# Patient Record
Sex: MALE | Race: BLACK OR AFRICAN AMERICAN | NOT HISPANIC OR LATINO | Employment: OTHER | URBAN - METROPOLITAN AREA
[De-identification: names, ages, dates, MRNs, and addresses within clinical notes are randomized per-mention and may not be internally consistent; named-entity substitution may affect disease eponyms.]

---

## 2023-05-30 PROBLEM — K42.9 UMBILICAL HERNIA: Status: ACTIVE | Noted: 2023-05-30

## 2023-05-30 PROBLEM — I10 BENIGN HYPERTENSION: Status: ACTIVE | Noted: 2023-05-30

## 2023-05-30 PROBLEM — R43.2 LOSS OF TASTE: Status: ACTIVE | Noted: 2023-05-30

## 2023-05-30 PROBLEM — L91.8 CUTANEOUS SKIN TAGS: Status: ACTIVE | Noted: 2023-05-30

## 2023-05-30 PROBLEM — R59.0 CERVICAL LYMPHADENOPATHY: Status: ACTIVE | Noted: 2023-05-30

## 2023-05-30 PROBLEM — R53.83 FATIGUE: Status: ACTIVE | Noted: 2023-05-30

## 2023-05-30 PROBLEM — R03.0 ELEVATED BLOOD PRESSURE READING: Status: ACTIVE | Noted: 2023-05-30

## 2023-05-30 PROBLEM — E55.9 VITAMIN D DEFICIENCY: Status: ACTIVE | Noted: 2023-05-30

## 2023-05-30 PROBLEM — J30.9 ALLERGIC RHINITIS: Status: ACTIVE | Noted: 2023-05-30

## 2023-05-30 RX ORDER — ERGOCALCIFEROL 1.25 MG/1
50000 CAPSULE ORAL
COMMUNITY
Start: 2021-03-08 | End: 2023-05-31 | Stop reason: ENTERED-IN-ERROR

## 2023-05-30 RX ORDER — AMLODIPINE BESYLATE 5 MG/1
1 TABLET ORAL DAILY
COMMUNITY
Start: 2022-05-23 | End: 2023-05-31 | Stop reason: ENTERED-IN-ERROR

## 2023-05-30 RX ORDER — SODIUM PICOSULFATE, MAGNESIUM OXIDE, AND ANHYDROUS CITRIC ACID 10; 3.5; 12 MG/160ML; G/160ML; G/160ML
LIQUID ORAL
COMMUNITY
Start: 2021-03-19 | End: 2023-05-31 | Stop reason: ENTERED-IN-ERROR

## 2023-05-30 RX ORDER — POLYETHYLENE GLYCOL 3350, SODIUM CHLORIDE, SODIUM BICARBONATE, POTASSIUM CHLORIDE 420; 11.2; 5.72; 1.48 G/4L; G/4L; G/4L; G/4L
POWDER, FOR SOLUTION ORAL
COMMUNITY
Start: 2022-06-02 | End: 2023-05-31 | Stop reason: ENTERED-IN-ERROR

## 2023-05-30 RX ORDER — FLUTICASONE PROPIONATE 50 MCG
1 SPRAY, SUSPENSION (ML) NASAL 2 TIMES DAILY
COMMUNITY
Start: 2022-05-23 | End: 2023-06-22 | Stop reason: ALTCHOICE

## 2023-05-31 ENCOUNTER — OFFICE VISIT (OUTPATIENT)
Dept: PRIMARY CARE | Facility: CLINIC | Age: 68
End: 2023-05-31
Payer: MEDICARE

## 2023-05-31 VITALS
HEIGHT: 66 IN | WEIGHT: 210 LBS | HEART RATE: 67 BPM | BODY MASS INDEX: 33.75 KG/M2 | SYSTOLIC BLOOD PRESSURE: 120 MMHG | DIASTOLIC BLOOD PRESSURE: 88 MMHG

## 2023-05-31 DIAGNOSIS — L50.6 ALLERGIC CONTACT URTICARIA: ICD-10-CM

## 2023-05-31 DIAGNOSIS — K42.9 UMBILICAL HERNIA WITHOUT OBSTRUCTION AND WITHOUT GANGRENE: ICD-10-CM

## 2023-05-31 DIAGNOSIS — I10 BENIGN HYPERTENSION: ICD-10-CM

## 2023-05-31 DIAGNOSIS — Z12.5 PROSTATE CANCER SCREENING: ICD-10-CM

## 2023-05-31 DIAGNOSIS — Z00.00 ROUTINE GENERAL MEDICAL EXAMINATION AT HEALTH CARE FACILITY: Primary | ICD-10-CM

## 2023-05-31 LAB
ALANINE AMINOTRANSFERASE (SGPT) (U/L) IN SER/PLAS: 9 U/L (ref 10–52)
ALBUMIN (G/DL) IN SER/PLAS: 4.4 G/DL (ref 3.4–5)
ALKALINE PHOSPHATASE (U/L) IN SER/PLAS: 85 U/L (ref 33–136)
ANION GAP IN SER/PLAS: 13 MMOL/L (ref 10–20)
ASPARTATE AMINOTRANSFERASE (SGOT) (U/L) IN SER/PLAS: 11 U/L (ref 9–39)
BASOPHILS (10*3/UL) IN BLOOD BY AUTOMATED COUNT: 0.11 X10E9/L (ref 0–0.1)
BASOPHILS/100 LEUKOCYTES IN BLOOD BY AUTOMATED COUNT: 0.6 % (ref 0–2)
BILIRUBIN TOTAL (MG/DL) IN SER/PLAS: 0.7 MG/DL (ref 0–1.2)
CALCIUM (MG/DL) IN SER/PLAS: 9.1 MG/DL (ref 8.6–10.6)
CARBON DIOXIDE, TOTAL (MMOL/L) IN SER/PLAS: 22 MMOL/L (ref 21–32)
CHLORIDE (MMOL/L) IN SER/PLAS: 104 MMOL/L (ref 98–107)
CHOLESTEROL (MG/DL) IN SER/PLAS: 92 MG/DL (ref 0–199)
CHOLESTEROL IN HDL (MG/DL) IN SER/PLAS: 35.8 MG/DL
CHOLESTEROL/HDL RATIO: 2.6
CREATININE (MG/DL) IN SER/PLAS: 1.03 MG/DL (ref 0.5–1.3)
EOSINOPHILS (10*3/UL) IN BLOOD BY AUTOMATED COUNT: 2.7 X10E9/L (ref 0–0.7)
EOSINOPHILS/100 LEUKOCYTES IN BLOOD BY AUTOMATED COUNT: 15 % (ref 0–6)
ERYTHROCYTE DISTRIBUTION WIDTH (RATIO) BY AUTOMATED COUNT: 13.7 % (ref 11.5–14.5)
ERYTHROCYTE MEAN CORPUSCULAR HEMOGLOBIN CONCENTRATION (G/DL) BY AUTOMATED: 31.3 G/DL (ref 32–36)
ERYTHROCYTE MEAN CORPUSCULAR VOLUME (FL) BY AUTOMATED COUNT: 97 FL (ref 80–100)
ERYTHROCYTES (10*6/UL) IN BLOOD BY AUTOMATED COUNT: 4.31 X10E12/L (ref 4.5–5.9)
GFR MALE: 79 ML/MIN/1.73M2
GLUCOSE (MG/DL) IN SER/PLAS: 64 MG/DL (ref 74–99)
HEMATOCRIT (%) IN BLOOD BY AUTOMATED COUNT: 41.6 % (ref 41–52)
HEMOGLOBIN (G/DL) IN BLOOD: 13 G/DL (ref 13.5–17.5)
IMMATURE GRANULOCYTES/100 LEUKOCYTES IN BLOOD BY AUTOMATED COUNT: 1.7 % (ref 0–0.9)
LDL: 44 MG/DL (ref 0–99)
LEUKOCYTES (10*3/UL) IN BLOOD BY AUTOMATED COUNT: 18 X10E9/L (ref 4.4–11.3)
LYMPHOCYTES (10*3/UL) IN BLOOD BY AUTOMATED COUNT: 1.67 X10E9/L (ref 1.2–4.8)
LYMPHOCYTES/100 LEUKOCYTES IN BLOOD BY AUTOMATED COUNT: 9.3 % (ref 13–44)
MONOCYTES (10*3/UL) IN BLOOD BY AUTOMATED COUNT: 2.52 X10E9/L (ref 0.1–1)
MONOCYTES/100 LEUKOCYTES IN BLOOD BY AUTOMATED COUNT: 14 % (ref 2–10)
NEUTROPHILS (10*3/UL) IN BLOOD BY AUTOMATED COUNT: 10.66 X10E9/L (ref 1.2–7.7)
NEUTROPHILS/100 LEUKOCYTES IN BLOOD BY AUTOMATED COUNT: 59.4 % (ref 40–80)
NRBC (PER 100 WBCS) BY AUTOMATED COUNT: 0 /100 WBC (ref 0–0)
PLATELETS (10*3/UL) IN BLOOD AUTOMATED COUNT: 234 X10E9/L (ref 150–450)
POTASSIUM (MMOL/L) IN SER/PLAS: 3.7 MMOL/L (ref 3.5–5.3)
PROSTATE SPECIFIC ANTIGEN,SCREEN: 0.66 NG/ML (ref 0–4)
PROTEIN TOTAL: 7.6 G/DL (ref 6.4–8.2)
SODIUM (MMOL/L) IN SER/PLAS: 135 MMOL/L (ref 136–145)
TRIGLYCERIDE (MG/DL) IN SER/PLAS: 60 MG/DL (ref 0–149)
UREA NITROGEN (MG/DL) IN SER/PLAS: 12 MG/DL (ref 6–23)
VLDL: 12 MG/DL (ref 0–40)

## 2023-05-31 PROCEDURE — 1160F RVW MEDS BY RX/DR IN RCRD: CPT | Performed by: NURSE PRACTITIONER

## 2023-05-31 PROCEDURE — 1123F ACP DISCUSS/DSCN MKR DOCD: CPT | Performed by: NURSE PRACTITIONER

## 2023-05-31 PROCEDURE — 84153 ASSAY OF PSA TOTAL: CPT

## 2023-05-31 PROCEDURE — G0439 PPPS, SUBSEQ VISIT: HCPCS | Performed by: NURSE PRACTITIONER

## 2023-05-31 PROCEDURE — 80053 COMPREHEN METABOLIC PANEL: CPT

## 2023-05-31 PROCEDURE — 1170F FXNL STATUS ASSESSED: CPT | Performed by: NURSE PRACTITIONER

## 2023-05-31 PROCEDURE — 3074F SYST BP LT 130 MM HG: CPT | Performed by: NURSE PRACTITIONER

## 2023-05-31 PROCEDURE — 3079F DIAST BP 80-89 MM HG: CPT | Performed by: NURSE PRACTITIONER

## 2023-05-31 PROCEDURE — 1036F TOBACCO NON-USER: CPT | Performed by: NURSE PRACTITIONER

## 2023-05-31 PROCEDURE — 80061 LIPID PANEL: CPT

## 2023-05-31 PROCEDURE — G0444 DEPRESSION SCREEN ANNUAL: HCPCS | Performed by: NURSE PRACTITIONER

## 2023-05-31 PROCEDURE — 99214 OFFICE O/P EST MOD 30 MIN: CPT | Performed by: NURSE PRACTITIONER

## 2023-05-31 PROCEDURE — 1159F MED LIST DOCD IN RCRD: CPT | Performed by: NURSE PRACTITIONER

## 2023-05-31 PROCEDURE — 85025 COMPLETE CBC W/AUTO DIFF WBC: CPT

## 2023-05-31 PROCEDURE — 82652 VIT D 1 25-DIHYDROXY: CPT

## 2023-05-31 RX ORDER — PREDNISONE 10 MG/1
TABLET ORAL
Qty: 30 TABLET | Refills: 0 | Status: SHIPPED | OUTPATIENT
Start: 2023-05-31 | End: 2023-06-21 | Stop reason: ALTCHOICE

## 2023-05-31 RX ORDER — AMLODIPINE BESYLATE 5 MG/1
5 TABLET ORAL DAILY
Qty: 90 TABLET | Refills: 1 | Status: SHIPPED | OUTPATIENT
Start: 2023-05-31 | End: 2023-11-07 | Stop reason: ALTCHOICE

## 2023-05-31 RX ORDER — CETIRIZINE HYDROCHLORIDE 10 MG/1
10 TABLET ORAL DAILY
Qty: 30 TABLET | Refills: 2 | Status: SHIPPED | OUTPATIENT
Start: 2023-05-31 | End: 2023-06-02

## 2023-05-31 ASSESSMENT — ACTIVITIES OF DAILY LIVING (ADL)
DOING_HOUSEWORK: INDEPENDENT
BATHING: INDEPENDENT
EATING: INDEPENDENT
WALKS IN HOME: INDEPENDENT
TAKING MEDICATION: INDEPENDENT
GROCERY SHOPPING: INDEPENDENT
MANAGING_FINANCES: INDEPENDENT
PREPARING MEALS: INDEPENDENT
STIL DRIVING: YES
DRESSING: INDEPENDENT
PILL BOX USED: NO
USING TELEPHONE: INDEPENDENT
HEARING - LEFT EAR: FUNCTIONAL
USING TRANSPORTATION: INDEPENDENT
HEARING - RIGHT EAR: FUNCTIONAL
FEEDING YOURSELF: INDEPENDENT
BATHING: INDEPENDENT
ADEQUATE_TO_COMPLETE_ADL: YES
DRESSING YOURSELF: INDEPENDENT
NEEDS ASSISTANCE WITH FOOD: INDEPENDENT
JUDGMENT_ADEQUATE_SAFELY_COMPLETE_DAILY_ACTIVITIES: YES
TOILETING: INDEPENDENT
MANAGING FINANCES: INDEPENDENT
TAKING_MEDICATION: INDEPENDENT
PATIENT'S MEMORY ADEQUATE TO SAFELY COMPLETE DAILY ACTIVITIES?: YES
DOING HOUSEWORK: INDEPENDENT
GROCERY_SHOPPING: INDEPENDENT
GROOMING: INDEPENDENT

## 2023-05-31 ASSESSMENT — ENCOUNTER SYMPTOMS
OCCASIONAL FEELINGS OF UNSTEADINESS: 0
DEPRESSION: 0
LOSS OF SENSATION IN FEET: 0
URTICARIA: 1

## 2023-05-31 ASSESSMENT — PATIENT HEALTH QUESTIONNAIRE - PHQ9
SUM OF ALL RESPONSES TO PHQ9 QUESTIONS 1 AND 2: 0
2. FEELING DOWN, DEPRESSED OR HOPELESS: NOT AT ALL
1. LITTLE INTEREST OR PLEASURE IN DOING THINGS: NOT AT ALL
1. LITTLE INTEREST OR PLEASURE IN DOING THINGS: NOT AT ALL
SUM OF ALL RESPONSES TO PHQ9 QUESTIONS 1 AND 2: 0
2. FEELING DOWN, DEPRESSED OR HOPELESS: NOT AT ALL

## 2023-05-31 ASSESSMENT — ANXIETY QUESTIONNAIRES
6. BECOMING EASILY ANNOYED OR IRRITABLE: NOT AT ALL
1. FEELING NERVOUS, ANXIOUS, OR ON EDGE: NOT AT ALL
2. NOT BEING ABLE TO STOP OR CONTROL WORRYING: NOT AT ALL
7. FEELING AFRAID AS IF SOMETHING AWFUL MIGHT HAPPEN: NOT AT ALL
5. BEING SO RESTLESS THAT IT IS HARD TO SIT STILL: NOT AT ALL
GAD7 TOTAL SCORE: 0
4. TROUBLE RELAXING: NOT AT ALL
3. WORRYING TOO MUCH ABOUT DIFFERENT THINGS: NOT AT ALL

## 2023-05-31 ASSESSMENT — COLUMBIA-SUICIDE SEVERITY RATING SCALE - C-SSRS
2. HAVE YOU ACTUALLY HAD ANY THOUGHTS OF KILLING YOURSELF?: NO
6. HAVE YOU EVER DONE ANYTHING, STARTED TO DO ANYTHING, OR PREPARED TO DO ANYTHING TO END YOUR LIFE?: NO
1. IN THE PAST MONTH, HAVE YOU WISHED YOU WERE DEAD OR WISHED YOU COULD GO TO SLEEP AND NOT WAKE UP?: NO

## 2023-05-31 NOTE — PROGRESS NOTES
"Subjective   Reason for Visit: Jin Reynolds is an 68 y.o. male here for a Medicare Wellness visit.          Reviewed all medications by prescribing practitioner or clinical pharmacist (such as prescriptions, OTCs, herbal therapies and supplements) and documented in the medical record.    Jin is a 68 year old male who presents to the office today for a Medicare Wellness visit. Stated he developed allergies/hives approximately one week ago. Has itchy hives.    Umbilical hernia has gotten large. He denied pain with hernia.    Hives    Rash  This is a new problem. The current episode started in the past 7 days. The problem has been gradually improving since onset. The rash is diffuse. The rash is characterized by redness, swelling and itchiness. It is unknown if there was an exposure to a precipitant.       Patient Care Team:  ROSA Bone-ROLY as PCP - General  Clive Hernandez MD as PCP - [employer] PCP     Review of Systems   Skin:  Positive for rash.   All other systems reviewed and are negative.      Objective   Vitals:  BP (!) 172/100   Pulse 67   Ht 1.676 m (5' 6\")   Wt 95.3 kg (210 lb)   BMI 33.89 kg/m²       Physical Exam  Constitutional:       Appearance: Normal appearance.   HENT:      Head: Normocephalic and atraumatic.      Right Ear: Tympanic membrane, ear canal and external ear normal.      Left Ear: Tympanic membrane, ear canal and external ear normal.      Nose: Nose normal.      Mouth/Throat:      Mouth: Mucous membranes are moist.      Pharynx: Oropharynx is clear.   Eyes:      Extraocular Movements: Extraocular movements intact.      Conjunctiva/sclera: Conjunctivae normal.      Pupils: Pupils are equal, round, and reactive to light.   Cardiovascular:      Rate and Rhythm: Normal rate and regular rhythm.   Pulmonary:      Effort: Pulmonary effort is normal.      Breath sounds: Normal breath sounds.   Abdominal:      General: Abdomen is flat. Bowel sounds are normal.      " Palpations: Abdomen is soft.   Musculoskeletal:         General: Normal range of motion.      Cervical back: Normal range of motion.   Skin:     General: Skin is warm and dry.      Comments: Hive like rash to face, posterior neck, arms and thighs.    Neurological:      General: No focal deficit present.      Mental Status: He is alert and oriented to person, place, and time. Mental status is at baseline.   Psychiatric:         Mood and Affect: Mood normal.         Behavior: Behavior normal.         Thought Content: Thought content normal.         Judgment: Judgment normal.         Assessment/Plan   Problem List Items Addressed This Visit          Circulatory    Benign hypertension    Relevant Medications    amLODIPine (Norvasc) 5 mg tablet    Other Relevant Orders    CBC and Auto Differential    Comprehensive Metabolic Panel    Lipid Panel       Digestive    Umbilical hernia    Relevant Orders    Referral to General Surgery     Other Visit Diagnoses       Routine general medical examination at health care facility    -  Primary    Relevant Orders    Vitamin D 1,25 Dihydroxy    Allergic contact urticaria        Relevant Medications    cetirizine (ZyrTEC) 10 mg tablet    predniSONE (Deltasone) 10 mg tablet    Prostate cancer screening        Relevant Orders    Prostate Specific Antigen, Screen        1) Hypertension: BP rechecked 120/88. Restart Amlodipine 5 mg daily. Monitor daily sodium intake. Blood work ordered.    2) Umbilical hernia: referral placed for general surgery (Dr. Vergara); patient known to Dr. Vergara.    3) Hives: likely allergic dermatitis. You were prescribed tapering dose of Prednisone and Cetirizine.     4) Medicare wellness: discussed living will/healthcare POA. You were provided with forms to complete and bring copy for your chart once completed.

## 2023-06-02 RX ORDER — HYDROXYZINE HYDROCHLORIDE 25 MG/1
25 TABLET, FILM COATED ORAL 2 TIMES DAILY
Qty: 30 TABLET | Refills: 0 | Status: SHIPPED | OUTPATIENT
Start: 2023-06-02 | End: 2023-06-21 | Stop reason: ALTCHOICE

## 2023-06-03 LAB — VITAMIN D 1,25-DIHYDROXY: 53.4 PG/ML (ref 19.9–79.3)

## 2023-06-09 ENCOUNTER — TELEPHONE (OUTPATIENT)
Dept: PRIMARY CARE | Facility: CLINIC | Age: 68
End: 2023-06-09
Payer: MEDICARE

## 2023-06-09 NOTE — TELEPHONE ENCOUNTER
Pt daughter wanted you to know he is admitted at [facility] with a blood infection and you can call her with any questions.

## 2023-06-16 ENCOUNTER — PATIENT OUTREACH (OUTPATIENT)
Dept: PRIMARY CARE | Facility: CLINIC | Age: 68
End: 2023-06-16
Payer: MEDICARE

## 2023-06-16 DIAGNOSIS — K92.2 GASTROINTESTINAL HEMORRHAGE, UNSPECIFIED GASTROINTESTINAL HEMORRHAGE TYPE: ICD-10-CM

## 2023-06-16 RX ORDER — PANTOPRAZOLE SODIUM 40 MG/1
40 TABLET, DELAYED RELEASE ORAL
COMMUNITY

## 2023-06-16 RX ORDER — FOLIC ACID 1 MG/1
1 TABLET ORAL DAILY
COMMUNITY
End: 2023-11-07 | Stop reason: ALTCHOICE

## 2023-06-16 NOTE — PROGRESS NOTES
Discharge Facility:[facility]  Discharge Diagnosis:GI Bleed  Admission Date:6/9/23  Discharge Date: 6/15/23    PCP Appointment Date:6/21/23  Specialist Appointment Date: GI 7/17/23/ Cardiology   Hospital Encounter and Summary: Linked   See discharge assessment below for further details  Engagement  Call Start Time: 1224 (6/16/2023 12:35 PM)    Medications  Medications reviewed with patient/caregiver?: Yes (6/16/2023 12:35 PM)  Is the patient having any side effects they believe may be caused by any medication additions or changes?: No (6/16/2023 12:35 PM)  Does the patient have all medications ordered at discharge?: Yes (6/16/2023 12:35 PM)  Prescription Comments: see med list (6/16/2023 12:35 PM)  Is the patient taking all medications as directed (includes completed medication regime)?: Yes (6/16/2023 12:35 PM)  Medication Comments: see med list (6/16/2023 12:35 PM)    Appointments  Does the patient have a primary care provider?: Yes (6/16/2023 12:35 PM)  Care Management Interventions: Verified appointment date/time/provider; Educated patient on importance of making appointment (Patient has a follow up visit) (6/16/2023 12:35 PM)  Has the patient kept scheduled appointments due by today?: Yes (6/16/2023 12:35 PM)  Care Management Interventions: Advised patient to keep appointment; Educated on importance of keeping appointment (6/16/2023 12:35 PM)    Patient Teaching  Does the patient have access to their discharge instructions?: Yes (6/16/2023 12:35 PM)  Care Management Interventions: Reviewed instructions with patient (6/16/2023 12:35 PM)  What is the patient's perception of their health status since discharge?: Improving (6/16/2023 12:35 PM)  Is the patient/caregiver able to teach back the hierarchy of who to call/visit for symptoms/problems? PCP, Specialist, Home Health nurse, Urgent Care, ED, 911: Yes (6/16/2023 12:35 PM)

## 2023-06-21 ENCOUNTER — OFFICE VISIT (OUTPATIENT)
Dept: PRIMARY CARE | Facility: CLINIC | Age: 68
End: 2023-06-21
Payer: MEDICARE

## 2023-06-21 VITALS
BODY MASS INDEX: 29.4 KG/M2 | SYSTOLIC BLOOD PRESSURE: 140 MMHG | WEIGHT: 194 LBS | DIASTOLIC BLOOD PRESSURE: 80 MMHG | HEIGHT: 68 IN

## 2023-06-21 DIAGNOSIS — I10 BENIGN HYPERTENSION: Primary | ICD-10-CM

## 2023-06-21 DIAGNOSIS — K25.4 GASTROINTESTINAL HEMORRHAGE ASSOCIATED WITH GASTRIC ULCER: ICD-10-CM

## 2023-06-21 DIAGNOSIS — D72.829 LEUKOCYTOSIS, UNSPECIFIED TYPE: ICD-10-CM

## 2023-06-21 DIAGNOSIS — R79.89 ELEVATED SERUM CREATININE: ICD-10-CM

## 2023-06-21 LAB
ALANINE AMINOTRANSFERASE (SGPT) (U/L) IN SER/PLAS: 10 U/L (ref 10–52)
ALBUMIN (G/DL) IN SER/PLAS: 4.1 G/DL (ref 3.4–5)
ALKALINE PHOSPHATASE (U/L) IN SER/PLAS: 123 U/L (ref 33–136)
ANION GAP IN SER/PLAS: 14 MMOL/L (ref 10–20)
ASPARTATE AMINOTRANSFERASE (SGOT) (U/L) IN SER/PLAS: 10 U/L (ref 9–39)
BASOPHILS (10*3/UL) IN BLOOD BY AUTOMATED COUNT: 0.14 X10E9/L (ref 0–0.1)
BASOPHILS/100 LEUKOCYTES IN BLOOD BY AUTOMATED COUNT: 1 % (ref 0–2)
BILIRUBIN TOTAL (MG/DL) IN SER/PLAS: 0.4 MG/DL (ref 0–1.2)
CALCIUM (MG/DL) IN SER/PLAS: 9.2 MG/DL (ref 8.6–10.6)
CARBON DIOXIDE, TOTAL (MMOL/L) IN SER/PLAS: 22 MMOL/L (ref 21–32)
CHLORIDE (MMOL/L) IN SER/PLAS: 105 MMOL/L (ref 98–107)
CREATININE (MG/DL) IN SER/PLAS: 1.18 MG/DL (ref 0.5–1.3)
EOSINOPHILS (10*3/UL) IN BLOOD BY AUTOMATED COUNT: 1.63 X10E9/L (ref 0–0.7)
EOSINOPHILS/100 LEUKOCYTES IN BLOOD BY AUTOMATED COUNT: 11.5 % (ref 0–6)
ERYTHROCYTE DISTRIBUTION WIDTH (RATIO) BY AUTOMATED COUNT: 15.8 % (ref 11.5–14.5)
ERYTHROCYTE MEAN CORPUSCULAR HEMOGLOBIN CONCENTRATION (G/DL) BY AUTOMATED: 29.9 G/DL (ref 32–36)
ERYTHROCYTE MEAN CORPUSCULAR VOLUME (FL) BY AUTOMATED COUNT: 96 FL (ref 80–100)
ERYTHROCYTES (10*6/UL) IN BLOOD BY AUTOMATED COUNT: 3.79 X10E12/L (ref 4.5–5.9)
GFR MALE: 67 ML/MIN/1.73M2
GLUCOSE (MG/DL) IN SER/PLAS: 84 MG/DL (ref 74–99)
HEMATOCRIT (%) IN BLOOD BY AUTOMATED COUNT: 36.4 % (ref 41–52)
HEMOGLOBIN (G/DL) IN BLOOD: 10.9 G/DL (ref 13.5–17.5)
IMMATURE GRANULOCYTES/100 LEUKOCYTES IN BLOOD BY AUTOMATED COUNT: 0.4 % (ref 0–0.9)
LEUKOCYTES (10*3/UL) IN BLOOD BY AUTOMATED COUNT: 14.1 X10E9/L (ref 4.4–11.3)
LYMPHOCYTES (10*3/UL) IN BLOOD BY AUTOMATED COUNT: 1.45 X10E9/L (ref 1.2–4.8)
LYMPHOCYTES/100 LEUKOCYTES IN BLOOD BY AUTOMATED COUNT: 10.3 % (ref 13–44)
MONOCYTES (10*3/UL) IN BLOOD BY AUTOMATED COUNT: 2.09 X10E9/L (ref 0.1–1)
MONOCYTES/100 LEUKOCYTES IN BLOOD BY AUTOMATED COUNT: 14.8 % (ref 2–10)
NEUTROPHILS (10*3/UL) IN BLOOD BY AUTOMATED COUNT: 8.75 X10E9/L (ref 1.2–7.7)
NEUTROPHILS/100 LEUKOCYTES IN BLOOD BY AUTOMATED COUNT: 62 % (ref 40–80)
NRBC (PER 100 WBCS) BY AUTOMATED COUNT: 0 /100 WBC (ref 0–0)
PLATELETS (10*3/UL) IN BLOOD AUTOMATED COUNT: 290 X10E9/L (ref 150–450)
POTASSIUM (MMOL/L) IN SER/PLAS: 4.1 MMOL/L (ref 3.5–5.3)
PROTEIN TOTAL: 7.2 G/DL (ref 6.4–8.2)
SODIUM (MMOL/L) IN SER/PLAS: 137 MMOL/L (ref 136–145)
UREA NITROGEN (MG/DL) IN SER/PLAS: 19 MG/DL (ref 6–23)

## 2023-06-21 PROCEDURE — 3079F DIAST BP 80-89 MM HG: CPT | Performed by: NURSE PRACTITIONER

## 2023-06-21 PROCEDURE — 80053 COMPREHEN METABOLIC PANEL: CPT

## 2023-06-21 PROCEDURE — 99495 TRANSJ CARE MGMT MOD F2F 14D: CPT | Performed by: NURSE PRACTITIONER

## 2023-06-21 PROCEDURE — 1160F RVW MEDS BY RX/DR IN RCRD: CPT | Performed by: NURSE PRACTITIONER

## 2023-06-21 PROCEDURE — 3077F SYST BP >= 140 MM HG: CPT | Performed by: NURSE PRACTITIONER

## 2023-06-21 PROCEDURE — 1036F TOBACCO NON-USER: CPT | Performed by: NURSE PRACTITIONER

## 2023-06-21 PROCEDURE — 85025 COMPLETE CBC W/AUTO DIFF WBC: CPT

## 2023-06-21 PROCEDURE — 1159F MED LIST DOCD IN RCRD: CPT | Performed by: NURSE PRACTITIONER

## 2023-06-21 ASSESSMENT — ENCOUNTER SYMPTOMS
LOSS OF SENSATION IN FEET: 0
DEPRESSION: 0
OCCASIONAL FEELINGS OF UNSTEADINESS: 0

## 2023-06-21 NOTE — PROGRESS NOTES
Subjective   Patient ID: Jin Reynolds is a 68 y.o. male who presents for Hospital Follow-up (Blood infection).    Jin presents to the office today for a follow-up from hospitalization. Was admitted to [facility] on 6/6 with seizure-like activity and evidence of UGIB (melena, fatigue, dizziness w/ syncope, anemia to (13 baseline)). He underwent EGD on 6/9 w/ non-bleeding grade D esophagitis and non-bleeding duodenal ulcers x2- one ulcer with clot now s/p clipping and cauterization. Ulcer etiology most likely NSAID use for back pain (herniated disc). No further episodes of melena s/p intervention. Fatigue and dizziness improved s/p 2U pRBCs. Found to have leukocytosis with WBC 95,000; recent steroid use (days) for pruritic rash (/2 rash bx w/ extravascular neutrophilia and eosinophilia; intact blood vessels) that has since downtrended to 26 pm 6/11 s/p steroid DC on 6/6 and IV abx   (zosyn 6/1-6/11). Pruritic rash resolved by 6/11- developed over Memorial day weekend spent in back yard so perhaps rash was d/t reaction to bug bite. A CT head was negative for intracranial abnormality. EEG was performed with no epileptiform discharges seen on routine. An MRI was performed that was negative for any intercranial abnormality. Neuro was consulted due to concern for seizure at home and signed off with likely diagnosis of convulsive syncope. He had two episodes of nonsustained VTACH (13 beat and 25 beats) in back to back nights. He was asx and remained hemodynamically stable. A TTE was performed that showed EF of 55-60% with no WMA but severe LA dilation. His labs and   vitals signs remained stable and he is stable for discharge to home with GI and PCP follow up. He was discharged with a Zio patch and instructed to   follow up with cardiology. Follow-up appt with cardiology tomorrow at 2:20p.    Admitted he is feeling much better.       Review of Systems   All other systems reviewed and are negative.      Objective   Ht 1.727  "m (5' 8\")   Wt 88 kg (194 lb)   BMI 29.50 kg/m²     Physical Exam  Constitutional:       Appearance: Normal appearance.   HENT:      Head: Normocephalic and atraumatic.      Right Ear: Tympanic membrane, ear canal and external ear normal.      Left Ear: Tympanic membrane, ear canal and external ear normal.      Nose: Nose normal.      Mouth/Throat:      Mouth: Mucous membranes are moist.      Pharynx: Oropharynx is clear.   Eyes:      Extraocular Movements: Extraocular movements intact.      Conjunctiva/sclera: Conjunctivae normal.      Pupils: Pupils are equal, round, and reactive to light.   Cardiovascular:      Rate and Rhythm: Normal rate and regular rhythm.   Pulmonary:      Effort: Pulmonary effort is normal.      Breath sounds: Normal breath sounds.   Abdominal:      General: Abdomen is flat. Bowel sounds are normal.      Palpations: Abdomen is soft.   Musculoskeletal:         General: Normal range of motion.      Cervical back: Normal range of motion.   Skin:     General: Skin is warm and dry.   Neurological:      General: No focal deficit present.      Mental Status: He is alert and oriented to person, place, and time. Mental status is at baseline.   Psychiatric:         Mood and Affect: Mood normal.         Behavior: Behavior normal.         Thought Content: Thought content normal.         Judgment: Judgment normal.         Assessment/Plan   Problem List Items Addressed This Visit          Circulatory    Benign hypertension - Primary     Other Visit Diagnoses       Leukocytosis, unspecified type        Relevant Orders    CBC and Auto Differential (Completed)    Elevated serum creatinine        Relevant Orders    Comprehensive Metabolic Panel (Completed)    Gastrointestinal hemorrhage associated with gastric ulcer              1) GI bleed: stable. Avoid NSAIDs. If back pain persists, consider pain management referral.    2) Leukocytosis: CBC ordered.     3) Elevated serum creatinine: BMP ordered. Make " sure you are staying well hydrated with fluids. Avoid nephrotoxic medications such as Aleve, Motrin, Ibuprofen and Advil.    4) Hypertension: stable. Continue Amlodipine. Monitor daily sodium intake.

## 2023-06-27 ENCOUNTER — TELEPHONE (OUTPATIENT)
Dept: PRIMARY CARE | Facility: CLINIC | Age: 68
End: 2023-06-27
Payer: MEDICARE

## 2023-06-28 ENCOUNTER — OFFICE VISIT (OUTPATIENT)
Dept: PRIMARY CARE | Facility: CLINIC | Age: 68
End: 2023-06-28
Payer: MEDICARE

## 2023-06-28 VITALS
DIASTOLIC BLOOD PRESSURE: 83 MMHG | HEART RATE: 77 BPM | BODY MASS INDEX: 29.25 KG/M2 | HEIGHT: 68 IN | SYSTOLIC BLOOD PRESSURE: 121 MMHG | WEIGHT: 193 LBS

## 2023-06-28 DIAGNOSIS — L50.9 HIVES OF UNKNOWN ORIGIN: Primary | ICD-10-CM

## 2023-06-28 LAB — SEDIMENTATION RATE, ERYTHROCYTE: 38 MM/H (ref 0–20)

## 2023-06-28 PROCEDURE — 85652 RBC SED RATE AUTOMATED: CPT

## 2023-06-28 PROCEDURE — 1160F RVW MEDS BY RX/DR IN RCRD: CPT | Performed by: NURSE PRACTITIONER

## 2023-06-28 PROCEDURE — 86038 ANTINUCLEAR ANTIBODIES: CPT

## 2023-06-28 PROCEDURE — 1159F MED LIST DOCD IN RCRD: CPT | Performed by: NURSE PRACTITIONER

## 2023-06-28 PROCEDURE — 3079F DIAST BP 80-89 MM HG: CPT | Performed by: NURSE PRACTITIONER

## 2023-06-28 PROCEDURE — 3074F SYST BP LT 130 MM HG: CPT | Performed by: NURSE PRACTITIONER

## 2023-06-28 PROCEDURE — 1036F TOBACCO NON-USER: CPT | Performed by: NURSE PRACTITIONER

## 2023-06-28 PROCEDURE — 99214 OFFICE O/P EST MOD 30 MIN: CPT | Performed by: NURSE PRACTITIONER

## 2023-06-28 RX ORDER — PREDNISONE 10 MG/1
TABLET ORAL
Qty: 20 TABLET | Refills: 0 | Status: SHIPPED | OUTPATIENT
Start: 2023-06-28 | End: 2023-11-07 | Stop reason: ALTCHOICE

## 2023-06-28 RX ORDER — HYDROXYZINE HYDROCHLORIDE 25 MG/1
25 TABLET, FILM COATED ORAL EVERY 8 HOURS PRN
Qty: 60 TABLET | Refills: 0 | Status: SHIPPED | OUTPATIENT
Start: 2023-06-28 | End: 2023-11-07 | Stop reason: ALTCHOICE

## 2023-06-28 ASSESSMENT — PATIENT HEALTH QUESTIONNAIRE - PHQ9
2. FEELING DOWN, DEPRESSED OR HOPELESS: NOT AT ALL
SUM OF ALL RESPONSES TO PHQ9 QUESTIONS 1 AND 2: 0
1. LITTLE INTEREST OR PLEASURE IN DOING THINGS: NOT AT ALL

## 2023-06-28 ASSESSMENT — ENCOUNTER SYMPTOMS: URTICARIA: 1

## 2023-06-28 NOTE — PROGRESS NOTES
"Subjective   Patient ID: Jin Reynolds is a 68 y.o. male who presents for Hives and Itching.    Jin presents to the office today with concerns of recurrent hives and itching. No new medications, soaps, lotions or detergents. Stated he had skin biopsy while hospitalized. Punch biopsy showed neutrophilic and eosinophilic     Hives  This is a recurrent problem.        Review of Systems   All other systems reviewed and are negative.      Objective   /83   Pulse 77   Ht 1.727 m (5' 8\")   Wt 87.5 kg (193 lb)   BMI 29.35 kg/m²     Physical Exam  Constitutional:       Appearance: Normal appearance.   HENT:      Head: Normocephalic and atraumatic.      Right Ear: Tympanic membrane, ear canal and external ear normal.      Left Ear: Tympanic membrane, ear canal and external ear normal.      Nose: Nose normal.      Mouth/Throat:      Mouth: Mucous membranes are moist.      Pharynx: Oropharynx is clear.   Eyes:      Extraocular Movements: Extraocular movements intact.      Conjunctiva/sclera: Conjunctivae normal.      Pupils: Pupils are equal, round, and reactive to light.   Cardiovascular:      Rate and Rhythm: Normal rate and regular rhythm.   Pulmonary:      Effort: Pulmonary effort is normal.      Breath sounds: Normal breath sounds.   Abdominal:      General: Abdomen is flat. Bowel sounds are normal.      Palpations: Abdomen is soft.   Musculoskeletal:         General: Normal range of motion.      Cervical back: Normal range of motion.   Skin:     General: Skin is warm and dry.      Comments: Skin with erythematous rash/hives.    Neurological:      General: No focal deficit present.      Mental Status: He is alert and oriented to person, place, and time. Mental status is at baseline.   Psychiatric:         Mood and Affect: Mood normal.         Behavior: Behavior normal.         Thought Content: Thought content normal.         Judgment: Judgment normal.         Assessment/Plan   Problem List Items Addressed This " Visit    None  Visit Diagnoses       Hives of unknown origin    -  Primary    Relevant Medications    predniSONE (Deltasone) 10 mg tablet    hydrOXYzine HCL (Atarax) 25 mg tablet    Other Relevant Orders    PEDRO with Reflex to RANDI (Completed)    Sedimentation Rate (Completed)          1) Hives unknown origin: blood work ordered (PEDRO and sed rate). You were prescribed tapering dose of Prednisone along with Hydroxyzine. Referral placed for dermatology.

## 2023-06-29 LAB — ANTI-NUCLEAR ANTIBODY (ANA): NEGATIVE

## 2023-07-21 ENCOUNTER — APPOINTMENT (OUTPATIENT)
Dept: LAB | Facility: LAB | Age: 68
End: 2023-07-21
Payer: MEDICARE

## 2023-07-21 LAB
BASOPHILS (10*3/UL) IN BLOOD BY AUTOMATED COUNT: 0.13 X10E9/L (ref 0–0.1)
BASOPHILS/100 LEUKOCYTES IN BLOOD BY AUTOMATED COUNT: 0.8 % (ref 0–2)
COMPLEMENT C3 (MG/DL) IN SER/PLAS: 77 MG/DL (ref 87–200)
COMPLEMENT C4 (MG/DL) IN SER/PLAS: 32 MG/DL (ref 10–50)
EOSINOPHILS (10*3/UL) IN BLOOD BY AUTOMATED COUNT: 2.34 X10E9/L (ref 0–0.7)
EOSINOPHILS/100 LEUKOCYTES IN BLOOD BY AUTOMATED COUNT: 13.6 % (ref 0–6)
ERYTHROCYTE DISTRIBUTION WIDTH (RATIO) BY AUTOMATED COUNT: 14.7 % (ref 11.5–14.5)
ERYTHROCYTE MEAN CORPUSCULAR HEMOGLOBIN CONCENTRATION (G/DL) BY AUTOMATED: 30.4 G/DL (ref 32–36)
ERYTHROCYTE MEAN CORPUSCULAR VOLUME (FL) BY AUTOMATED COUNT: 89 FL (ref 80–100)
ERYTHROCYTES (10*6/UL) IN BLOOD BY AUTOMATED COUNT: 4.29 X10E12/L (ref 4.5–5.9)
HEMATOCRIT (%) IN BLOOD BY AUTOMATED COUNT: 38.2 % (ref 41–52)
HEMOGLOBIN (G/DL) IN BLOOD: 11.6 G/DL (ref 13.5–17.5)
IMMATURE GRANULOCYTES/100 LEUKOCYTES IN BLOOD BY AUTOMATED COUNT: 1.1 % (ref 0–0.9)
LEUKOCYTES (10*3/UL) IN BLOOD BY AUTOMATED COUNT: 17.2 X10E9/L (ref 4.4–11.3)
LYMPHOCYTES (10*3/UL) IN BLOOD BY AUTOMATED COUNT: 1.46 X10E9/L (ref 1.2–4.8)
LYMPHOCYTES/100 LEUKOCYTES IN BLOOD BY AUTOMATED COUNT: 8.5 % (ref 13–44)
MONOCYTES (10*3/UL) IN BLOOD BY AUTOMATED COUNT: 2.03 X10E9/L (ref 0.1–1)
MONOCYTES/100 LEUKOCYTES IN BLOOD BY AUTOMATED COUNT: 11.8 % (ref 2–10)
NEUTROPHILS (10*3/UL) IN BLOOD BY AUTOMATED COUNT: 11.09 X10E9/L (ref 1.2–7.7)
NEUTROPHILS/100 LEUKOCYTES IN BLOOD BY AUTOMATED COUNT: 64.2 % (ref 40–80)
NRBC (PER 100 WBCS) BY AUTOMATED COUNT: 0 /100 WBC (ref 0–0)
PLATELETS (10*3/UL) IN BLOOD AUTOMATED COUNT: 413 X10E9/L (ref 150–450)
RHEUMATOID FACTOR (IU/ML) IN SERUM OR PLASMA: 12 IU/ML (ref 0–15)
SEDIMENTATION RATE, ERYTHROCYTE: 63 MM/H (ref 0–20)

## 2023-07-22 LAB
ALLERGEN CHICKEN MEAT IGE: <0.1 KU/L
ALLERGEN FOOD: CLAM (RUDITAPES SPP.) IGE (KU/L): <0.1 KU/L
ALLERGEN FOOD: EGG WHITE IGE (KU/L): <0.1 KU/L
ALLERGEN FOOD: FISH (COD) GADUS MORHUA) IGE (KU/L): <0.1 KU/L
ALLERGEN FOOD: MAIZE, CORN (ZEA MAYS) IGE (KU/L): <0.1 KU/L
ALLERGEN FOOD: MILK IGE (KU/L): <0.1 KU/L
ALLERGEN FOOD: PEANUT (ARACHIS HYPOGAEA) IGE (KU/L): <0.1 KU/L
ALLERGEN FOOD: POTATO (SOLANUM TUBEROSUM) IGE (KU/L): <0.1 KU/L
ALLERGEN FOOD: SCALLOP (PECTEN SPP.) IGE (KU/L): <0.1 KU/L
ALLERGEN FOOD: SESAME SEED (SESAMUM INDICUM) IGE (KU/L): <0.1 KU/L
ALLERGEN FOOD: SHRIMP (P. BOREALIS/MONODON, M. BARBATA/JOYNERI) IGE (KU/L): <0.1 KU/L
ALLERGEN FOOD: SOYBEAN (GLYCINE MAX) IGE (KU/L): <0.1 KU/L
ALLERGEN FOOD: STRAWBERRY (FRAGARIA VESCA) IGE (KU/L): <0.1 KU/L
ALLERGEN FOOD: WALNUT (JUGLANS SPP.) IGE (KU/L): <0.1 KU/L
ALLERGEN FOOD: WHEAT (TRITICUM AESTIVUM) IGE (KU/L): <0.1 KU/L
ALLERGEN OCCUPATIONAL: LATEX (HEVEA BRASILIENSIS) IGE (KU/L): <0.1 KU/L
IMMUNOCAP INTERPRETATION: NORMAL
Lab: <0.1 KU/L
Lab: <0.1 KU/L

## 2023-07-24 LAB — ANTI-NUCLEAR ANTIBODY (ANA): NEGATIVE

## 2023-07-26 LAB
ALLERGEN FOOD: PINEAPPLE (ANANAS COMOSUS) IGE (KU/L): <0.1 KU/L
ALLERGEN FOOD: PORK (SUS SPP.) IGE (KU/L): <0.1 KU/L
IMMUNOCAP INTERPRETATION (ARUP): NORMAL
Lab: <0.1 KU/L

## 2023-07-27 LAB
ALLERGEN RICE IGG: 12.6 MCG/ML
CLASS ARA H1, VIRC: 0
CLASS ARA H2, VIRC: 0
CLASS ARA H3, VIRC: 0
CLASS ARA H8, VIRC: 0
CLASS ARA H9, VIRC: 0
Lab: 12.1 MCG/ML
Lab: 18 MCG/ML
PEANUT COMP. ARA H1, VIRC: <0.1 KU/L
PEANUT COMP. ARA H2, VIRC: <0.1 KU/L
PEANUT COMP. ARA H3, VIRC: <0.1 KU/L
PEANUT COMP. ARA H8, VIRC: <0.1 KU/L
PEANUT COMP. ARA H9, VIRC: <0.1 KU/L

## 2023-07-28 ENCOUNTER — TELEPHONE (OUTPATIENT)
Dept: PRIMARY CARE | Facility: CLINIC | Age: 68
End: 2023-07-28
Payer: MEDICARE

## 2023-07-28 DIAGNOSIS — R51.9 NONINTRACTABLE HEADACHE, UNSPECIFIED CHRONICITY PATTERN, UNSPECIFIED HEADACHE TYPE: Primary | ICD-10-CM

## 2023-07-28 RX ORDER — ACETAMINOPHEN 500 MG
1000 TABLET ORAL EVERY 8 HOURS PRN
Qty: 30 TABLET | Refills: 0 | Status: SHIPPED | OUTPATIENT
Start: 2023-07-28 | End: 2023-08-07

## 2023-07-28 NOTE — TELEPHONE ENCOUNTER
Spoke with Dr. Lucia Marc earlier today. Allergy testing negative. Sed rate elevated. WBC was 96,000 in hospital. Most recent was 17,000, H/H 11.6/38.2. Has appt with Dr. Herrera on 8/8/2023 (heme-onc) at 9A. Possible BM bx. Above was discussed with Jin via telephone this afternoon. Having headaches. Requesting script for Tylenol; script sent to pharmacy.

## 2023-08-29 LAB
COMPLETE PATHOLOGY REPORT: NORMAL
CONVERTED ADDENDUM DIAGNOSIS 2: NORMAL
CONVERTED ADDENDUM DIAGNOSIS 3: NORMAL
CONVERTED CLINICAL DIAGNOSIS-HISTORY: NORMAL
CONVERTED FINAL DIAGNOSIS: NORMAL
CONVERTED FINAL REPORT PDF LINK TO COPY AND PASTE: NORMAL
CONVERTED GROSS DESCRIPTION: NORMAL
CONVERTED MICROSCOPIC DESCRIPTION: NORMAL

## 2023-09-13 ENCOUNTER — HOSPITAL ENCOUNTER (OUTPATIENT)
Dept: DATA CONVERSION | Facility: HOSPITAL | Age: 68
End: 2023-09-13
Attending: INTERNAL MEDICINE
Payer: MEDICARE

## 2023-09-13 DIAGNOSIS — Z87.11 PERSONAL HISTORY OF PEPTIC ULCER DISEASE: ICD-10-CM

## 2023-09-13 DIAGNOSIS — K22.89 OTHER SPECIFIED DISEASE OF ESOPHAGUS: ICD-10-CM

## 2023-09-13 DIAGNOSIS — K20.90 ESOPHAGITIS, UNSPECIFIED WITHOUT BLEEDING: ICD-10-CM

## 2023-09-13 DIAGNOSIS — K44.9 DIAPHRAGMATIC HERNIA WITHOUT OBSTRUCTION OR GANGRENE: ICD-10-CM

## 2023-09-13 DIAGNOSIS — K26.0 ACUTE DUODENAL ULCER WITH HEMORRHAGE: ICD-10-CM

## 2023-09-13 DIAGNOSIS — D47.02 SYSTEMIC MASTOCYTOSIS: ICD-10-CM

## 2023-09-19 DIAGNOSIS — I10 BENIGN HYPERTENSION: ICD-10-CM

## 2023-09-19 RX ORDER — AMLODIPINE BESYLATE 10 MG/1
10 TABLET ORAL DAILY
Qty: 90 TABLET | Refills: 1 | Status: SHIPPED | OUTPATIENT
Start: 2023-09-19 | End: 2024-03-12

## 2023-09-20 ENCOUNTER — PATIENT OUTREACH (OUTPATIENT)
Dept: PRIMARY CARE | Facility: CLINIC | Age: 68
End: 2023-09-20
Payer: MEDICARE

## 2023-09-20 NOTE — PROGRESS NOTES
Patient has met target of no readmission for or 90 days post hospital discharge and is graduated from Transitional Care Management program at this time.

## 2023-09-25 LAB
COMPLETE PATHOLOGY REPORT: NORMAL
CONVERTED ADDENDUM DIAGNOSIS 2: NORMAL
CONVERTED CLINICAL DIAGNOSIS-HISTORY: NORMAL
CONVERTED FINAL DIAGNOSIS: NORMAL
CONVERTED FINAL REPORT PDF LINK TO COPY AND PASTE: NORMAL
CONVERTED GROSS DESCRIPTION: NORMAL

## 2023-11-01 ENCOUNTER — APPOINTMENT (OUTPATIENT)
Dept: RADIOLOGY | Facility: HOSPITAL | Age: 68
End: 2023-11-01
Payer: MEDICARE

## 2023-11-01 ENCOUNTER — HOSPITAL ENCOUNTER (EMERGENCY)
Facility: HOSPITAL | Age: 68
Discharge: HOME | End: 2023-11-01
Attending: GENERAL PRACTICE
Payer: MEDICARE

## 2023-11-01 VITALS
TEMPERATURE: 98.1 F | HEART RATE: 86 BPM | BODY MASS INDEX: 28.04 KG/M2 | SYSTOLIC BLOOD PRESSURE: 139 MMHG | WEIGHT: 185 LBS | DIASTOLIC BLOOD PRESSURE: 76 MMHG | RESPIRATION RATE: 17 BRPM | OXYGEN SATURATION: 99 % | HEIGHT: 68 IN

## 2023-11-01 DIAGNOSIS — N20.1 URETERAL STONE: Primary | ICD-10-CM

## 2023-11-01 DIAGNOSIS — N30.01 ACUTE CYSTITIS WITH HEMATURIA: ICD-10-CM

## 2023-11-01 DIAGNOSIS — M76.61 ACHILLES TENDINITIS OF RIGHT LOWER EXTREMITY: ICD-10-CM

## 2023-11-01 LAB
ALBUMIN SERPL BCP-MCNC: 4.2 G/DL (ref 3.4–5)
ALP SERPL-CCNC: 78 U/L (ref 33–136)
ALT SERPL W P-5'-P-CCNC: 7 U/L (ref 10–52)
ANION GAP SERPL CALC-SCNC: 12 MMOL/L (ref 10–20)
APPEARANCE UR: ABNORMAL
APTT PPP: 50 SECONDS (ref 27–38)
AST SERPL W P-5'-P-CCNC: 11 U/L (ref 9–39)
BACTERIA #/AREA URNS AUTO: ABNORMAL /HPF
BASOPHILS # BLD AUTO: 0.19 X10*3/UL (ref 0–0.1)
BASOPHILS NFR BLD AUTO: 1.4 %
BILIRUB SERPL-MCNC: 0.6 MG/DL (ref 0–1.2)
BILIRUB UR STRIP.AUTO-MCNC: NEGATIVE MG/DL
BUN SERPL-MCNC: 44 MG/DL (ref 6–23)
CALCIUM SERPL-MCNC: 8.7 MG/DL (ref 8.6–10.3)
CHLORIDE SERPL-SCNC: 103 MMOL/L (ref 98–107)
CO2 SERPL-SCNC: 23 MMOL/L (ref 21–32)
COLOR UR: ABNORMAL
CREAT SERPL-MCNC: 2.04 MG/DL (ref 0.5–1.3)
EOSINOPHIL # BLD AUTO: 4.44 X10*3/UL (ref 0–0.7)
EOSINOPHIL NFR BLD AUTO: 33.8 %
ERYTHROCYTE [DISTWIDTH] IN BLOOD BY AUTOMATED COUNT: 17.2 % (ref 11.5–14.5)
GFR SERPL CREATININE-BSD FRML MDRD: 35 ML/MIN/1.73M*2
GLUCOSE SERPL-MCNC: 84 MG/DL (ref 74–99)
GLUCOSE UR STRIP.AUTO-MCNC: NEGATIVE MG/DL
HCT VFR BLD AUTO: 33.4 % (ref 41–52)
HGB BLD-MCNC: 10.4 G/DL (ref 13.5–17.5)
IMM GRANULOCYTES # BLD AUTO: 0.04 X10*3/UL (ref 0–0.7)
IMM GRANULOCYTES NFR BLD AUTO: 0.3 % (ref 0–0.9)
INR PPP: 1.5 (ref 0.9–1.1)
KETONES UR STRIP.AUTO-MCNC: NEGATIVE MG/DL
LEUKOCYTE ESTERASE UR QL STRIP.AUTO: NEGATIVE
LYMPHOCYTES # BLD AUTO: 2.1 X10*3/UL (ref 1.2–4.8)
LYMPHOCYTES NFR BLD AUTO: 16 %
MCH RBC QN AUTO: 28.3 PG (ref 26–34)
MCHC RBC AUTO-ENTMCNC: 31.1 G/DL (ref 32–36)
MCV RBC AUTO: 91 FL (ref 80–100)
MONOCYTES # BLD AUTO: 2.22 X10*3/UL (ref 0.1–1)
MONOCYTES NFR BLD AUTO: 16.9 %
MUCOUS THREADS #/AREA URNS AUTO: ABNORMAL /LPF
NEUTROPHILS # BLD AUTO: 4.15 X10*3/UL (ref 1.2–7.7)
NEUTROPHILS NFR BLD AUTO: 31.6 %
NITRITE UR QL STRIP.AUTO: NEGATIVE
NRBC BLD-RTO: 0 /100 WBCS (ref 0–0)
PH UR STRIP.AUTO: 5 [PH]
PLATELET # BLD AUTO: 374 X10*3/UL (ref 150–450)
PMV BLD AUTO: 10.3 FL (ref 7.5–11.5)
POTASSIUM SERPL-SCNC: 4.2 MMOL/L (ref 3.5–5.3)
PROT SERPL-MCNC: 8.3 G/DL (ref 6.4–8.2)
PROT UR STRIP.AUTO-MCNC: ABNORMAL MG/DL
PROTHROMBIN TIME: 17.5 SECONDS (ref 9.8–12.8)
RBC # BLD AUTO: 3.68 X10*6/UL (ref 4.5–5.9)
RBC # UR STRIP.AUTO: ABNORMAL /UL
RBC #/AREA URNS AUTO: >20 /HPF
RBC MORPH BLD: NORMAL
SODIUM SERPL-SCNC: 134 MMOL/L (ref 136–145)
SP GR UR STRIP.AUTO: 1.01
SQUAMOUS #/AREA URNS AUTO: ABNORMAL /HPF
UROBILINOGEN UR STRIP.AUTO-MCNC: <2 MG/DL
WBC # BLD AUTO: 13.1 X10*3/UL (ref 4.4–11.3)
WBC #/AREA URNS AUTO: ABNORMAL /HPF

## 2023-11-01 PROCEDURE — 85025 COMPLETE CBC W/AUTO DIFF WBC: CPT

## 2023-11-01 PROCEDURE — 85730 THROMBOPLASTIN TIME PARTIAL: CPT

## 2023-11-01 PROCEDURE — 74176 CT ABD & PELVIS W/O CONTRAST: CPT | Mod: FOREIGN READ | Performed by: RADIOLOGY

## 2023-11-01 PROCEDURE — 74176 CT ABD & PELVIS W/O CONTRAST: CPT | Mod: MG

## 2023-11-01 PROCEDURE — 81001 URINALYSIS AUTO W/SCOPE: CPT

## 2023-11-01 PROCEDURE — 85610 PROTHROMBIN TIME: CPT

## 2023-11-01 PROCEDURE — 36415 COLL VENOUS BLD VENIPUNCTURE: CPT

## 2023-11-01 PROCEDURE — 2500000001 HC RX 250 WO HCPCS SELF ADMINISTERED DRUGS (ALT 637 FOR MEDICARE OP): Performed by: EMERGENCY MEDICINE

## 2023-11-01 PROCEDURE — 99284 EMERGENCY DEPT VISIT MOD MDM: CPT | Mod: 25 | Performed by: GENERAL PRACTICE

## 2023-11-01 PROCEDURE — 80053 COMPREHEN METABOLIC PANEL: CPT

## 2023-11-01 RX ORDER — LEVOFLOXACIN 500 MG/1
500 TABLET, FILM COATED ORAL DAILY
Qty: 7 TABLET | Refills: 0 | Status: SHIPPED | OUTPATIENT
Start: 2023-11-01 | End: 2023-11-07 | Stop reason: ALTCHOICE

## 2023-11-01 RX ORDER — LEVOFLOXACIN 500 MG/1
500 TABLET, FILM COATED ORAL ONCE
Status: COMPLETED | OUTPATIENT
Start: 2023-11-01 | End: 2023-11-01

## 2023-11-01 RX ADMIN — LEVOFLOXACIN 500 MG: 500 TABLET, FILM COATED ORAL at 08:22

## 2023-11-01 ASSESSMENT — COLUMBIA-SUICIDE SEVERITY RATING SCALE - C-SSRS
1. IN THE PAST MONTH, HAVE YOU WISHED YOU WERE DEAD OR WISHED YOU COULD GO TO SLEEP AND NOT WAKE UP?: NO
2. HAVE YOU ACTUALLY HAD ANY THOUGHTS OF KILLING YOURSELF?: NO
6. HAVE YOU EVER DONE ANYTHING, STARTED TO DO ANYTHING, OR PREPARED TO DO ANYTHING TO END YOUR LIFE?: NO

## 2023-11-01 ASSESSMENT — PAIN - FUNCTIONAL ASSESSMENT: PAIN_FUNCTIONAL_ASSESSMENT: 0-10

## 2023-11-01 ASSESSMENT — PAIN SCALES - GENERAL: PAINLEVEL_OUTOF10: 0 - NO PAIN

## 2023-11-01 NOTE — ED PROVIDER NOTES
HPI   Chief Complaint   Patient presents with    Blood in Urine     Pt presents with blood in his urine that began this morning when he woke up to go to the bathroom at 0300. Pt states he urinated around 0400 and that was normal looking to him. Pt denies a h/x of this, or any past  issues. Pt is also stating that his right ankle has been swollen for the last three days. No cp or sob. No further complaints at this time.        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  This evening he woke up to go to the bathroom in the middle of the night and noticed blood in his urine.  He has urinated since and states it is starting to clear.  He denies flank pain, abdominal pain and is not on anticoagulants.    Limitations to history: None  Independent Historians: Patient  External Records Reviewed: HIE, outpatient notes, inpatient notes  ------------------------------------------------------------------------------------------------------------------------------------------  ROS: a ten point review of systems was performed and was negative except as per HPI.  ------------------------------------------------------------------------------------------------------------------------------------------  PMH / PSH: as per HPI, otherwise reviewed in EMR  MEDS: as per HPI, otherwise reviewed in EMR  ALLERGIES: as per HPI, otherwise reviewed in EMR  SocH:  as per HPI, otherwise reviewed in EMR  FH:  as per HPI, otherwise reviewed in EMR  ------------------------------------------------------------------------------------------------------------------------------------------  Physical Exam:  VS: As documented in the triage note and EMR flowsheet from this visit was reviewed  General: Well appearing. No acute distress.   Eyes:  Extraocular movements grossly intact. No scleral icterus. No discharge  HEENT:  Normocephalic.  Atraumatic  Neck: Moves neck  freely. No gross masses  CV: Regular rhythm. No murmurs, rubs or gallops   Resp: Clear to auscultation bilaterally. No respiratory distress.    GI: Soft, no masses, nontender. No rebound tenderness or guarding.  No CVA tenderness bilaterally  MSK: Symmetric muscle bulk. No deformities. No lower extremity edema.    Skin: Warm, dry, intact.   Neuro: No focal deficits.  A&O x3.   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  The patient has no CVA tenderness and is well-appearing on exam.  There is renal insufficiency on his CMP.  INR 1.5.  Patient was signed out to the oncoming physician, Dr. Samano, pending additional labs and CT results.  We will also handle the patient's ultimate disposition.    Discussion of Management with Other Providers:   I discussed the patient/results with: Emergency medicine team    Final diagnosis and disposition as below.    Results for orders placed or performed during the hospital encounter of 11/01/23  -CBC and Auto Differential:        Result                      Value             Ref Range           WBC                         13.1 (H)          4.4 - 11.3 x*       nRBC                        0.0               0.0 - 0.0 /1*       RBC                         3.68 (L)          4.50 - 5.90 *       Hemoglobin                  10.4 (L)          13.5 - 17.5 *       Hematocrit                  33.4 (L)          41.0 - 52.0 %       MCV                         91                80 - 100 fL         MCH                         28.3              26.0 - 34.0 *       MCHC                        31.1 (L)          32.0 - 36.0  *       RDW                         17.2 (H)          11.5 - 14.5 %       Platelets                   374               150 - 450 x1*       MPV                         10.3              7.5 - 11.5 fL       Neutrophils %                                                     Immature Granulocytes *                                           Lymphocytes %                                                     Monocytes %                                                       Eosinophils %                                                     Basophils %                                                       Neutrophils Absolute                                              Lymphocytes Absolute                                              Monocytes Absolute                                                Eosinophils Absolute                                              Basophils Absolute                                           -Protime-INR:        Result                      Value             Ref Range           Protime                     17.5 (H)          9.8 - 12.8 s*       INR                         1.5 (H)           0.9 - 1.1      -aPTT:        Result                      Value             Ref Range           aPTT                        50 (H)            27 - 38 seco*  CT abdomen pelvis w IV contrast    (Results Pending)                            Urban Coma Scale Score: 15                  Patient History   Past Medical History:   Diagnosis Date    Personal history of diseases of the skin and subcutaneous tissue     History of alopecia     No past surgical history on file.  Family History   Problem Relation Name Age of Onset    Cancer Father       Social History     Tobacco Use    Smoking status: Never    Smokeless tobacco: Never   Substance Use Topics    Alcohol use: Never    Drug use: Yes     Types: Marijuana       Physical Exam   ED Triage Vitals [11/01/23 0500]   Temp Heart Rate Resp BP   36.7 °C (98.1 °F) 86 17  139/76      SpO2 Temp src Heart Rate Source Patient Position   99 % -- Monitor --      BP Location FiO2 (%)     -- --       Physical Exam    ED Course & MDM   Diagnoses as of 11/04/23 1212   Ureteral stone   Achilles tendinitis of right lower extremity   Acute cystitis with hematuria       Medical Decision Making      Procedure  Procedures     Tank Bernard,   11/04/23 1215

## 2023-11-01 NOTE — DISCHARGE INSTRUCTIONS
Please take the Levaquin as prescribed to treat for your UTI.  Please follow-up with the urologist and the orthopedic surgeons to continue care for your possible passed kidney stone and UTI as well as Achilles tendinitis.  Please Tennity for worsening pain, fever, vomiting, inability to drink or any other concerning symptoms.

## 2023-11-01 NOTE — PROGRESS NOTES
I did take signout on this patient.  He was pending imaging and lab work.  His UA does show signs for infection and CT scan does show some mild hydro on the right side but no kidney stone.  By patient history does describe some pain yesterday and did acutely worsen with some pain on urination that did pass suddenly.  Clinically seems like he did pass a stone with residual UTI.  I will treat patient with Levaquin.  Following overnight exam he did describe some pain over right Achilles tendon and I did examine patient.  He does not have any thigh swelling or tenderness.  Does a full range of motion of the knee.  Full range of motion of the ankle.  Does not have tenderness over the foot.  Does have tenderness 3 cm up on the Achilles tendon.  Does have full range of motion of the ankle.  No sign for cellulitis but no signs for DVT.  No signs for septic joint.  Clinically I suspect patient has Achilles tendinitis.  Patient replaced in a walking boot and plan to follow-up with Ortho.  Return precautions are discussed.

## 2023-11-03 ENCOUNTER — TELEPHONE (OUTPATIENT)
Dept: HEMATOLOGY/ONCOLOGY | Facility: HOSPITAL | Age: 68
End: 2023-11-03
Payer: MEDICARE

## 2023-11-07 ENCOUNTER — LAB (OUTPATIENT)
Dept: LAB | Facility: LAB | Age: 68
End: 2023-11-07
Payer: MEDICARE

## 2023-11-07 ENCOUNTER — OFFICE VISIT (OUTPATIENT)
Dept: HEMATOLOGY/ONCOLOGY | Facility: HOSPITAL | Age: 68
End: 2023-11-07
Payer: MEDICARE

## 2023-11-07 DIAGNOSIS — D47.02 SMOLDERING SYSTEMIC MASTOCYTOSIS: Primary | ICD-10-CM

## 2023-11-07 DIAGNOSIS — D47.02 SMOLDERING SYSTEMIC MASTOCYTOSIS: ICD-10-CM

## 2023-11-07 LAB
ALBUMIN SERPL BCP-MCNC: 4.2 G/DL (ref 3.4–5)
ALP SERPL-CCNC: 85 U/L (ref 33–136)
ALT SERPL W P-5'-P-CCNC: 10 U/L (ref 10–52)
ANION GAP SERPL CALC-SCNC: 14 MMOL/L (ref 10–20)
AST SERPL W P-5'-P-CCNC: 11 U/L (ref 9–39)
BASOPHILS # BLD AUTO: 0.18 X10*3/UL (ref 0–0.1)
BASOPHILS NFR BLD AUTO: 1.7 %
BILIRUB SERPL-MCNC: 0.6 MG/DL (ref 0–1.2)
BUN SERPL-MCNC: 21 MG/DL (ref 6–23)
CALCIUM SERPL-MCNC: 9.2 MG/DL (ref 8.6–10.6)
CHLORIDE SERPL-SCNC: 106 MMOL/L (ref 98–107)
CO2 SERPL-SCNC: 24 MMOL/L (ref 21–32)
CREAT SERPL-MCNC: 1.26 MG/DL (ref 0.5–1.3)
EOSINOPHIL # BLD AUTO: 2.87 X10*3/UL (ref 0–0.7)
EOSINOPHIL NFR BLD AUTO: 27.8 %
ERYTHROCYTE [DISTWIDTH] IN BLOOD BY AUTOMATED COUNT: 16.9 % (ref 11.5–14.5)
GFR SERPL CREATININE-BSD FRML MDRD: 63 ML/MIN/1.73M*2
GLUCOSE SERPL-MCNC: 92 MG/DL (ref 74–99)
HCT VFR BLD AUTO: 33.6 % (ref 41–52)
HGB BLD-MCNC: 10.1 G/DL (ref 13.5–17.5)
IMM GRANULOCYTES # BLD AUTO: 0.04 X10*3/UL (ref 0–0.7)
IMM GRANULOCYTES NFR BLD AUTO: 0.4 % (ref 0–0.9)
LYMPHOCYTES # BLD AUTO: 1.99 X10*3/UL (ref 1.2–4.8)
LYMPHOCYTES NFR BLD AUTO: 19.2 %
MCH RBC QN AUTO: 28 PG (ref 26–34)
MCHC RBC AUTO-ENTMCNC: 30.1 G/DL (ref 32–36)
MCV RBC AUTO: 93 FL (ref 80–100)
MONOCYTES # BLD AUTO: 1.79 X10*3/UL (ref 0.1–1)
MONOCYTES NFR BLD AUTO: 17.3 %
NEUTROPHILS # BLD AUTO: 3.47 X10*3/UL (ref 1.2–7.7)
NEUTROPHILS NFR BLD AUTO: 33.6 %
NRBC BLD-RTO: 0 /100 WBCS (ref 0–0)
PLATELET # BLD AUTO: 410 X10*3/UL (ref 150–450)
POTASSIUM SERPL-SCNC: 4.1 MMOL/L (ref 3.5–5.3)
PROT SERPL-MCNC: 7.9 G/DL (ref 6.4–8.2)
RBC # BLD AUTO: 3.61 X10*6/UL (ref 4.5–5.9)
RBC MORPH BLD: NORMAL
SODIUM SERPL-SCNC: 140 MMOL/L (ref 136–145)
WBC # BLD AUTO: 10.3 X10*3/UL (ref 4.4–11.3)

## 2023-11-07 PROCEDURE — 1126F AMNT PAIN NOTED NONE PRSNT: CPT | Performed by: STUDENT IN AN ORGANIZED HEALTH CARE EDUCATION/TRAINING PROGRAM

## 2023-11-07 PROCEDURE — 3074F SYST BP LT 130 MM HG: CPT | Performed by: STUDENT IN AN ORGANIZED HEALTH CARE EDUCATION/TRAINING PROGRAM

## 2023-11-07 PROCEDURE — 1160F RVW MEDS BY RX/DR IN RCRD: CPT | Performed by: STUDENT IN AN ORGANIZED HEALTH CARE EDUCATION/TRAINING PROGRAM

## 2023-11-07 PROCEDURE — 99442 PR PHYS/QHP TELEPHONE EVALUATION 11-20 MIN: CPT | Performed by: STUDENT IN AN ORGANIZED HEALTH CARE EDUCATION/TRAINING PROGRAM

## 2023-11-07 PROCEDURE — 85025 COMPLETE CBC W/AUTO DIFF WBC: CPT

## 2023-11-07 PROCEDURE — 83520 IMMUNOASSAY QUANT NOS NONAB: CPT

## 2023-11-07 PROCEDURE — 1036F TOBACCO NON-USER: CPT | Performed by: STUDENT IN AN ORGANIZED HEALTH CARE EDUCATION/TRAINING PROGRAM

## 2023-11-07 PROCEDURE — 36415 COLL VENOUS BLD VENIPUNCTURE: CPT

## 2023-11-07 PROCEDURE — 3079F DIAST BP 80-89 MM HG: CPT | Performed by: STUDENT IN AN ORGANIZED HEALTH CARE EDUCATION/TRAINING PROGRAM

## 2023-11-07 PROCEDURE — 1159F MED LIST DOCD IN RCRD: CPT | Performed by: STUDENT IN AN ORGANIZED HEALTH CARE EDUCATION/TRAINING PROGRAM

## 2023-11-07 PROCEDURE — 80053 COMPREHEN METABOLIC PANEL: CPT

## 2023-11-07 RX ORDER — CETIRIZINE HYDROCHLORIDE 10 MG/1
10 TABLET ORAL DAILY
COMMUNITY
End: 2024-01-02 | Stop reason: WASHOUT

## 2023-11-07 RX ORDER — MONTELUKAST SODIUM 10 MG/1
10 TABLET ORAL DAILY
Qty: 30 TABLET | Refills: 11 | Status: SHIPPED | OUTPATIENT
Start: 2023-11-07 | End: 2024-11-06

## 2023-11-07 RX ORDER — MONTELUKAST SODIUM 10 MG/1
10 TABLET ORAL DAILY
COMMUNITY
End: 2023-11-07 | Stop reason: SDUPTHER

## 2023-11-07 ASSESSMENT — ENCOUNTER SYMPTOMS: HEMATURIA: 1

## 2023-11-07 NOTE — PROGRESS NOTES
Patient ID: Jin Reynolds is a 68 y.o. male.    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 Was initially scheduled for in person visit but did not realize it was for today. Had labs locally and requested phone visit instead. Feeling well overall though a recent episode of painless hematuria that started a little over a week ago. Was seen in the ED for this, underwent labs and CT imaging which showed right sided hydronephrosis, felt to be related to a possible ureteral stone vs acute cystitis. He has completed a weeks' course of levofloxacin and is scheduled to see Urology next week. Hematuria ongoing. Some mild right flank discomfort that is not getting worse. No fevers, chills, nausea, vomiting, abdominal pain. No dysuria, frequency, or urgency. Denies similar symptoms in past.     Still using daily antihistamine, montelukast. Pruritus continues to be intermittent, not relieved with topical creams or ointments. Still carrying around Epipen, has not had to use this. Has not had DEXA done yet, was told order  when he went in this morning.     Review of Systems   Genitourinary:  Positive for hematuria.    All other systems reviewed and are negative.      Objective      Performance Status:  Karnofsky Score: 80 - Normal activity with effort; some signs or symptoms of disease      Assessment/Plan        Oncology History   Smoldering systemic mastocytosis   2023 Initial Diagnosis    Smoldering systemic mastocytosis  Presented with skin rash and eosinophilia     2023 Biopsy    BONE MARROW (23)  Hypercellular (90-95%) with trilineage hematopoiesis, granulocytic hyperplasia, eosinophilia, and increased atypical mast cells  IP:  CD34+, +, CD7(bright)+, cCD3-, CD33-, CD15-, CD13+ blast population  IHC:  + increased spindle-shaped mast cells (15% cellularity), CD2-  Myeloid NGS:  CBL (31%), cKIT D816V (30%), RUNX1 x 2 (19%, 29%), SRSF2 (47%)  [ASXL1 negative]  FISH:  PDGFRb negative    SERUM TRYPTASE  228 (8/23/23)  268 (9/5/23)          Smoldering systemic mastocytosis  Counts and symptoms stable. No allergic reactions, but does carry EpiPen with him. EGD done, biopsy without mast cells present. Current regimen includes daily anti-histamine, montelukast, and pepcid. Labs are stable. DEXA not completed yet, new order placed today.     RTC:   1/9/24 with Dr. Samir June PA-C

## 2023-11-08 ENCOUNTER — APPOINTMENT (OUTPATIENT)
Dept: RADIOLOGY | Facility: CLINIC | Age: 68
End: 2023-11-08
Payer: MEDICARE

## 2023-11-08 ENCOUNTER — APPOINTMENT (OUTPATIENT)
Dept: ORTHOPEDIC SURGERY | Facility: CLINIC | Age: 68
End: 2023-11-08
Payer: MEDICARE

## 2023-11-08 NOTE — ASSESSMENT & PLAN NOTE
Counts and symptoms stable. No allergic reactions, but does carry EpiPen with him. EGD done, biopsy without mast cells present. Current regimen includes daily anti-histamine, montelukast, and pepcid. Labs are stable. DEXA not completed yet, new order placed today.

## 2023-11-10 LAB — TRYPTASE SERPL-MCNC: 207 UG/L

## 2023-11-13 ENCOUNTER — ANCILLARY PROCEDURE (OUTPATIENT)
Dept: RADIOLOGY | Facility: CLINIC | Age: 68
End: 2023-11-13
Payer: MEDICARE

## 2023-11-13 DIAGNOSIS — D47.02 SMOLDERING SYSTEMIC MASTOCYTOSIS: ICD-10-CM

## 2023-11-13 PROCEDURE — 77080 DXA BONE DENSITY AXIAL: CPT

## 2023-11-13 PROCEDURE — 77080 DXA BONE DENSITY AXIAL: CPT | Performed by: RADIOLOGY

## 2023-11-16 ENCOUNTER — OFFICE VISIT (OUTPATIENT)
Dept: UROLOGY | Facility: CLINIC | Age: 68
End: 2023-11-16
Payer: MEDICARE

## 2023-11-16 DIAGNOSIS — R31.0 GROSS HEMATURIA: Primary | ICD-10-CM

## 2023-11-16 DIAGNOSIS — R31.9 HEMATURIA, UNSPECIFIED TYPE: ICD-10-CM

## 2023-11-16 DIAGNOSIS — N13.30 HYDROURETERONEPHROSIS: ICD-10-CM

## 2023-11-16 LAB
POC APPEARANCE, URINE: CLEAR
POC BILIRUBIN, URINE: NEGATIVE
POC BLOOD, URINE: ABNORMAL
POC COLOR, URINE: ABNORMAL
POC GLUCOSE, URINE: NEGATIVE MG/DL
POC KETONES, URINE: NEGATIVE MG/DL
POC LEUKOCYTES, URINE: NEGATIVE
POC NITRITE,URINE: NEGATIVE
POC PH, URINE: 6.5 PH
POC PROTEIN, URINE: ABNORMAL MG/DL
POC SPECIFIC GRAVITY, URINE: 1.02
POC UROBILINOGEN, URINE: 1 EU/DL

## 2023-11-16 PROCEDURE — 1126F AMNT PAIN NOTED NONE PRSNT: CPT | Performed by: NURSE PRACTITIONER

## 2023-11-16 PROCEDURE — 1036F TOBACCO NON-USER: CPT | Performed by: NURSE PRACTITIONER

## 2023-11-16 PROCEDURE — 81003 URINALYSIS AUTO W/O SCOPE: CPT | Performed by: NURSE PRACTITIONER

## 2023-11-16 PROCEDURE — 1160F RVW MEDS BY RX/DR IN RCRD: CPT | Performed by: NURSE PRACTITIONER

## 2023-11-16 PROCEDURE — 99203 OFFICE O/P NEW LOW 30 MIN: CPT | Performed by: NURSE PRACTITIONER

## 2023-11-16 PROCEDURE — 1159F MED LIST DOCD IN RCRD: CPT | Performed by: NURSE PRACTITIONER

## 2023-11-16 PROCEDURE — 88112 CYTOPATH CELL ENHANCE TECH: CPT

## 2023-11-16 NOTE — PROGRESS NOTES
UROLOGIC INITIAL EVALUATION     PROBLEM LIST:  1. Gross hematuria  Non-gynecologic cytology    US renal complete    Follow Up In Urology      2. Hematuria, unspecified type  POCT UA Automated manually resulted      3. Hydroureteronephrosis  US renal complete    Follow Up In Urology           HISTORY OF PRESENT ILLNESS:   Jin Reynolds is a 68 y.o. with hx HTN and smoldering systemic mastocytosis  Presents today for evaluation of gross hematuria  Sudden onset of gross hematuria 2 weeks ago, seen in ED  Seen recently by oncologist, not thought to be related to [facility]  Slowly clearing, completed abx as prescribed  No hx of kidney stones  No family hx of stones,  malignancy  Never smoker    PAST MEDICAL HISTORY:  Past Medical History:   Diagnosis Date    Personal history of diseases of the skin and subcutaneous tissue     History of alopecia       PAST SURGICAL HISTORY:  No past surgical history on file.     ALLERGIES:   No Known Allergies     MEDICATIONS:   Current Outpatient Medications on File Prior to Visit   Medication Sig Dispense Refill    amLODIPine (Norvasc) 10 mg tablet Take 1 tablet (10 mg) by mouth once daily. 90 tablet 1    cetirizine (ZyrTEC) 10 mg tablet Take 1 tablet (10 mg) by mouth once daily.      montelukast (Singulair) 10 mg tablet Take 1 tablet (10 mg) by mouth once daily. 30 tablet 11    pantoprazole (ProtoNix) 40 mg EC tablet Take 1 tablet (40 mg) by mouth once daily in the morning. Take before meals. Do not crush, chew, or split.       No current facility-administered medications on file prior to visit.        SOCIAL HISTORY:  Patient  reports that he has never smoked. He has never used smokeless tobacco. He reports current drug use. Drug: Marijuana. He reports that he does not drink alcohol.   Social History     Socioeconomic History    Marital status:      Spouse name: Not on file    Number of children: Not on file    Years of education: Not on file    Highest education level: Not on  file   Occupational History    Not on file   Tobacco Use    Smoking status: Never    Smokeless tobacco: Never   Substance and Sexual Activity    Alcohol use: Never    Drug use: Yes     Types: Marijuana    Sexual activity: Not on file   Other Topics Concern    Not on file   Social History Narrative    Not on file     Social Determinants of Health     Financial Resource Strain: Not on file   Food Insecurity: Not on file   Transportation Needs: Not on file   Physical Activity: Not on file   Stress: Not on file   Social Connections: Not on file   Intimate Partner Violence: Not on file   Housing Stability: Not on file       FAMILY HISTORY:  Family History   Problem Relation Name Age of Onset    Cancer Father         REVIEW OF SYSTEMS:  Review of Systems   Constitutional:  Negative for activity change and fever.   Cardiovascular:  Negative for palpitations.   Genitourinary:  Positive for hematuria. Negative for difficulty urinating and flank pain.   Neurological:  Negative for weakness and light-headedness.       PHYSICAL EXAM:  There were no vitals taken for this visit.  Constitutional: Well-developed and well-nourished. No distress.    Head: Normocephalic and atraumatic.    Neck: Normal range of motion.    Pulmonary/Chest: Effort normal. No respiratory distress.   Abdominal: Nondistended  : Clear, red urine noted.  Integumentary: No rash or lesions visualized.  Musculoskeletal: Normal range of motion.    Neurological: Alert and oriented to person, place, and time.  Psychiatric: Normal mood and affect. Thought content normal.      LABORATORY REVIEW:   Lab Results   Component Value Date    BUN 21 11/07/2023    CREATININE 1.26 11/07/2023    EGFR 63 11/07/2023     11/07/2023    K 4.1 11/07/2023     11/07/2023    CO2 24 11/07/2023    CALCIUM 9.2 11/07/2023      Lab Results   Component Value Date    WBC 10.3 11/07/2023    RBC 3.61 (L) 11/07/2023    HGB 10.1 (L) 11/07/2023    HCT 33.6 (L) 11/07/2023    MCV 93  11/07/2023    MCH 28.0 11/07/2023    MCHC 30.1 (L) 11/07/2023    RDW 16.9 (H) 11/07/2023     11/07/2023    MPV 10.3 11/01/2023        Urine dipstick shows positive for RBC's and positive for protein.       Assessment:      1. Gross hematuria  Non-gynecologic cytology    US renal complete    Follow Up In Urology      2. Hematuria, unspecified type  POCT UA Automated manually resulted      3. Hydroureteronephrosis  US renal complete    Follow Up In Urology          Jin Reynolds is a 68 y.o. with recent onset of gross hematuria. Seen in ED 11/1/23; CT showed mild R hydroureteronephrosis without evidence of stones, attributed to possible passed stone. No nephrolithiasis on prior CT 6/6/23 and no personal or family hx of symptomatic stones. No urine culture in ED, UA negative for leuks, nitrites. Completed levofloxacin x 7 days for possible cystitis. Hematuria slowing resolving.     Plan:   Send urine for cytology  Renal US to evaluate for resolution of hydroureteronephrosis  Refer to Dr. Montiel for cystoscopy +/- ureteroscopy   Encouraged to call in the interim with any questions, concerns

## 2023-11-17 LAB
LABORATORY COMMENT REPORT: NORMAL
LABORATORY COMMENT REPORT: NORMAL
PATH REPORT.FINAL DX SPEC: NORMAL
PATH REPORT.GROSS SPEC: NORMAL
PATH REPORT.RELEVANT HX SPEC: NORMAL
PATH REPORT.TOTAL CANCER: NORMAL

## 2023-11-17 ASSESSMENT — ENCOUNTER SYMPTOMS
LIGHT-HEADEDNESS: 0
DIFFICULTY URINATING: 0
FLANK PAIN: 0
PALPITATIONS: 0
ACTIVITY CHANGE: 0
FEVER: 0
HEMATURIA: 1
WEAKNESS: 0

## 2023-11-21 ENCOUNTER — ANCILLARY PROCEDURE (OUTPATIENT)
Dept: RADIOLOGY | Facility: CLINIC | Age: 68
End: 2023-11-21
Payer: MEDICARE

## 2023-11-21 DIAGNOSIS — M25.571 RIGHT ANKLE PAIN, UNSPECIFIED CHRONICITY: ICD-10-CM

## 2023-11-21 PROCEDURE — 73610 X-RAY EXAM OF ANKLE: CPT | Mod: RT,FY

## 2023-11-21 PROCEDURE — 73610 X-RAY EXAM OF ANKLE: CPT | Mod: RIGHT SIDE | Performed by: RADIOLOGY

## 2023-11-22 ENCOUNTER — OFFICE VISIT (OUTPATIENT)
Dept: ORTHOPEDIC SURGERY | Facility: CLINIC | Age: 68
End: 2023-11-22
Payer: MEDICARE

## 2023-11-22 DIAGNOSIS — S93.491A SPRAIN OF ANTERIOR TALOFIBULAR LIGAMENT OF RIGHT ANKLE, INITIAL ENCOUNTER: Primary | ICD-10-CM

## 2023-11-22 PROCEDURE — 99213 OFFICE O/P EST LOW 20 MIN: CPT | Performed by: STUDENT IN AN ORGANIZED HEALTH CARE EDUCATION/TRAINING PROGRAM

## 2023-11-22 PROCEDURE — 1160F RVW MEDS BY RX/DR IN RCRD: CPT | Performed by: STUDENT IN AN ORGANIZED HEALTH CARE EDUCATION/TRAINING PROGRAM

## 2023-11-22 PROCEDURE — 1036F TOBACCO NON-USER: CPT | Performed by: STUDENT IN AN ORGANIZED HEALTH CARE EDUCATION/TRAINING PROGRAM

## 2023-11-22 PROCEDURE — 1159F MED LIST DOCD IN RCRD: CPT | Performed by: STUDENT IN AN ORGANIZED HEALTH CARE EDUCATION/TRAINING PROGRAM

## 2023-11-22 PROCEDURE — 1126F AMNT PAIN NOTED NONE PRSNT: CPT | Performed by: STUDENT IN AN ORGANIZED HEALTH CARE EDUCATION/TRAINING PROGRAM

## 2023-11-22 PROCEDURE — 99203 OFFICE O/P NEW LOW 30 MIN: CPT | Performed by: STUDENT IN AN ORGANIZED HEALTH CARE EDUCATION/TRAINING PROGRAM

## 2023-11-22 ASSESSMENT — PAIN - FUNCTIONAL ASSESSMENT: PAIN_FUNCTIONAL_ASSESSMENT: NO/DENIES PAIN

## 2023-11-22 NOTE — PROGRESS NOTES
ORTHOPAEDIC SURGERY HISTORY & PHYSICAL     Chief Complaint:  Right ankle pain    History Of Present Illness  Jin Reynolds is a 68 y.o. male who presents for evaluation of right ankle pain.  Patient reports mild to moderate pain in his right ankle.  He does not recall any specific trauma.  He does report an event where he was plantarflexed on a ladder where his ankle may have given out on him.  He has been ambulating on treadmill without difficulty.  Overall his symptoms have been steadily improving.  He reports no prior history of injury similar to this.  He denies any associated numbness, tingling or weakness.     Past Medical History  Past Medical History:   Diagnosis Date    Personal history of diseases of the skin and subcutaneous tissue     History of alopecia       Surgical History  Recent Surgeries in Orthopaedic Surgery            No cases to display             Social History  Social History     Socioeconomic History    Marital status:      Spouse name: None    Number of children: None    Years of education: None    Highest education level: None   Occupational History    None   Tobacco Use    Smoking status: Never    Smokeless tobacco: Never   Substance and Sexual Activity    Alcohol use: Never    Drug use: Yes     Types: Marijuana    Sexual activity: None   Other Topics Concern    None   Social History Narrative    None     Social Determinants of Health     Financial Resource Strain: Not on file   Food Insecurity: Not on file   Transportation Needs: Not on file   Physical Activity: Not on file   Stress: Not on file   Social Connections: Not on file   Intimate Partner Violence: Not on file   Housing Stability: Not on file       Family History  Family History   Problem Relation Name Age of Onset    Cancer Father          Allergies  No Known Allergies    Review of Systems  REVIEW OF SYSTEMS  Constitutional: no unplanned weight loss  Psychiatric: no suicidal ideation  ENT: no vision changes, no sinus  problems  Pulmonary: no shortness of breath during office visit today  Lymphatic: no enlarged lymph nodes  Cardiovascular: no chest pain or shortness of breath during office visit today  Gastrointestinal: no stomach problems  Genitourinary: no dysuria   Skin: no rashes  Endocrine: no thyroid problems  Neurological: no headache, no numbness  Hematological: no easy bruising  Musculoskeletal: Right ankle pain    Physical Exam  PHYSICAL EXAMINATION  Constitutional Exam: well developed and well nourished  Psychiatric Exam: alert and oriented, appropriate mood and behavior  Eye Exam: EOMI  Pulmonary Exam: breathing non-labored, no apparent distress  Lymphatic exam: no appreciable lymphadenopathy in the lower extremities  Cardiovascular exam: RRR to peripheral palpation, DP pulses 2+, PT 2+, toes are pink with good capillary refill, no pitting edema  Skin exam: no open lesions, rashes, abrasions or ulcerations  Neurological exam: sensation to light touch intact in both lower extremities in peripheral and dermatomal distributions (except for any abnormalities noted in musculoskeletal exam)    Musculoskeletal exam: Right lower extremity examination.  Patient pain localized to the anterior distal fibula.  He is focally tender to palpation overlying the distal fibula and ATFL.  Mild anterolateral ankle swelling.  I am able to passively range the patient's ankle to neutral dorsiflexion.  Patient has no pain with small active motion of the ankle.  Patient has sensation intact light touch grossly in a saphenous, sural, superficial peroneal, deep peroneal and tibial nerve distribution.  He has 5 out of 5 strength in plantarflexion, dorsiflexion and EHL.  He has 2+ DP/PT pulse palpated.  He has a mildly positive anterior drawer, negative talar tilt.  Negative syndesmotic squeeze test.      Last Recorded Vitals  There were no vitals taken for this visit.    Laboratory Results    No results found for this or any previous visit (from  the past 24 hour(s)).    Radiology Results   X-ray imaging 3 view weightbearing right ankle reviewed from 11/21/2023 and independently evaluated by me on 11/22/2023 demonstrates distal fibular avulsion type injury consistent with ligamentous sprain, ankle mortise remains well aligned.    Assessment/Plan:  69 y/o M who in my impression has right ankle sprain involving ATFL and distal fibular avulsion type injury.  I have reviewed the diagnosis and treatment options extensively with the patient.    In my impression, the patient may continue weightbearing as tolerated in his right lower extremity.  I will provide him with a lace up style ankle brace for increased support as well as a formal referral to physical therapy to work on ankle range of motion, strengthening and proprioceptive training.  I will plan to see the patient back in approximately 6 weeks for repeat clinical evaluation.  Upon return, patient would not require further imaging.    Servando Garrett MD, ANGEL  Department of Orthopaedic Surgery  [facility]    The diagnosis and treatment plan were reviewed with the patient. All questions were answered. The patient verbalized understanding of the treatment plan. There were no barriers to understanding identified.    Note dictated with [employer] software.  Completed without full type editing and sent to avoid delay.

## 2023-11-27 DIAGNOSIS — R31.9 HEMATURIA, UNSPECIFIED TYPE: ICD-10-CM

## 2023-11-27 DIAGNOSIS — N13.30 HYDROURETERONEPHROSIS: ICD-10-CM

## 2023-11-27 DIAGNOSIS — R31.0 GROSS HEMATURIA: ICD-10-CM

## 2023-11-30 ENCOUNTER — APPOINTMENT (OUTPATIENT)
Dept: UROLOGY | Facility: HOSPITAL | Age: 68
End: 2023-11-30
Payer: MEDICARE

## 2023-12-04 ENCOUNTER — APPOINTMENT (OUTPATIENT)
Dept: UROLOGY | Facility: HOSPITAL | Age: 68
End: 2023-12-04
Payer: MEDICARE

## 2023-12-18 ENCOUNTER — APPOINTMENT (OUTPATIENT)
Dept: UROLOGY | Facility: HOSPITAL | Age: 68
End: 2023-12-18
Payer: MEDICARE

## 2023-12-19 ENCOUNTER — ANCILLARY PROCEDURE (OUTPATIENT)
Dept: RADIOLOGY | Facility: CLINIC | Age: 68
End: 2023-12-19
Payer: MEDICARE

## 2023-12-19 DIAGNOSIS — R31.9 HEMATURIA, UNSPECIFIED TYPE: ICD-10-CM

## 2023-12-19 DIAGNOSIS — N13.30 HYDROURETERONEPHROSIS: ICD-10-CM

## 2023-12-19 DIAGNOSIS — R31.0 GROSS HEMATURIA: ICD-10-CM

## 2023-12-19 PROCEDURE — 76770 US EXAM ABDO BACK WALL COMP: CPT | Performed by: STUDENT IN AN ORGANIZED HEALTH CARE EDUCATION/TRAINING PROGRAM

## 2023-12-19 PROCEDURE — 76770 US EXAM ABDO BACK WALL COMP: CPT

## 2023-12-27 ENCOUNTER — APPOINTMENT (OUTPATIENT)
Dept: ORTHOPEDIC SURGERY | Facility: CLINIC | Age: 68
End: 2023-12-27
Payer: MEDICARE

## 2024-01-01 PROBLEM — M81.0 OSTEOPOROSIS: Status: ACTIVE | Noted: 2024-01-01

## 2024-01-01 PROBLEM — R43.2 LOSS OF TASTE: Status: RESOLVED | Noted: 2023-05-30 | Resolved: 2024-01-01

## 2024-01-01 PROBLEM — R59.0 CERVICAL LYMPHADENOPATHY: Status: RESOLVED | Noted: 2023-05-30 | Resolved: 2024-01-01

## 2024-01-01 PROBLEM — K22.11 ESOPHAGEAL ULCER WITH BLEEDING: Status: ACTIVE | Noted: 2024-01-01

## 2024-01-01 PROBLEM — Z87.19 HISTORY OF UPPER GASTROINTESTINAL HEMORRHAGE: Status: ACTIVE | Noted: 2024-01-01

## 2024-01-01 PROBLEM — R53.83 FATIGUE: Status: RESOLVED | Noted: 2023-05-30 | Resolved: 2024-01-01

## 2024-01-01 PROBLEM — R03.0 ELEVATED BLOOD PRESSURE READING: Status: RESOLVED | Noted: 2023-05-30 | Resolved: 2024-01-01

## 2024-01-01 NOTE — ASSESSMENT & PLAN NOTE
Symptoms persistent with daily hives as most troublesome.  Recent DEXA demonstrates osteoporosis.  Will have bone survey to rule out possibility of significant lytic lesions.  Considering starting avapritinib given symptom burden, and would certainly be warranted if he has significant lytic lesions.  Will send some educational info, and discuss at next visit.

## 2024-01-01 NOTE — ASSESSMENT & PLAN NOTE
Documented on recent bone density and likely related to RBENDON.  Reviewed fracture risk and recommended oral calcium and Vit D supplements as well as bisphosphonate therapy.  Given his history of GIB from esophageal ulcer, I do not think he is candidate for oral treatment, and have recommended IV zolendronic acid annually.  He does have sore tooth, so we will have him see dentist first for clearance before initiating therapy.

## 2024-01-02 ENCOUNTER — TELEMEDICINE (OUTPATIENT)
Dept: HEMATOLOGY/ONCOLOGY | Facility: HOSPITAL | Age: 69
End: 2024-01-02
Payer: MEDICARE

## 2024-01-02 DIAGNOSIS — E55.9 VITAMIN D DEFICIENCY: ICD-10-CM

## 2024-01-02 DIAGNOSIS — D47.02 SMOLDERING SYSTEMIC MASTOCYTOSIS: Primary | ICD-10-CM

## 2024-01-02 PROCEDURE — 99215 OFFICE O/P EST HI 40 MIN: CPT | Performed by: INTERNAL MEDICINE

## 2024-01-02 RX ORDER — CYANOCOBALAMIN (VITAMIN B-12) 500 MCG
800 TABLET ORAL DAILY
Qty: 60 TABLET | Refills: 11 | Status: SHIPPED | OUTPATIENT
Start: 2024-01-02 | End: 2025-01-01

## 2024-01-02 RX ORDER — MINERAL OIL
180 ENEMA (ML) RECTAL DAILY
COMMUNITY

## 2024-01-02 NOTE — PROGRESS NOTES
"Patient ID: Jin Reynolds is a 68 y.o. male.    Assessment/Plan        Oncology History   Smoldering systemic mastocytosis   8/23/2023 Initial Diagnosis    DX:  SMOLDERING SYSTEMIC MASTOCYTOSIS  Presented with skin rash and eosinophilia    BRENDON DIAGNOSTIC CRITERIA  (For SM, Need major and 1 minor OR at least 3 minors)  - Multi-focal, dense infiltrates of MC (>= 15 mast cells in aggregates) in BM and/or other extra-cutaneous organs [major]  - In BM or extracutaneous organs, >25% MC spindle shaped OR have atypical morphology OR of all MC on BM aspirate smears, >25% are immature or atypical [minor]  - Detection of activating mutation KIT D816V in BM, PB, or other extracutaneous organ [minor]  - Serum tryptase persistently exceeds 20 ng/mL (unless associated clonal myeloid disorder, in which this paramenter is not valid) [minor]    \"B\" FINDINGS  - BM biopsy showing >30% infiltration (focal, dense aggregates) and/or serum tryptase > 200 ng/mL  - Signs of dysplasia or myeloproliferation, in non-MC lineages but insufficient criteria for definitive diagnosis of AHNMD with normal or slightly abnormal blood counts    \"C\" FINDINGS  None       8/23/2023 Biopsy    BONE MARROW (8/23/23)  Hypercellular (90-95%) with trilineage hematopoiesis, granulocytic hyperplasia, eosinophilia, and increased atypical mast cells  IP:  CD34+, +, CD7(bright)+, cCD3-, CD33-, CD15-, CD13+ blast population  IHC:  + increased spindle-shaped mast cells (15% cellularity), CD2-  Myeloid NGS:  CBL (31%), cKIT D816V (30%), RUNX1 x 2 (19%, 29%), SRSF2 (47%) [ASXL1 negative]  FISH:  PDGFRb negative    SERUM TRYPTASE  228 (8/23/23)  268 (9/5/23)          Smoldering systemic mastocytosis  Symptoms persistent with daily hives as most troublesome.  Recent DEXA demonstrates osteoporosis.  Will have bone survey to rule out possibility of significant lytic lesions.  Considering starting avapritinib given symptom burden, and would certainly be warranted if " he has significant lytic lesions.  Will send some educational info, and discuss at next visit.    Osteoporosis  Documented on recent bone density and likely related to BRENDON.  Reviewed fracture risk and recommended oral calcium and Vit D supplements as well as bisphosphonate therapy.  Given his history of GIB from esophageal ulcer, I do not think he is candidate for oral treatment, and have recommended IV zolendronic acid annually.  He does have sore tooth, so we will have him see dentist first for clearance before initiating therapy.      ______________________________________________________________________________________________________________________________________________  Subjective        Here today for planned follow up.  Symptoms are about the same.  Most bothered by daily hives that come and go.   No new health issues.  Denies fevers, chills, nausea, vomiting, diarrhea, dyspnea, and pain.    MASTOCYTOSIS SYMPTOMS ASSESSMENT (MSAF)    SYMPTOM SCORE   Itchy skin 6   Dizziness 0   Headache 0   Fatigue 6   Runny nose 0   Shortness of breath 0   Chest pain/palpitations 0   Nausea/vomiting 0   Diarrhea, stomach ache, cramps 0   Bone pain/muscle pain 0   Concentration problems 0   Depression, somberness 4   Other:  Hives 6   TOTAL SCORE 22     Attacks , with or without loss of consciousness FREQUENCY PER MONTH 0   Flushing FREQUENCY PER WEEK 0     INFLUENCE OF FATIGUE ON: SCORE   General activities 0   Mood/temper 3   Mobility 3   Chores 2   Relationships 5   Happiness 0   Other:  NA 0   TOTAL SCORE 13           Objective       Physical Exam  Constitutional:       General: He is not in acute distress.     Appearance: He is not ill-appearing.   Eyes:      General:         Right eye: No discharge.         Left eye: No discharge.      Conjunctiva/sclera: Conjunctivae normal.   Skin:     Findings: No rash.   Neurological:      Mental Status: He is alert.   Psychiatric:         Mood and Affect: Mood normal.          Time Spent  Prep time on day of patient encounter: 8 minutes  Time spent directly with patient, family or caregiver: 20 minutes  Additional Time Spent on Patient Care Activities: 8 minutes  Documentation Time: 8 minutes  Other Time Spent: 0 minutes  Total: 44 minutes        Nika Dawson MD

## 2024-01-02 NOTE — PATIENT INSTRUCTIONS
"Take calcium supplement daily 3030-0130 mg calcium carbonate (recommend 3 Tums Ultra strength or generic equivalent with 400 mg calcium per tablet).  Take Vit D supplement daily (prescription sent to local pharmacy).  Please see your dentist and advise him that you will be receiving \"BISPHOSPHONATE THERAPY\" with Reclast.  Please review medication information for avapritinib.  "

## 2024-01-03 ENCOUNTER — APPOINTMENT (OUTPATIENT)
Dept: ORTHOPEDIC SURGERY | Facility: CLINIC | Age: 69
End: 2024-01-03
Payer: MEDICARE

## 2024-01-08 ENCOUNTER — TELEPHONE (OUTPATIENT)
Dept: PRIMARY CARE | Facility: CLINIC | Age: 69
End: 2024-01-08
Payer: MEDICARE

## 2024-01-08 ENCOUNTER — HOSPITAL ENCOUNTER (EMERGENCY)
Facility: HOSPITAL | Age: 69
Discharge: HOME | End: 2024-01-08
Payer: MEDICARE

## 2024-01-08 VITALS
RESPIRATION RATE: 20 BRPM | HEART RATE: 65 BPM | TEMPERATURE: 97.8 F | WEIGHT: 189 LBS | BODY MASS INDEX: 28.64 KG/M2 | DIASTOLIC BLOOD PRESSURE: 92 MMHG | OXYGEN SATURATION: 100 % | SYSTOLIC BLOOD PRESSURE: 167 MMHG | HEIGHT: 68 IN

## 2024-01-08 DIAGNOSIS — G89.3 CANCER RELATED PAIN: Primary | ICD-10-CM

## 2024-01-08 PROCEDURE — 99283 EMERGENCY DEPT VISIT LOW MDM: CPT

## 2024-01-08 RX ORDER — OXYCODONE HYDROCHLORIDE 5 MG/1
5 TABLET ORAL EVERY 6 HOURS PRN
Qty: 12 TABLET | Refills: 0 | Status: SHIPPED | OUTPATIENT
Start: 2024-01-08 | End: 2024-01-11

## 2024-01-08 ASSESSMENT — PAIN SCALES - GENERAL
PAINLEVEL_OUTOF10: 6
PAINLEVEL_OUTOF10: 6

## 2024-01-08 ASSESSMENT — COLUMBIA-SUICIDE SEVERITY RATING SCALE - C-SSRS
6. HAVE YOU EVER DONE ANYTHING, STARTED TO DO ANYTHING, OR PREPARED TO DO ANYTHING TO END YOUR LIFE?: NO
2. HAVE YOU ACTUALLY HAD ANY THOUGHTS OF KILLING YOURSELF?: NO
1. IN THE PAST MONTH, HAVE YOU WISHED YOU WERE DEAD OR WISHED YOU COULD GO TO SLEEP AND NOT WAKE UP?: NO

## 2024-01-08 ASSESSMENT — PAIN DESCRIPTION - LOCATION: LOCATION: FACE

## 2024-01-08 ASSESSMENT — PAIN DESCRIPTION - FREQUENCY: FREQUENCY: CONSTANT/CONTINUOUS

## 2024-01-08 ASSESSMENT — PAIN DESCRIPTION - ONSET: ONSET: ONGOING

## 2024-01-08 ASSESSMENT — PAIN DESCRIPTION - DESCRIPTORS: DESCRIPTORS: DULL

## 2024-01-08 ASSESSMENT — PAIN - FUNCTIONAL ASSESSMENT: PAIN_FUNCTIONAL_ASSESSMENT: 0-10

## 2024-01-08 NOTE — TELEPHONE ENCOUNTER
Patient called to say Naproxen is not working for pain, been having head pain for 3 days. Patient wants to know if he should go to [facility] or if you have any suggestions what he can do.

## 2024-01-08 NOTE — ED TRIAGE NOTES
Pt presents to ED for c/o pain exacerbation d/t Cancer diagnosis and not being controlled OTC medications.

## 2024-01-09 ENCOUNTER — APPOINTMENT (OUTPATIENT)
Dept: HEMATOLOGY/ONCOLOGY | Facility: HOSPITAL | Age: 69
End: 2024-01-09
Payer: MEDICARE

## 2024-01-09 ENCOUNTER — ANCILLARY PROCEDURE (OUTPATIENT)
Dept: RADIOLOGY | Facility: CLINIC | Age: 69
End: 2024-01-09
Payer: MEDICARE

## 2024-01-09 DIAGNOSIS — D47.02 SMOLDERING SYSTEMIC MASTOCYTOSIS: ICD-10-CM

## 2024-01-09 PROCEDURE — 77075 RADEX OSSEOUS SURVEY COMPL: CPT

## 2024-01-09 PROCEDURE — 77075 RADEX OSSEOUS SURVEY COMPL: CPT | Performed by: STUDENT IN AN ORGANIZED HEALTH CARE EDUCATION/TRAINING PROGRAM

## 2024-01-09 NOTE — ED PROVIDER NOTES
HPI   No chief complaint on file.      68-year-old male presents with a complaint of pain he has a history of a blood cancer that causes him pain he is trying Naprosyn and over-the-counter medicines occasions normally help with his pain he did have a call into his doctor but did not get the medication.  Recently had scans with oncology not interested in further workup today solely here for pain treatment no deficits not the worst headache of his life but would appreciate pain medicine for his pain throughout his forehead and face.  His cancer diagnosis is smoldering systemic mastocytosis.                          Urban Coma Scale Score: 15                  Patient History   Past Medical History:   Diagnosis Date    Personal history of diseases of the skin and subcutaneous tissue     History of alopecia     No past surgical history on file.  Family History   Problem Relation Name Age of Onset    Cancer Father       Social History     Tobacco Use    Smoking status: Never    Smokeless tobacco: Never   Substance Use Topics    Alcohol use: Never    Drug use: Yes     Types: Marijuana       Physical Exam   ED Triage Vitals [01/08/24 1310]   Temp Heart Rate Resp BP   36.7 °C (98 °F) 61 18 (!) 175/106      SpO2 Temp Source Heart Rate Source Patient Position   100 % Oral Monitor Sitting      BP Location FiO2 (%)     Right arm --       Physical Exam  Vitals reviewed.   Constitutional:       General: He is not in acute distress.     Appearance: Normal appearance. He is normal weight. He is not ill-appearing, toxic-appearing or diaphoretic.   HENT:      Head: Normocephalic and atraumatic.      Right Ear: External ear normal.      Left Ear: External ear normal.      Nose: Nose normal.      Mouth/Throat:      Mouth: Mucous membranes are moist.   Eyes:      Extraocular Movements: Extraocular movements intact.      Conjunctiva/sclera: Conjunctivae normal.      Pupils: Pupils are equal, round, and reactive to light.    Cardiovascular:      Rate and Rhythm: Normal rate and regular rhythm.   Pulmonary:      Effort: Pulmonary effort is normal. No respiratory distress.      Breath sounds: No stridor.   Abdominal:      General: There is no distension.   Musculoskeletal:         General: No swelling or deformity.      Cervical back: Normal range of motion.   Skin:     Capillary Refill: Capillary refill takes less than 2 seconds.      Findings: No rash.   Neurological:      General: No focal deficit present.      Mental Status: He is alert and oriented to person, place, and time. Mental status is at baseline.   Psychiatric:         Mood and Affect: Mood normal.         Behavior: Behavior normal.         Thought Content: Thought content normal.         Judgment: Judgment normal.         ED Course & MDM   Diagnoses as of 01/08/24 2040   Cancer related pain       Medical Decision Making  Ddx: headache, myalgias, cancer pain    Has close follow up with pcp. Will discharge to see pcp/ oncology    [facility] shows no active prescriptions. .           Procedure  Procedures     Parveen Billings PA-C  01/08/24 2043

## 2024-01-11 NOTE — PROGRESS NOTES
UROLOGIC INITIAL EVALUATION       PROBLEM LIST:  1. Gross hematuria  POCT UA Automated manually resulted           HISTORY OF PRESENT ILLNESS:   Jin Reynolds is a 68 y.o. male who was first seen in the ED on 11/1/2023  for evaluation of gross hematuria. Patient states he had one episode of gross hematuria.  He has been diagnosed with smoldering systemic mastocytosis. He underwent ultrasound 12/19/2023 that showed Minimally complex parapelvic cyst with thin internal septa (Bosniak 2), upper pole 3.5 cm in the right kidney, Minimally complex cyst with thin internal septa (Bosniak 2), lower pole 3.8 cm. Simple cyst midpole 1.2 cm in the left kidney  He denies any smoking history or kidney stone history.     PAST MEDICAL HISTORY:  Past Medical History:   Diagnosis Date    Personal history of diseases of the skin and subcutaneous tissue     History of alopecia       PAST SURGICAL HISTORY:  No past surgical history on file.     ALLERGIES:   No Known Allergies     MEDICATIONS:     Current Outpatient Medications:     amLODIPine (Norvasc) 10 mg tablet, Take 1 tablet (10 mg) by mouth once daily., Disp: 90 tablet, Rfl: 1    cholecalciferol (Vitamin D-3) 10 MCG (400 UNIT) tablet, Take 2 tablets (20 mcg) by mouth once daily., Disp: 60 tablet, Rfl: 11    fexofenadine (Allegra) 180 mg tablet, Take 1 tablet (180 mg) by mouth once daily., Disp: , Rfl:     montelukast (Singulair) 10 mg tablet, Take 1 tablet (10 mg) by mouth once daily., Disp: 30 tablet, Rfl: 11    oxyCODONE (Roxicodone) 5 mg immediate release tablet, Take 1 tablet (5 mg) by mouth every 6 hours if needed for severe pain (7 - 10) for up to 3 days., Disp: 12 tablet, Rfl: 0    pantoprazole (ProtoNix) 40 mg EC tablet, Take 1 tablet (40 mg) by mouth once daily in the morning. Take before meals. Do not crush, chew, or split., Disp: , Rfl:       SOCIAL HISTORY:  Patient  reports that he has never smoked. He has never used smokeless tobacco. He reports current drug use.  Drug: Marijuana. He reports that he does not drink alcohol.   Social History     Socioeconomic History    Marital status:      Spouse name: Not on file    Number of children: Not on file    Years of education: Not on file    Highest education level: Not on file   Occupational History    Not on file   Tobacco Use    Smoking status: Never    Smokeless tobacco: Never   Substance and Sexual Activity    Alcohol use: Never    Drug use: Yes     Types: Marijuana    Sexual activity: Not on file   Other Topics Concern    Not on file   Social History Narrative    Not on file     Social Determinants of Health     Financial Resource Strain: Not on file   Food Insecurity: Not on file   Transportation Needs: Not on file   Physical Activity: Not on file   Stress: Not on file   Social Connections: Not on file   Intimate Partner Violence: Not on file   Housing Stability: Not on file       FAMILY HISTORY:  Family History   Problem Relation Name Age of Onset    Cancer Father         REVIEW OF SYSTEMS:  Constitutional: Negative for fever and chills. Denies anorexia, weight loss.  Eyes: Negative for visual disturbance.   Respiratory: Negative for shortness of breath.    Cardiovascular: Negative for chest pain.   Gastrointestinal: Negative for nausea and vomiting.   Genitourinary: See interval history above.  Skin: Negative for rash.   Neurological: Negative for dizziness and numbness.   Psychiatric/Behavioral: Negative for confusion and decreased concentration.     PHYSICAL EXAM:  There were no vitals taken for this visit.  Constitutional: Patient appears well-developed and well-nourished. No distress.    Head: Normocephalic and atraumatic.    Neck: Normal range of motion.    Cardiovascular: Normal rate.    Pulmonary/Chest: Effort normal. No respiratory distress.   Abdominal: soft NTND  Musculoskeletal: Normal range of motion.    Neurological: Alert and oriented to person, place, and time.  Psychiatric: Normal mood and affect.  Behavior is normal. Thought content normal.      RADIOLOGY REVIEW:  CTU  IMPRESSION:  1. There is mild right hydronephrosis and hydroureter. No stones are  seen in either kidney, ureter, or bladder. The cause of this is  unclear. This may be due to a passed stone.  2. Multiple hepatic cysts.  3. There is an umbilical hernia containing fat, but no loops of bowel.    LABORATORY REVIEW:   [unfilled]    Lab Results   Component Value Date    BUN 21 11/07/2023    CREATININE 1.26 11/07/2023    EGFR 63 11/07/2023     11/07/2023    K 4.1 11/07/2023     11/07/2023    CO2 24 11/07/2023    CALCIUM 9.2 11/07/2023      Lab Results   Component Value Date    WBC 10.3 11/07/2023    RBC 3.61 (L) 11/07/2023    HGB 10.1 (L) 11/07/2023    HCT 33.6 (L) 11/07/2023    MCV 93 11/07/2023    MCH 28.0 11/07/2023    MCHC 30.1 (L) 11/07/2023    RDW 16.9 (H) 11/07/2023     11/07/2023    MPV 10.3 11/01/2023       Assessment:      1. Gross hematuria  POCT UA Automated manually resulted             Plan:    Reviewed and interpreted patients CT scan   Will obtain a urine cytology    Will perform cystoscopy today in clinic    20 minutes total spent on patient's care today; >50% time spent on counseling/coordination of care

## 2024-01-15 ENCOUNTER — PROCEDURE VISIT (OUTPATIENT)
Dept: UROLOGY | Facility: HOSPITAL | Age: 69
End: 2024-01-15
Payer: MEDICARE

## 2024-01-15 VITALS — SYSTOLIC BLOOD PRESSURE: 145 MMHG | DIASTOLIC BLOOD PRESSURE: 89 MMHG | HEART RATE: 70 BPM

## 2024-01-15 DIAGNOSIS — R31.0 GROSS HEMATURIA: Primary | ICD-10-CM

## 2024-01-15 PROCEDURE — 52000 CYSTOURETHROSCOPY: CPT | Performed by: STUDENT IN AN ORGANIZED HEALTH CARE EDUCATION/TRAINING PROGRAM

## 2024-01-15 PROCEDURE — 99213 OFFICE O/P EST LOW 20 MIN: CPT | Performed by: STUDENT IN AN ORGANIZED HEALTH CARE EDUCATION/TRAINING PROGRAM

## 2024-01-15 PROCEDURE — 81003 URINALYSIS AUTO W/O SCOPE: CPT | Performed by: STUDENT IN AN ORGANIZED HEALTH CARE EDUCATION/TRAINING PROGRAM

## 2024-01-16 LAB
POC APPEARANCE, URINE: ABNORMAL
POC BILIRUBIN, URINE: NEGATIVE
POC BLOOD, URINE: ABNORMAL
POC COLOR, URINE: ABNORMAL
POC GLUCOSE, URINE: NEGATIVE MG/DL
POC KETONES, URINE: NEGATIVE MG/DL
POC LEUKOCYTES, URINE: NEGATIVE
POC NITRITE,URINE: NEGATIVE
POC PH, URINE: 5.5 PH
POC PROTEIN, URINE: NEGATIVE MG/DL
POC SPECIFIC GRAVITY, URINE: >=1.03
POC UROBILINOGEN, URINE: 0.2 EU/DL

## 2024-01-17 NOTE — PROGRESS NOTES
PROCEDURE NOTE:    PREOPERATIVE DIAGNOSIS:  Gross hematuria     POSTOPERATIVE DIAGNOSIS:  Same    OPERATION:  Flexible Cystourethroscopy      SURGEON:  Quinn Martínez MD    ANESTHESIA:  2%  lidocaine jelly    COMPLICATIONS:  None    EBL: Minimal    SPECIMEN:  Voided urine was not collected and submitted for cytology.    DISPOSITION:  The patient was discharged home after the procedure, per routine.    INDICATIONS: :  Mr. Reynolds is a 68 y.o. patient with a history of gross hematuria who presents today for Cystoscopy.     The indications, risks and benefits of this procedure were discussed with the patient, consent was obtained prior to the procedure, and to the best of my judgement the patient seemed to understand and agree to the procedure.    PROCEDURE:  The patient  was brought into the procedure suite and informed consent was reviewed and confirmed. Vital signs were obtained prior to the procedure: /89   Pulse 70 .  The patient was escorted onto the stretcher, placed supine, prepped with betadine and draped in the usual standard surgical fashion.  Intraurethral 2% viscous lidocaine jelly was used for local analgesia.  A 16 Syriac flexible cystourethroscope was inserted into the urethra.   The penile urethra was normal.  The prostate urethra was enlarged.  Upon entering the bladder the entire bladder was surveyed in a 360 degree fashion.  The left and right ureteral orifices were in normal orthotopic position effluxing clear yellow urine, bilaterally.   There was no evidence of any bladder lesions, foreign objects, stones or evidence of any mucosal changes. The cystoscope was then retroflexed.  The bladder neck was then further examined without any evidence of lesions. The scope was then removed and in an antegrade fashion, the urethra and bladder were again resurveyed with no evidence of additional lesions.  The cystoscope was then fully removed.   The patient tolerated the procedure well.  Vitals were  stable after the procedure.  The patient was able to void and was discharged home.  Verbal and written Post procedure instructions were reviewed with the patient.    IMPRESSION:  Negative hematuria workup

## 2024-02-05 ENCOUNTER — LAB (OUTPATIENT)
Dept: LAB | Facility: LAB | Age: 69
End: 2024-02-05
Payer: MEDICARE

## 2024-02-05 DIAGNOSIS — E55.9 VITAMIN D DEFICIENCY: ICD-10-CM

## 2024-02-05 DIAGNOSIS — D47.02 SMOLDERING SYSTEMIC MASTOCYTOSIS: ICD-10-CM

## 2024-02-05 LAB
25(OH)D3 SERPL-MCNC: 27 NG/ML (ref 30–100)
ALBUMIN SERPL BCP-MCNC: 4.4 G/DL (ref 3.4–5)
ALP SERPL-CCNC: 100 U/L (ref 33–136)
ALT SERPL W P-5'-P-CCNC: 12 U/L (ref 10–52)
ANION GAP SERPL CALC-SCNC: 12 MMOL/L (ref 10–20)
AST SERPL W P-5'-P-CCNC: 16 U/L (ref 9–39)
BASOPHILS # BLD AUTO: 0.19 X10*3/UL (ref 0–0.1)
BASOPHILS NFR BLD AUTO: 2.6 %
BILIRUB SERPL-MCNC: 0.5 MG/DL (ref 0–1.2)
BUN SERPL-MCNC: 21 MG/DL (ref 6–23)
CALCIUM SERPL-MCNC: 9.4 MG/DL (ref 8.6–10.6)
CHLORIDE SERPL-SCNC: 104 MMOL/L (ref 98–107)
CO2 SERPL-SCNC: 26 MMOL/L (ref 21–32)
CREAT SERPL-MCNC: 1.24 MG/DL (ref 0.5–1.3)
EGFRCR SERPLBLD CKD-EPI 2021: 63 ML/MIN/1.73M*2
EOSINOPHIL # BLD AUTO: 2.03 X10*3/UL (ref 0–0.7)
EOSINOPHIL NFR BLD AUTO: 27.5 %
ERYTHROCYTE [DISTWIDTH] IN BLOOD BY AUTOMATED COUNT: 15.5 % (ref 11.5–14.5)
GLUCOSE SERPL-MCNC: 95 MG/DL (ref 74–99)
HCT VFR BLD AUTO: 31.5 % (ref 41–52)
HGB BLD-MCNC: 9.8 G/DL (ref 13.5–17.5)
IMM GRANULOCYTES # BLD AUTO: 0.01 X10*3/UL (ref 0–0.7)
IMM GRANULOCYTES NFR BLD AUTO: 0.1 % (ref 0–0.9)
LYMPHOCYTES # BLD AUTO: 1.72 X10*3/UL (ref 1.2–4.8)
LYMPHOCYTES NFR BLD AUTO: 23.3 %
MCH RBC QN AUTO: 30.5 PG (ref 26–34)
MCHC RBC AUTO-ENTMCNC: 31.1 G/DL (ref 32–36)
MCV RBC AUTO: 98 FL (ref 80–100)
MONOCYTES # BLD AUTO: 1 X10*3/UL (ref 0.1–1)
MONOCYTES NFR BLD AUTO: 13.6 %
NEUTROPHILS # BLD AUTO: 2.43 X10*3/UL (ref 1.2–7.7)
NEUTROPHILS NFR BLD AUTO: 32.9 %
NRBC BLD-RTO: 0 /100 WBCS (ref 0–0)
PLATELET # BLD AUTO: 372 X10*3/UL (ref 150–450)
POTASSIUM SERPL-SCNC: 4.3 MMOL/L (ref 3.5–5.3)
PROT SERPL-MCNC: 8.3 G/DL (ref 6.4–8.2)
RBC # BLD AUTO: 3.21 X10*6/UL (ref 4.5–5.9)
SODIUM SERPL-SCNC: 138 MMOL/L (ref 136–145)
WBC # BLD AUTO: 7.4 X10*3/UL (ref 4.4–11.3)

## 2024-02-05 PROCEDURE — 80053 COMPREHEN METABOLIC PANEL: CPT

## 2024-02-05 PROCEDURE — 83520 IMMUNOASSAY QUANT NOS NONAB: CPT

## 2024-02-05 PROCEDURE — 82306 VITAMIN D 25 HYDROXY: CPT

## 2024-02-05 PROCEDURE — 36415 COLL VENOUS BLD VENIPUNCTURE: CPT

## 2024-02-05 PROCEDURE — 85025 COMPLETE CBC W/AUTO DIFF WBC: CPT

## 2024-02-06 ENCOUNTER — TELEMEDICINE (OUTPATIENT)
Dept: HEMATOLOGY/ONCOLOGY | Facility: HOSPITAL | Age: 69
End: 2024-02-06
Payer: MEDICARE

## 2024-02-06 DIAGNOSIS — M81.8 OTHER OSTEOPOROSIS WITHOUT CURRENT PATHOLOGICAL FRACTURE: ICD-10-CM

## 2024-02-06 DIAGNOSIS — D47.02 SMOLDERING SYSTEMIC MASTOCYTOSIS: Primary | ICD-10-CM

## 2024-02-06 PROCEDURE — 1036F TOBACCO NON-USER: CPT | Performed by: INTERNAL MEDICINE

## 2024-02-06 PROCEDURE — 1125F AMNT PAIN NOTED PAIN PRSNT: CPT | Performed by: INTERNAL MEDICINE

## 2024-02-06 PROCEDURE — 99215 OFFICE O/P EST HI 40 MIN: CPT | Performed by: INTERNAL MEDICINE

## 2024-02-06 PROCEDURE — 1159F MED LIST DOCD IN RCRD: CPT | Performed by: INTERNAL MEDICINE

## 2024-02-06 RX ORDER — EPINEPHRINE 0.3 MG/.3ML
0.3 INJECTION SUBCUTANEOUS EVERY 5 MIN PRN
Status: CANCELLED | OUTPATIENT
Start: 2024-03-05

## 2024-02-06 RX ORDER — HEPARIN SODIUM,PORCINE/PF 10 UNIT/ML
50 SYRINGE (ML) INTRAVENOUS AS NEEDED
Status: CANCELLED | OUTPATIENT
Start: 2024-02-06

## 2024-02-06 RX ORDER — ONDANSETRON HYDROCHLORIDE 8 MG/1
8 TABLET, FILM COATED ORAL EVERY 8 HOURS PRN
Qty: 60 TABLET | Refills: 5 | Status: SHIPPED | OUTPATIENT
Start: 2024-02-06 | End: 2024-03-10 | Stop reason: ALTCHOICE

## 2024-02-06 RX ORDER — DIPHENHYDRAMINE HYDROCHLORIDE 50 MG/ML
50 INJECTION INTRAMUSCULAR; INTRAVENOUS AS NEEDED
Status: CANCELLED | OUTPATIENT
Start: 2024-03-05

## 2024-02-06 RX ORDER — PROCHLORPERAZINE MALEATE 10 MG
10 TABLET ORAL EVERY 6 HOURS PRN
Qty: 60 TABLET | Refills: 5 | Status: SHIPPED | OUTPATIENT
Start: 2024-02-06 | End: 2024-03-10 | Stop reason: ALTCHOICE

## 2024-02-06 RX ORDER — ALBUTEROL SULFATE 0.83 MG/ML
3 SOLUTION RESPIRATORY (INHALATION) AS NEEDED
Status: CANCELLED | OUTPATIENT
Start: 2024-03-05

## 2024-02-06 RX ORDER — HEPARIN 100 UNIT/ML
500 SYRINGE INTRAVENOUS AS NEEDED
Status: CANCELLED | OUTPATIENT
Start: 2024-02-06

## 2024-02-06 RX ORDER — FAMOTIDINE 10 MG/ML
20 INJECTION INTRAVENOUS ONCE AS NEEDED
Status: CANCELLED | OUTPATIENT
Start: 2024-03-05

## 2024-02-06 NOTE — ASSESSMENT & PLAN NOTE
Reviewed bone survey, no large lytic lesions.  Has chronic thoracic compression.  Although hives have been better, he continues to have slow decline in Hgb and persistent symptoms.  He is interested in treatment, so will plan to start avapritinib at indolent SM dosing (25 mg daily).  He has reviewed education material that I sent him during last visit.  Reviewed potential risks, including cognitive events, edema, myelosuppression, and bleeding, including potential CNS bleeding.  Rx sent for  Specialty.  Pharmacy to follow with additional counseling.  He will continue histamine release medications.

## 2024-02-06 NOTE — ASSESSMENT & PLAN NOTE
Saw dentist.  No future dental work planned.  Will proceed with Prolia.  Not candidate for oral bisphosphonate given recent history of esophageal ulcer and GI bleeding.

## 2024-02-06 NOTE — PROGRESS NOTES
"Patient ID: Jin Reynolds is a 69 y.o. male.    ASSESSMENT & PLAN          Oncology History   Smoldering systemic mastocytosis   8/23/2023 Initial Diagnosis    DX:  SMOLDERING SYSTEMIC MASTOCYTOSIS  Presented with skin rash and eosinophilia    [facility] CRITERIA  (For SM, Need major and 1 minor OR at least 3 minors)  - Multi-focal, dense infiltrates of MC (>= 15 mast cells in aggregates) in BM and/or other extra-cutaneous organs [major]  - In BM or extracutaneous organs, >25% MC spindle shaped OR have atypical morphology OR of all MC on BM aspirate smears, >25% are immature or atypical [minor]  - Detection of activating mutation KIT D816V in BM, PB, or other extracutaneous organ [minor]  - Serum tryptase persistently exceeds 20 ng/mL (unless associated clonal myeloid disorder, in which this paramenter is not valid) [minor]    \"B\" FINDINGS  - BM biopsy showing >30% infiltration (focal, dense aggregates) and/or serum tryptase > 200 ng/mL  - Signs of dysplasia or myeloproliferation, in non-MC lineages but insufficient criteria for definitive diagnosis of AHNMD with normal or slightly abnormal blood counts    \"C\" FINDINGS  None       8/23/2023 Biopsy    BONE MARROW (8/23/23)  Hypercellular (90-95%) with trilineage hematopoiesis, granulocytic hyperplasia, eosinophilia, and increased atypical mast cells  IP:  CD34+, +, CD7(bright)+, cCD3-, CD33-, CD15-, CD13+ blast population  IHC:  + increased spindle-shaped mast cells (15% cellularity), CD2-  Myeloid NGS:  CBL (31%), cKIT D816V (30%), RUNX1 x 2 (19%, 29%), SRSF2 (47%) [ASXL1 negative]  FISH:  PDGFRb negative    SERUM TRYPTASE  228 (8/23/23)  268 (9/5/23)     2/13/2024 -  Molecular Therapy    AVAPRITINIB   - Starting dose 25 mg daily (non-Adv SM)          Smoldering systemic mastocytosis  Reviewed bone survey, no large lytic lesions.  Has chronic thoracic compression.  Although hives have been better, he continues to have slow decline in Hgb and persistent " symptoms.  He is interested in treatment, so will plan to start avapritinib at indolent SM dosing (25 mg daily).  He has reviewed education material that I sent him during last visit.  Reviewed potential risks, including cognitive events, edema, myelosuppression, and bleeding, including potential CNS bleeding.  Rx sent for  Specialty.  Pharmacy to follow with additional counseling.  He will continue histamine release medications.    Osteoporosis  Saw dentist.  No future dental work planned.  Will proceed with Prolia.  Not candidate for oral bisphosphonate given recent history of esophageal ulcer and GI bleeding.    ______________________________________________________________________________________________________________________________________________    SUBJECTIVE     Here today for planned follow up.  Has had less hives over past 2 weeks after bad episode that sent him to ED.  Still describes himself as feeling lethargic.  Saw dentist and had tooth extracted.  No other planned dental work.  No new health issues.  Denies fevers, chills, nausea, vomiting, diarrhea, dyspnea, rash, and pain.    OBJECTIVE      Physical Exam    Virtual or Telephone Consent    An interactive audio and video telecommunication system which permits real time communications between the patient (at the originating site) and provider (at the distant site) was utilized to provide this telehealth service.   Verbal consent was requested and obtained from Jin Reynolds on this date, 02/06/24 for a telehealth visit.     Time Spent  Prep time on day of patient encounter: 7 minutes  Time spent directly with patient, family or caregiver: 21 minutes  Additional Time Spent on Patient Care Activities: 6 minutes  Documentation Time: 6 minutes  Other Time Spent: 0 minutes  Total: 40 minutes        Nika Dawson MD

## 2024-02-07 ENCOUNTER — TELEPHONE (OUTPATIENT)
Dept: HEMATOLOGY/ONCOLOGY | Facility: HOSPITAL | Age: 69
End: 2024-02-07
Payer: MEDICARE

## 2024-02-07 LAB — TRYPTASE SERPL-MCNC: 217 UG/L

## 2024-02-22 DIAGNOSIS — M81.8 OTHER OSTEOPOROSIS WITHOUT CURRENT PATHOLOGICAL FRACTURE: Primary | ICD-10-CM

## 2024-02-22 RX ORDER — DIPHENHYDRAMINE HYDROCHLORIDE 50 MG/ML
50 INJECTION INTRAMUSCULAR; INTRAVENOUS AS NEEDED
Status: CANCELLED | OUTPATIENT
Start: 2024-03-05

## 2024-02-22 RX ORDER — ZOLEDRONIC ACID 5 MG/100ML
5 INJECTION, SOLUTION INTRAVENOUS ONCE
Status: CANCELLED | OUTPATIENT
Start: 2024-03-05

## 2024-02-22 RX ORDER — ALBUTEROL SULFATE 0.83 MG/ML
3 SOLUTION RESPIRATORY (INHALATION) AS NEEDED
Status: CANCELLED | OUTPATIENT
Start: 2024-03-05

## 2024-02-22 RX ORDER — FAMOTIDINE 10 MG/ML
20 INJECTION INTRAVENOUS ONCE AS NEEDED
Status: CANCELLED | OUTPATIENT
Start: 2024-03-05

## 2024-02-22 RX ORDER — EPINEPHRINE 0.3 MG/.3ML
0.3 INJECTION SUBCUTANEOUS EVERY 5 MIN PRN
Status: CANCELLED | OUTPATIENT
Start: 2024-03-05

## 2024-03-05 ENCOUNTER — APPOINTMENT (OUTPATIENT)
Dept: HEMATOLOGY/ONCOLOGY | Facility: HOSPITAL | Age: 69
End: 2024-03-05
Payer: MEDICARE

## 2024-03-05 ENCOUNTER — LAB (OUTPATIENT)
Dept: LAB | Facility: HOSPITAL | Age: 69
End: 2024-03-05
Payer: MEDICARE

## 2024-03-05 ENCOUNTER — INFUSION (OUTPATIENT)
Dept: HEMATOLOGY/ONCOLOGY | Facility: HOSPITAL | Age: 69
End: 2024-03-05
Payer: MEDICARE

## 2024-03-05 ENCOUNTER — OFFICE VISIT (OUTPATIENT)
Dept: HEMATOLOGY/ONCOLOGY | Facility: HOSPITAL | Age: 69
End: 2024-03-05
Payer: MEDICARE

## 2024-03-05 VITALS
TEMPERATURE: 97.2 F | WEIGHT: 197.09 LBS | DIASTOLIC BLOOD PRESSURE: 87 MMHG | HEART RATE: 65 BPM | BODY MASS INDEX: 29.97 KG/M2 | SYSTOLIC BLOOD PRESSURE: 157 MMHG | RESPIRATION RATE: 16 BRPM | OXYGEN SATURATION: 98 %

## 2024-03-05 DIAGNOSIS — D47.02 SMOLDERING SYSTEMIC MASTOCYTOSIS: Primary | ICD-10-CM

## 2024-03-05 DIAGNOSIS — D47.02 SMOLDERING SYSTEMIC MASTOCYTOSIS: ICD-10-CM

## 2024-03-05 DIAGNOSIS — M81.8 OTHER OSTEOPOROSIS WITHOUT CURRENT PATHOLOGICAL FRACTURE: ICD-10-CM

## 2024-03-05 LAB
ALBUMIN SERPL BCP-MCNC: 4.6 G/DL (ref 3.4–5)
ALP SERPL-CCNC: 116 U/L (ref 33–136)
ALT SERPL W P-5'-P-CCNC: 9 U/L (ref 10–52)
ANION GAP SERPL CALC-SCNC: 12 MMOL/L (ref 10–20)
AST SERPL W P-5'-P-CCNC: 12 U/L (ref 9–39)
BASOPHILS # BLD AUTO: 0.11 X10*3/UL (ref 0–0.1)
BASOPHILS NFR BLD AUTO: 2.1 %
BILIRUB SERPL-MCNC: 0.7 MG/DL (ref 0–1.2)
BUN SERPL-MCNC: 16 MG/DL (ref 6–23)
CALCIUM SERPL-MCNC: 9 MG/DL (ref 8.6–10.3)
CHLORIDE SERPL-SCNC: 105 MMOL/L (ref 98–107)
CO2 SERPL-SCNC: 25 MMOL/L (ref 21–32)
CREAT SERPL-MCNC: 1.23 MG/DL (ref 0.5–1.3)
EGFRCR SERPLBLD CKD-EPI 2021: 64 ML/MIN/1.73M*2
EOSINOPHIL # BLD AUTO: 1.37 X10*3/UL (ref 0–0.7)
EOSINOPHIL NFR BLD AUTO: 25.7 %
ERYTHROCYTE [DISTWIDTH] IN BLOOD BY AUTOMATED COUNT: 16.3 % (ref 11.5–14.5)
GLUCOSE SERPL-MCNC: 89 MG/DL (ref 74–99)
HBV CORE AB SER QL: NONREACTIVE
HBV SURFACE AB SER-ACNC: 178 MIU/ML
HBV SURFACE AG SERPL QL IA: NONREACTIVE
HCT VFR BLD AUTO: 33.9 % (ref 41–52)
HGB BLD-MCNC: 11.1 G/DL (ref 13.5–17.5)
IMM GRANULOCYTES # BLD AUTO: 0 X10*3/UL (ref 0–0.7)
IMM GRANULOCYTES NFR BLD AUTO: 0 % (ref 0–0.9)
LYMPHOCYTES # BLD AUTO: 1.33 X10*3/UL (ref 1.2–4.8)
LYMPHOCYTES NFR BLD AUTO: 25 %
MCH RBC QN AUTO: 32.6 PG (ref 26–34)
MCHC RBC AUTO-ENTMCNC: 32.7 G/DL (ref 32–36)
MCV RBC AUTO: 99 FL (ref 80–100)
MONOCYTES # BLD AUTO: 0.68 X10*3/UL (ref 0.1–1)
MONOCYTES NFR BLD AUTO: 12.8 %
NEUTROPHILS # BLD AUTO: 1.84 X10*3/UL (ref 1.2–7.7)
NEUTROPHILS NFR BLD AUTO: 34.4 %
NRBC BLD-RTO: 0 /100 WBCS (ref 0–0)
PLATELET # BLD AUTO: 246 X10*3/UL (ref 150–450)
POTASSIUM SERPL-SCNC: 4.2 MMOL/L (ref 3.5–5.3)
PROT SERPL-MCNC: 8.7 G/DL (ref 6.4–8.2)
RBC # BLD AUTO: 3.41 X10*6/UL (ref 4.5–5.9)
SODIUM SERPL-SCNC: 138 MMOL/L (ref 136–145)
WBC # BLD AUTO: 5.3 X10*3/UL (ref 4.4–11.3)

## 2024-03-05 PROCEDURE — 83520 IMMUNOASSAY QUANT NOS NONAB: CPT

## 2024-03-05 PROCEDURE — 86706 HEP B SURFACE ANTIBODY: CPT

## 2024-03-05 PROCEDURE — 1036F TOBACCO NON-USER: CPT | Performed by: INTERNAL MEDICINE

## 2024-03-05 PROCEDURE — 3079F DIAST BP 80-89 MM HG: CPT | Performed by: INTERNAL MEDICINE

## 2024-03-05 PROCEDURE — 84075 ASSAY ALKALINE PHOSPHATASE: CPT

## 2024-03-05 PROCEDURE — 86704 HEP B CORE ANTIBODY TOTAL: CPT

## 2024-03-05 PROCEDURE — 85025 COMPLETE CBC W/AUTO DIFF WBC: CPT

## 2024-03-05 PROCEDURE — 3077F SYST BP >= 140 MM HG: CPT | Performed by: INTERNAL MEDICINE

## 2024-03-05 PROCEDURE — 36415 COLL VENOUS BLD VENIPUNCTURE: CPT

## 2024-03-05 PROCEDURE — 99214 OFFICE O/P EST MOD 30 MIN: CPT | Performed by: INTERNAL MEDICINE

## 2024-03-05 PROCEDURE — 2500000004 HC RX 250 GENERAL PHARMACY W/ HCPCS (ALT 636 FOR OP/ED): Performed by: INTERNAL MEDICINE

## 2024-03-05 PROCEDURE — 87340 HEPATITIS B SURFACE AG IA: CPT

## 2024-03-05 PROCEDURE — 1159F MED LIST DOCD IN RCRD: CPT | Performed by: INTERNAL MEDICINE

## 2024-03-05 PROCEDURE — 1126F AMNT PAIN NOTED NONE PRSNT: CPT | Performed by: INTERNAL MEDICINE

## 2024-03-05 RX ORDER — HEPARIN 100 UNIT/ML
500 SYRINGE INTRAVENOUS AS NEEDED
OUTPATIENT
Start: 2024-03-05

## 2024-03-05 RX ORDER — ALBUTEROL SULFATE 0.83 MG/ML
3 SOLUTION RESPIRATORY (INHALATION) AS NEEDED
OUTPATIENT
Start: 2024-03-05

## 2024-03-05 RX ORDER — EPINEPHRINE 0.3 MG/.3ML
0.3 INJECTION SUBCUTANEOUS EVERY 5 MIN PRN
OUTPATIENT
Start: 2024-03-05

## 2024-03-05 RX ORDER — ZOLEDRONIC ACID 5 MG/100ML
5 INJECTION, SOLUTION INTRAVENOUS ONCE
Status: CANCELLED | OUTPATIENT
Start: 2024-03-05

## 2024-03-05 RX ORDER — FAMOTIDINE 10 MG/ML
20 INJECTION INTRAVENOUS ONCE AS NEEDED
OUTPATIENT
Start: 2024-03-05

## 2024-03-05 RX ORDER — ZOLEDRONIC ACID 5 MG/100ML
5 INJECTION, SOLUTION INTRAVENOUS ONCE
Status: COMPLETED | OUTPATIENT
Start: 2024-03-05 | End: 2024-03-05

## 2024-03-05 RX ORDER — HEPARIN SODIUM,PORCINE/PF 10 UNIT/ML
50 SYRINGE (ML) INTRAVENOUS AS NEEDED
OUTPATIENT
Start: 2024-03-05

## 2024-03-05 RX ORDER — DIPHENHYDRAMINE HYDROCHLORIDE 50 MG/ML
50 INJECTION INTRAMUSCULAR; INTRAVENOUS AS NEEDED
OUTPATIENT
Start: 2024-03-05

## 2024-03-05 RX ADMIN — ZOLEDRONIC ACID 5 MG: 5 INJECTION, SOLUTION INTRAVENOUS at 16:11

## 2024-03-05 ASSESSMENT — PAIN SCALES - GENERAL: PAINLEVEL: 0-NO PAIN

## 2024-03-05 NOTE — PROGRESS NOTES
[facility] Infusion Note  03/05/24    Jin Reynolds is a 69 y.o. year old male patient presenting to outpatient infusion for Reclast #1.     Since the last visit, he reports doing well. Overall, he states that energy level is energy level is good. The patient is able to perform all ADLs. . Appetite has been unchanged and good. he reports no complaints.   There are no social work or other referral needs at this time.    Line type: PIV- 22 R AC  Line removed/maintained prior to discharge: removed    Administrations This Visit       zoledronic acid (Reclast) IVPB 5 mg       Admin Date  03/05/2024 Action  New Bag Dose  5 mg Route  intravenous Administered By  Jaylene Gonzalez RN                  Hypersensitivity reaction noted: No  Patient tolerated treatment well. Discharged home in stable condition.    Follow-up Plan: 1 year for Reclast    Jaylene Gnozalez RN

## 2024-03-07 NOTE — ASSESSMENT & PLAN NOTE
Overall, he feels significantly better these last few weeks which is reflected in improvement in symptom burden score.  He has not yet received his avapritinib.  Based on improved symptoms, we decided to not start avapritinib, and will continue with current anti-histamines, etc.  Plan for labs monthly.  RTC 3 months.

## 2024-03-08 LAB — TRYPTASE SERPL-MCNC: 150 UG/L

## 2024-03-10 NOTE — PROGRESS NOTES
"Patient ID: Jin Reynolds is a 69 y.o. male.    ASSESSMENT & PLAN          Oncology History   Smoldering systemic mastocytosis   8/23/2023 Initial Diagnosis    DX:  SMOLDERING SYSTEMIC MASTOCYTOSIS  Presented with skin rash and eosinophilia    [facility] DI[facility] CRITERIA  (For SM, Need major and 1 minor OR at least 3 minors)  - Multi-focal, dense infiltrates of MC (>= 15 mast cells in aggregates) in BM and/or other extra-cutaneous organs [major]  - In BM or extracutaneous organs, >25% MC spindle shaped OR have atypical morphology OR of all MC on BM aspirate smears, >25% are immature or atypical [minor]  - Detection of activating mutation KIT D816V in BM, PB, or other extracutaneous organ [minor]  - Serum tryptase persistently exceeds 20 ng/mL (unless associated clonal myeloid disorder, in which this paramenter is not valid) [minor]    \"B\" FINDINGS  - BM biopsy showing >30% infiltration (focal, dense aggregates) and/or serum tryptase > 200 ng/mL  - Signs of dysplasia or myeloproliferation, in non-MC lineages but insufficient criteria for definitive diagnosis of AHNMD with normal or slightly abnormal blood counts    \"C\" FINDINGS  None       8/23/2023 Biopsy    BONE MARROW (8/23/23)  Hypercellular (90-95%) with trilineage hematopoiesis, granulocytic hyperplasia, eosinophilia, and increased atypical mast cells  IP:  CD34+, +, CD7(bright)+, cCD3-, CD33-, CD15-, CD13+ blast population  IHC:  + increased spindle-shaped mast cells (15% cellularity), CD2-  Myeloid NGS:  CBL (31%), cKIT D816V (30%), RUNX1 x 2 (19%, 29%), SRSF2 (47%) [ASXL1 negative]  FISH:  PDGFRb negative    SERUM TRYPTASE  228 (8/23/23)  268 (9/5/23)     2/13/2024 -  Molecular Therapy    AVAPRITINIB   - Starting dose 25 mg daily (non-Adv SM)          Smoldering systemic mastocytosis  Overall, he feels significantly better these last few weeks which is reflected in improvement in symptom burden score.  He has not yet received his avapritinib.  Based on " improved symptoms, we decided to not start avapritinib, and will continue with current anti-histamines, etc.  Plan for labs monthly.  RTC 3 months.    Osteoporosis  To receive 1st dose of zolendronic acid today.  Continues on Vit D and calcium supplementation.    ______________________________________________________________________________________________________________________________________________    SUBJECTIVE     Here today for planned follow up.  Overall feeling better.  No hives in more than 4 weeks.  No new health issues.  Denies fevers, chills, nausea, vomiting, diarrhea, dyspnea, rash, and pain.    MASTOCYTOSIS SYMPTOMS ASSESSMENT (MSAF)    SYMPTOM SCORE   Itchy skin 0   Dizziness 0   Headache 0   Fatigue 2   Runny nose 4   Shortness of breath 0   Chest pain/palpitations 0   Nausea/vomiting 0   Diarrhea, stomach ache, cramps 0   Bone pain/muscle pain 0   Concentration problems 3   Depression, somberness 0   Other:      TOTAL SCORE 9     Attacks , with or without loss of consciousness FREQUENCY PER MONTH 0   Flushing FREQUENCY PER WEEK 0     INFLUENCE OF FATIGUE ON: SCORE   General activities 0   Mood/temper 0   Mobility 0   Chores 0   Relationships 0   Happiness 0   Other:      TOTAL SCORE 0         OBJECTIVE     /87   Pulse 65   Temp 36.2 °C (97.2 °F)   Resp 16   Wt 89.4 kg (197 lb 1.5 oz)   SpO2 98%   BMI 29.97 kg/m²      Physical Exam  Vitals reviewed.   Constitutional:       Appearance: Normal appearance.   Eyes:      Conjunctiva/sclera: Conjunctivae normal.      Pupils: Pupils are equal, round, and reactive to light.   Skin:     General: Skin is warm and dry.      Findings: No rash.   Psychiatric:         Mood and Affect: Mood normal.         Time Spent  Prep time on day of patient encounter: 5 minutes  Time spent directly with patient, family or caregiver: 19 minutes  Additional Time Spent on Patient Care Activities: 5 minutes  Documentation Time: 5 minutes  Other Time Spent: 0  minutes  Total: 34 minutes        Nika Dawson MD

## 2024-03-12 DIAGNOSIS — I10 BENIGN HYPERTENSION: ICD-10-CM

## 2024-03-12 RX ORDER — AMLODIPINE BESYLATE 10 MG/1
10 TABLET ORAL DAILY
Qty: 90 TABLET | Refills: 1 | Status: SHIPPED | OUTPATIENT
Start: 2024-03-12

## 2024-06-14 ENCOUNTER — TELEMEDICINE (OUTPATIENT)
Dept: HEMATOLOGY/ONCOLOGY | Facility: HOSPITAL | Age: 69
End: 2024-06-14
Payer: MEDICARE

## 2024-06-14 DIAGNOSIS — D47.02 SMOLDERING SYSTEMIC MASTOCYTOSIS: ICD-10-CM

## 2024-06-14 PROCEDURE — 99213 OFFICE O/P EST LOW 20 MIN: CPT | Performed by: STUDENT IN AN ORGANIZED HEALTH CARE EDUCATION/TRAINING PROGRAM

## 2024-06-14 NOTE — PROGRESS NOTES
"Patient ID: Jin Reynolds is a 69 y.o. male.    ASSESSMENT & PLAN          Oncology History   Smoldering systemic mastocytosis   8/23/2023 Initial Diagnosis    DX:  SMOLDERING SYSTEMIC MASTOCYTOSIS  Presented with skin rash and eosinophilia    [facility]ON DIAGNOSTIC CRITERIA  (For SM, Need major and 1 minor OR at least 3 minors)  - Multi-focal, dense infiltrates of MC (>= 15 mast cells in aggregates) in BM and/or other extra-cutaneous organs [major]  - In BM or extracutaneous organs, >25% MC spindle shaped OR have atypical morphology OR of all MC on BM aspirate smears, >25% are immature or atypical [minor]  - Detection of activating mutation KIT D816V in BM, PB, or other extracutaneous organ [minor]  - Serum tryptase persistently exceeds 20 ng/mL (unless associated clonal myeloid disorder, in which this paramenter is not valid) [minor]    \"B\" FINDINGS  - BM biopsy showing >30% infiltration (focal, dense aggregates) and/or serum tryptase > 200 ng/mL  - Signs of dysplasia or myeloproliferation, in non-MC lineages but insufficient criteria for definitive diagnosis of AHNMD with normal or slightly abnormal blood counts    \"C\" FINDINGS  None       8/23/2023 Biopsy    BONE MARROW (8/23/23)  Hypercellular (90-95%) with trilineage hematopoiesis, granulocytic hyperplasia, eosinophilia, and increased atypical mast cells  IP:  CD34+, +, CD7(bright)+, cCD3-, CD33-, CD15-, CD13+ blast population  IHC:  + increased spindle-shaped mast cells (15% cellularity), CD2-  Myeloid NGS:  CBL (31%), cKIT D816V (30%), RUNX1 x 2 (19%, 29%), SRSF2 (47%) [ASXL1 negative]  FISH:  PDGFRb negative    SERUM TRYPTASE  228 (8/23/23)  268 (9/5/23)     2/13/2024 -  Molecular Therapy    AVAPRITINIB   - Starting dose 25 mg daily (non-Adv SM)          Smoldering systemic mastocytosis  Overall, he feels significantly better over the past couple of months which is reflected in improvement in symptom burden score. Based on improved symptoms, we decided to " not start avapritinib, and will continue with current anti-histamines, etc.  Plan for labs monthly.  RTC 3 months. No recent labs, requested to have these completed in the next few days for review.       ______________________________________________________________________________________________________________________________________________    SUBJECTIVE     Here today for planned follow up.  Overall feeling better.  No hives in more than in past couple of months, still using singulair and anti-histamine daily.  No new health issues. Denies fevers, chills, nausea, vomiting, diarrhea, dyspnea, rash, and pain.     MASTOCYTOSIS SYMPTOMS ASSESSMENT (MSAF)    SYMPTOM SCORE   Itchy skin 0   Dizziness 0   Headache 0   Fatigue 2   Runny nose 4   Shortness of breath 0   Chest pain/palpitations 0   Nausea/vomiting 0   Diarrhea, stomach ache, cramps 0   Bone pain/muscle pain 0   Concentration problems 0   Depression, somberness 0   Other:      TOTAL SCORE 6     Attacks , with or without loss of consciousness FREQUENCY PER MONTH 0   Flushing FREQUENCY PER WEEK 0     INFLUENCE OF FATIGUE ON: SCORE   General activities 0   Mood/temper 0   Mobility 0   Chores 0   Relationships 0   Happiness 0   Other:      TOTAL SCORE 0         OBJECTIVE          Physical Exam  Vitals reviewed.   Constitutional:       Appearance: Normal appearance.   Eyes:      Conjunctiva/sclera: Conjunctivae normal.      Pupils: Pupils are equal, round, and reactive to light.   Skin:     General: Skin is warm and dry.      Findings: No rash.   Psychiatric:         Mood and Affect: Mood normal.                Kalia June PA-C

## 2024-06-19 ENCOUNTER — DOCUMENTATION (OUTPATIENT)
Dept: HEMATOLOGY/ONCOLOGY | Facility: HOSPITAL | Age: 69
End: 2024-06-19

## 2024-06-19 ENCOUNTER — TELEPHONE (OUTPATIENT)
Dept: HEMATOLOGY/ONCOLOGY | Facility: HOSPITAL | Age: 69
End: 2024-06-19

## 2024-06-19 ENCOUNTER — LAB (OUTPATIENT)
Dept: LAB | Facility: LAB | Age: 69
End: 2024-06-19
Payer: MEDICARE

## 2024-06-19 DIAGNOSIS — C92.00 ACUTE MYELOID LEUKEMIA NOT HAVING ACHIEVED REMISSION (MULTI): Primary | ICD-10-CM

## 2024-06-19 DIAGNOSIS — D47.02 SMOLDERING SYSTEMIC MASTOCYTOSIS: ICD-10-CM

## 2024-06-19 LAB
ALBUMIN SERPL BCP-MCNC: 4.4 G/DL (ref 3.4–5)
ALP SERPL-CCNC: 107 U/L (ref 33–136)
ALT SERPL W P-5'-P-CCNC: 6 U/L (ref 10–52)
ANION GAP SERPL CALC-SCNC: 15 MMOL/L (ref 10–20)
AST SERPL W P-5'-P-CCNC: 8 U/L (ref 9–39)
BASOPHILS # BLD AUTO: 0.04 X10*3/UL (ref 0–0.1)
BASOPHILS NFR BLD AUTO: 0.9 %
BILIRUB SERPL-MCNC: 0.4 MG/DL (ref 0–1.2)
BUN SERPL-MCNC: 21 MG/DL (ref 6–23)
CALCIUM SERPL-MCNC: 8.6 MG/DL (ref 8.6–10.6)
CHLORIDE SERPL-SCNC: 107 MMOL/L (ref 98–107)
CO2 SERPL-SCNC: 20 MMOL/L (ref 21–32)
CREAT SERPL-MCNC: 1.23 MG/DL (ref 0.5–1.3)
EGFRCR SERPLBLD CKD-EPI 2021: 64 ML/MIN/1.73M*2
EOSINOPHIL # BLD AUTO: 0.94 X10*3/UL (ref 0–0.7)
EOSINOPHIL NFR BLD AUTO: 21.1 %
ERYTHROCYTE [DISTWIDTH] IN BLOOD BY AUTOMATED COUNT: 18.3 % (ref 11.5–14.5)
GLUCOSE SERPL-MCNC: 104 MG/DL (ref 74–99)
HCT VFR BLD AUTO: 19.4 % (ref 41–52)
HGB BLD-MCNC: 6.3 G/DL (ref 13.5–17.5)
IMM GRANULOCYTES # BLD AUTO: 0.05 X10*3/UL (ref 0–0.7)
IMM GRANULOCYTES NFR BLD AUTO: 1.1 % (ref 0–0.9)
LYMPHOCYTES # BLD AUTO: 1.65 X10*3/UL (ref 1.2–4.8)
LYMPHOCYTES NFR BLD AUTO: 37.1 %
MCH RBC QN AUTO: 34.2 PG (ref 26–34)
MCHC RBC AUTO-ENTMCNC: 32.5 G/DL (ref 32–36)
MCV RBC AUTO: 105 FL (ref 80–100)
MONOCYTES # BLD AUTO: 0.37 X10*3/UL (ref 0.1–1)
MONOCYTES NFR BLD AUTO: 8.3 %
NEUTROPHILS # BLD AUTO: 1.4 X10*3/UL (ref 1.2–7.7)
NEUTROPHILS NFR BLD AUTO: 31.5 %
NRBC BLD-RTO: 0 /100 WBCS (ref 0–0)
PLATELET # BLD AUTO: 106 X10*3/UL (ref 150–450)
POTASSIUM SERPL-SCNC: 4.5 MMOL/L (ref 3.5–5.3)
PROT SERPL-MCNC: 8.1 G/DL (ref 6.4–8.2)
RBC # BLD AUTO: 1.84 X10*6/UL (ref 4.5–5.9)
SODIUM SERPL-SCNC: 137 MMOL/L (ref 136–145)
WBC # BLD AUTO: 4.5 X10*3/UL (ref 4.4–11.3)

## 2024-06-19 PROCEDURE — 85025 COMPLETE CBC W/AUTO DIFF WBC: CPT

## 2024-06-19 PROCEDURE — 36415 COLL VENOUS BLD VENIPUNCTURE: CPT

## 2024-06-19 PROCEDURE — 83520 IMMUNOASSAY QUANT NOS NONAB: CPT

## 2024-06-19 PROCEDURE — 80053 COMPREHEN METABOLIC PANEL: CPT

## 2024-06-19 NOTE — TELEPHONE ENCOUNTER
The patient left a VM on the RN triage line returning Henrietta RN's call, she is aware and is going to call patient

## 2024-06-19 NOTE — TELEPHONE ENCOUNTER
This nurse was notified patient had labs done recently and the results showed critical hgb, see emr.     Call made to both spouse and patient encouraged to call back to discuss plan.     Patient needs to get lab work tomorrow for type and screen and would need blood replacement. Plan for replacement on Friday.    Also need to follow up with patient and inquire on symptoms, any shortness of breath, increased fatigue, etc.     Okay to message this nurse on call back. Or to handle the call and send summary.     Per chart patient prefers to go to [facility], message sent to charge nurse to see spot is available for one unit of blood Friday. If unsuccessful we will reach out to main campus.

## 2024-06-19 NOTE — PROGRESS NOTES
Critical Hgb 6.3  Dr. Dawson and Kalia June both out - Dr. Mike (doc of day) informed via secure chat @ [id number].

## 2024-06-19 NOTE — TELEPHONE ENCOUNTER
"Spoke with patient, discussed plan.     Patient states he is not experiencing shortness of breath, palpitations, chest pain.  However does report fatigue. Rates fatigue at 6/7 out of 10 for the last 2 weeks. \"Im still very active, its not debilitating.\"     Patient verbalized understanding regarding plan.    Encouraged ER if symptoms present, examples provided: lethargy, chest pain, shortness of breath.     This nurse will call patient back with plan for transfusion tomorrow.   "

## 2024-06-20 ENCOUNTER — LAB (OUTPATIENT)
Dept: LAB | Facility: LAB | Age: 69
End: 2024-06-20
Payer: MEDICARE

## 2024-06-20 ENCOUNTER — LAB REQUISITION (OUTPATIENT)
Dept: LAB | Facility: HOSPITAL | Age: 69
End: 2024-06-20
Payer: MEDICARE

## 2024-06-20 DIAGNOSIS — C92.00 ACUTE MYELOID LEUKEMIA NOT HAVING ACHIEVED REMISSION (MULTI): ICD-10-CM

## 2024-06-20 DIAGNOSIS — C92.00 ACUTE MYELOBLASTIC LEUKEMIA, NOT HAVING ACHIEVED REMISSION (MULTI): ICD-10-CM

## 2024-06-20 LAB
ABO GROUP (TYPE) IN BLOOD: NORMAL
ANTIBODY SCREEN: NORMAL
RH FACTOR (ANTIGEN D): NORMAL

## 2024-06-20 PROCEDURE — 86850 RBC ANTIBODY SCREEN: CPT

## 2024-06-20 PROCEDURE — 36415 COLL VENOUS BLD VENIPUNCTURE: CPT

## 2024-06-20 PROCEDURE — 86900 BLOOD TYPING SEROLOGIC ABO: CPT

## 2024-06-20 PROCEDURE — 86923 COMPATIBILITY TEST ELECTRIC: CPT

## 2024-06-20 PROCEDURE — 86901 BLOOD TYPING SEROLOGIC RH(D): CPT

## 2024-06-21 ENCOUNTER — APPOINTMENT (OUTPATIENT)
Dept: HEMATOLOGY/ONCOLOGY | Facility: HOSPITAL | Age: 69
End: 2024-06-21
Payer: MEDICARE

## 2024-06-21 ENCOUNTER — APPOINTMENT (OUTPATIENT)
Dept: OTHER | Facility: HOSPITAL | Age: 69
End: 2024-06-21
Payer: MEDICARE

## 2024-06-21 ENCOUNTER — INFUSION (OUTPATIENT)
Dept: OTHER | Facility: HOSPITAL | Age: 69
End: 2024-06-21
Payer: MEDICARE

## 2024-06-21 VITALS
TEMPERATURE: 97.3 F | DIASTOLIC BLOOD PRESSURE: 67 MMHG | SYSTOLIC BLOOD PRESSURE: 143 MMHG | OXYGEN SATURATION: 100 % | RESPIRATION RATE: 16 BRPM | HEART RATE: 59 BPM | WEIGHT: 194.45 LBS | BODY MASS INDEX: 29.57 KG/M2

## 2024-06-21 DIAGNOSIS — D47.02 SMOLDERING SYSTEMIC MASTOCYTOSIS: ICD-10-CM

## 2024-06-21 DIAGNOSIS — M81.8 OTHER OSTEOPOROSIS WITHOUT CURRENT PATHOLOGICAL FRACTURE: ICD-10-CM

## 2024-06-21 DIAGNOSIS — D47.02 SMOLDERING SYSTEMIC MASTOCYTOSIS: Primary | ICD-10-CM

## 2024-06-21 LAB
BASOPHILS # BLD MANUAL: 0.11 X10*3/UL (ref 0–0.1)
BASOPHILS NFR BLD MANUAL: 3 %
BLOOD EXPIRATION DATE: NORMAL
DACRYOCYTES BLD QL SMEAR: ABNORMAL
DISPENSE STATUS: NORMAL
EOSINOPHIL # BLD MANUAL: 0.53 X10*3/UL (ref 0–0.7)
EOSINOPHIL NFR BLD MANUAL: 14 %
ERYTHROCYTE [DISTWIDTH] IN BLOOD BY AUTOMATED COUNT: 17.6 % (ref 11.5–14.5)
HCT VFR BLD AUTO: 17.5 % (ref 41–52)
HGB BLD-MCNC: 5.8 G/DL (ref 13.5–17.5)
HYPOCHROMIA BLD QL SMEAR: ABNORMAL
IMM GRANULOCYTES # BLD AUTO: 0.04 X10*3/UL (ref 0–0.7)
IMM GRANULOCYTES NFR BLD AUTO: 1.1 % (ref 0–0.9)
LYMPHOCYTES # BLD MANUAL: 1.37 X10*3/UL (ref 1.2–4.8)
LYMPHOCYTES NFR BLD MANUAL: 36 %
MCH RBC QN AUTO: 34.1 PG (ref 26–34)
MCHC RBC AUTO-ENTMCNC: 33.1 G/DL (ref 32–36)
MCV RBC AUTO: 103 FL (ref 80–100)
MONOCYTES # BLD MANUAL: 0 X10*3/UL (ref 0.1–1)
MONOCYTES NFR BLD MANUAL: 0 %
NEUTS SEG # BLD MANUAL: 1.52 X10*3/UL (ref 1.2–7)
NEUTS SEG NFR BLD MANUAL: 40 %
NRBC BLD-RTO: 0.5 /100 WBCS (ref 0–0)
OVALOCYTES BLD QL SMEAR: ABNORMAL
PATH REVIEW-CBC DIFFERENTIAL: NORMAL
PLATELET # BLD AUTO: 83 X10*3/UL (ref 150–450)
POLYCHROMASIA BLD QL SMEAR: ABNORMAL
PRODUCT BLOOD TYPE: 5100
PRODUCT CODE: NORMAL
RBC # BLD AUTO: 1.7 X10*6/UL (ref 4.5–5.9)
RBC MORPH BLD: ABNORMAL
TOTAL CELLS COUNTED BLD: 100
TRYPTASE SERPL-MCNC: 188 UG/L
UNIT ABO: NORMAL
UNIT NUMBER: NORMAL
UNIT RH: NORMAL
UNIT VOLUME: 290
VARIANT LYMPHS # BLD MANUAL: 0.27 X10*3/UL (ref 0–0.5)
VARIANT LYMPHS NFR BLD: 7 %
WBC # BLD AUTO: 3.8 X10*3/UL (ref 4.4–11.3)
XM INTEP: NORMAL

## 2024-06-21 PROCEDURE — 85007 BL SMEAR W/DIFF WBC COUNT: CPT

## 2024-06-21 PROCEDURE — 85027 COMPLETE CBC AUTOMATED: CPT

## 2024-06-21 PROCEDURE — 36430 TRANSFUSION BLD/BLD COMPNT: CPT

## 2024-06-21 RX ORDER — DIPHENHYDRAMINE HYDROCHLORIDE 50 MG/ML
50 INJECTION INTRAMUSCULAR; INTRAVENOUS AS NEEDED
Status: CANCELLED | OUTPATIENT
Start: 2024-06-21

## 2024-06-21 RX ORDER — DIPHENHYDRAMINE HYDROCHLORIDE 50 MG/ML
50 INJECTION INTRAMUSCULAR; INTRAVENOUS AS NEEDED
OUTPATIENT
Start: 2024-06-21

## 2024-06-21 RX ORDER — ALBUTEROL SULFATE 0.83 MG/ML
3 SOLUTION RESPIRATORY (INHALATION) AS NEEDED
OUTPATIENT
Start: 2024-06-21

## 2024-06-21 RX ORDER — EPINEPHRINE 0.3 MG/.3ML
0.3 INJECTION SUBCUTANEOUS EVERY 5 MIN PRN
OUTPATIENT
Start: 2024-06-21

## 2024-06-21 RX ORDER — FAMOTIDINE 10 MG/ML
20 INJECTION INTRAVENOUS ONCE AS NEEDED
OUTPATIENT
Start: 2024-06-21

## 2024-06-21 RX ORDER — ALBUTEROL SULFATE 0.83 MG/ML
3 SOLUTION RESPIRATORY (INHALATION) AS NEEDED
Status: CANCELLED | OUTPATIENT
Start: 2024-06-21

## 2024-06-21 RX ORDER — EPINEPHRINE 0.3 MG/.3ML
0.3 INJECTION SUBCUTANEOUS EVERY 5 MIN PRN
Status: CANCELLED | OUTPATIENT
Start: 2024-06-21

## 2024-06-21 RX ORDER — FAMOTIDINE 10 MG/ML
20 INJECTION INTRAVENOUS ONCE AS NEEDED
Status: CANCELLED | OUTPATIENT
Start: 2024-06-21

## 2024-06-21 ASSESSMENT — PAIN SCALES - GENERAL: PAINLEVEL: 0-NO PAIN

## 2024-06-21 NOTE — ASSESSMENT & PLAN NOTE
Overall, he feels significantly better over the past couple of months which is reflected in improvement in symptom burden score. Based on improved symptoms, we decided to not start avapritinib, and will continue with current anti-histamines, etc.  Plan for labs monthly.  RTC 3 months. No recent labs, requested to have these completed in the next few days for review.

## 2024-06-21 NOTE — PROGRESS NOTES
Pt ambulated to SCT unit without assistance. Pt denies complaints or pain today. Vitals obtained, blood drawn and sent to lab. #22 IV started in the R AC. Marya Jackson NP came to see patient, consent for blood products. Noted low H&H, YUDELKA Lal, aware. 1 units pRBCs ordered and released. See flowsheet for transfusion details, transfusion tolerated well. YUDELKA Lal, came to see pt. Pt ambulated off unit without complaints or assistance.

## 2024-06-22 ENCOUNTER — APPOINTMENT (OUTPATIENT)
Dept: LAB | Facility: LAB | Age: 69
End: 2024-06-22
Payer: MEDICARE

## 2024-06-22 ENCOUNTER — LAB (OUTPATIENT)
Dept: LAB | Facility: LAB | Age: 69
End: 2024-06-22
Payer: MEDICARE

## 2024-06-22 DIAGNOSIS — D47.02 SMOLDERING SYSTEMIC MASTOCYTOSIS: ICD-10-CM

## 2024-06-22 LAB
ABO GROUP (TYPE) IN BLOOD: NORMAL
ALBUMIN SERPL BCP-MCNC: 4.5 G/DL (ref 3.4–5)
ALP SERPL-CCNC: 112 U/L (ref 33–136)
ALT SERPL W P-5'-P-CCNC: 6 U/L (ref 10–52)
ANION GAP SERPL CALC-SCNC: 14 MMOL/L (ref 10–20)
ANTIBODY SCREEN: NORMAL
APTT PPP: 48 SECONDS (ref 27–38)
AST SERPL W P-5'-P-CCNC: 9 U/L (ref 9–39)
BASOPHILS # BLD MANUAL: 0.12 X10*3/UL (ref 0–0.1)
BASOPHILS NFR BLD MANUAL: 2.6 %
BILIRUB SERPL-MCNC: 0.7 MG/DL (ref 0–1.2)
BUN SERPL-MCNC: 19 MG/DL (ref 6–23)
CALCIUM SERPL-MCNC: 8.4 MG/DL (ref 8.6–10.6)
CHLORIDE SERPL-SCNC: 107 MMOL/L (ref 98–107)
CO2 SERPL-SCNC: 22 MMOL/L (ref 21–32)
CREAT SERPL-MCNC: 1.33 MG/DL (ref 0.5–1.3)
EGFRCR SERPLBLD CKD-EPI 2021: 58 ML/MIN/1.73M*2
EOSINOPHIL # BLD MANUAL: 0.75 X10*3/UL (ref 0–0.7)
EOSINOPHIL NFR BLD MANUAL: 16.7 %
ERYTHROCYTE [DISTWIDTH] IN BLOOD BY AUTOMATED COUNT: 20.3 % (ref 11.5–14.5)
FIBRINOGEN PPP-MCNC: 445 MG/DL (ref 200–400)
GLUCOSE SERPL-MCNC: 99 MG/DL (ref 74–99)
HCT VFR BLD AUTO: 20.6 % (ref 41–52)
HGB BLD-MCNC: 6.8 G/DL (ref 13.5–17.5)
IMM GRANULOCYTES # BLD AUTO: 0.05 X10*3/UL (ref 0–0.7)
IMM GRANULOCYTES NFR BLD AUTO: 1.1 % (ref 0–0.9)
INR PPP: 1.3 (ref 0.9–1.1)
LDH SERPL L TO P-CCNC: 175 U/L (ref 84–246)
LYMPHOCYTES # BLD MANUAL: 1.31 X10*3/UL (ref 1.2–4.8)
LYMPHOCYTES NFR BLD MANUAL: 29 %
MCH RBC QN AUTO: 33.2 PG (ref 26–34)
MCHC RBC AUTO-ENTMCNC: 33 G/DL (ref 32–36)
MCV RBC AUTO: 101 FL (ref 80–100)
MONOCYTES # BLD MANUAL: 0.12 X10*3/UL (ref 0.1–1)
MONOCYTES NFR BLD MANUAL: 2.6 %
MYELOCYTES # BLD MANUAL: 0.12 X10*3/UL
MYELOCYTES NFR BLD MANUAL: 2.6 %
NEUTS SEG # BLD MANUAL: 1.85 X10*3/UL (ref 1.2–7)
NEUTS SEG NFR BLD MANUAL: 41.2 %
NRBC BLD-RTO: 0 /100 WBCS (ref 0–0)
OVALOCYTES BLD QL SMEAR: ABNORMAL
PHOSPHATE SERPL-MCNC: 2.6 MG/DL (ref 2.5–4.9)
PLATELET # BLD AUTO: 98 X10*3/UL (ref 150–450)
POTASSIUM SERPL-SCNC: 4.5 MMOL/L (ref 3.5–5.3)
PROT SERPL-MCNC: 8.3 G/DL (ref 6.4–8.2)
PROTHROMBIN TIME: 14.7 SECONDS (ref 9.8–12.8)
RBC # BLD AUTO: 2.05 X10*6/UL (ref 4.5–5.9)
RBC MORPH BLD: ABNORMAL
RH FACTOR (ANTIGEN D): NORMAL
SODIUM SERPL-SCNC: 138 MMOL/L (ref 136–145)
TOTAL CELLS COUNTED BLD: 114
URATE SERPL-MCNC: 6.2 MG/DL (ref 4–7.5)
VARIANT LYMPHS # BLD MANUAL: 0.24 X10*3/UL (ref 0–0.5)
VARIANT LYMPHS NFR BLD: 5.3 %
WBC # BLD AUTO: 4.5 X10*3/UL (ref 4.4–11.3)

## 2024-06-22 PROCEDURE — 83010 ASSAY OF HAPTOGLOBIN QUANT: CPT

## 2024-06-22 PROCEDURE — 86900 BLOOD TYPING SEROLOGIC ABO: CPT

## 2024-06-22 PROCEDURE — 84550 ASSAY OF BLOOD/URIC ACID: CPT

## 2024-06-22 PROCEDURE — 85027 COMPLETE CBC AUTOMATED: CPT

## 2024-06-22 PROCEDURE — 80053 COMPREHEN METABOLIC PANEL: CPT

## 2024-06-22 PROCEDURE — 85610 PROTHROMBIN TIME: CPT

## 2024-06-22 PROCEDURE — 84100 ASSAY OF PHOSPHORUS: CPT

## 2024-06-22 PROCEDURE — 86850 RBC ANTIBODY SCREEN: CPT

## 2024-06-22 PROCEDURE — 85384 FIBRINOGEN ACTIVITY: CPT

## 2024-06-22 PROCEDURE — 85007 BL SMEAR W/DIFF WBC COUNT: CPT

## 2024-06-22 PROCEDURE — 86901 BLOOD TYPING SEROLOGIC RH(D): CPT

## 2024-06-22 PROCEDURE — 85730 THROMBOPLASTIN TIME PARTIAL: CPT

## 2024-06-22 PROCEDURE — 83615 LACTATE (LD) (LDH) ENZYME: CPT

## 2024-06-22 PROCEDURE — 36415 COLL VENOUS BLD VENIPUNCTURE: CPT

## 2024-06-23 LAB — HAPTOGLOB SERPL-MCNC: 113 MG/DL (ref 37–246)

## 2024-06-24 ENCOUNTER — INFUSION (OUTPATIENT)
Dept: HEMATOLOGY/ONCOLOGY | Facility: HOSPITAL | Age: 69
End: 2024-06-24
Payer: MEDICARE

## 2024-06-24 ENCOUNTER — OFFICE VISIT (OUTPATIENT)
Dept: HEMATOLOGY/ONCOLOGY | Facility: HOSPITAL | Age: 69
End: 2024-06-24
Payer: MEDICARE

## 2024-06-24 VITALS
OXYGEN SATURATION: 100 % | TEMPERATURE: 97.5 F | BODY MASS INDEX: 29.33 KG/M2 | WEIGHT: 192.9 LBS | HEART RATE: 65 BPM | DIASTOLIC BLOOD PRESSURE: 70 MMHG | RESPIRATION RATE: 16 BRPM | SYSTOLIC BLOOD PRESSURE: 116 MMHG

## 2024-06-24 DIAGNOSIS — M81.8 OTHER OSTEOPOROSIS WITHOUT CURRENT PATHOLOGICAL FRACTURE: ICD-10-CM

## 2024-06-24 DIAGNOSIS — D47.02 SMOLDERING SYSTEMIC MASTOCYTOSIS: ICD-10-CM

## 2024-06-24 DIAGNOSIS — D47.02 SMOLDERING SYSTEMIC MASTOCYTOSIS: Primary | ICD-10-CM

## 2024-06-24 LAB
ALBUMIN SERPL BCP-MCNC: 4.4 G/DL (ref 3.4–5)
ALP SERPL-CCNC: 112 U/L (ref 33–136)
ALT SERPL W P-5'-P-CCNC: 8 U/L (ref 10–52)
ANION GAP SERPL CALC-SCNC: 13 MMOL/L (ref 10–20)
AST SERPL W P-5'-P-CCNC: 10 U/L (ref 9–39)
BASOPHILS # BLD MANUAL: 0 X10*3/UL (ref 0–0.1)
BASOPHILS NFR BLD MANUAL: 0 %
BILIRUB SERPL-MCNC: 0.7 MG/DL (ref 0–1.2)
BLOOD EXPIRATION DATE: NORMAL
BUN SERPL-MCNC: 20 MG/DL (ref 6–23)
CALCIUM SERPL-MCNC: 8.6 MG/DL (ref 8.6–10.3)
CHLORIDE SERPL-SCNC: 106 MMOL/L (ref 98–107)
CO2 SERPL-SCNC: 22 MMOL/L (ref 21–32)
CREAT SERPL-MCNC: 1.19 MG/DL (ref 0.5–1.3)
DACRYOCYTES BLD QL SMEAR: ABNORMAL
DISPENSE STATUS: NORMAL
EGFRCR SERPLBLD CKD-EPI 2021: 67 ML/MIN/1.73M*2
EOSINOPHIL # BLD MANUAL: 0.52 X10*3/UL (ref 0–0.7)
EOSINOPHIL NFR BLD MANUAL: 13 %
ERYTHROCYTE [DISTWIDTH] IN BLOOD BY AUTOMATED COUNT: 19.1 % (ref 11.5–14.5)
GLUCOSE SERPL-MCNC: 97 MG/DL (ref 74–99)
HCT VFR BLD AUTO: 19.2 % (ref 41–52)
HGB BLD-MCNC: 6.4 G/DL (ref 13.5–17.5)
HYPOCHROMIA BLD QL SMEAR: ABNORMAL
IMM GRANULOCYTES # BLD AUTO: 0.05 X10*3/UL (ref 0–0.7)
IMM GRANULOCYTES NFR BLD AUTO: 1.3 % (ref 0–0.9)
LYMPHOCYTES # BLD MANUAL: 0.76 X10*3/UL (ref 1.2–4.8)
LYMPHOCYTES NFR BLD MANUAL: 19 %
MCH RBC QN AUTO: 33.3 PG (ref 26–34)
MCHC RBC AUTO-ENTMCNC: 33.3 G/DL (ref 32–36)
MCV RBC AUTO: 100 FL (ref 80–100)
MONOCYTES # BLD MANUAL: 0.28 X10*3/UL (ref 0.1–1)
MONOCYTES NFR BLD MANUAL: 7 %
NEUTROPHILS # BLD MANUAL: 2.28 X10*3/UL (ref 1.2–7.7)
NEUTS BAND # BLD MANUAL: 0.2 X10*3/UL (ref 0–0.7)
NEUTS BAND NFR BLD MANUAL: 5 %
NEUTS SEG # BLD MANUAL: 2.08 X10*3/UL (ref 1.2–7)
NEUTS SEG NFR BLD MANUAL: 52 %
NRBC BLD-RTO: 0 /100 WBCS (ref 0–0)
OVALOCYTES BLD QL SMEAR: ABNORMAL
PLATELET # BLD AUTO: 78 X10*3/UL (ref 150–450)
POLYCHROMASIA BLD QL SMEAR: ABNORMAL
POTASSIUM SERPL-SCNC: 4 MMOL/L (ref 3.5–5.3)
PRODUCT BLOOD TYPE: 5100
PRODUCT CODE: NORMAL
PROT SERPL-MCNC: 8.5 G/DL (ref 6.4–8.2)
RBC # BLD AUTO: 1.92 X10*6/UL (ref 4.5–5.9)
RBC MORPH BLD: ABNORMAL
SARS-COV-2 RNA RESP QL NAA+PROBE: NOT DETECTED
SODIUM SERPL-SCNC: 137 MMOL/L (ref 136–145)
TOTAL CELLS COUNTED BLD: 100
UNIT ABO: NORMAL
UNIT NUMBER: NORMAL
UNIT RH: NORMAL
UNIT VOLUME: 281
VARIANT LYMPHS # BLD MANUAL: 0.16 X10*3/UL (ref 0–0.5)
VARIANT LYMPHS NFR BLD: 4 %
WBC # BLD AUTO: 4 X10*3/UL (ref 4.4–11.3)
XM INTEP: NORMAL

## 2024-06-24 PROCEDURE — 99215 OFFICE O/P EST HI 40 MIN: CPT | Performed by: STUDENT IN AN ORGANIZED HEALTH CARE EDUCATION/TRAINING PROGRAM

## 2024-06-24 PROCEDURE — 87635 SARS-COV-2 COVID-19 AMP PRB: CPT

## 2024-06-24 PROCEDURE — 36430 TRANSFUSION BLD/BLD COMPNT: CPT

## 2024-06-24 PROCEDURE — P9040 RBC LEUKOREDUCED IRRADIATED: HCPCS

## 2024-06-24 PROCEDURE — 80053 COMPREHEN METABOLIC PANEL: CPT

## 2024-06-24 PROCEDURE — 87799 DETECT AGENT NOS DNA QUANT: CPT

## 2024-06-24 PROCEDURE — 83520 IMMUNOASSAY QUANT NOS NONAB: CPT

## 2024-06-24 PROCEDURE — 1036F TOBACCO NON-USER: CPT | Performed by: STUDENT IN AN ORGANIZED HEALTH CARE EDUCATION/TRAINING PROGRAM

## 2024-06-24 PROCEDURE — 86923 COMPATIBILITY TEST ELECTRIC: CPT

## 2024-06-24 PROCEDURE — 85007 BL SMEAR W/DIFF WBC COUNT: CPT

## 2024-06-24 PROCEDURE — 85027 COMPLETE CBC AUTOMATED: CPT

## 2024-06-24 RX ORDER — ALBUTEROL SULFATE 0.83 MG/ML
3 SOLUTION RESPIRATORY (INHALATION) AS NEEDED
OUTPATIENT
Start: 2024-06-24

## 2024-06-24 RX ORDER — HEPARIN 100 UNIT/ML
500 SYRINGE INTRAVENOUS AS NEEDED
OUTPATIENT
Start: 2024-06-24

## 2024-06-24 RX ORDER — EPINEPHRINE 0.3 MG/.3ML
0.3 INJECTION SUBCUTANEOUS EVERY 5 MIN PRN
OUTPATIENT
Start: 2024-06-24

## 2024-06-24 RX ORDER — DIPHENHYDRAMINE HYDROCHLORIDE 50 MG/ML
50 INJECTION INTRAMUSCULAR; INTRAVENOUS AS NEEDED
OUTPATIENT
Start: 2024-06-24

## 2024-06-24 RX ORDER — HEPARIN SODIUM,PORCINE/PF 10 UNIT/ML
50 SYRINGE (ML) INTRAVENOUS AS NEEDED
OUTPATIENT
Start: 2024-06-24

## 2024-06-24 RX ORDER — FAMOTIDINE 10 MG/ML
20 INJECTION INTRAVENOUS ONCE AS NEEDED
OUTPATIENT
Start: 2024-06-24

## 2024-06-24 NOTE — PROGRESS NOTES
Patient presents to infusion appointment in stable condition. LUBA June PA-C at bedside to assess patient. Today Hgb 6.4, 1 unit PRBCs transfused per order. Tolerated treatment without incident. Discharged in stable condition.

## 2024-06-24 NOTE — ASSESSMENT & PLAN NOTE
Sudden worsening of cytopenias on last routine lab studies, now requiring pRBC transfusions, plts down by about 50%. Path read out of abnormal lymphocytes, possibly blasts. Unclear if could be related to recent viral illness versus progression of mastocytosis versus other bone marrow process. Will undergo bone marrow evaluation this week, continue twice weekly count checks for the time being. RTC Dr. Dawson 7/9.

## 2024-06-24 NOTE — PROGRESS NOTES
"Patient ID: Jin Reynolds is a 69 y.o. male.    ASSESSMENT & PLAN          Oncology History   Smoldering systemic mastocytosis   8/23/2023 Initial Diagnosis    DX:  SMOLDERING SYSTEMIC MASTOCYTOSIS  Presented with skin rash and eosinophilia    [facility]  (For SM, Need major and 1 minor OR at least 3 minors)  - Multi-focal, dense infiltrates of MC (>= 15 mast cells in aggregates) in BM and/or other extra-cutaneous organs [major]  - In BM or extracutaneous organs, >25% MC spindle shaped OR have atypical morphology OR of all MC on BM aspirate smears, >25% are immature or atypical [minor]  - Detection of activating mutation KIT D816V in BM, PB, or other extracutaneous organ [minor]  - Serum tryptase persistently exceeds 20 ng/mL (unless associated clonal myeloid disorder, in which this paramenter is not valid) [minor]    \"B\" FINDINGS  - BM biopsy showing >30% infiltration (focal, dense aggregates) and/or serum tryptase > 200 ng/mL  - Signs of dysplasia or myeloproliferation, in non-MC lineages but insufficient criteria for definitive diagnosis of AHNMD with normal or slightly abnormal blood counts    \"C\" FINDINGS  None       8/23/2023 Biopsy    BONE MARROW (8/23/23)  Hypercellular (90-95%) with trilineage hematopoiesis, granulocytic hyperplasia, eosinophilia, and increased atypical mast cells  IP:  CD34+, +, CD7(bright)+, cCD3-, CD33-, CD15-, CD13+ blast population  IHC:  + increased spindle-shaped mast cells (15% cellularity), CD2-  Myeloid NGS:  CBL (31%), cKIT D816V (30%), RUNX1 x 2 (19%, 29%), SRSF2 (47%) [ASXL1 negative]  FISH:  PDGFRb negative    SERUM TRYPTASE  228 (8/23/23)  268 (9/5/23)     2/13/2024 -  Molecular Therapy    AVAPRITINIB   - Starting dose 25 mg daily (non-Adv SM)          Smoldering systemic mastocytosis  Sudden worsening of cytopenias on last routine lab studies, now requiring pRBC transfusions, plts down by about 50%. Path read out of abnormal lymphocytes, possibly " blasts. Unclear if could be related to recent viral illness versus progression of mastocytosis versus other bone marrow process. Will undergo bone marrow evaluation this week, continue twice weekly count checks for the time being. RTC Dr. Dawson 7/9.         ______________________________________________________________________________________________________________________________________________    SUBJECTIVE     Here today for follow up, pRBC transfusion. Still feeling well. Over the last 3-4 weeks has been working off a cold with a runny nose. Symptoms seem to have improved over the last few days but not resolved. Fatigued in the last couple weeks as well though still staying very active throughout the day. Helps take care of young grandchildren, drives them around, plays with them. His fatigue has not impacted his ability to do this but he has been needing to go to bed early. No hives in more than in past couple of months, still using singulair and anti-histamine daily.  Denies fevers, chills, nausea, vomiting, diarrhea, dyspnea, rash, and pain.     MASTOCYTOSIS SYMPTOMS ASSESSMENT (MSAF)    SYMPTOM SCORE   Itchy skin 0   Dizziness 0   Headache 0   Fatigue 4   Runny nose 4   Shortness of breath 0   Chest pain/palpitations 0   Nausea/vomiting 0   Diarrhea, stomach ache, cramps 0   Bone pain/muscle pain 0   Concentration problems 0   Depression, somberness 0   Other:      TOTAL SCORE 8     Attacks , with or without loss of consciousness FREQUENCY PER MONTH 0   Flushing FREQUENCY PER WEEK 0     INFLUENCE OF FATIGUE ON: SCORE   General activities 0   Mood/temper 0   Mobility 0   Chores 0   Relationships 0   Happiness 0   Other:      TOTAL SCORE 0         OBJECTIVE          Physical Exam  Vitals reviewed.   Constitutional:       Appearance: Normal appearance.   Eyes:      Conjunctiva/sclera: Conjunctivae normal.      Pupils: Pupils are equal, round, and reactive to light.   Cardiovascular:      Rate and Rhythm:  Normal rate and regular rhythm.   Pulmonary:      Effort: Pulmonary effort is normal.      Breath sounds: Normal breath sounds.   Abdominal:      Palpations: Abdomen is soft.   Musculoskeletal:         General: No swelling or tenderness.   Skin:     General: Skin is warm and dry.      Findings: No rash.   Psychiatric:         Mood and Affect: Mood normal.                Kalia June PA-C

## 2024-06-25 DIAGNOSIS — D47.02 SMOLDERING SYSTEMIC MASTOCYTOSIS: ICD-10-CM

## 2024-06-25 LAB
EBV DNA BLD NAA+PROBE-LOG IU: NORMAL {LOG_IU}/ML
LABORATORY COMMENT REPORT: NOT DETECTED

## 2024-06-26 LAB — TRYPTASE SERPL-MCNC: 164 UG/L

## 2024-06-27 ENCOUNTER — PROCEDURE VISIT (OUTPATIENT)
Dept: OTHER | Facility: HOSPITAL | Age: 69
End: 2024-06-27
Payer: MEDICARE

## 2024-06-27 VITALS
WEIGHT: 197.53 LBS | TEMPERATURE: 97.7 F | OXYGEN SATURATION: 100 % | BODY MASS INDEX: 30.03 KG/M2 | DIASTOLIC BLOOD PRESSURE: 71 MMHG | HEART RATE: 64 BPM | RESPIRATION RATE: 18 BRPM | SYSTOLIC BLOOD PRESSURE: 141 MMHG

## 2024-06-27 DIAGNOSIS — Z87.19 HISTORY OF UPPER GASTROINTESTINAL HEMORRHAGE: Primary | ICD-10-CM

## 2024-06-27 DIAGNOSIS — M81.8 OTHER OSTEOPOROSIS WITHOUT CURRENT PATHOLOGICAL FRACTURE: ICD-10-CM

## 2024-06-27 DIAGNOSIS — D47.02 SMOLDERING SYSTEMIC MASTOCYTOSIS: ICD-10-CM

## 2024-06-27 LAB
ABO GROUP (TYPE) IN BLOOD: NORMAL
ANTIBODY SCREEN: NORMAL
BASOPHILS # BLD MANUAL: 0.03 X10*3/UL (ref 0–0.1)
BASOPHILS NFR BLD MANUAL: 1 %
BLOOD EXPIRATION DATE: NORMAL
CMV DNA SERPL NAA+PROBE-LOG IU: NORMAL {LOG_IU}/ML
DACRYOCYTES BLD QL SMEAR: ABNORMAL
DISPENSE STATUS: NORMAL
EOSINOPHIL # BLD MANUAL: 0.54 X10*3/UL (ref 0–0.7)
EOSINOPHIL NFR BLD MANUAL: 16 %
ERYTHROCYTE [DISTWIDTH] IN BLOOD BY AUTOMATED COUNT: 19.1 % (ref 11.5–14.5)
HCT VFR BLD AUTO: 20 % (ref 41–52)
HGB BLD-MCNC: 6.6 G/DL (ref 13.5–17.5)
IMM GRANULOCYTES # BLD AUTO: 0.04 X10*3/UL (ref 0–0.7)
IMM GRANULOCYTES NFR BLD AUTO: 1.2 % (ref 0–0.9)
LABORATORY COMMENT REPORT: NOT DETECTED
LYMPHOCYTES # BLD MANUAL: 1.29 X10*3/UL (ref 1.2–4.8)
LYMPHOCYTES NFR BLD MANUAL: 38 %
MCH RBC QN AUTO: 31.7 PG (ref 26–34)
MCHC RBC AUTO-ENTMCNC: 33 G/DL (ref 32–36)
MCV RBC AUTO: 96 FL (ref 80–100)
MONOCYTES # BLD MANUAL: 0.17 X10*3/UL (ref 0.1–1)
MONOCYTES NFR BLD MANUAL: 5 %
MYELOCYTES # BLD MANUAL: 0.07 X10*3/UL
MYELOCYTES NFR BLD MANUAL: 2 %
NEUTS SEG # BLD MANUAL: 1.05 X10*3/UL (ref 1.2–7)
NEUTS SEG NFR BLD MANUAL: 31 %
NRBC BLD-RTO: 0 /100 WBCS (ref 0–0)
OVALOCYTES BLD QL SMEAR: ABNORMAL
PLATELET # BLD AUTO: 80 X10*3/UL (ref 150–450)
POLYCHROMASIA BLD QL SMEAR: ABNORMAL
PRODUCT BLOOD TYPE: 5100
PRODUCT CODE: NORMAL
RBC # BLD AUTO: 2.08 X10*6/UL (ref 4.5–5.9)
RBC MORPH BLD: ABNORMAL
RH FACTOR (ANTIGEN D): NORMAL
SCHISTOCYTES BLD QL SMEAR: ABNORMAL
TOTAL CELLS COUNTED BLD: 100
UNIT ABO: NORMAL
UNIT NUMBER: NORMAL
UNIT RH: NORMAL
UNIT VOLUME: 325
VARIANT LYMPHS # BLD MANUAL: 0.24 X10*3/UL (ref 0–0.5)
VARIANT LYMPHS NFR BLD: 7 %
WBC # BLD AUTO: 3.4 X10*3/UL (ref 4.4–11.3)
XM INTEP: NORMAL

## 2024-06-27 PROCEDURE — 82270 OCCULT BLOOD FECES: CPT

## 2024-06-27 PROCEDURE — 88291 CYTO/MOLECULAR REPORT: CPT | Performed by: PATHOLOGY

## 2024-06-27 PROCEDURE — G0452 MOLECULAR PATHOLOGY INTERPR: HCPCS | Performed by: PATHOLOGY

## 2024-06-27 PROCEDURE — P9040 RBC LEUKOREDUCED IRRADIATED: HCPCS

## 2024-06-27 PROCEDURE — 85007 BL SMEAR W/DIFF WBC COUNT: CPT | Performed by: STUDENT IN AN ORGANIZED HEALTH CARE EDUCATION/TRAINING PROGRAM

## 2024-06-27 PROCEDURE — 88341 IMHCHEM/IMCYTCHM EA ADD ANTB: CPT | Performed by: PATHOLOGY

## 2024-06-27 PROCEDURE — 85097 BONE MARROW INTERPRETATION: CPT | Performed by: PATHOLOGY

## 2024-06-27 PROCEDURE — 36415 COLL VENOUS BLD VENIPUNCTURE: CPT | Performed by: NURSE PRACTITIONER

## 2024-06-27 PROCEDURE — 88342 IMHCHEM/IMCYTCHM 1ST ANTB: CPT | Performed by: PATHOLOGY

## 2024-06-27 PROCEDURE — 88305 TISSUE EXAM BY PATHOLOGIST: CPT | Performed by: PATHOLOGY

## 2024-06-27 PROCEDURE — 85027 COMPLETE CBC AUTOMATED: CPT | Performed by: STUDENT IN AN ORGANIZED HEALTH CARE EDUCATION/TRAINING PROGRAM

## 2024-06-27 PROCEDURE — 88305 TISSUE EXAM BY PATHOLOGIST: CPT | Mod: TC,SUR | Performed by: STUDENT IN AN ORGANIZED HEALTH CARE EDUCATION/TRAINING PROGRAM

## 2024-06-27 PROCEDURE — 88311 DECALCIFY TISSUE: CPT | Performed by: PATHOLOGY

## 2024-06-27 PROCEDURE — 81450 HL NEO GSAP 5-50DNA/DNA&RNA: CPT | Mod: OUT | Performed by: PATHOLOGY

## 2024-06-27 PROCEDURE — 85097 BONE MARROW INTERPRETATION: CPT | Mod: TC | Performed by: STUDENT IN AN ORGANIZED HEALTH CARE EDUCATION/TRAINING PROGRAM

## 2024-06-27 PROCEDURE — 38221 DX BONE MARROW BIOPSIES: CPT | Performed by: NURSE PRACTITIONER

## 2024-06-27 PROCEDURE — 36430 TRANSFUSION BLD/BLD COMPNT: CPT

## 2024-06-27 PROCEDURE — 86901 BLOOD TYPING SEROLOGIC RH(D): CPT

## 2024-06-27 PROCEDURE — 88275 CYTOGENETICS 100-300: CPT | Performed by: PATHOLOGY

## 2024-06-27 RX ORDER — DIPHENHYDRAMINE HYDROCHLORIDE 50 MG/ML
50 INJECTION INTRAMUSCULAR; INTRAVENOUS AS NEEDED
OUTPATIENT
Start: 2024-06-27

## 2024-06-27 RX ORDER — ALBUTEROL SULFATE 0.83 MG/ML
3 SOLUTION RESPIRATORY (INHALATION) AS NEEDED
OUTPATIENT
Start: 2024-06-27

## 2024-06-27 RX ORDER — EPINEPHRINE 0.3 MG/.3ML
0.3 INJECTION SUBCUTANEOUS EVERY 5 MIN PRN
OUTPATIENT
Start: 2024-06-27

## 2024-06-27 RX ORDER — FAMOTIDINE 10 MG/ML
20 INJECTION INTRAVENOUS ONCE AS NEEDED
OUTPATIENT
Start: 2024-06-27

## 2024-06-27 ASSESSMENT — PAIN SCALES - GENERAL: PAINLEVEL: 0-NO PAIN

## 2024-06-27 NOTE — PROGRESS NOTES
Bone Marrow Biopsy and Aspiration Procedure Note     Informed consent was obtained and potential risks including bleeding, infection and pain were reviewed with the patient.       The Left posterior iliac crest was prepped with chlorhexidine. However, pt was consented for R side.    10 ml of lidocaine 1% local anesthesia infiltrated into the subcutaneous tissue.    Left bone marrow biopsy obtained (2 cores done), left bone marrow aspirate was not obtained (3 attempts made).    The procedure was tolerated well and there were no complications.    Specimens sent for: routine histopathologic stains and sectioning, flow cytometry, cytogenetics, and molecular analysis    Procedure completed by: ROSA Lind-CNP

## 2024-06-27 NOTE — PROGRESS NOTES
Patient arrived to ASCT unit via self-ambulation for bone marrow biopsy. He is alert and oriented x3, denies pain or any discomfort. Vital signs obtained. 22g PIV placed in right upper  forearm without incident, blood drawn and sent to lab. Anderson Mcgrath NP performed procedure. Dressing is clean, dry and intact. Education provided and PI sheet given.  After biopsy patient received 1u PRBCs, tolerated infusion well, see flowsheet. PIV removed, catheter intact, and OCD applied to site.  No adverse reactions, no complaints and no assistance needed.

## 2024-06-28 DIAGNOSIS — D47.02 SMOLDERING SYSTEMIC MASTOCYTOSIS: Primary | ICD-10-CM

## 2024-06-28 PROCEDURE — 82270 OCCULT BLOOD FECES: CPT | Performed by: STUDENT IN AN ORGANIZED HEALTH CARE EDUCATION/TRAINING PROGRAM

## 2024-06-29 PROCEDURE — 82270 OCCULT BLOOD FECES: CPT | Performed by: STUDENT IN AN ORGANIZED HEALTH CARE EDUCATION/TRAINING PROGRAM

## 2024-07-01 ENCOUNTER — LAB (OUTPATIENT)
Dept: LAB | Facility: LAB | Age: 69
End: 2024-07-01
Payer: MEDICARE

## 2024-07-01 DIAGNOSIS — Z87.19 HISTORY OF UPPER GASTROINTESTINAL HEMORRHAGE: ICD-10-CM

## 2024-07-01 DIAGNOSIS — D47.02 SMOLDERING SYSTEMIC MASTOCYTOSIS: ICD-10-CM

## 2024-07-01 LAB
HEMOCCULT SP1 STL QL: NEGATIVE

## 2024-07-01 PROCEDURE — 88185 FLOWCYTOMETRY/TC ADD-ON: CPT

## 2024-07-01 PROCEDURE — 88184 FLOWCYTOMETRY/ TC 1 MARKER: CPT

## 2024-07-02 ENCOUNTER — LAB (OUTPATIENT)
Dept: HEMATOLOGY/ONCOLOGY | Facility: HOSPITAL | Age: 69
End: 2024-07-02
Payer: MEDICARE

## 2024-07-02 ENCOUNTER — TELEMEDICINE (OUTPATIENT)
Dept: HEMATOLOGY/ONCOLOGY | Facility: HOSPITAL | Age: 69
End: 2024-07-02
Payer: MEDICARE

## 2024-07-02 VITALS
OXYGEN SATURATION: 99 % | TEMPERATURE: 96.8 F | HEART RATE: 88 BPM | BODY MASS INDEX: 29.24 KG/M2 | WEIGHT: 192.3 LBS | DIASTOLIC BLOOD PRESSURE: 76 MMHG | RESPIRATION RATE: 18 BRPM | SYSTOLIC BLOOD PRESSURE: 144 MMHG

## 2024-07-02 DIAGNOSIS — D47.02 SMOLDERING SYSTEMIC MASTOCYTOSIS: Primary | ICD-10-CM

## 2024-07-02 DIAGNOSIS — C95.00 ACUTE LEUKEMIA NOT HAVING ACHIEVED REMISSION (MULTI): ICD-10-CM

## 2024-07-02 DIAGNOSIS — D72.0 GENETIC ANOMALIES OF LEUKOCYTES (MULTI): ICD-10-CM

## 2024-07-02 DIAGNOSIS — M81.8 OTHER OSTEOPOROSIS WITHOUT CURRENT PATHOLOGICAL FRACTURE: ICD-10-CM

## 2024-07-02 PROBLEM — R76.8 RED BLOOD CELL ANTIBODY POSITIVE: Status: ACTIVE | Noted: 2024-07-02

## 2024-07-02 LAB
ABO GROUP (TYPE) IN BLOOD: NORMAL
ANTIBODY SCREEN: NORMAL
BASOPHILS # BLD MANUAL: 0.08 X10*3/UL (ref 0–0.1)
BASOPHILS NFR BLD MANUAL: 2 %
BLASTS # BLD MANUAL: 0.21 X10*3/UL
BLASTS NFR BLD MANUAL: 5 %
CHROM ANALY OVERALL INTERP-IMP: NORMAL
DACRYOCYTES BLD QL SMEAR: ABNORMAL
ELECTRONICALLY COSIGNED BY CYTOGENETICS: NORMAL
ELECTRONICALLY SIGNED BY CYTOGENETICS: NORMAL
EOSINOPHIL # BLD MANUAL: 0.41 X10*3/UL (ref 0–0.7)
EOSINOPHIL NFR BLD MANUAL: 10 %
ERYTHROCYTE [DISTWIDTH] IN BLOOD BY AUTOMATED COUNT: 19.5 % (ref 11.5–14.5)
FISH ISCN RESULTS: NORMAL
HCT VFR BLD AUTO: 17 % (ref 41–52)
HGB BLD-MCNC: 5.6 G/DL (ref 13.5–17.5)
HYPOCHROMIA BLD QL SMEAR: ABNORMAL
IMM GRANULOCYTES # BLD AUTO: 0.03 X10*3/UL (ref 0–0.7)
IMM GRANULOCYTES NFR BLD AUTO: 0.7 % (ref 0–0.9)
LYMPHOCYTES # BLD MANUAL: 1.11 X10*3/UL (ref 1.2–4.8)
LYMPHOCYTES NFR BLD MANUAL: 27 %
MCH RBC QN AUTO: 32.9 PG (ref 26–34)
MCHC RBC AUTO-ENTMCNC: 32.9 G/DL (ref 32–36)
MCV RBC AUTO: 100 FL (ref 80–100)
MONOCYTES # BLD MANUAL: 0.37 X10*3/UL (ref 0.1–1)
MONOCYTES NFR BLD MANUAL: 9 %
NEUTROPHILS # BLD MANUAL: 1.89 X10*3/UL (ref 1.2–7.7)
NEUTS BAND # BLD MANUAL: 0.41 X10*3/UL (ref 0–0.7)
NEUTS BAND NFR BLD MANUAL: 10 %
NEUTS SEG # BLD MANUAL: 1.48 X10*3/UL (ref 1.2–7)
NEUTS SEG NFR BLD MANUAL: 36 %
NRBC BLD-RTO: 0 /100 WBCS (ref 0–0)
OVALOCYTES BLD QL SMEAR: ABNORMAL
PLATELET # BLD AUTO: 59 X10*3/UL (ref 150–450)
POLYCHROMASIA BLD QL SMEAR: ABNORMAL
RBC # BLD AUTO: 1.7 X10*6/UL (ref 4.5–5.9)
RBC MORPH BLD: ABNORMAL
RH FACTOR (ANTIGEN D): NORMAL
ROULEAUX BLD QL SMEAR: PRESENT
SCHISTOCYTES BLD QL SMEAR: ABNORMAL
TOTAL CELLS COUNTED BLD: 100
VARIANT LYMPHS # BLD MANUAL: 0.04 X10*3/UL (ref 0–0.5)
VARIANT LYMPHS NFR BLD: 1 %
WBC # BLD AUTO: 4.1 X10*3/UL (ref 4.4–11.3)

## 2024-07-02 PROCEDURE — 36415 COLL VENOUS BLD VENIPUNCTURE: CPT

## 2024-07-02 PROCEDURE — 99442 PR PHYS/QHP TELEPHONE EVALUATION 11-20 MIN: CPT | Performed by: INTERNAL MEDICINE

## 2024-07-02 PROCEDURE — 86922 COMPATIBILITY TEST ANTIGLOB: CPT

## 2024-07-02 PROCEDURE — 86901 BLOOD TYPING SEROLOGIC RH(D): CPT

## 2024-07-02 PROCEDURE — 1036F TOBACCO NON-USER: CPT | Performed by: INTERNAL MEDICINE

## 2024-07-02 PROCEDURE — 85027 COMPLETE CBC AUTOMATED: CPT

## 2024-07-02 PROCEDURE — 85007 BL SMEAR W/DIFF WBC COUNT: CPT

## 2024-07-02 RX ORDER — ALBUTEROL SULFATE 0.83 MG/ML
3 SOLUTION RESPIRATORY (INHALATION) AS NEEDED
Status: CANCELLED | OUTPATIENT
Start: 2024-07-02

## 2024-07-02 RX ORDER — EPINEPHRINE 0.3 MG/.3ML
0.3 INJECTION SUBCUTANEOUS EVERY 5 MIN PRN
Status: CANCELLED | OUTPATIENT
Start: 2024-07-02

## 2024-07-02 RX ORDER — HEPARIN SODIUM,PORCINE/PF 10 UNIT/ML
50 SYRINGE (ML) INTRAVENOUS AS NEEDED
Status: CANCELLED | OUTPATIENT
Start: 2024-07-02

## 2024-07-02 RX ORDER — DIPHENHYDRAMINE HYDROCHLORIDE 50 MG/ML
50 INJECTION INTRAMUSCULAR; INTRAVENOUS AS NEEDED
Status: CANCELLED | OUTPATIENT
Start: 2024-07-02

## 2024-07-02 RX ORDER — FAMOTIDINE 10 MG/ML
20 INJECTION INTRAVENOUS ONCE AS NEEDED
Status: CANCELLED | OUTPATIENT
Start: 2024-07-02

## 2024-07-02 RX ORDER — HEPARIN 100 UNIT/ML
500 SYRINGE INTRAVENOUS AS NEEDED
Status: CANCELLED | OUTPATIENT
Start: 2024-07-02

## 2024-07-02 ASSESSMENT — PAIN SCALES - GENERAL: PAINLEVEL: 0-NO PAIN

## 2024-07-02 NOTE — PROGRESS NOTES
PT here for count check; 5.6 Hemoglobin. PT tested positive for antibodies; blood has been ordered and PT rescheduled for 7/3/24 at 0245pm. PT aware to call infusion  prior to arriving to verify blood is ready. All signs/symptoms of anemia reviewed with PT. PT aware to go to ED if needed.  Dr. Herrera notified via secure text the plan of care.

## 2024-07-02 NOTE — ASSESSMENT & PLAN NOTE
Recently developed pancytopenia.  BM reveals acute leukemia.  Since they were unable to obtain aspirate, IHC was not lineage defining.  We have PB flow cytometry pending to help confirm lineage, which I expect will be myeloid based on his SM history.  We are also awaiting additional cytogenetic and molecular testing for risk stratification and treatment planning.  Tentatively, I introduced azacitidine and venetoclax treatment.  Will discuss more next week, but will begin scheduling and precert.

## 2024-07-02 NOTE — PROGRESS NOTES
"    Patient ID: Jin Reynolds is a 69 y.o. male.    ASSESSMENT & PLAN          Oncology History   Smoldering systemic mastocytosis   8/23/2023 Initial Diagnosis    DX:  SMOLDERING SYSTEMIC MASTOCYTOSIS  Presented with skin rash and eosinophilia    [facility]  (For SM, Need major and 1 minor OR at least 3 minors)  - Multi-focal, dense infiltrates of MC (>= 15 mast cells in aggregates) in BM and/or other extra-cutaneous organs [major]  - In BM or extracutaneous organs, >25% MC spindle shaped OR have atypical morphology OR of all MC on BM aspirate smears, >25% are immature or atypical [minor]  - Detection of activating mutation KIT D816V in BM, PB, or other extracutaneous organ [minor]  - Serum tryptase persistently exceeds 20 ng/mL (unless associated clonal myeloid disorder, in which this paramenter is not valid) [minor]    \"B\" FINDINGS  - BM biopsy showing >30% infiltration (focal, dense aggregates) and/or serum tryptase > 200 ng/mL  - Signs of dysplasia or myeloproliferation, in non-MC lineages but insufficient criteria for definitive diagnosis of AHNMD with normal or slightly abnormal blood counts    \"C\" FINDINGS  None       8/23/2023 Biopsy    BONE MARROW (8/23/23)  Hypercellular (90-95%) with trilineage hematopoiesis, granulocytic hyperplasia, eosinophilia, and increased atypical mast cells  IP:  CD34+, +, CD7(bright)+, cCD3-, CD33-, CD15-, CD13+ blast population  IHC:  + increased spindle-shaped mast cells (15% cellularity), CD2-  Myeloid NGS:  CBL (31%), cKIT D816V (30%), RUNX1 x 2 (19%, 29%), SRSF2 (47%) [ASXL1 negative]  FISH:  PDGFRb negative    SERUM TRYPTASE  228 (8/23/23)  268 (9/5/23)     2/13/2024 -  Molecular Therapy    AVAPRITINIB   - Starting dose 25 mg daily (non-Adv SM)          Acute leukemia (Multi)  Recently developed pancytopenia.  BM reveals acute leukemia.  Since they were unable to obtain aspirate, IHC was not lineage defining.  We have PB flow cytometry pending to " "help confirm lineage, which I expect will be myeloid based on his SM history.  We are also awaiting additional cytogenetic and molecular testing for risk stratification and treatment planning.  Tentatively, I introduced azacitidine and venetoclax treatment.  Will discuss more next week, but will begin scheduling and precert.    Red blood cell antibody positive  Newly positive antibody screen.  Awaiting ID for transfusion planning.    ______________________________________________________________________________________________________________________________________________    SUBJECTIVE     Here today for planned follow up.  Feeling bit more fatigued and when hemglobin lower, he \"can feel it\".   No other new health issues.  Denies fevers, chills, nausea, vomiting, diarrhea, dyspnea, rash, and pain.    OBJECTIVE        Virtual or Telephone Consent    A telephone visit (audio only) between the patient (at the originating site) and the provider (at the distant site) was utilized to provide this telehealth service.   Verbal consent was requested and obtained from Jin Reynolds on this date, 07/02/24 for a telehealth visit.     Time Spent  Time spent directly with patient, family or caregiver: 12 minutes        Nika Dawson MD          "

## 2024-07-03 ENCOUNTER — TELEPHONE (OUTPATIENT)
Dept: HEMATOLOGY/ONCOLOGY | Facility: HOSPITAL | Age: 69
End: 2024-07-03
Payer: MEDICARE

## 2024-07-03 ENCOUNTER — INFUSION (OUTPATIENT)
Dept: HEMATOLOGY/ONCOLOGY | Facility: HOSPITAL | Age: 69
End: 2024-07-03
Payer: MEDICARE

## 2024-07-03 VITALS
BODY MASS INDEX: 29.3 KG/M2 | OXYGEN SATURATION: 100 % | HEART RATE: 69 BPM | SYSTOLIC BLOOD PRESSURE: 140 MMHG | DIASTOLIC BLOOD PRESSURE: 69 MMHG | TEMPERATURE: 97 F | RESPIRATION RATE: 18 BRPM | WEIGHT: 192.68 LBS

## 2024-07-03 DIAGNOSIS — D47.02 SMOLDERING SYSTEMIC MASTOCYTOSIS: Primary | ICD-10-CM

## 2024-07-03 DIAGNOSIS — M81.8 OTHER OSTEOPOROSIS WITHOUT CURRENT PATHOLOGICAL FRACTURE: ICD-10-CM

## 2024-07-03 DIAGNOSIS — D47.02 SMOLDERING SYSTEMIC MASTOCYTOSIS: ICD-10-CM

## 2024-07-03 DIAGNOSIS — C95.00 ACUTE LEUKEMIA NOT HAVING ACHIEVED REMISSION (MULTI): ICD-10-CM

## 2024-07-03 LAB
ABO GROUP (TYPE) IN BLOOD: NORMAL
BB ANTIBODY IDENTIFICATION: NORMAL
BLOOD EXPIRATION DATE: NORMAL
BLOOD EXPIRATION DATE: NORMAL
CASE #: NORMAL
CELL COUNT (BLOOD): 3.4 X10*3/UL
CELL POPULATIONS: NORMAL
COMPONENT CODE: NORMAL
COMPONENT CODE: NORMAL
DAT-POLYSPECIFIC: NORMAL
DIAGNOSIS: NORMAL
DISPENSE STATUS: NORMAL
DISPENSE STATUS: NORMAL
FLOW DIFFERENTIAL: NORMAL
FLOW TEST ORDERED: NORMAL
LAB TEST METHOD: NORMAL
LDH SERPL L TO P-CCNC: 368 U/L (ref 84–246)
NUMBER OF CELLS COLLECTED: NORMAL PER TUBE
PATH REPORT.ADDENDUM SPEC: NORMAL
PATH REPORT.COMMENTS IMP SPEC: NORMAL
PATH REPORT.FINAL DX SPEC: NORMAL
PATH REPORT.GROSS SPEC: NORMAL
PATH REPORT.MICROSCOPIC SPEC OTHER STN: NORMAL
PATH REPORT.RELEVANT HX SPEC: NORMAL
PATH REPORT.TOTAL CANCER: NORMAL
PATH REPORT.TOTAL CANCER: NORMAL
PATH REV-IMMUNOHEMATOLOGY-PR30: NORMAL
PRODUCT BLOOD TYPE: 5100
PRODUCT BLOOD TYPE: 5100
PRODUCT CODE: NORMAL
PRODUCT CODE: NORMAL
RH FACTOR (ANTIGEN D): NORMAL
SIGNATURE COMMENT: NORMAL
SPECIMEN VIABILITY: NORMAL
UNIT ABO: NORMAL
UNIT ABO: NORMAL
UNIT NUMBER: NORMAL
UNIT RH: NORMAL
UNIT RH: NORMAL
UNIT VOLUME: 276
UNIT VOLUME: 350
XM INTEP: NORMAL
XM INTEP: NORMAL

## 2024-07-03 PROCEDURE — 86902 BLOOD TYPE ANTIGEN DONOR EA: CPT

## 2024-07-03 PROCEDURE — 2500000004 HC RX 250 GENERAL PHARMACY W/ HCPCS (ALT 636 FOR OP/ED): Performed by: STUDENT IN AN ORGANIZED HEALTH CARE EDUCATION/TRAINING PROGRAM

## 2024-07-03 PROCEDURE — 86901 BLOOD TYPING SEROLOGIC RH(D): CPT

## 2024-07-03 PROCEDURE — 96375 TX/PRO/DX INJ NEW DRUG ADDON: CPT | Mod: INF

## 2024-07-03 PROCEDURE — 86880 COOMBS TEST DIRECT: CPT

## 2024-07-03 PROCEDURE — 87070 CULTURE OTHR SPECIMN AEROBIC: CPT

## 2024-07-03 PROCEDURE — 36430 TRANSFUSION BLD/BLD COMPNT: CPT

## 2024-07-03 PROCEDURE — 36415 COLL VENOUS BLD VENIPUNCTURE: CPT

## 2024-07-03 PROCEDURE — 96374 THER/PROPH/DIAG INJ IV PUSH: CPT | Mod: INF

## 2024-07-03 PROCEDURE — P9040 RBC LEUKOREDUCED IRRADIATED: HCPCS

## 2024-07-03 PROCEDURE — 83615 LACTATE (LD) (LDH) ENZYME: CPT

## 2024-07-03 RX ORDER — ACETAMINOPHEN 325 MG/1
650 TABLET ORAL ONCE
Status: CANCELLED | OUTPATIENT
Start: 2024-07-03 | End: 2024-07-03

## 2024-07-03 RX ORDER — EPINEPHRINE 0.3 MG/.3ML
0.3 INJECTION SUBCUTANEOUS EVERY 5 MIN PRN
Status: DISCONTINUED | OUTPATIENT
Start: 2024-07-03 | End: 2024-07-03 | Stop reason: HOSPADM

## 2024-07-03 RX ORDER — ALBUTEROL SULFATE 0.83 MG/ML
3 SOLUTION RESPIRATORY (INHALATION) AS NEEDED
Status: CANCELLED | OUTPATIENT
Start: 2024-07-03

## 2024-07-03 RX ORDER — FAMOTIDINE 10 MG/ML
20 INJECTION INTRAVENOUS ONCE AS NEEDED
Status: CANCELLED | OUTPATIENT
Start: 2024-07-03

## 2024-07-03 RX ORDER — EPINEPHRINE 0.3 MG/.3ML
0.3 INJECTION SUBCUTANEOUS EVERY 5 MIN PRN
Status: CANCELLED | OUTPATIENT
Start: 2024-07-03

## 2024-07-03 RX ORDER — DIPHENHYDRAMINE HYDROCHLORIDE 50 MG/ML
50 INJECTION INTRAMUSCULAR; INTRAVENOUS AS NEEDED
Status: CANCELLED | OUTPATIENT
Start: 2024-07-03

## 2024-07-03 RX ORDER — DIPHENHYDRAMINE HCL 25 MG
25 CAPSULE ORAL ONCE
Status: CANCELLED | OUTPATIENT
Start: 2024-07-03

## 2024-07-03 RX ORDER — ALBUTEROL SULFATE 0.83 MG/ML
3 SOLUTION RESPIRATORY (INHALATION) AS NEEDED
Status: DISCONTINUED | OUTPATIENT
Start: 2024-07-03 | End: 2024-07-03 | Stop reason: HOSPADM

## 2024-07-03 RX ORDER — DIPHENHYDRAMINE HCL 25 MG
25 CAPSULE ORAL ONCE
Status: CANCELLED | OUTPATIENT
Start: 2024-07-03 | End: 2024-07-03

## 2024-07-03 RX ORDER — DIPHENHYDRAMINE HYDROCHLORIDE 50 MG/ML
50 INJECTION INTRAMUSCULAR; INTRAVENOUS AS NEEDED
Status: COMPLETED | OUTPATIENT
Start: 2024-07-03 | End: 2024-07-03

## 2024-07-03 RX ORDER — HEPARIN 100 UNIT/ML
500 SYRINGE INTRAVENOUS AS NEEDED
Status: CANCELLED | OUTPATIENT
Start: 2024-07-03

## 2024-07-03 RX ORDER — HEPARIN SODIUM,PORCINE/PF 10 UNIT/ML
50 SYRINGE (ML) INTRAVENOUS AS NEEDED
Status: CANCELLED | OUTPATIENT
Start: 2024-07-03

## 2024-07-03 RX ORDER — FAMOTIDINE 10 MG/ML
20 INJECTION INTRAVENOUS ONCE AS NEEDED
Status: COMPLETED | OUTPATIENT
Start: 2024-07-03 | End: 2024-07-03

## 2024-07-03 ASSESSMENT — PAIN SCALES - GENERAL: PAINLEVEL: 0-NO PAIN

## 2024-07-03 NOTE — PROGRESS NOTES
Adverse Event Note     Name:Jin Reynolds  : 1955  MRN: [id number]      Adverse Event: transfusion reaction   Medication: PRBC's  Administered Date/Time: 2024 1535  Reactions/Symptoms Started Time: 1550   Symptoms: (check all that apply)   [] Back Pain   [] Erythema Face     [] Hypotension     [] Rash/Rigors [x] Daiphoretic   [] Bleeding    [] Erythema Hands  [] Itching  [] Swelling/Edema [] Unknown   [] Chest Pain [] Hives/Urticaria     [] Low platelet Ct  [] Syncope   [] Cytopenia  [] Hypertension       [] Neutropenia       Provider Notified: Yes   Medications Given(Add all medications to the chart via , or treatment plan)   [] Acetaminophen-Tylenol       [x] Famotidine-Pepcid  [] Nitroglycerine-NTG   [] Albuterol                               [] Hydrocortisone       [] Ondansetron-Zofran   [x] Diphenhydramine-Benadryl  [] Lorazepam-Ativan  [] Naloxone-Narcan   [] Epinephrine-Epi                     [x] Methylprednisone   Additional Details/ Comments:   Pt presents to [facility] for planned transfusion of 2uRBCs. He has previously tolerated transfusions without incident. Of note, new antibodies were present on type and screen from .     Pt notified RN 10 mins into blood transfusion that he felt lightheaded, slightly short of breath and sweaty. Noted to be pale / diaphoretic.     Transfusion stopped at 1551.   NS started 1553.   1554 - pepcid 20mg given  1555 - Benadryl 50mg given  1556- solu medrol 40mg given  1556: 81/53 BP, hr 53, temp 36.8, RR 20 spo2 100% RA    Marya Jackson CNP and Renay Talley CNP at chairside.     1600: 148/65, hr 73, o2 98% on room air.     Pt states sx mostly resolved. Blood bank notified of reaction, they stated pt will need both units sent back to blood bank. Reaction protocol paperwork, labwork, and both units of RBCs sent back up to blood bank.     1700: Pt feeling well, VSS on room air. Per Dr Dawson, pt can go home today and come back Friday for 2u  RBCs. Confirmed with pt to come in at 0830 Friday. Instructed pt to return to ED/ call 911 if he develops new onset SOB, chest pain, altered mental status, pt states understanding.     1745: Pt feeling completely back to baseline, accompanied to lobby in stable condition, daughter is picking him up.

## 2024-07-05 ENCOUNTER — APPOINTMENT (OUTPATIENT)
Dept: HEMATOLOGY/ONCOLOGY | Facility: HOSPITAL | Age: 69
End: 2024-07-05
Payer: MEDICARE

## 2024-07-05 ENCOUNTER — INFUSION (OUTPATIENT)
Dept: HEMATOLOGY/ONCOLOGY | Facility: HOSPITAL | Age: 69
End: 2024-07-05
Payer: MEDICARE

## 2024-07-05 VITALS
RESPIRATION RATE: 17 BRPM | HEART RATE: 78 BPM | BODY MASS INDEX: 29.87 KG/M2 | TEMPERATURE: 97.7 F | OXYGEN SATURATION: 100 % | DIASTOLIC BLOOD PRESSURE: 72 MMHG | WEIGHT: 196.43 LBS | SYSTOLIC BLOOD PRESSURE: 137 MMHG

## 2024-07-05 DIAGNOSIS — D47.02 SMOLDERING SYSTEMIC MASTOCYTOSIS: Primary | ICD-10-CM

## 2024-07-05 DIAGNOSIS — M81.8 OTHER OSTEOPOROSIS WITHOUT CURRENT PATHOLOGICAL FRACTURE: ICD-10-CM

## 2024-07-05 DIAGNOSIS — D47.02 SMOLDERING SYSTEMIC MASTOCYTOSIS: ICD-10-CM

## 2024-07-05 PROBLEM — C92.00 ACUTE MYELOID LEUKEMIA NOT HAVING ACHIEVED REMISSION (MULTI): Status: ACTIVE | Noted: 2024-07-02

## 2024-07-05 LAB
ABO GROUP (TYPE) IN BLOOD: NORMAL
ANTIBODY SCREEN: NORMAL
BLOOD EXPIRATION DATE: NORMAL
BLOOD EXPIRATION DATE: NORMAL
DISPENSE STATUS: NORMAL
DISPENSE STATUS: NORMAL
PRODUCT BLOOD TYPE: 5100
PRODUCT BLOOD TYPE: 5100
PRODUCT CODE: NORMAL
PRODUCT CODE: NORMAL
RH FACTOR (ANTIGEN D): NORMAL
UNIT ABO: NORMAL
UNIT ABO: NORMAL
UNIT NUMBER: NORMAL
UNIT NUMBER: NORMAL
UNIT RH: NORMAL
UNIT RH: NORMAL
UNIT VOLUME: 280
UNIT VOLUME: 350
XM INTEP: NORMAL
XM INTEP: NORMAL

## 2024-07-05 PROCEDURE — 86901 BLOOD TYPING SEROLOGIC RH(D): CPT

## 2024-07-05 PROCEDURE — 2500000001 HC RX 250 WO HCPCS SELF ADMINISTERED DRUGS (ALT 637 FOR MEDICARE OP): Performed by: NURSE PRACTITIONER

## 2024-07-05 PROCEDURE — 36430 TRANSFUSION BLD/BLD COMPNT: CPT

## 2024-07-05 RX ORDER — FAMOTIDINE 10 MG/ML
20 INJECTION INTRAVENOUS ONCE AS NEEDED
OUTPATIENT
Start: 2024-07-05

## 2024-07-05 RX ORDER — HEPARIN SODIUM,PORCINE/PF 10 UNIT/ML
50 SYRINGE (ML) INTRAVENOUS AS NEEDED
OUTPATIENT
Start: 2024-07-05

## 2024-07-05 RX ORDER — EPINEPHRINE 0.3 MG/.3ML
0.3 INJECTION SUBCUTANEOUS EVERY 5 MIN PRN
Status: DISCONTINUED | OUTPATIENT
Start: 2024-07-05 | End: 2024-07-05 | Stop reason: HOSPADM

## 2024-07-05 RX ORDER — ACETAMINOPHEN 325 MG/1
650 TABLET ORAL ONCE
Status: COMPLETED | OUTPATIENT
Start: 2024-07-05 | End: 2024-07-05

## 2024-07-05 RX ORDER — DIPHENHYDRAMINE HCL 25 MG
25 CAPSULE ORAL ONCE
OUTPATIENT
Start: 2024-07-05 | End: 2024-07-05

## 2024-07-05 RX ORDER — DIPHENHYDRAMINE HCL 25 MG
25 CAPSULE ORAL ONCE
Status: COMPLETED | OUTPATIENT
Start: 2024-07-05 | End: 2024-07-05

## 2024-07-05 RX ORDER — FAMOTIDINE 10 MG/ML
20 INJECTION INTRAVENOUS ONCE AS NEEDED
Status: DISCONTINUED | OUTPATIENT
Start: 2024-07-05 | End: 2024-07-05 | Stop reason: HOSPADM

## 2024-07-05 RX ORDER — ALBUTEROL SULFATE 0.83 MG/ML
3 SOLUTION RESPIRATORY (INHALATION) AS NEEDED
OUTPATIENT
Start: 2024-07-05

## 2024-07-05 RX ORDER — EPINEPHRINE 0.3 MG/.3ML
0.3 INJECTION SUBCUTANEOUS EVERY 5 MIN PRN
OUTPATIENT
Start: 2024-07-05

## 2024-07-05 RX ORDER — ACETAMINOPHEN 325 MG/1
650 TABLET ORAL ONCE
OUTPATIENT
Start: 2024-07-05 | End: 2024-07-05

## 2024-07-05 RX ORDER — ALBUTEROL SULFATE 0.83 MG/ML
3 SOLUTION RESPIRATORY (INHALATION) AS NEEDED
Status: DISCONTINUED | OUTPATIENT
Start: 2024-07-05 | End: 2024-07-05 | Stop reason: HOSPADM

## 2024-07-05 RX ORDER — DIPHENHYDRAMINE HYDROCHLORIDE 50 MG/ML
50 INJECTION INTRAMUSCULAR; INTRAVENOUS AS NEEDED
Status: DISCONTINUED | OUTPATIENT
Start: 2024-07-05 | End: 2024-07-05 | Stop reason: HOSPADM

## 2024-07-05 RX ORDER — DIPHENHYDRAMINE HYDROCHLORIDE 50 MG/ML
50 INJECTION INTRAMUSCULAR; INTRAVENOUS AS NEEDED
OUTPATIENT
Start: 2024-07-05

## 2024-07-05 RX ORDER — HEPARIN 100 UNIT/ML
500 SYRINGE INTRAVENOUS AS NEEDED
OUTPATIENT
Start: 2024-07-05

## 2024-07-05 NOTE — PROGRESS NOTES
-Patient presents to the clinic today for 2 units PRBC transfusion  -Tylenol and benadryl pre-meds administered  -Patient tolerated infusion well with no adverse effects  -Patient discharged in stable condition. Reviewed calendar/next appointment, all questions answered.   -Patient ambulated out of clinic with no assistance  -Notes/Plan for next visit-

## 2024-07-06 LAB
BACTERIA BPU CULT: NO GROWTH
GRAM STN SPEC: NORMAL

## 2024-07-08 ENCOUNTER — DOCUMENTATION (OUTPATIENT)
Dept: HEMATOLOGY/ONCOLOGY | Facility: HOSPITAL | Age: 69
End: 2024-07-08
Payer: MEDICARE

## 2024-07-08 ENCOUNTER — LAB REQUISITION (OUTPATIENT)
Dept: LAB | Facility: HOSPITAL | Age: 69
End: 2024-07-08
Payer: MEDICARE

## 2024-07-08 DIAGNOSIS — D47.02 SYSTEMIC MASTOCYTOSIS: ICD-10-CM

## 2024-07-08 LAB
BB ANTIBODY IDENTIFICATION: NORMAL
CASE #: NORMAL
DIAGNOSIS-BB-PR33: NORMAL
PATH REV-IMMUNOHEMATOLOGY-PR30: NORMAL
PATH REVIEW-TRANSFUSION REACTION: NORMAL
RESIDENT REVIEW-BB: NORMAL
TYPE OF REACTION: NORMAL

## 2024-07-08 NOTE — ASSESSMENT & PLAN NOTE
Mr. 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  He does not have current evidence of tumor lysis syndrome, DIC, leukostasis, or active infection.  I introduced the diagnosis, natural history, and general treatment approaches.  In particular, I reviewed options of intensive therapy (traditonally 7+3 or similar approach), lower intensity chemotherapy (hypomethylating agent with venetoclax or other targeted agent), novel therapy, supportive care only, and the potential for clinical trials. Given his [id number] AML Risk Stratification: ADVERSE: Mutated ASXL1, BCOR, EZH2, RUNX1, SF3B1, SRSF2, STAG2, U2AF1 and/or ZRSR2 (excludes those combined with favorable risk AML subtypes) risk AML by ELN 2022 (presuming prior molecular mutations remain present on recent testing) and other co-morbidities, I recommended induction with azacitidine and venetoclax.  I highlighted the toxicities of these therapies, specifically myelosuppression and increased need for transfusions of blood and platelets as well as very high risk for infection.  The treatment schedule was introduced.  I reviewed potential toxicities of venetoclax (escalation to 400 mg daily) combined with azacitidine (75 mg/m2 daily x 7d every 28d), predominantly myelosuppression, mild CINV generally preventable with appropriate anti-emetics, abnormal LFTs, edema, headache, fatigue, hyperuricemia, tumor lysis syndrome, and risk for infections.  I highlighted the need for dose escalation and lab monitoring for tumor lysis syndrome during the initial week.  Expectations for CR rates, time to response, and durability were introduced.  Questions were addressed, and the patient is ready to proceed.  Informed consent obtained.  The goal of initial therapy would be to achieve MRD- CR1 with post-remission therapy dictated by his response, AML risk, co-morbidities, and donor options.  I described  the impact of cytogenetic and molecular features which can stratify overall rates for relapse, remission, etc.  With active AML, the patient is a at risk for infections, including life threatening, severe cytopenias, and other complications of leukemia.  He is agreeable to proceed.  I also briefly introduced the potential role of allogeneic stem cell transplant.  Will proceed with HLA typing and donor search.

## 2024-07-09 ENCOUNTER — ONCOLOGY MEDICATION OUTREACH (OUTPATIENT)
Dept: HEMATOLOGY/ONCOLOGY | Facility: HOSPITAL | Age: 69
End: 2024-07-09

## 2024-07-09 ENCOUNTER — APPOINTMENT (OUTPATIENT)
Dept: HEMATOLOGY/ONCOLOGY | Facility: HOSPITAL | Age: 69
End: 2024-07-09
Payer: MEDICARE

## 2024-07-09 ENCOUNTER — DOCUMENTATION (OUTPATIENT)
Dept: HEMATOLOGY/ONCOLOGY | Facility: HOSPITAL | Age: 69
End: 2024-07-09
Payer: MEDICARE

## 2024-07-09 ENCOUNTER — LAB (OUTPATIENT)
Dept: HEMATOLOGY/ONCOLOGY | Facility: HOSPITAL | Age: 69
End: 2024-07-09
Payer: MEDICARE

## 2024-07-09 ENCOUNTER — OFFICE VISIT (OUTPATIENT)
Dept: HEMATOLOGY/ONCOLOGY | Facility: HOSPITAL | Age: 69
End: 2024-07-09
Payer: MEDICARE

## 2024-07-09 ENCOUNTER — LAB (OUTPATIENT)
Dept: LAB | Facility: HOSPITAL | Age: 69
End: 2024-07-09
Payer: MEDICARE

## 2024-07-09 VITALS
WEIGHT: 196.2 LBS | RESPIRATION RATE: 16 BRPM | TEMPERATURE: 96.6 F | SYSTOLIC BLOOD PRESSURE: 132 MMHG | OXYGEN SATURATION: 100 % | HEART RATE: 77 BPM | DIASTOLIC BLOOD PRESSURE: 63 MMHG | BODY MASS INDEX: 29.83 KG/M2

## 2024-07-09 VITALS
RESPIRATION RATE: 18 BRPM | OXYGEN SATURATION: 100 % | SYSTOLIC BLOOD PRESSURE: 142 MMHG | TEMPERATURE: 97 F | HEART RATE: 69 BPM | DIASTOLIC BLOOD PRESSURE: 82 MMHG

## 2024-07-09 DIAGNOSIS — D47.02 SMOLDERING SYSTEMIC MASTOCYTOSIS: ICD-10-CM

## 2024-07-09 DIAGNOSIS — M81.8 OTHER OSTEOPOROSIS WITHOUT CURRENT PATHOLOGICAL FRACTURE: ICD-10-CM

## 2024-07-09 DIAGNOSIS — D47.02 SYSTEMIC MASTOCYTOSIS WITH ASSOCIATED CLONAL HEMATOLOGICAL NON-MAST CELL LINEAGE DISEASE: ICD-10-CM

## 2024-07-09 DIAGNOSIS — C92.00 ACUTE MYELOID LEUKEMIA NOT HAVING ACHIEVED REMISSION (MULTI): Primary | ICD-10-CM

## 2024-07-09 DIAGNOSIS — C92.00 ACUTE MYELOID LEUKEMIA NOT HAVING ACHIEVED REMISSION (MULTI): ICD-10-CM

## 2024-07-09 LAB
ABO GROUP (TYPE) IN BLOOD: NORMAL
ALBUMIN SERPL BCP-MCNC: 4 G/DL (ref 3.4–5)
ALP SERPL-CCNC: 85 U/L (ref 33–136)
ALT SERPL W P-5'-P-CCNC: 8 U/L (ref 10–52)
ANION GAP SERPL CALC-SCNC: 11 MMOL/L (ref 10–20)
ANTIBODY SCREEN: NORMAL
AST SERPL W P-5'-P-CCNC: 10 U/L (ref 9–39)
BASOPHILS # BLD MANUAL: 0.06 X10*3/UL (ref 0–0.1)
BASOPHILS NFR BLD MANUAL: 2 %
BILIRUB SERPL-MCNC: 1 MG/DL (ref 0–1.2)
BLASTS # BLD MANUAL: 0.19 X10*3/UL
BLASTS NFR BLD MANUAL: 6 %
BUN SERPL-MCNC: 16 MG/DL (ref 6–23)
CALCIUM SERPL-MCNC: 8.5 MG/DL (ref 8.6–10.3)
CHLORIDE SERPL-SCNC: 107 MMOL/L (ref 98–107)
CO2 SERPL-SCNC: 23 MMOL/L (ref 21–32)
CREAT SERPL-MCNC: 1.09 MG/DL (ref 0.5–1.3)
DACRYOCYTES BLD QL SMEAR: ABNORMAL
EGFRCR SERPLBLD CKD-EPI 2021: 74 ML/MIN/1.73M*2
EOSINOPHIL # BLD MANUAL: 0.5 X10*3/UL (ref 0–0.7)
EOSINOPHIL NFR BLD MANUAL: 16 %
ERYTHROCYTE [DISTWIDTH] IN BLOOD BY AUTOMATED COUNT: 17.5 % (ref 11.5–14.5)
GLUCOSE SERPL-MCNC: 100 MG/DL (ref 74–99)
HCT VFR BLD AUTO: 15.9 % (ref 41–52)
HGB BLD-MCNC: 5.3 G/DL (ref 13.5–17.5)
HYPOCHROMIA BLD QL SMEAR: ABNORMAL
IMM GRANULOCYTES # BLD AUTO: 0.04 X10*3/UL (ref 0–0.7)
IMM GRANULOCYTES NFR BLD AUTO: 1.3 % (ref 0–0.9)
LDH SERPL L TO P-CCNC: 351 U/L (ref 84–246)
LYMPHOCYTES # BLD MANUAL: 1.12 X10*3/UL (ref 1.2–4.8)
LYMPHOCYTES NFR BLD MANUAL: 36 %
MCH RBC QN AUTO: 31.9 PG (ref 26–34)
MCHC RBC AUTO-ENTMCNC: 33.3 G/DL (ref 32–36)
MCV RBC AUTO: 96 FL (ref 80–100)
MONOCYTES # BLD MANUAL: 0.03 X10*3/UL (ref 0.1–1)
MONOCYTES NFR BLD MANUAL: 1 %
NEUTROPHILS # BLD MANUAL: 1.15 X10*3/UL (ref 1.2–7.7)
NEUTS BAND # BLD MANUAL: 0.06 X10*3/UL (ref 0–0.7)
NEUTS BAND NFR BLD MANUAL: 2 %
NEUTS SEG # BLD MANUAL: 1.09 X10*3/UL (ref 1.2–7)
NEUTS SEG NFR BLD MANUAL: 35 %
NRBC BLD-RTO: 0 /100 WBCS (ref 0–0)
OVALOCYTES BLD QL SMEAR: ABNORMAL
PHOSPHATE SERPL-MCNC: 2.4 MG/DL (ref 2.5–4.9)
PLATELET # BLD AUTO: 44 X10*3/UL (ref 150–450)
POLYCHROMASIA BLD QL SMEAR: ABNORMAL
POTASSIUM SERPL-SCNC: 4.4 MMOL/L (ref 3.5–5.3)
PROT SERPL-MCNC: 7.6 G/DL (ref 6.4–8.2)
RBC # BLD AUTO: 1.66 X10*6/UL (ref 4.5–5.9)
RBC MORPH BLD: ABNORMAL
RH FACTOR (ANTIGEN D): NORMAL
ROULEAUX BLD QL SMEAR: PRESENT
SODIUM SERPL-SCNC: 137 MMOL/L (ref 136–145)
TOTAL CELLS COUNTED BLD: 100
URATE SERPL-MCNC: 5 MG/DL (ref 4–7.5)
VARIANT LYMPHS # BLD MANUAL: 0.06 X10*3/UL (ref 0–0.5)
VARIANT LYMPHS NFR BLD: 2 %
WBC # BLD AUTO: 3.1 X10*3/UL (ref 4.4–11.3)

## 2024-07-09 PROCEDURE — 85007 BL SMEAR W/DIFF WBC COUNT: CPT

## 2024-07-09 PROCEDURE — 86902 BLOOD TYPE ANTIGEN DONOR EA: CPT

## 2024-07-09 PROCEDURE — 86870 RBC ANTIBODY IDENTIFICATION: CPT

## 2024-07-09 PROCEDURE — 84550 ASSAY OF BLOOD/URIC ACID: CPT

## 2024-07-09 PROCEDURE — 86077 PHYS BLOOD BANK SERV XMATCH: CPT | Performed by: PATHOLOGY

## 2024-07-09 PROCEDURE — 85027 COMPLETE CBC AUTOMATED: CPT

## 2024-07-09 PROCEDURE — 86901 BLOOD TYPING SEROLOGIC RH(D): CPT

## 2024-07-09 PROCEDURE — 84100 ASSAY OF PHOSPHORUS: CPT

## 2024-07-09 PROCEDURE — 83615 LACTATE (LD) (LDH) ENZYME: CPT

## 2024-07-09 PROCEDURE — 80053 COMPREHEN METABOLIC PANEL: CPT

## 2024-07-09 PROCEDURE — 36415 COLL VENOUS BLD VENIPUNCTURE: CPT

## 2024-07-09 RX ORDER — EPINEPHRINE 0.3 MG/.3ML
0.3 INJECTION SUBCUTANEOUS EVERY 5 MIN PRN
Status: CANCELLED | OUTPATIENT
Start: 2024-07-09

## 2024-07-09 RX ORDER — ONDANSETRON HYDROCHLORIDE 8 MG/1
8 TABLET, FILM COATED ORAL ONCE
OUTPATIENT
Start: 2024-07-20

## 2024-07-09 RX ORDER — EPINEPHRINE 0.3 MG/.3ML
0.3 INJECTION SUBCUTANEOUS EVERY 5 MIN PRN
OUTPATIENT
Start: 2024-07-22

## 2024-07-09 RX ORDER — FAMOTIDINE 10 MG/ML
20 INJECTION INTRAVENOUS ONCE AS NEEDED
OUTPATIENT
Start: 2024-07-20

## 2024-07-09 RX ORDER — PROCHLORPERAZINE MALEATE 10 MG
10 TABLET ORAL EVERY 6 HOURS PRN
OUTPATIENT
Start: 2024-07-18

## 2024-07-09 RX ORDER — ALBUTEROL SULFATE 0.83 MG/ML
3 SOLUTION RESPIRATORY (INHALATION) AS NEEDED
OUTPATIENT
Start: 2024-07-17

## 2024-07-09 RX ORDER — PROCHLORPERAZINE EDISYLATE 5 MG/ML
10 INJECTION INTRAMUSCULAR; INTRAVENOUS EVERY 6 HOURS PRN
OUTPATIENT
Start: 2024-07-18

## 2024-07-09 RX ORDER — ONDANSETRON HYDROCHLORIDE 8 MG/1
8 TABLET, FILM COATED ORAL ONCE
OUTPATIENT
Start: 2024-07-18

## 2024-07-09 RX ORDER — ONDANSETRON HYDROCHLORIDE 8 MG/1
8 TABLET, FILM COATED ORAL EVERY 8 HOURS PRN
Qty: 30 TABLET | Refills: 5 | Status: SHIPPED | OUTPATIENT
Start: 2024-07-09

## 2024-07-09 RX ORDER — FAMOTIDINE 10 MG/ML
20 INJECTION INTRAVENOUS ONCE AS NEEDED
OUTPATIENT
Start: 2024-07-22

## 2024-07-09 RX ORDER — ONDANSETRON HYDROCHLORIDE 8 MG/1
8 TABLET, FILM COATED ORAL ONCE
OUTPATIENT
Start: 2024-07-22

## 2024-07-09 RX ORDER — FAMOTIDINE 10 MG/ML
20 INJECTION INTRAVENOUS ONCE AS NEEDED
OUTPATIENT
Start: 2024-07-21

## 2024-07-09 RX ORDER — FAMOTIDINE 10 MG/ML
20 INJECTION INTRAVENOUS ONCE AS NEEDED
OUTPATIENT
Start: 2024-07-16

## 2024-07-09 RX ORDER — DIPHENHYDRAMINE HYDROCHLORIDE 50 MG/ML
50 INJECTION INTRAMUSCULAR; INTRAVENOUS AS NEEDED
OUTPATIENT
Start: 2024-07-21

## 2024-07-09 RX ORDER — FAMOTIDINE 10 MG/ML
20 INJECTION INTRAVENOUS ONCE AS NEEDED
OUTPATIENT
Start: 2024-07-18

## 2024-07-09 RX ORDER — ALBUTEROL SULFATE 0.83 MG/ML
3 SOLUTION RESPIRATORY (INHALATION) AS NEEDED
Status: CANCELLED | OUTPATIENT
Start: 2024-07-09

## 2024-07-09 RX ORDER — DIPHENHYDRAMINE HYDROCHLORIDE 50 MG/ML
50 INJECTION INTRAMUSCULAR; INTRAVENOUS AS NEEDED
Status: DISCONTINUED | OUTPATIENT
Start: 2024-07-09 | End: 2024-07-09 | Stop reason: HOSPADM

## 2024-07-09 RX ORDER — PROCHLORPERAZINE EDISYLATE 5 MG/ML
10 INJECTION INTRAMUSCULAR; INTRAVENOUS EVERY 6 HOURS PRN
OUTPATIENT
Start: 2024-07-22

## 2024-07-09 RX ORDER — EPINEPHRINE 0.3 MG/.3ML
0.3 INJECTION SUBCUTANEOUS EVERY 5 MIN PRN
OUTPATIENT
Start: 2024-07-17

## 2024-07-09 RX ORDER — PROCHLORPERAZINE MALEATE 10 MG
10 TABLET ORAL EVERY 6 HOURS PRN
OUTPATIENT
Start: 2024-07-16

## 2024-07-09 RX ORDER — ALBUTEROL SULFATE 0.83 MG/ML
3 SOLUTION RESPIRATORY (INHALATION) AS NEEDED
OUTPATIENT
Start: 2024-07-20

## 2024-07-09 RX ORDER — PROCHLORPERAZINE MALEATE 10 MG
10 TABLET ORAL EVERY 6 HOURS PRN
OUTPATIENT
Start: 2024-07-22

## 2024-07-09 RX ORDER — EPINEPHRINE 0.3 MG/.3ML
0.3 INJECTION SUBCUTANEOUS EVERY 5 MIN PRN
OUTPATIENT
Start: 2024-07-21

## 2024-07-09 RX ORDER — ALBUTEROL SULFATE 0.83 MG/ML
3 SOLUTION RESPIRATORY (INHALATION) AS NEEDED
OUTPATIENT
Start: 2024-07-16

## 2024-07-09 RX ORDER — ONDANSETRON HYDROCHLORIDE 8 MG/1
8 TABLET, FILM COATED ORAL ONCE
OUTPATIENT
Start: 2024-07-16

## 2024-07-09 RX ORDER — PROCHLORPERAZINE MALEATE 10 MG
10 TABLET ORAL EVERY 6 HOURS PRN
OUTPATIENT
Start: 2024-07-19

## 2024-07-09 RX ORDER — PROCHLORPERAZINE EDISYLATE 5 MG/ML
10 INJECTION INTRAMUSCULAR; INTRAVENOUS EVERY 6 HOURS PRN
OUTPATIENT
Start: 2024-07-21

## 2024-07-09 RX ORDER — PROCHLORPERAZINE EDISYLATE 5 MG/ML
10 INJECTION INTRAMUSCULAR; INTRAVENOUS EVERY 6 HOURS PRN
OUTPATIENT
Start: 2024-07-20

## 2024-07-09 RX ORDER — DIPHENHYDRAMINE HYDROCHLORIDE 50 MG/ML
50 INJECTION INTRAMUSCULAR; INTRAVENOUS AS NEEDED
OUTPATIENT
Start: 2024-07-18

## 2024-07-09 RX ORDER — DIPHENHYDRAMINE HYDROCHLORIDE 50 MG/ML
50 INJECTION INTRAMUSCULAR; INTRAVENOUS AS NEEDED
OUTPATIENT
Start: 2024-07-17

## 2024-07-09 RX ORDER — ALLOPURINOL 300 MG/1
300 TABLET ORAL DAILY
Qty: 14 TABLET | Refills: 0 | Status: SHIPPED | OUTPATIENT
Start: 2024-07-09 | End: 2024-07-23

## 2024-07-09 RX ORDER — DIPHENHYDRAMINE HCL 25 MG
25 CAPSULE ORAL ONCE
Status: CANCELLED | OUTPATIENT
Start: 2024-07-09 | End: 2024-07-09

## 2024-07-09 RX ORDER — ALBUTEROL SULFATE 0.83 MG/ML
3 SOLUTION RESPIRATORY (INHALATION) AS NEEDED
Status: DISCONTINUED | OUTPATIENT
Start: 2024-07-09 | End: 2024-07-09 | Stop reason: HOSPADM

## 2024-07-09 RX ORDER — ALBUTEROL SULFATE 0.83 MG/ML
3 SOLUTION RESPIRATORY (INHALATION) AS NEEDED
OUTPATIENT
Start: 2024-07-18

## 2024-07-09 RX ORDER — ONDANSETRON HYDROCHLORIDE 8 MG/1
8 TABLET, FILM COATED ORAL ONCE
OUTPATIENT
Start: 2024-07-17

## 2024-07-09 RX ORDER — ONDANSETRON HYDROCHLORIDE 8 MG/1
8 TABLET, FILM COATED ORAL ONCE
OUTPATIENT
Start: 2024-07-19

## 2024-07-09 RX ORDER — PROCHLORPERAZINE MALEATE 10 MG
10 TABLET ORAL EVERY 6 HOURS PRN
Qty: 30 TABLET | Refills: 5 | Status: SHIPPED | OUTPATIENT
Start: 2024-07-09

## 2024-07-09 RX ORDER — FAMOTIDINE 10 MG/ML
20 INJECTION INTRAVENOUS ONCE AS NEEDED
OUTPATIENT
Start: 2024-07-19

## 2024-07-09 RX ORDER — DIPHENHYDRAMINE HYDROCHLORIDE 50 MG/ML
50 INJECTION INTRAMUSCULAR; INTRAVENOUS AS NEEDED
OUTPATIENT
Start: 2024-07-20

## 2024-07-09 RX ORDER — FAMOTIDINE 10 MG/ML
20 INJECTION INTRAVENOUS ONCE AS NEEDED
Status: DISCONTINUED | OUTPATIENT
Start: 2024-07-09 | End: 2024-07-09 | Stop reason: HOSPADM

## 2024-07-09 RX ORDER — ALBUTEROL SULFATE 0.83 MG/ML
3 SOLUTION RESPIRATORY (INHALATION) AS NEEDED
OUTPATIENT
Start: 2024-07-21

## 2024-07-09 RX ORDER — EPINEPHRINE 0.3 MG/.3ML
0.3 INJECTION SUBCUTANEOUS EVERY 5 MIN PRN
OUTPATIENT
Start: 2024-07-18

## 2024-07-09 RX ORDER — FAMOTIDINE 10 MG/ML
20 INJECTION INTRAVENOUS ONCE AS NEEDED
OUTPATIENT
Start: 2024-07-17

## 2024-07-09 RX ORDER — ACETAMINOPHEN 325 MG/1
650 TABLET ORAL ONCE
Status: CANCELLED | OUTPATIENT
Start: 2024-07-09 | End: 2024-07-09

## 2024-07-09 RX ORDER — DIPHENHYDRAMINE HYDROCHLORIDE 50 MG/ML
50 INJECTION INTRAMUSCULAR; INTRAVENOUS AS NEEDED
OUTPATIENT
Start: 2024-07-19

## 2024-07-09 RX ORDER — EPINEPHRINE 0.3 MG/.3ML
0.3 INJECTION SUBCUTANEOUS EVERY 5 MIN PRN
OUTPATIENT
Start: 2024-07-19

## 2024-07-09 RX ORDER — ONDANSETRON HYDROCHLORIDE 8 MG/1
8 TABLET, FILM COATED ORAL ONCE
OUTPATIENT
Start: 2024-07-21

## 2024-07-09 RX ORDER — DIPHENHYDRAMINE HYDROCHLORIDE 50 MG/ML
50 INJECTION INTRAMUSCULAR; INTRAVENOUS AS NEEDED
OUTPATIENT
Start: 2024-07-16

## 2024-07-09 RX ORDER — DIPHENHYDRAMINE HYDROCHLORIDE 50 MG/ML
50 INJECTION INTRAMUSCULAR; INTRAVENOUS AS NEEDED
OUTPATIENT
Start: 2024-07-22

## 2024-07-09 RX ORDER — ALBUTEROL SULFATE 0.83 MG/ML
3 SOLUTION RESPIRATORY (INHALATION) AS NEEDED
OUTPATIENT
Start: 2024-07-19

## 2024-07-09 RX ORDER — EPINEPHRINE 0.3 MG/.3ML
0.3 INJECTION SUBCUTANEOUS EVERY 5 MIN PRN
OUTPATIENT
Start: 2024-07-16

## 2024-07-09 RX ORDER — FAMOTIDINE 10 MG/ML
20 INJECTION INTRAVENOUS ONCE AS NEEDED
Status: CANCELLED | OUTPATIENT
Start: 2024-07-09

## 2024-07-09 RX ORDER — PROCHLORPERAZINE MALEATE 10 MG
10 TABLET ORAL EVERY 6 HOURS PRN
OUTPATIENT
Start: 2024-07-17

## 2024-07-09 RX ORDER — PROCHLORPERAZINE EDISYLATE 5 MG/ML
10 INJECTION INTRAMUSCULAR; INTRAVENOUS EVERY 6 HOURS PRN
OUTPATIENT
Start: 2024-07-16

## 2024-07-09 RX ORDER — ALBUTEROL SULFATE 0.83 MG/ML
3 SOLUTION RESPIRATORY (INHALATION) AS NEEDED
OUTPATIENT
Start: 2024-07-22

## 2024-07-09 RX ORDER — EPINEPHRINE 0.3 MG/.3ML
0.3 INJECTION SUBCUTANEOUS EVERY 5 MIN PRN
Status: DISCONTINUED | OUTPATIENT
Start: 2024-07-09 | End: 2024-07-09 | Stop reason: HOSPADM

## 2024-07-09 RX ORDER — EPINEPHRINE 0.3 MG/.3ML
0.3 INJECTION SUBCUTANEOUS EVERY 5 MIN PRN
OUTPATIENT
Start: 2024-07-20

## 2024-07-09 RX ORDER — PROCHLORPERAZINE EDISYLATE 5 MG/ML
10 INJECTION INTRAMUSCULAR; INTRAVENOUS EVERY 6 HOURS PRN
OUTPATIENT
Start: 2024-07-17

## 2024-07-09 RX ORDER — PROCHLORPERAZINE MALEATE 10 MG
10 TABLET ORAL EVERY 6 HOURS PRN
OUTPATIENT
Start: 2024-07-21

## 2024-07-09 RX ORDER — PROCHLORPERAZINE EDISYLATE 5 MG/ML
10 INJECTION INTRAMUSCULAR; INTRAVENOUS EVERY 6 HOURS PRN
OUTPATIENT
Start: 2024-07-19

## 2024-07-09 RX ORDER — DIPHENHYDRAMINE HYDROCHLORIDE 50 MG/ML
50 INJECTION INTRAMUSCULAR; INTRAVENOUS AS NEEDED
Status: CANCELLED | OUTPATIENT
Start: 2024-07-09

## 2024-07-09 RX ORDER — PROCHLORPERAZINE MALEATE 10 MG
10 TABLET ORAL EVERY 6 HOURS PRN
OUTPATIENT
Start: 2024-07-20

## 2024-07-09 ASSESSMENT — PAIN SCALES - GENERAL: PAINLEVEL_OUTOF10: 0-NO PAIN

## 2024-07-09 NOTE — ASSESSMENT & PLAN NOTE
Recent delayed hemolytic transfusion reaction due to new alloantibodies with Anti-E and Anti-K.  Pre-medications are not indicated with this type of reaction, so they were removed from plan.

## 2024-07-09 NOTE — PROGRESS NOTES
Pt arrived to [facility] infusion for 1 unit PRBCs per provider. Patient received infusion without incident. 1 unit of PRBCs to be pre-ordered for Friday count check. Pt dc home in safe condition.

## 2024-07-09 NOTE — PROGRESS NOTES
Critical Lab received at 8:04  Results called by: Aundrea  Critical Lab: Hemoglobin 5.3  Notified: Dr. Dawson, Maryam June, Oscar Flaherty, Henrietta Blackburn

## 2024-07-09 NOTE — PROGRESS NOTES
Jin Reynolds is a 69 y.o. male with mastocytosis, now with AML, starting aza/leonor. Patient educated in infusion    Assessment/Plan   CHEMO REVIEW  Plan is to start aza/leonor with ramp up 100/200/400 on day3+ for 1L tx of AML. Labs reviewed. Drug-drug interactions = none. Leonor sent to [facility] pharmacy. Appropriateness confirmed based on my review.     Supportive care  Nausea/vomiting - prn zofran and compazine  TLS - allopurinol x 14d    CHEMO EDUCATION  Reviewed venetoclax dosing with food and ramp up, treatment cycle, duration of therapy, and missed doses. Counseled on potential side effects including but not limited to chemotherapy side effects: neutropenia, infection risk, anemia, fatigue, weakness, low energy, thrombocytopenia, bleeding/bruising, and n/v. Discussed techniques to mitigate severity of side effects such as blood count checks, prophylactic anti-infective medicines, temperature checks, blood product transfusions, electrolyte monitoring, and antiemetic use. Explained role of [facility] for specialty medications. Provided medication/regimen handout. All questions answered and contact information was given to patient.     Objective   Lab Results   Component Value Date    WBC 3.1 (L) 07/09/2024    HGB 5.3 (LL) 07/09/2024    ANC 1.15 (L) 07/09/2024    SEGSABS 1.09 (L) 07/09/2024    BANDSABS 0.06 07/09/2024      Lab Results   Component Value Date    GLUCOSE 100 (H) 07/09/2024    CALCIUM 8.5 (L) 07/09/2024     07/09/2024    K 4.4 07/09/2024    CO2 23 07/09/2024     07/09/2024    BUN 16 07/09/2024    CREATININE 1.09 07/09/2024    URICACID 5.0 07/09/2024     Lab Results   Component Value Date    ALT 8 (L) 07/09/2024    AST 10 07/09/2024    ALKPHOS 85 07/09/2024    BILITOT 1.0 07/09/2024       Treatment history  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   8/23/2023 Initial Diagnosis    DX:  SMOLDERING SYSTEMIC MASTOCYTOSIS  Presented with skin  "rash and eosinophilia    [facility]ITERIA  (For SM, Need major and 1 minor OR at least 3 minors)  - Multi-focal, dense infiltrates of MC (>= 15 mast cells in aggregates) in BM and/or other extra-cutaneous organs [major]  - In BM or extracutaneous organs, >25% MC spindle shaped OR have atypical morphology OR of all MC on BM aspirate smears, >25% are immature or atypical [minor]  - Detection of activating mutation KIT D816V in BM, PB, or other extracutaneous organ [minor]  - Serum tryptase persistently exceeds 20 ng/mL (unless associated clonal myeloid disorder, in which this paramenter is not valid) [minor]    \"B\" FINDINGS  - BM biopsy showing >30% infiltration (focal, dense aggregates) and/or serum tryptase > 200 ng/mL  - Signs of dysplasia or myeloproliferation, in non-MC lineages but insufficient criteria for definitive diagnosis of AHNMD with normal or slightly abnormal blood counts    \"C\" FINDINGS  None       8/23/2023 Biopsy    BONE MARROW (8/23/23)  Hypercellular (90-95%) with trilineage hematopoiesis, granulocytic hyperplasia, eosinophilia, and increased atypical mast cells  IP:  CD34+, +, CD7(bright)+, cCD3-, CD33-, CD15-, CD13+ blast population  IHC:  + increased spindle-shaped mast cells (15% cellularity), CD2-  Myeloid NGS:  CBL (31%), cKIT D816V (30%), RUNX1 x 2 (19%, 29%), SRSF2 (47%) [ASXL1 negative]  FISH:  PDGFRb negative    SERUM TRYPTASE  228 (8/23/23)  268 (9/5/23)     2/13/2024 -  Molecular Therapy    AVAPRITINIB   - Starting dose 25 mg daily (non-Adv SM)     7/16/2024 -  Chemotherapy    Venetoclax / AzaCITIDine, 28 Day Cycles - Induction / Consolidation     Acute myeloid leukemia not having achieved remission (Multi)   6/27/2024 Initial Diagnosis    ICC 2022 DX: Acute myeloid leukemia, NOS (>=20% blasts)  [id number] AML Risk Stratification: INTERMEDIATE: Cytogenetic and/or molecular abnormalities not classified as favorable or adverse  Presented with pancytopenia and circulating " blasts    BONE MARROW (06/27/2024)  Marrow replaced by blasts, fibrotic foci, some atypical + cells; 7-8% circulating blasts; aspicular  - Unable to perform additional studies due to aspicular specimen    PERIPHERAL BLOOD (6/27/2024)  FISH:  positive for gain RUNX1    PERIPHERAL BLOOD (7/1/24)  IP:  abnl myeloblast population (CD34+, CD7+, CD4+, CD13+, CD38+, CD11+ dim, HLA=DR+, TdT+)  Myeloid NGS:  pending     7/16/2024 -  Chemotherapy    Venetoclax / AzaCITIDine, 28 Day Cycles - Induction / Consolidation         Allergies and Medications   No Known Allergies  Current Outpatient Medications   Medication Instructions    allopurinol (ZYLOPRIM) 300 mg, oral, Daily    amLODIPine (NORVASC) 10 mg, oral, Daily    cholecalciferol (VITAMIN D-3) 20 mcg, oral, Daily    fexofenadine (ALLEGRA) 180 mg, oral, Daily    montelukast (SINGULAIR) 10 mg, oral, Daily    ondansetron (ZOFRAN) 8 mg, oral, Every 8 hours PRN    pantoprazole (PROTONIX) 40 mg, oral, Daily before breakfast, Do not crush, chew, or split.    prochlorperazine (COMPAZINE) 10 mg, oral, Every 6 hours PRN    [START ON 7/16/2024] venetoclax (Venclexta) 100 mg tablet Take 1 tablet (100 mg total) by mouth once daily in the evening for 1 day, THEN 2 tablets (200 mg total) once daily in the evening for 1 day, THEN 4 tablets (400 mg total) once daily in the evening for 26 days. Take with food..

## 2024-07-09 NOTE — PATIENT INSTRUCTIONS
AZACITIDINE & VENETOCLAX 1st CYCLE     Azacitidine (Vidaza) IV or SC (shot) once daily for 7 days every 28 days  Venetoclax (Venclexta) oral once daily for 28 days      Azacitidine will be administered as a shots in the infusion center.  The dose of venetoclax will increase each day over the 1st 3 days. Please bring your pills to the infusion center the first 3 days.    You will be monitored closely for tumor lysis syndrome the first 3 days, a potential serious but rare, complication from this combination.  Please take allopurinol daily for the 1st 14 days to help prevent kidney issues from this complication.      SUPPORTIVE MEDICATIONS  Allopurinol by mouth once daily for 1st 14 days  Prochlorperazine 10 mg by mouth every 6 hours as needed for nausea/vomiting (may be sedating)  Ondansetron 4 mg by mouth every 6 hours as needed for nausea/vomiting  *If you are taking nausea medications regularly throughout the day, talk with the team about ways to improve your symptoms.

## 2024-07-09 NOTE — PROGRESS NOTES
"     Patient ID: Jin Reynolds is a 69 y.o. male.    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   8/23/2023 Initial Diagnosis    DX:  SMOLDERING SYSTEMIC MASTOCYTOSIS  Presented with skin rash and eosinophilia    [facility]  (For SM, Need major and 1 minor OR at least 3 minors)  - Multi-focal, dense infiltrates of MC (>= 15 mast cells in aggregates) in BM and/or other extra-cutaneous organs [major]  - In BM or extracutaneous organs, >25% MC spindle shaped OR have atypical morphology OR of all MC on BM aspirate smears, >25% are immature or atypical [minor]  - Detection of activating mutation KIT D816V in BM, PB, or other extracutaneous organ [minor]  - Serum tryptase persistently exceeds 20 ng/mL (unless associated clonal myeloid disorder, in which this paramenter is not valid) [minor]    \"B\" FINDINGS  - BM biopsy showing >30% infiltration (focal, dense aggregates) and/or serum tryptase > 200 ng/mL  - Signs of dysplasia or myeloproliferation, in non-MC lineages but insufficient criteria for definitive diagnosis of AHNMD with normal or slightly abnormal blood counts    \"C\" FINDINGS  None       8/23/2023 Biopsy    BONE MARROW (8/23/23)  Hypercellular (90-95%) with trilineage hematopoiesis, granulocytic hyperplasia, eosinophilia, and increased atypical mast cells  IP:  CD34+, +, CD7(bright)+, cCD3-, CD33-, CD15-, CD13+ blast population  IHC:  + increased spindle-shaped mast cells (15% cellularity), CD2-  Myeloid NGS:  CBL (31%), cKIT D816V (30%), RUNX1 x 2 (19%, 29%), SRSF2 (47%) [ASXL1 negative]  FISH:  PDGFRb negative    SERUM TRYPTASE  228 (8/23/23)  268 (9/5/23)     2/13/2024 -  Molecular Therapy    AVAPRITINIB   - Starting dose 25 mg daily (non-Adv SM)     7/16/2024 -  Chemotherapy    Venetoclax / AzaCITIDine, 28 Day Cycles - Induction / Consolidation     Acute myeloid leukemia not having achieved " remission (Multi)   6/27/2024 Initial Diagnosis    ICC 2022 DX: Acute myeloid leukemia, NOS (>=20% blasts)  [id number] AML Risk Stratification: INTERMEDIATE: Cytogenetic and/or molecular abnormalities not classified as favorable or adverse  Presented with pancytopenia and circulating blasts    BONE MARROW (06/27/2024)  Marrow replaced by blasts, fibrotic foci, some atypical + cells; 7-8% circulating blasts; aspicular  - Unable to perform additional studies due to aspicular specimen    PERIPHERAL BLOOD (6/27/2024)  FISH:  positive for gain RUNX1    PERIPHERAL BLOOD (7/1/24)  IP:  abnl myeloblast population (CD34+, CD7+, CD4+, CD13+, CD38+, CD11+ dim, HLA=DR+, TdT+)  Myeloid NGS:  pending     7/16/2024 -  Chemotherapy    Venetoclax / AzaCITIDine, 28 Day Cycles - Induction / Consolidation          Acute myeloid leukemia not having achieved remission (Multi)  Mr. 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  He does not have current evidence of tumor lysis syndrome, DIC, leukostasis, or active infection.  I introduced the diagnosis, natural history, and general treatment approaches.  In particular, I reviewed options of intensive therapy (traditonally 7+3 or similar approach), lower intensity chemotherapy (hypomethylating agent with venetoclax or other targeted agent), novel therapy, supportive care only, and the potential for clinical trials. Given his [id number] AML Risk Stratification: ADVERSE: Mutated ASXL1, BCOR, EZH2, RUNX1, SF3B1, SRSF2, STAG2, U2AF1 and/or ZRSR2 (excludes those combined with favorable risk AML subtypes) risk AML by ELN 2022 (presuming prior molecular mutations remain present on recent testing) and other co-morbidities, I recommended induction with azacitidine and venetoclax.  I highlighted the toxicities of these therapies, specifically myelosuppression and increased need for transfusions of blood and  platelets as well as very high risk for infection.  The treatment schedule was introduced.  I reviewed potential toxicities of venetoclax (escalation to 400 mg daily) combined with azacitidine (75 mg/m2 daily x 7d every 28d), predominantly myelosuppression, mild CINV generally preventable with appropriate anti-emetics, abnormal LFTs, edema, headache, fatigue, hyperuricemia, tumor lysis syndrome, and risk for infections.  I highlighted the need for dose escalation and lab monitoring for tumor lysis syndrome during the initial week.  Expectations for CR rates, time to response, and durability were introduced.  Questions were addressed, and the patient is ready to proceed.  Informed consent obtained.  The goal of initial therapy would be to achieve MRD- CR1 with post-remission therapy dictated by his response, AML risk, co-morbidities, and donor options.  I described the impact of cytogenetic and molecular features which can stratify overall rates for relapse, remission, etc.  With active AML, the patient is a at risk for infections, including life threatening, severe cytopenias, and other complications of leukemia.  He is agreeable to proceed.  I also briefly introduced the potential role of allogeneic stem cell transplant.  Will proceed with HLA typing and donor search.     Red blood cell antibody positive  Recent delayed hemolytic transfusion reaction due to new alloantibodies with Anti-E and Anti-K.  Pre-medications are not indicated with this type of reaction, so they were removed from plan.    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      ______________________________________________________________________________________________________________________________________________    SUBJECTIVE     Here today for planned follow up.  Overall, continues to feel well, although does notice some exertional  fatigue with heavy activity. No new health issues.  Denies fevers, chills, nausea, vomiting, diarrhea, dyspnea, rash, and pain.    OBJECTIVE     /63 (BP Location: Left arm, Patient Position: Sitting, BP Cuff Size: Large adult)   Pulse 77   Temp 35.9 °C (96.6 °F) (Skin)   Resp 16   Wt 89 kg (196 lb 3.2 oz)   SpO2 100%   BMI 29.83 kg/m²      Physical Exam  Vitals reviewed.   Constitutional:       Appearance: Normal appearance.   Eyes:      Conjunctiva/sclera: Conjunctivae normal.      Pupils: Pupils are equal, round, and reactive to light.   Skin:     General: Skin is warm and dry.      Findings: No rash.   Psychiatric:         Mood and Affect: Mood normal.         Time Spent  Prep time on day of patient encounter: 8 minutes  Time spent directly with patient, family or caregiver: 22 minutes  Additional Time Spent on Patient Care Activities: 8 minutes  Documentation Time: 6 minutes  Other Time Spent: 0 minutes  Total: 44 minutes        Nika Dawson MD

## 2024-07-10 ENCOUNTER — SPECIALTY PHARMACY (OUTPATIENT)
Dept: PHARMACY | Facility: CLINIC | Age: 69
End: 2024-07-10

## 2024-07-10 LAB
BB ANTIBODY IDENTIFICATION: NORMAL
CASE #: NORMAL
ELECTRONICALLY SIGNED BY: NORMAL
MYELOID NGS RESULTS: NORMAL
PATH REV-IMMUNOHEMATOLOGY-PR30: NORMAL

## 2024-07-11 LAB
DIAGNOSIS-BB-PR33: NORMAL
PATH REPORT.ADDENDUM SPEC: NORMAL
PATH REPORT.ADDENDUM SPEC: NORMAL
PATH REPORT.COMMENTS IMP SPEC: NORMAL
PATH REPORT.FINAL DX SPEC: NORMAL
PATH REPORT.GROSS SPEC: NORMAL
PATH REPORT.MICROSCOPIC SPEC OTHER STN: NORMAL
PATH REPORT.RELEVANT HX SPEC: NORMAL
PATH REPORT.TOTAL CANCER: NORMAL
PATH REVIEW-TRANSFUSION REACTION: NORMAL
RESIDENT REVIEW-BB: NORMAL
TYPE OF REACTION: NORMAL

## 2024-07-11 RX ORDER — ALBUTEROL SULFATE 0.83 MG/ML
3 SOLUTION RESPIRATORY (INHALATION) AS NEEDED
OUTPATIENT
Start: 2024-07-11

## 2024-07-11 RX ORDER — DIPHENHYDRAMINE HYDROCHLORIDE 50 MG/ML
50 INJECTION INTRAMUSCULAR; INTRAVENOUS AS NEEDED
OUTPATIENT
Start: 2024-07-11

## 2024-07-11 RX ORDER — EPINEPHRINE 0.3 MG/.3ML
0.3 INJECTION SUBCUTANEOUS EVERY 5 MIN PRN
OUTPATIENT
Start: 2024-07-11

## 2024-07-11 RX ORDER — FAMOTIDINE 10 MG/ML
20 INJECTION INTRAVENOUS ONCE AS NEEDED
OUTPATIENT
Start: 2024-07-11

## 2024-07-12 ENCOUNTER — PHARMACY VISIT (OUTPATIENT)
Dept: PHARMACY | Facility: CLINIC | Age: 69
End: 2024-07-12
Payer: COMMERCIAL

## 2024-07-12 ENCOUNTER — SPECIALTY PHARMACY (OUTPATIENT)
Dept: PHARMACY | Facility: CLINIC | Age: 69
End: 2024-07-12

## 2024-07-12 ENCOUNTER — LAB (OUTPATIENT)
Dept: LAB | Facility: HOSPITAL | Age: 69
End: 2024-07-12
Payer: MEDICARE

## 2024-07-12 ENCOUNTER — LAB (OUTPATIENT)
Dept: HEMATOLOGY/ONCOLOGY | Facility: HOSPITAL | Age: 69
End: 2024-07-12
Payer: MEDICARE

## 2024-07-12 VITALS
WEIGHT: 195.55 LBS | BODY MASS INDEX: 29.73 KG/M2 | TEMPERATURE: 97.7 F | RESPIRATION RATE: 18 BRPM | OXYGEN SATURATION: 100 % | HEART RATE: 71 BPM | DIASTOLIC BLOOD PRESSURE: 71 MMHG | SYSTOLIC BLOOD PRESSURE: 135 MMHG

## 2024-07-12 DIAGNOSIS — D47.02 SYSTEMIC MASTOCYTOSIS WITH ASSOCIATED CLONAL HEMATOLOGICAL NON-MAST CELL LINEAGE DISEASE: ICD-10-CM

## 2024-07-12 DIAGNOSIS — C92.00 ACUTE MYELOID LEUKEMIA NOT HAVING ACHIEVED REMISSION (MULTI): ICD-10-CM

## 2024-07-12 DIAGNOSIS — D47.02 SMOLDERING SYSTEMIC MASTOCYTOSIS: ICD-10-CM

## 2024-07-12 DIAGNOSIS — M81.8 OTHER OSTEOPOROSIS WITHOUT CURRENT PATHOLOGICAL FRACTURE: ICD-10-CM

## 2024-07-12 LAB
ABO GROUP (TYPE) IN BLOOD: NORMAL
ALBUMIN SERPL BCP-MCNC: 4.2 G/DL (ref 3.4–5)
ALP SERPL-CCNC: 90 U/L (ref 33–136)
ALT SERPL W P-5'-P-CCNC: 8 U/L (ref 10–52)
ANION GAP SERPL CALC-SCNC: 11 MMOL/L (ref 10–20)
ANTIBODY SCREEN: NORMAL
AST SERPL W P-5'-P-CCNC: 10 U/L (ref 9–39)
BASOPHILS # BLD MANUAL: 0.15 X10*3/UL (ref 0–0.1)
BASOPHILS NFR BLD MANUAL: 5 %
BILIRUB SERPL-MCNC: 0.9 MG/DL (ref 0–1.2)
BLASTS # BLD MANUAL: 0.23 X10*3/UL
BLASTS NFR BLD MANUAL: 8 %
BLOOD EXPIRATION DATE: NORMAL
BUN SERPL-MCNC: 16 MG/DL (ref 6–23)
CALCIUM SERPL-MCNC: 8.9 MG/DL (ref 8.6–10.3)
CHLORIDE SERPL-SCNC: 106 MMOL/L (ref 98–107)
CO2 SERPL-SCNC: 23 MMOL/L (ref 21–32)
CREAT SERPL-MCNC: 1.05 MG/DL (ref 0.5–1.3)
DACRYOCYTES BLD QL SMEAR: ABNORMAL
DISPENSE STATUS: NORMAL
EGFRCR SERPLBLD CKD-EPI 2021: 77 ML/MIN/1.73M*2
EOSINOPHIL # BLD MANUAL: 0.38 X10*3/UL (ref 0–0.7)
EOSINOPHIL NFR BLD MANUAL: 13 %
ERYTHROCYTE [DISTWIDTH] IN BLOOD BY AUTOMATED COUNT: 17.1 % (ref 11.5–14.5)
GLUCOSE SERPL-MCNC: 99 MG/DL (ref 74–99)
HCT VFR BLD AUTO: 16.5 % (ref 41–52)
HGB BLD-MCNC: 5.6 G/DL (ref 13.5–17.5)
HYPOCHROMIA BLD QL SMEAR: ABNORMAL
IMM GRANULOCYTES # BLD AUTO: 0.04 X10*3/UL (ref 0–0.7)
IMM GRANULOCYTES NFR BLD AUTO: 1.4 % (ref 0–0.9)
LYMPHOCYTES # BLD MANUAL: 1.25 X10*3/UL (ref 1.2–4.8)
LYMPHOCYTES NFR BLD MANUAL: 43 %
MCH RBC QN AUTO: 32 PG (ref 26–34)
MCHC RBC AUTO-ENTMCNC: 33.9 G/DL (ref 32–36)
MCV RBC AUTO: 94 FL (ref 80–100)
MONOCYTES # BLD MANUAL: 0.09 X10*3/UL (ref 0.1–1)
MONOCYTES NFR BLD MANUAL: 3 %
NEUTROPHILS # BLD MANUAL: 0.82 X10*3/UL (ref 1.2–7.7)
NEUTS BAND # BLD MANUAL: 0.12 X10*3/UL (ref 0–0.7)
NEUTS BAND NFR BLD MANUAL: 4 %
NEUTS SEG # BLD MANUAL: 0.7 X10*3/UL (ref 1.2–7)
NEUTS SEG NFR BLD MANUAL: 24 %
NRBC BLD-RTO: 0 /100 WBCS (ref 0–0)
OVALOCYTES BLD QL SMEAR: ABNORMAL
PLATELET # BLD AUTO: 29 X10*3/UL (ref 150–450)
POLYCHROMASIA BLD QL SMEAR: ABNORMAL
POTASSIUM SERPL-SCNC: 4 MMOL/L (ref 3.5–5.3)
PRODUCT BLOOD TYPE: 5100
PRODUCT BLOOD TYPE: 9500
PRODUCT BLOOD TYPE: 9500
PRODUCT CODE: NORMAL
PROT SERPL-MCNC: 8 G/DL (ref 6.4–8.2)
RBC # BLD AUTO: 1.75 X10*6/UL (ref 4.5–5.9)
RBC MORPH BLD: ABNORMAL
RH FACTOR (ANTIGEN D): NORMAL
ROULEAUX BLD QL SMEAR: PRESENT
SODIUM SERPL-SCNC: 136 MMOL/L (ref 136–145)
TOTAL CELLS COUNTED BLD: 100
UNIT ABO: NORMAL
UNIT NUMBER: NORMAL
UNIT RH: NORMAL
UNIT VOLUME: 282
UNIT VOLUME: 288
UNIT VOLUME: 350
WBC # BLD AUTO: 2.9 X10*3/UL (ref 4.4–11.3)
XM INTEP: NORMAL

## 2024-07-12 PROCEDURE — 86832 HLA CLASS I HIGH DEFIN QUAL: CPT

## 2024-07-12 PROCEDURE — RXMED WILLOW AMBULATORY MEDICATION CHARGE

## 2024-07-12 PROCEDURE — 36415 COLL VENOUS BLD VENIPUNCTURE: CPT

## 2024-07-12 PROCEDURE — 80053 COMPREHEN METABOLIC PANEL: CPT

## 2024-07-12 PROCEDURE — 81379 HLA I TYPING COMPLETE HR: CPT

## 2024-07-12 PROCEDURE — 86901 BLOOD TYPING SEROLOGIC RH(D): CPT

## 2024-07-12 PROCEDURE — 85027 COMPLETE CBC AUTOMATED: CPT

## 2024-07-12 PROCEDURE — 86077 PHYS BLOOD BANK SERV XMATCH: CPT | Performed by: STUDENT IN AN ORGANIZED HEALTH CARE EDUCATION/TRAINING PROGRAM

## 2024-07-12 PROCEDURE — 85007 BL SMEAR W/DIFF WBC COUNT: CPT

## 2024-07-12 PROCEDURE — 86902 BLOOD TYPE ANTIGEN DONOR EA: CPT

## 2024-07-12 PROCEDURE — 86870 RBC ANTIBODY IDENTIFICATION: CPT

## 2024-07-12 RX ORDER — HEPARIN SODIUM,PORCINE/PF 10 UNIT/ML
50 SYRINGE (ML) INTRAVENOUS AS NEEDED
OUTPATIENT
Start: 2024-07-12

## 2024-07-12 RX ORDER — FAMOTIDINE 10 MG/ML
20 INJECTION INTRAVENOUS ONCE AS NEEDED
Status: DISCONTINUED | OUTPATIENT
Start: 2024-07-12 | End: 2024-07-12 | Stop reason: HOSPADM

## 2024-07-12 RX ORDER — DIPHENHYDRAMINE HYDROCHLORIDE 50 MG/ML
50 INJECTION INTRAMUSCULAR; INTRAVENOUS AS NEEDED
Status: DISCONTINUED | OUTPATIENT
Start: 2024-07-12 | End: 2024-07-12 | Stop reason: HOSPADM

## 2024-07-12 RX ORDER — ALBUTEROL SULFATE 0.83 MG/ML
3 SOLUTION RESPIRATORY (INHALATION) AS NEEDED
Status: DISCONTINUED | OUTPATIENT
Start: 2024-07-12 | End: 2024-07-12 | Stop reason: HOSPADM

## 2024-07-12 RX ORDER — EPINEPHRINE 0.3 MG/.3ML
0.3 INJECTION SUBCUTANEOUS EVERY 5 MIN PRN
Status: DISCONTINUED | OUTPATIENT
Start: 2024-07-12 | End: 2024-07-12 | Stop reason: HOSPADM

## 2024-07-12 RX ORDER — HEPARIN 100 UNIT/ML
500 SYRINGE INTRAVENOUS AS NEEDED
OUTPATIENT
Start: 2024-07-12

## 2024-07-12 NOTE — PROGRESS NOTES
Patient arrived to unit for scheduled 1 unit PRBCs. Hemoglobin noted today of 5.6, Dr. Dawson notified. Per MD, one unit only today. Confirmed no pre-medications. Patient denies new complaints at this time. Tolerated treatment without incident. Hematoma to left arm after PIV attempt (not flushed). Notified Scarlet Vizcaino - no new orders placed. Heat pack applied. Patient denies discomfort or pain. Discharged ambulatory and in stable condition.

## 2024-07-15 ENCOUNTER — TELEPHONE (OUTPATIENT)
Dept: HEMATOLOGY/ONCOLOGY | Facility: CLINIC | Age: 69
End: 2024-07-15
Payer: MEDICARE

## 2024-07-15 ENCOUNTER — SPECIALTY PHARMACY (OUTPATIENT)
Dept: HEMATOLOGY/ONCOLOGY | Facility: HOSPITAL | Age: 69
End: 2024-07-15
Payer: MEDICARE

## 2024-07-15 ENCOUNTER — TELEPHONE (OUTPATIENT)
Dept: HEMATOLOGY/ONCOLOGY | Facility: HOSPITAL | Age: 69
End: 2024-07-15
Payer: MEDICARE

## 2024-07-15 LAB
BB ANTIBODY IDENTIFICATION: NORMAL
CASE #: NORMAL

## 2024-07-15 NOTE — TELEPHONE ENCOUNTER
Patient asked me to call him to review treatment plan again. Discussed ramp up, target dose, administration with food. Reviewed overall treatment schema and possible SE related to cytopenias. All questions answered.

## 2024-07-15 NOTE — TELEPHONE ENCOUNTER
Call placed to patient, not able to speak with patient left a VM in regards to appointments Scheduled for tomorrow 7/16/24. Patient made aware that follow up appointment is scheduled with Dr. Dawson tomorrow 7/16 at 3pm with labs prior then an infusion appointment after that visit. Patient encouraged to call office with additional questions, left office number for patient.

## 2024-07-16 ENCOUNTER — LAB (OUTPATIENT)
Dept: LAB | Facility: HOSPITAL | Age: 69
DRG: 683 | End: 2024-07-16
Payer: MEDICARE

## 2024-07-16 ENCOUNTER — APPOINTMENT (OUTPATIENT)
Dept: HEMATOLOGY/ONCOLOGY | Facility: HOSPITAL | Age: 69
End: 2024-07-16
Payer: MEDICARE

## 2024-07-16 ENCOUNTER — APPOINTMENT (OUTPATIENT)
Dept: HEMATOLOGY/ONCOLOGY | Facility: HOSPITAL | Age: 69
DRG: 683 | End: 2024-07-16
Payer: MEDICARE

## 2024-07-16 ENCOUNTER — OFFICE VISIT (OUTPATIENT)
Dept: HEMATOLOGY/ONCOLOGY | Facility: HOSPITAL | Age: 69
End: 2024-07-16
Payer: MEDICARE

## 2024-07-16 ENCOUNTER — INFUSION (OUTPATIENT)
Dept: HEMATOLOGY/ONCOLOGY | Facility: HOSPITAL | Age: 69
End: 2024-07-16
Payer: MEDICARE

## 2024-07-16 VITALS
OXYGEN SATURATION: 100 % | WEIGHT: 190.92 LBS | DIASTOLIC BLOOD PRESSURE: 75 MMHG | HEART RATE: 99 BPM | TEMPERATURE: 97.7 F | BODY MASS INDEX: 29.03 KG/M2 | RESPIRATION RATE: 18 BRPM | SYSTOLIC BLOOD PRESSURE: 150 MMHG

## 2024-07-16 VITALS — HEIGHT: 63 IN | BODY MASS INDEX: 34.3 KG/M2

## 2024-07-16 DIAGNOSIS — D47.02 SYSTEMIC MASTOCYTOSIS WITH ASSOCIATED CLONAL HEMATOLOGICAL NON-MAST CELL LINEAGE DISEASE: ICD-10-CM

## 2024-07-16 DIAGNOSIS — C92.00 ACUTE MYELOID LEUKEMIA NOT HAVING ACHIEVED REMISSION (MULTI): Primary | ICD-10-CM

## 2024-07-16 DIAGNOSIS — C92.00 ACUTE MYELOID LEUKEMIA NOT HAVING ACHIEVED REMISSION (MULTI): ICD-10-CM

## 2024-07-16 LAB
ABO GROUP (TYPE) IN BLOOD: NORMAL
ANION GAP SERPL CALC-SCNC: 13 MMOL/L (ref 10–20)
ANTIBODY SCREEN: NORMAL
BASOPHILS # BLD MANUAL: 0.07 X10*3/UL (ref 0–0.1)
BASOPHILS NFR BLD MANUAL: 2 %
BLASTS # BLD MANUAL: 0.6 X10*3/UL
BLASTS NFR BLD MANUAL: 17 %
BUN SERPL-MCNC: 19 MG/DL (ref 6–23)
CALCIUM SERPL-MCNC: 9 MG/DL (ref 8.6–10.3)
CHLORIDE SERPL-SCNC: 105 MMOL/L (ref 98–107)
CO2 SERPL-SCNC: 21 MMOL/L (ref 21–32)
CREAT SERPL-MCNC: 1.33 MG/DL (ref 0.5–1.3)
EGFRCR SERPLBLD CKD-EPI 2021: 58 ML/MIN/1.73M*2
EOSINOPHIL # BLD MANUAL: 0.39 X10*3/UL (ref 0–0.7)
EOSINOPHIL NFR BLD MANUAL: 11 %
ERYTHROCYTE [DISTWIDTH] IN BLOOD BY AUTOMATED COUNT: 17.1 % (ref 11.5–14.5)
GLUCOSE SERPL-MCNC: 99 MG/DL (ref 74–99)
HCT VFR BLD AUTO: 17.2 % (ref 41–52)
HGB BLD-MCNC: 5.8 G/DL (ref 13.5–17.5)
IMM GRANULOCYTES # BLD AUTO: 0.05 X10*3/UL (ref 0–0.7)
IMM GRANULOCYTES NFR BLD AUTO: 1.4 % (ref 0–0.9)
LDH SERPL L TO P-CCNC: 289 U/L (ref 84–246)
LYMPHOCYTES # BLD MANUAL: 1.16 X10*3/UL (ref 1.2–4.8)
LYMPHOCYTES NFR BLD MANUAL: 33 %
MCH RBC QN AUTO: 30.4 PG (ref 26–34)
MCHC RBC AUTO-ENTMCNC: 33.7 G/DL (ref 32–36)
MCV RBC AUTO: 90 FL (ref 80–100)
MONOCYTES # BLD MANUAL: 0.14 X10*3/UL (ref 0.1–1)
MONOCYTES NFR BLD MANUAL: 4 %
MYELOCYTES # BLD MANUAL: 0.07 X10*3/UL
MYELOCYTES NFR BLD MANUAL: 2 %
NEUTS SEG # BLD MANUAL: 1.09 X10*3/UL (ref 1.2–7)
NEUTS SEG NFR BLD MANUAL: 31 %
NRBC BLD-RTO: 0 /100 WBCS (ref 0–0)
OVALOCYTES BLD QL SMEAR: ABNORMAL
PHOSPHATE SERPL-MCNC: 2.6 MG/DL (ref 2.5–4.9)
PLATELET # BLD AUTO: 27 X10*3/UL (ref 150–450)
POLYCHROMASIA BLD QL SMEAR: ABNORMAL
POTASSIUM SERPL-SCNC: 4 MMOL/L (ref 3.5–5.3)
RBC # BLD AUTO: 1.91 X10*6/UL (ref 4.5–5.9)
RBC MORPH BLD: ABNORMAL
RH FACTOR (ANTIGEN D): NORMAL
SODIUM SERPL-SCNC: 135 MMOL/L (ref 136–145)
TOTAL CELLS COUNTED BLD: 100
URATE SERPL-MCNC: 4 MG/DL (ref 4–7.5)
WBC # BLD AUTO: 3.5 X10*3/UL (ref 4.4–11.3)

## 2024-07-16 PROCEDURE — 86870 RBC ANTIBODY IDENTIFICATION: CPT

## 2024-07-16 PROCEDURE — 80048 BASIC METABOLIC PNL TOTAL CA: CPT

## 2024-07-16 PROCEDURE — 85007 BL SMEAR W/DIFF WBC COUNT: CPT

## 2024-07-16 PROCEDURE — 84550 ASSAY OF BLOOD/URIC ACID: CPT

## 2024-07-16 PROCEDURE — 85027 COMPLETE CBC AUTOMATED: CPT

## 2024-07-16 PROCEDURE — 83615 LACTATE (LD) (LDH) ENZYME: CPT

## 2024-07-16 PROCEDURE — 84100 ASSAY OF PHOSPHORUS: CPT

## 2024-07-16 PROCEDURE — 86901 BLOOD TYPING SEROLOGIC RH(D): CPT

## 2024-07-16 PROCEDURE — 36415 COLL VENOUS BLD VENIPUNCTURE: CPT

## 2024-07-16 RX ORDER — EPINEPHRINE 0.3 MG/.3ML
0.3 INJECTION SUBCUTANEOUS EVERY 5 MIN PRN
OUTPATIENT
Start: 2024-07-16

## 2024-07-16 RX ORDER — ONDANSETRON HYDROCHLORIDE 8 MG/1
8 TABLET, FILM COATED ORAL ONCE
Status: COMPLETED | OUTPATIENT
Start: 2024-07-16 | End: 2024-07-16

## 2024-07-16 RX ORDER — EPINEPHRINE 0.3 MG/.3ML
0.3 INJECTION SUBCUTANEOUS EVERY 5 MIN PRN
Status: DISCONTINUED | OUTPATIENT
Start: 2024-07-16 | End: 2024-07-16 | Stop reason: HOSPADM

## 2024-07-16 RX ORDER — DIPHENHYDRAMINE HYDROCHLORIDE 50 MG/ML
50 INJECTION INTRAMUSCULAR; INTRAVENOUS AS NEEDED
OUTPATIENT
Start: 2024-07-16

## 2024-07-16 RX ORDER — ALBUTEROL SULFATE 0.83 MG/ML
3 SOLUTION RESPIRATORY (INHALATION) AS NEEDED
Status: DISCONTINUED | OUTPATIENT
Start: 2024-07-16 | End: 2024-07-16 | Stop reason: HOSPADM

## 2024-07-16 RX ORDER — DIPHENHYDRAMINE HYDROCHLORIDE 50 MG/ML
50 INJECTION INTRAMUSCULAR; INTRAVENOUS AS NEEDED
Status: DISCONTINUED | OUTPATIENT
Start: 2024-07-16 | End: 2024-07-16 | Stop reason: HOSPADM

## 2024-07-16 RX ORDER — PROCHLORPERAZINE EDISYLATE 5 MG/ML
10 INJECTION INTRAMUSCULAR; INTRAVENOUS EVERY 6 HOURS PRN
Status: DISCONTINUED | OUTPATIENT
Start: 2024-07-16 | End: 2024-07-16 | Stop reason: HOSPADM

## 2024-07-16 RX ORDER — ALBUTEROL SULFATE 0.83 MG/ML
3 SOLUTION RESPIRATORY (INHALATION) AS NEEDED
OUTPATIENT
Start: 2024-07-16

## 2024-07-16 RX ORDER — FAMOTIDINE 10 MG/ML
20 INJECTION INTRAVENOUS ONCE AS NEEDED
Status: DISCONTINUED | OUTPATIENT
Start: 2024-07-16 | End: 2024-07-16 | Stop reason: HOSPADM

## 2024-07-16 RX ORDER — PROCHLORPERAZINE MALEATE 10 MG
10 TABLET ORAL EVERY 6 HOURS PRN
Status: DISCONTINUED | OUTPATIENT
Start: 2024-07-16 | End: 2024-07-16 | Stop reason: HOSPADM

## 2024-07-16 RX ORDER — FAMOTIDINE 10 MG/ML
20 INJECTION INTRAVENOUS ONCE AS NEEDED
OUTPATIENT
Start: 2024-07-16

## 2024-07-16 ASSESSMENT — PAIN SCALES - GENERAL: PAINLEVEL: 3

## 2024-07-16 NOTE — ASSESSMENT & PLAN NOTE
New diagnosis of AML after presenting with worsening anemia in the setting of mast cell disease.  He does not have current evidence of tumor lysis syndrome, DIC, leukostasis, or active infection.  Presenting for aza/leonor cycle 1    Plan:   C1 Aza/leonor today

## 2024-07-16 NOTE — ASSESSMENT & PLAN NOTE
Recent delayed hemolytic transfusion reaction due to new alloantibodies with Anti-E and Anti-K. Pre-medications are not indicated with this type of reaction, so they were removed from plan.     Hemoglobin 5.8 today, will transfuse PRBC, probably tomorrow due to matching required by [facility]

## 2024-07-16 NOTE — PROGRESS NOTES
"     Patient ID: Jin Reynolds is a 69 y.o. male.    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   8/23/2023 Initial Diagnosis    DX:  SMOLDERING SYSTEMIC MASTOCYTOSIS  Presented with skin rash and eosinophilia    [facility]ITERIA  (For SM, Need major and 1 minor OR at least 3 minors)  - Multi-focal, dense infiltrates of MC (>= 15 mast cells in aggregates) in BM and/or other extra-cutaneous organs [major]  - In BM or extracutaneous organs, >25% MC spindle shaped OR have atypical morphology OR of all MC on BM aspirate smears, >25% are immature or atypical [minor]  - Detection of activating mutation KIT D816V in BM, PB, or other extracutaneous organ [minor]  - Serum tryptase persistently exceeds 20 ng/mL (unless associated clonal myeloid disorder, in which this paramenter is not valid) [minor]    \"B\" FINDINGS  - BM biopsy showing >30% infiltration (focal, dense aggregates) and/or serum tryptase > 200 ng/mL  - Signs of dysplasia or myeloproliferation, in non-MC lineages but insufficient criteria for definitive diagnosis of AHNMD with normal or slightly abnormal blood counts    \"C\" FINDINGS  None       8/23/2023 Biopsy    BONE MARROW (8/23/23)  Hypercellular (90-95%) with trilineage hematopoiesis, granulocytic hyperplasia, eosinophilia, and increased atypical mast cells  IP:  CD34+, +, CD7(bright)+, cCD3-, CD33-, CD15-, CD13+ blast population  IHC:  + increased spindle-shaped mast cells (15% cellularity), CD2-  Myeloid NGS:  CBL (31%), cKIT D816V (30%), RUNX1 x 2 (19%, 29%), SRSF2 (47%) [ASXL1 negative]  FISH:  PDGFRb negative    SERUM TRYPTASE  228 (8/23/23)  268 (9/5/23)     2/13/2024 -  Molecular Therapy    AVAPRITINIB   - Starting dose 25 mg daily (non-Adv SM)     7/16/2024 -  Chemotherapy    Venetoclax / AzaCITIDine, 28 Day Cycles - Induction / Consolidation     Acute myeloid leukemia not having achieved " remission (Multi)   6/27/2024 Initial Diagnosis    ICC 2022 DX: Acute myeloid leukemia, NOS (>=20% blasts)  [id number] AML Risk Stratification: INTERMEDIATE: Cytogenetic and/or molecular abnormalities not classified as favorable or adverse  Presented with pancytopenia and circulating blasts    BONE MARROW (06/27/2024)  Marrow replaced by blasts, fibrotic foci, some atypical + cells; 7-8% circulating blasts; aspicular  - Unable to perform additional studies due to aspicular specimen    PERIPHERAL BLOOD (6/27/2024)  FISH:  positive for gain RUNX1    PERIPHERAL BLOOD (7/1/24)  IP:  abnl myeloblast population (CD34+, CD7+, CD4+, CD13+, CD38+, CD11+ dim, HLA=DR+, TdT+)  Myeloid NGS: CBL C404Y (41%), KIT D816V (8%), RUNX1 x2 (8%, 30%), SRSF2 (37%)     7/16/2024 -  Chemotherapy    Venetoclax / AzaCITIDine, 28 Day Cycles - Induction / Consolidation          Acute myeloid leukemia not having achieved remission (Multi)  New diagnosis of AML after presenting with worsening anemia in the setting of mast cell disease.  He does not have current evidence of tumor lysis syndrome, DIC, leukostasis, or active infection.  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      Red blood cell antibody positive  Recent delayed hemolytic transfusion reaction due to new alloantibodies with Anti-E and Anti-K. Pre-medications are not indicated with this type of reaction, so they were removed from plan.     Hemoglobin 5.8 today, will transfuse PRBC, probably tomorrow due to matching required by [facility]      ______________________________________________________________________________________________________________________________________________    SUBJECTIVE     Here today for planned follow up.  Overall, continues to feel well, although does notice some exertional fatigue with  heavy activity. No new health issues.  Denies fevers, chills, nausea, vomiting, diarrhea, dyspnea, rash, and pain.     OBJECTIVE     /75   Pulse 99   Temp 36.5 °C (97.7 °F)   Resp 18   Wt 86.6 kg (190 lb 14.7 oz)   SpO2 100%   BMI 29.03 kg/m²      Physical Exam  Vitals reviewed.   Constitutional:       Appearance: Normal appearance.   Eyes:      Conjunctiva/sclera: Conjunctivae normal.      Pupils: Pupils are equal, round, and reactive to light.   Skin:     General: Skin is warm and dry.      Findings: No rash.   Psychiatric:         Mood and Affect: Mood normal.          Patient seen and discussed with MD Nika Acosta MD

## 2024-07-16 NOTE — PROGRESS NOTES
Patient arrived ambulatory to infusion for scheduled tx of C1D1 subcutaneous azacitidine on Venetoclax / AzaCITIDine, 28 Day Cycles - Induction / Consolidation plan. Seen by Dr. Dawson at chairside. Reviewed common side effects, bleeding / neutropenic precautions, TLS monitoring labs and gave pt handouts. Pt states he has venetoclax pills at home and will take first dose this evening. Hgb 5.8, type and screen in process- pt to get 1u RBCs tomorrow with infusion appt. Tolerated injections without issue. Aware to go to lab prior to tomorrow's visit to get TLS labs drawn. Discharged in stable condition accompanied by his daughter.

## 2024-07-17 ENCOUNTER — LAB (OUTPATIENT)
Dept: LAB | Facility: HOSPITAL | Age: 69
DRG: 683 | End: 2024-07-17
Payer: MEDICARE

## 2024-07-17 ENCOUNTER — HOSPITAL ENCOUNTER (INPATIENT)
Facility: HOSPITAL | Age: 69
End: 2024-07-17
Attending: INTERNAL MEDICINE
Payer: MEDICARE

## 2024-07-17 ENCOUNTER — INFUSION (OUTPATIENT)
Dept: HEMATOLOGY/ONCOLOGY | Facility: HOSPITAL | Age: 69
End: 2024-07-17
Payer: MEDICARE

## 2024-07-17 ENCOUNTER — APPOINTMENT (OUTPATIENT)
Dept: HEMATOLOGY/ONCOLOGY | Facility: HOSPITAL | Age: 69
End: 2024-07-17
Payer: MEDICARE

## 2024-07-17 ENCOUNTER — OFFICE VISIT (OUTPATIENT)
Dept: HEMATOLOGY/ONCOLOGY | Facility: HOSPITAL | Age: 69
End: 2024-07-17
Payer: MEDICARE

## 2024-07-17 ENCOUNTER — DOCUMENTATION (OUTPATIENT)
Dept: HEMATOLOGY/ONCOLOGY | Facility: HOSPITAL | Age: 69
End: 2024-07-17
Payer: MEDICARE

## 2024-07-17 VITALS
SYSTOLIC BLOOD PRESSURE: 109 MMHG | HEART RATE: 80 BPM | RESPIRATION RATE: 16 BRPM | DIASTOLIC BLOOD PRESSURE: 57 MMHG | TEMPERATURE: 98.6 F | WEIGHT: 190 LBS | BODY MASS INDEX: 34.13 KG/M2 | OXYGEN SATURATION: 99 %

## 2024-07-17 DIAGNOSIS — C92.00 ACUTE MYELOID LEUKEMIA NOT HAVING ACHIEVED REMISSION (MULTI): Primary | ICD-10-CM

## 2024-07-17 DIAGNOSIS — I10 HYPERTENSION, UNSPECIFIED TYPE: ICD-10-CM

## 2024-07-17 DIAGNOSIS — D47.02 SMOLDERING SYSTEMIC MASTOCYTOSIS: ICD-10-CM

## 2024-07-17 DIAGNOSIS — E88.3 TUMOR LYSIS SYNDROME FOLLOWING ANTINEOPLASTIC DRUG THERAPY (HHS-HCC): ICD-10-CM

## 2024-07-17 DIAGNOSIS — T45.1X5A TUMOR LYSIS SYNDROME FOLLOWING ANTINEOPLASTIC DRUG THERAPY (HHS-HCC): ICD-10-CM

## 2024-07-17 DIAGNOSIS — D47.02 SYSTEMIC MASTOCYTOSIS WITH ASSOCIATED CLONAL HEMATOLOGICAL NON-MAST CELL LINEAGE DISEASE: ICD-10-CM

## 2024-07-17 DIAGNOSIS — N17.9 AKI (ACUTE KIDNEY INJURY) (CMS-HCC): ICD-10-CM

## 2024-07-17 DIAGNOSIS — M81.8 OTHER OSTEOPOROSIS WITHOUT CURRENT PATHOLOGICAL FRACTURE: ICD-10-CM

## 2024-07-17 DIAGNOSIS — C92.00 ACUTE MYELOID LEUKEMIA NOT HAVING ACHIEVED REMISSION (MULTI): ICD-10-CM

## 2024-07-17 DIAGNOSIS — C92.02 AML (ACUTE MYELOID LEUKEMIA) IN RELAPSE (MULTI): Primary | ICD-10-CM

## 2024-07-17 DIAGNOSIS — E88.3 TUMOR LYSIS SYNDROME (HHS-HCC): Primary | ICD-10-CM

## 2024-07-17 LAB
ABO GROUP (TYPE) IN BLOOD: NORMAL
ANION GAP SERPL CALC-SCNC: 13 MMOL/L (ref 10–20)
ANTIBODY SCREEN: NORMAL
BASOPHILS # BLD MANUAL: 0.04 X10*3/UL (ref 0–0.1)
BASOPHILS NFR BLD MANUAL: 2 %
BLASTS # BLD MANUAL: 0.09 X10*3/UL
BLASTS NFR BLD MANUAL: 5 %
BLOOD EXPIRATION DATE: NORMAL
BUN SERPL-MCNC: 37 MG/DL (ref 6–23)
CALCIUM SERPL-MCNC: 7.1 MG/DL (ref 8.6–10.3)
CHLORIDE SERPL-SCNC: 101 MMOL/L (ref 98–107)
CO2 SERPL-SCNC: 21 MMOL/L (ref 21–32)
CREAT SERPL-MCNC: 1.74 MG/DL (ref 0.5–1.3)
DACRYOCYTES BLD QL SMEAR: ABNORMAL
DISPENSE STATUS: NORMAL
EGFRCR SERPLBLD CKD-EPI 2021: 42 ML/MIN/1.73M*2
EOSINOPHIL # BLD MANUAL: 0.14 X10*3/UL (ref 0–0.7)
EOSINOPHIL NFR BLD MANUAL: 8 %
ERYTHROCYTE [DISTWIDTH] IN BLOOD BY AUTOMATED COUNT: 17.3 % (ref 11.5–14.5)
GLUCOSE SERPL-MCNC: 106 MG/DL (ref 74–99)
HCT VFR BLD AUTO: 14.7 % (ref 41–52)
HGB BLD-MCNC: 5 G/DL (ref 13.5–17.5)
IMM GRANULOCYTES # BLD AUTO: 0.02 X10*3/UL (ref 0–0.7)
IMM GRANULOCYTES NFR BLD AUTO: 1.1 % (ref 0–0.9)
LDH SERPL L TO P-CCNC: 6660 U/L (ref 84–246)
LYMPHOCYTES # BLD MANUAL: 0.29 X10*3/UL (ref 1.2–4.8)
LYMPHOCYTES NFR BLD MANUAL: 16 %
MCH RBC QN AUTO: 30.7 PG (ref 26–34)
MCHC RBC AUTO-ENTMCNC: 34 G/DL (ref 32–36)
MCV RBC AUTO: 90 FL (ref 80–100)
METAMYELOCYTES # BLD MANUAL: 0.02 X10*3/UL
METAMYELOCYTES NFR BLD MANUAL: 1 %
MONOCYTES # BLD MANUAL: 0.11 X10*3/UL (ref 0.1–1)
MONOCYTES NFR BLD MANUAL: 6 %
NEUTS SEG # BLD MANUAL: 1.12 X10*3/UL (ref 1.2–7)
NEUTS SEG NFR BLD MANUAL: 62 %
NRBC BLD-RTO: 0 /100 WBCS (ref 0–0)
OVALOCYTES BLD QL SMEAR: ABNORMAL
PATH REV-IMMUNOHEMATOLOGY-PR30: NORMAL
PHOSPHATE SERPL-MCNC: 5.8 MG/DL (ref 2.5–4.9)
PLATELET # BLD AUTO: 19 X10*3/UL (ref 150–450)
POTASSIUM SERPL-SCNC: 5 MMOL/L (ref 3.5–5.3)
PRODUCT BLOOD TYPE: 5100
PRODUCT CODE: NORMAL
RBC # BLD AUTO: 1.63 X10*6/UL (ref 4.5–5.9)
RBC MORPH BLD: ABNORMAL
RH FACTOR (ANTIGEN D): NORMAL
SARS-COV-2 RNA RESP QL NAA+PROBE: NOT DETECTED
SODIUM SERPL-SCNC: 130 MMOL/L (ref 136–145)
TOTAL CELLS COUNTED BLD: 100
UNIT ABO: NORMAL
UNIT NUMBER: NORMAL
UNIT RH: NORMAL
UNIT VOLUME: 350
URATE SERPL-MCNC: 6 MG/DL (ref 4–7.5)
WBC # BLD AUTO: 1.8 X10*3/UL (ref 4.4–11.3)
XM INTEP: NORMAL

## 2024-07-17 PROCEDURE — 30233N1 TRANSFUSION OF NONAUTOLOGOUS RED BLOOD CELLS INTO PERIPHERAL VEIN, PERCUTANEOUS APPROACH: ICD-10-PCS | Performed by: INTERNAL MEDICINE

## 2024-07-17 PROCEDURE — 1170000001 HC PRIVATE ONCOLOGY ROOM DAILY

## 2024-07-17 PROCEDURE — 36415 COLL VENOUS BLD VENIPUNCTURE: CPT

## 2024-07-17 PROCEDURE — 99223 1ST HOSP IP/OBS HIGH 75: CPT

## 2024-07-17 PROCEDURE — 2500000001 HC RX 250 WO HCPCS SELF ADMINISTERED DRUGS (ALT 637 FOR MEDICARE OP)

## 2024-07-17 PROCEDURE — 80048 BASIC METABOLIC PNL TOTAL CA: CPT

## 2024-07-17 PROCEDURE — 85027 COMPLETE CBC AUTOMATED: CPT

## 2024-07-17 PROCEDURE — P9040 RBC LEUKOREDUCED IRRADIATED: HCPCS

## 2024-07-17 PROCEDURE — 83615 LACTATE (LD) (LDH) ENZYME: CPT

## 2024-07-17 PROCEDURE — 84100 ASSAY OF PHOSPHORUS: CPT

## 2024-07-17 PROCEDURE — 84550 ASSAY OF BLOOD/URIC ACID: CPT

## 2024-07-17 PROCEDURE — 86870 RBC ANTIBODY IDENTIFICATION: CPT

## 2024-07-17 PROCEDURE — 2500000004 HC RX 250 GENERAL PHARMACY W/ HCPCS (ALT 636 FOR OP/ED)

## 2024-07-17 PROCEDURE — 86922 COMPATIBILITY TEST ANTIGLOB: CPT

## 2024-07-17 PROCEDURE — 85007 BL SMEAR W/DIFF WBC COUNT: CPT

## 2024-07-17 PROCEDURE — 86901 BLOOD TYPING SEROLOGIC RH(D): CPT

## 2024-07-17 RX ORDER — PANTOPRAZOLE SODIUM 40 MG/1
40 TABLET, DELAYED RELEASE ORAL
Status: DISCONTINUED | OUTPATIENT
Start: 2024-07-18 | End: 2024-07-28 | Stop reason: HOSPADM

## 2024-07-17 RX ORDER — EPINEPHRINE 0.3 MG/.3ML
0.3 INJECTION SUBCUTANEOUS EVERY 5 MIN PRN
Status: DISCONTINUED | OUTPATIENT
Start: 2024-07-17 | End: 2024-07-17 | Stop reason: HOSPADM

## 2024-07-17 RX ORDER — SODIUM CHLORIDE 9 MG/ML
50 INJECTION, SOLUTION INTRAVENOUS CONTINUOUS
Status: DISCONTINUED | OUTPATIENT
Start: 2024-07-17 | End: 2024-07-28 | Stop reason: HOSPADM

## 2024-07-17 RX ORDER — ALLOPURINOL 300 MG/1
300 TABLET ORAL DAILY
Status: DISCONTINUED | OUTPATIENT
Start: 2024-07-17 | End: 2024-07-28 | Stop reason: HOSPADM

## 2024-07-17 RX ORDER — CYANOCOBALAMIN (VITAMIN B-12) 500 MCG
800 TABLET ORAL DAILY
Status: DISCONTINUED | OUTPATIENT
Start: 2024-07-18 | End: 2024-07-28 | Stop reason: HOSPADM

## 2024-07-17 RX ORDER — PROCHLORPERAZINE MALEATE 10 MG
10 TABLET ORAL EVERY 6 HOURS PRN
Status: DISCONTINUED | OUTPATIENT
Start: 2024-07-17 | End: 2024-07-28 | Stop reason: HOSPADM

## 2024-07-17 RX ORDER — FAMOTIDINE 10 MG/ML
20 INJECTION INTRAVENOUS ONCE AS NEEDED
Status: DISCONTINUED | OUTPATIENT
Start: 2024-07-17 | End: 2024-07-17 | Stop reason: HOSPADM

## 2024-07-17 RX ORDER — AMLODIPINE BESYLATE 10 MG/1
10 TABLET ORAL DAILY
Status: DISCONTINUED | OUTPATIENT
Start: 2024-07-17 | End: 2024-07-28 | Stop reason: HOSPADM

## 2024-07-17 RX ORDER — POLYETHYLENE GLYCOL 3350 17 G/17G
17 POWDER, FOR SOLUTION ORAL DAILY PRN
Status: DISCONTINUED | OUTPATIENT
Start: 2024-07-17 | End: 2024-07-28 | Stop reason: HOSPADM

## 2024-07-17 RX ORDER — ONDANSETRON HYDROCHLORIDE 8 MG/1
8 TABLET, FILM COATED ORAL EVERY 8 HOURS PRN
Status: DISCONTINUED | OUTPATIENT
Start: 2024-07-17 | End: 2024-07-28 | Stop reason: HOSPADM

## 2024-07-17 RX ORDER — MONTELUKAST SODIUM 10 MG/1
10 TABLET ORAL DAILY
Status: DISCONTINUED | OUTPATIENT
Start: 2024-07-18 | End: 2024-07-28 | Stop reason: HOSPADM

## 2024-07-17 RX ORDER — SODIUM CHLORIDE 9 MG/ML
100 INJECTION, SOLUTION INTRAVENOUS CONTINUOUS
Status: DISCONTINUED | OUTPATIENT
Start: 2024-07-17 | End: 2024-07-17 | Stop reason: HOSPADM

## 2024-07-17 RX ORDER — ALBUTEROL SULFATE 0.83 MG/ML
3 SOLUTION RESPIRATORY (INHALATION) AS NEEDED
Status: DISCONTINUED | OUTPATIENT
Start: 2024-07-17 | End: 2024-07-17 | Stop reason: HOSPADM

## 2024-07-17 RX ORDER — CETIRIZINE HYDROCHLORIDE 10 MG/1
10 TABLET ORAL DAILY
Status: DISCONTINUED | OUTPATIENT
Start: 2024-07-18 | End: 2024-07-17

## 2024-07-17 RX ORDER — DIPHENHYDRAMINE HYDROCHLORIDE 50 MG/ML
50 INJECTION INTRAMUSCULAR; INTRAVENOUS AS NEEDED
Status: DISCONTINUED | OUTPATIENT
Start: 2024-07-17 | End: 2024-07-17 | Stop reason: HOSPADM

## 2024-07-17 ASSESSMENT — COGNITIVE AND FUNCTIONAL STATUS - GENERAL
MOBILITY SCORE: 24
DAILY ACTIVITIY SCORE: 24

## 2024-07-17 ASSESSMENT — PAIN SCALES - GENERAL
PAINLEVEL: 0-NO PAIN
PAINLEVEL_OUTOF10: 0 - NO PAIN

## 2024-07-17 ASSESSMENT — PAIN - FUNCTIONAL ASSESSMENT
PAIN_FUNCTIONAL_ASSESSMENT: 0-10

## 2024-07-17 NOTE — PROGRESS NOTES
Pt here dr D2C1 aza and blood transfusion. TLS labs were drawn for patient prior to coming to infusion. Labs resulted at an LDH of 6660, K of 5.0, creat at 1.74, uric acid at 6.0. All labs drawn were increased from yesterdays TLS draw and Ca dropped from 9.0 to 7.1. Notified Dr. Dawson, 1L of IVF bolus ordered and administered as well as 3mg of Rasburicase. Pt also received 1 unit of blood for a hgb of 5.0. Dr. Dawson would like patient to be directly admitted for TLS as indicted by labs and to hold aza injection today. Notified patient of plan of care. Started continuous IVF as ordered while waiting for bed

## 2024-07-17 NOTE — PROGRESS NOTES
Contacted by Maryanne Arenas in the lab, patient has critical hemoglobin of 5.0, platelets of 19. Notified Dr. Dawson and Kalia JHA via secure chat, message acknowledged. Patient in infusion at this time.

## 2024-07-17 NOTE — PROGRESS NOTES
"     Patient ID: Jin Reynolds is a 69 y.o. male.    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   8/23/2023 Initial Diagnosis    DX:  SMOLDERING SYSTEMIC MASTOCYTOSIS  Presented with skin rash and eosinophilia    [facility] CRITERIA  (For SM, Need major and 1 minor OR at least 3 minors)  - Multi-focal, dense infiltrates of MC (>= 15 mast cells in aggregates) in BM and/or other extra-cutaneous organs [major]  - In BM or extracutaneous organs, >25% MC spindle shaped OR have atypical morphology OR of all MC on BM aspirate smears, >25% are immature or atypical [minor]  - Detection of activating mutation KIT D816V in BM, PB, or other extracutaneous organ [minor]  - Serum tryptase persistently exceeds 20 ng/mL (unless associated clonal myeloid disorder, in which this paramenter is not valid) [minor]    \"B\" FINDINGS  - BM biopsy showing >30% infiltration (focal, dense aggregates) and/or serum tryptase > 200 ng/mL  - Signs of dysplasia or myeloproliferation, in non-MC lineages but insufficient criteria for definitive diagnosis of AHNMD with normal or slightly abnormal blood counts    \"C\" FINDINGS  None       8/23/2023 Biopsy    BONE MARROW (8/23/23)  Hypercellular (90-95%) with trilineage hematopoiesis, granulocytic hyperplasia, eosinophilia, and increased atypical mast cells  IP:  CD34+, +, CD7(bright)+, cCD3-, CD33-, CD15-, CD13+ blast population  IHC:  + increased spindle-shaped mast cells (15% cellularity), CD2-  Myeloid NGS:  CBL (31%), cKIT D816V (30%), RUNX1 x 2 (19%, 29%), SRSF2 (47%) [ASXL1 negative]  FISH:  PDGFRb negative    SERUM TRYPTASE  228 (8/23/23)  268 (9/5/23)     2/13/2024 -  Molecular Therapy    AVAPRITINIB   - Starting dose 25 mg daily (non-Adv SM)     7/16/2024 -  Chemotherapy    Venetoclax / AzaCITIDine, 28 Day Cycles - Induction / Consolidation     Acute myeloid leukemia not having achieved " remission (Multi)   6/27/2024 Initial Diagnosis    [facility] 2022 DX: Acute myeloid leukemia, NOS (>=20% blasts)  [id number] AML Risk Stratification: INTERMEDIATE: Cytogenetic and/or molecular abnormalities not classified as favorable or adverse  Presented with pancytopenia and circulating blasts    BONE MARROW (06/27/2024)  Marrow replaced by blasts, fibrotic foci, some atypical + cells; 7-8% circulating blasts; aspicular  - Unable to perform additional studies due to aspicular specimen    PERIPHERAL BLOOD (6/27/2024)  FISH:  positive for gain RUNX1    PERIPHERAL BLOOD (7/1/24)  IP:  abnl myeloblast population (CD34+, CD7+, CD4+, CD13+, CD38+, CD11+ dim, HLA=DR+, TdT+)  Myeloid NGS: CBL C404Y (41%), KIT D816V (8%), RUNX1 x2 (8%, 30%), SRSF2 (37%)     7/16/2024 -  Chemotherapy    Venetoclax / AzaCITIDine, 28 Day Cycles - Induction / Consolidation          Assessment & Plan  Acute myeloid leukemia not having achieved remission (Multi)  Here for C1D2 Venetoclax and azacitidine.  Early TLS with initial treatment.  Will hold for now and manage TLS before proceeding.  Plan for IP admission.  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  IVF and rasburicase in clinic.  Hold AML rx.  PERRY (acute kidney injury) (CMS-HCC)  Interventions as above.       ______________________________________________________________________________________________________________________________________________    SUBJECTIVE     Here today for planned follow up.  Continues to feel more tired.  No other new health issues.  Denies fevers, chills, nausea, vomiting, diarrhea, dyspnea, rash, and pain.    OBJECTIVE     There were no vitals taken for this visit.     Physical Exam  Vitals reviewed.   Constitutional:       Appearance: Normal appearance.   Eyes:      Conjunctiva/sclera: Conjunctivae normal.      Pupils: Pupils are equal, round, and reactive to light.   Skin:      General: Skin is warm and dry.      Findings: No rash.   Psychiatric:         Mood and Affect: Mood normal.         Time Spent  Prep time on day of patient encounter: 7 minutes  Time spent directly with patient, family or caregiver: 17 minutes  Additional Time Spent on Patient Care Activities: 19 minutes  Documentation Time: 5 minutes  Other Time Spent: 0 minutes  Total: 48 minutes        [facility] BMT POST TRANSPLANT

## 2024-07-17 NOTE — ASSESSMENT & PLAN NOTE
Here for C1D2 Venetoclax and azacitidine.  Early TLS with initial treatment.  Will hold for now and manage TLS before proceeding.  Plan for IP admission.

## 2024-07-18 ENCOUNTER — APPOINTMENT (OUTPATIENT)
Dept: HEMATOLOGY/ONCOLOGY | Facility: HOSPITAL | Age: 69
End: 2024-07-18
Payer: MEDICARE

## 2024-07-18 PROBLEM — N17.9 AKI (ACUTE KIDNEY INJURY) (CMS-HCC): Status: ACTIVE | Noted: 2024-07-18

## 2024-07-18 PROBLEM — J30.9 ALLERGIC RHINITIS: Status: RESOLVED | Noted: 2023-05-30 | Resolved: 2024-07-18

## 2024-07-18 PROBLEM — K22.11 ESOPHAGEAL ULCER WITH BLEEDING: Status: RESOLVED | Noted: 2024-01-01 | Resolved: 2024-07-18

## 2024-07-18 PROBLEM — L91.8 CUTANEOUS SKIN TAGS: Status: RESOLVED | Noted: 2023-05-30 | Resolved: 2024-07-18

## 2024-07-18 PROBLEM — E88.3 TUMOR LYSIS SYNDROME FOLLOWING ANTINEOPLASTIC DRUG THERAPY (HHS-HCC): Status: ACTIVE | Noted: 2024-07-18

## 2024-07-18 PROBLEM — K42.9 UMBILICAL HERNIA: Status: RESOLVED | Noted: 2023-05-30 | Resolved: 2024-07-18

## 2024-07-18 PROBLEM — T45.1X5A TUMOR LYSIS SYNDROME FOLLOWING ANTINEOPLASTIC DRUG THERAPY (HHS-HCC): Status: ACTIVE | Noted: 2024-07-18

## 2024-07-18 PROBLEM — T80.911A DELAYED HEMOLYTIC TRANSFUSION REACTION: Status: ACTIVE | Noted: 2024-07-18

## 2024-07-18 LAB
ABO GROUP (TYPE) IN BLOOD: NORMAL
ALBUMIN SERPL BCP-MCNC: 3.6 G/DL (ref 3.4–5)
ALBUMIN SERPL BCP-MCNC: 3.6 G/DL (ref 3.4–5)
ALBUMIN SERPL BCP-MCNC: 3.9 G/DL (ref 3.4–5)
ALBUMIN SERPL BCP-MCNC: 3.9 G/DL (ref 3.4–5)
ALP SERPL-CCNC: 79 U/L (ref 33–136)
ALT SERPL W P-5'-P-CCNC: 6 U/L (ref 10–52)
ANION GAP SERPL CALC-SCNC: 10 MMOL/L (ref 10–20)
ANION GAP SERPL CALC-SCNC: 11 MMOL/L (ref 10–20)
ANION GAP SERPL CALC-SCNC: 14 MMOL/L (ref 10–20)
ANTIBODY SCREEN: NORMAL
APTT PPP: 49 SECONDS (ref 27–38)
AST SERPL W P-5'-P-CCNC: 70 U/L (ref 9–39)
BASOPHILS # BLD MANUAL: 0.01 X10*3/UL (ref 0–0.1)
BASOPHILS NFR BLD MANUAL: 0.9 %
BB ANTIBODY IDENTIFICATION: NORMAL
BILIRUB DIRECT SERPL-MCNC: 0.4 MG/DL (ref 0–0.3)
BILIRUB SERPL-MCNC: 1.7 MG/DL (ref 0–1.2)
BLOOD EXPIRATION DATE: NORMAL
BLOOD EXPIRATION DATE: NORMAL
BUN SERPL-MCNC: 47 MG/DL (ref 6–23)
BUN SERPL-MCNC: 49 MG/DL (ref 6–23)
BUN SERPL-MCNC: 51 MG/DL (ref 6–23)
BURR CELLS BLD QL SMEAR: ABNORMAL
CALCIUM SERPL-MCNC: 5.8 MG/DL (ref 8.6–10.6)
CALCIUM SERPL-MCNC: 6.2 MG/DL (ref 8.6–10.6)
CALCIUM SERPL-MCNC: 6.4 MG/DL (ref 8.6–10.6)
CASE #: NORMAL
CHLORIDE SERPL-SCNC: 106 MMOL/L (ref 98–107)
CO2 SERPL-SCNC: 16 MMOL/L (ref 21–32)
CO2 SERPL-SCNC: 17 MMOL/L (ref 21–32)
CO2 SERPL-SCNC: 19 MMOL/L (ref 21–32)
CREAT SERPL-MCNC: 2.67 MG/DL (ref 0.5–1.3)
CREAT SERPL-MCNC: 2.68 MG/DL (ref 0.5–1.3)
CREAT SERPL-MCNC: 2.88 MG/DL (ref 0.5–1.3)
DISPENSE STATUS: NORMAL
DISPENSE STATUS: NORMAL
EGFRCR SERPLBLD CKD-EPI 2021: 23 ML/MIN/1.73M*2
EGFRCR SERPLBLD CKD-EPI 2021: 25 ML/MIN/1.73M*2
EGFRCR SERPLBLD CKD-EPI 2021: 25 ML/MIN/1.73M*2
EOSINOPHIL # BLD MANUAL: 0 X10*3/UL (ref 0–0.7)
EOSINOPHIL NFR BLD MANUAL: 0 %
ERYTHROCYTE [DISTWIDTH] IN BLOOD BY AUTOMATED COUNT: 16.6 % (ref 11.5–14.5)
FIBRINOGEN PPP-MCNC: 425 MG/DL (ref 200–400)
G6PD RBC QL: NORMAL
GLUCOSE SERPL-MCNC: 100 MG/DL (ref 74–99)
GLUCOSE SERPL-MCNC: 136 MG/DL (ref 74–99)
GLUCOSE SERPL-MCNC: 99 MG/DL (ref 74–99)
HCT VFR BLD AUTO: 16.5 % (ref 41–52)
HGB BLD-MCNC: 5.5 G/DL (ref 13.5–17.5)
HLA CLS I TYP PNL BLD/T DONR HIGH RES: NORMAL
HLA RESULTS: NORMAL
HLA-DP2 QL: NORMAL
HLA-DQB1 HIGH RES: NORMAL
HLA-DRB1 HIGH RES: NORMAL
IMM GRANULOCYTES # BLD AUTO: 0.01 X10*3/UL (ref 0–0.7)
IMM GRANULOCYTES NFR BLD AUTO: 0.9 % (ref 0–0.9)
INR PPP: 1.6 (ref 0.9–1.1)
LDH SERPL L TO P-CCNC: 2974 U/L (ref 84–246)
LDH SERPL L TO P-CCNC: 3436 U/L (ref 84–246)
LDH SERPL L TO P-CCNC: 4478 U/L (ref 84–246)
LYMPHOCYTES # BLD MANUAL: 0.39 X10*3/UL (ref 1.2–4.8)
LYMPHOCYTES NFR BLD MANUAL: 35.7 %
MCH RBC QN AUTO: 29.4 PG (ref 26–34)
MCHC RBC AUTO-ENTMCNC: 33.3 G/DL (ref 32–36)
MCV RBC AUTO: 88 FL (ref 80–100)
MONOCYTES # BLD MANUAL: 0.11 X10*3/UL (ref 0.1–1)
MONOCYTES NFR BLD MANUAL: 9.8 %
NEUTS SEG # BLD MANUAL: 0.59 X10*3/UL (ref 1.2–7)
NEUTS SEG NFR BLD MANUAL: 53.6 %
NRBC BLD-RTO: 0 /100 WBCS (ref 0–0)
PHOSPHATE SERPL-MCNC: 3.2 MG/DL (ref 2.5–4.9)
PHOSPHATE SERPL-MCNC: 3.9 MG/DL (ref 2.5–4.9)
PHOSPHATE SERPL-MCNC: 5 MG/DL (ref 2.5–4.9)
PLATELET # BLD AUTO: 20 X10*3/UL (ref 150–450)
POTASSIUM SERPL-SCNC: 3.6 MMOL/L (ref 3.5–5.3)
POTASSIUM SERPL-SCNC: 4 MMOL/L (ref 3.5–5.3)
POTASSIUM SERPL-SCNC: 4.2 MMOL/L (ref 3.5–5.3)
PRODUCT BLOOD TYPE: 5100
PRODUCT BLOOD TYPE: 9500
PRODUCT CODE: NORMAL
PRODUCT CODE: NORMAL
PROT SERPL-MCNC: 7.4 G/DL (ref 6.4–8.2)
PROTHROMBIN TIME: 17.7 SECONDS (ref 9.8–12.8)
RBC # BLD AUTO: 1.87 X10*6/UL (ref 4.5–5.9)
RBC MORPH BLD: ABNORMAL
RH FACTOR (ANTIGEN D): NORMAL
SODIUM SERPL-SCNC: 130 MMOL/L (ref 136–145)
SODIUM SERPL-SCNC: 131 MMOL/L (ref 136–145)
SODIUM SERPL-SCNC: 132 MMOL/L (ref 136–145)
TOTAL CELLS COUNTED BLD: 112
UNIT ABO: NORMAL
UNIT ABO: NORMAL
UNIT NUMBER: NORMAL
UNIT NUMBER: NORMAL
UNIT RH: NORMAL
UNIT RH: NORMAL
UNIT VOLUME: 264
UNIT VOLUME: 350
URATE SERPL-MCNC: 4.3 MG/DL (ref 4–7.5)
URATE SERPL-MCNC: 4.8 MG/DL (ref 4–7.5)
URATE SERPL-MCNC: 5.3 MG/DL (ref 4–7.5)
WBC # BLD AUTO: 1.1 X10*3/UL (ref 4.4–11.3)
XM INTEP: NORMAL
XM INTEP: NORMAL

## 2024-07-18 PROCEDURE — 2500000004 HC RX 250 GENERAL PHARMACY W/ HCPCS (ALT 636 FOR OP/ED)

## 2024-07-18 PROCEDURE — 99233 SBSQ HOSP IP/OBS HIGH 50: CPT | Performed by: INTERNAL MEDICINE

## 2024-07-18 PROCEDURE — 80069 RENAL FUNCTION PANEL: CPT | Mod: CCI

## 2024-07-18 PROCEDURE — 86870 RBC ANTIBODY IDENTIFICATION: CPT

## 2024-07-18 PROCEDURE — 85007 BL SMEAR W/DIFF WBC COUNT: CPT

## 2024-07-18 PROCEDURE — 85384 FIBRINOGEN ACTIVITY: CPT

## 2024-07-18 PROCEDURE — 82040 ASSAY OF SERUM ALBUMIN: CPT

## 2024-07-18 PROCEDURE — 1170000001 HC PRIVATE ONCOLOGY ROOM DAILY

## 2024-07-18 PROCEDURE — 85610 PROTHROMBIN TIME: CPT

## 2024-07-18 PROCEDURE — 36415 COLL VENOUS BLD VENIPUNCTURE: CPT

## 2024-07-18 PROCEDURE — 84550 ASSAY OF BLOOD/URIC ACID: CPT

## 2024-07-18 PROCEDURE — P9040 RBC LEUKOREDUCED IRRADIATED: HCPCS

## 2024-07-18 PROCEDURE — 85027 COMPLETE CBC AUTOMATED: CPT

## 2024-07-18 PROCEDURE — 99001 SPECIMEN HANDLING PT-LAB: CPT | Mod: OUT | Performed by: INTERNAL MEDICINE

## 2024-07-18 PROCEDURE — 82248 BILIRUBIN DIRECT: CPT

## 2024-07-18 PROCEDURE — 83010 ASSAY OF HAPTOGLOBIN QUANT: CPT | Performed by: INTERNAL MEDICINE

## 2024-07-18 PROCEDURE — 83615 LACTATE (LD) (LDH) ENZYME: CPT

## 2024-07-18 PROCEDURE — 84100 ASSAY OF PHOSPHORUS: CPT

## 2024-07-18 PROCEDURE — 2500000004 HC RX 250 GENERAL PHARMACY W/ HCPCS (ALT 636 FOR OP/ED): Performed by: NURSE PRACTITIONER

## 2024-07-18 PROCEDURE — 86901 BLOOD TYPING SEROLOGIC RH(D): CPT

## 2024-07-18 PROCEDURE — 2500000001 HC RX 250 WO HCPCS SELF ADMINISTERED DRUGS (ALT 637 FOR MEDICARE OP)

## 2024-07-18 PROCEDURE — 86922 COMPATIBILITY TEST ANTIGLOB: CPT

## 2024-07-18 PROCEDURE — 36430 TRANSFUSION BLD/BLD COMPNT: CPT

## 2024-07-18 PROCEDURE — 82960 TEST FOR G6PD ENZYME: CPT

## 2024-07-18 RX ORDER — FAMOTIDINE 20 MG/1
20 TABLET, FILM COATED ORAL DAILY
COMMUNITY

## 2024-07-18 ASSESSMENT — COGNITIVE AND FUNCTIONAL STATUS - GENERAL
MOBILITY SCORE: 24
DAILY ACTIVITIY SCORE: 24
DAILY ACTIVITIY SCORE: 24
MOBILITY SCORE: 24

## 2024-07-18 ASSESSMENT — PAIN SCALES - GENERAL
PAINLEVEL_OUTOF10: 0 - NO PAIN

## 2024-07-18 ASSESSMENT — PAIN - FUNCTIONAL ASSESSMENT
PAIN_FUNCTIONAL_ASSESSMENT: 0-10
PAIN_FUNCTIONAL_ASSESSMENT: 0-10

## 2024-07-18 NOTE — PROGRESS NOTES
Jin Reynolds is a 69 y.o. 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    Subjective   Patient is bed resting well.   He states he is feeling a bit better  today.  His appetite if fair.  He denies any N/V or diarrhea.   Patient remains stable .        Objective     Physical Exam  Constitutional:       Appearance: Normal appearance.   HENT:      Head: Normocephalic.      Nose: Nose normal.      Mouth/Throat:      Mouth: Mucous membranes are moist.      Pharynx: Oropharynx is clear.   Eyes:      Pupils: Pupils are equal, round, and reactive to light.   Cardiovascular:      Rate and Rhythm: Normal rate.      Pulses: Normal pulses.      Heart sounds: Normal heart sounds.   Pulmonary:      Effort: Pulmonary effort is normal.      Breath sounds: Normal breath sounds.   Abdominal:      General: Bowel sounds are normal.      Palpations: Abdomen is soft.   Musculoskeletal:         General: Normal range of motion.      Cervical back: Normal range of motion.   Skin:     General: Skin is warm and dry.      Capillary Refill: Capillary refill takes less than 2 seconds.   Neurological:      General: No focal deficit present.      Mental Status: He is alert and oriented to person, place, and time.   Psychiatric:         Mood and Affect: Mood normal.         Behavior: Behavior normal.         Last Recorded Vitals  Blood pressure 133/76, pulse 76, temperature 36.5 °C (97.7 °F), temperature source Temporal, resp. rate 18, weight 88.4 kg (194 lb 14.2 oz), SpO2 94%.  Intake/Output last 3 Shifts:  I/O last 3 completed shifts:  In: 350 [Blood:350]  Out: -     Relevant Results         Scheduled medications  allopurinol, 300 mg, oral, Daily  amLODIPine, 10 mg, oral, Daily  cholecalciferol, 800 Units, oral, Daily  montelukast, 10 mg, oral, Daily  pantoprazole, 40 mg, oral, Daily before breakfast      Continuous medications  sodium chloride 0.9%, 150 mL/hr, Last Rate: 150 mL/hr (07/18/24  3576)      PRN medications  PRN medications: alteplase, ondansetron, polyethylene glycol, prochlorperazine       Results for orders placed or performed during the hospital encounter of 07/17/24 (from the past 24 hour(s))   Prepare RBC: 1 Units, Irradiated, Leukocytes Reduced (CMV reduced risk)   Result Value Ref Range    PRODUCT CODE C6602O44     Unit Number [id number]     Unit ABO O     Unit RH POS     XM INTEP COMP     Dispense Status TR     Blood Expiration Date 7/22/2024 11:59:00 PM EDT     PRODUCT BLOOD TYPE 5100     UNIT VOLUME 350    Renal function panel   Result Value Ref Range    Glucose 100 (H) 74 - 99 mg/dL    Sodium 132 (L) 136 - 145 mmol/L    Potassium 4.2 3.5 - 5.3 mmol/L    Chloride 106 98 - 107 mmol/L    Bicarbonate 16 (L) 21 - 32 mmol/L    Anion Gap 14 10 - 20 mmol/L    Urea Nitrogen 51 (H) 6 - 23 mg/dL    Creatinine 2.88 (H) 0.50 - 1.30 mg/dL    eGFR 23 (L) >60 mL/min/1.73m*2    Calcium 6.4 (L) 8.6 - 10.6 mg/dL    Phosphorus 5.0 (H) 2.5 - 4.9 mg/dL    Albumin 3.9 3.4 - 5.0 g/dL   Uric Acid   Result Value Ref Range    Uric Acid 5.3 4.0 - 7.5 mg/dL   Lactate dehydrogenase   Result Value Ref Range    LDH 4,478 (H) 84 - 246 U/L   CBC and Auto Differential   Result Value Ref Range    WBC 1.1 (L) 4.4 - 11.3 x10*3/uL    nRBC 0.0 0.0 - 0.0 /100 WBCs    RBC 1.87 (L) 4.50 - 5.90 x10*6/uL    Hemoglobin 5.5 (LL) 13.5 - 17.5 g/dL    Hematocrit 16.5 (L) 41.0 - 52.0 %    MCV 88 80 - 100 fL    MCH 29.4 26.0 - 34.0 pg    MCHC 33.3 32.0 - 36.0 g/dL    RDW 16.6 (H) 11.5 - 14.5 %    Platelets 20 (LL) 150 - 450 x10*3/uL    Immature Granulocytes %, Automated 0.9 0.0 - 0.9 %    Immature Granulocytes Absolute, Automated 0.01 0.00 - 0.70 x10*3/uL   Hepatic function panel   Result Value Ref Range    Albumin 3.9 3.4 - 5.0 g/dL    Bilirubin, Total 1.7 (H) 0.0 - 1.2 mg/dL    Bilirubin, Direct 0.4 (H) 0.0 - 0.3 mg/dL    Alkaline Phosphatase 79 33 - 136 U/L    ALT 6 (L) 10 - 52 U/L    AST 70 (H) 9 - 39 U/L    Total Protein  7.4 6.4 - 8.2 g/dL   Coagulation Screen   Result Value Ref Range    Protime 17.7 (H) 9.8 - 12.8 seconds    INR 1.6 (H) 0.9 - 1.1    aPTT 49 (H) 27 - 38 seconds   Fibrinogen   Result Value Ref Range    Fibrinogen 425 (H) 200 - 400 mg/dL   Type and screen   Result Value Ref Range    ABO TYPE O     Rh TYPE POS     ANTIBODY SCREEN POS    Glucose 6 Phosphate Dehydrogenase Screen   Result Value Ref Range    G6PD, Qual Normal Normal   Manual Differential   Result Value Ref Range    Neutrophils %, Manual 53.6 40.0 - 80.0 %    Lymphocytes %, Manual 35.7 13.0 - 44.0 %    Monocytes %, Manual 9.8 2.0 - 10.0 %    Eosinophils %, Manual 0.0 0.0 - 6.0 %    Basophils %, Manual 0.9 0.0 - 2.0 %    Seg Neutrophils Absolute, Manual 0.59 (L) 1.20 - 7.00 x10*3/uL    Lymphocytes Absolute, Manual 0.39 (L) 1.20 - 4.80 x10*3/uL    Monocytes Absolute, Manual 0.11 0.10 - 1.00 x10*3/uL    Eosinophils Absolute, Manual 0.00 0.00 - 0.70 x10*3/uL    Basophils Absolute, Manual 0.01 0.00 - 0.10 x10*3/uL    Total Cells Counted 112     RBC Morphology See Below     Cayey Cells Many    BB ORDER ONLY - Antibody Identification   Result Value Ref Range    Antibody ID ANTI-E     CASE # [id number]    Prepare RBC: 1 Units, Irradiated, Leukocytes Reduced (CMV reduced risk)   Result Value Ref Range    PRODUCT CODE S0724S58     Unit Number [id number]     Unit ABO O     Unit RH NEG     XM INTEP COMP     Dispense Status IS     Blood Expiration Date 7/23/2024 11:59:00 PM EDT     PRODUCT BLOOD TYPE 9500     UNIT VOLUME 264                       Assessment/Plan   Principal Problem:    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 male with PMHx of AML, esophageal ulcer w/ GI bleed, smoldering systemic mastocytosis, and HTN presented to infusion center for C1D2 for Jeremy/Aza but ab work revealed increased Cr 1.74, phosph 5.8, uric acid 6, and LDH 6,660, showing evidence of TLS so aza/Jeremy were held. He was started on IVF and  rasburicase. He was admitted to [facility] for further evaluation and treatment.     ONC:  #Smoldering systemic Mastocytosis (Dx 8/2023)  - presented with skin rash and eosinophilia  - BMBx (8/23/23): hypercellular (90-95%) with trilineage hematopoiesis, granulocytic hyperplasia, eosinophilia, and increased atypical mast cells.  - c/w allegra (will have someone bring his home supply since its not on formulary)  - c/w home montelukast  #AML  - Presented with worsening anemia in setting of mast cell disease  - (6/24/24): peripheral blood path read abnormal lymphocytes with possible blasts.  - BMBx (6/27/24): Acute Leukemia arising in background of systemic mastocytosis; 7-8% blast on peripheral blood; + CD34+, CD7+, TDT+  - Started induction w/ C1 Aza/Jeremy (7/16/24)  - (7/17) C1D2 was put on hold due to TLS     CHEMO:  28-day cycle for 1 cycle   Venetoclax 100 mg PO on Day 1 if tolerated followed by   Venetoclax 200 mg PO on Day 2 if tolerated followed by   Venetoclax 400 mg PO on Days 3 - 28   AzaCITIDine 75 mg/m2 IV over 10 - 40 minutes or subcutaneous daily on Days 1 - 7      TLS Labs:  - LDH (7/17) 6,660 s/p Rasburicase  - Uric Acid (7/17) 6    - c/w Allopurinol 300 mg daily  - Phosph (7/17) 5.8  - Potassium (7/17) 5  - c/w IVF 100mL/hr, increased  to 150cc/hr   - monitoring q8hrs      HEME:  #Anemia and Thrombocytopenia   - Likely secondary to disease  - Hgb 5 (7/17) on admission; s/p 1u pRBCs (7/17),   1 unit Prbc  7/18   - plt 19 (7/17) on admission  - G6PD(7/17) Normal   - no evidence of bleeding   - Monitor counts daily  - Keep Hgb >7, plt >10     ID:  NKDA  #ppx: allopurinol  - Afebrile, no evidence of active infection on admit     FEN/GI:  Admit Wt: 86.2 kg  - sCr 1.74 on admission; Continue IVF 100mL/hr, renally dose meds, increased to 150cc/ml , Creat  increased to 2.88  - Monitor electrolytes, replace as needed  - c/w home pantoprazole     CARDIAC:  #HTN  - c/w home amlodipine  - Echo (6/14): EF  55-60%  - Onco Echo to be ordered in the AM     DISPO:  - Full Code   - Access: PIV  - Primary Oncologist: Dr. Dawson  - PT/OT eval     Patient seen and discussed with ROSA Bear-CNP

## 2024-07-18 NOTE — PROGRESS NOTES
Pharmacy Medication History Review    Jin Reynolds is a 69 y.o. 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 Pharmacy reviewed the patient's [id number] medications and allergies for accuracy.    The list below reflects the updated PTA list.   Comments regarding how patient may be taking medications differently can be found in the Admit Orders Activity  Prior to Admission Medications   Prescriptions Last Dose Informant   allopurinol (Zyloprim) 300 mg tablet 7/17/2024 Self   Sig: Take 1 tablet (300 mg) by mouth once daily for 14 days.   amLODIPine (Norvasc) 10 mg tablet 7/17/2024 Self   Sig: TAKE 1 TABLET BY MOUTH EVERY DAY   cholecalciferol (Vitamin D-3) 10 MCG (400 UNIT) tablet 7/17/2024 Self   Sig: Take 2 tablets (20 mcg) by mouth once daily.   famotidine (Pepcid) 20 mg tablet  Self   Sig: Take 1 tablet (20 mg) by mouth once daily.   fexofenadine (Allegra) 180 mg tablet 7/17/2024 Self   Sig: Take 1 tablet (180 mg) by mouth once daily.   montelukast (Singulair) 10 mg tablet 7/17/2024 Self   Sig: Take 1 tablet (10 mg) by mouth once daily.   ondansetron (Zofran) 8 mg tablet Unknown Self   Sig: Take 1 tablet (8 mg) by mouth every 8 hours if needed for nausea or vomiting.   pantoprazole (ProtoNix) 40 mg EC tablet 7/17/2024 Self   Sig: Take 1 tablet (40 mg) by mouth once daily in the morning. Take before meals. Do not crush, chew, or split.   prochlorperazine (Compazine) 10 mg tablet Unknown Self   Sig: Take 1 tablet (10 mg) by mouth every 6 hours if needed for nausea or vomiting.   venetoclax (Venclexta) 100 mg tablet     Sig: Take 1 tablet (100 mg total) by mouth once daily in the evening for 1 day, THEN 2 tablets (200 mg total) once daily in the evening for 1 day, THEN 4 tablets (400 mg total) once daily in the evening for 26 days. Take with food..      Facility-Administered Medications: None        The list below reflects the updated allergy list. Please review each documented allergy  "for additional clarification and justification.  Allergies  Reviewed by Shirley Mancilla, Santy on 7/18/2024   No Known Allergies         Patient declines M2B at discharge. Patient uses [facility] in [place].     Sources:   Outpatient fill history, OARRS, and patient interview  Hem onc office visit 7/9/24    Additional Comments:  Patient was a good historian. He mentioned he takes famotidine 20 mg in the evening.       Shirley Mancilla PharmD  Transitions of Care Pharmacist  07/18/24     Secure Chat preferred   If no response call [phone] or Vocera \"Med Rec\"   "

## 2024-07-18 NOTE — CARE PLAN
Problem: Skin  Goal: Decreased wound size/increased tissue granulation at next dressing change  Outcome: Progressing  Goal: Participates in plan/prevention/treatment measures  Outcome: Progressing  Goal: Prevent/manage excess moisture  Outcome: Progressing  Goal: Prevent/minimize sheer/friction injuries  Outcome: Progressing  Goal: Promote/optimize nutrition  Outcome: Progressing  Goal: Promote skin healing  Outcome: Progressing     Problem: Fall/Injury  Goal: Not fall by end of shift  Outcome: Progressing  Goal: Be free from injury by end of the shift  Outcome: Progressing  Goal: Verbalize understanding of personal risk factors for fall in the hospital  Outcome: Progressing  Goal: Verbalize understanding of risk factor reduction measures to prevent injury from fall in the home  Outcome: Progressing  Goal: Use assistive devices by end of the shift  Outcome: Progressing  Goal: Pace activities to prevent fatigue by end of the shift  Outcome: Progressing     Problem: Respiratory  Goal: Clear secretions with interventions this shift  Outcome: Progressing  Goal: Minimize anxiety/maximize coping throughout shift  Outcome: Progressing  Goal: Minimal/no exertional discomfort or dyspnea this shift  Outcome: Progressing  Goal: No signs of respiratory distress (eg. Use of accessory muscles. Peds grunting)  Outcome: Progressing  Goal: Patent airway maintained this shift  Outcome: Progressing  Goal: Tolerate mechanical ventilation evidenced by VS/agitation level this shift  Outcome: Progressing  Goal: Tolerate pulmonary toileting this shift  Outcome: Progressing  Goal: Verbalize decreased shortness of breath this shift  Outcome: Progressing  Goal: Wean oxygen to maintain O2 saturation per order/standard this shift  Outcome: Progressing  Goal: Increase self care and/or family involvement in next 24 hours  Outcome: Progressing       The clinical goals for the shift include Pt will remain hemodynamically stable and remain free of  falls and injury during shift

## 2024-07-18 NOTE — CARE PLAN
Received patient in bed with no signs of distress or complaint's of pain.  Pt admitted 7/17 for monitoring of TLS. Pt Axo4, VSS and on RA.  Pt receiving continuous fluids NS @ 100 ml/hr. PIV intact.  Review of systems, intact/WNL.  Reviewed plan of care, lab results and medications to which patient agreed.  Pt received 1 unit prbc before admission and will receive additional unit during shift.  Safety precautions in place.  Will continue to monitor.

## 2024-07-18 NOTE — H&P
History Of Present Illness  Jin Reynolds is a 69 y.o. male with PMHx of AML and HTN presented to infusion center for C1D2 for Jeremy/Aza but ab work revealed increased Cr 1.74, phosph 5.8, uric acid 6, and LDH 6,660, showing evidence of TLS so aza/Jeremy were held. He was started on IVF and rasburicase. He was admitted to [facility] for further evaluation and treatment.    Patient states last night he was having some nausea so wasn't able to get much sleep. He took his zofran and the nausea improved. He also began to have some diarrhea last night. His stools are more loose and he has gone 2x/day beginning yesterday. He denies any melena or hematochezia.    Patient denies sick contacts, dizziness, lightheadedness, headaches, visual changes, mouth sores, dysphagia, odynophagia, vomiting, chest pain, SOB, FRANCO, cough, wheezing, abdominal pain, constipation, urinary symptoms (urgency, frequency, burning), swelling of extremities, bruising, rashes, jaundice, weakness, numbness.     Past Medical History  He has a past medical history of Personal history of diseases of the skin and subcutaneous tissue.    Surgical History  He has no past surgical history on file.    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   8/23/2023 Initial Diagnosis    DX:  SMOLDERING SYSTEMIC MASTOCYTOSIS  Presented with skin rash and eosinophilia    BRENDON DIAGNOSTIC CRITERIA  (For SM, Need major and 1 minor OR at least 3 minors)  - Multi-focal, dense infiltrates of MC (>= 15 mast cells in aggregates) in BM and/or other extra-cutaneous organs [major]  - In BM or extracutaneous organs, >25% MC spindle shaped OR have atypical morphology OR of all MC on BM aspirate smears, >25% are immature or atypical [minor]  - Detection of activating mutation KIT D816V in BM, PB, or other extracutaneous organ [minor]  - Serum tryptase persistently exceeds 20 ng/mL (unless associated clonal myeloid disorder, in which  "this paramenter is not valid) [minor]    \"B\" FINDINGS  - BM biopsy showing >30% infiltration (focal, dense aggregates) and/or serum tryptase > 200 ng/mL  - Signs of dysplasia or myeloproliferation, in non-MC lineages but insufficient criteria for definitive diagnosis of AHNMD with normal or slightly abnormal blood counts    \"C\" FINDINGS  None       8/23/2023 Biopsy    BONE MARROW (8/23/23)  Hypercellular (90-95%) with trilineage hematopoiesis, granulocytic hyperplasia, eosinophilia, and increased atypical mast cells  IP:  CD34+, +, CD7(bright)+, cCD3-, CD33-, CD15-, CD13+ blast population  IHC:  + increased spindle-shaped mast cells (15% cellularity), CD2-  Myeloid NGS:  CBL (31%), cKIT D816V (30%), RUNX1 x 2 (19%, 29%), SRSF2 (47%) [ASXL1 negative]  FISH:  PDGFRb negative    SERUM TRYPTASE  228 (8/23/23)  268 (9/5/23)     2/13/2024 -  Molecular Therapy    AVAPRITINIB   - Starting dose 25 mg daily (non-Adv SM)     7/16/2024 -  Chemotherapy    Venetoclax / AzaCITIDine, 28 Day Cycles - Induction / Consolidation     Acute myeloid leukemia not having achieved remission (Multi)   6/27/2024 Initial Diagnosis    ICC 2022 DX: Acute myeloid leukemia, NOS (>=20% blasts)  [id number] AML Risk Stratification: INTERMEDIATE: Cytogenetic and/or molecular abnormalities not classified as favorable or adverse  Presented with pancytopenia and circulating blasts    BONE MARROW (06/27/2024)  Marrow replaced by blasts, fibrotic foci, some atypical + cells; 7-8% circulating blasts; aspicular  - Unable to perform additional studies due to aspicular specimen    PERIPHERAL BLOOD (6/27/2024)  FISH:  positive for gain RUNX1    PERIPHERAL BLOOD (7/1/24)  IP:  abnl myeloblast population (CD34+, CD7+, CD4+, CD13+, CD38+, CD11+ dim, HLA=DR+, TdT+)  Myeloid NGS: CBL C404Y (41%), KIT D816V (8%), RUNX1 x2 (8%, 30%), SRSF2 (37%)     7/16/2024 -  Chemotherapy    Venetoclax / AzaCITIDine, 28 Day Cycles - Induction / Consolidation        "   Social History  He reports that he has never smoked. He has never used smokeless tobacco. He reports current drug use. Drug: Marijuana. He reports that he does not drink alcohol.     Allergies  Patient has no known allergies.    Review of Systems   All other systems reviewed and are negative.       Physical Exam  Vitals reviewed.   Constitutional:       General: He is not in acute distress.     Appearance: Normal appearance. He is not ill-appearing.   HENT:      Head: Normocephalic and atraumatic.      Nose: Nose normal.      Mouth/Throat:      Mouth: Mucous membranes are moist.   Eyes:      General: No scleral icterus.     Extraocular Movements: Extraocular movements intact.      Conjunctiva/sclera: Conjunctivae normal.      Pupils: Pupils are equal, round, and reactive to light.   Cardiovascular:      Rate and Rhythm: Normal rate and regular rhythm.      Pulses: Normal pulses.      Heart sounds: Normal heart sounds.   Pulmonary:      Effort: Pulmonary effort is normal. No respiratory distress.      Breath sounds: Normal breath sounds. No wheezing.   Abdominal:      General: Abdomen is flat. Bowel sounds are normal. There is no distension.      Palpations: Abdomen is soft. There is no mass.      Tenderness: There is no abdominal tenderness.   Musculoskeletal:         General: No swelling or tenderness. Normal range of motion.      Cervical back: Normal range of motion.      Right lower leg: No edema.      Left lower leg: No edema.   Skin:     General: Skin is warm.      Coloration: Skin is not jaundiced or pale.      Findings: No bruising.   Neurological:      General: No focal deficit present.      Mental Status: He is alert and oriented to person, place, and time.      Cranial Nerves: No cranial nerve deficit.      Sensory: No sensory deficit.      Motor: No weakness.   Psychiatric:         Mood and Affect: Mood normal.         Behavior: Behavior normal.         Thought Content: Thought content normal.          Judgment: Judgment normal.          Last Recorded Vitals  Blood pressure 115/67, pulse 78, temperature 36.7 °C (98.1 °F), temperature source Temporal, resp. rate 16, SpO2 98%.    Relevant Results  Scheduled medications  allopurinol, 300 mg, oral, Daily  amLODIPine, 10 mg, oral, Daily  [START ON 7/18/2024] cetirizine, 10 mg, oral, Daily  [START ON 7/18/2024] cholecalciferol, 800 Units, oral, Daily  [START ON 7/18/2024] montelukast, 10 mg, oral, Daily  [START ON 7/18/2024] pantoprazole, 40 mg, oral, Daily before breakfast      Continuous medications  sodium chloride 0.9%, 100 mL/hr, Last Rate: 100 mL/hr (07/17/24 2121)      PRN medications  PRN medications: alteplase, ondansetron, polyethylene glycol, prochlorperazine  Results for orders placed or performed during the hospital encounter of 07/17/24 (from the past 24 hour(s))   Prepare RBC: 1 Units, Irradiated, Leukocytes Reduced (CMV reduced risk)   Result Value Ref Range    PRODUCT CODE D8438B84     Unit Number [id number]     Unit ABO O     Unit RH POS     XM INTEP COMP     Dispense Status IS     Blood Expiration Date 7/22/2024 11:59:00 PM EDT     PRODUCT BLOOD TYPE 5100     UNIT VOLUME 350      No results found.    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 male with PMHx of AML, esophageal ulcer w/ GI bleed, smoldering systemic mastocytosis, and HTN presented to infusion center for C1D2 for Jeremy/Aza but ab work revealed increased Cr 1.74, phosph 5.8, uric acid 6, and LDH 6,660, showing evidence of TLS so aza/Jeremy were held. He was started on IVF and rasburicase. He was admitted to [facility] for further evaluation and treatment.    ONC:  #Smoldering systemic Mastocytosis (Dx 8/2023)  - presented with skin rash and eosinophilia  - BMBx (8/23/23): hypercellular (90-95%) with trilineage hematopoiesis, granulocytic hyperplasia, eosinophilia, and increased atypical mast cells.  - c/w allegra (will  have someone bring his home supply since its not on formulary)  - c/w home montelukast  #AML  - Presented with worsening anemia in setting of mast cell disease  - (6/24/24): peripheral blood path read abnormal lymphocytes with possible blasts.  - BMBx (6/27/24): Acute Leukemia arising in background of systemic mastocytosis; 7-8% blast on peripheral blood; + CD34+, CD7+, TDT+  - Started induction w/ C1 Aza/Jeremy (7/16/24)  - (7/17) C1D2 was put on hold due to TLS    CHEMO:  28-day cycle for 1 cycle   Venetoclax 100 mg PO on Day 1 if tolerated followed by   Venetoclax 200 mg PO on Day 2 if tolerated followed by   Venetoclax 400 mg PO on Days 3 - 28   AzaCITIDine 75 mg/m2 IV over 10 - 40 minutes or subcutaneous daily on Days 1 - 7     TLS Labs:  - LDH (7/17) 6,660 s/p Rasburicase  - Uric Acid (7/17) 6    - c/w Allopurinol 300 mg daily  - Phosph (7/17) 5.8  - Potassium (7/17) 5  - c/w IVF 100mL/hr    HEME:  #Anemia and Thrombocytopenia   - Likely secondary to disease  - Hgb 5 (7/17) on admission; s/p 1u pRBCs (7/17)  - plt 19 (7/17) on admission  - G6PD(7/17) pending  - no evidence of bleeding   - Monitor counts daily  - Keep Hgb >7, plt >10    ID:  NKDA  #ppx: allopurinol  - Afebrile, no evidence of active infection on admit    FEN/GI:  Admit Wt: 86.2 kg  - sCr 1.74 on admission; Continue IVF 100mL/hr, renally dose meds  - Monitor electrolytes, replace as needed  - c/w home pantoprazole    CARDIAC:  #HTN  - c/w home amlodipine  - Echo (6/14): EF 55-60%  - Onco Echo to be ordered in the AM    DISPO:  - Full Code   - Access: PIV  - Primary Oncologist: Dr. Dawson  - PT/OT eval       I spent >60 minutes in the professional and overall care of this patient.      Nenita Love PA-C

## 2024-07-18 NOTE — CARE PLAN
The patient's goals for the shift include      The clinical goals for the shift include pt will remain safe and free from injury through shift    Problem: Skin  Goal: Promote skin healing  Outcome: Progressing     Problem: Fall/Injury  Goal: Not fall by end of shift  Outcome: Progressing     Problem: Fall/Injury  Goal: Be free from injury by end of the shift  Outcome: Progressing

## 2024-07-19 ENCOUNTER — APPOINTMENT (OUTPATIENT)
Dept: HEMATOLOGY/ONCOLOGY | Facility: HOSPITAL | Age: 69
End: 2024-07-19
Payer: MEDICARE

## 2024-07-19 LAB
ALBUMIN SERPL BCP-MCNC: 3.6 G/DL (ref 3.4–5)
ALBUMIN SERPL BCP-MCNC: 3.7 G/DL (ref 3.4–5)
ALBUMIN SERPL BCP-MCNC: 3.8 G/DL (ref 3.4–5)
ALBUMIN SERPL BCP-MCNC: 3.8 G/DL (ref 3.4–5)
ALP SERPL-CCNC: 73 U/L (ref 33–136)
ALT SERPL W P-5'-P-CCNC: 5 U/L (ref 10–52)
ANION GAP SERPL CALC-SCNC: 11 MMOL/L (ref 10–20)
ANION GAP SERPL CALC-SCNC: 12 MMOL/L (ref 10–20)
ANION GAP SERPL CALC-SCNC: 13 MMOL/L (ref 10–20)
APTT PPP: 40 SECONDS (ref 27–38)
AST SERPL W P-5'-P-CCNC: 27 U/L (ref 9–39)
BASOPHILS # BLD AUTO: 0 X10*3/UL (ref 0–0.1)
BASOPHILS NFR BLD AUTO: 0 %
BILIRUB DIRECT SERPL-MCNC: 0.6 MG/DL (ref 0–0.3)
BILIRUB SERPL-MCNC: 2.3 MG/DL (ref 0–1.2)
BLOOD EXPIRATION DATE: NORMAL
BUN SERPL-MCNC: 41 MG/DL (ref 6–23)
BUN SERPL-MCNC: 43 MG/DL (ref 6–23)
BUN SERPL-MCNC: 43 MG/DL (ref 6–23)
CA-I BLD-SCNC: 0.87 MMOL/L (ref 1.1–1.33)
CALCIUM SERPL-MCNC: 6.6 MG/DL (ref 8.6–10.6)
CALCIUM SERPL-MCNC: 6.6 MG/DL (ref 8.6–10.6)
CALCIUM SERPL-MCNC: 6.7 MG/DL (ref 8.6–10.6)
CHLORIDE SERPL-SCNC: 108 MMOL/L (ref 98–107)
CHLORIDE SERPL-SCNC: 109 MMOL/L (ref 98–107)
CHLORIDE SERPL-SCNC: 109 MMOL/L (ref 98–107)
CO2 SERPL-SCNC: 16 MMOL/L (ref 21–32)
CO2 SERPL-SCNC: 16 MMOL/L (ref 21–32)
CO2 SERPL-SCNC: 17 MMOL/L (ref 21–32)
CREAT SERPL-MCNC: 2.34 MG/DL (ref 0.5–1.3)
CREAT SERPL-MCNC: 2.5 MG/DL (ref 0.5–1.3)
CREAT SERPL-MCNC: 2.61 MG/DL (ref 0.5–1.3)
DISPENSE STATUS: NORMAL
EGFRCR SERPLBLD CKD-EPI 2021: 26 ML/MIN/1.73M*2
EGFRCR SERPLBLD CKD-EPI 2021: 27 ML/MIN/1.73M*2
EGFRCR SERPLBLD CKD-EPI 2021: 30 ML/MIN/1.73M*2
EOSINOPHIL # BLD AUTO: 0.2 X10*3/UL (ref 0–0.7)
EOSINOPHIL NFR BLD AUTO: 16.8 %
ERYTHROCYTE [DISTWIDTH] IN BLOOD BY AUTOMATED COUNT: 16.7 % (ref 11.5–14.5)
FIBRINOGEN PPP-MCNC: 367 MG/DL (ref 200–400)
GLUCOSE SERPL-MCNC: 100 MG/DL (ref 74–99)
GLUCOSE SERPL-MCNC: 182 MG/DL (ref 74–99)
GLUCOSE SERPL-MCNC: 98 MG/DL (ref 74–99)
HAPTOGLOB SERPL-MCNC: 22 MG/DL (ref 37–246)
HCT VFR BLD AUTO: 15.2 % (ref 41–52)
HGB BLD-MCNC: 5.1 G/DL (ref 13.5–17.5)
HLA RESULTS: NORMAL
HLA-A+B+C AB NFR SER: NORMAL %
HLA-DP+DQ+DR AB NFR SER: NORMAL %
IMM GRANULOCYTES # BLD AUTO: 0.01 X10*3/UL (ref 0–0.7)
IMM GRANULOCYTES NFR BLD AUTO: 0.8 % (ref 0–0.9)
INR PPP: 1.3 (ref 0.9–1.1)
LDH SERPL L TO P-CCNC: 2338 U/L (ref 84–246)
LDH SERPL L TO P-CCNC: 2508 U/L (ref 84–246)
LDH SERPL L TO P-CCNC: 2543 U/L (ref 84–246)
LYMPHOCYTES # BLD AUTO: 0.54 X10*3/UL (ref 1.2–4.8)
LYMPHOCYTES NFR BLD AUTO: 45.4 %
MCH RBC QN AUTO: 29.1 PG (ref 26–34)
MCHC RBC AUTO-ENTMCNC: 33.6 G/DL (ref 32–36)
MCV RBC AUTO: 87 FL (ref 80–100)
MONOCYTES # BLD AUTO: 0.18 X10*3/UL (ref 0.1–1)
MONOCYTES NFR BLD AUTO: 15.1 %
NEUTROPHILS # BLD AUTO: 0.26 X10*3/UL (ref 1.2–7.7)
NEUTROPHILS NFR BLD AUTO: 21.9 %
NRBC BLD-RTO: 0 /100 WBCS (ref 0–0)
PATH REV-IMMUNOHEMATOLOGY-PR30: NORMAL
PHOSPHATE SERPL-MCNC: 2.4 MG/DL (ref 2.5–4.9)
PHOSPHATE SERPL-MCNC: 2.8 MG/DL (ref 2.5–4.9)
PHOSPHATE SERPL-MCNC: 2.8 MG/DL (ref 2.5–4.9)
PLATELET # BLD AUTO: 11 X10*3/UL (ref 150–450)
POTASSIUM SERPL-SCNC: 3.7 MMOL/L (ref 3.5–5.3)
POTASSIUM SERPL-SCNC: 3.9 MMOL/L (ref 3.5–5.3)
POTASSIUM SERPL-SCNC: 3.9 MMOL/L (ref 3.5–5.3)
PRODUCT BLOOD TYPE: 5100
PRODUCT CODE: NORMAL
PROT SERPL-MCNC: 6.8 G/DL (ref 6.4–8.2)
PROTHROMBIN TIME: 15.1 SECONDS (ref 9.8–12.8)
RBC # BLD AUTO: 1.75 X10*6/UL (ref 4.5–5.9)
RBC MORPH BLD: NORMAL
SODIUM SERPL-SCNC: 133 MMOL/L (ref 136–145)
UNIT ABO: NORMAL
UNIT NUMBER: NORMAL
UNIT RH: NORMAL
UNIT VOLUME: 350
URATE SERPL-MCNC: 2.8 MG/DL (ref 4–7.5)
URATE SERPL-MCNC: 3.5 MG/DL (ref 4–7.5)
URATE SERPL-MCNC: 3.6 MG/DL (ref 4–7.5)
WBC # BLD AUTO: 1.2 X10*3/UL (ref 4.4–11.3)
XM INTEP: NORMAL

## 2024-07-19 PROCEDURE — 83615 LACTATE (LD) (LDH) ENZYME: CPT

## 2024-07-19 PROCEDURE — P9040 RBC LEUKOREDUCED IRRADIATED: HCPCS

## 2024-07-19 PROCEDURE — 1170000001 HC PRIVATE ONCOLOGY ROOM DAILY

## 2024-07-19 PROCEDURE — 99233 SBSQ HOSP IP/OBS HIGH 50: CPT | Performed by: INTERNAL MEDICINE

## 2024-07-19 PROCEDURE — 84550 ASSAY OF BLOOD/URIC ACID: CPT

## 2024-07-19 PROCEDURE — 36415 COLL VENOUS BLD VENIPUNCTURE: CPT

## 2024-07-19 PROCEDURE — 85384 FIBRINOGEN ACTIVITY: CPT

## 2024-07-19 PROCEDURE — 85025 COMPLETE CBC W/AUTO DIFF WBC: CPT

## 2024-07-19 PROCEDURE — 84100 ASSAY OF PHOSPHORUS: CPT

## 2024-07-19 PROCEDURE — 2500000004 HC RX 250 GENERAL PHARMACY W/ HCPCS (ALT 636 FOR OP/ED): Performed by: INTERNAL MEDICINE

## 2024-07-19 PROCEDURE — 80069 RENAL FUNCTION PANEL: CPT

## 2024-07-19 PROCEDURE — 85610 PROTHROMBIN TIME: CPT

## 2024-07-19 PROCEDURE — 36430 TRANSFUSION BLD/BLD COMPNT: CPT

## 2024-07-19 PROCEDURE — 2500000001 HC RX 250 WO HCPCS SELF ADMINISTERED DRUGS (ALT 637 FOR MEDICARE OP)

## 2024-07-19 PROCEDURE — 82248 BILIRUBIN DIRECT: CPT

## 2024-07-19 PROCEDURE — 82330 ASSAY OF CALCIUM: CPT

## 2024-07-19 ASSESSMENT — COGNITIVE AND FUNCTIONAL STATUS - GENERAL
DAILY ACTIVITIY SCORE: 24
MOBILITY SCORE: 24
MOBILITY SCORE: 24
DAILY ACTIVITIY SCORE: 24

## 2024-07-19 ASSESSMENT — PAIN - FUNCTIONAL ASSESSMENT
PAIN_FUNCTIONAL_ASSESSMENT: 0-10

## 2024-07-19 ASSESSMENT — ACTIVITIES OF DAILY LIVING (ADL): LACK_OF_TRANSPORTATION: NO

## 2024-07-19 ASSESSMENT — PAIN SCALES - GENERAL
PAINLEVEL_OUTOF10: 0 - NO PAIN

## 2024-07-19 NOTE — PROGRESS NOTES
Jin Reynolds is a 69 y.o. male on day 2 of admission presenting with Acute myeloid leukemia not having achieved remission (Multi).    Subjective   Afebrile, VSS. No acute issues overnight. Patient is tolerating oral intake well. Endorses intermittent gingival bleeding while brushing teeth that has been an ongoing issue, otherwise no new complaints this morning. Denies CP, palpitations, SOB, HA, vision changes, N/V/D/C, flank pain/dysuria. ROS otherwise unremarkable.          Objective     Physical Exam  Constitutional:       General: He is not in acute distress.     Appearance: Normal appearance.   HENT:      Head: Normocephalic and atraumatic.      Mouth/Throat:      Mouth: Mucous membranes are moist.   Eyes:      General: No scleral icterus.     Extraocular Movements: Extraocular movements intact.      Pupils: Pupils are equal, round, and reactive to light.   Cardiovascular:      Rate and Rhythm: Normal rate and regular rhythm.      Pulses: Normal pulses.      Heart sounds: Normal heart sounds.   Pulmonary:      Effort: Pulmonary effort is normal.      Breath sounds: Normal breath sounds.   Abdominal:      General: Abdomen is flat.      Palpations: Abdomen is soft. There is no mass.      Tenderness: There is no abdominal tenderness.   Musculoskeletal:         General: No swelling or tenderness. Normal range of motion.      Cervical back: Normal range of motion.   Skin:     General: Skin is warm and dry.      Capillary Refill: Capillary refill takes less than 2 seconds.   Neurological:      General: No focal deficit present.      Mental Status: He is alert and oriented to person, place, and time.   Psychiatric:         Mood and Affect: Mood normal.         Behavior: Behavior normal.         Last Recorded Vitals  Blood pressure 139/82, pulse 82, temperature 36.5 °C (97.7 °F), temperature source Temporal, resp. rate 18, weight 88.4 kg (194 lb 14.2 oz), SpO2 99%.  Intake/Output last 3 Shifts:  I/O last 3 completed  shifts:  In: 2972.1 (33.6 mL/kg) [I.V.:2440.8 (27.6 mL/kg); Blood:531.3]  Out: - (0 mL/kg)   Weight: 88.4 kg     Relevant Results           Results for orders placed or performed during the hospital encounter of 07/17/24 (from the past 24 hour(s))   Renal function panel   Result Value Ref Range    Glucose 99 74 - 99 mg/dL    Sodium 131 (L) 136 - 145 mmol/L    Potassium 4.0 3.5 - 5.3 mmol/L    Chloride 106 98 - 107 mmol/L    Bicarbonate 19 (L) 21 - 32 mmol/L    Anion Gap 10 10 - 20 mmol/L    Urea Nitrogen 49 (H) 6 - 23 mg/dL    Creatinine 2.67 (H) 0.50 - 1.30 mg/dL    eGFR 25 (L) >60 mL/min/1.73m*2    Calcium 5.8 (L) 8.6 - 10.6 mg/dL    Phosphorus 3.9 2.5 - 4.9 mg/dL    Albumin 3.6 3.4 - 5.0 g/dL   Uric Acid   Result Value Ref Range    Uric Acid 4.8 4.0 - 7.5 mg/dL   Lactate dehydrogenase   Result Value Ref Range    LDH 3,436 (H) 84 - 246 U/L   Haptoglobin   Result Value Ref Range    Haptoglobin 22 (L) 37 - 246 mg/dL   Renal function panel   Result Value Ref Range    Glucose 136 (H) 74 - 99 mg/dL    Sodium 130 (L) 136 - 145 mmol/L    Potassium 3.6 3.5 - 5.3 mmol/L    Chloride 106 98 - 107 mmol/L    Bicarbonate 17 (L) 21 - 32 mmol/L    Anion Gap 11 10 - 20 mmol/L    Urea Nitrogen 47 (H) 6 - 23 mg/dL    Creatinine 2.68 (H) 0.50 - 1.30 mg/dL    eGFR 25 (L) >60 mL/min/1.73m*2    Calcium 6.2 (L) 8.6 - 10.6 mg/dL    Phosphorus 3.2 2.5 - 4.9 mg/dL    Albumin 3.6 3.4 - 5.0 g/dL   Uric Acid   Result Value Ref Range    Uric Acid 4.3 4.0 - 7.5 mg/dL   Lactate dehydrogenase   Result Value Ref Range    LDH 2,974 (H) 84 - 246 U/L   Calcium, ionized   Result Value Ref Range    POCT Calcium, Ionized 0.87 (L) 1.1 - 1.33 mmol/L   Renal function panel   Result Value Ref Range    Glucose 100 (H) 74 - 99 mg/dL    Sodium 133 (L) 136 - 145 mmol/L    Potassium 3.9 3.5 - 5.3 mmol/L    Chloride 109 (H) 98 - 107 mmol/L    Bicarbonate 16 (L) 21 - 32 mmol/L    Anion Gap 12 10 - 20 mmol/L    Urea Nitrogen 43 (H) 6 - 23 mg/dL    Creatinine 2.50  (H) 0.50 - 1.30 mg/dL    eGFR 27 (L) >60 mL/min/1.73m*2    Calcium 6.6 (L) 8.6 - 10.6 mg/dL    Phosphorus 2.8 2.5 - 4.9 mg/dL    Albumin 3.8 3.4 - 5.0 g/dL   Uric Acid   Result Value Ref Range    Uric Acid 3.6 (L) 4.0 - 7.5 mg/dL   Lactate dehydrogenase   Result Value Ref Range    LDH 2,508 (H) 84 - 246 U/L   CBC and Auto Differential   Result Value Ref Range    WBC 1.2 (L) 4.4 - 11.3 x10*3/uL    nRBC 0.0 0.0 - 0.0 /100 WBCs    RBC 1.75 (L) 4.50 - 5.90 x10*6/uL    Hemoglobin 5.1 (LL) 13.5 - 17.5 g/dL    Hematocrit 15.2 (L) 41.0 - 52.0 %    MCV 87 80 - 100 fL    MCH 29.1 26.0 - 34.0 pg    MCHC 33.6 32.0 - 36.0 g/dL    RDW 16.7 (H) 11.5 - 14.5 %    Platelets 11 (LL) 150 - 450 x10*3/uL    Neutrophils % 21.9 40.0 - 80.0 %    Immature Granulocytes %, Automated 0.8 0.0 - 0.9 %    Lymphocytes % 45.4 13.0 - 44.0 %    Monocytes % 15.1 2.0 - 10.0 %    Eosinophils % 16.8 0.0 - 6.0 %    Basophils % 0.0 0.0 - 2.0 %    Neutrophils Absolute 0.26 (L) 1.20 - 7.70 x10*3/uL    Immature Granulocytes Absolute, Automated 0.01 0.00 - 0.70 x10*3/uL    Lymphocytes Absolute 0.54 (L) 1.20 - 4.80 x10*3/uL    Monocytes Absolute 0.18 0.10 - 1.00 x10*3/uL    Eosinophils Absolute 0.20 0.00 - 0.70 x10*3/uL    Basophils Absolute 0.00 0.00 - 0.10 x10*3/uL   Hepatic function panel   Result Value Ref Range    Albumin 3.8 3.4 - 5.0 g/dL    Bilirubin, Total 2.3 (H) 0.0 - 1.2 mg/dL    Bilirubin, Direct 0.6 (H) 0.0 - 0.3 mg/dL    Alkaline Phosphatase 73 33 - 136 U/L    ALT 5 (L) 10 - 52 U/L    AST 27 9 - 39 U/L    Total Protein 6.8 6.4 - 8.2 g/dL   Morphology   Result Value Ref Range    RBC Morphology No significant RBC morphology present    Renal function panel   Result Value Ref Range    Glucose 98 74 - 99 mg/dL    Sodium 133 (L) 136 - 145 mmol/L    Potassium 3.9 3.5 - 5.3 mmol/L    Chloride 108 (H) 98 - 107 mmol/L    Bicarbonate 16 (L) 21 - 32 mmol/L    Anion Gap 13 10 - 20 mmol/L    Urea Nitrogen 43 (H) 6 - 23 mg/dL    Creatinine 2.61 (H) 0.50 - 1.30  mg/dL    eGFR 26 (L) >60 mL/min/1.73m*2    Calcium 6.6 (L) 8.6 - 10.6 mg/dL    Phosphorus 2.8 2.5 - 4.9 mg/dL    Albumin 3.7 3.4 - 5.0 g/dL   Uric Acid   Result Value Ref Range    Uric Acid 3.5 (L) 4.0 - 7.5 mg/dL   Lactate dehydrogenase   Result Value Ref Range    LDH 2,543 (H) 84 - 246 U/L   Prepare RBC: 1 Units, Irradiated, Leukocytes Reduced (CMV reduced risk)   Result Value Ref Range    PRODUCT CODE E7318T97     Unit Number [id number]     Unit ABO O     Unit RH POS     XM INTEP COMP     Dispense Status IS     Blood Expiration Date 8/1/2024 11:59:00 PM EDT     PRODUCT BLOOD TYPE 5100     UNIT VOLUME 350      Scheduled medications  allopurinol, 300 mg, oral, Daily  amLODIPine, 10 mg, oral, Daily  cholecalciferol, 800 Units, oral, Daily  montelukast, 10 mg, oral, Daily  pantoprazole, 40 mg, oral, Daily before breakfast  predniSONE, 80 mg, oral, Daily      Continuous medications  sodium chloride 0.9%, 150 mL/hr, Last Rate: 150 mL/hr (07/18/24 2300)      PRN medications  PRN medications: alteplase, ondansetron, polyethylene glycol, prochlorperazine                Assessment/Plan   Principal Problem:    Acute myeloid leukemia not having achieved remission (Multi)  Active Problems:    Delayed hemolytic transfusion reaction    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-HCC)    PERRY (acute kidney injury) (CMS-HCC)    Jin Reynolds is a 69 y.o. male with PMHx of AML, esophageal ulcer w/ GI bleed, smoldering systemic mastocytosis, and HTN presented to infusion center for C1D2 for Jeremy/Aza but ab work revealed increased Cr 1.74, phosph 5.8, uric acid 6, and LDH 6,660, showing evidence of TLS so aza/Jeremy were held. He was started on IVF and rasburicase. He was admitted to [facility] for further evaluation and treatment.    Updates 7/19/24:  #TLS  - Labs improving  - c/w allopurinol  - c/w IVF at 150cc/hr  #DHTR  - Haptoglobin 22, suggestive of hemolysis  - Start prednisone 80mg daily  - 1u PRBC  today  #PERRY  - slight improvement in sCr, continue with mIVF as above for TLS, renally dose meds      ONC:  #Smoldering systemic Mastocytosis (Dx 8/2023)  - presented with skin rash and eosinophilia  - BMBx (8/23/23): hypercellular (90-95%) with trilineage hematopoiesis, granulocytic hyperplasia, eosinophilia, and increased atypical mast cells.  - c/w allegra (will have someone bring his home supply since its not on formulary)  - c/w home montelukast  #AML  - Presented with worsening anemia in setting of mast cell disease  - (6/24/24): peripheral blood path read abnormal lymphocytes with possible blasts.  - BMBx (6/27/24): Acute Leukemia arising in background of systemic mastocytosis; 7-8% blast on peripheral blood; + CD34+, CD7+, TDT+  - Started induction w/ C1 Aza/Jeremy (7/16/24)  - (7/17) C1D2 was put on hold due to TLS    #TLS  - LDH (7/17) 6,660, (7/18) 4,478, (7/19) 2,508   - Uric Acid (7/17) 6, (7/18) 5.3, (7/19) 3.5   - s/p Rasburicase  - c/w Allopurinol 300 mg daily  - Phosph (7/17) 5.8, (7/18) 5, (7/19) 3.9  - Potassium (7/17) 5, (7/18) 4.2, (7/19) 3.9  - c/w IVF at 150cc/hr   - monitoring q8hrs      CHEMO:  28-day cycle for 1 cycle   Venetoclax 100 mg PO on Day 1 if tolerated followed by   Venetoclax 200 mg PO on Day 2 if tolerated followed by   Venetoclax 400 mg PO on Days 3 - 28   AzaCITIDine 75 mg/m2 IV over 10 - 40 minutes or subcutaneous daily on Days 1 - 7      HEME:  #Anemia and Thrombocytopenia  #DHTR  - Haptoglobin 22, suggestive of hemolysis  - Start prednisone 80mg daily   - Hgb 5 (7/17), 5.5 (7/18), 5.1 (7/19) s/p 1u pRBCs 7/17, 7/18, 7/19.  - G6PD(7/17) Normal   - no evidence of bleeding   - Monitor counts daily  - Keep Hgb >7, plt >10     ID:  NKDA  #ppx: allopurinol  - Afebrile, no evidence of active infection on admit     FEN/GI:  Admit Wt: 86.2 kg  - Monitor electrolytes, replace as needed  - c/w home pantoprazole     RENAL:  # PERRY  :: 2/2 TLS  - sCr 1.74 on admission; Creat   increased to 2.88 (7/18), 2.61 (7/19)  - Continue IVF 150mL/hr, renally dose meds    CARDIAC:  #HTN  - c/w home amlodipine  - Echo (6/14/23): EF 55-60%       DISPO:  - Full Code   - Access: PIV  - Primary Oncologist: Dr. Dawson  - PT/OT eval     Patient seen and discussed with Dr. Nika Dawson.       ROSA Sims-CNP

## 2024-07-19 NOTE — HOSPITAL COURSE
Jin Reynolds is a 69 y.o. 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 S/p C1D1 aza/leonor with significant clinical TLS and PERRY, therapy held and admitted for treatment of TLS with rasburicase and IVFs. Improved, restarted Azacitidine (held venetoclax).    Hospital course c/b delayed hemolytic transfusion reaction and acute kidney injury.    Received rasburicase, allopurinol and IVF for TLS. Creatinine improved and was 1.27 at discharge. Baseline 1-1.2    For DHTR, haptoglobin level was checked and was 22, suggestive of hemolysis. Started on prednisone 80mg daily is currently being tapered. Will complete taper 7/30. Hemoglobin stabilized.     No central line. Infusion appointments for count checks on 7/30 ([facility] follow up), 8/2, 8/6, 8/9, 8/13 (also to see Kalia)

## 2024-07-19 NOTE — CARE PLAN
Problem: Skin  Goal: Participates in plan/prevention/treatment measures  Outcome: Progressing     Problem: Fall/Injury  Goal: Not fall by end of shift  Outcome: Progressing  Goal: Be free from injury by end of the shift  Outcome: Progressing  Goal: Verbalize understanding of personal risk factors for fall in the hospital  Outcome: Progressing  Goal: Verbalize understanding of risk factor reduction measures to prevent injury from fall in the home  Outcome: Progressing  Goal: Use assistive devices by end of the shift  Outcome: Progressing  Goal: Pace activities to prevent fatigue by end of the shift  Outcome: Progressing   The patient's goals for the shift include      Problem: Skin  Goal: Promote/optimize nutrition  Outcome: Progressing

## 2024-07-19 NOTE — CARE PLAN
Problem: Skin  Goal: Decreased wound size/increased tissue granulation at next dressing change  Outcome: Progressing  Goal: Participates in plan/prevention/treatment measures  Outcome: Progressing  Goal: Prevent/manage excess moisture  Outcome: Progressing  Goal: Prevent/minimize sheer/friction injuries  Outcome: Progressing  Goal: Promote/optimize nutrition  Outcome: Progressing  Goal: Promote skin healing  Outcome: Progressing     Problem: Fall/Injury  Goal: Not fall by end of shift  Outcome: Progressing  Goal: Be free from injury by end of the shift  Outcome: Progressing  Goal: Verbalize understanding of personal risk factors for fall in the hospital  Outcome: Progressing  Goal: Verbalize understanding of risk factor reduction measures to prevent injury from fall in the home  Outcome: Progressing  Goal: Use assistive devices by end of the shift  Outcome: Progressing  Goal: Pace activities to prevent fatigue by end of the shift  Outcome: Progressing     Problem: Respiratory  Goal: Clear secretions with interventions this shift  Outcome: Progressing  Goal: Minimize anxiety/maximize coping throughout shift  Outcome: Progressing  Goal: Minimal/no exertional discomfort or dyspnea this shift  Outcome: Progressing  Goal: No signs of respiratory distress (eg. Use of accessory muscles. Peds grunting)  Outcome: Progressing  Goal: Patent airway maintained this shift  Outcome: Progressing  Goal: Tolerate mechanical ventilation evidenced by VS/agitation level this shift  Outcome: Progressing  Goal: Tolerate pulmonary toileting this shift  Outcome: Progressing  Goal: Verbalize decreased shortness of breath this shift  Outcome: Progressing  Goal: Wean oxygen to maintain O2 saturation per order/standard this shift  Outcome: Progressing  Goal: Increase self care and/or family involvement in next 24 hours  Outcome: Progressing       The clinical goals for the shift include Pt will remain hemodynamically stable and free from  falls or injury during shift

## 2024-07-19 NOTE — PROGRESS NOTES
07/19/24 1558   Discharge Planning   Living Arrangements Spouse/significant other   Support Systems Spouse/significant other   Assistance Needed none   Type of Residence Private residence   Home or Post Acute Services None   Expected Discharge Disposition Home   Does the patient need discharge transport arranged? No   Financial Resource Strain   How hard is it for you to pay for the very basics like food, housing, medical care, and heating? Somewhat   Housing Stability   In the last 12 months, was there a time when you were not able to pay the mortgage or rent on time? N   In the past 12 months, how many times have you moved where you were living? 1   At any time in the past 12 months, were you homeless or living in a shelter (including now)? N   Transportation Needs   In the past 12 months, has lack of transportation kept you from medical appointments or from getting medications? no   In the past 12 months, has lack of transportation kept you from meetings, work, or from getting things needed for daily living? No     Pt with AML, C1D2 Aza/Jeremy, was admitted for TLS (chemo on hold.)  Met with the pt to discuss any home going needs.  He will be returning to home, where he lives with his wife.  He is still independent and doesn't require any home care or DME.  He doesn't have a central line.  His MD is Dr. Nika Dawson.  The pt expressed difficulty with the copay bills he has accumulated with his frequent visits.  He would appreciate any financial assistance available.  Will inform SW.  Will continue to follow for any home going needs that may arise.  Liz Thomas RN, TCC

## 2024-07-20 ENCOUNTER — APPOINTMENT (OUTPATIENT)
Dept: HEMATOLOGY/ONCOLOGY | Facility: HOSPITAL | Age: 69
End: 2024-07-20
Payer: MEDICARE

## 2024-07-20 LAB
ALBUMIN SERPL BCP-MCNC: 3.5 G/DL (ref 3.4–5)
ALBUMIN SERPL BCP-MCNC: 3.7 G/DL (ref 3.4–5)
ALBUMIN SERPL BCP-MCNC: 3.7 G/DL (ref 3.4–5)
ALP SERPL-CCNC: 71 U/L (ref 33–136)
ALT SERPL W P-5'-P-CCNC: 6 U/L (ref 10–52)
ANION GAP SERPL CALC-SCNC: 11 MMOL/L (ref 10–20)
ANION GAP SERPL CALC-SCNC: 11 MMOL/L (ref 10–20)
AST SERPL W P-5'-P-CCNC: 17 U/L (ref 9–39)
BASOPHILS # BLD MANUAL: 0 X10*3/UL (ref 0–0.1)
BASOPHILS NFR BLD MANUAL: 0 %
BILIRUB DIRECT SERPL-MCNC: 0.4 MG/DL (ref 0–0.3)
BILIRUB SERPL-MCNC: 1.5 MG/DL (ref 0–1.2)
BLOOD EXPIRATION DATE: NORMAL
BUN SERPL-MCNC: 41 MG/DL (ref 6–23)
BUN SERPL-MCNC: 45 MG/DL (ref 6–23)
BURR CELLS BLD QL SMEAR: ABNORMAL
CALCIUM SERPL-MCNC: 7 MG/DL (ref 8.6–10.6)
CALCIUM SERPL-MCNC: 7.2 MG/DL (ref 8.6–10.6)
CHLORIDE SERPL-SCNC: 110 MMOL/L (ref 98–107)
CHLORIDE SERPL-SCNC: 110 MMOL/L (ref 98–107)
CO2 SERPL-SCNC: 16 MMOL/L (ref 21–32)
CO2 SERPL-SCNC: 18 MMOL/L (ref 21–32)
CREAT SERPL-MCNC: 2.08 MG/DL (ref 0.5–1.3)
CREAT SERPL-MCNC: 2.3 MG/DL (ref 0.5–1.3)
DISPENSE STATUS: NORMAL
EGFRCR SERPLBLD CKD-EPI 2021: 30 ML/MIN/1.73M*2
EGFRCR SERPLBLD CKD-EPI 2021: 34 ML/MIN/1.73M*2
EOSINOPHIL # BLD MANUAL: 0 X10*3/UL (ref 0–0.7)
EOSINOPHIL NFR BLD MANUAL: 0 %
ERYTHROCYTE [DISTWIDTH] IN BLOOD BY AUTOMATED COUNT: 18.4 % (ref 11.5–14.5)
GLUCOSE SERPL-MCNC: 124 MG/DL (ref 74–99)
GLUCOSE SERPL-MCNC: 96 MG/DL (ref 74–99)
HCT VFR BLD AUTO: 16.8 % (ref 41–52)
HGB BLD-MCNC: 5.7 G/DL (ref 13.5–17.5)
IMM GRANULOCYTES # BLD AUTO: 0.01 X10*3/UL (ref 0–0.7)
IMM GRANULOCYTES NFR BLD AUTO: 0.7 % (ref 0–0.9)
LDH SERPL L TO P-CCNC: 1757 U/L (ref 84–246)
LDH SERPL L TO P-CCNC: 1852 U/L (ref 84–246)
LYMPHOCYTES # BLD MANUAL: 0.84 X10*3/UL (ref 1.2–4.8)
LYMPHOCYTES NFR BLD MANUAL: 59.7 %
MCH RBC QN AUTO: 28.5 PG (ref 26–34)
MCHC RBC AUTO-ENTMCNC: 33.9 G/DL (ref 32–36)
MCV RBC AUTO: 84 FL (ref 80–100)
MONOCYTES # BLD MANUAL: 0.02 X10*3/UL (ref 0.1–1)
MONOCYTES NFR BLD MANUAL: 1.7 %
NEUTROPHILS # BLD MANUAL: 0.53 X10*3/UL (ref 1.2–7.7)
NEUTS BAND # BLD MANUAL: 0.06 X10*3/UL (ref 0–0.7)
NEUTS BAND NFR BLD MANUAL: 4.2 %
NEUTS SEG # BLD MANUAL: 0.47 X10*3/UL (ref 1.2–7)
NEUTS SEG NFR BLD MANUAL: 33.6 %
NRBC BLD-RTO: 0 /100 WBCS (ref 0–0)
PHOSPHATE SERPL-MCNC: 2.3 MG/DL (ref 2.5–4.9)
PHOSPHATE SERPL-MCNC: 2.8 MG/DL (ref 2.5–4.9)
PLATELET # BLD AUTO: 12 X10*3/UL (ref 150–450)
POTASSIUM SERPL-SCNC: 3.7 MMOL/L (ref 3.5–5.3)
POTASSIUM SERPL-SCNC: 4.3 MMOL/L (ref 3.5–5.3)
PRODUCT BLOOD TYPE: 5100
PRODUCT CODE: NORMAL
PROT SERPL-MCNC: 7 G/DL (ref 6.4–8.2)
RBC # BLD AUTO: 2 X10*6/UL (ref 4.5–5.9)
RBC MORPH BLD: ABNORMAL
SODIUM SERPL-SCNC: 133 MMOL/L (ref 136–145)
SODIUM SERPL-SCNC: 135 MMOL/L (ref 136–145)
TOTAL CELLS COUNTED BLD: 119
UNIT ABO: NORMAL
UNIT NUMBER: NORMAL
UNIT RH: NORMAL
UNIT VOLUME: 350
URATE SERPL-MCNC: 2.5 MG/DL (ref 4–7.5)
URATE SERPL-MCNC: 2.6 MG/DL (ref 4–7.5)
VARIANT LYMPHS # BLD MANUAL: 0.01 X10*3/UL (ref 0–0.5)
VARIANT LYMPHS NFR BLD: 0.8 %
WBC # BLD AUTO: 1.4 X10*3/UL (ref 4.4–11.3)
XM INTEP: NORMAL

## 2024-07-20 PROCEDURE — 2500000004 HC RX 250 GENERAL PHARMACY W/ HCPCS (ALT 636 FOR OP/ED): Performed by: NURSE PRACTITIONER

## 2024-07-20 PROCEDURE — 84100 ASSAY OF PHOSPHORUS: CPT

## 2024-07-20 PROCEDURE — 2500000001 HC RX 250 WO HCPCS SELF ADMINISTERED DRUGS (ALT 637 FOR MEDICARE OP)

## 2024-07-20 PROCEDURE — 80053 COMPREHEN METABOLIC PANEL: CPT

## 2024-07-20 PROCEDURE — 83615 LACTATE (LD) (LDH) ENZYME: CPT

## 2024-07-20 PROCEDURE — 1170000001 HC PRIVATE ONCOLOGY ROOM DAILY

## 2024-07-20 PROCEDURE — 82040 ASSAY OF SERUM ALBUMIN: CPT

## 2024-07-20 PROCEDURE — 2500000004 HC RX 250 GENERAL PHARMACY W/ HCPCS (ALT 636 FOR OP/ED): Performed by: INTERNAL MEDICINE

## 2024-07-20 PROCEDURE — 84550 ASSAY OF BLOOD/URIC ACID: CPT

## 2024-07-20 PROCEDURE — 85025 COMPLETE CBC W/AUTO DIFF WBC: CPT

## 2024-07-20 PROCEDURE — 99233 SBSQ HOSP IP/OBS HIGH 50: CPT | Performed by: STUDENT IN AN ORGANIZED HEALTH CARE EDUCATION/TRAINING PROGRAM

## 2024-07-20 PROCEDURE — 85007 BL SMEAR W/DIFF WBC COUNT: CPT

## 2024-07-20 PROCEDURE — 36415 COLL VENOUS BLD VENIPUNCTURE: CPT

## 2024-07-20 PROCEDURE — 83735 ASSAY OF MAGNESIUM: CPT

## 2024-07-20 PROCEDURE — 36430 TRANSFUSION BLD/BLD COMPNT: CPT

## 2024-07-20 PROCEDURE — 85027 COMPLETE CBC AUTOMATED: CPT

## 2024-07-20 PROCEDURE — P9040 RBC LEUKOREDUCED IRRADIATED: HCPCS

## 2024-07-20 PROCEDURE — 82248 BILIRUBIN DIRECT: CPT

## 2024-07-20 ASSESSMENT — COGNITIVE AND FUNCTIONAL STATUS - GENERAL
MOBILITY SCORE: 24
DAILY ACTIVITIY SCORE: 24
MOBILITY SCORE: 24
DAILY ACTIVITIY SCORE: 24

## 2024-07-20 ASSESSMENT — PAIN SCALES - GENERAL
PAINLEVEL_OUTOF10: 0 - NO PAIN

## 2024-07-20 ASSESSMENT — PAIN - FUNCTIONAL ASSESSMENT
PAIN_FUNCTIONAL_ASSESSMENT: 0-10

## 2024-07-20 NOTE — CARE PLAN
Problem: Skin  Goal: Decreased wound size/increased tissue granulation at next dressing change  Outcome: Progressing  Goal: Participates in plan/prevention/treatment measures  Outcome: Progressing  Goal: Prevent/manage excess moisture  Outcome: Progressing  Goal: Prevent/minimize sheer/friction injuries  Outcome: Progressing  Goal: Promote/optimize nutrition  Outcome: Progressing  Goal: Promote skin healing  Outcome: Progressing     Problem: Fall/Injury  Goal: Not fall by end of shift  Outcome: Progressing  Goal: Be free from injury by end of the shift  Outcome: Progressing  Goal: Verbalize understanding of personal risk factors for fall in the hospital  Outcome: Progressing  Goal: Verbalize understanding of risk factor reduction measures to prevent injury from fall in the home  Outcome: Progressing  Goal: Use assistive devices by end of the shift  Outcome: Progressing  Goal: Pace activities to prevent fatigue by end of the shift  Outcome: Progressing     Problem: Respiratory  Goal: Clear secretions with interventions this shift  Outcome: Progressing  Goal: Minimize anxiety/maximize coping throughout shift  Outcome: Progressing  Goal: Minimal/no exertional discomfort or dyspnea this shift  Outcome: Progressing  Goal: No signs of respiratory distress (eg. Use of accessory muscles. Peds grunting)  Outcome: Progressing  Goal: Patent airway maintained this shift  Outcome: Progressing  Goal: Tolerate mechanical ventilation evidenced by VS/agitation level this shift  Outcome: Progressing  Goal: Tolerate pulmonary toileting this shift  Outcome: Progressing  Goal: Verbalize decreased shortness of breath this shift  Outcome: Progressing  Goal: Wean oxygen to maintain O2 saturation per order/standard this shift  Outcome: Progressing  Goal: Increase self care and/or family involvement in next 24 hours  Outcome: Progressing       The clinical goals for the shift include pt will remain free from falls or injury during  shift    VSS, remains afebrile. No c/o pain, N/V/D. Walked the halls a few times today. Patient's hemoglobin is 5.7; patient ordered 1 unit of PRBCs. NS infusing at 150mL/hr. Patient remains safe throughout shift. Continue to monitor.          Declines

## 2024-07-20 NOTE — CARE PLAN
Problem: Skin  Goal: Decreased wound size/increased tissue granulation at next dressing change  Outcome: Progressing  Goal: Participates in plan/prevention/treatment measures  Outcome: Progressing  Goal: Prevent/manage excess moisture  Outcome: Progressing  Goal: Prevent/minimize sheer/friction injuries  Outcome: Progressing  Goal: Promote/optimize nutrition  Outcome: Progressing  Goal: Promote skin healing  Outcome: Progressing     Problem: Fall/Injury  Goal: Not fall by end of shift  Outcome: Progressing  Goal: Be free from injury by end of the shift  Outcome: Progressing  Goal: Verbalize understanding of personal risk factors for fall in the hospital  Outcome: Progressing  Goal: Verbalize understanding of risk factor reduction measures to prevent injury from fall in the home  Outcome: Progressing  Goal: Use assistive devices by end of the shift  Outcome: Progressing  Goal: Pace activities to prevent fatigue by end of the shift  Outcome: Progressing     Problem: Respiratory  Goal: Clear secretions with interventions this shift  Outcome: Progressing  Goal: Minimize anxiety/maximize coping throughout shift  Outcome: Progressing  Goal: Minimal/no exertional discomfort or dyspnea this shift  Outcome: Progressing  Goal: No signs of respiratory distress (eg. Use of accessory muscles. Peds grunting)  Outcome: Progressing  Goal: Patent airway maintained this shift  Outcome: Progressing  Goal: Tolerate mechanical ventilation evidenced by VS/agitation level this shift  Outcome: Progressing  Goal: Tolerate pulmonary toileting this shift  Outcome: Progressing  Goal: Verbalize decreased shortness of breath this shift  Outcome: Progressing  Goal: Wean oxygen to maintain O2 saturation per order/standard this shift  Outcome: Progressing  Goal: Increase self care and/or family involvement in next 24 hours  Outcome: Progressing       The clinical goals for the shift include Pt will remain free from falls or injury during  shift

## 2024-07-21 ENCOUNTER — APPOINTMENT (OUTPATIENT)
Dept: HEMATOLOGY/ONCOLOGY | Facility: HOSPITAL | Age: 69
End: 2024-07-21
Payer: MEDICARE

## 2024-07-21 VITALS
HEART RATE: 65 BPM | WEIGHT: 200.18 LBS | RESPIRATION RATE: 16 BRPM | TEMPERATURE: 98.6 F | OXYGEN SATURATION: 97 % | DIASTOLIC BLOOD PRESSURE: 78 MMHG | BODY MASS INDEX: 35.96 KG/M2 | SYSTOLIC BLOOD PRESSURE: 142 MMHG

## 2024-07-21 LAB
ALBUMIN SERPL BCP-MCNC: 3.2 G/DL (ref 3.4–5)
ALBUMIN SERPL BCP-MCNC: 3.2 G/DL (ref 3.4–5)
ALP SERPL-CCNC: 66 U/L (ref 33–136)
ALP SERPL-CCNC: 67 U/L (ref 33–136)
ALT SERPL W P-5'-P-CCNC: 7 U/L (ref 10–52)
ALT SERPL W P-5'-P-CCNC: 7 U/L (ref 10–52)
ANION GAP SERPL CALC-SCNC: 10 MMOL/L (ref 10–20)
ANION GAP SERPL CALC-SCNC: 10 MMOL/L (ref 10–20)
AST SERPL W P-5'-P-CCNC: 10 U/L (ref 9–39)
AST SERPL W P-5'-P-CCNC: 10 U/L (ref 9–39)
BASOPHILS # BLD AUTO: 0 X10*3/UL (ref 0–0.1)
BASOPHILS # BLD AUTO: 0 X10*3/UL (ref 0–0.1)
BASOPHILS NFR BLD AUTO: 0 %
BASOPHILS NFR BLD AUTO: 0 %
BILIRUB DIRECT SERPL-MCNC: 0.2 MG/DL (ref 0–0.3)
BILIRUB DIRECT SERPL-MCNC: 0.2 MG/DL (ref 0–0.3)
BILIRUB SERPL-MCNC: 0.9 MG/DL (ref 0–1.2)
BILIRUB SERPL-MCNC: 0.9 MG/DL (ref 0–1.2)
BLOOD EXPIRATION DATE: NORMAL
BLOOD EXPIRATION DATE: NORMAL
BUN SERPL-MCNC: 35 MG/DL (ref 6–23)
BUN SERPL-MCNC: 35 MG/DL (ref 6–23)
CALCIUM SERPL-MCNC: 7.4 MG/DL (ref 8.6–10.6)
CALCIUM SERPL-MCNC: 7.6 MG/DL (ref 8.6–10.6)
CHLORIDE SERPL-SCNC: 109 MMOL/L (ref 98–107)
CHLORIDE SERPL-SCNC: 111 MMOL/L (ref 98–107)
CO2 SERPL-SCNC: 19 MMOL/L (ref 21–32)
CO2 SERPL-SCNC: 19 MMOL/L (ref 21–32)
CREAT SERPL-MCNC: 1.59 MG/DL (ref 0.5–1.3)
CREAT SERPL-MCNC: 1.69 MG/DL (ref 0.5–1.3)
DISPENSE STATUS: NORMAL
DISPENSE STATUS: NORMAL
EGFRCR SERPLBLD CKD-EPI 2021: 44 ML/MIN/1.73M*2
EGFRCR SERPLBLD CKD-EPI 2021: 47 ML/MIN/1.73M*2
EOSINOPHIL # BLD AUTO: 0.02 X10*3/UL (ref 0–0.7)
EOSINOPHIL # BLD AUTO: 0.03 X10*3/UL (ref 0–0.7)
EOSINOPHIL NFR BLD AUTO: 1.4 %
EOSINOPHIL NFR BLD AUTO: 1.7 %
ERYTHROCYTE [DISTWIDTH] IN BLOOD BY AUTOMATED COUNT: 17.3 % (ref 11.5–14.5)
ERYTHROCYTE [DISTWIDTH] IN BLOOD BY AUTOMATED COUNT: 17.5 % (ref 11.5–14.5)
GLUCOSE SERPL-MCNC: 159 MG/DL (ref 74–99)
GLUCOSE SERPL-MCNC: 83 MG/DL (ref 74–99)
HCT VFR BLD AUTO: 17.9 % (ref 41–52)
HCT VFR BLD AUTO: 17.9 % (ref 41–52)
HGB BLD-MCNC: 6.1 G/DL (ref 13.5–17.5)
HGB BLD-MCNC: 6.1 G/DL (ref 13.5–17.5)
IMM GRANULOCYTES # BLD AUTO: 0.01 X10*3/UL (ref 0–0.7)
IMM GRANULOCYTES # BLD AUTO: 0.02 X10*3/UL (ref 0–0.7)
IMM GRANULOCYTES NFR BLD AUTO: 0.7 % (ref 0–0.9)
IMM GRANULOCYTES NFR BLD AUTO: 1.1 % (ref 0–0.9)
LDH SERPL L TO P-CCNC: 1059 U/L (ref 84–246)
LDH SERPL L TO P-CCNC: 1151 U/L (ref 84–246)
LYMPHOCYTES # BLD AUTO: 0.43 X10*3/UL (ref 1.2–4.8)
LYMPHOCYTES # BLD AUTO: 0.66 X10*3/UL (ref 1.2–4.8)
LYMPHOCYTES NFR BLD AUTO: 30.5 %
LYMPHOCYTES NFR BLD AUTO: 37.7 %
MAGNESIUM SERPL-MCNC: 1.87 MG/DL (ref 1.6–2.4)
MCH RBC QN AUTO: 28.5 PG (ref 26–34)
MCH RBC QN AUTO: 28.8 PG (ref 26–34)
MCHC RBC AUTO-ENTMCNC: 34.1 G/DL (ref 32–36)
MCHC RBC AUTO-ENTMCNC: 34.1 G/DL (ref 32–36)
MCV RBC AUTO: 84 FL (ref 80–100)
MCV RBC AUTO: 84 FL (ref 80–100)
MONOCYTES # BLD AUTO: 0.12 X10*3/UL (ref 0.1–1)
MONOCYTES # BLD AUTO: 0.14 X10*3/UL (ref 0.1–1)
MONOCYTES NFR BLD AUTO: 8 %
MONOCYTES NFR BLD AUTO: 8.5 %
NEUTROPHILS # BLD AUTO: 0.83 X10*3/UL (ref 1.2–7.7)
NEUTROPHILS # BLD AUTO: 0.9 X10*3/UL (ref 1.2–7.7)
NEUTROPHILS NFR BLD AUTO: 51.5 %
NEUTROPHILS NFR BLD AUTO: 58.9 %
NRBC BLD-RTO: 0 /100 WBCS (ref 0–0)
NRBC BLD-RTO: 0 /100 WBCS (ref 0–0)
PHOSPHATE SERPL-MCNC: 3.2 MG/DL (ref 2.5–4.9)
PHOSPHATE SERPL-MCNC: 3.4 MG/DL (ref 2.5–4.9)
PLATELET # BLD AUTO: 11 X10*3/UL (ref 150–450)
PLATELET # BLD AUTO: 12 X10*3/UL (ref 150–450)
POTASSIUM SERPL-SCNC: 3.4 MMOL/L (ref 3.5–5.3)
POTASSIUM SERPL-SCNC: 4 MMOL/L (ref 3.5–5.3)
PRODUCT BLOOD TYPE: 5100
PRODUCT BLOOD TYPE: 5100
PRODUCT CODE: NORMAL
PRODUCT CODE: NORMAL
PROT SERPL-MCNC: 5.9 G/DL (ref 6.4–8.2)
PROT SERPL-MCNC: 6.2 G/DL (ref 6.4–8.2)
RBC # BLD AUTO: 2.12 X10*6/UL (ref 4.5–5.9)
RBC # BLD AUTO: 2.14 X10*6/UL (ref 4.5–5.9)
RBC MORPH BLD: NORMAL
RBC MORPH BLD: NORMAL
SODIUM SERPL-SCNC: 135 MMOL/L (ref 136–145)
SODIUM SERPL-SCNC: 136 MMOL/L (ref 136–145)
UNIT ABO: NORMAL
UNIT ABO: NORMAL
UNIT NUMBER: NORMAL
UNIT NUMBER: NORMAL
UNIT RH: NORMAL
UNIT RH: NORMAL
UNIT VOLUME: 279
UNIT VOLUME: 323
URATE SERPL-MCNC: 2.2 MG/DL (ref 4–7.5)
URATE SERPL-MCNC: 2.3 MG/DL (ref 4–7.5)
WBC # BLD AUTO: 1.4 X10*3/UL (ref 4.4–11.3)
WBC # BLD AUTO: 1.8 X10*3/UL (ref 4.4–11.3)
XM INTEP: NORMAL
XM INTEP: NORMAL

## 2024-07-21 PROCEDURE — 2500000001 HC RX 250 WO HCPCS SELF ADMINISTERED DRUGS (ALT 637 FOR MEDICARE OP): Performed by: STUDENT IN AN ORGANIZED HEALTH CARE EDUCATION/TRAINING PROGRAM

## 2024-07-21 PROCEDURE — 83615 LACTATE (LD) (LDH) ENZYME: CPT | Performed by: STUDENT IN AN ORGANIZED HEALTH CARE EDUCATION/TRAINING PROGRAM

## 2024-07-21 PROCEDURE — 84550 ASSAY OF BLOOD/URIC ACID: CPT | Performed by: STUDENT IN AN ORGANIZED HEALTH CARE EDUCATION/TRAINING PROGRAM

## 2024-07-21 PROCEDURE — 85025 COMPLETE CBC W/AUTO DIFF WBC: CPT

## 2024-07-21 PROCEDURE — 80053 COMPREHEN METABOLIC PANEL: CPT | Performed by: STUDENT IN AN ORGANIZED HEALTH CARE EDUCATION/TRAINING PROGRAM

## 2024-07-21 PROCEDURE — 2500000004 HC RX 250 GENERAL PHARMACY W/ HCPCS (ALT 636 FOR OP/ED): Performed by: INTERNAL MEDICINE

## 2024-07-21 PROCEDURE — 36415 COLL VENOUS BLD VENIPUNCTURE: CPT

## 2024-07-21 PROCEDURE — 1170000001 HC PRIVATE ONCOLOGY ROOM DAILY

## 2024-07-21 PROCEDURE — 36430 TRANSFUSION BLD/BLD COMPNT: CPT

## 2024-07-21 PROCEDURE — 2500000004 HC RX 250 GENERAL PHARMACY W/ HCPCS (ALT 636 FOR OP/ED): Performed by: NURSE PRACTITIONER

## 2024-07-21 PROCEDURE — 84100 ASSAY OF PHOSPHORUS: CPT | Performed by: STUDENT IN AN ORGANIZED HEALTH CARE EDUCATION/TRAINING PROGRAM

## 2024-07-21 PROCEDURE — 82248 BILIRUBIN DIRECT: CPT

## 2024-07-21 PROCEDURE — 99233 SBSQ HOSP IP/OBS HIGH 50: CPT | Performed by: STUDENT IN AN ORGANIZED HEALTH CARE EDUCATION/TRAINING PROGRAM

## 2024-07-21 PROCEDURE — P9040 RBC LEUKOREDUCED IRRADIATED: HCPCS

## 2024-07-21 PROCEDURE — 2500000001 HC RX 250 WO HCPCS SELF ADMINISTERED DRUGS (ALT 637 FOR MEDICARE OP)

## 2024-07-21 PROCEDURE — 86902 BLOOD TYPE ANTIGEN DONOR EA: CPT

## 2024-07-21 RX ORDER — POSACONAZOLE 100 MG/1
300 TABLET, DELAYED RELEASE ORAL
Status: DISCONTINUED | OUTPATIENT
Start: 2024-07-24 | End: 2024-07-28 | Stop reason: HOSPADM

## 2024-07-21 RX ORDER — POSACONAZOLE 100 MG/1
300 TABLET, DELAYED RELEASE ORAL
Status: COMPLETED | OUTPATIENT
Start: 2024-07-22 | End: 2024-07-22

## 2024-07-21 RX ORDER — ACYCLOVIR 400 MG/1
400 TABLET ORAL DAILY
Status: DISCONTINUED | OUTPATIENT
Start: 2024-07-21 | End: 2024-07-28 | Stop reason: HOSPADM

## 2024-07-21 ASSESSMENT — COGNITIVE AND FUNCTIONAL STATUS - GENERAL
MOBILITY SCORE: 24
DAILY ACTIVITIY SCORE: 24
MOBILITY SCORE: 24
MOBILITY SCORE: 24

## 2024-07-21 ASSESSMENT — ACTIVITIES OF DAILY LIVING (ADL)
TOILETING: INDEPENDENT
PATIENT'S MEMORY ADEQUATE TO SAFELY COMPLETE DAILY ACTIVITIES?: YES
HEARING - LEFT EAR: FUNCTIONAL
ADEQUATE_TO_COMPLETE_ADL: YES
DRESSING YOURSELF: INDEPENDENT
JUDGMENT_ADEQUATE_SAFELY_COMPLETE_DAILY_ACTIVITIES: YES
GROOMING: INDEPENDENT
FEEDING YOURSELF: INDEPENDENT
WALKS IN HOME: INDEPENDENT
HEARING - RIGHT EAR: FUNCTIONAL
BATHING: INDEPENDENT

## 2024-07-21 ASSESSMENT — PAIN - FUNCTIONAL ASSESSMENT
PAIN_FUNCTIONAL_ASSESSMENT: 0-10

## 2024-07-21 ASSESSMENT — PAIN SCALES - GENERAL
PAINLEVEL_OUTOF10: 0 - NO PAIN

## 2024-07-21 NOTE — CARE PLAN
The patient's goals for the shift include      The clinical goals for the shift include patient shall remain safe and free from falls    Over the shift, the patient did not make progress toward the following goals. Barriers to progression include n/a. Recommendations to address these barriers include n/a.      Problem: Skin  Goal: Decreased wound size/increased tissue granulation at next dressing change  Outcome: Progressing  Goal: Participates in plan/prevention/treatment measures  Outcome: Progressing  Goal: Prevent/manage excess moisture  Outcome: Progressing  Goal: Prevent/minimize sheer/friction injuries  Outcome: Progressing  Goal: Promote/optimize nutrition  Outcome: Progressing  Goal: Promote skin healing  Outcome: Progressing     Problem: Fall/Injury  Goal: Not fall by end of shift  Outcome: Progressing  Goal: Be free from injury by end of the shift  Outcome: Progressing  Goal: Verbalize understanding of personal risk factors for fall in the hospital  Outcome: Progressing  Goal: Verbalize understanding of risk factor reduction measures to prevent injury from fall in the home  Outcome: Progressing  Goal: Use assistive devices by end of the shift  Outcome: Progressing  Goal: Pace activities to prevent fatigue by end of the shift  Outcome: Progressing     Problem: Respiratory  Goal: Clear secretions with interventions this shift  Outcome: Progressing  Goal: Minimize anxiety/maximize coping throughout shift  Outcome: Progressing  Goal: Minimal/no exertional discomfort or dyspnea this shift  Outcome: Progressing  Goal: No signs of respiratory distress (eg. Use of accessory muscles. Peds grunting)  Outcome: Progressing  Goal: Patent airway maintained this shift  Outcome: Progressing  Goal: Tolerate mechanical ventilation evidenced by VS/agitation level this shift  Outcome: Progressing  Goal: Tolerate pulmonary toileting this shift  Outcome: Progressing  Goal: Verbalize decreased shortness of breath this  shift  Outcome: Progressing  Goal: Wean oxygen to maintain O2 saturation per order/standard this shift  Outcome: Progressing  Goal: Increase self care and/or family involvement in next 24 hours  Outcome: Progressing

## 2024-07-21 NOTE — CARE PLAN
Problem: Skin  Goal: Decreased wound size/increased tissue granulation at next dressing change  Outcome: Progressing  Goal: Participates in plan/prevention/treatment measures  Outcome: Progressing  Goal: Prevent/manage excess moisture  Outcome: Progressing  Goal: Prevent/minimize sheer/friction injuries  Outcome: Progressing  Goal: Promote/optimize nutrition  Outcome: Progressing  Goal: Promote skin healing  Outcome: Progressing     Problem: Fall/Injury  Goal: Not fall by end of shift  Outcome: Progressing  Goal: Be free from injury by end of the shift  Outcome: Progressing  Goal: Verbalize understanding of personal risk factors for fall in the hospital  Outcome: Progressing  Goal: Verbalize understanding of risk factor reduction measures to prevent injury from fall in the home  Outcome: Progressing  Goal: Use assistive devices by end of the shift  Outcome: Progressing  Goal: Pace activities to prevent fatigue by end of the shift  Outcome: Progressing     Problem: Respiratory  Goal: Clear secretions with interventions this shift  Outcome: Progressing  Goal: Minimize anxiety/maximize coping throughout shift  Outcome: Progressing  Goal: Minimal/no exertional discomfort or dyspnea this shift  Outcome: Progressing  Goal: No signs of respiratory distress (eg. Use of accessory muscles. Peds grunting)  Outcome: Progressing  Goal: Patent airway maintained this shift  Outcome: Progressing  Goal: Tolerate mechanical ventilation evidenced by VS/agitation level this shift  Outcome: Progressing  Goal: Tolerate pulmonary toileting this shift  Outcome: Progressing  Goal: Verbalize decreased shortness of breath this shift  Outcome: Progressing  Goal: Wean oxygen to maintain O2 saturation per order/standard this shift  Outcome: Progressing  Goal: Increase self care and/or family involvement in next 24 hours  Outcome: Progressing     Problem: Pain - Adult  Goal: Verbalizes/displays adequate comfort level or baseline comfort  level  Outcome: Progressing     Problem: Safety - Adult  Goal: Free from fall injury  Outcome: Progressing     Problem: Discharge Planning  Goal: Discharge to home or other facility with appropriate resources  Outcome: Progressing     Problem: Chronic Conditions and Co-morbidities  Goal: Patient's chronic conditions and co-morbidity symptoms are monitored and maintained or improved  Outcome: Progressing       The clinical goals for the shift include patient shall remain safe and free from falls      VSS, remains afebrile. No c/o pain, N/V/D. Walked the halls a few times today. Patient's hemoglobin is 6.1 after 1 unit of blood; patient ordered 1 more unit of PRBCs. NS infusing at 150mL/hr. Patient remains safe throughout shift. Continue to monitor.

## 2024-07-21 NOTE — PROGRESS NOTES
Jin Reynolds is a 69 y.o. male on day 4 of admission presenting with Acute myeloid leukemia not having achieved remission (Multi).    Subjective   Afebrile, VSS. No acute issues overnight. Patient walking halls frequently taking labs, no further bleeding, feels like he has a little more energy today. No bleeding. Denies CP, palpitations, SOB, HA, vision changes, N/V/D/C, flank pain/dysuria. ROS otherwise unremarkable.          Objective     Physical Exam  Constitutional:       General: He is not in acute distress.     Appearance: Normal appearance.   HENT:      Head: Normocephalic and atraumatic.      Mouth/Throat:      Mouth: Mucous membranes are moist.   Eyes:      General: No scleral icterus.     Extraocular Movements: Extraocular movements intact.      Pupils: Pupils are equal, round, and reactive to light.   Cardiovascular:      Rate and Rhythm: Normal rate and regular rhythm.      Pulses: Normal pulses.      Heart sounds: Normal heart sounds.   Pulmonary:      Effort: Pulmonary effort is normal.      Breath sounds: Normal breath sounds.   Abdominal:      General: Abdomen is flat.      Palpations: Abdomen is soft. There is no mass.      Tenderness: There is no abdominal tenderness.   Musculoskeletal:         General: No swelling or tenderness. Normal range of motion.      Cervical back: Normal range of motion.   Skin:     General: Skin is warm and dry.      Capillary Refill: Capillary refill takes less than 2 seconds.   Neurological:      General: No focal deficit present.      Mental Status: He is alert and oriented to person, place, and time.   Psychiatric:         Mood and Affect: Mood normal.         Behavior: Behavior normal.         Last Recorded Vitals  Blood pressure 129/82, pulse 72, temperature 36.8 °C (98.2 °F), temperature source Temporal, resp. rate 18, weight 90.8 kg (200 lb 2.8 oz), SpO2 98%.  Intake/Output last 3 Shifts:  I/O last 3 completed shifts:  In: 4504.2 (49.6 mL/kg) [I.V.:4027.5 (44.4  mL/kg); Blood:476.7]  Out: - (0 mL/kg)   Weight: 90.8 kg     Relevant Results           Results for orders placed or performed during the hospital encounter of 07/17/24 (from the past 24 hour(s))   CBC and Auto Differential   Result Value Ref Range    WBC 1.4 (L) 4.4 - 11.3 x10*3/uL    nRBC 0.0 0.0 - 0.0 /100 WBCs    RBC 2.14 (L) 4.50 - 5.90 x10*6/uL    Hemoglobin 6.1 (LL) 13.5 - 17.5 g/dL    Hematocrit 17.9 (L) 41.0 - 52.0 %    MCV 84 80 - 100 fL    MCH 28.5 26.0 - 34.0 pg    MCHC 34.1 32.0 - 36.0 g/dL    RDW 17.5 (H) 11.5 - 14.5 %    Platelets 11 (LL) 150 - 450 x10*3/uL    Neutrophils % 58.9 40.0 - 80.0 %    Immature Granulocytes %, Automated 0.7 0.0 - 0.9 %    Lymphocytes % 30.5 13.0 - 44.0 %    Monocytes % 8.5 2.0 - 10.0 %    Eosinophils % 1.4 0.0 - 6.0 %    Basophils % 0.0 0.0 - 2.0 %    Neutrophils Absolute 0.83 (L) 1.20 - 7.70 x10*3/uL    Immature Granulocytes Absolute, Automated 0.01 0.00 - 0.70 x10*3/uL    Lymphocytes Absolute 0.43 (L) 1.20 - 4.80 x10*3/uL    Monocytes Absolute 0.12 0.10 - 1.00 x10*3/uL    Eosinophils Absolute 0.02 0.00 - 0.70 x10*3/uL    Basophils Absolute 0.00 0.00 - 0.10 x10*3/uL   Hepatic function panel   Result Value Ref Range    Albumin 3.2 (L) 3.4 - 5.0 g/dL    Bilirubin, Total 0.9 0.0 - 1.2 mg/dL    Bilirubin, Direct 0.2 0.0 - 0.3 mg/dL    Alkaline Phosphatase 66 33 - 136 U/L    ALT 7 (L) 10 - 52 U/L    AST 10 9 - 39 U/L    Total Protein 6.2 (L) 6.4 - 8.2 g/dL   Magnesium   Result Value Ref Range    Magnesium 1.87 1.60 - 2.40 mg/dL   Lactate Dehydrogenase   Result Value Ref Range    LDH 1,151 (H) 84 - 246 U/L   Uric Acid   Result Value Ref Range    Uric Acid 2.3 (L) 4.0 - 7.5 mg/dL   Basic Metabolic Panel   Result Value Ref Range    Glucose 159 (H) 74 - 99 mg/dL    Sodium 135 (L) 136 - 145 mmol/L    Potassium 3.4 (L) 3.5 - 5.3 mmol/L    Chloride 109 (H) 98 - 107 mmol/L    Bicarbonate 19 (L) 21 - 32 mmol/L    Anion Gap 10 10 - 20 mmol/L    Urea Nitrogen 35 (H) 6 - 23 mg/dL     Creatinine 1.69 (H) 0.50 - 1.30 mg/dL    eGFR 44 (L) >60 mL/min/1.73m*2    Calcium 7.6 (L) 8.6 - 10.6 mg/dL   Phosphorus   Result Value Ref Range    Phosphorus 3.2 2.5 - 4.9 mg/dL   Morphology   Result Value Ref Range    RBC Morphology No significant RBC morphology present    Prepare RBC: 1 Units, Irradiated, Leukocytes Reduced (CMV reduced risk)   Result Value Ref Range    PRODUCT CODE R9796T85     Unit Number [id number]     Unit ABO O     Unit RH POS     XM INTEP COMP     Dispense Status IS     Blood Expiration Date 8/8/2024 11:59:00 PM EDT     PRODUCT BLOOD TYPE 5100     UNIT VOLUME 323    CBC and Auto Differential   Result Value Ref Range    WBC 1.8 (L) 4.4 - 11.3 x10*3/uL    nRBC 0.0 0.0 - 0.0 /100 WBCs    RBC 2.12 (L) 4.50 - 5.90 x10*6/uL    Hemoglobin 6.1 (LL) 13.5 - 17.5 g/dL    Hematocrit 17.9 (L) 41.0 - 52.0 %    MCV 84 80 - 100 fL    MCH 28.8 26.0 - 34.0 pg    MCHC 34.1 32.0 - 36.0 g/dL    RDW 17.3 (H) 11.5 - 14.5 %    Platelets 12 (LL) 150 - 450 x10*3/uL    Neutrophils % 51.5 40.0 - 80.0 %    Immature Granulocytes %, Automated 1.1 (H) 0.0 - 0.9 %    Lymphocytes % 37.7 13.0 - 44.0 %    Monocytes % 8.0 2.0 - 10.0 %    Eosinophils % 1.7 0.0 - 6.0 %    Basophils % 0.0 0.0 - 2.0 %    Neutrophils Absolute 0.90 (L) 1.20 - 7.70 x10*3/uL    Immature Granulocytes Absolute, Automated 0.02 0.00 - 0.70 x10*3/uL    Lymphocytes Absolute 0.66 (L) 1.20 - 4.80 x10*3/uL    Monocytes Absolute 0.14 0.10 - 1.00 x10*3/uL    Eosinophils Absolute 0.03 0.00 - 0.70 x10*3/uL    Basophils Absolute 0.00 0.00 - 0.10 x10*3/uL   Hepatic function panel   Result Value Ref Range    Albumin 3.2 (L) 3.4 - 5.0 g/dL    Bilirubin, Total 0.9 0.0 - 1.2 mg/dL    Bilirubin, Direct 0.2 0.0 - 0.3 mg/dL    Alkaline Phosphatase 67 33 - 136 U/L    ALT 7 (L) 10 - 52 U/L    AST 10 9 - 39 U/L    Total Protein 5.9 (L) 6.4 - 8.2 g/dL   Uric Acid   Result Value Ref Range    Uric Acid 2.2 (L) 4.0 - 7.5 mg/dL   Lactate dehydrogenase   Result Value Ref  Range    LDH 1,059 (H) 84 - 246 U/L   Phosphorus   Result Value Ref Range    Phosphorus 3.4 2.5 - 4.9 mg/dL   Basic Metabolic Panel   Result Value Ref Range    Glucose 83 74 - 99 mg/dL    Sodium 136 136 - 145 mmol/L    Potassium 4.0 3.5 - 5.3 mmol/L    Chloride 111 (H) 98 - 107 mmol/L    Bicarbonate 19 (L) 21 - 32 mmol/L    Anion Gap 10 10 - 20 mmol/L    Urea Nitrogen 35 (H) 6 - 23 mg/dL    Creatinine 1.59 (H) 0.50 - 1.30 mg/dL    eGFR 47 (L) >60 mL/min/1.73m*2    Calcium 7.4 (L) 8.6 - 10.6 mg/dL   Morphology   Result Value Ref Range    RBC Morphology No significant RBC morphology present      Scheduled medications  allopurinol, 300 mg, oral, Daily  amLODIPine, 10 mg, oral, Daily  cholecalciferol, 800 Units, oral, Daily  montelukast, 10 mg, oral, Daily  pantoprazole, 40 mg, oral, Daily before breakfast  predniSONE, 80 mg, oral, Daily      Continuous medications  sodium chloride 0.9%, 150 mL/hr, Last Rate: 150 mL/hr (07/21/24 0633)      PRN medications  PRN medications: alteplase, ondansetron, polyethylene glycol, prochlorperazine                Assessment/Plan   Principal Problem:    Acute myeloid leukemia not having achieved remission (Multi)  Active Problems:    Delayed hemolytic transfusion reaction    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 male with PMHx of AML, esophageal ulcer w/ GI bleed, smoldering systemic mastocytosis, and HTN presented to infusion center for C1D2 for Jeremy/Aza but ab work revealed increased Cr 1.74, phosph 5.8, uric acid 6, and LDH 6,660, showing evidence of TLS so aza/Jeremy were held. He was started on IVF and rasburicase. He was admitted to [facility] for further evaluation and treatment.    Updates 7/21/24:  #TLS  - Labs improving  - c/w allopurinol  - c/w IVF at 150cc/hr  - cut labs back to daily  #DHTR  - Haptoglobin 22, suggestive of hemolysis  - cont prednisone 80mg daily (started 7/19)  - 1u  PRBC today  #PERRY  - slight improvement in sCr, continue with mIVF as above for TLS, renally dose meds      ONC:  #Smoldering systemic Mastocytosis (Dx 8/2023)  - presented with skin rash and eosinophilia  - BMBx (8/23/23): hypercellular (90-95%) with trilineage hematopoiesis, granulocytic hyperplasia, eosinophilia, and increased atypical mast cells.  - c/w allegra (will have someone bring his home supply since its not on formulary)  - c/w home montelukast  #AML  - Presented with worsening anemia in setting of mast cell disease  - (6/24/24): peripheral blood path read abnormal lymphocytes with possible blasts.  - BMBx (6/27/24): Acute Leukemia arising in background of systemic mastocytosis; 7-8% blast on peripheral blood; + CD34+, CD7+, TDT+  - Started induction w/ C1 Aza/Jeremy (7/16/24)  - (7/17) C1D2 was put on hold due to TLS    #TLS  - LDH (7/17) 6,660, (7/18) 4,478, (7/19) 2,508, (7/20) 1757, (7/21) 1059  - Uric Acid (7/17) 6, (7/18) 5.3, (7/19) 3.5, (7/20) 2.5, (7/21) 2.2   - s/p Rasburicase  - c/w Allopurinol 300 mg daily  - Phosph (7/17) 5.8, (7/18) 5, (7/19) 3.9, (7/20) 2.3, (7/21) 3.4  - Potassium (7/17) 5, (7/18) 4.2, (7/19) 3.9, (7/20) 3.7, (7/21) 4.0  - c/w IVF at 150cc/hr   - monitoring daily     CHEMO:  28-day cycle for 1 cycle   Venetoclax 100 mg PO on Day 1 if tolerated followed by   Venetoclax 200 mg PO on Day 2 if tolerated followed by   Venetoclax 400 mg PO on Days 3 - 28   AzaCITIDine 75 mg/m2 IV over 10 - 40 minutes or subcutaneous daily on Days 1 - 7   -consider restarting Aza only next week     HEME:  #Anemia and Thrombocytopenia  #DHTR  - Haptoglobin 22, suggestive of hemolysis  - prednisone 80mg daily (7/19-**)  - Hgb 5 (7/17), 5.5 (7/18), 5.1 (7/19) s/p 1u pRBCs 7/17, 7/18, 7/19, 7/20  - G6PD(7/17) Normal   - no evidence of bleeding   - Monitor counts daily  - Keep Hgb >7, plt >10     ID:  NKDA  #ppx: allopurinol  - Afebrile, no evidence of active infection on admit     FEN/GI:  Admit  Wt: 86.2 kg  - Monitor electrolytes, replace as needed  - c/w home pantoprazole     RENAL:  # PERRY  :: 2/2 TLS  - sCr 1.74 on admission; Creat  increased to 2.88 (7/18), 2.61 (7/19)  - Continue IVF 150mL/hr, renally dose meds    CARDIAC:  #HTN  - c/w home amlodipine  - Echo (6/14/23): EF 55-60%       DISPO:  - Full Code   - Access: PIV  - Primary Oncologist: Dr. Dawson  - PT/OT eval     Patient seen and discussed with Dr. Nika Dawson.       DO Oscar Meier DO  Hematology- Oncology Fellow, PGY-7

## 2024-07-22 ENCOUNTER — APPOINTMENT (OUTPATIENT)
Dept: HEMATOLOGY/ONCOLOGY | Facility: HOSPITAL | Age: 69
End: 2024-07-22
Payer: MEDICARE

## 2024-07-22 LAB
ABO GROUP (TYPE) IN BLOOD: NORMAL
ALBUMIN SERPL BCP-MCNC: 3.1 G/DL (ref 3.4–5)
ALP SERPL-CCNC: 58 U/L (ref 33–136)
ALT SERPL W P-5'-P-CCNC: 7 U/L (ref 10–52)
ANION GAP SERPL CALC-SCNC: 11 MMOL/L (ref 10–20)
ANTIBODY SCREEN: NORMAL
APTT PPP: 42 SECONDS (ref 27–38)
AST SERPL W P-5'-P-CCNC: 7 U/L (ref 9–39)
BASOPHILS # BLD MANUAL: 0 X10*3/UL (ref 0–0.1)
BASOPHILS NFR BLD MANUAL: 0 %
BB ANTIBODY IDENTIFICATION: NORMAL
BILIRUB DIRECT SERPL-MCNC: 0.2 MG/DL (ref 0–0.3)
BILIRUB SERPL-MCNC: 1 MG/DL (ref 0–1.2)
BLOOD EXPIRATION DATE: NORMAL
BUN SERPL-MCNC: 26 MG/DL (ref 6–23)
CALCIUM SERPL-MCNC: 7 MG/DL (ref 8.6–10.6)
CASE #: NORMAL
CHLORIDE SERPL-SCNC: 109 MMOL/L (ref 98–107)
CO2 SERPL-SCNC: 20 MMOL/L (ref 21–32)
CREAT SERPL-MCNC: 1.53 MG/DL (ref 0.5–1.3)
DISPENSE STATUS: NORMAL
EGFRCR SERPLBLD CKD-EPI 2021: 49 ML/MIN/1.73M*2
EOSINOPHIL # BLD MANUAL: 0.14 X10*3/UL (ref 0–0.7)
EOSINOPHIL NFR BLD MANUAL: 7.5 %
ERYTHROCYTE [DISTWIDTH] IN BLOOD BY AUTOMATED COUNT: 16.5 % (ref 11.5–14.5)
FIBRINOGEN PPP-MCNC: 254 MG/DL (ref 200–400)
GLUCOSE SERPL-MCNC: 88 MG/DL (ref 74–99)
HCT VFR BLD AUTO: 20.3 % (ref 41–52)
HGB BLD-MCNC: 7.1 G/DL (ref 13.5–17.5)
IMM GRANULOCYTES # BLD AUTO: 0.01 X10*3/UL (ref 0–0.7)
IMM GRANULOCYTES NFR BLD AUTO: 0.6 % (ref 0–0.9)
INR PPP: 1.3 (ref 0.9–1.1)
LDH SERPL L TO P-CCNC: 759 U/L (ref 84–246)
LYMPHOCYTES # BLD MANUAL: 0.69 X10*3/UL (ref 1.2–4.8)
LYMPHOCYTES NFR BLD MANUAL: 38.3 %
MCH RBC QN AUTO: 28.1 PG (ref 26–34)
MCHC RBC AUTO-ENTMCNC: 35 G/DL (ref 32–36)
MCV RBC AUTO: 80 FL (ref 80–100)
MONOCYTES # BLD MANUAL: 0.06 X10*3/UL (ref 0.1–1)
MONOCYTES NFR BLD MANUAL: 3.4 %
NEUTROPHILS # BLD MANUAL: 0.91 X10*3/UL (ref 1.2–7.7)
NEUTS BAND # BLD MANUAL: 0.23 X10*3/UL (ref 0–0.7)
NEUTS BAND NFR BLD MANUAL: 12.5 %
NEUTS SEG # BLD MANUAL: 0.68 X10*3/UL (ref 1.2–7)
NEUTS SEG NFR BLD MANUAL: 37.5 %
NRBC BLD-RTO: 0 /100 WBCS (ref 0–0)
PATH REV-IMMUNOHEMATOLOGY-PR30: NORMAL
PHOSPHATE SERPL-MCNC: 3.1 MG/DL (ref 2.5–4.9)
PLATELET # BLD AUTO: 11 X10*3/UL (ref 150–450)
POTASSIUM SERPL-SCNC: 3.6 MMOL/L (ref 3.5–5.3)
PRODUCT BLOOD TYPE: 5100
PRODUCT CODE: NORMAL
PROT SERPL-MCNC: 5.8 G/DL (ref 6.4–8.2)
PROTHROMBIN TIME: 14.3 SECONDS (ref 9.8–12.8)
RBC # BLD AUTO: 2.53 X10*6/UL (ref 4.5–5.9)
RBC MORPH BLD: ABNORMAL
RH FACTOR (ANTIGEN D): NORMAL
SODIUM SERPL-SCNC: 136 MMOL/L (ref 136–145)
TOTAL CELLS COUNTED BLD: 120
UNIT ABO: NORMAL
UNIT NUMBER: NORMAL
UNIT RH: NORMAL
UNIT VOLUME: 323
URATE SERPL-MCNC: 2.3 MG/DL (ref 4–7.5)
VARIANT LYMPHS # BLD MANUAL: 0.01 X10*3/UL (ref 0–0.5)
VARIANT LYMPHS NFR BLD: 0.8 %
WBC # BLD AUTO: 1.8 X10*3/UL (ref 4.4–11.3)
XM INTEP: NORMAL

## 2024-07-22 PROCEDURE — 82248 BILIRUBIN DIRECT: CPT

## 2024-07-22 PROCEDURE — 80053 COMPREHEN METABOLIC PANEL: CPT | Performed by: STUDENT IN AN ORGANIZED HEALTH CARE EDUCATION/TRAINING PROGRAM

## 2024-07-22 PROCEDURE — 85027 COMPLETE CBC AUTOMATED: CPT

## 2024-07-22 PROCEDURE — 86901 BLOOD TYPING SEROLOGIC RH(D): CPT | Performed by: STUDENT IN AN ORGANIZED HEALTH CARE EDUCATION/TRAINING PROGRAM

## 2024-07-22 PROCEDURE — 2500000001 HC RX 250 WO HCPCS SELF ADMINISTERED DRUGS (ALT 637 FOR MEDICARE OP): Performed by: STUDENT IN AN ORGANIZED HEALTH CARE EDUCATION/TRAINING PROGRAM

## 2024-07-22 PROCEDURE — 85610 PROTHROMBIN TIME: CPT

## 2024-07-22 PROCEDURE — 86870 RBC ANTIBODY IDENTIFICATION: CPT

## 2024-07-22 PROCEDURE — 85384 FIBRINOGEN ACTIVITY: CPT

## 2024-07-22 PROCEDURE — 83615 LACTATE (LD) (LDH) ENZYME: CPT | Performed by: STUDENT IN AN ORGANIZED HEALTH CARE EDUCATION/TRAINING PROGRAM

## 2024-07-22 PROCEDURE — 84100 ASSAY OF PHOSPHORUS: CPT | Performed by: STUDENT IN AN ORGANIZED HEALTH CARE EDUCATION/TRAINING PROGRAM

## 2024-07-22 PROCEDURE — 2500000002 HC RX 250 W HCPCS SELF ADMINISTERED DRUGS (ALT 637 FOR MEDICARE OP, ALT 636 FOR OP/ED): Performed by: STUDENT IN AN ORGANIZED HEALTH CARE EDUCATION/TRAINING PROGRAM

## 2024-07-22 PROCEDURE — 1170000001 HC PRIVATE ONCOLOGY ROOM DAILY

## 2024-07-22 PROCEDURE — 36415 COLL VENOUS BLD VENIPUNCTURE: CPT

## 2024-07-22 PROCEDURE — 2500000004 HC RX 250 GENERAL PHARMACY W/ HCPCS (ALT 636 FOR OP/ED): Performed by: NURSE PRACTITIONER

## 2024-07-22 PROCEDURE — 99233 SBSQ HOSP IP/OBS HIGH 50: CPT | Performed by: INTERNAL MEDICINE

## 2024-07-22 PROCEDURE — 2500000004 HC RX 250 GENERAL PHARMACY W/ HCPCS (ALT 636 FOR OP/ED): Performed by: INTERNAL MEDICINE

## 2024-07-22 PROCEDURE — 84550 ASSAY OF BLOOD/URIC ACID: CPT | Performed by: STUDENT IN AN ORGANIZED HEALTH CARE EDUCATION/TRAINING PROGRAM

## 2024-07-22 PROCEDURE — 85007 BL SMEAR W/DIFF WBC COUNT: CPT

## 2024-07-22 PROCEDURE — 2500000001 HC RX 250 WO HCPCS SELF ADMINISTERED DRUGS (ALT 637 FOR MEDICARE OP)

## 2024-07-22 ASSESSMENT — PAIN - FUNCTIONAL ASSESSMENT: PAIN_FUNCTIONAL_ASSESSMENT: 0-10

## 2024-07-22 ASSESSMENT — PAIN SCALES - GENERAL
PAINLEVEL_OUTOF10: 0 - NO PAIN
PAINLEVEL_OUTOF10: 0 - NO PAIN

## 2024-07-22 NOTE — CARE PLAN
The patient's goals for the shift include      The clinical goals for the shift include patient shall remain safe and free from falls    Over the shift, the patient did not make progress toward the following goals. Barriers to progression include n/a. Recommendations to address these barriers include n/a.      Problem: Skin  Goal: Decreased wound size/increased tissue granulation at next dressing change  Outcome: Progressing  Goal: Participates in plan/prevention/treatment measures  Outcome: Progressing  Goal: Prevent/manage excess moisture  Outcome: Progressing  Goal: Prevent/minimize sheer/friction injuries  Outcome: Progressing  Goal: Promote/optimize nutrition  Outcome: Progressing  Goal: Promote skin healing  Outcome: Progressing     Problem: Fall/Injury  Goal: Not fall by end of shift  Outcome: Progressing  Goal: Be free from injury by end of the shift  Outcome: Progressing  Goal: Verbalize understanding of personal risk factors for fall in the hospital  Outcome: Progressing  Goal: Verbalize understanding of risk factor reduction measures to prevent injury from fall in the home  Outcome: Progressing  Goal: Use assistive devices by end of the shift  Outcome: Progressing  Goal: Pace activities to prevent fatigue by end of the shift  Outcome: Progressing     Problem: Respiratory  Goal: Clear secretions with interventions this shift  Outcome: Progressing  Goal: Minimize anxiety/maximize coping throughout shift  Outcome: Progressing  Goal: Minimal/no exertional discomfort or dyspnea this shift  Outcome: Progressing  Goal: No signs of respiratory distress (eg. Use of accessory muscles. Peds grunting)  Outcome: Progressing  Goal: Patent airway maintained this shift  Outcome: Progressing  Goal: Tolerate mechanical ventilation evidenced by VS/agitation level this shift  Outcome: Progressing  Goal: Tolerate pulmonary toileting this shift  Outcome: Progressing  Goal: Verbalize decreased shortness of breath this  shift  Outcome: Progressing  Goal: Wean oxygen to maintain O2 saturation per order/standard this shift  Outcome: Progressing  Goal: Increase self care and/or family involvement in next 24 hours  Outcome: Progressing     Problem: Pain - Adult  Goal: Verbalizes/displays adequate comfort level or baseline comfort level  Outcome: Progressing     Problem: Safety - Adult  Goal: Free from fall injury  Outcome: Progressing     Problem: Discharge Planning  Goal: Discharge to home or other facility with appropriate resources  Outcome: Progressing     Problem: Chronic Conditions and Co-morbidities  Goal: Patient's chronic conditions and co-morbidity symptoms are monitored and maintained or improved  Outcome: Progressing

## 2024-07-22 NOTE — CARE PLAN
Problem: Skin  Goal: Decreased wound size/increased tissue granulation at next dressing change  Outcome: Progressing  Flowsheets (Taken 7/22/2024 1731)  Decreased wound size/increased tissue granulation at next dressing change: Protective dressings over bony prominences  Goal: Participates in plan/prevention/treatment measures  Outcome: Progressing  Flowsheets (Taken 7/22/2024 1731)  Participates in plan/prevention/treatment measures:   Increase activity/out of bed for meals   Elevate heels  Goal: Prevent/manage excess moisture  Outcome: Progressing  Flowsheets (Taken 7/22/2024 1731)  Prevent/manage excess moisture: Monitor for/manage infection if present  Goal: Prevent/minimize sheer/friction injuries  Outcome: Progressing  Flowsheets (Taken 7/22/2024 1731)  Prevent/minimize sheer/friction injuries: Increase activity/out of bed for meals  Goal: Promote/optimize nutrition  Outcome: Progressing  Flowsheets (Taken 7/22/2024 1731)  Promote/optimize nutrition: Offer water/supplements/favorite foods  Goal: Promote skin healing  Outcome: Progressing  Flowsheets (Taken 7/22/2024 1731)  Promote skin healing: Assess skin/pad under line(s)/device(s)     Problem: Fall/Injury  Goal: Not fall by end of shift  Outcome: Progressing  Goal: Be free from injury by end of the shift  Outcome: Progressing  Goal: Verbalize understanding of personal risk factors for fall in the hospital  Outcome: Progressing  Goal: Verbalize understanding of risk factor reduction measures to prevent injury from fall in the home  Outcome: Progressing  Goal: Use assistive devices by end of the shift  Outcome: Progressing  Goal: Pace activities to prevent fatigue by end of the shift  Outcome: Progressing     The clinical goals for the shift include patient will remain safe thru 7/22/24 1900    Patient with normal VS. No pain or nausea. Been walking halls and in chair today. Up ad luis alberto. Fluids just decreased. Patient remained safe this shift.

## 2024-07-22 NOTE — PROGRESS NOTES
Jin Reynolds is a 69 y.o. male on day 5 of admission presenting with Acute myeloid leukemia not having achieved remission (Multi).    Subjective   Afebrile. No acute issues overnight. Patient feeling well overall. Walking in hallway frequently. No bleeding. Denies CP, palpitations, SOB, HA, vision changes, N/V/D/C, flank pain/dysuria. ROS otherwise unremarkable.       Objective     Physical Exam  Constitutional:       General: He is not in acute distress.     Appearance: Normal appearance.   HENT:      Head: Normocephalic and atraumatic.      Nose: Nose normal.      Mouth/Throat:      Mouth: Mucous membranes are moist.      Pharynx: Oropharynx is clear.   Eyes:      General: No scleral icterus.     Extraocular Movements: Extraocular movements intact.      Pupils: Pupils are equal, round, and reactive to light.   Cardiovascular:      Rate and Rhythm: Normal rate and regular rhythm.      Pulses: Normal pulses.      Heart sounds: Normal heart sounds.   Pulmonary:      Effort: Pulmonary effort is normal. No respiratory distress.      Breath sounds: Normal breath sounds.   Abdominal:      General: Bowel sounds are normal. There is no distension.      Palpations: Abdomen is soft. There is no mass.      Tenderness: There is no abdominal tenderness.   Musculoskeletal:         General: No swelling or tenderness. Normal range of motion.      Cervical back: Normal range of motion and neck supple.   Skin:     General: Skin is warm and dry.   Neurological:      General: No focal deficit present.      Mental Status: He is alert and oriented to person, place, and time.   Psychiatric:         Mood and Affect: Mood normal.         Behavior: Behavior normal.         Last Recorded Vitals  Blood pressure 152/85, pulse 70, temperature 36 °C (96.8 °F), temperature source Temporal, resp. rate 18, weight 89.8 kg (198 lb), SpO2 98%.  Intake/Output last 3 Shifts:  I/O last 3 completed shifts:  In: 6398.2 (71.2 mL/kg) [P.O.:840; I.V.:4928.5  (54.9 mL/kg); Blood:629.7]  Out: 4 (0 mL/kg) [Urine:4 (0 mL/kg/hr)]  Weight: 89.8 kg     Relevant Results      Scheduled medications  acyclovir, 400 mg, oral, Daily  allopurinol, 300 mg, oral, Daily  amLODIPine, 10 mg, oral, Daily  cholecalciferol, 800 Units, oral, Daily  montelukast, 10 mg, oral, Daily  pantoprazole, 40 mg, oral, Daily before breakfast  posaconazole, 300 mg, oral, BID  [START ON 7/24/2024] posaconazole, 300 mg, oral, Daily with breakfast  predniSONE, 80 mg, oral, Daily      Continuous medications  sodium chloride 0.9%, 150 mL/hr, Last Rate: 150 mL/hr (07/22/24 1311)      PRN medications  PRN medications: alteplase, ondansetron, polyethylene glycol, prochlorperazine      Assessment/Plan   Principal Problem:    Acute myeloid leukemia not having achieved remission (Multi)  Active Problems:    Delayed hemolytic transfusion reaction    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 male with PMHx of AML, esophageal ulcer w/ GI bleed, smoldering systemic mastocytosis, and HTN presented to infusion center for C1D2 for Jeremy/Aza but ab work revealed increased Cr 1.74, phosph 5.8, uric acid 6, and LDH 6,660, showing evidence of TLS so aza/Jeremy were held. He was started on IVF and rasburicase. He was admitted to [facility] for further evaluation and treatment.      ONC:  #Smoldering systemic Mastocytosis (Dx 8/2023)  - presented with skin rash and eosinophilia  - BMBx (8/23/23): hypercellular (90-95%) with trilineage hematopoiesis, granulocytic hyperplasia, eosinophilia, and increased atypical mast cells.  - c/w allegra (will have someone bring his home supply since its not on formulary)  - c/w home montelukast  #AML  - Presented with worsening anemia in setting of mast cell disease  - (6/24/24): peripheral blood path read abnormal lymphocytes with possible blasts.  - BMBx (6/27/24): Acute Leukemia arising in background of  systemic mastocytosis; 7-8% blast on peripheral blood; + CD34+, CD7+, TDT+  - Started induction w/ C1 Aza/Jeremy (7/16/24)  - On hold, but will restart Aza only on 7/23 (hold Venetoclax)    #TLS  - LDH/ UA downtrending   - s/p Rasburicase  - c/w Allopurinol 300 mg daily  - decreased IVF to 75ml/hr        HEME:  #Pancytopenia secondary to disease  #DHTR  - Haptoglobin 22, suggestive of hemolysis  - prednisone 80mg daily (7/19-**). Plan to taper in several days.  - Hgb 5 (7/17), 5.5 (7/18), 5.1 (7/19) s/p 1u pRBCs 7/17, 7/18, 7/19, 7/20  - G6PD(7/17) Normal   - no evidence of bleeding   - Monitor counts daily  - Keep Hgb >7, plt >10     ID:  NKDA  #ppx: ACV, Posaconazole  - Afebrile, no evidence of active infection on admit     FEN/GI:  Admit Wt: 88.2 kg, current wt: 89.8kg (7/22)  - Monitor electrolytes, replace as needed  - c/w home pantoprazole     RENAL:  # PERRY  :: 2/2 TLS  - sCr 1.74 on admission; Max Creat 2.88 (7/18), now 1.53 (7/22)    CARDIAC:  #HTN  - c/w home amlodipine  - Echo (6/14/23): EF 55-60%       DISPO:  - Full Code   - Access: PIV  - Primary Oncologist: Dr. Dawson  - PT/OT eval     Patient seen and discussed with Dr. Tr Bourgeois PA-C

## 2024-07-23 LAB
ALBUMIN SERPL BCP-MCNC: 3.4 G/DL (ref 3.4–5)
ALP SERPL-CCNC: 60 U/L (ref 33–136)
ALT SERPL W P-5'-P-CCNC: 8 U/L (ref 10–52)
ANION GAP SERPL CALC-SCNC: 11 MMOL/L (ref 10–20)
AST SERPL W P-5'-P-CCNC: 9 U/L (ref 9–39)
BASOPHILS # BLD MANUAL: 0.01 X10*3/UL (ref 0–0.1)
BASOPHILS NFR BLD MANUAL: 0.8 %
BILIRUB DIRECT SERPL-MCNC: 0.2 MG/DL (ref 0–0.3)
BILIRUB SERPL-MCNC: 0.7 MG/DL (ref 0–1.2)
BUN SERPL-MCNC: 24 MG/DL (ref 6–23)
BURR CELLS BLD QL SMEAR: ABNORMAL
CALCIUM SERPL-MCNC: 7.4 MG/DL (ref 8.6–10.6)
CHLORIDE SERPL-SCNC: 108 MMOL/L (ref 98–107)
CO2 SERPL-SCNC: 21 MMOL/L (ref 21–32)
CREAT SERPL-MCNC: 1.39 MG/DL (ref 0.5–1.3)
EGFRCR SERPLBLD CKD-EPI 2021: 55 ML/MIN/1.73M*2
EOSINOPHIL # BLD MANUAL: 0 X10*3/UL (ref 0–0.7)
EOSINOPHIL NFR BLD MANUAL: 0 %
ERYTHROCYTE [DISTWIDTH] IN BLOOD BY AUTOMATED COUNT: 16.6 % (ref 11.5–14.5)
GLUCOSE SERPL-MCNC: 100 MG/DL (ref 74–99)
HCT VFR BLD AUTO: 23 % (ref 41–52)
HGB BLD-MCNC: 7.8 G/DL (ref 13.5–17.5)
IMM GRANULOCYTES # BLD AUTO: 0.02 X10*3/UL (ref 0–0.7)
IMM GRANULOCYTES NFR BLD AUTO: 1.3 % (ref 0–0.9)
LDH SERPL L TO P-CCNC: 659 U/L (ref 84–246)
LYMPHOCYTES # BLD MANUAL: 0.38 X10*3/UL (ref 1.2–4.8)
LYMPHOCYTES NFR BLD MANUAL: 25 %
MCH RBC QN AUTO: 28.2 PG (ref 26–34)
MCHC RBC AUTO-ENTMCNC: 33.9 G/DL (ref 32–36)
MCV RBC AUTO: 83 FL (ref 80–100)
MONOCYTES # BLD MANUAL: 0.01 X10*3/UL (ref 0.1–1)
MONOCYTES NFR BLD MANUAL: 0.9 %
MYELOCYTES # BLD MANUAL: 0.01 X10*3/UL
MYELOCYTES NFR BLD MANUAL: 0.9 %
NEUTS SEG # BLD MANUAL: 1.05 X10*3/UL (ref 1.2–7)
NEUTS SEG NFR BLD MANUAL: 69.8 %
NRBC BLD-RTO: 0 /100 WBCS (ref 0–0)
PHOSPHATE SERPL-MCNC: 3.4 MG/DL (ref 2.5–4.9)
PLATELET # BLD AUTO: 14 X10*3/UL (ref 150–450)
POTASSIUM SERPL-SCNC: 3.6 MMOL/L (ref 3.5–5.3)
PROT SERPL-MCNC: 6.5 G/DL (ref 6.4–8.2)
RBC # BLD AUTO: 2.77 X10*6/UL (ref 4.5–5.9)
RBC MORPH BLD: ABNORMAL
SODIUM SERPL-SCNC: 136 MMOL/L (ref 136–145)
TOTAL CELLS COUNTED BLD: 116
URATE SERPL-MCNC: 1.8 MG/DL (ref 4–7.5)
VARIANT LYMPHS # BLD MANUAL: 0.04 X10*3/UL (ref 0–0.5)
VARIANT LYMPHS NFR BLD: 2.6 %
WBC # BLD AUTO: 1.5 X10*3/UL (ref 4.4–11.3)

## 2024-07-23 PROCEDURE — 80053 COMPREHEN METABOLIC PANEL: CPT | Performed by: PHYSICIAN ASSISTANT

## 2024-07-23 PROCEDURE — 3E01305 INTRODUCTION OF OTHER ANTINEOPLASTIC INTO SUBCUTANEOUS TISSUE, PERCUTANEOUS APPROACH: ICD-10-PCS | Performed by: INTERNAL MEDICINE

## 2024-07-23 PROCEDURE — 85027 COMPLETE CBC AUTOMATED: CPT

## 2024-07-23 PROCEDURE — 2500000005 HC RX 250 GENERAL PHARMACY W/O HCPCS: Performed by: INTERNAL MEDICINE

## 2024-07-23 PROCEDURE — 85007 BL SMEAR W/DIFF WBC COUNT: CPT

## 2024-07-23 PROCEDURE — 2500000004 HC RX 250 GENERAL PHARMACY W/ HCPCS (ALT 636 FOR OP/ED): Performed by: INTERNAL MEDICINE

## 2024-07-23 PROCEDURE — 36415 COLL VENOUS BLD VENIPUNCTURE: CPT

## 2024-07-23 PROCEDURE — 2500000001 HC RX 250 WO HCPCS SELF ADMINISTERED DRUGS (ALT 637 FOR MEDICARE OP)

## 2024-07-23 PROCEDURE — 84550 ASSAY OF BLOOD/URIC ACID: CPT | Performed by: PHYSICIAN ASSISTANT

## 2024-07-23 PROCEDURE — 99233 SBSQ HOSP IP/OBS HIGH 50: CPT | Performed by: INTERNAL MEDICINE

## 2024-07-23 PROCEDURE — 2500000001 HC RX 250 WO HCPCS SELF ADMINISTERED DRUGS (ALT 637 FOR MEDICARE OP): Performed by: STUDENT IN AN ORGANIZED HEALTH CARE EDUCATION/TRAINING PROGRAM

## 2024-07-23 PROCEDURE — 2500000004 HC RX 250 GENERAL PHARMACY W/ HCPCS (ALT 636 FOR OP/ED): Performed by: PHYSICIAN ASSISTANT

## 2024-07-23 PROCEDURE — 82248 BILIRUBIN DIRECT: CPT

## 2024-07-23 PROCEDURE — 83615 LACTATE (LD) (LDH) ENZYME: CPT | Performed by: PHYSICIAN ASSISTANT

## 2024-07-23 PROCEDURE — 1170000001 HC PRIVATE ONCOLOGY ROOM DAILY

## 2024-07-23 PROCEDURE — 84100 ASSAY OF PHOSPHORUS: CPT | Performed by: PHYSICIAN ASSISTANT

## 2024-07-23 RX ORDER — PROCHLORPERAZINE EDISYLATE 5 MG/ML
10 INJECTION INTRAMUSCULAR; INTRAVENOUS EVERY 6 HOURS PRN
Status: DISCONTINUED | OUTPATIENT
Start: 2024-07-23 | End: 2024-07-28 | Stop reason: HOSPADM

## 2024-07-23 RX ORDER — PROCHLORPERAZINE MALEATE 10 MG
10 TABLET ORAL EVERY 6 HOURS PRN
Status: DISCONTINUED | OUTPATIENT
Start: 2024-07-23 | End: 2024-07-23 | Stop reason: SDUPTHER

## 2024-07-23 RX ORDER — ALBUTEROL SULFATE 0.83 MG/ML
3 SOLUTION RESPIRATORY (INHALATION) AS NEEDED
Status: DISCONTINUED | OUTPATIENT
Start: 2024-07-23 | End: 2024-07-28 | Stop reason: HOSPADM

## 2024-07-23 RX ORDER — EPINEPHRINE 0.1 MG/ML
0.3 INJECTION INTRACARDIAC; INTRAVENOUS EVERY 5 MIN PRN
Status: DISCONTINUED | OUTPATIENT
Start: 2024-07-23 | End: 2024-07-28 | Stop reason: HOSPADM

## 2024-07-23 RX ORDER — FAMOTIDINE 10 MG/ML
20 INJECTION INTRAVENOUS ONCE AS NEEDED
Status: DISCONTINUED | OUTPATIENT
Start: 2024-07-23 | End: 2024-07-28 | Stop reason: HOSPADM

## 2024-07-23 RX ORDER — ONDANSETRON HYDROCHLORIDE 8 MG/1
8 TABLET, FILM COATED ORAL ONCE
Status: COMPLETED | OUTPATIENT
Start: 2024-07-23 | End: 2024-07-23

## 2024-07-23 RX ORDER — DIPHENHYDRAMINE HYDROCHLORIDE 50 MG/ML
50 INJECTION INTRAMUSCULAR; INTRAVENOUS AS NEEDED
Status: DISCONTINUED | OUTPATIENT
Start: 2024-07-23 | End: 2024-07-28 | Stop reason: HOSPADM

## 2024-07-23 ASSESSMENT — COGNITIVE AND FUNCTIONAL STATUS - GENERAL
DAILY ACTIVITIY SCORE: 24
MOBILITY SCORE: 24
DAILY ACTIVITIY SCORE: 24
MOBILITY SCORE: 24

## 2024-07-23 ASSESSMENT — PAIN - FUNCTIONAL ASSESSMENT: PAIN_FUNCTIONAL_ASSESSMENT: 0-10

## 2024-07-23 ASSESSMENT — PAIN SCALES - GENERAL
PAINLEVEL_OUTOF10: 0 - NO PAIN
PAINLEVEL_OUTOF10: 0 - NO PAIN

## 2024-07-23 NOTE — CARE PLAN
Problem: Skin  Goal: Decreased wound size/increased tissue granulation at next dressing change  Outcome: Progressing  Goal: Participates in plan/prevention/treatment measures  Outcome: Progressing  Goal: Prevent/manage excess moisture  Outcome: Progressing  Goal: Prevent/minimize sheer/friction injuries  Outcome: Progressing  Goal: Promote/optimize nutrition  Outcome: Progressing  Goal: Promote skin healing  Outcome: Progressing     Problem: Fall/Injury  Goal: Not fall by end of shift  7/23/2024 0624 by Mayra Constantino RN  Outcome: Progressing  7/23/2024 0622 by Mayra Constantino RN  Outcome: Progressing  Goal: Be free from injury by end of the shift  7/23/2024 0624 by Mayra Constantino RN  Outcome: Progressing  7/23/2024 0622 by Mayra Constantino RN  Outcome: Progressing  Goal: Verbalize understanding of personal risk factors for fall in the hospital  7/23/2024 0624 by Mayra Constantino RN  Outcome: Progressing  7/23/2024 0622 by Mayra Constantino RN  Outcome: Progressing  Goal: Verbalize understanding of risk factor reduction measures to prevent injury from fall in the home  7/23/2024 0624 by Mayra Constantino RN  Outcome: Progressing  7/23/2024 0622 by Mayra Constantino RN  Outcome: Progressing  Goal: Use assistive devices by end of the shift  7/23/2024 0624 by Marya Constantino RN  Outcome: Progressing  7/23/2024 0622 by Mayra Constantino RN  Outcome: Progressing  Goal: Pace activities to prevent fatigue by end of the shift  7/23/2024 0624 by Mayra Constantino RN  Outcome: Progressing  7/23/2024 0622 by Mayra Constantino RN  Outcome: Progressing     Problem: Respiratory  Goal: Clear secretions with interventions this shift  Outcome: Progressing  Goal: Minimize anxiety/maximize coping throughout shift  Outcome: Progressing  Goal: Minimal/no exertional discomfort or dyspnea this shift  Outcome: Progressing  Goal: No signs of respiratory distress (eg. Use of accessory muscles. Peds  grunting)  Outcome: Progressing  Goal: Patent airway maintained this shift  Outcome: Progressing  Goal: Tolerate mechanical ventilation evidenced by VS/agitation level this shift  Outcome: Progressing  Goal: Tolerate pulmonary toileting this shift  Outcome: Progressing  Goal: Verbalize decreased shortness of breath this shift  Outcome: Progressing  Goal: Wean oxygen to maintain O2 saturation per order/standard this shift  Outcome: Progressing  Goal: Increase self care and/or family involvement in next 24 hours  Outcome: Progressing     Problem: Pain - Adult  Goal: Verbalizes/displays adequate comfort level or baseline comfort level  Outcome: Progressing     Problem: Safety - Adult  Goal: Free from fall injury  7/23/2024 0624 by Mayra Constantino RN  Outcome: Progressing  7/23/2024 0622 by Mayra Constantino RN  Outcome: Progressing     Problem: Discharge Planning  Goal: Discharge to home or other facility with appropriate resources  Outcome: Progressing     Problem: Chronic Conditions and Co-morbidities  Goal: Patient's chronic conditions and co-morbidity symptoms are monitored and maintained or improved  Outcome: Progressing

## 2024-07-23 NOTE — PROGRESS NOTES
"Jin Reynolds is a 69 y.o. male on day 6 of admission presenting with Acute myeloid leukemia not having achieved remission (Multi).    Subjective   Afebrile. No acute issues overnight. Patient feeling well overall.  No complaints.  Denies CP, palpitations, SOB, HA, vision changes, N/V/D/C, flank pain/dysuria. ROS otherwise unremarkable.       Objective     Physical Exam  Constitutional:       General: He is not in acute distress.     Appearance: Normal appearance.   HENT:      Head: Normocephalic and atraumatic.      Nose: Nose normal.      Mouth/Throat:      Mouth: Mucous membranes are moist.      Pharynx: Oropharynx is clear.   Eyes:      General: No scleral icterus.     Extraocular Movements: Extraocular movements intact.      Pupils: Pupils are equal, round, and reactive to light.   Cardiovascular:      Rate and Rhythm: Normal rate and regular rhythm.      Pulses: Normal pulses.      Heart sounds: Normal heart sounds.   Pulmonary:      Effort: Pulmonary effort is normal. No respiratory distress.      Breath sounds: Normal breath sounds.   Abdominal:      General: Bowel sounds are normal. There is no distension.      Palpations: Abdomen is soft. There is no mass.      Tenderness: There is no abdominal tenderness.   Musculoskeletal:         General: No swelling or tenderness. Normal range of motion.      Cervical back: Normal range of motion and neck supple.   Skin:     General: Skin is warm and dry.   Neurological:      General: No focal deficit present.      Mental Status: He is alert and oriented to person, place, and time.   Psychiatric:         Mood and Affect: Mood normal.         Behavior: Behavior normal.         Last Recorded Vitals  Blood pressure 143/86, pulse 62, temperature 36.1 °C (97 °F), temperature source Temporal, resp. rate 17, height (S) 1.684 m (5' 6.3\"), weight (S) 94 kg (207 lb 3.7 oz), SpO2 100%.  Intake/Output last 3 Shifts:  I/O last 3 completed shifts:  In: 4766.7 (53.1 mL/kg) " [P.O.:1200; I.V.:3260 (36.3 mL/kg); Blood:306.7]  Out: - (0 mL/kg)   Weight: 89.8 kg     Relevant Results      Scheduled medications  acyclovir, 400 mg, oral, Daily  allopurinol, 300 mg, oral, Daily  amLODIPine, 10 mg, oral, Daily  cholecalciferol, 800 Units, oral, Daily  montelukast, 10 mg, oral, Daily  pantoprazole, 40 mg, oral, Daily before breakfast  [START ON 7/24/2024] posaconazole, 300 mg, oral, Daily with breakfast  predniSONE, 80 mg, oral, Daily      Continuous medications  sodium chloride 0.9%, 75 mL/hr, Last Rate: 75 mL/hr (07/23/24 1703)      PRN medications  PRN medications: albuterol, alteplase, dextrose, diphenhydrAMINE, EPINEPHrine, famotidine, methylPREDNISolone sodium succinate (PF), ondansetron, polyethylene glycol, prochlorperazine, prochlorperazine, sodium chloride      Assessment/Plan   Principal Problem:    Acute myeloid leukemia not having achieved remission (Multi)  Active Problems:    Delayed hemolytic transfusion reaction    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 male with PMHx of AML, esophageal ulcer w/ GI bleed, smoldering systemic mastocytosis, and HTN presented to infusion center for C1D2 for Jeremy/Aza but ab work revealed increased Cr 1.74, phosph 5.8, uric acid 6, and LDH 6,660, showing evidence of TLS so aza/Jeremy were held. He was started on IVF and rasburicase. He was admitted to [facility] for further evaluation and treatment.    Day 2, cycle 1 AZA (holding Jeremy)    ONC:  #Smoldering systemic Mastocytosis (Dx 8/2023)  - presented with skin rash and eosinophilia  - BMBx (8/23/23): hypercellular (90-95%) with trilineage hematopoiesis, granulocytic hyperplasia, eosinophilia, and increased atypical mast cells.  - c/w allegra (will have someone bring his home supply since its not on formulary)  - c/w home montelukast  #AML  - Presented with worsening anemia in setting of mast cell disease  -  (6/24/24): peripheral blood path read abnormal lymphocytes with possible blasts.  - BMBx (6/27/24): Acute Leukemia arising in background of systemic mastocytosis; 7-8% blast on peripheral blood; + CD34+, CD7+, TDT+  - Started induction w/ C1 Aza/Jeremy (7/16/24)  - Restarted Aza only on 7/23 (hold Venetoclax)    #TLS  - LDH/ UA downtrending   - s/p Rasburicase  - c/w Allopurinol 300 mg daily  - decreased IVF to 75ml/hr        HEME:  #Pancytopenia secondary to disease  #DHTR  - Haptoglobin 22, suggestive of hemolysis  - prednisone 80mg daily (7/19-7/23). Decrease to 60mg daily beginning 7/24  - G6PD(7/17) Normal   - no evidence of bleeding   - Monitor counts daily  - Keep Hgb >7, plt >10     ID:  NKDA  #ppx: ACV, Posaconazole  - Afebrile, no evidence of active infection on admit     FEN/GI:  Admit Wt: 88.2 kg, current wt: 89.8kg (7/22)  - Monitor electrolytes, replace as needed  - c/w home pantoprazole     RENAL:  # PERRY  :: 2/2 TLS  - sCr 1.74 on admission; Max Creat 2.88 (7/18), now 1.39 (7/23)    CARDIAC:  #HTN  - c/w home amlodipine  - Echo (6/14/23): EF 55-60%       DISPO:  - Full Code   - Access: PIV  - Primary Oncologist: Dr. Dawson  - PT/OT eval     Patient seen and discussed with Dr. Tr Bourgeois PA-C

## 2024-07-23 NOTE — CARE PLAN
Problem: Skin  Goal: Decreased wound size/increased tissue granulation at next dressing change  7/23/2024 0624 by Mayra Constantino RN  Outcome: Progressing  7/23/2024 0622 by Mayra Constantino RN  Outcome: Progressing  Goal: Participates in plan/prevention/treatment measures  7/23/2024 0624 by Mayra Constantino RN  Outcome: Progressing  7/23/2024 0622 by Mayra Constantino RN  Outcome: Progressing  Goal: Prevent/manage excess moisture  7/23/2024 0624 by Mayra Constantino RN  Outcome: Progressing  7/23/2024 0622 by Mayra Constantino RN  Outcome: Progressing  Goal: Prevent/minimize sheer/friction injuries  7/23/2024 0624 by Mayra Constantino RN  Outcome: Progressing  7/23/2024 0622 by Mayra Constantino RN  Outcome: Progressing  Goal: Promote/optimize nutrition  7/23/2024 0624 by Mayra Constantino RN  Outcome: Progressing  7/23/2024 0622 by Mayra Constantino RN  Outcome: Progressing  Goal: Promote skin healing  7/23/2024 0624 by Mayra Constantino RN  Outcome: Progressing  7/23/2024 0622 by Mayra Constantino RN  Outcome: Progressing     Problem: Fall/Injury  Goal: Not fall by end of shift  7/23/2024 0624 by Mayra Constantino RN  Outcome: Progressing  7/23/2024 0624 by Mayra Constantino RN  Outcome: Progressing  7/23/2024 0622 by Mayra Constantino RN  Outcome: Progressing  Goal: Be free from injury by end of the shift  7/23/2024 0624 by Mayra Constantino RN  Outcome: Progressing  7/23/2024 0624 by Mayra Constantino RN  Outcome: Progressing  7/23/2024 0622 by Mayra Constantino RN  Outcome: Progressing  Goal: Verbalize understanding of personal risk factors for fall in the hospital  7/23/2024 0624 by Mayra Constantino RN  Outcome: Progressing  7/23/2024 0624 by Mayra Constantino RN  Outcome: Progressing  7/23/2024 0622 by Mayra Allison Gyorki, RN  Outcome: Progressing  Goal: Verbalize understanding of risk factor reduction measures to prevent injury from fall in the home  7/23/2024 0624 by Mayra Cooper  MAREK Constantino  Outcome: Progressing  7/23/2024 0624 by Mayra Constantino RN  Outcome: Progressing  7/23/2024 0622 by Mayra Constantino RN  Outcome: Progressing  Goal: Use assistive devices by end of the shift  7/23/2024 0624 by Mayra Constantino RN  Outcome: Progressing  7/23/2024 0624 by Mayra Constantino RN  Outcome: Progressing  7/23/2024 0622 by Mayra Constantino RN  Outcome: Progressing  Goal: Pace activities to prevent fatigue by end of the shift  7/23/2024 0624 by Mayra Constantino RN  Outcome: Progressing  7/23/2024 0624 by Mayra Constantino RN  Outcome: Progressing  7/23/2024 0622 by Mayra Constantino RN  Outcome: Progressing     Problem: Respiratory  Goal: Clear secretions with interventions this shift  7/23/2024 0624 by Mayra Constantino, MAREK  Outcome: Progressing  7/23/2024 0622 by Mayra Constantino RN  Outcome: Progressing  Goal: Minimize anxiety/maximize coping throughout shift  7/23/2024 0624 by Mayra Constantino RN  Outcome: Progressing  7/23/2024 0622 by Mayra Constantino RN  Outcome: Progressing  Goal: Minimal/no exertional discomfort or dyspnea this shift  7/23/2024 0624 by Mayra Constantino RN  Outcome: Progressing  7/23/2024 0622 by Mayra Constantino RN  Outcome: Progressing  Goal: No signs of respiratory distress (eg. Use of accessory muscles. Peds grunting)  7/23/2024 0624 by Mayra Constantino, MAREK  Outcome: Progressing  7/23/2024 0622 by Mayra Constantino RN  Outcome: Progressing  Goal: Patent airway maintained this shift  7/23/2024 0624 by Mayra Constantino RN  Outcome: Progressing  7/23/2024 0622 by Mayra Constantino RN  Outcome: Progressing  Goal: Tolerate mechanical ventilation evidenced by VS/agitation level this shift  7/23/2024 0624 by Mayra Constantino, MAREK  Outcome: Progressing  7/23/2024 0622 by Mayra Constantino RN  Outcome: Progressing  Goal: Tolerate pulmonary toileting this shift  7/23/2024 0624 by Mayra Constantino RN  Outcome: Progressing  7/23/2024 0622 by Mayra  Allison Constantino RN  Outcome: Progressing  Goal: Verbalize decreased shortness of breath this shift  7/23/2024 0624 by Mayra Constantino RN  Outcome: Progressing  7/23/2024 0622 by Mayra Constantino RN  Outcome: Progressing  Goal: Wean oxygen to maintain O2 saturation per order/standard this shift  7/23/2024 0624 by Mayra Constantino RN  Outcome: Progressing  7/23/2024 0622 by Mayra Constantino RN  Outcome: Progressing  Goal: Increase self care and/or family involvement in next 24 hours  7/23/2024 0624 by Mayra Constantino RN  Outcome: Progressing  7/23/2024 0622 by Mayra Constantino RN  Outcome: Progressing     Problem: Pain - Adult  Goal: Verbalizes/displays adequate comfort level or baseline comfort level  7/23/2024 0624 by Mayra Constantino RN  Outcome: Progressing  7/23/2024 0622 by Mayra Constantino RN  Outcome: Progressing     Problem: Safety - Adult  Goal: Free from fall injury  7/23/2024 0624 by Mayra Constantino RN  Outcome: Progressing  7/23/2024 0624 by Mayra Constantino RN  Outcome: Progressing  7/23/2024 0622 by Mayra Constantino RN  Outcome: Progressing     Problem: Discharge Planning  Goal: Discharge to home or other facility with appropriate resources  7/23/2024 0624 by Mayra Constantino RN  Outcome: Progressing  7/23/2024 0622 by Mayra Constantino RN  Outcome: Progressing     Problem: Chronic Conditions and Co-morbidities  Goal: Patient's chronic conditions and co-morbidity symptoms are monitored and maintained or improved  7/23/2024 0624 by Mayra Constantino RN  Outcome: Progressing  7/23/2024 0622 by Mayra Constantino RN  Outcome: Progressing

## 2024-07-23 NOTE — CARE PLAN
Problem: Fall/Injury  Goal: Not fall by end of shift  Outcome: Progressing     Problem: Fall/Injury  Goal: Be free from injury by end of the shift  Outcome: Progressing     Problem: Fall/Injury  Goal: Verbalize understanding of risk factor reduction measures to prevent injury from fall in the home  Outcome: Progressing     Problem: Fall/Injury  Goal: Verbalize understanding of personal risk factors for fall in the hospital  Outcome: Progressing     Problem: Fall/Injury  Goal: Pace activities to prevent fatigue by end of the shift  Outcome: Progressing

## 2024-07-24 LAB
ALBUMIN SERPL BCP-MCNC: 3.3 G/DL (ref 3.4–5)
ALP SERPL-CCNC: 59 U/L (ref 33–136)
ALT SERPL W P-5'-P-CCNC: 8 U/L (ref 10–52)
ANION GAP SERPL CALC-SCNC: 10 MMOL/L (ref 10–20)
APTT PPP: 38 SECONDS (ref 27–38)
AST SERPL W P-5'-P-CCNC: 8 U/L (ref 9–39)
BASOPHILS # BLD MANUAL: 0 X10*3/UL (ref 0–0.1)
BASOPHILS NFR BLD MANUAL: 0 %
BILIRUB DIRECT SERPL-MCNC: 0.2 MG/DL (ref 0–0.3)
BILIRUB SERPL-MCNC: 0.6 MG/DL (ref 0–1.2)
BUN SERPL-MCNC: 23 MG/DL (ref 6–23)
CALCIUM SERPL-MCNC: 7.1 MG/DL (ref 8.6–10.6)
CHLORIDE SERPL-SCNC: 107 MMOL/L (ref 98–107)
CO2 SERPL-SCNC: 23 MMOL/L (ref 21–32)
CREAT SERPL-MCNC: 1.38 MG/DL (ref 0.5–1.3)
EGFRCR SERPLBLD CKD-EPI 2021: 56 ML/MIN/1.73M*2
EOSINOPHIL # BLD MANUAL: 0 X10*3/UL (ref 0–0.7)
EOSINOPHIL NFR BLD MANUAL: 0 %
ERYTHROCYTE [DISTWIDTH] IN BLOOD BY AUTOMATED COUNT: 16.4 % (ref 11.5–14.5)
FIBRINOGEN PPP-MCNC: 213 MG/DL (ref 200–400)
GLUCOSE SERPL-MCNC: 100 MG/DL (ref 74–99)
HCT VFR BLD AUTO: 22.4 % (ref 41–52)
HGB BLD-MCNC: 7.7 G/DL (ref 13.5–17.5)
IMM GRANULOCYTES # BLD AUTO: 0.03 X10*3/UL (ref 0–0.7)
IMM GRANULOCYTES NFR BLD AUTO: 1.5 % (ref 0–0.9)
INR PPP: 1.3 (ref 0.9–1.1)
LDH SERPL L TO P-CCNC: 506 U/L (ref 84–246)
LYMPHOCYTES # BLD MANUAL: 0.61 X10*3/UL (ref 1.2–4.8)
LYMPHOCYTES NFR BLD MANUAL: 30.7 %
MCH RBC QN AUTO: 28.9 PG (ref 26–34)
MCHC RBC AUTO-ENTMCNC: 34.4 G/DL (ref 32–36)
MCV RBC AUTO: 84 FL (ref 80–100)
MONOCYTES # BLD MANUAL: 0.07 X10*3/UL (ref 0.1–1)
MONOCYTES NFR BLD MANUAL: 3.5 %
NEUTS SEG # BLD MANUAL: 1.32 X10*3/UL (ref 1.2–7)
NEUTS SEG NFR BLD MANUAL: 65.8 %
NRBC BLD-RTO: 1.5 /100 WBCS (ref 0–0)
PHOSPHATE SERPL-MCNC: 2.9 MG/DL (ref 2.5–4.9)
PLATELET # BLD AUTO: 16 X10*3/UL (ref 150–450)
POTASSIUM SERPL-SCNC: 3.7 MMOL/L (ref 3.5–5.3)
PROT SERPL-MCNC: 6.3 G/DL (ref 6.4–8.2)
PROTHROMBIN TIME: 14.9 SECONDS (ref 9.8–12.8)
RBC # BLD AUTO: 2.66 X10*6/UL (ref 4.5–5.9)
RBC MORPH BLD: ABNORMAL
SODIUM SERPL-SCNC: 136 MMOL/L (ref 136–145)
TOTAL CELLS COUNTED BLD: 114
URATE SERPL-MCNC: 1.8 MG/DL (ref 4–7.5)
WBC # BLD AUTO: 2 X10*3/UL (ref 4.4–11.3)

## 2024-07-24 PROCEDURE — 83615 LACTATE (LD) (LDH) ENZYME: CPT | Performed by: PHYSICIAN ASSISTANT

## 2024-07-24 PROCEDURE — 84100 ASSAY OF PHOSPHORUS: CPT | Performed by: PHYSICIAN ASSISTANT

## 2024-07-24 PROCEDURE — 2500000004 HC RX 250 GENERAL PHARMACY W/ HCPCS (ALT 636 FOR OP/ED): Performed by: PHYSICIAN ASSISTANT

## 2024-07-24 PROCEDURE — 85007 BL SMEAR W/DIFF WBC COUNT: CPT

## 2024-07-24 PROCEDURE — 85610 PROTHROMBIN TIME: CPT

## 2024-07-24 PROCEDURE — 2500000001 HC RX 250 WO HCPCS SELF ADMINISTERED DRUGS (ALT 637 FOR MEDICARE OP): Performed by: STUDENT IN AN ORGANIZED HEALTH CARE EDUCATION/TRAINING PROGRAM

## 2024-07-24 PROCEDURE — 99233 SBSQ HOSP IP/OBS HIGH 50: CPT | Performed by: INTERNAL MEDICINE

## 2024-07-24 PROCEDURE — 2500000005 HC RX 250 GENERAL PHARMACY W/O HCPCS: Performed by: INTERNAL MEDICINE

## 2024-07-24 PROCEDURE — 84550 ASSAY OF BLOOD/URIC ACID: CPT | Performed by: PHYSICIAN ASSISTANT

## 2024-07-24 PROCEDURE — 2500000002 HC RX 250 W HCPCS SELF ADMINISTERED DRUGS (ALT 637 FOR MEDICARE OP, ALT 636 FOR OP/ED): Performed by: STUDENT IN AN ORGANIZED HEALTH CARE EDUCATION/TRAINING PROGRAM

## 2024-07-24 PROCEDURE — 82248 BILIRUBIN DIRECT: CPT

## 2024-07-24 PROCEDURE — 1170000001 HC PRIVATE ONCOLOGY ROOM DAILY

## 2024-07-24 PROCEDURE — 36415 COLL VENOUS BLD VENIPUNCTURE: CPT

## 2024-07-24 PROCEDURE — 2500000004 HC RX 250 GENERAL PHARMACY W/ HCPCS (ALT 636 FOR OP/ED): Performed by: INTERNAL MEDICINE

## 2024-07-24 PROCEDURE — 85027 COMPLETE CBC AUTOMATED: CPT

## 2024-07-24 PROCEDURE — 2500000001 HC RX 250 WO HCPCS SELF ADMINISTERED DRUGS (ALT 637 FOR MEDICARE OP)

## 2024-07-24 PROCEDURE — 85384 FIBRINOGEN ACTIVITY: CPT

## 2024-07-24 RX ORDER — ONDANSETRON HYDROCHLORIDE 8 MG/1
8 TABLET, FILM COATED ORAL ONCE
Status: COMPLETED | OUTPATIENT
Start: 2024-07-24 | End: 2024-07-24

## 2024-07-24 ASSESSMENT — COGNITIVE AND FUNCTIONAL STATUS - GENERAL
MOBILITY SCORE: 24
MOBILITY SCORE: 24
DAILY ACTIVITIY SCORE: 24
DAILY ACTIVITIY SCORE: 24

## 2024-07-24 ASSESSMENT — PAIN SCALES - GENERAL
PAINLEVEL_OUTOF10: 0 - NO PAIN
PAINLEVEL_OUTOF10: 0 - NO PAIN

## 2024-07-24 ASSESSMENT — PAIN - FUNCTIONAL ASSESSMENT: PAIN_FUNCTIONAL_ASSESSMENT: 0-10

## 2024-07-24 NOTE — PROGRESS NOTES
"Jin Reynolds is a 69 y.o. male on day 7 of admission presenting with Acute myeloid leukemia not having achieved remission (Multi).    Subjective   Afebrile. No acute issues overnight. Patient feeling well overall.  Denies any acute complaints. Relieved to hear his creatinine and LDH are improving. Remainder of ROS is negative.     Objective     Physical Exam  Constitutional:       General: He is not in acute distress.     Appearance: Normal appearance.   HENT:      Head: Normocephalic and atraumatic.      Nose: Nose normal.      Mouth/Throat:      Mouth: Mucous membranes are moist.      Pharynx: Oropharynx is clear.   Eyes:      General: No scleral icterus.     Extraocular Movements: Extraocular movements intact.      Pupils: Pupils are equal, round, and reactive to light.   Cardiovascular:      Rate and Rhythm: Normal rate and regular rhythm.      Pulses: Normal pulses.      Heart sounds: Normal heart sounds.   Pulmonary:      Effort: Pulmonary effort is normal. No respiratory distress.      Breath sounds: Normal breath sounds.   Abdominal:      General: Bowel sounds are normal. There is no distension.      Palpations: Abdomen is soft. There is no mass.      Tenderness: There is no abdominal tenderness.   Musculoskeletal:         General: No swelling or tenderness. Normal range of motion.      Cervical back: Normal range of motion and neck supple.   Skin:     General: Skin is warm and dry.   Neurological:      General: No focal deficit present.      Mental Status: He is alert and oriented to person, place, and time.   Psychiatric:         Mood and Affect: Mood normal.         Behavior: Behavior normal.         Last Recorded Vitals  Blood pressure 147/86, pulse 59, temperature 36.5 °C (97.7 °F), temperature source Temporal, resp. rate 18, height (S) 1.684 m (5' 6.3\"), weight (S) 94 kg (207 lb 3.7 oz), SpO2 100%.  Intake/Output last 3 Shifts:  I/O last 3 completed shifts:  In: 749.3 (8 mL/kg) [I.V.:749.3 (8 " mL/kg)]  Out: - (0 mL/kg)   Weight: 94 kg     Relevant Results      Scheduled medications  acyclovir, 400 mg, oral, Daily  allopurinol, 300 mg, oral, Daily  amLODIPine, 10 mg, oral, Daily  azaCITIDine, 75 mg/m2 (Treatment Plan Recorded), subcutaneous, Once  cholecalciferol, 800 Units, oral, Daily  montelukast, 10 mg, oral, Daily  ondansetron, 8 mg, oral, Once  pantoprazole, 40 mg, oral, Daily before breakfast  posaconazole, 300 mg, oral, Daily with breakfast  predniSONE, 60 mg, oral, Daily      Continuous medications  sodium chloride 0.9%, 75 mL/hr, Last Rate: 75 mL/hr (07/24/24 0619)      PRN medications  PRN medications: albuterol, alteplase, dextrose, diphenhydrAMINE, EPINEPHrine, famotidine, methylPREDNISolone sodium succinate (PF), ondansetron, polyethylene glycol, prochlorperazine, prochlorperazine, sodium chloride      Assessment/Plan   Principal Problem:    Acute myeloid leukemia not having achieved remission (Multi)  Active Problems:    Delayed hemolytic transfusion reaction    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 male with PMHx of AML, esophageal ulcer w/ GI bleed, smoldering systemic mastocytosis, and HTN presented to infusion center for C1D2 for Jeremy/Aza but ab work revealed increased Cr 1.74, phosph 5.8, uric acid 6, and LDH 6,660, showing evidence of TLS so aza/Jeremy were held. He was started on IVF and rasburicase. He was admitted to [facility] for further evaluation and treatment.    Day 3, cycle 1 AZA (holding Jeremy)    ONC:  #Smoldering systemic Mastocytosis (Dx 8/2023)  - presented with skin rash and eosinophilia  - BMBx (8/23/23): hypercellular (90-95%) with trilineage hematopoiesis, granulocytic hyperplasia, eosinophilia, and increased atypical mast cells.  - c/w allegra (will have someone bring his home supply since its not on formulary)  - c/w home montelukast  #AML  - Presented with worsening anemia in  setting of mast cell disease  - (6/24/24): peripheral blood path read abnormal lymphocytes with possible blasts.  - BMBx (6/27/24): Acute Leukemia arising in background of systemic mastocytosis; 7-8% blast on peripheral blood; + CD34+, CD7+, TDT+  - Started induction w/ C1 Aza/Jeremy (7/16/24)  - Restarted Aza only on 7/23 (hold Venetoclax)    #TLS  - LDH/ UA downtrending   - s/p Rasburicase  - c/w Allopurinol 300 mg daily  - decreased IVF to 75ml/hr        HEME:  #Pancytopenia secondary to disease  #DHTR  - Haptoglobin 22, suggestive of hemolysis  - prednisone 80mg daily (7/19-7/23). Decrease to 60mg daily beginning 7/24  - G6PD(7/17) Normal   - no evidence of bleeding   - Monitor counts daily  - Keep Hgb >7, plt >10     ID:  NKDA  #ppx: ACV, Posaconazole  - Afebrile, no evidence of active infection on admit     FEN/GI:  Admit Wt: 88.2 kg, current wt: 89.8kg (7/22)  - Monitor electrolytes, replace as needed  - c/w home pantoprazole     RENAL:  # PERRY  :: 2/2 TLS  - sCr 1.74 on admission; Max Creat 2.88 (7/18), now 1.39 (7/23)    CARDIAC:  #HTN  - c/w home amlodipine  - Echo (6/14/23): EF 55-60%       DISPO:  - Full Code   - Access: PIV  - Primary Oncologist: Dr. Dawson  - PT/OT eval     Patient seen and discussed with Dr. Tr Gusman PA-C

## 2024-07-24 NOTE — NURSING NOTE
Dose #2/7 of Azacitidine 155mg in 6.2mL administered subcutaneously.  Pre-medicated with zofran 8mg PO.  Patient, dose and rate verified with second RN, Keely Falk. NS infusing at 75mL/hr. Patient with no side effects.

## 2024-07-24 NOTE — CARE PLAN
Problem: Skin  Goal: Decreased wound size/increased tissue granulation at next dressing change  Outcome: Progressing  Goal: Participates in plan/prevention/treatment measures  Outcome: Progressing  Goal: Prevent/manage excess moisture  Outcome: Progressing  Goal: Prevent/minimize sheer/friction injuries  Outcome: Progressing  Goal: Promote/optimize nutrition  Outcome: Progressing  Goal: Promote skin healing  Outcome: Progressing     Problem: Fall/Injury  Goal: Not fall by end of shift  Outcome: Progressing  Goal: Be free from injury by end of the shift  Outcome: Progressing  Goal: Verbalize understanding of personal risk factors for fall in the hospital  Outcome: Progressing  Goal: Verbalize understanding of risk factor reduction measures to prevent injury from fall in the home  Outcome: Progressing  Goal: Use assistive devices by end of the shift  Outcome: Progressing  Goal: Pace activities to prevent fatigue by end of the shift  Outcome: Progressing     Problem: Respiratory  Goal: Clear secretions with interventions this shift  Outcome: Progressing  Goal: Minimize anxiety/maximize coping throughout shift  Outcome: Progressing  Goal: Minimal/no exertional discomfort or dyspnea this shift  Outcome: Progressing  Goal: No signs of respiratory distress (eg. Use of accessory muscles. Peds grunting)  Outcome: Progressing  Goal: Patent airway maintained this shift  Outcome: Progressing  Goal: Tolerate mechanical ventilation evidenced by VS/agitation level this shift  Outcome: Progressing  Goal: Tolerate pulmonary toileting this shift  Outcome: Progressing  Goal: Verbalize decreased shortness of breath this shift  Outcome: Progressing  Goal: Wean oxygen to maintain O2 saturation per order/standard this shift  Outcome: Progressing  Goal: Increase self care and/or family involvement in next 24 hours  Outcome: Progressing     Problem: Pain - Adult  Goal: Verbalizes/displays adequate comfort level or baseline comfort  level  Outcome: Progressing     Problem: Safety - Adult  Goal: Free from fall injury  Outcome: Progressing     Problem: Discharge Planning  Goal: Discharge to home or other facility with appropriate resources  Outcome: Progressing     Problem: Chronic Conditions and Co-morbidities  Goal: Patient's chronic conditions and co-morbidity symptoms are monitored and maintained or improved  Outcome: Progressing       The clinical goals for the shift include patient will remain free from injury and falls this shift    VSS, remains afebrile. Denies pain, N/V/D. Received sub-q azacitidine today. Tolerated well. Remains safe throughout shift.

## 2024-07-25 ENCOUNTER — LAB REQUISITION (OUTPATIENT)
Dept: LAB | Facility: CLINIC | Age: 69
DRG: 683 | End: 2024-07-25
Payer: MEDICARE

## 2024-07-25 DIAGNOSIS — C92.00 ACUTE MYELOBLASTIC LEUKEMIA, NOT HAVING ACHIEVED REMISSION (MULTI): ICD-10-CM

## 2024-07-25 LAB
ALBUMIN SERPL BCP-MCNC: 3.4 G/DL (ref 3.4–5)
ALP SERPL-CCNC: 52 U/L (ref 33–136)
ALT SERPL W P-5'-P-CCNC: 10 U/L (ref 10–52)
ANION GAP SERPL CALC-SCNC: 12 MMOL/L (ref 10–20)
AST SERPL W P-5'-P-CCNC: 9 U/L (ref 9–39)
BASOPHILS # BLD AUTO: 0 X10*3/UL (ref 0–0.1)
BASOPHILS NFR BLD AUTO: 0 %
BILIRUB DIRECT SERPL-MCNC: 0.2 MG/DL (ref 0–0.3)
BILIRUB SERPL-MCNC: 0.7 MG/DL (ref 0–1.2)
BUN SERPL-MCNC: 21 MG/DL (ref 6–23)
CALCIUM SERPL-MCNC: 6.4 MG/DL (ref 8.6–10.6)
CHLORIDE SERPL-SCNC: 106 MMOL/L (ref 98–107)
CO2 SERPL-SCNC: 23 MMOL/L (ref 21–32)
CREAT SERPL-MCNC: 1.31 MG/DL (ref 0.5–1.3)
EGFRCR SERPLBLD CKD-EPI 2021: 59 ML/MIN/1.73M*2
EOSINOPHIL # BLD AUTO: 0.02 X10*3/UL (ref 0–0.7)
EOSINOPHIL NFR BLD AUTO: 0.9 %
ERYTHROCYTE [DISTWIDTH] IN BLOOD BY AUTOMATED COUNT: 16.4 % (ref 11.5–14.5)
GLUCOSE SERPL-MCNC: 86 MG/DL (ref 74–99)
HCT VFR BLD AUTO: 23 % (ref 41–52)
HGB BLD-MCNC: 7.7 G/DL (ref 13.5–17.5)
IMM GRANULOCYTES # BLD AUTO: 0.04 X10*3/UL (ref 0–0.7)
IMM GRANULOCYTES NFR BLD AUTO: 1.9 % (ref 0–0.9)
LDH SERPL L TO P-CCNC: 433 U/L (ref 84–246)
LYMPHOCYTES # BLD AUTO: 0.76 X10*3/UL (ref 1.2–4.8)
LYMPHOCYTES NFR BLD AUTO: 35.3 %
MCH RBC QN AUTO: 28.3 PG (ref 26–34)
MCHC RBC AUTO-ENTMCNC: 33.5 G/DL (ref 32–36)
MCV RBC AUTO: 85 FL (ref 80–100)
MONOCYTES # BLD AUTO: 0.17 X10*3/UL (ref 0.1–1)
MONOCYTES NFR BLD AUTO: 7.9 %
NEUTROPHILS # BLD AUTO: 1.16 X10*3/UL (ref 1.2–7.7)
NEUTROPHILS NFR BLD AUTO: 54 %
NRBC BLD-RTO: 0.9 /100 WBCS (ref 0–0)
PHOSPHATE SERPL-MCNC: 2.2 MG/DL (ref 2.5–4.9)
PLATELET # BLD AUTO: 17 X10*3/UL (ref 150–450)
POTASSIUM SERPL-SCNC: 3.5 MMOL/L (ref 3.5–5.3)
PROT SERPL-MCNC: 6.2 G/DL (ref 6.4–8.2)
RBC # BLD AUTO: 2.72 X10*6/UL (ref 4.5–5.9)
SODIUM SERPL-SCNC: 137 MMOL/L (ref 136–145)
SPECIMEN HANDLING: NORMAL
URATE SERPL-MCNC: 2.7 MG/DL (ref 4–7.5)
WBC # BLD AUTO: 2.2 X10*3/UL (ref 4.4–11.3)

## 2024-07-25 PROCEDURE — 36415 COLL VENOUS BLD VENIPUNCTURE: CPT

## 2024-07-25 PROCEDURE — 84550 ASSAY OF BLOOD/URIC ACID: CPT | Performed by: PHYSICIAN ASSISTANT

## 2024-07-25 PROCEDURE — 84100 ASSAY OF PHOSPHORUS: CPT | Performed by: PHYSICIAN ASSISTANT

## 2024-07-25 PROCEDURE — 2500000001 HC RX 250 WO HCPCS SELF ADMINISTERED DRUGS (ALT 637 FOR MEDICARE OP)

## 2024-07-25 PROCEDURE — 85025 COMPLETE CBC W/AUTO DIFF WBC: CPT

## 2024-07-25 PROCEDURE — 99233 SBSQ HOSP IP/OBS HIGH 50: CPT | Performed by: INTERNAL MEDICINE

## 2024-07-25 PROCEDURE — 82248 BILIRUBIN DIRECT: CPT

## 2024-07-25 PROCEDURE — 2500000002 HC RX 250 W HCPCS SELF ADMINISTERED DRUGS (ALT 637 FOR MEDICARE OP, ALT 636 FOR OP/ED): Performed by: STUDENT IN AN ORGANIZED HEALTH CARE EDUCATION/TRAINING PROGRAM

## 2024-07-25 PROCEDURE — 2500000004 HC RX 250 GENERAL PHARMACY W/ HCPCS (ALT 636 FOR OP/ED): Performed by: PHYSICIAN ASSISTANT

## 2024-07-25 PROCEDURE — 2500000005 HC RX 250 GENERAL PHARMACY W/O HCPCS: Performed by: INTERNAL MEDICINE

## 2024-07-25 PROCEDURE — 80053 COMPREHEN METABOLIC PANEL: CPT | Performed by: PHYSICIAN ASSISTANT

## 2024-07-25 PROCEDURE — 2500000004 HC RX 250 GENERAL PHARMACY W/ HCPCS (ALT 636 FOR OP/ED): Performed by: INTERNAL MEDICINE

## 2024-07-25 PROCEDURE — 2500000005 HC RX 250 GENERAL PHARMACY W/O HCPCS

## 2024-07-25 PROCEDURE — 2500000001 HC RX 250 WO HCPCS SELF ADMINISTERED DRUGS (ALT 637 FOR MEDICARE OP): Performed by: STUDENT IN AN ORGANIZED HEALTH CARE EDUCATION/TRAINING PROGRAM

## 2024-07-25 PROCEDURE — 1170000001 HC PRIVATE ONCOLOGY ROOM DAILY

## 2024-07-25 PROCEDURE — 83615 LACTATE (LD) (LDH) ENZYME: CPT | Performed by: PHYSICIAN ASSISTANT

## 2024-07-25 RX ORDER — ACYCLOVIR 400 MG/1
400 TABLET ORAL DAILY
Qty: 30 TABLET | Refills: 2 | Status: SHIPPED | OUTPATIENT
Start: 2024-07-26 | End: 2024-07-28

## 2024-07-25 RX ORDER — ONDANSETRON HYDROCHLORIDE 8 MG/1
8 TABLET, FILM COATED ORAL ONCE
Status: COMPLETED | OUTPATIENT
Start: 2024-07-25 | End: 2024-07-25

## 2024-07-25 RX ORDER — ALLOPURINOL 300 MG/1
300 TABLET ORAL DAILY
Qty: 14 TABLET | Refills: 0 | Status: SHIPPED | OUTPATIENT
Start: 2024-07-25 | End: 2024-08-11

## 2024-07-25 ASSESSMENT — COGNITIVE AND FUNCTIONAL STATUS - GENERAL
DAILY ACTIVITIY SCORE: 24
MOBILITY SCORE: 24
MOBILITY SCORE: 24
DAILY ACTIVITIY SCORE: 24

## 2024-07-25 ASSESSMENT — PAIN SCALES - GENERAL
PAINLEVEL_OUTOF10: 0 - NO PAIN
PAINLEVEL_OUTOF10: 0 - NO PAIN

## 2024-07-25 ASSESSMENT — PAIN - FUNCTIONAL ASSESSMENT: PAIN_FUNCTIONAL_ASSESSMENT: 0-10

## 2024-07-25 NOTE — SIGNIFICANT EVENT
07/25/24 0124   Prechemo Checklist   Has the patient been in the hospital, ED, or urgent care since last date of service N/A   Chemo/Immuno Consent Completed and Signed Yes   Protocol/Indications Verified Yes   Confirmed to previous date/time of medication Yes   Compared to previous dose Yes   All medications are dated accurately Yes   Pregnancy Test Negative Not applicable   Parameters Met Yes   BSA/Weight-Height Verified Yes   Dose Calculations Verified (current, total, cumulative) Yes

## 2024-07-25 NOTE — PROGRESS NOTES
"Jin Reynolds is a 69 y.o. male on day 8 of admission presenting with Acute myeloid leukemia not having achieved remission (Multi).    Subjective   Afebrile. No acute issues overnight. Patient feeling well overall.  Denies any acute complaints. Creatinine continues to improve. Patient comfortable with plan for Sunday discharge when his Azacitidine clears.     Objective     Physical Exam  Constitutional:       General: He is not in acute distress.     Appearance: Normal appearance.   HENT:      Head: Normocephalic and atraumatic.      Nose: Nose normal.      Mouth/Throat:      Mouth: Mucous membranes are moist.      Pharynx: Oropharynx is clear.   Eyes:      General: No scleral icterus.     Extraocular Movements: Extraocular movements intact.      Pupils: Pupils are equal, round, and reactive to light.   Cardiovascular:      Rate and Rhythm: Normal rate and regular rhythm.      Pulses: Normal pulses.      Heart sounds: Normal heart sounds.   Pulmonary:      Effort: Pulmonary effort is normal. No respiratory distress.      Breath sounds: Normal breath sounds.   Abdominal:      General: Bowel sounds are normal. There is no distension.      Palpations: Abdomen is soft. There is no mass.      Tenderness: There is no abdominal tenderness.   Musculoskeletal:         General: No swelling or tenderness. Normal range of motion.      Cervical back: Normal range of motion and neck supple.   Skin:     General: Skin is warm and dry.   Neurological:      General: No focal deficit present.      Mental Status: He is alert and oriented to person, place, and time.   Psychiatric:         Mood and Affect: Mood normal.         Behavior: Behavior normal.         Last Recorded Vitals  Blood pressure 128/77, pulse 66, temperature 36.7 °C (98.1 °F), temperature source Temporal, resp. rate 18, height (S) 1.684 m (5' 6.3\"), weight (S) 94 kg (207 lb 3.7 oz), SpO2 98%.  Intake/Output last 3 Shifts:  I/O last 3 completed shifts:  In: 2487.9 (26.5 " mL/kg) [I.V.:2487.9 (26.5 mL/kg)]  Out: - (0 mL/kg)   Weight: 94 kg     Relevant Results      Scheduled medications  acyclovir, 400 mg, oral, Daily  allopurinol, 300 mg, oral, Daily  amLODIPine, 10 mg, oral, Daily  cholecalciferol, 800 Units, oral, Daily  montelukast, 10 mg, oral, Daily  pantoprazole, 40 mg, oral, Daily before breakfast  posaconazole, 300 mg, oral, Daily with breakfast  predniSONE, 60 mg, oral, Daily      Continuous medications  sodium chloride 0.9%, 75 mL/hr, Last Rate: 75 mL/hr (07/25/24 0254)      PRN medications  PRN medications: albuterol, alteplase, dextrose, diphenhydrAMINE, EPINEPHrine, famotidine, methylPREDNISolone sodium succinate (PF), ondansetron, polyethylene glycol, prochlorperazine, prochlorperazine, sodium chloride      Assessment/Plan   Principal Problem:    Acute myeloid leukemia not having achieved remission (Multi)  Active Problems:    Delayed hemolytic transfusion reaction    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 male with PMHx of AML, esophageal ulcer w/ GI bleed, smoldering systemic mastocytosis, and HTN presented to infusion center for C1D2 for Jeremy/Aza but ab work revealed increased Cr 1.74, phosph 5.8, uric acid 6, and LDH 6,660, showing evidence of TLS so aza/Jeremy were held. He was started on IVF and rasburicase. He was admitted to [facility] for further evaluation and treatment.    Day 4, cycle 1 AZA (holding Jeremy)    ONC:  #Smoldering systemic Mastocytosis (Dx 8/2023)  - presented with skin rash and eosinophilia  - BMBx (8/23/23): hypercellular (90-95%) with trilineage hematopoiesis, granulocytic hyperplasia, eosinophilia, and increased atypical mast cells.  - c/w allegra (will have someone bring his home supply since its not on formulary)  - c/w home montelukast  #AML  - Presented with worsening anemia in setting of mast cell disease  - (6/24/24): peripheral blood path read  abnormal lymphocytes with possible blasts.  - BMBx (6/27/24): Acute Leukemia arising in background of systemic mastocytosis; 7-8% blast on peripheral blood; + CD34+, CD7+, TDT+  - Started induction w/ C1 Aza/Jeremy (7/16/24)  - Restarted Aza only on 7/23 (hold Venetoclax)    #TLS  - LDH/ UA downtrending   - s/p Rasburicase  - c/w Allopurinol 300 mg daily  - decreased IVF to 75ml/hr        HEME:  #Pancytopenia secondary to disease  #DHTR  - Haptoglobin 22, suggestive of hemolysis  - Prednisone 80mg daily (7/19-7/23). Decrease to 60mg daily beginning 7/24  - G6PD(7/17) Normal   - no evidence of bleeding   - Monitor counts daily  - Keep Hgb >7, plt >10     ID:  NKDA  #ppx: ACV, Posaconazole  - Afebrile, no evidence of active infection on admit     FEN/GI:  Admit Wt: 88.2 kg, current wt: 89.8kg (7/22)  - Monitor electrolytes, replace as needed  - c/w home pantoprazole     RENAL:  # PERRY  :: 2/2 TLS  - sCr 1.74 on admission; Max Creat 2.88 (7/18), now 1.31 (7/25)    CARDIAC:  #HTN  - c/w home amlodipine  - Echo (6/14/23): EF 55-60%    DISPO:  - Full Code   - Access: PIV  - Primary Oncologist: Dr. Dawson  - PT/OT yamil  - Tentative plan for discharge on Sunday (7/28)     Patient seen and discussed with Dr. Tr Gusman PA-C

## 2024-07-25 NOTE — CARE PLAN
The clinical goals for the shift include patient will remain free from injuries and falls throughout shift    Over the shift, the patient made progress toward the following goals. Patient remained safe and HDS.    Problem: Fall/Injury  Goal: Not fall by end of shift  Outcome: Progressing  Goal: Be free from injury by end of the shift  Outcome: Progressing  Goal: Verbalize understanding of personal risk factors for fall in the hospital  Outcome: Progressing  Goal: Verbalize understanding of risk factor reduction measures to prevent injury from fall in the home  Outcome: Progressing  Goal: Use assistive devices by end of the shift  Outcome: Progressing  Goal: Pace activities to prevent fatigue by end of the shift  Outcome: Progressing

## 2024-07-26 ENCOUNTER — APPOINTMENT (OUTPATIENT)
Dept: HEMATOLOGY/ONCOLOGY | Facility: HOSPITAL | Age: 69
End: 2024-07-26
Payer: MEDICARE

## 2024-07-26 LAB
ALBUMIN SERPL BCP-MCNC: 3.2 G/DL (ref 3.4–5)
ALP SERPL-CCNC: 51 U/L (ref 33–136)
ALT SERPL W P-5'-P-CCNC: 9 U/L (ref 10–52)
ANION GAP SERPL CALC-SCNC: 12 MMOL/L (ref 10–20)
APTT PPP: 36 SECONDS (ref 27–38)
AST SERPL W P-5'-P-CCNC: 7 U/L (ref 9–39)
BASOPHILS # BLD AUTO: 0 X10*3/UL (ref 0–0.1)
BASOPHILS NFR BLD AUTO: 0 %
BILIRUB DIRECT SERPL-MCNC: 0.1 MG/DL (ref 0–0.3)
BILIRUB SERPL-MCNC: 0.6 MG/DL (ref 0–1.2)
BUN SERPL-MCNC: 21 MG/DL (ref 6–23)
CALCIUM SERPL-MCNC: 6.7 MG/DL (ref 8.6–10.6)
CHLORIDE SERPL-SCNC: 106 MMOL/L (ref 98–107)
CO2 SERPL-SCNC: 24 MMOL/L (ref 21–32)
CREAT SERPL-MCNC: 1.42 MG/DL (ref 0.5–1.3)
EGFRCR SERPLBLD CKD-EPI 2021: 54 ML/MIN/1.73M*2
EOSINOPHIL # BLD AUTO: 0.02 X10*3/UL (ref 0–0.7)
EOSINOPHIL NFR BLD AUTO: 0.9 %
ERYTHROCYTE [DISTWIDTH] IN BLOOD BY AUTOMATED COUNT: 16.2 % (ref 11.5–14.5)
FIBRINOGEN PPP-MCNC: 202 MG/DL (ref 200–400)
GLUCOSE SERPL-MCNC: 100 MG/DL (ref 74–99)
HCT VFR BLD AUTO: 21.6 % (ref 41–52)
HGB BLD-MCNC: 7.3 G/DL (ref 13.5–17.5)
IMM GRANULOCYTES # BLD AUTO: 0.03 X10*3/UL (ref 0–0.7)
IMM GRANULOCYTES NFR BLD AUTO: 1.4 % (ref 0–0.9)
INR PPP: 1.3 (ref 0.9–1.1)
LDH SERPL L TO P-CCNC: 368 U/L (ref 84–246)
LYMPHOCYTES # BLD AUTO: 0.66 X10*3/UL (ref 1.2–4.8)
LYMPHOCYTES NFR BLD AUTO: 31.1 %
MCH RBC QN AUTO: 28.3 PG (ref 26–34)
MCHC RBC AUTO-ENTMCNC: 33.8 G/DL (ref 32–36)
MCV RBC AUTO: 84 FL (ref 80–100)
MONOCYTES # BLD AUTO: 0.16 X10*3/UL (ref 0.1–1)
MONOCYTES NFR BLD AUTO: 7.5 %
NEUTROPHILS # BLD AUTO: 1.25 X10*3/UL (ref 1.2–7.7)
NEUTROPHILS NFR BLD AUTO: 59.1 %
NRBC BLD-RTO: 0.9 /100 WBCS (ref 0–0)
PHOSPHATE SERPL-MCNC: 2.3 MG/DL (ref 2.5–4.9)
PLATELET # BLD AUTO: 15 X10*3/UL (ref 150–450)
POTASSIUM SERPL-SCNC: 3.6 MMOL/L (ref 3.5–5.3)
PROT SERPL-MCNC: 5.7 G/DL (ref 6.4–8.2)
PROTHROMBIN TIME: 14.6 SECONDS (ref 9.8–12.8)
RBC # BLD AUTO: 2.58 X10*6/UL (ref 4.5–5.9)
SODIUM SERPL-SCNC: 138 MMOL/L (ref 136–145)
URATE SERPL-MCNC: 2.4 MG/DL (ref 4–7.5)
WBC # BLD AUTO: 2.1 X10*3/UL (ref 4.4–11.3)

## 2024-07-26 PROCEDURE — 85025 COMPLETE CBC W/AUTO DIFF WBC: CPT

## 2024-07-26 PROCEDURE — 85384 FIBRINOGEN ACTIVITY: CPT

## 2024-07-26 PROCEDURE — RXMED WILLOW AMBULATORY MEDICATION CHARGE

## 2024-07-26 PROCEDURE — 2500000002 HC RX 250 W HCPCS SELF ADMINISTERED DRUGS (ALT 637 FOR MEDICARE OP, ALT 636 FOR OP/ED): Performed by: STUDENT IN AN ORGANIZED HEALTH CARE EDUCATION/TRAINING PROGRAM

## 2024-07-26 PROCEDURE — 85610 PROTHROMBIN TIME: CPT

## 2024-07-26 PROCEDURE — 2500000004 HC RX 250 GENERAL PHARMACY W/ HCPCS (ALT 636 FOR OP/ED): Performed by: INTERNAL MEDICINE

## 2024-07-26 PROCEDURE — 36415 COLL VENOUS BLD VENIPUNCTURE: CPT

## 2024-07-26 PROCEDURE — 84550 ASSAY OF BLOOD/URIC ACID: CPT | Performed by: PHYSICIAN ASSISTANT

## 2024-07-26 PROCEDURE — 2500000001 HC RX 250 WO HCPCS SELF ADMINISTERED DRUGS (ALT 637 FOR MEDICARE OP)

## 2024-07-26 PROCEDURE — 2500000001 HC RX 250 WO HCPCS SELF ADMINISTERED DRUGS (ALT 637 FOR MEDICARE OP): Performed by: STUDENT IN AN ORGANIZED HEALTH CARE EDUCATION/TRAINING PROGRAM

## 2024-07-26 PROCEDURE — 83615 LACTATE (LD) (LDH) ENZYME: CPT | Performed by: PHYSICIAN ASSISTANT

## 2024-07-26 PROCEDURE — 99233 SBSQ HOSP IP/OBS HIGH 50: CPT | Performed by: INTERNAL MEDICINE

## 2024-07-26 PROCEDURE — 2500000004 HC RX 250 GENERAL PHARMACY W/ HCPCS (ALT 636 FOR OP/ED): Performed by: PHYSICIAN ASSISTANT

## 2024-07-26 PROCEDURE — 36415 COLL VENOUS BLD VENIPUNCTURE: CPT | Performed by: PHYSICIAN ASSISTANT

## 2024-07-26 PROCEDURE — 84100 ASSAY OF PHOSPHORUS: CPT | Performed by: PHYSICIAN ASSISTANT

## 2024-07-26 PROCEDURE — 2500000005 HC RX 250 GENERAL PHARMACY W/O HCPCS: Performed by: INTERNAL MEDICINE

## 2024-07-26 PROCEDURE — 1170000001 HC PRIVATE ONCOLOGY ROOM DAILY

## 2024-07-26 PROCEDURE — 2500000004 HC RX 250 GENERAL PHARMACY W/ HCPCS (ALT 636 FOR OP/ED): Mod: JZ | Performed by: NURSE PRACTITIONER

## 2024-07-26 PROCEDURE — 82248 BILIRUBIN DIRECT: CPT

## 2024-07-26 RX ORDER — CALCIUM GLUCONATE 20 MG/ML
2 INJECTION, SOLUTION INTRAVENOUS ONCE
Status: COMPLETED | OUTPATIENT
Start: 2024-07-26 | End: 2024-07-26

## 2024-07-26 RX ORDER — ONDANSETRON HYDROCHLORIDE 8 MG/1
8 TABLET, FILM COATED ORAL ONCE
Status: COMPLETED | OUTPATIENT
Start: 2024-07-26 | End: 2024-07-26

## 2024-07-26 RX ORDER — FLUCONAZOLE 200 MG/1
400 TABLET ORAL DAILY
Qty: 14 TABLET | Refills: 0 | Status: SHIPPED | OUTPATIENT
Start: 2024-07-26 | End: 2024-07-26

## 2024-07-26 RX ORDER — POSACONAZOLE 100 MG/1
300 TABLET, DELAYED RELEASE ORAL
Qty: 30 TABLET | Refills: 2 | Status: SHIPPED | OUTPATIENT
Start: 2024-07-27

## 2024-07-26 RX ORDER — PREDNISONE 20 MG/1
40 TABLET ORAL DAILY
Status: DISCONTINUED | OUTPATIENT
Start: 2024-07-27 | End: 2024-07-28 | Stop reason: HOSPADM

## 2024-07-26 ASSESSMENT — COGNITIVE AND FUNCTIONAL STATUS - GENERAL
MOBILITY SCORE: 24
MOBILITY SCORE: 24
DAILY ACTIVITIY SCORE: 24
MOBILITY SCORE: 24
MOBILITY SCORE: 24
DAILY ACTIVITIY SCORE: 24
DAILY ACTIVITIY SCORE: 24

## 2024-07-26 ASSESSMENT — PAIN SCALES - GENERAL
PAINLEVEL_OUTOF10: 0 - NO PAIN

## 2024-07-26 ASSESSMENT — PAIN - FUNCTIONAL ASSESSMENT: PAIN_FUNCTIONAL_ASSESSMENT: 0-10

## 2024-07-26 NOTE — CARE PLAN
Problem: Skin  Goal: Decreased wound size/increased tissue granulation at next dressing change  7/26/2024 1419 by Yancy Guerra RN  Outcome: Progressing  7/26/2024 1419 by Yancy Guerra RN  Outcome: Progressing  Goal: Participates in plan/prevention/treatment measures  7/26/2024 1419 by Yancy Guerra RN  Outcome: Progressing  7/26/2024 1419 by Yancy Guerra RN  Outcome: Progressing  Goal: Prevent/manage excess moisture  7/26/2024 1419 by Yancy Guerra RN  Outcome: Progressing  7/26/2024 1419 by Yancy Guerra RN  Outcome: Progressing  Goal: Prevent/minimize sheer/friction injuries  7/26/2024 1419 by Yancy Guerra RN  Outcome: Progressing  7/26/2024 1419 by Yancy Guerra RN  Outcome: Progressing  Goal: Promote/optimize nutrition  Outcome: Progressing  Goal: Promote skin healing  Outcome: Progressing     Problem: Fall/Injury  Goal: Not fall by end of shift  Outcome: Progressing  Goal: Be free from injury by end of the shift  Outcome: Progressing  Goal: Verbalize understanding of personal risk factors for fall in the hospital  Outcome: Progressing  Goal: Verbalize understanding of risk factor reduction measures to prevent injury from fall in the home  Outcome: Progressing  Goal: Use assistive devices by end of the shift  Outcome: Progressing  Goal: Pace activities to prevent fatigue by end of the shift  Outcome: Progressing

## 2024-07-26 NOTE — SIGNIFICANT EVENT
07/26/24 0020   Prechemo Checklist   Has the patient been in the hospital, ED, or urgent care since last date of service N/A   Chemo/Immuno Consent Completed and Signed Yes   Protocol/Indications Verified Yes   Confirmed to previous date/time of medication Yes   Compared to previous dose Yes   All medications are dated accurately Yes   Pregnancy Test Negative Not applicable   Parameters Met Yes   BSA/Weight-Height Verified Yes   Dose Calculations Verified (current, total, cumulative) Yes

## 2024-07-26 NOTE — PROGRESS NOTES
"Jin Reynolds is a 69 y.o. male on day 9 of admission presenting with Acute myeloid leukemia not having achieved remission (Multi).    Subjective   No acute issues overnight. Creatinine stable. Plan for Sunday discharge when he completes chemotherapy.    Objective   Physical Exam  Constitutional:       General: He is not in acute distress.     Appearance: Normal appearance.   HENT:      Head: Normocephalic and atraumatic.      Nose: Nose normal.      Mouth/Throat:      Mouth: Mucous membranes are moist.      Pharynx: Oropharynx is clear.   Eyes:      General: No scleral icterus.     Extraocular Movements: Extraocular movements intact.      Pupils: Pupils are equal, round, and reactive to light.   Cardiovascular:      Rate and Rhythm: Normal rate and regular rhythm.      Pulses: Normal pulses.      Heart sounds: Normal heart sounds.   Pulmonary:      Effort: Pulmonary effort is normal. No respiratory distress.      Breath sounds: Normal breath sounds.   Abdominal:      General: Bowel sounds are normal. There is no distension.      Palpations: Abdomen is soft. There is no mass.      Tenderness: There is no abdominal tenderness.   Musculoskeletal:         General: No swelling or tenderness. Normal range of motion.      Cervical back: Normal range of motion and neck supple.   Skin:     General: Skin is warm and dry.   Neurological:      General: No focal deficit present.      Mental Status: He is alert and oriented to person, place, and time.   Psychiatric:         Mood and Affect: Mood normal.         Behavior: Behavior normal.     Last Recorded Vitals  Blood pressure 132/68, pulse (!) 48, temperature 36.4 °C (97.6 °F), temperature source Temporal, resp. rate 16, height (S) 1.684 m (5' 6.3\"), weight (S) 94 kg (207 lb 3.7 oz), SpO2 100%.  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 S/p C1D1 aza/leonor with significant clinical TLS and PERRY, therapy held and admitted for treatment of TLS with rasburicase and IVFs. Improving, restarted chemotherapy and plan discharge at completion of chemotherapy.    Day 5, cycle 1 AZA (holding Leonor)    ONC:  #Smoldering systemic Mastocytosis (Dx 8/2023)  - presented with skin rash and eosinophilia  - BMBx (8/23/23): hypercellular (90-95%) with trilineage hematopoiesis, granulocytic hyperplasia, eosinophilia, and increased atypical mast cells.  - c/w allegra (will have someone bring his home supply since its not on formulary)  - c/w home montelukast  #AML  - Presented with worsening anemia in setting of mast cell disease  - (6/24/24): peripheral blood path read abnormal lymphocytes with possible blasts.  - BMBx (6/27/24): Acute Leukemia arising in background of systemic mastocytosis; 7-8% blast on peripheral blood; + CD34+, CD7+, TDT+  - Started induction w/ C1 Aza/Leonor (7/16/24)  - Restarted Aza only on 7/23 (hold Venetoclax)    #TLS  - LDH/ UA downtrending   - s/p Rasburicase  - c/w Allopurinol 300 mg daily  - decreased IVF to 75ml/hr     HEME:  #Pancytopenia secondary to disease  #Delayed Hemolytic Transfusion Reaction 7/19  - Haptoglobin 22, suggestive of hemolysis  - Prednisone 80mg daily (7/19-7/23). Decrease to 60mg daily beginning 7/24. Plan 40mg on 7/27  - G6PD(7/17) Normal   - no evidence of bleeding   - Keep Hgb >7, plt >10     ID:  #ppx: ACV, Posaconazole    FEN/GI:  Admit Wt: 88.2 kg, current wt: 94kg (7/23)  - c/w home pantoprazole    RENAL:  # PERRY  :: 2/2 TLS  - sCr 1.74 on admission; Max Creat 2.88 (7/18),  now 1.42 (7/26)    CARDIAC:  #HTN  - c/w home amlodipine  - Echo (6/14/23): EF 55-60%    DISPO:  - Full Code   - Access: PIV  - Primary Oncologist: Dr. Dawson  - PT/OT eval  - Tentative plan for discharge on Sunday (7/28)     Patient seen and discussed with Dr. Tr Mike

## 2024-07-26 NOTE — CARE PLAN
Problem: Skin  Goal: Decreased wound size/increased tissue granulation at next dressing change  Outcome: Progressing  Goal: Participates in plan/prevention/treatment measures  Outcome: Progressing  Goal: Prevent/manage excess moisture  Outcome: Progressing  Goal: Prevent/minimize sheer/friction injuries  Outcome: Progressing  Goal: Promote/optimize nutrition  Outcome: Progressing  Goal: Promote skin healing  Outcome: Progressing     Problem: Fall/Injury  Goal: Not fall by end of shift  Outcome: Progressing  Goal: Be free from injury by end of the shift  Outcome: Progressing  Goal: Verbalize understanding of personal risk factors for fall in the hospital  Outcome: Progressing  Goal: Verbalize understanding of risk factor reduction measures to prevent injury from fall in the home  Outcome: Progressing  Goal: Use assistive devices by end of the shift  Outcome: Progressing  Goal: Pace activities to prevent fatigue by end of the shift  Outcome: Progressing     Problem: Respiratory  Goal: Clear secretions with interventions this shift  Outcome: Progressing  Goal: Minimize anxiety/maximize coping throughout shift  Outcome: Progressing  Goal: Minimal/no exertional discomfort or dyspnea this shift  Outcome: Progressing  Goal: No signs of respiratory distress (eg. Use of accessory muscles. Peds grunting)  Outcome: Progressing  Goal: Patent airway maintained this shift  Outcome: Progressing  Goal: Tolerate mechanical ventilation evidenced by VS/agitation level this shift  Outcome: Progressing  Goal: Tolerate pulmonary toileting this shift  Outcome: Progressing  Goal: Verbalize decreased shortness of breath this shift  Outcome: Progressing  Goal: Wean oxygen to maintain O2 saturation per order/standard this shift  Outcome: Progressing  Goal: Increase self care and/or family involvement in next 24 hours  Outcome: Progressing     Problem: Pain - Adult  Goal: Verbalizes/displays adequate comfort level or baseline comfort  level  Outcome: Progressing     Problem: Safety - Adult  Goal: Free from fall injury  Outcome: Progressing     Problem: Discharge Planning  Goal: Discharge to home or other facility with appropriate resources  Outcome: Progressing     Problem: Chronic Conditions and Co-morbidities  Goal: Patient's chronic conditions and co-morbidity symptoms are monitored and maintained or improved  Outcome: Progressing       The clinical goals for the shift include pt will remain free from injury throughout the shift.    VSS, remains afebrile. Denies pain, N/V/D. NS infusing at 75mL/hr. Remains safe throughout shift. Continue to monitor.

## 2024-07-27 LAB
ALBUMIN SERPL BCP-MCNC: 3.3 G/DL (ref 3.4–5)
ALP SERPL-CCNC: 50 U/L (ref 33–136)
ALT SERPL W P-5'-P-CCNC: 10 U/L (ref 10–52)
ANION GAP SERPL CALC-SCNC: 11 MMOL/L (ref 10–20)
AST SERPL W P-5'-P-CCNC: 7 U/L (ref 9–39)
BASOPHILS # BLD AUTO: 0 X10*3/UL (ref 0–0.1)
BASOPHILS NFR BLD AUTO: 0 %
BILIRUB DIRECT SERPL-MCNC: 0.1 MG/DL (ref 0–0.3)
BILIRUB SERPL-MCNC: 0.5 MG/DL (ref 0–1.2)
BUN SERPL-MCNC: 22 MG/DL (ref 6–23)
CALCIUM SERPL-MCNC: 7.1 MG/DL (ref 8.6–10.6)
CHLORIDE SERPL-SCNC: 106 MMOL/L (ref 98–107)
CO2 SERPL-SCNC: 24 MMOL/L (ref 21–32)
CREAT SERPL-MCNC: 1.48 MG/DL (ref 0.5–1.3)
EGFRCR SERPLBLD CKD-EPI 2021: 51 ML/MIN/1.73M*2
EOSINOPHIL # BLD AUTO: 0.01 X10*3/UL (ref 0–0.7)
EOSINOPHIL NFR BLD AUTO: 0.5 %
ERYTHROCYTE [DISTWIDTH] IN BLOOD BY AUTOMATED COUNT: 15.9 % (ref 11.5–14.5)
GLUCOSE SERPL-MCNC: 107 MG/DL (ref 74–99)
HCT VFR BLD AUTO: 20.9 % (ref 41–52)
HGB BLD-MCNC: 7.1 G/DL (ref 13.5–17.5)
IMM GRANULOCYTES # BLD AUTO: 0.02 X10*3/UL (ref 0–0.7)
IMM GRANULOCYTES NFR BLD AUTO: 0.9 % (ref 0–0.9)
LDH SERPL L TO P-CCNC: 318 U/L (ref 84–246)
LYMPHOCYTES # BLD AUTO: 0.58 X10*3/UL (ref 1.2–4.8)
LYMPHOCYTES NFR BLD AUTO: 26.5 %
MCH RBC QN AUTO: 27.8 PG (ref 26–34)
MCHC RBC AUTO-ENTMCNC: 34 G/DL (ref 32–36)
MCV RBC AUTO: 82 FL (ref 80–100)
MONOCYTES # BLD AUTO: 0.17 X10*3/UL (ref 0.1–1)
MONOCYTES NFR BLD AUTO: 7.8 %
NEUTROPHILS # BLD AUTO: 1.41 X10*3/UL (ref 1.2–7.7)
NEUTROPHILS NFR BLD AUTO: 64.3 %
NRBC BLD-RTO: 0.9 /100 WBCS (ref 0–0)
PHOSPHATE SERPL-MCNC: 2.5 MG/DL (ref 2.5–4.9)
PLATELET # BLD AUTO: 17 X10*3/UL (ref 150–450)
POTASSIUM SERPL-SCNC: 3.7 MMOL/L (ref 3.5–5.3)
PROT SERPL-MCNC: 5.7 G/DL (ref 6.4–8.2)
RBC # BLD AUTO: 2.55 X10*6/UL (ref 4.5–5.9)
SODIUM SERPL-SCNC: 137 MMOL/L (ref 136–145)
URATE SERPL-MCNC: 2.4 MG/DL (ref 4–7.5)
WBC # BLD AUTO: 2.2 X10*3/UL (ref 4.4–11.3)

## 2024-07-27 PROCEDURE — 84100 ASSAY OF PHOSPHORUS: CPT | Performed by: PHYSICIAN ASSISTANT

## 2024-07-27 PROCEDURE — 2500000004 HC RX 250 GENERAL PHARMACY W/ HCPCS (ALT 636 FOR OP/ED): Performed by: NURSE PRACTITIONER

## 2024-07-27 PROCEDURE — 2500000002 HC RX 250 W HCPCS SELF ADMINISTERED DRUGS (ALT 637 FOR MEDICARE OP, ALT 636 FOR OP/ED): Performed by: STUDENT IN AN ORGANIZED HEALTH CARE EDUCATION/TRAINING PROGRAM

## 2024-07-27 PROCEDURE — 2500000005 HC RX 250 GENERAL PHARMACY W/O HCPCS: Performed by: INTERNAL MEDICINE

## 2024-07-27 PROCEDURE — 1170000001 HC PRIVATE ONCOLOGY ROOM DAILY

## 2024-07-27 PROCEDURE — 2500000004 HC RX 250 GENERAL PHARMACY W/ HCPCS (ALT 636 FOR OP/ED): Performed by: INTERNAL MEDICINE

## 2024-07-27 PROCEDURE — 82248 BILIRUBIN DIRECT: CPT

## 2024-07-27 PROCEDURE — 2500000001 HC RX 250 WO HCPCS SELF ADMINISTERED DRUGS (ALT 637 FOR MEDICARE OP)

## 2024-07-27 PROCEDURE — 36415 COLL VENOUS BLD VENIPUNCTURE: CPT | Performed by: PHYSICIAN ASSISTANT

## 2024-07-27 PROCEDURE — 99233 SBSQ HOSP IP/OBS HIGH 50: CPT | Performed by: INTERNAL MEDICINE

## 2024-07-27 PROCEDURE — 84550 ASSAY OF BLOOD/URIC ACID: CPT | Performed by: PHYSICIAN ASSISTANT

## 2024-07-27 PROCEDURE — 83615 LACTATE (LD) (LDH) ENZYME: CPT | Performed by: PHYSICIAN ASSISTANT

## 2024-07-27 PROCEDURE — 85025 COMPLETE CBC W/AUTO DIFF WBC: CPT

## 2024-07-27 PROCEDURE — 2500000001 HC RX 250 WO HCPCS SELF ADMINISTERED DRUGS (ALT 637 FOR MEDICARE OP): Performed by: STUDENT IN AN ORGANIZED HEALTH CARE EDUCATION/TRAINING PROGRAM

## 2024-07-27 RX ORDER — PREDNISONE 10 MG/1
TABLET ORAL
Qty: 3 TABLET | Refills: 0 | Status: SHIPPED | OUTPATIENT
Start: 2024-07-27 | End: 2024-07-28

## 2024-07-27 RX ORDER — ONDANSETRON HYDROCHLORIDE 8 MG/1
8 TABLET, FILM COATED ORAL ONCE
Status: COMPLETED | OUTPATIENT
Start: 2024-07-27 | End: 2024-07-27

## 2024-07-27 ASSESSMENT — COGNITIVE AND FUNCTIONAL STATUS - GENERAL
DAILY ACTIVITIY SCORE: 24
DAILY ACTIVITIY SCORE: 24
MOBILITY SCORE: 24
MOBILITY SCORE: 24

## 2024-07-27 ASSESSMENT — PAIN SCALES - GENERAL
PAINLEVEL_OUTOF10: 0 - NO PAIN

## 2024-07-27 ASSESSMENT — PAIN - FUNCTIONAL ASSESSMENT
PAIN_FUNCTIONAL_ASSESSMENT: 0-10

## 2024-07-27 NOTE — PROGRESS NOTES
"Jin Reynolds is a 69 y.o. male on day 10 of admission presenting with Acute myeloid leukemia not having achieved remission (Multi).    Subjective   No acute issues overnight. Creatinine stable. Plan for Sunday discharge when he completes chemotherapy.    Objective   Physical Exam  Constitutional:       General: He is not in acute distress.     Appearance: Normal appearance.   HENT:      Head: Normocephalic and atraumatic.      Nose: Nose normal.      Mouth/Throat:      Mouth: Mucous membranes are moist.      Pharynx: Oropharynx is clear.   Eyes:      General: No scleral icterus.     Extraocular Movements: Extraocular movements intact.      Pupils: Pupils are equal, round, and reactive to light.   Cardiovascular:      Rate and Rhythm: Normal rate and regular rhythm.      Pulses: Normal pulses.      Heart sounds: Normal heart sounds.   Pulmonary:      Effort: Pulmonary effort is normal. No respiratory distress.      Breath sounds: Normal breath sounds.   Abdominal:      General: Bowel sounds are normal. There is no distension.      Palpations: Abdomen is soft. There is no mass.      Tenderness: There is no abdominal tenderness.   Musculoskeletal:         General: No swelling or tenderness. Normal range of motion.      Cervical back: Normal range of motion and neck supple.   Skin:     General: Skin is warm and dry.   Neurological:      General: No focal deficit present.      Mental Status: He is alert and oriented to person, place, and time.   Psychiatric:         Mood and Affect: Mood normal.         Behavior: Behavior normal.     Last Recorded Vitals  Blood pressure (!) 155/91, pulse 64, temperature 36.8 °C (98.2 °F), temperature source Temporal, resp. rate 18, height (S) 1.684 m (5' 6.3\"), weight 95.8 kg (211 lb 3.2 oz), SpO2 99%.  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 S/p C1D1 aza/leonor with significant clinical TLS and PERRY, therapy held and admitted for treatment of TLS with rasburicase and IVFs. Improving, restarted chemotherapy and plan discharge at completion of chemotherapy.    Day 6, cycle 1 AZA (holding Leonor)    ONC:  #Smoldering systemic Mastocytosis (Dx 8/2023)  - presented with skin rash and eosinophilia  - BMBx (8/23/23): hypercellular (90-95%) with trilineage hematopoiesis, granulocytic hyperplasia, eosinophilia, and increased atypical mast cells.  - c/w allegra (will have someone bring his home supply since its not on formulary)  - c/w home montelukast  #AML  - Presented with worsening anemia in setting of mast cell disease  - (6/24/24): peripheral blood path read abnormal lymphocytes with possible blasts.  - BMBx (6/27/24): Acute Leukemia arising in background of systemic mastocytosis; 7-8% blast on peripheral blood; + CD34+, CD7+, TDT+  - Started induction w/ C1 Aza/Leonor (7/16/24)  - Restarted Aza only on 7/23 (hold Venetoclax)    #TLS  - LDH/ UA downtrending   - s/p Rasburicase  - c/w Allopurinol 300 mg daily  - decreased IVF to 50ml/hr   - G6PD(7/17) Normal    HEME:  #Pancytopenia secondary to disease  #Delayed Hemolytic Transfusion Reaction 7/19  - Haptoglobin 22, suggestive of hemolysis  - Prednisone 80mg daily (7/19-7/23). 60mg daily 7/24-7/26.  40mg daily 7/27-7/28. Then 20mg daily x1, 10mg daily x1 then stop.  - Keep Hgb >7, plt >10     ID:  #ppx: ACV, Posaconazole    FEN/GI:  Admit Wt: 88.2 kg, current wt: 95.9kg (7/27). Appears euvolemic. Likely admitted dry  - c/w home  pantoprazole    RENAL:  # PERRY  :: 2/2 TLS  - sCr 1.74 on admission; Max Creat 2.88 (7/18), now 1.48 (7/27)    CARDIAC:  #HTN  - c/w home amlodipine  - Echo (6/14/23): EF 55-60%    DISPO:  - Full Code   - Access: PIV  - Primary Oncologist: Dr. Dawson  - PT/OT eval  - Tentative plan for discharge on Sunday (7/28)     Patient seen and discussed with Dr. Tr Mike

## 2024-07-27 NOTE — CARE PLAN
Problem: Skin  Goal: Decreased wound size/increased tissue granulation at next dressing change  Outcome: Progressing  Goal: Participates in plan/prevention/treatment measures  Outcome: Progressing  Goal: Prevent/manage excess moisture  Outcome: Progressing  Goal: Prevent/minimize sheer/friction injuries  Outcome: Progressing  Goal: Promote/optimize nutrition  Outcome: Progressing  Goal: Promote skin healing  Outcome: Progressing     Problem: Fall/Injury  Goal: Not fall by end of shift  Outcome: Progressing  Goal: Be free from injury by end of the shift  Outcome: Progressing  Goal: Verbalize understanding of personal risk factors for fall in the hospital  Outcome: Progressing  Goal: Verbalize understanding of risk factor reduction measures to prevent injury from fall in the home  Outcome: Progressing  Goal: Use assistive devices by end of the shift  Outcome: Progressing  Goal: Pace activities to prevent fatigue by end of the shift  Outcome: Progressing

## 2024-07-27 NOTE — CARE PLAN
Problem: Skin  Goal: Decreased wound size/increased tissue granulation at next dressing change  Outcome: Progressing  Goal: Participates in plan/prevention/treatment measures  Outcome: Progressing  Goal: Prevent/manage excess moisture  Outcome: Progressing  Goal: Prevent/minimize sheer/friction injuries  Outcome: Progressing  Goal: Promote/optimize nutrition  Outcome: Progressing  Goal: Promote skin healing  Outcome: Progressing     Problem: Fall/Injury  Goal: Not fall by end of shift  Outcome: Progressing  Goal: Be free from injury by end of the shift  Outcome: Progressing  Goal: Verbalize understanding of personal risk factors for fall in the hospital  Outcome: Progressing  Goal: Verbalize understanding of risk factor reduction measures to prevent injury from fall in the home  Outcome: Progressing  Goal: Use assistive devices by end of the shift  Outcome: Progressing  Goal: Pace activities to prevent fatigue by end of the shift  Outcome: Progressing     Problem: Respiratory  Goal: Clear secretions with interventions this shift  Outcome: Progressing  Goal: Minimize anxiety/maximize coping throughout shift  Outcome: Progressing  Goal: Minimal/no exertional discomfort or dyspnea this shift  Outcome: Progressing  Goal: No signs of respiratory distress (eg. Use of accessory muscles. Peds grunting)  Outcome: Progressing  Goal: Patent airway maintained this shift  Outcome: Progressing  Goal: Tolerate mechanical ventilation evidenced by VS/agitation level this shift  Outcome: Progressing  Goal: Tolerate pulmonary toileting this shift  Outcome: Progressing  Goal: Verbalize decreased shortness of breath this shift  Outcome: Progressing  Goal: Wean oxygen to maintain O2 saturation per order/standard this shift  Outcome: Progressing  Goal: Increase self care and/or family involvement in next 24 hours  Outcome: Progressing     Problem: Pain - Adult  Goal: Verbalizes/displays adequate comfort level or baseline comfort  level  Outcome: Progressing     Problem: Safety - Adult  Goal: Free from fall injury  Outcome: Progressing     Problem: Discharge Planning  Goal: Discharge to home or other facility with appropriate resources  Outcome: Progressing     Problem: Chronic Conditions and Co-morbidities  Goal: Patient's chronic conditions and co-morbidity symptoms are monitored and maintained or improved  Outcome: Progressing       The clinical goals for the shift include pt will be free from injury this shift    VSS, remains afebrile. Denies pain, N/V/D. NS infusing at 75mL/hr. Remains safe throughout shift. Continue to monitor.

## 2024-07-27 NOTE — SIGNIFICANT EVENT
07/27/24 0249   Prechemo Checklist   Has the patient been in the hospital, ED, or urgent care since last date of service Yes   Chemo/Immuno Consent Completed and Signed Yes   Protocol/Indications Verified Yes   Confirmed to previous date/time of medication Yes   Compared to previous dose Yes   All medications are dated accurately Yes   Pregnancy Test Negative Not applicable   Parameters Met Yes   BSA/Weight-Height Verified Yes   Dose Calculations Verified (current, total, cumulative) Yes

## 2024-07-28 ENCOUNTER — PHARMACY VISIT (OUTPATIENT)
Dept: PHARMACY | Facility: CLINIC | Age: 69
End: 2024-07-28
Payer: COMMERCIAL

## 2024-07-28 VITALS
DIASTOLIC BLOOD PRESSURE: 84 MMHG | HEART RATE: 64 BPM | OXYGEN SATURATION: 100 % | WEIGHT: 211.42 LBS | HEIGHT: 66 IN | BODY MASS INDEX: 33.98 KG/M2 | SYSTOLIC BLOOD PRESSURE: 145 MMHG | TEMPERATURE: 97.5 F | RESPIRATION RATE: 16 BRPM

## 2024-07-28 LAB
ALBUMIN SERPL BCP-MCNC: 3.4 G/DL (ref 3.4–5)
ALP SERPL-CCNC: 50 U/L (ref 33–136)
ALT SERPL W P-5'-P-CCNC: 10 U/L (ref 10–52)
ANION GAP SERPL CALC-SCNC: 10 MMOL/L (ref 10–20)
AST SERPL W P-5'-P-CCNC: 9 U/L (ref 9–39)
BASOPHILS # BLD AUTO: 0 X10*3/UL (ref 0–0.1)
BASOPHILS NFR BLD AUTO: 0 %
BILIRUB DIRECT SERPL-MCNC: 0.1 MG/DL (ref 0–0.3)
BILIRUB SERPL-MCNC: 0.6 MG/DL (ref 0–1.2)
BUN SERPL-MCNC: 22 MG/DL (ref 6–23)
CALCIUM SERPL-MCNC: 7.2 MG/DL (ref 8.6–10.6)
CHLORIDE SERPL-SCNC: 104 MMOL/L (ref 98–107)
CO2 SERPL-SCNC: 24 MMOL/L (ref 21–32)
CREAT SERPL-MCNC: 1.27 MG/DL (ref 0.5–1.3)
EGFRCR SERPLBLD CKD-EPI 2021: 62 ML/MIN/1.73M*2
EOSINOPHIL # BLD AUTO: 0.01 X10*3/UL (ref 0–0.7)
EOSINOPHIL NFR BLD AUTO: 0.4 %
ERYTHROCYTE [DISTWIDTH] IN BLOOD BY AUTOMATED COUNT: 16 % (ref 11.5–14.5)
GLUCOSE SERPL-MCNC: 103 MG/DL (ref 74–99)
HCT VFR BLD AUTO: 21.3 % (ref 41–52)
HGB BLD-MCNC: 7.4 G/DL (ref 13.5–17.5)
IMM GRANULOCYTES # BLD AUTO: 0.03 X10*3/UL (ref 0–0.7)
IMM GRANULOCYTES NFR BLD AUTO: 1.3 % (ref 0–0.9)
LDH SERPL L TO P-CCNC: 355 U/L (ref 84–246)
LYMPHOCYTES # BLD AUTO: 0.69 X10*3/UL (ref 1.2–4.8)
LYMPHOCYTES NFR BLD AUTO: 29 %
MCH RBC QN AUTO: 28.4 PG (ref 26–34)
MCHC RBC AUTO-ENTMCNC: 34.7 G/DL (ref 32–36)
MCV RBC AUTO: 82 FL (ref 80–100)
MONOCYTES # BLD AUTO: 0.12 X10*3/UL (ref 0.1–1)
MONOCYTES NFR BLD AUTO: 5 %
NEUTROPHILS # BLD AUTO: 1.53 X10*3/UL (ref 1.2–7.7)
NEUTROPHILS NFR BLD AUTO: 64.3 %
NRBC BLD-RTO: 0 /100 WBCS (ref 0–0)
PHOSPHATE SERPL-MCNC: 2.3 MG/DL (ref 2.5–4.9)
PLATELET # BLD AUTO: 20 X10*3/UL (ref 150–450)
POTASSIUM SERPL-SCNC: 3.7 MMOL/L (ref 3.5–5.3)
PROT SERPL-MCNC: 6 G/DL (ref 6.4–8.2)
RBC # BLD AUTO: 2.61 X10*6/UL (ref 4.5–5.9)
SODIUM SERPL-SCNC: 134 MMOL/L (ref 136–145)
URATE SERPL-MCNC: 2.4 MG/DL (ref 4–7.5)
WBC # BLD AUTO: 2.4 X10*3/UL (ref 4.4–11.3)

## 2024-07-28 PROCEDURE — 83615 LACTATE (LD) (LDH) ENZYME: CPT | Performed by: PHYSICIAN ASSISTANT

## 2024-07-28 PROCEDURE — 2500000004 HC RX 250 GENERAL PHARMACY W/ HCPCS (ALT 636 FOR OP/ED): Performed by: NURSE PRACTITIONER

## 2024-07-28 PROCEDURE — 99238 HOSP IP/OBS DSCHRG MGMT 30/<: CPT | Performed by: INTERNAL MEDICINE

## 2024-07-28 PROCEDURE — 85025 COMPLETE CBC W/AUTO DIFF WBC: CPT

## 2024-07-28 PROCEDURE — 82248 BILIRUBIN DIRECT: CPT

## 2024-07-28 PROCEDURE — 2500000001 HC RX 250 WO HCPCS SELF ADMINISTERED DRUGS (ALT 637 FOR MEDICARE OP)

## 2024-07-28 PROCEDURE — 80048 BASIC METABOLIC PNL TOTAL CA: CPT | Performed by: PHYSICIAN ASSISTANT

## 2024-07-28 PROCEDURE — 2500000001 HC RX 250 WO HCPCS SELF ADMINISTERED DRUGS (ALT 637 FOR MEDICARE OP): Performed by: STUDENT IN AN ORGANIZED HEALTH CARE EDUCATION/TRAINING PROGRAM

## 2024-07-28 PROCEDURE — RXMED WILLOW AMBULATORY MEDICATION CHARGE

## 2024-07-28 PROCEDURE — 2500000005 HC RX 250 GENERAL PHARMACY W/O HCPCS: Performed by: INTERNAL MEDICINE

## 2024-07-28 PROCEDURE — 84550 ASSAY OF BLOOD/URIC ACID: CPT | Performed by: PHYSICIAN ASSISTANT

## 2024-07-28 PROCEDURE — 84100 ASSAY OF PHOSPHORUS: CPT | Performed by: PHYSICIAN ASSISTANT

## 2024-07-28 PROCEDURE — 2500000004 HC RX 250 GENERAL PHARMACY W/ HCPCS (ALT 636 FOR OP/ED): Performed by: INTERNAL MEDICINE

## 2024-07-28 PROCEDURE — 2500000002 HC RX 250 W HCPCS SELF ADMINISTERED DRUGS (ALT 637 FOR MEDICARE OP, ALT 636 FOR OP/ED): Performed by: STUDENT IN AN ORGANIZED HEALTH CARE EDUCATION/TRAINING PROGRAM

## 2024-07-28 PROCEDURE — 36415 COLL VENOUS BLD VENIPUNCTURE: CPT | Performed by: PHYSICIAN ASSISTANT

## 2024-07-28 RX ORDER — ACYCLOVIR 400 MG/1
400 TABLET ORAL 2 TIMES DAILY
Qty: 60 TABLET | Refills: 0 | Status: SHIPPED | OUTPATIENT
Start: 2024-07-28 | End: 2024-08-27

## 2024-07-28 RX ORDER — ONDANSETRON HYDROCHLORIDE 8 MG/1
8 TABLET, FILM COATED ORAL ONCE
Status: COMPLETED | OUTPATIENT
Start: 2024-07-28 | End: 2024-07-28

## 2024-07-28 RX ORDER — PREDNISONE 10 MG/1
TABLET ORAL
Qty: 3 TABLET | Refills: 0 | Status: SHIPPED | OUTPATIENT
Start: 2024-07-29 | End: 2024-07-31

## 2024-07-28 RX ORDER — ACYCLOVIR 400 MG/1
400 TABLET ORAL 2 TIMES DAILY
Start: 2024-07-28 | End: 2024-07-28

## 2024-07-28 ASSESSMENT — PAIN SCALES - GENERAL
PAINLEVEL_OUTOF10: 0 - NO PAIN

## 2024-07-28 ASSESSMENT — PAIN - FUNCTIONAL ASSESSMENT
PAIN_FUNCTIONAL_ASSESSMENT: 0-10

## 2024-07-28 NOTE — CARE PLAN
Problem: Skin  Goal: Decreased wound size/increased tissue granulation at next dressing change  Outcome: Met  Flowsheets (Taken 7/28/2024 1104)  Decreased wound size/increased tissue granulation at next dressing change: Protective dressings over bony prominences  Goal: Participates in plan/prevention/treatment measures  Outcome: Met  Flowsheets (Taken 7/28/2024 1104)  Participates in plan/prevention/treatment measures: Increase activity/out of bed for meals  Goal: Prevent/manage excess moisture  Outcome: Met  Flowsheets (Taken 7/28/2024 1104)  Prevent/manage excess moisture: Moisturize dry skin  Goal: Prevent/minimize sheer/friction injuries  Outcome: Met  Flowsheets (Taken 7/28/2024 1104)  Prevent/minimize sheer/friction injuries: Increase activity/out of bed for meals  Goal: Promote/optimize nutrition  Outcome: Met  Flowsheets (Taken 7/28/2024 1104)  Promote/optimize nutrition: Offer water/supplements/favorite foods  Goal: Promote skin healing  Outcome: Met  Flowsheets (Taken 7/28/2024 1104)  Promote skin healing: Assess skin/pad under line(s)/device(s)     Problem: Fall/Injury  Goal: Not fall by end of shift  Outcome: Met  Goal: Be free from injury by end of the shift  Outcome: Met  Goal: Verbalize understanding of personal risk factors for fall in the hospital  Outcome: Met  Goal: Verbalize understanding of risk factor reduction measures to prevent injury from fall in the home  Outcome: Met  Goal: Use assistive devices by end of the shift  Outcome: Met  Goal: Pace activities to prevent fatigue by end of the shift  Outcome: Met       The clinical goals for the shift include patient will remain safe prior to discharge    Patient with stable VS. No pain or nausea. Peripheral Ivs removed. Cath tips intact. Received meds to beds. RN reviewed dc instructions. Refused wheelchair. Walking independently. RN walked with patient dowstairs to front. He wanted to wait outside for his family to arrive. Patient safely  discharged,

## 2024-07-28 NOTE — DISCHARGE SUMMARY
Discharge Diagnosis  Acute myeloid leukemia not having achieved remission (Multi)    Issues Requiring Follow-Up  Decide when to restart Venetoclax    Test Results Pending At Discharge  Pending Labs       No current pending labs.          Hospital Course  Jin Reynolds is a 69 y.o. 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 S/p C1D1 aza/leonor with significant clinical TLS and PERRY, therapy held and admitted for treatment of TLS with rasburicase and IVFs. Improved, restarted Azacitidine (held venetoclax).    Hospital course c/b delayed hemolytic transfusion reaction and acute kidney injury.    Received rasburicase, allopurinol and IVF for TLS. Creatinine improved and was 1.27 at discharge. Baseline 1-1.2    For DHTR, haptoglobin level was checked and was 22, suggestive of hemolysis. Started on prednisone 80mg daily is currently being tapered. Will complete taper 7/30. Hemoglobin stabilized.     No central line. Infusion appointments for count checks on 7/30 ([facility] heme follow up), 8/2, 8/6, 8/9, 8/13 (also to see Hinali)     Pertinent Physical Exam At Time of Discharge  Physical Exam  Vitals reviewed.   Constitutional:       Appearance: Normal appearance.   HENT:      Head: Normocephalic.      Mouth/Throat:      Mouth: Mucous membranes are moist.   Eyes:      Extraocular Movements: Extraocular movements intact.      Conjunctiva/sclera: Conjunctivae normal.      Pupils: Pupils are equal, round, and reactive to light.   Cardiovascular:      Rate and Rhythm: Normal rate and regular rhythm.      Pulses: Normal pulses.      Heart sounds: Normal heart sounds.   Pulmonary:      Effort: Pulmonary effort is normal.      Breath sounds: Normal breath sounds.   Abdominal:      General: Bowel sounds are normal.      Palpations: Abdomen is soft.   Musculoskeletal:         General: Normal range of motion.      Cervical back: Normal range of motion.   Skin:     General: Skin is warm and dry.       Capillary Refill: Capillary refill takes less than 2 seconds.   Neurological:      General: No focal deficit present.      Mental Status: He is alert.   Psychiatric:         Mood and Affect: Mood normal.         Behavior: Behavior normal.     Home Medications     Medication List      START taking these medications     acyclovir 400 mg tablet; Commonly known as: Zovirax; Take 1 tablet (400   mg) by mouth 2 times a day.   posaconazole 100 mg DR tablet; Commonly known as: Noxafil; Take 3   tablets (300 mg) by mouth once daily with breakfast. Do not crush, chew,   or split.   predniSONE 10 mg tablet; Commonly known as: Deltasone; Take 2 tablets   (20 mg) by mouth once daily for 1 day, THEN 1 tablet (10 mg) once daily   for 1 day. Do not fill before July 29, 2024.; Start taking on: July 29, 2024     CONTINUE taking these medications     allopurinol 300 mg tablet; Commonly known as: Zyloprim; Take 1 tablet   (300 mg) by mouth once daily for 14 days.   amLODIPine 10 mg tablet; Commonly known as: Norvasc; TAKE 1 TABLET BY   MOUTH EVERY DAY   cholecalciferol 10 MCG (400 UNIT) tablet; Commonly known as: Vitamin   D-3; Take 2 tablets (20 mcg) by mouth once daily.   famotidine 20 mg tablet; Commonly known as: Pepcid   fexofenadine 180 mg tablet; Commonly known as: Allegra   montelukast 10 mg tablet; Commonly known as: Singulair; Take 1 tablet   (10 mg) by mouth once daily.   ondansetron 8 mg tablet; Commonly known as: Zofran; Take 1 tablet (8 mg)   by mouth every 8 hours if needed for nausea or vomiting.   pantoprazole 40 mg EC tablet; Commonly known as: ProtoNix   prochlorperazine 10 mg tablet; Commonly known as: Compazine; Take 1   tablet (10 mg) by mouth every 6 hours if needed for nausea or vomiting.   Venclexta 100 mg tablet; Generic drug: venetoclax; Take 1 tablet (100 mg   total) by mouth once daily in the evening for 1 day, THEN 2 tablets (200   mg total) once daily in the evening for 1 day, THEN 4 tablets (400 mg    total) once daily in the evening for 26 days. Take with food..; Start   taking on: July 16, 2024; Notes to patient: Hold until directed by Dr. Dawson     Outpatient Follow-Up  Future Appointments   Date Time Provider Department Center   7/30/2024  8:30 AM INF 27 [facility]   8/2/2024 10:30 AM INF 29 [facility]   8/6/2024  8:45 AM INF 20 [facility]   8/9/2024  8:45 AM INF 23 [facility]   8/13/2024  8:00 AM Kalia June PA-C [id number] [facility]   8/13/2024  8:30 AM INF 05 [facility]   8/14/2024 11:00 AM INF 24 [facility]   8/15/2024  8:45 AM INF 22 [facility]   8/16/2024  8:45 AM INF 22 [facility]   8/17/2024 10:00 AM INF 07 [facility]   8/18/2024 12:00 PM INF 09 [facility]   8/19/2024  8:30 AM INF 12 [facility]   9/10/2024 10:40 AM Nika Dawson MD [address]       Patient seen, examined and discussed with Dr. Tr Mike

## 2024-07-28 NOTE — CARE PLAN
The patient's goals for the shift include      The clinical goals for the shift include Patient will remain free of injury throughout the shift    Over the shift, the patient did not make progress toward the following goals. Barriers to progression include n/a. Recommendations to address these barriers include n/a.      Problem: Skin  Goal: Decreased wound size/increased tissue granulation at next dressing change  Outcome: Progressing  Goal: Participates in plan/prevention/treatment measures  Outcome: Progressing  Goal: Prevent/manage excess moisture  Outcome: Progressing  Goal: Prevent/minimize sheer/friction injuries  Outcome: Progressing  Goal: Promote/optimize nutrition  Outcome: Progressing  Goal: Promote skin healing  Outcome: Progressing     Problem: Fall/Injury  Goal: Not fall by end of shift  Outcome: Progressing  Goal: Be free from injury by end of the shift  Outcome: Progressing  Goal: Verbalize understanding of personal risk factors for fall in the hospital  Outcome: Progressing  Goal: Verbalize understanding of risk factor reduction measures to prevent injury from fall in the home  Outcome: Progressing  Goal: Use assistive devices by end of the shift  Outcome: Progressing  Goal: Pace activities to prevent fatigue by end of the shift  Outcome: Progressing     Problem: Respiratory  Goal: Clear secretions with interventions this shift  Outcome: Progressing  Goal: Minimize anxiety/maximize coping throughout shift  Outcome: Progressing  Goal: Minimal/no exertional discomfort or dyspnea this shift  Outcome: Progressing  Goal: No signs of respiratory distress (eg. Use of accessory muscles. Peds grunting)  Outcome: Progressing  Goal: Patent airway maintained this shift  Outcome: Progressing  Goal: Tolerate mechanical ventilation evidenced by VS/agitation level this shift  Outcome: Progressing  Goal: Tolerate pulmonary toileting this shift  Outcome: Progressing  Goal: Verbalize decreased shortness of breath this  shift  Outcome: Progressing  Goal: Wean oxygen to maintain O2 saturation per order/standard this shift  Outcome: Progressing  Goal: Increase self care and/or family involvement in next 24 hours  Outcome: Progressing     Problem: Pain - Adult  Goal: Verbalizes/displays adequate comfort level or baseline comfort level  Outcome: Progressing     Problem: Safety - Adult  Goal: Free from fall injury  Outcome: Progressing     Problem: Discharge Planning  Goal: Discharge to home or other facility with appropriate resources  Outcome: Progressing     Problem: Chronic Conditions and Co-morbidities  Goal: Patient's chronic conditions and co-morbidity symptoms are monitored and maintained or improved  Outcome: Progressing

## 2024-07-28 NOTE — NURSING NOTE
Chemo - patient premedicated with zofran. Received 155 mg Vidaza subcutaneous by 2 Rns. Verified at bedside with Jewel Zelaya RN. Patient had no side effects.   24

## 2024-07-29 ENCOUNTER — LAB REQUISITION (OUTPATIENT)
Dept: LAB | Facility: CLINIC | Age: 69
End: 2024-07-29
Payer: MEDICARE

## 2024-07-29 DIAGNOSIS — C92.00 ACUTE MYELOBLASTIC LEUKEMIA, NOT HAVING ACHIEVED REMISSION (MULTI): ICD-10-CM

## 2024-07-29 LAB
HLA CLS I TYP PNL BLD/T DONR HIGH RES: NORMAL
HLA RESULTS: NORMAL
HLA-DP2 QL: NORMAL
HLA-DQB1 HIGH RES: NORMAL
HLA-DRB1 HIGH RES: NORMAL

## 2024-07-30 ENCOUNTER — LAB (OUTPATIENT)
Dept: HEMATOLOGY/ONCOLOGY | Facility: HOSPITAL | Age: 69
End: 2024-07-30
Payer: MEDICARE

## 2024-07-30 ENCOUNTER — OFFICE VISIT (OUTPATIENT)
Dept: HEMATOLOGY/ONCOLOGY | Facility: HOSPITAL | Age: 69
End: 2024-07-30
Payer: MEDICARE

## 2024-07-30 VITALS
SYSTOLIC BLOOD PRESSURE: 151 MMHG | HEART RATE: 79 BPM | TEMPERATURE: 97.3 F | DIASTOLIC BLOOD PRESSURE: 81 MMHG | OXYGEN SATURATION: 99 % | WEIGHT: 211.5 LBS | RESPIRATION RATE: 18 BRPM | BODY MASS INDEX: 33.83 KG/M2

## 2024-07-30 DIAGNOSIS — C92.00 ACUTE MYELOID LEUKEMIA NOT HAVING ACHIEVED REMISSION (MULTI): ICD-10-CM

## 2024-07-30 DIAGNOSIS — C92.02 AML (ACUTE MYELOID LEUKEMIA) IN RELAPSE (MULTI): ICD-10-CM

## 2024-07-30 DIAGNOSIS — M81.8 OTHER OSTEOPOROSIS WITHOUT CURRENT PATHOLOGICAL FRACTURE: ICD-10-CM

## 2024-07-30 DIAGNOSIS — D47.02 SMOLDERING SYSTEMIC MASTOCYTOSIS: ICD-10-CM

## 2024-07-30 LAB
ABO GROUP (TYPE) IN BLOOD: NORMAL
ALBUMIN SERPL BCP-MCNC: 3.6 G/DL (ref 3.4–5)
ALP SERPL-CCNC: 54 U/L (ref 33–136)
ALT SERPL W P-5'-P-CCNC: 11 U/L (ref 10–52)
ANION GAP SERPL CALC-SCNC: 10 MMOL/L (ref 10–20)
ANTIBODY SCREEN: NORMAL
AST SERPL W P-5'-P-CCNC: 9 U/L (ref 9–39)
BASOPHILS # BLD AUTO: 0.01 X10*3/UL (ref 0–0.1)
BASOPHILS NFR BLD AUTO: 0.3 %
BILIRUB SERPL-MCNC: 0.5 MG/DL (ref 0–1.2)
BUN SERPL-MCNC: 27 MG/DL (ref 6–23)
CALCIUM SERPL-MCNC: 7.9 MG/DL (ref 8.6–10.3)
CHLORIDE SERPL-SCNC: 109 MMOL/L (ref 98–107)
CO2 SERPL-SCNC: 23 MMOL/L (ref 21–32)
CREAT SERPL-MCNC: 1.37 MG/DL (ref 0.5–1.3)
EGFRCR SERPLBLD CKD-EPI 2021: 56 ML/MIN/1.73M*2
EOSINOPHIL # BLD AUTO: 0.04 X10*3/UL (ref 0–0.7)
EOSINOPHIL NFR BLD AUTO: 1.1 %
ERYTHROCYTE [DISTWIDTH] IN BLOOD BY AUTOMATED COUNT: 16.4 % (ref 11.5–14.5)
GLUCOSE SERPL-MCNC: 93 MG/DL (ref 74–99)
HCT VFR BLD AUTO: 23.4 % (ref 41–52)
HGB BLD-MCNC: 7.8 G/DL (ref 13.5–17.5)
IMM GRANULOCYTES # BLD AUTO: 0.02 X10*3/UL (ref 0–0.7)
IMM GRANULOCYTES NFR BLD AUTO: 0.6 % (ref 0–0.9)
LYMPHOCYTES # BLD AUTO: 1.31 X10*3/UL (ref 1.2–4.8)
LYMPHOCYTES NFR BLD AUTO: 37.4 %
MCH RBC QN AUTO: 28.4 PG (ref 26–34)
MCHC RBC AUTO-ENTMCNC: 33.3 G/DL (ref 32–36)
MCV RBC AUTO: 85 FL (ref 80–100)
MONOCYTES # BLD AUTO: 0.18 X10*3/UL (ref 0.1–1)
MONOCYTES NFR BLD AUTO: 5.1 %
NEUTROPHILS # BLD AUTO: 1.94 X10*3/UL (ref 1.2–7.7)
NEUTROPHILS NFR BLD AUTO: 55.5 %
NRBC BLD-RTO: 0 /100 WBCS (ref 0–0)
PLATELET # BLD AUTO: 18 X10*3/UL (ref 150–450)
POTASSIUM SERPL-SCNC: 3.1 MMOL/L (ref 3.5–5.3)
PROT SERPL-MCNC: 6.3 G/DL (ref 6.4–8.2)
RBC # BLD AUTO: 2.75 X10*6/UL (ref 4.5–5.9)
RH FACTOR (ANTIGEN D): NORMAL
SODIUM SERPL-SCNC: 139 MMOL/L (ref 136–145)
URATE SERPL-MCNC: 2.9 MG/DL (ref 4–7.5)
WBC # BLD AUTO: 3.5 X10*3/UL (ref 4.4–11.3)

## 2024-07-30 PROCEDURE — 3077F SYST BP >= 140 MM HG: CPT | Performed by: NURSE PRACTITIONER

## 2024-07-30 PROCEDURE — 36415 COLL VENOUS BLD VENIPUNCTURE: CPT

## 2024-07-30 PROCEDURE — 85025 COMPLETE CBC W/AUTO DIFF WBC: CPT

## 2024-07-30 PROCEDURE — 1126F AMNT PAIN NOTED NONE PRSNT: CPT | Performed by: NURSE PRACTITIONER

## 2024-07-30 PROCEDURE — 1111F DSCHRG MED/CURRENT MED MERGE: CPT | Performed by: NURSE PRACTITIONER

## 2024-07-30 PROCEDURE — 96360 HYDRATION IV INFUSION INIT: CPT | Mod: INF

## 2024-07-30 PROCEDURE — 84550 ASSAY OF BLOOD/URIC ACID: CPT

## 2024-07-30 PROCEDURE — 86870 RBC ANTIBODY IDENTIFICATION: CPT

## 2024-07-30 PROCEDURE — 2500000004 HC RX 250 GENERAL PHARMACY W/ HCPCS (ALT 636 FOR OP/ED): Performed by: NURSE PRACTITIONER

## 2024-07-30 PROCEDURE — 3079F DIAST BP 80-89 MM HG: CPT | Performed by: NURSE PRACTITIONER

## 2024-07-30 PROCEDURE — 99215 OFFICE O/P EST HI 40 MIN: CPT | Mod: 25 | Performed by: NURSE PRACTITIONER

## 2024-07-30 PROCEDURE — 86885 COOMBS TEST INDIRECT QUAL: CPT

## 2024-07-30 PROCEDURE — 86850 RBC ANTIBODY SCREEN: CPT

## 2024-07-30 PROCEDURE — 99215 OFFICE O/P EST HI 40 MIN: CPT | Performed by: NURSE PRACTITIONER

## 2024-07-30 PROCEDURE — 80053 COMPREHEN METABOLIC PANEL: CPT

## 2024-07-30 ASSESSMENT — PAIN SCALES - GENERAL: PAINLEVEL: 0-NO PAIN

## 2024-07-31 ENCOUNTER — HOSPITAL ENCOUNTER (EMERGENCY)
Facility: HOSPITAL | Age: 69
Discharge: HOME | End: 2024-07-31
Attending: EMERGENCY MEDICINE
Payer: MEDICARE

## 2024-07-31 ENCOUNTER — TELEPHONE (OUTPATIENT)
Dept: HEMATOLOGY/ONCOLOGY | Facility: HOSPITAL | Age: 69
End: 2024-07-31
Payer: MEDICARE

## 2024-07-31 ENCOUNTER — TELEPHONE (OUTPATIENT)
Dept: ADMISSION | Facility: HOSPITAL | Age: 69
End: 2024-07-31
Payer: MEDICARE

## 2024-07-31 VITALS
HEART RATE: 58 BPM | BODY MASS INDEX: 33.91 KG/M2 | HEIGHT: 66 IN | RESPIRATION RATE: 16 BRPM | WEIGHT: 211 LBS | TEMPERATURE: 97.7 F | SYSTOLIC BLOOD PRESSURE: 153 MMHG | DIASTOLIC BLOOD PRESSURE: 81 MMHG | OXYGEN SATURATION: 100 %

## 2024-07-31 DIAGNOSIS — R04.0 EPISTAXIS: Primary | ICD-10-CM

## 2024-07-31 DIAGNOSIS — D69.6 THROMBOCYTOPENIA (CMS-HCC): ICD-10-CM

## 2024-07-31 LAB
ABO GROUP (TYPE) IN BLOOD: NORMAL
ALBUMIN SERPL BCP-MCNC: 4 G/DL (ref 3.4–5)
ALP SERPL-CCNC: 55 U/L (ref 33–136)
ALT SERPL W P-5'-P-CCNC: 11 U/L (ref 10–52)
ANION GAP SERPL CALC-SCNC: 9 MMOL/L (ref 10–20)
ANTIBODY SCREEN: NORMAL
APTT PPP: 36 SECONDS (ref 27–38)
AST SERPL W P-5'-P-CCNC: 8 U/L (ref 9–39)
BASOPHILS # BLD AUTO: 0.02 X10*3/UL (ref 0–0.1)
BASOPHILS NFR BLD AUTO: 0.7 %
BB ANTIBODY IDENTIFICATION: NORMAL
BB ANTIBODY IDENTIFICATION: NORMAL
BILIRUB SERPL-MCNC: 0.8 MG/DL (ref 0–1.2)
BLOOD EXPIRATION DATE: NORMAL
BUN SERPL-MCNC: 23 MG/DL (ref 6–23)
CALCIUM SERPL-MCNC: 8.2 MG/DL (ref 8.6–10.3)
CASE #: NORMAL
CASE #: NORMAL
CHLORIDE SERPL-SCNC: 107 MMOL/L (ref 98–107)
CO2 SERPL-SCNC: 25 MMOL/L (ref 21–32)
CREAT SERPL-MCNC: 1.29 MG/DL (ref 0.5–1.3)
DISPENSE STATUS: NORMAL
EGFRCR SERPLBLD CKD-EPI 2021: 60 ML/MIN/1.73M*2
EOSINOPHIL # BLD AUTO: 0.06 X10*3/UL (ref 0–0.7)
EOSINOPHIL NFR BLD AUTO: 2.2 %
ERYTHROCYTE [DISTWIDTH] IN BLOOD BY AUTOMATED COUNT: 16.4 % (ref 11.5–14.5)
GLUCOSE SERPL-MCNC: 90 MG/DL (ref 74–99)
HCT VFR BLD AUTO: 22.1 % (ref 41–52)
HGB BLD-MCNC: 7.5 G/DL (ref 13.5–17.5)
IMM GRANULOCYTES # BLD AUTO: 0.02 X10*3/UL (ref 0–0.7)
IMM GRANULOCYTES NFR BLD AUTO: 0.7 % (ref 0–0.9)
INR PPP: 1.3 (ref 0.9–1.1)
LYMPHOCYTES # BLD AUTO: 1.07 X10*3/UL (ref 1.2–4.8)
LYMPHOCYTES NFR BLD AUTO: 39.3 %
MCH RBC QN AUTO: 28.7 PG (ref 26–34)
MCHC RBC AUTO-ENTMCNC: 33.9 G/DL (ref 32–36)
MCV RBC AUTO: 85 FL (ref 80–100)
MONOCYTES # BLD AUTO: 0.15 X10*3/UL (ref 0.1–1)
MONOCYTES NFR BLD AUTO: 5.5 %
NEUTROPHILS # BLD AUTO: 1.4 X10*3/UL (ref 1.2–7.7)
NEUTROPHILS NFR BLD AUTO: 51.6 %
NRBC BLD-RTO: 0 /100 WBCS (ref 0–0)
PLATELET # BLD AUTO: 15 X10*3/UL (ref 150–450)
POTASSIUM SERPL-SCNC: 3.8 MMOL/L (ref 3.5–5.3)
PRODUCT BLOOD TYPE: 5100
PRODUCT CODE: NORMAL
PROT SERPL-MCNC: 6.7 G/DL (ref 6.4–8.2)
PROTHROMBIN TIME: 15.2 SECONDS (ref 9.8–12.8)
RBC # BLD AUTO: 2.61 X10*6/UL (ref 4.5–5.9)
RH FACTOR (ANTIGEN D): NORMAL
SODIUM SERPL-SCNC: 137 MMOL/L (ref 136–145)
UNIT ABO: NORMAL
UNIT NUMBER: NORMAL
UNIT RH: NORMAL
UNIT VOLUME: 203
WBC # BLD AUTO: 2.7 X10*3/UL (ref 4.4–11.3)

## 2024-07-31 PROCEDURE — 36415 COLL VENOUS BLD VENIPUNCTURE: CPT | Performed by: EMERGENCY MEDICINE

## 2024-07-31 PROCEDURE — 80053 COMPREHEN METABOLIC PANEL: CPT | Performed by: EMERGENCY MEDICINE

## 2024-07-31 PROCEDURE — 85610 PROTHROMBIN TIME: CPT | Performed by: EMERGENCY MEDICINE

## 2024-07-31 PROCEDURE — 36430 TRANSFUSION BLD/BLD COMPNT: CPT

## 2024-07-31 PROCEDURE — 99285 EMERGENCY DEPT VISIT HI MDM: CPT | Mod: 25

## 2024-07-31 PROCEDURE — 85025 COMPLETE CBC W/AUTO DIFF WBC: CPT | Performed by: EMERGENCY MEDICINE

## 2024-07-31 PROCEDURE — P9073 PLATELETS PHERESIS PATH REDU: HCPCS

## 2024-07-31 PROCEDURE — 86901 BLOOD TYPING SEROLOGIC RH(D): CPT | Performed by: EMERGENCY MEDICINE

## 2024-07-31 RX ORDER — SODIUM CHLORIDE 0.65 %
1 AEROSOL, SPRAY (ML) NASAL 2 TIMES DAILY
Qty: 15 ML | Refills: 0 | Status: SHIPPED | OUTPATIENT
Start: 2024-07-31

## 2024-07-31 RX ORDER — OXYMETAZOLINE HCL 0.05 %
2 SPRAY, NON-AEROSOL (ML) NASAL EVERY 12 HOURS PRN
Qty: 30 ML | Refills: 0 | Status: SHIPPED | OUTPATIENT
Start: 2024-07-31 | End: 2024-08-02

## 2024-07-31 ASSESSMENT — PAIN - FUNCTIONAL ASSESSMENT: PAIN_FUNCTIONAL_ASSESSMENT: 0-10

## 2024-07-31 ASSESSMENT — COLUMBIA-SUICIDE SEVERITY RATING SCALE - C-SSRS
1. IN THE PAST MONTH, HAVE YOU WISHED YOU WERE DEAD OR WISHED YOU COULD GO TO SLEEP AND NOT WAKE UP?: NO
6. HAVE YOU EVER DONE ANYTHING, STARTED TO DO ANYTHING, OR PREPARED TO DO ANYTHING TO END YOUR LIFE?: NO
2. HAVE YOU ACTUALLY HAD ANY THOUGHTS OF KILLING YOURSELF?: NO

## 2024-07-31 ASSESSMENT — PAIN SCALES - GENERAL: PAINLEVEL_OUTOF10: 0 - NO PAIN

## 2024-07-31 NOTE — TELEPHONE ENCOUNTER
Jin calls from [facility] ED. He went there last night with a nosebleed. He states that they finally did stop the bleeding and now want to admit him. Pt is concerned that they don't understand his type of cancer and resulting blood counts (PLT 15, Hgb 7.5). T&S was drawn in ED. Pt has not discussed his concerns fully with ED MD but asks that I communicate this with Dr. Dawson to make sure that she in agreement with their decision to admit.    Message sent to team.

## 2024-07-31 NOTE — ED TRIAGE NOTES
"Pt presents to the ED with a nose bleed that started a couple of hours ago \"out of nowhere\". Pt has tried to lay back and hold pressure with no relief. Pt is not on any blood thinners. Pt states that he is anemic and going through cancer treatment at this time.   "

## 2024-07-31 NOTE — DISCHARGE INSTRUCTIONS
Please use the nasal saline 2-3 times per day.  If you develop bleeding soak a gauze pad and Afrin, place it in your nose and then use nasal clamp for at least 15 minutes.  Do not lean backwards with the bloody nose so you can choke on the blood.  Take blood pressure medication when you get home.  Follow-up with your hematologist.  Return to nearest ER for any recurrent bleeding.

## 2024-07-31 NOTE — ED PROVIDER NOTES
Chief Complaint   Patient presents with    Epistaxis (Nose Bleed)     HPI:   Jin Reynolds is an 69 y.o. male with complicated PMH including AML, esophageal ulcer with previous bleed, HTN, GERD who presents to the ED for evaluation of epistaxis.  Patient states that about 3 hours prior to arrival he had spontaneous nosebleed.  He attempted to contact his hematologist and was told to go to the ER for further evaluation.  He said he Tried to lay back and hold pressure but there is no relief.  He denies any other bleeding including no hemoptysis, hematemesis, black or bloody stools.  Denies any chest pain, shortness of breath, abdominal pain, fever or chills    Medications:  Soc HX:  Allergies   Allergen Reactions    Zyrtec [Cetirizine] Headache   :  Past Medical History:   Diagnosis Date    Esophageal ulcer with bleeding 01/01/2024    Personal history of diseases of the skin and subcutaneous tissue     History of alopecia    Umbilical hernia 05/30/2023     History reviewed. No pertinent surgical history.  Family History   Problem Relation Name Age of Onset    Cancer Father        Physical Exam  Vitals and nursing note reviewed.   Constitutional:       General: He is not in acute distress.     Appearance: Normal appearance. He is not ill-appearing or toxic-appearing.   HENT:      Right Ear: External ear normal.      Left Ear: External ear normal.      Mouth/Throat:      Mouth: Mucous membranes are moist.      Pharynx: No posterior oropharyngeal erythema.   Eyes:      Pupils: Pupils are equal, round, and reactive to light.   Cardiovascular:      Rate and Rhythm: Normal rate and regular rhythm.      Pulses: Normal pulses.      Heart sounds: Normal heart sounds.   Pulmonary:      Effort: Pulmonary effort is normal.      Breath sounds: Normal breath sounds.   Musculoskeletal:         General: Normal range of motion.   Skin:     General: Skin is warm and dry.      Findings: No rash.   Neurological:      Mental Status: He is  alert.      Cranial Nerves: No cranial nerve deficit.     VS: As documented in the triage note and EMR flowsheet from this visit were reviewed.    External Records Reviewed: I reviewed recent and relevant outside records including: Reviewed hospital discharge note 7/28/2024.  Patient admitted 7/17-28/24 for AML, GI bleed Hospital course was complicated by delayed hemolytic transfusion reaction and PERRY.  Currently on prednisone taper.  Hemoglobin was stabilized.    Critical Care    Performed by: Reanna Spears PA-C  Authorized by: Julian Deshpande MD    Critical care provider statement:     Critical care time (minutes):  45    Critical care time was exclusive of:  Separately billable procedures and treating other patients and teaching time    Critical care was necessary to treat or prevent imminent or life-threatening deterioration of the following conditions:  Circulatory failure    Critical care was time spent personally by me on the following activities:  Blood draw for specimens, discussions with consultants, examination of patient, obtaining history from patient or surrogate, ordering and performing treatments and interventions, ordering and review of laboratory studies, ordering and review of radiographic studies, re-evaluation of patient's condition and review of old charts       Medical Decision Making:   ED Course as of 07/31/24 1252   Wed Jul 31, 2024   0939 Vitals Reviewed: Afebrile. Hypertensive. Not tachycardic nor tachypneic. No hypoxia.   [KA]   8940 Spoke with patient's hematologist, Dr. Dawson, via secure chat regarding patient's case. She has instructed me to transfuse one unit of platelets and then ok for dc with saline nasal spray 3-4x per day and Afrin instructions if recurrent bleeding.  He has repeat count check Friday.    I have ordered the appropriate product and patient has given signed informed consent, which is not avail in his chart. [KA]   7541 Patient is a 69-year-old male who  presents to the ED for evaluation of epistaxis.  On arrival in the ED was placed on nasal clamp and bleeding was resolved by the time I had arrived to see patient.  Labs were obtained during triage.  Show critical platelet level of 15.  Hemoglobin 7.5.  I did discuss this with patient's oncologist as our usual indication is to transfuse platelets at 10 if asymptomatic and less than 50 if symptomatic.  Because patient is not currently symptomatic I wanted clarification from his specialist.  She recommended transfusing 1 unit of platelets and if stable for DC, DC and he will have new labs drawn the day after tomorrow.  I discussed this with patient and he was agreeable.  Informed consent was obtained obtained.  Patient taken back to a room where he can be monitored.  At this time he has no other complaints and has no other active bleeding. [KA]   1204 Has follow-up with oncology in 2 days. [JM]   1244 Patient received platelet transfusion.  Says he feels well.  Will discharge home with saline nasal spray and Afrin.  He is to follow-up with his oncologist and obtain his lab work on Friday as scheduled.  Advised patient to return to ER for any new or worsening symptoms.  Recommended taking his blood pressure medicine when he gets home and ensuring that he has good control of BP.  Patient agreeable with plan. [KA]      ED Course User Index  [JM] Julian Deshpande MD  [KA] Reanna Spears PA-C         Diagnoses as of 07/31/24 1252   Epistaxis   Thrombocytopenia (CMS-HCC)     Chronic Medical Conditions Significantly Affecting Care: AML     Escalation of Care: Appropriate for outpatient management       Discussion of Management with Other Providers:  I discussed the patient/results with: Hematologist Dr. Webb; attending Dr. Deshpande    Counseling: Spoke with the patient and discussed today´s findings, in addition to providing specific details for the plan of care and expected course.  Patient was given the  opportunity to ask questions.    Discussed return precautions and importance of follow-up.  Advised to follow-up with hematology.  Advised to return to the ED for changing or worsening symptoms, new symptoms, complaint specific precautions, and precautions listed on the discharge paperwork.  Educated on the common potential side effects of medications prescribed.    I advised the patient that the emergency evaluation and treatment provided today doesn't end their need for medical care. It is very important that they follow-up with their primary care provider or other specialist as instructed.    The plan of care was mutually agreed upon with the patient. The patient and/or family were given the opportunity to ask questions. All questions asked today in the ED were answered to the best of my ability with today's information.    I specifically advised the patient to return to the ED for changing or worsening symptoms, worrisome new symptoms, or for any complaint specific precautions listed on the discharge paperwork.    This patient was cared for in the setting of nationwide stress on resources and staffing.    This report was transcribed using voice recognition software.  Every effort was made to ensure accuracy, however, inadvertently computerized transcription errors may be present.       Reanna Spears PA-C  07/31/24 1250       Reanna Spears PA-C  07/31/24 1253

## 2024-07-31 NOTE — TELEPHONE ENCOUNTER
Pt has had nose bleed since 5am- cannot get bleeding to stop. Has used many tissues- unable to approximate blood loss.   Pt advised to go to nearest ED- yesterday platelets were 18 at [facility]  Pt does not have anyone to safely transport him to ED- will call the ambulance to go to [facility]- team and ED updated.

## 2024-08-01 ENCOUNTER — LAB REQUISITION (OUTPATIENT)
Dept: LAB | Facility: CLINIC | Age: 69
End: 2024-08-01
Payer: MEDICARE

## 2024-08-01 DIAGNOSIS — N18.6 END STAGE RENAL DISEASE (MULTI): ICD-10-CM

## 2024-08-02 ENCOUNTER — LAB (OUTPATIENT)
Dept: HEMATOLOGY/ONCOLOGY | Facility: HOSPITAL | Age: 69
End: 2024-08-02
Payer: MEDICARE

## 2024-08-02 VITALS
OXYGEN SATURATION: 100 % | HEART RATE: 80 BPM | SYSTOLIC BLOOD PRESSURE: 125 MMHG | HEIGHT: 66 IN | DIASTOLIC BLOOD PRESSURE: 74 MMHG | RESPIRATION RATE: 19 BRPM | BODY MASS INDEX: 32.81 KG/M2 | TEMPERATURE: 98.4 F | WEIGHT: 204.15 LBS

## 2024-08-02 DIAGNOSIS — D47.02 SYSTEMIC MASTOCYTOSIS WITH ASSOCIATED CLONAL HEMATOLOGICAL NON-MAST CELL LINEAGE DISEASE: ICD-10-CM

## 2024-08-02 DIAGNOSIS — M81.8 OTHER OSTEOPOROSIS WITHOUT CURRENT PATHOLOGICAL FRACTURE: ICD-10-CM

## 2024-08-02 DIAGNOSIS — C92.00 ACUTE MYELOID LEUKEMIA NOT HAVING ACHIEVED REMISSION (MULTI): ICD-10-CM

## 2024-08-02 LAB
ABO GROUP (TYPE) IN BLOOD: NORMAL
ANTIBODY SCREEN: NORMAL
BASOPHILS # BLD AUTO: 0.02 X10*3/UL (ref 0–0.1)
BASOPHILS NFR BLD AUTO: 1.1 %
BLOOD EXPIRATION DATE: NORMAL
DACRYOCYTES BLD QL SMEAR: NORMAL
DISPENSE STATUS: NORMAL
EOSINOPHIL # BLD AUTO: 0.04 X10*3/UL (ref 0–0.7)
EOSINOPHIL NFR BLD AUTO: 2.2 %
ERYTHROCYTE [DISTWIDTH] IN BLOOD BY AUTOMATED COUNT: 16.2 % (ref 11.5–14.5)
HCT VFR BLD AUTO: 18.8 % (ref 41–52)
HGB BLD-MCNC: 6.4 G/DL (ref 13.5–17.5)
IMM GRANULOCYTES # BLD AUTO: 0.01 X10*3/UL (ref 0–0.7)
IMM GRANULOCYTES NFR BLD AUTO: 0.5 % (ref 0–0.9)
LYMPHOCYTES # BLD AUTO: 0.99 X10*3/UL (ref 1.2–4.8)
LYMPHOCYTES NFR BLD AUTO: 53.5 %
MCH RBC QN AUTO: 29 PG (ref 26–34)
MCHC RBC AUTO-ENTMCNC: 34 G/DL (ref 32–36)
MCV RBC AUTO: 85 FL (ref 80–100)
MONOCYTES # BLD AUTO: 0.12 X10*3/UL (ref 0.1–1)
MONOCYTES NFR BLD AUTO: 6.5 %
NEUTROPHILS # BLD AUTO: 0.67 X10*3/UL (ref 1.2–7.7)
NEUTROPHILS NFR BLD AUTO: 36.2 %
NRBC BLD-RTO: 0 /100 WBCS (ref 0–0)
OVALOCYTES BLD QL SMEAR: NORMAL
PATH REV-IMMUNOHEMATOLOGY-PR30: NORMAL
PLATELET # BLD AUTO: 21 X10*3/UL (ref 150–450)
PRODUCT BLOOD TYPE: 5100
PRODUCT CODE: NORMAL
RBC # BLD AUTO: 2.21 X10*6/UL (ref 4.5–5.9)
RBC MORPH BLD: NORMAL
RH FACTOR (ANTIGEN D): NORMAL
SCHISTOCYTES BLD QL SMEAR: NORMAL
UNIT ABO: NORMAL
UNIT NUMBER: NORMAL
UNIT RH: NORMAL
UNIT VOLUME: 286
WBC # BLD AUTO: 1.9 X10*3/UL (ref 4.4–11.3)
XM INTEP: NORMAL

## 2024-08-02 PROCEDURE — 36430 TRANSFUSION BLD/BLD COMPNT: CPT

## 2024-08-02 PROCEDURE — 85025 COMPLETE CBC W/AUTO DIFF WBC: CPT

## 2024-08-02 PROCEDURE — 36415 COLL VENOUS BLD VENIPUNCTURE: CPT

## 2024-08-02 PROCEDURE — 86901 BLOOD TYPING SEROLOGIC RH(D): CPT

## 2024-08-02 RX ORDER — FAMOTIDINE 10 MG/ML
20 INJECTION INTRAVENOUS ONCE AS NEEDED
OUTPATIENT
Start: 2024-08-02

## 2024-08-02 RX ORDER — EPINEPHRINE 0.3 MG/.3ML
0.3 INJECTION SUBCUTANEOUS EVERY 5 MIN PRN
OUTPATIENT
Start: 2024-08-02

## 2024-08-02 RX ORDER — EPINEPHRINE 0.3 MG/.3ML
0.3 INJECTION SUBCUTANEOUS EVERY 5 MIN PRN
Status: DISCONTINUED | OUTPATIENT
Start: 2024-08-02 | End: 2024-08-02 | Stop reason: HOSPADM

## 2024-08-02 RX ORDER — FAMOTIDINE 10 MG/ML
20 INJECTION INTRAVENOUS ONCE AS NEEDED
Status: DISCONTINUED | OUTPATIENT
Start: 2024-08-02 | End: 2024-08-02 | Stop reason: HOSPADM

## 2024-08-02 RX ORDER — ALBUTEROL SULFATE 0.83 MG/ML
3 SOLUTION RESPIRATORY (INHALATION) AS NEEDED
Status: DISCONTINUED | OUTPATIENT
Start: 2024-08-02 | End: 2024-08-02 | Stop reason: HOSPADM

## 2024-08-02 RX ORDER — ALBUTEROL SULFATE 0.83 MG/ML
3 SOLUTION RESPIRATORY (INHALATION) AS NEEDED
OUTPATIENT
Start: 2024-08-02

## 2024-08-02 RX ORDER — DIPHENHYDRAMINE HYDROCHLORIDE 50 MG/ML
50 INJECTION INTRAMUSCULAR; INTRAVENOUS AS NEEDED
OUTPATIENT
Start: 2024-08-02

## 2024-08-02 RX ORDER — DIPHENHYDRAMINE HYDROCHLORIDE 50 MG/ML
50 INJECTION INTRAMUSCULAR; INTRAVENOUS AS NEEDED
Status: DISCONTINUED | OUTPATIENT
Start: 2024-08-02 | End: 2024-08-02 | Stop reason: HOSPADM

## 2024-08-02 ASSESSMENT — PAIN SCALES - GENERAL: PAINLEVEL: 0-NO PAIN

## 2024-08-02 NOTE — PROGRESS NOTES
Patient tolerated the transfusion very well. All queries and concerns addressed. Discharged to home in stable condition.

## 2024-08-03 PROBLEM — D84.9 IMMUNOCOMPROMISED STATE (MULTI): Status: ACTIVE | Noted: 2024-08-03

## 2024-08-03 NOTE — ASSESSMENT & PLAN NOTE
Creatinine improved; 1.27 at discharge. sCr 1.37 today.  Baseline 1-1.2. Uric Acid, Potassium, Phos normalized. Received  mL Bolus & educated to increase PO intake.

## 2024-08-03 NOTE — ASSESSMENT & PLAN NOTE
Haptoglobin 22, suggestive of hemolysis. Started on prednisone 80mg daily is currently being tapered. Complete taper today 7/30. Hemoglobin stabilized, 7.8.

## 2024-08-03 NOTE — ASSESSMENT & PLAN NOTE
C1D15 Venetoclax and Azacitidine. Admitted for early TLS with initial treatment requiring rasburicase, allopurinol and IVF. Venetoclax held as of D2 and will hold for the remainder of C1.

## 2024-08-03 NOTE — PROGRESS NOTES
Patient ID: Jin Reynolds is a 69 y.o. male.    Subjective    HPI  Presents today for post hospital discharge follow up visit in [facility].     He was admitted 7/17-7/28 for treatment of TLS. He is s/p C1 D15 Aza/Jeremy. Hospital course c/b delayed hemolytic transfusion reaction and PERRY.    Today he is feeling well without any acute issues since leaving the hospital. He is drinking adequate amounts of fluids.      Received rasburicase, allopurinol and IVF for TLS. Creatinine improved and was 1.27 at discharge. Baseline 1-1.2     For DHTR, haptoglobin level was checked and was 22, suggestive of hemolysis. Started on prednisone 80mg daily is currently being tapered. Will complete taper 7/30. Hemoglobin stabilized.        Review of Systems   All other systems reviewed and are negative.      Objective    BSA: 2.12 meters squared  /81 (BP Location: Right arm, Patient Position: Sitting)   Pulse 79   Temp 36.3 °C (97.3 °F) (Temporal)   Resp 18   Wt 95.9 kg (211 lb 8 oz)   SpO2 99%   BMI 33.83 kg/m²   Vital signs reviewed today.      Physical Exam  Constitutional:       Appearance: Normal appearance.   HENT:      Head: Normocephalic.      Nose: Nose normal.      Mouth/Throat:      Mouth: Mucous membranes are moist.      Pharynx: Oropharynx is clear.   Eyes:      Extraocular Movements: Extraocular movements intact.      Conjunctiva/sclera: Conjunctivae normal.      Pupils: Pupils are equal, round, and reactive to light.   Cardiovascular:      Rate and Rhythm: Normal rate and regular rhythm.      Pulses: Normal pulses.      Heart sounds: Normal heart sounds.   Pulmonary:      Effort: Pulmonary effort is normal.      Breath sounds: Normal breath sounds.   Abdominal:      General: Abdomen is flat. Bowel sounds are normal.      Palpations: Abdomen is soft.   Musculoskeletal:         General: Normal range of motion.      Cervical back: Normal range of motion.   Skin:     General: Skin is warm and dry.  "     Capillary Refill: Capillary refill takes less than 2 seconds.   Neurological:      General: No focal deficit present.      Mental Status: He is alert and oriented to person, place, and time. Mental status is at baseline.   Psychiatric:         Mood and Affect: Mood normal.         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   8/23/2023 Initial Diagnosis    DX:  SMOLDERING SYSTEMIC MASTOCYTOSIS  Presented with skin rash and eosinophilia    [facility]ITERIA  (For SM, Need major and 1 minor OR at least 3 minors)  - Multi-focal, dense infiltrates of MC (>= 15 mast cells in aggregates) in BM and/or other extra-cutaneous organs [major]  - In BM or extracutaneous organs, >25% MC spindle shaped OR have atypical morphology OR of all MC on BM aspirate smears, >25% are immature or atypical [minor]  - Detection of activating mutation KIT D816V in BM, PB, or other extracutaneous organ [minor]  - Serum tryptase persistently exceeds 20 ng/mL (unless associated clonal myeloid disorder, in which this paramenter is not valid) [minor]    \"B\" FINDINGS  - BM biopsy showing >30% infiltration (focal, dense aggregates) and/or serum tryptase > 200 ng/mL  - Signs of dysplasia or myeloproliferation, in non-MC lineages but insufficient criteria for definitive diagnosis of AHNMD with normal or slightly abnormal blood counts    \"C\" FINDINGS  None       8/23/2023 Biopsy    BONE MARROW (8/23/23)  Hypercellular (90-95%) with trilineage hematopoiesis, granulocytic hyperplasia, eosinophilia, and increased atypical mast cells  IP:  CD34+, +, CD7(bright)+, cCD3-, CD33-, CD15-, CD13+ blast population  IHC:  + increased spindle-shaped mast cells (15% cellularity), CD2-  Myeloid NGS:  CBL (31%), cKIT D816V (30%), RUNX1 x 2 (19%, 29%), " SRSF2 (47%) [ASXL1 negative]  FISH:  PDGFRb negative    SERUM TRYPTASE  228 (8/23/23)  268 (9/5/23)     2/13/2024 -  Molecular Therapy    AVAPRITINIB   - Starting dose 25 mg daily (non-Adv SM)     7/16/2024 -  Chemotherapy    Venetoclax / AzaCITIDine, 28 Day Cycles - Induction / Consolidation     Acute myeloid leukemia not having achieved remission (Multi)   6/27/2024 Initial Diagnosis    ICC 2022 DX: Acute myeloid leukemia, NOS (>=20% blasts)  [id number] AML Risk Stratification: INTERMEDIATE: Cytogenetic and/or molecular abnormalities not classified as favorable or adverse  Presented with pancytopenia and circulating blasts    BONE MARROW (06/27/2024)  Marrow replaced by blasts, fibrotic foci, some atypical + cells; 7-8% circulating blasts; aspicular  - Unable to perform additional studies due to aspicular specimen    PERIPHERAL BLOOD (6/27/2024)  FISH:  positive for gain RUNX1    PERIPHERAL BLOOD (7/1/24)  IP:  abnl myeloblast population (CD34+, CD7+, CD4+, CD13+, CD38+, CD11+ dim, HLA=DR+, TdT+)  Myeloid NGS: CBL C404Y (41%), KIT D816V (8%), RUNX1 x2 (8%, 30%), SRSF2 (37%)     7/16/2024 -  Chemotherapy    Venetoclax / AzaCITIDine, 28 Day Cycles - Induction / Consolidation        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 Admitted for early TLS with initial treatment requiring rasburicase, allopurinol and IVF. Venetoclax held as of D2 and will hold for the remainder of C1.     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 sCr 1.37 today.  Baseline 1-1.2. Uric Acid, Potassium, Phos normalized. Received  mL Bolus & educated to increase PO intake.    Delayed hemolytic transfusion reaction  Haptoglobin 22, suggestive of hemolysis. Started on prednisone 80mg daily is currently being tapered. Complete taper today 7/30. Hemoglobin stabilized, 7.8.    Immunocompromised state (Multi)  Continue  Acyclovir. Start Posaconazole & Levofloxacin when neutropenic.    Provided AVS with updated medication list & appointments.    RTC  Count Checks twice weekly (Main)  8/13 Kalia June PA-C & C2D1 Aza/Jeremy (Will determine plan to restart Jeremy)  9/10 Dr. Samir Robles, APRN-CNP  [facility]

## 2024-08-05 ENCOUNTER — TELEPHONE (OUTPATIENT)
Dept: HEMATOLOGY/ONCOLOGY | Facility: HOSPITAL | Age: 69
End: 2024-08-05
Payer: MEDICARE

## 2024-08-05 DIAGNOSIS — C92.00 ACUTE MYELOID LEUKEMIA NOT HAVING ACHIEVED REMISSION (MULTI): Primary | ICD-10-CM

## 2024-08-05 LAB
BB ANTIBODY IDENTIFICATION: NORMAL
CASE #: NORMAL
PATH REV-IMMUNOHEMATOLOGY-PR30: NORMAL

## 2024-08-05 NOTE — TELEPHONE ENCOUNTER
RN called and confirmed with the patient that he would like to get a port placed. RN gave the patient the number to schedule with [phone]. He verbalized understanding and said he will call IR in the next couple days to try and set up an appointment. MD Dawson updated so order can be placed.

## 2024-08-06 ENCOUNTER — LAB (OUTPATIENT)
Dept: HEMATOLOGY/ONCOLOGY | Facility: HOSPITAL | Age: 69
End: 2024-08-06
Payer: MEDICARE

## 2024-08-06 ENCOUNTER — LAB (OUTPATIENT)
Dept: LAB | Facility: HOSPITAL | Age: 69
End: 2024-08-06
Payer: MEDICARE

## 2024-08-06 VITALS
HEART RATE: 78 BPM | WEIGHT: 198.4 LBS | BODY MASS INDEX: 32.04 KG/M2 | TEMPERATURE: 96.8 F | DIASTOLIC BLOOD PRESSURE: 70 MMHG | OXYGEN SATURATION: 100 % | RESPIRATION RATE: 16 BRPM | SYSTOLIC BLOOD PRESSURE: 137 MMHG

## 2024-08-06 DIAGNOSIS — D47.02 SMOLDERING SYSTEMIC MASTOCYTOSIS: ICD-10-CM

## 2024-08-06 DIAGNOSIS — M81.8 OTHER OSTEOPOROSIS WITHOUT CURRENT PATHOLOGICAL FRACTURE: ICD-10-CM

## 2024-08-06 DIAGNOSIS — C92.02 AML (ACUTE MYELOID LEUKEMIA) IN RELAPSE (MULTI): ICD-10-CM

## 2024-08-06 DIAGNOSIS — C92.00 ACUTE MYELOID LEUKEMIA NOT HAVING ACHIEVED REMISSION (MULTI): ICD-10-CM

## 2024-08-06 DIAGNOSIS — D47.02 SYSTEMIC MASTOCYTOSIS WITH ASSOCIATED CLONAL HEMATOLOGICAL NON-MAST CELL LINEAGE DISEASE: ICD-10-CM

## 2024-08-06 LAB
ABO GROUP (TYPE) IN BLOOD: NORMAL
ANTIBODY SCREEN: NORMAL
ANTIBODY SCREEN: NORMAL
BASOPHILS # BLD AUTO: 0.02 X10*3/UL (ref 0–0.1)
BASOPHILS NFR BLD AUTO: 1.2 %
BLOOD EXPIRATION DATE: NORMAL
BLOOD EXPIRATION DATE: NORMAL
DISPENSE STATUS: NORMAL
DISPENSE STATUS: NORMAL
EOSINOPHIL # BLD AUTO: 0.14 X10*3/UL (ref 0–0.7)
EOSINOPHIL NFR BLD AUTO: 8.1 %
ERYTHROCYTE [DISTWIDTH] IN BLOOD BY AUTOMATED COUNT: 15.9 % (ref 11.5–14.5)
HCT VFR BLD AUTO: 20.7 % (ref 41–52)
HGB BLD-MCNC: 7 G/DL (ref 13.5–17.5)
HOLD SPECIMEN: NORMAL
IMM GRANULOCYTES # BLD AUTO: 0 X10*3/UL (ref 0–0.7)
IMM GRANULOCYTES NFR BLD AUTO: 0 % (ref 0–0.9)
LYMPHOCYTES # BLD AUTO: 0.9 X10*3/UL (ref 1.2–4.8)
LYMPHOCYTES NFR BLD AUTO: 52 %
MCH RBC QN AUTO: 28.9 PG (ref 26–34)
MCHC RBC AUTO-ENTMCNC: 33.8 G/DL (ref 32–36)
MCV RBC AUTO: 86 FL (ref 80–100)
MONOCYTES # BLD AUTO: 0.1 X10*3/UL (ref 0.1–1)
MONOCYTES NFR BLD AUTO: 5.8 %
NEUTROPHILS # BLD AUTO: 0.57 X10*3/UL (ref 1.2–7.7)
NEUTROPHILS NFR BLD AUTO: 32.9 %
NRBC BLD-RTO: 0 /100 WBCS (ref 0–0)
OVALOCYTES BLD QL SMEAR: NORMAL
PLATELET # BLD AUTO: 7 X10*3/UL (ref 150–450)
POLYCHROMASIA BLD QL SMEAR: NORMAL
PRODUCT BLOOD TYPE: 6200
PRODUCT BLOOD TYPE: 9500
PRODUCT CODE: NORMAL
PRODUCT CODE: NORMAL
RBC # BLD AUTO: 2.42 X10*6/UL (ref 4.5–5.9)
RBC MORPH BLD: NORMAL
RH FACTOR (ANTIGEN D): NORMAL
SCHISTOCYTES BLD QL SMEAR: NORMAL
UNIT ABO: NORMAL
UNIT ABO: NORMAL
UNIT NUMBER: NORMAL
UNIT NUMBER: NORMAL
UNIT RH: NORMAL
UNIT RH: NORMAL
UNIT VOLUME: 284
UNIT VOLUME: 341
WBC # BLD AUTO: 1.7 X10*3/UL (ref 4.4–11.3)
XM INTEP: NORMAL

## 2024-08-06 PROCEDURE — P9037 PLATE PHERES LEUKOREDU IRRAD: HCPCS

## 2024-08-06 PROCEDURE — 36415 COLL VENOUS BLD VENIPUNCTURE: CPT

## 2024-08-06 PROCEDURE — 36430 TRANSFUSION BLD/BLD COMPNT: CPT

## 2024-08-06 PROCEDURE — 85025 COMPLETE CBC W/AUTO DIFF WBC: CPT

## 2024-08-06 PROCEDURE — P9040 RBC LEUKOREDUCED IRRADIATED: HCPCS

## 2024-08-06 PROCEDURE — 86922 COMPATIBILITY TEST ANTIGLOB: CPT

## 2024-08-06 PROCEDURE — 86901 BLOOD TYPING SEROLOGIC RH(D): CPT

## 2024-08-06 PROCEDURE — 86870 RBC ANTIBODY IDENTIFICATION: CPT

## 2024-08-06 RX ORDER — EPINEPHRINE 0.3 MG/.3ML
0.3 INJECTION SUBCUTANEOUS EVERY 5 MIN PRN
OUTPATIENT
Start: 2024-08-06

## 2024-08-06 RX ORDER — DIPHENHYDRAMINE HYDROCHLORIDE 50 MG/ML
50 INJECTION INTRAMUSCULAR; INTRAVENOUS AS NEEDED
OUTPATIENT
Start: 2024-08-06

## 2024-08-06 RX ORDER — FAMOTIDINE 10 MG/ML
20 INJECTION INTRAVENOUS ONCE AS NEEDED
OUTPATIENT
Start: 2024-08-06

## 2024-08-06 RX ORDER — EPINEPHRINE 0.3 MG/.3ML
0.3 INJECTION SUBCUTANEOUS EVERY 5 MIN PRN
Status: DISCONTINUED | OUTPATIENT
Start: 2024-08-06 | End: 2024-08-06 | Stop reason: HOSPADM

## 2024-08-06 RX ORDER — ALBUTEROL SULFATE 0.83 MG/ML
3 SOLUTION RESPIRATORY (INHALATION) AS NEEDED
Status: DISCONTINUED | OUTPATIENT
Start: 2024-08-06 | End: 2024-08-06 | Stop reason: HOSPADM

## 2024-08-06 RX ORDER — FAMOTIDINE 10 MG/ML
20 INJECTION INTRAVENOUS ONCE AS NEEDED
Status: DISCONTINUED | OUTPATIENT
Start: 2024-08-06 | End: 2024-08-06 | Stop reason: HOSPADM

## 2024-08-06 RX ORDER — ALBUTEROL SULFATE 0.83 MG/ML
3 SOLUTION RESPIRATORY (INHALATION) AS NEEDED
OUTPATIENT
Start: 2024-08-06

## 2024-08-06 RX ORDER — DIPHENHYDRAMINE HYDROCHLORIDE 50 MG/ML
50 INJECTION INTRAMUSCULAR; INTRAVENOUS AS NEEDED
Status: DISCONTINUED | OUTPATIENT
Start: 2024-08-06 | End: 2024-08-06 | Stop reason: HOSPADM

## 2024-08-06 ASSESSMENT — PAIN SCALES - GENERAL: PAINLEVEL: 0-NO PAIN

## 2024-08-07 ENCOUNTER — HOSPITAL ENCOUNTER (OUTPATIENT)
Dept: CARDIOLOGY | Facility: HOSPITAL | Age: 69
Setting detail: OUTPATIENT SURGERY
Discharge: HOME | End: 2024-08-07
Payer: MEDICARE

## 2024-08-07 VITALS
SYSTOLIC BLOOD PRESSURE: 147 MMHG | OXYGEN SATURATION: 99 % | HEART RATE: 88 BPM | HEIGHT: 66 IN | RESPIRATION RATE: 14 BRPM | TEMPERATURE: 97.7 F | BODY MASS INDEX: 31.34 KG/M2 | DIASTOLIC BLOOD PRESSURE: 79 MMHG | WEIGHT: 195 LBS

## 2024-08-07 DIAGNOSIS — C92.00 ACUTE MYELOID LEUKEMIA NOT HAVING ACHIEVED REMISSION (MULTI): ICD-10-CM

## 2024-08-07 LAB
BB ANTIBODY IDENTIFICATION: NORMAL
CASE #: NORMAL
ERYTHROCYTE [DISTWIDTH] IN BLOOD BY AUTOMATED COUNT: 15.9 % (ref 11.5–14.5)
HCT VFR BLD AUTO: 25.3 % (ref 41–52)
HGB BLD-MCNC: 8.6 G/DL (ref 13.5–17.5)
MCH RBC QN AUTO: 29.1 PG (ref 26–34)
MCHC RBC AUTO-ENTMCNC: 34 G/DL (ref 32–36)
MCV RBC AUTO: 86 FL (ref 80–100)
NRBC BLD-RTO: 0 /100 WBCS (ref 0–0)
PATH REV-IMMUNOHEMATOLOGY-PR30: NORMAL
PLATELET # BLD AUTO: 20 X10*3/UL (ref 150–450)
RBC # BLD AUTO: 2.96 X10*6/UL (ref 4.5–5.9)
WBC # BLD AUTO: 1.4 X10*3/UL (ref 4.4–11.3)

## 2024-08-07 PROCEDURE — 85027 COMPLETE CBC AUTOMATED: CPT | Performed by: NURSE PRACTITIONER

## 2024-08-07 PROCEDURE — C1788 PORT, INDWELLING, IMP: HCPCS

## 2024-08-07 PROCEDURE — 99152 MOD SED SAME PHYS/QHP 5/>YRS: CPT

## 2024-08-07 PROCEDURE — 7100000009 HC PHASE TWO TIME - INITIAL BASE CHARGE

## 2024-08-07 PROCEDURE — 7100000010 HC PHASE TWO TIME - EACH INCREMENTAL 1 MINUTE

## 2024-08-07 PROCEDURE — 77001 FLUOROGUIDE FOR VEIN DEVICE: CPT

## 2024-08-07 PROCEDURE — 76937 US GUIDE VASCULAR ACCESS: CPT

## 2024-08-07 PROCEDURE — 36415 COLL VENOUS BLD VENIPUNCTURE: CPT | Performed by: NURSE PRACTITIONER

## 2024-08-07 PROCEDURE — 2500000005 HC RX 250 GENERAL PHARMACY W/O HCPCS: Performed by: RADIOLOGY

## 2024-08-07 PROCEDURE — 2500000004 HC RX 250 GENERAL PHARMACY W/ HCPCS (ALT 636 FOR OP/ED): Performed by: RADIOLOGY

## 2024-08-07 PROCEDURE — 99223 1ST HOSP IP/OBS HIGH 75: CPT | Performed by: NURSE PRACTITIONER

## 2024-08-07 PROCEDURE — 2780000003 HC OR 278 NO HCPCS

## 2024-08-07 RX ORDER — SODIUM CHLORIDE 9 MG/ML
INJECTION, SOLUTION INTRAVENOUS CONTINUOUS PRN
Status: COMPLETED | OUTPATIENT
Start: 2024-08-07 | End: 2024-08-07

## 2024-08-07 RX ORDER — SODIUM CHLORIDE 9 MG/ML
100 INJECTION, SOLUTION INTRAVENOUS CONTINUOUS
Status: DISCONTINUED | OUTPATIENT
Start: 2024-08-07 | End: 2024-08-07

## 2024-08-07 RX ORDER — FENTANYL CITRATE 50 UG/ML
INJECTION, SOLUTION INTRAMUSCULAR; INTRAVENOUS AS NEEDED
Status: DISCONTINUED | OUTPATIENT
Start: 2024-08-07 | End: 2024-08-07 | Stop reason: HOSPADM

## 2024-08-07 RX ORDER — LIDOCAINE HYDROCHLORIDE AND EPINEPHRINE 20; 10 MG/ML; UG/ML
INJECTION, SOLUTION INFILTRATION; PERINEURAL AS NEEDED
Status: DISCONTINUED | OUTPATIENT
Start: 2024-08-07 | End: 2024-08-07 | Stop reason: HOSPADM

## 2024-08-07 RX ORDER — MIDAZOLAM HYDROCHLORIDE 1 MG/ML
INJECTION, SOLUTION INTRAMUSCULAR; INTRAVENOUS AS NEEDED
Status: DISCONTINUED | OUTPATIENT
Start: 2024-08-07 | End: 2024-08-07 | Stop reason: HOSPADM

## 2024-08-07 ASSESSMENT — ENCOUNTER SYMPTOMS
MUSCULOSKELETAL NEGATIVE: 1
CARDIOVASCULAR NEGATIVE: 1
CONSTITUTIONAL NEGATIVE: 1
NEUROLOGICAL NEGATIVE: 1
PSYCHIATRIC NEGATIVE: 1
GASTROINTESTINAL NEGATIVE: 1
ENDOCRINE NEGATIVE: 1
EYES NEGATIVE: 1
ALLERGIC/IMMUNOLOGIC NEGATIVE: 1
RESPIRATORY NEGATIVE: 1
HEMATOLOGIC/LYMPHATIC NEGATIVE: 1

## 2024-08-07 ASSESSMENT — PAIN - FUNCTIONAL ASSESSMENT
PAIN_FUNCTIONAL_ASSESSMENT: 0-10

## 2024-08-07 ASSESSMENT — PAIN SCALES - GENERAL
PAINLEVEL_OUTOF10: 0 - NO PAIN
PAINLEVEL_OUTOF10: 0 - NO PAIN

## 2024-08-07 NOTE — NURSING NOTE
Home going instructions specific to procedure given. Easily arousable; responding appropriately. Vs +/- 20% of pre procedure status. Significant complications are absent. Ambulates without dizziness/age appropriate activity, pulse ox above or equal to 92% on room air/ordered oxygen treatment. Care Plan Complete. Discharge to home accompanied by wife. Discharged via wheelchair.  IV removed.

## 2024-08-07 NOTE — DISCHARGE INSTRUCTIONS
What is a Central Venous Access Port? Patient and Family Education    What is a Central Venous Access Port?  A central venous access port is a small device made up of 2 parts:  ? The port, which is a hollow metal or plastic disk with a rubber center and  ? The tube (also called a catheter) that connects to the port. The catheter is  threaded through a vein that leads to your heart.  A doctor places the port under the skin in the chest near the collarbone, or in the arm. The port  feels like a small bump. Once your port site heals it should not hurt. A port provides easy  access to a vein. A port is useful if you need frequent intravenous (IV) treatments, medicines,  blood tests or blood products. A port can stay in for months or years if it is cared for correctly  and working as it should. A port may also be called a Mediport, Power Port or Port-a-cath.    Before your Port is Placed:  ? If you take any medicine that thins your blood or helps prevent clots, talk with  your doctor. Before your port is placed, ask your doctor if your dose of these  medicines needs to be changed to help prevent bleeding problems. If your doctor tells  you to stop taking these medicines, ask him or her when you should restart them. If  your port placement is cancelled, call your doctor and ask if you need to restart your  medicine or change your dose. These medicines include, but are not limited to:  Warfarin (Coumadin), Lovenox (enoxaparin), Eliquis (apixaban), Plavix (clopidogrel),  Pradaxa (dabigatran) and Xarelto (rivaroxaban).  ? Do not eat or drink anything 8 hours before your port placement. If you have  been told to take certain medicines, take them with a sip of water.  ? Take your daily medicines, especially any cardiac (heart), high blood pressure, seizure,  and antibiotic medicines. If you take insulin for diabetes, ask your doctor how to adjust  your insulin dose the morning of your port placement. Be sure to tell your  doctor that  you have to fast for 8 hours before the port is placed.  ? Talk with your doctor, nurse or dietitian before taking probiotics. Ask if it’s safe for  you to take them when you have a central line. Some patients should avoid taking  certain probiotics because they may increase their chance of getting an infection.    The Day your Port is Placed:  ? A doctor in Radiology will place your port. The port placement takes about 60 to 90  minutes but plan on being here for 3 to 4 hours. This allows time for your check-in and  recovery.  ? A staff member will talk with you about the procedure, ask you questions and help  answer your questions. For women: Tell your doctor or technologist if there is any  chance you are pregnant.  ? You will be asked to change into a hospital gown for the procedure. You must remove  necklaces and earrings because they can get in the way during your port placement. An  IV will be placed in your arm and you will be asked to lay on a cart. Once we are  ready, we will take you to the procedure room. A family member may stay in our  waiting room while your port is placed.    In the Procedure Room:  You will get medicine through your IV to help you relax. While the port is placed, some  people fall asleep and some are awake but feel very relaxed. We will wash the area where the  port is being placed with a germ killing solution. This solution helps lower your chances of  getting an infection. Next, the doctor injects a numbing medicine into your skin, around the  port site. Once your skin is numb, the doctor makes 2 small incisions (cuts) to place the port  under your skin. The incisions are closed with skin glue or sutures and paper Band-Aids called  steri-strips. A gauze bandage is then placed over the port site.    After the Port is Placed:  ? You will be taken to the recovery area. We will check your pulse and blood pressure  often and check your port site for bleeding and swelling.  Some bruising is normal. If  you see bleeding, apply pressure with your fingertips and call your nurse right away. If  you feel swelling or more pain at the site, call your nurse.  ? You may have some soreness where your port is placed but it should improve each  day as the site heals. The incisions used to place the port are small and should heal in  10 to 14 days.  ? Once healed, you do not need to have a dressing over the site unless the port has a  needle in it.    If You go Home After Your Port is Placed:  ? You must have someone drive you home. We cannot let you drive or travel home alone in  a cab or on a bus. Do not drive for 24 hours after your port is placed.  ? Someone should stay with you through the night.  ? Resume your routine normal diet. You may resume your normal medicines but if you  take blood-thinning medicine, ask your doctor when you should start taking it again.  ? Rest until morning.   ? Lifting your arm on the same side of the mediport should be restricted to 10-15 pounds   or less for 1 week.  ? Avoid pushing, pulling, straining, or any strenuous activities.  ? You may climb stairs.  ? You may return to work when you feel you are ready at any point after surgery.  If you are not able to contact your doctor, go to the nearest Emergency Room.    Caring for Your Incisions:  ? Remove the gauze dressing after 48 hours. You do not need to replace it. Don’t try to peel  off the surgical glue or steri-strips. Let them fall off on their own, which often happens  after 2 weeks.  ? Do not let your incisions get wet until they are fully healed, which often takes 10 to 14  days. When you shower, cover your incisions with a dressing made from plastic wrap  (like Saran wrap or Glad Press n’ Seal) or a plastic bag and tape to keep the area dry.  After you shower, remove the plastic wrap and pat the incisions dry. Don’t swim or soak  in a tub or Jacuzzi until your incisions are fully healed.  ? Do not get  your port site wet if it has a needle in it. Follow the steps above to make  sure your port site is covered with plastic wrap or a plastic bag when you shower.  ? Look at your incisions each day. Call your doctor right away if you have any of the signs  of infection that are listed on the next page.    Caring for Your Port:  -A trained nurse must use a special non-coring needle, called a Ogmez needle, each time they  access your port. The needle is placed through your skin and into the rubber center of the port.  -You will likely feel slight pricking when your nurse inserts the needle. The needle stays in  place for your treatments and can be removed afterwards.  -Your port needs to be flushed every 4 to 6 weeks to help prevent blood clots  from forming in the catheter. If blood clots form and cause a blockage, your  port may need to be removed. Your nurse will flush your port with saline. If  needed, they may also flush it with a blood thinning medicine called heparin.  -Port flushes are done right before the needle is taken out. Some patients are  taught how to do these flushes at home. If you need to flush your port at home,  your nurse will show you how. If your port is not being used at least once  every 4 to 6 weeks, talk with your doctor or nurse about scheduling port  flushes.    Sedation:  If you received medication for sedation, it will be active in your body for approximately 24  hours. So you may feel a little sleepy. Therefore, for the next 24 hours:  ? DO NOT drive a car, operate machinery or power tools. It is recommended that a   responsible adult be with you for the first 24 hours.  ? DO NOT drink any alcoholic beverages or take any non-prescriptive medications that   contain alcohol.  ? DO NOT make any important decisions or sign any legal/important documents.    When to Call Your Doctor:  ? Redness, swelling or drainage near the port or over the port site.  ? Drainage from the port site.  ? Shake  or chills.  ? Temperature of 100.4°F (38°C) or higher.  ? Pain, warm or soreness at the port site.  ? Swelling of your arm, check at the port site.  ? Also call if the port has been moved  ? If you have any questions about your port.     How to Reach your Doctor:    Call [phone] with problems or questions    This info is a general resource. It is not meant to replace your health care provider’s advice.  Ask your doctor or health care team any questions. Always follow their instructions.

## 2024-08-07 NOTE — POST-PROCEDURE NOTE
Interventional Radiology Brief Postprocedure Note    Attending: Anselmo Philip MD      Assistant: none    Diagnosis: AML    Description of procedure: Right chest mediport placement. Port ready to use.     Anesthesia:  Moderate sedation    Complications: None    Estimated Blood Loss: minimal    Medications (Filter: Administrations occurring from 1606 to 1606 on 08/07/24) As of 08/07/24 1606      None          No specimens collected      See detailed result report with images in PACS.    The patient tolerated the procedure well without incident or complication and is in stable condition.

## 2024-08-07 NOTE — H&P
History Of Present Illness  Jin Reynolds is a 69 y.o. male presenting with acute myeloid leukemia, here for mediport placement. PMH includes thrombocytopenia and anemia requiring PRBC and platelet infusions (last yesterday 8/6/24), HTN, esophageal ulcer with GIB, smoldering systemic mastocytosis    Past Medical History:  Past Medical History:   Diagnosis Date    Cancer (Multi)     Esophageal ulcer with bleeding 01/01/2024    Personal history of diseases of the skin and subcutaneous tissue     History of alopecia    Umbilical hernia 05/30/2023        Past Surgical History:  History reviewed. No pertinent surgical history.       Social History:   reports that he has never smoked. He has never used smokeless tobacco. He reports current drug use. Drug: Marijuana. He reports that he does not drink alcohol.     Family History:  Family History   Problem Relation Name Age of Onset    Cancer Father          Allergies:  Allergies   Allergen Reactions    Zyrtec [Cetirizine] Headache        Home Medications:  Current Outpatient Medications   Medication Instructions    acyclovir (ZOVIRAX) 400 mg, oral, 2 times daily    allopurinol (ZYLOPRIM) 300 mg, oral, Daily    amLODIPine (NORVASC) 10 mg, oral, Daily    cholecalciferol (VITAMIN D-3) 20 mcg, oral, Daily    famotidine (PEPCID) 20 mg, oral, Daily    fexofenadine (ALLEGRA) 180 mg, oral, Daily    montelukast (SINGULAIR) 10 mg, oral, Daily    ondansetron (ZOFRAN) 8 mg, oral, Every 8 hours PRN    oxymetazoline (Afrin) 0.05 % nasal spray 2 sprays, Each Nostril, Every 12 hours PRN, Do not use for more than 3 days.    pantoprazole (PROTONIX) 40 mg, oral, Daily before breakfast, Do not crush, chew, or split.    posaconazole (NOXAFIL) 300 mg, oral, Daily with breakfast, Do not crush, chew, or split.    prochlorperazine (COMPAZINE) 10 mg, oral, Every 6 hours PRN    sodium chloride (Saline Mist) 0.65 % nasal spray 1 spray, Each Nostril, 2 times daily    venetoclax (Venclexta) 100 mg  tablet Take 1 tablet (100 mg total) by mouth once daily in the evening for 1 day, THEN 2 tablets (200 mg total) once daily in the evening for 1 day, THEN 4 tablets (400 mg total) once daily in the evening for 26 days. Take with food..       Inpatient Medications:  Scheduled medications   Medication Dose Route Frequency     PRN medications   Medication     Continuous Medications   Medication Dose Last Rate    sodium chloride 0.9%  100 mL/hr           Review of Systems   Constitutional: Negative.    HENT: Negative.     Eyes: Negative.    Respiratory: Negative.     Cardiovascular: Negative.    Gastrointestinal: Negative.    Endocrine: Negative.    Genitourinary: Negative.    Musculoskeletal: Negative.    Skin: Negative.    Allergic/Immunologic: Negative.    Neurological: Negative.    Hematological: Negative.    Psychiatric/Behavioral: Negative.            Physical Exam  Constitutional:       General: He is awake. He is not in acute distress.     Appearance: He is not ill-appearing.   Cardiovascular:      Rate and Rhythm: Normal rate and regular rhythm.      Pulses:           Radial pulses are 2+ on the right side and 2+ on the left side.        Dorsalis pedis pulses are 1+ on the right side and 1+ on the left side.      Heart sounds: Normal heart sounds. No murmur heard.  Pulmonary:      Effort: Pulmonary effort is normal.      Breath sounds: Normal breath sounds and air entry.   Abdominal:      General: Bowel sounds are normal.      Palpations: Abdomen is soft.      Tenderness: There is no abdominal tenderness.   Musculoskeletal:      Right lower leg: No edema.      Left lower le+ Edema present.   Skin:     General: Skin is warm and dry.   Neurological:      General: No focal deficit present.      Mental Status: He is alert and oriented to person, place, and time.      GCS: GCS eye subscore is 4. GCS verbal subscore is 5. GCS motor subscore is 6.   Psychiatric:         Mood and Affect: Mood normal.          "Behavior: Behavior is cooperative.        Sedation Plan    ASA 4     Mallampati class: III.           NPO since last night at 2000    Last Recorded Vitals  Blood pressure 161/83, pulse 72, temperature 36.5 °C (97.7 °F), temperature source Temporal, resp. rate 14, height 1.676 m (5' 6\"), weight 88.5 kg (195 lb), SpO2 97%.         Vitals from the Past 24 Hours  Heart Rate:  [72-78]   Temp:  [36 °C (96.8 °F)-36.5 °C (97.7 °F)]   Resp:  [14-16]   BP: (126-161)/(58-83)   Height:  [167.6 cm (5' 6\")]   Weight:  [88.5 kg (195 lb)]   SpO2:  [97 %-100 %]          Relevant Results    Labs    CBC:   Recent Labs     08/06/24  0910 08/02/24  1051 07/31/24  0854 07/30/24  0922 07/28/24  0145 07/27/24  0159   WBC 1.7* 1.9* 2.7* 3.5* 2.4* 2.2*   HGB 7.0* 6.4* 7.5* 7.8* 7.4* 7.1*   HCT 20.7* 18.8* 22.1* 23.4* 21.3* 20.9*   PLT 7* 21* 15* 18* 20* 17*   MCV 86 85 85 85 82 82     BMP/CMP:   Recent Labs     07/31/24  0854 07/30/24  1024 07/28/24  0145 07/27/24  0159 07/26/24  0238 07/25/24  0822    139 134* 137 138 137   K 3.8 3.1* 3.7 3.7 3.6 3.5    109* 104 106 106 106   BUN 23 27* 22 22 21 21   CREATININE 1.29 1.37* 1.27 1.48* 1.42* 1.31*   CO2 25 23 24 24 24 23   CALCIUM 8.2* 7.9* 7.2* 7.1* 6.7* 6.4*   PROT 6.7 6.3* 6.0* 5.7* 5.7* 6.2*   BILITOT 0.8 0.5 0.6 0.5 0.6 0.7   ALKPHOS 55 54 50 50 51 52   ALT 11 11 10 10 9* 10   AST 8* 9 9 7* 7* 9   GLUCOSE 90 93 103* 107* 100* 86      Lipid Panel:   Recent Labs     06/09/23  0125 05/31/23  1152 05/23/22  1108 03/02/21  1307   CHOL  --  92 108 157   HDL  --  35.8* 52.1 54.8   CHHDL  --  2.6 2.1 2.9   VLDL  --  12 9 12   TRIG 68 60 43 61     Cardiac       No lab exists for component: \"CK\", \"CKMBP\"   Hemoglobin A1C: No results for input(s): \"HGBA1C\" in the last 54111 hours.  TSH/ Free T4:   Recent Labs     08/23/23  0909 06/09/23  0125 03/02/21  1307   TSH 2.51 5.44* 1.52   FREET4  --  0.92  --      Iron:   Recent Labs     08/23/23  0909 06/09/23  0125   FERRITIN 280 520*   TIBC " 245 219*   IRONSAT 12* 24*     Coag:     ABO:   ABO TYPE   Date Value Ref Range Status   08/06/2024 O  Final       Past Cardiology Tests (Last 3 Years):    EKG:  Recent Labs     06/22/23  1435   ATRRATE 92   VENTRATE 92   PRINT 128   QRSDUR 106   QTCFRED 405   QTCCALCB 435   No results found for this or any previous visit (from the past 4464 hour(s)).  Echo:  Echocardiogram:   Echocardiogram   Study Date:       6/14/2023              PHYSICIAN INTERPRETATION:  Left Ventricle: The left ventricular systolic function is normal, with an estimated ejection fraction of 55-60%. There are no regional wall motion abnormalities. The left ventricular cavity size is normal. There is a false tendon visualized in the left ventricle. There is mild concentric left ventricular hypertrophy. Abnormal (paradoxical) septal motion, consistent with an intraventricular conduction delay. Spectral Doppler shows a normal pattern of left ventricular diastolic filling.  Left Atrium: The left atrium is severely dilated.  Right Ventricle: The right ventricle is normal in size. There is normal right ventricular global systolic function.  Right Atrium: The right atrium is mildly dilated.  Aortic Valve: The aortic valve is trileaflet. There is minimal aortic valve cusp calcification. There is no evidence of aortic valve stenosis.  There is no evidence of aortic valve regurgitation. The peak instantaneous gradient of the aortic valve is 10.2 mmHg. The mean gradient of the aortic valve is 6.0 mmHg.  Mitral Valve: The mitral valve is normal in structure. There is trace mitral valve regurgitation.  Tricuspid Valve: The tricuspid valve is structurally normal. There is mild tricuspid regurgitation. The Doppler estimated RVSP is mildly elevated at 37.1 mmHg.  Pulmonic Valve: The pulmonic valve is structurally normal. There is physiologic pulmonic valve regurgitation.  Pericardium: There is a trivial pericardial effusion.  Aorta: The aortic root is normal.  "There is no dilatation of the ascending aorta. There is no dilatation of the aortic root.  Systemic Veins: The inferior vena cava appears to be of normal size. There is IVC inspiratory collapse greater than 50%.  In comparison to the previous echocardiogram(s): There are no prior studies on this patient for comparison purposes. No prior echocardiogram available for comparison.      CONCLUSIONS:  1. Left ventricular systolic function is normal with a 55-60% estimated ejection fraction.  2. LV false tendon present.  3. The left atrium is severely dilated.  4. Mildly elevated RVSP.  5. Aortic valve stenosis is not present.  6. Multiple hypoechoic spaces are seen in the liver - consider dedicated liver imaging if clinically indicated.  7. No prior echocardiogram available for comparison.      Ejection Fractions:  No results found for: \"EF\"  Cath:  Coronary Angiography: No results found for this or any previous visit from the past 1800 days.    Right Heart Cath: No results found for this or any previous visit from the past 1800 days.    Stress Test:  Nuclear:No results found for this or any previous visit from the past 1800 days.    Metabolic Stress: No results found for this or any previous visit from the past 1800 days.    Cardiac Imaging:  Cardiac Scoring: No results found for this or any previous visit from the past 1800 days.    Cardiac MRI: No results found for this or any previous visit from the past 1800 days.         Assessment/Plan  Assessment/Plan   Active Problems:  There are no active Hospital Problems.        acute myeloid leukemia  -planned for mediport placement with Dr. Philip on 8/7/24     thrombocytopenia and anemia requiring PRBC and platelet infusions (last yesterday 8/6/24)  -recheck CBC to see if platelets at minimum of 20 for port placement       NP discussed with Dr. Philip regarding plan of care/ discharge plan      I spent 30 minutes in the professional and overall care of this " patient.      Mauro Lopez, APRN-CNP, DNP

## 2024-08-09 ENCOUNTER — LAB (OUTPATIENT)
Dept: HEMATOLOGY/ONCOLOGY | Facility: HOSPITAL | Age: 69
End: 2024-08-09
Payer: MEDICARE

## 2024-08-09 ENCOUNTER — DOCUMENTATION (OUTPATIENT)
Dept: HEMATOLOGY/ONCOLOGY | Facility: HOSPITAL | Age: 69
End: 2024-08-09

## 2024-08-09 VITALS
DIASTOLIC BLOOD PRESSURE: 80 MMHG | RESPIRATION RATE: 16 BRPM | HEART RATE: 75 BPM | SYSTOLIC BLOOD PRESSURE: 148 MMHG | OXYGEN SATURATION: 100 % | TEMPERATURE: 97.5 F | BODY MASS INDEX: 30.57 KG/M2 | WEIGHT: 189.38 LBS

## 2024-08-09 DIAGNOSIS — D47.02 SYSTEMIC MASTOCYTOSIS WITH ASSOCIATED CLONAL HEMATOLOGICAL NON-MAST CELL LINEAGE DISEASE: ICD-10-CM

## 2024-08-09 DIAGNOSIS — C92.00 ACUTE MYELOID LEUKEMIA NOT HAVING ACHIEVED REMISSION (MULTI): ICD-10-CM

## 2024-08-09 LAB
ABO GROUP (TYPE) IN BLOOD: NORMAL
ACANTHOCYTES BLD QL SMEAR: ABNORMAL
ALBUMIN SERPL BCP-MCNC: 4.4 G/DL (ref 3.4–5)
ALP SERPL-CCNC: 81 U/L (ref 33–136)
ALT SERPL W P-5'-P-CCNC: 11 U/L (ref 10–52)
ANION GAP SERPL CALC-SCNC: 13 MMOL/L (ref 10–20)
ANTIBODY SCREEN: NORMAL
AST SERPL W P-5'-P-CCNC: 10 U/L (ref 9–39)
BASOPHILS # BLD MANUAL: 0 X10*3/UL (ref 0–0.1)
BASOPHILS NFR BLD MANUAL: 0 %
BILIRUB SERPL-MCNC: 0.8 MG/DL (ref 0–1.2)
BLASTS # BLD MANUAL: 0.04 X10*3/UL
BLASTS NFR BLD MANUAL: 3 %
BUN SERPL-MCNC: 23 MG/DL (ref 6–23)
CALCIUM SERPL-MCNC: 8.5 MG/DL (ref 8.6–10.3)
CHLORIDE SERPL-SCNC: 110 MMOL/L (ref 98–107)
CO2 SERPL-SCNC: 22 MMOL/L (ref 21–32)
CREAT SERPL-MCNC: 1.22 MG/DL (ref 0.5–1.3)
EGFRCR SERPLBLD CKD-EPI 2021: 65 ML/MIN/1.73M*2
EOSINOPHIL # BLD MANUAL: 0.22 X10*3/UL (ref 0–0.7)
EOSINOPHIL NFR BLD MANUAL: 17 %
ERYTHROCYTE [DISTWIDTH] IN BLOOD BY AUTOMATED COUNT: 16.2 % (ref 11.5–14.5)
GLUCOSE SERPL-MCNC: 105 MG/DL (ref 74–99)
HCT VFR BLD AUTO: 21.5 % (ref 41–52)
HGB BLD-MCNC: 7.3 G/DL (ref 13.5–17.5)
IMM GRANULOCYTES # BLD AUTO: 0 X10*3/UL (ref 0–0.7)
IMM GRANULOCYTES NFR BLD AUTO: 0 % (ref 0–0.9)
LYMPHOCYTES # BLD MANUAL: 0.68 X10*3/UL (ref 1.2–4.8)
LYMPHOCYTES NFR BLD MANUAL: 52 %
MCH RBC QN AUTO: 29.1 PG (ref 26–34)
MCHC RBC AUTO-ENTMCNC: 34 G/DL (ref 32–36)
MCV RBC AUTO: 86 FL (ref 80–100)
MONOCYTES # BLD MANUAL: 0.03 X10*3/UL (ref 0.1–1)
MONOCYTES NFR BLD MANUAL: 2 %
MYELOCYTES # BLD MANUAL: 0.03 X10*3/UL
MYELOCYTES NFR BLD MANUAL: 2 %
NEUTS SEG # BLD MANUAL: 0.31 X10*3/UL (ref 1.2–7)
NEUTS SEG NFR BLD MANUAL: 24 %
NRBC BLD-RTO: 0 /100 WBCS (ref 0–0)
OVALOCYTES BLD QL SMEAR: ABNORMAL
PATH REVIEW-CBC DIFFERENTIAL: NORMAL
PLATELET # BLD AUTO: 16 X10*3/UL (ref 150–450)
POTASSIUM SERPL-SCNC: 3.9 MMOL/L (ref 3.5–5.3)
PROT SERPL-MCNC: 7.4 G/DL (ref 6.4–8.2)
RBC # BLD AUTO: 2.51 X10*6/UL (ref 4.5–5.9)
RBC MORPH BLD: ABNORMAL
RH FACTOR (ANTIGEN D): NORMAL
SCHISTOCYTES BLD QL SMEAR: ABNORMAL
SODIUM SERPL-SCNC: 141 MMOL/L (ref 136–145)
TOTAL CELLS COUNTED BLD: 100
WBC # BLD AUTO: 1.3 X10*3/UL (ref 4.4–11.3)

## 2024-08-09 PROCEDURE — 36415 COLL VENOUS BLD VENIPUNCTURE: CPT

## 2024-08-09 PROCEDURE — 84075 ASSAY ALKALINE PHOSPHATASE: CPT

## 2024-08-09 PROCEDURE — 86900 BLOOD TYPING SEROLOGIC ABO: CPT

## 2024-08-09 PROCEDURE — 86922 COMPATIBILITY TEST ANTIGLOB: CPT

## 2024-08-09 PROCEDURE — 86870 RBC ANTIBODY IDENTIFICATION: CPT

## 2024-08-09 PROCEDURE — 85007 BL SMEAR W/DIFF WBC COUNT: CPT

## 2024-08-09 PROCEDURE — 85027 COMPLETE CBC AUTOMATED: CPT

## 2024-08-09 ASSESSMENT — PAIN SCALES - GENERAL: PAINLEVEL: 5

## 2024-08-09 NOTE — PROGRESS NOTES
Critical lab page received 10:09am from Heidy Ramirez, for lab draw today 8/9 at 9:01am, *Platelets 16, Message sent to Dr. Dawson, /Kalia BLAKELY and Henrietta Blackburn RN at 10:17am. Viewed by RN and MD.    FILIPPO MCKENZIE RN

## 2024-08-09 NOTE — PROGRESS NOTES
Patient seen in infusion for count check, no required transfusions today. Patient denies bleeding/nose bleeds/mouth bleeds etc. Hgb 7.3 today, type and screen sent and blood pre-ordered for Monday. Labs and schedule reviewed with patient. IV removed. Discharged in stable condition.

## 2024-08-12 ENCOUNTER — INFUSION (OUTPATIENT)
Dept: HEMATOLOGY/ONCOLOGY | Facility: HOSPITAL | Age: 69
End: 2024-08-12
Payer: MEDICARE

## 2024-08-12 VITALS
RESPIRATION RATE: 18 BRPM | TEMPERATURE: 97.3 F | SYSTOLIC BLOOD PRESSURE: 148 MMHG | WEIGHT: 197 LBS | HEART RATE: 68 BPM | DIASTOLIC BLOOD PRESSURE: 54 MMHG | BODY MASS INDEX: 31.8 KG/M2 | OXYGEN SATURATION: 100 %

## 2024-08-12 DIAGNOSIS — C92.00 ACUTE MYELOID LEUKEMIA NOT HAVING ACHIEVED REMISSION (MULTI): ICD-10-CM

## 2024-08-12 DIAGNOSIS — M81.8 OTHER OSTEOPOROSIS WITHOUT CURRENT PATHOLOGICAL FRACTURE: ICD-10-CM

## 2024-08-12 DIAGNOSIS — C92.02 AML (ACUTE MYELOID LEUKEMIA) IN RELAPSE (MULTI): ICD-10-CM

## 2024-08-12 DIAGNOSIS — D47.02 SYSTEMIC MASTOCYTOSIS WITH ASSOCIATED CLONAL HEMATOLOGICAL NON-MAST CELL LINEAGE DISEASE: ICD-10-CM

## 2024-08-12 LAB
ABO GROUP (TYPE) IN BLOOD: NORMAL
ACANTHOCYTES BLD QL SMEAR: ABNORMAL
ANTIBODY SCREEN: NORMAL
BASOPHILS # BLD MANUAL: 0.02 X10*3/UL (ref 0–0.1)
BASOPHILS NFR BLD MANUAL: 2 %
BB ANTIBODY IDENTIFICATION: NORMAL
BLASTS # BLD MANUAL: 0.05 X10*3/UL
BLASTS NFR BLD MANUAL: 4 %
BLOOD EXPIRATION DATE: NORMAL
BURR CELLS BLD QL SMEAR: ABNORMAL
CASE #: NORMAL
DISPENSE STATUS: NORMAL
EOSINOPHIL # BLD MANUAL: 0.12 X10*3/UL (ref 0–0.7)
EOSINOPHIL NFR BLD MANUAL: 10 %
ERYTHROCYTE [DISTWIDTH] IN BLOOD BY AUTOMATED COUNT: 16 % (ref 11.5–14.5)
HCT VFR BLD AUTO: 17.8 % (ref 41–52)
HGB BLD-MCNC: 6.1 G/DL (ref 13.5–17.5)
IMM GRANULOCYTES # BLD AUTO: 0.01 X10*3/UL (ref 0–0.7)
IMM GRANULOCYTES NFR BLD AUTO: 0.8 % (ref 0–0.9)
LYMPHOCYTES # BLD MANUAL: 0.67 X10*3/UL (ref 1.2–4.8)
LYMPHOCYTES NFR BLD MANUAL: 56 %
MCH RBC QN AUTO: 28.6 PG (ref 26–34)
MCHC RBC AUTO-ENTMCNC: 34.3 G/DL (ref 32–36)
MCV RBC AUTO: 84 FL (ref 80–100)
MONOCYTES # BLD MANUAL: 0.06 X10*3/UL (ref 0.1–1)
MONOCYTES NFR BLD MANUAL: 5 %
NEUTROPHILS # BLD MANUAL: 0.27 X10*3/UL (ref 1.2–7.7)
NEUTS BAND # BLD MANUAL: 0.02 X10*3/UL (ref 0–0.7)
NEUTS BAND NFR BLD MANUAL: 2 %
NEUTS SEG # BLD MANUAL: 0.25 X10*3/UL (ref 1.2–7)
NEUTS SEG NFR BLD MANUAL: 21 %
NRBC BLD-RTO: 0 /100 WBCS (ref 0–0)
OVALOCYTES BLD QL SMEAR: ABNORMAL
PLATELET # BLD AUTO: 18 X10*3/UL (ref 150–450)
PRODUCT BLOOD TYPE: 5100
PRODUCT CODE: NORMAL
RBC # BLD AUTO: 2.13 X10*6/UL (ref 4.5–5.9)
RBC MORPH BLD: ABNORMAL
RH FACTOR (ANTIGEN D): NORMAL
SCHISTOCYTES BLD QL SMEAR: ABNORMAL
TOTAL CELLS COUNTED BLD: 100
UNIT ABO: NORMAL
UNIT NUMBER: NORMAL
UNIT RH: NORMAL
UNIT VOLUME: 350
WBC # BLD AUTO: 1.2 X10*3/UL (ref 4.4–11.3)
XM INTEP: NORMAL

## 2024-08-12 PROCEDURE — P9040 RBC LEUKOREDUCED IRRADIATED: HCPCS

## 2024-08-12 PROCEDURE — 85027 COMPLETE CBC AUTOMATED: CPT

## 2024-08-12 PROCEDURE — 86870 RBC ANTIBODY IDENTIFICATION: CPT

## 2024-08-12 PROCEDURE — 2500000004 HC RX 250 GENERAL PHARMACY W/ HCPCS (ALT 636 FOR OP/ED): Performed by: INTERNAL MEDICINE

## 2024-08-12 PROCEDURE — 86901 BLOOD TYPING SEROLOGIC RH(D): CPT

## 2024-08-12 PROCEDURE — 36430 TRANSFUSION BLD/BLD COMPNT: CPT

## 2024-08-12 PROCEDURE — 86885 COOMBS TEST INDIRECT QUAL: CPT

## 2024-08-12 PROCEDURE — 85007 BL SMEAR W/DIFF WBC COUNT: CPT

## 2024-08-12 RX ORDER — FAMOTIDINE 10 MG/ML
20 INJECTION INTRAVENOUS ONCE AS NEEDED
Status: DISCONTINUED | OUTPATIENT
Start: 2024-08-12 | End: 2024-08-12 | Stop reason: HOSPADM

## 2024-08-12 RX ORDER — DIPHENHYDRAMINE HYDROCHLORIDE 50 MG/ML
50 INJECTION INTRAMUSCULAR; INTRAVENOUS AS NEEDED
Status: DISCONTINUED | OUTPATIENT
Start: 2024-08-12 | End: 2024-08-12 | Stop reason: HOSPADM

## 2024-08-12 RX ORDER — FAMOTIDINE 10 MG/ML
20 INJECTION INTRAVENOUS ONCE AS NEEDED
Status: CANCELLED | OUTPATIENT
Start: 2024-08-12

## 2024-08-12 RX ORDER — HEPARIN 100 UNIT/ML
500 SYRINGE INTRAVENOUS AS NEEDED
Status: DISCONTINUED | OUTPATIENT
Start: 2024-08-12 | End: 2024-08-12 | Stop reason: HOSPADM

## 2024-08-12 RX ORDER — ALBUTEROL SULFATE 0.83 MG/ML
3 SOLUTION RESPIRATORY (INHALATION) AS NEEDED
Status: CANCELLED | OUTPATIENT
Start: 2024-08-12

## 2024-08-12 RX ORDER — HEPARIN SODIUM,PORCINE/PF 10 UNIT/ML
50 SYRINGE (ML) INTRAVENOUS AS NEEDED
Status: CANCELLED | OUTPATIENT
Start: 2024-08-12

## 2024-08-12 RX ORDER — EPINEPHRINE 0.3 MG/.3ML
0.3 INJECTION SUBCUTANEOUS EVERY 5 MIN PRN
Status: CANCELLED | OUTPATIENT
Start: 2024-08-12

## 2024-08-12 RX ORDER — ALBUTEROL SULFATE 0.83 MG/ML
3 SOLUTION RESPIRATORY (INHALATION) AS NEEDED
Status: DISCONTINUED | OUTPATIENT
Start: 2024-08-12 | End: 2024-08-12 | Stop reason: HOSPADM

## 2024-08-12 RX ORDER — HEPARIN 100 UNIT/ML
500 SYRINGE INTRAVENOUS AS NEEDED
Status: CANCELLED | OUTPATIENT
Start: 2024-08-12

## 2024-08-12 RX ORDER — DIPHENHYDRAMINE HYDROCHLORIDE 50 MG/ML
50 INJECTION INTRAMUSCULAR; INTRAVENOUS AS NEEDED
Status: CANCELLED | OUTPATIENT
Start: 2024-08-12

## 2024-08-12 RX ORDER — EPINEPHRINE 0.3 MG/.3ML
0.3 INJECTION SUBCUTANEOUS EVERY 5 MIN PRN
Status: DISCONTINUED | OUTPATIENT
Start: 2024-08-12 | End: 2024-08-12 | Stop reason: HOSPADM

## 2024-08-12 ASSESSMENT — PAIN SCALES - GENERAL: PAINLEVEL: 0-NO PAIN

## 2024-08-12 NOTE — PROGRESS NOTES
Patient arrived to unit for scheduled count check. Patient denies new complaints at this time. Hemoglobin noted of 6.1, one unit PRBC administered per orders. Dr. Dawson notified of critical lab results. Tolerated transfusion without incident. Discharged ambulatory and in stable condition.

## 2024-08-13 ENCOUNTER — APPOINTMENT (OUTPATIENT)
Dept: HEMATOLOGY/ONCOLOGY | Facility: HOSPITAL | Age: 69
End: 2024-08-13
Payer: MEDICARE

## 2024-08-13 LAB
BB ANTIBODY IDENTIFICATION: NORMAL
CASE #: NORMAL
PATH REV-IMMUNOHEMATOLOGY-PR30: NORMAL
PATH REV-IMMUNOHEMATOLOGY-PR30: NORMAL

## 2024-08-14 ENCOUNTER — APPOINTMENT (OUTPATIENT)
Dept: HEMATOLOGY/ONCOLOGY | Facility: HOSPITAL | Age: 69
End: 2024-08-14
Payer: MEDICARE

## 2024-08-14 ENCOUNTER — TELEPHONE (OUTPATIENT)
Dept: HEMATOLOGY/ONCOLOGY | Facility: HOSPITAL | Age: 69
End: 2024-08-14
Payer: MEDICARE

## 2024-08-14 DIAGNOSIS — E88.3 TUMOR LYSIS SYNDROME FOLLOWING ANTINEOPLASTIC DRUG THERAPY (HHS-HCC): ICD-10-CM

## 2024-08-14 DIAGNOSIS — T45.1X5A TUMOR LYSIS SYNDROME FOLLOWING ANTINEOPLASTIC DRUG THERAPY (HHS-HCC): ICD-10-CM

## 2024-08-14 NOTE — TELEPHONE ENCOUNTER
Provider reviewed patients labs, ordered labs to be checked Thursday or Friday of this week.     Discussed with patient, agreed to appt on Friday. States he is feeling great would prefer to wait until Friday.     Lynnette Friday 8/16/2024 8am chair 12, patient aware.

## 2024-08-15 ENCOUNTER — APPOINTMENT (OUTPATIENT)
Dept: HEMATOLOGY/ONCOLOGY | Facility: HOSPITAL | Age: 69
End: 2024-08-15
Payer: MEDICARE

## 2024-08-16 ENCOUNTER — APPOINTMENT (OUTPATIENT)
Dept: HEMATOLOGY/ONCOLOGY | Facility: HOSPITAL | Age: 69
End: 2024-08-16
Payer: MEDICARE

## 2024-08-16 ENCOUNTER — INFUSION (OUTPATIENT)
Dept: HEMATOLOGY/ONCOLOGY | Facility: CLINIC | Age: 69
End: 2024-08-16
Payer: MEDICARE

## 2024-08-16 ENCOUNTER — TELEPHONE (OUTPATIENT)
Dept: HEMATOLOGY/ONCOLOGY | Facility: CLINIC | Age: 69
End: 2024-08-16

## 2024-08-16 ENCOUNTER — TELEPHONE (OUTPATIENT)
Dept: HEMATOLOGY/ONCOLOGY | Facility: HOSPITAL | Age: 69
End: 2024-08-16

## 2024-08-16 DIAGNOSIS — C92.00 ACUTE MYELOID LEUKEMIA NOT HAVING ACHIEVED REMISSION (MULTI): ICD-10-CM

## 2024-08-16 DIAGNOSIS — D47.02 SYSTEMIC MASTOCYTOSIS WITH ASSOCIATED CLONAL HEMATOLOGICAL NON-MAST CELL LINEAGE DISEASE: ICD-10-CM

## 2024-08-16 DIAGNOSIS — M81.8 OTHER OSTEOPOROSIS WITHOUT CURRENT PATHOLOGICAL FRACTURE: ICD-10-CM

## 2024-08-16 DIAGNOSIS — T45.1X5A TUMOR LYSIS SYNDROME FOLLOWING ANTINEOPLASTIC DRUG THERAPY (HHS-HCC): ICD-10-CM

## 2024-08-16 DIAGNOSIS — E88.3 TUMOR LYSIS SYNDROME FOLLOWING ANTINEOPLASTIC DRUG THERAPY (HHS-HCC): ICD-10-CM

## 2024-08-16 LAB
ABO GROUP (TYPE) IN BLOOD: NORMAL
ACANTHOCYTES BLD QL SMEAR: NORMAL
ANTIBODY SCREEN: NORMAL
BASOPHILS # BLD AUTO: 0.05 X10*3/UL (ref 0–0.1)
BASOPHILS NFR BLD AUTO: 4.3 %
EOSINOPHIL # BLD AUTO: 0.17 X10*3/UL (ref 0–0.7)
EOSINOPHIL NFR BLD AUTO: 14.7 %
ERYTHROCYTE [DISTWIDTH] IN BLOOD BY AUTOMATED COUNT: 16 % (ref 11.5–14.5)
HCT VFR BLD AUTO: 18.8 % (ref 41–52)
HGB BLD-MCNC: 6.4 G/DL (ref 13.5–17.5)
IMM GRANULOCYTES # BLD AUTO: 0 X10*3/UL (ref 0–0.7)
IMM GRANULOCYTES NFR BLD AUTO: 0 % (ref 0–0.9)
LYMPHOCYTES # BLD AUTO: 0.6 X10*3/UL (ref 1.2–4.8)
LYMPHOCYTES NFR BLD AUTO: 51.7 %
MCH RBC QN AUTO: 28.4 PG (ref 26–34)
MCHC RBC AUTO-ENTMCNC: 34 G/DL (ref 32–36)
MCV RBC AUTO: 84 FL (ref 80–100)
MONOCYTES # BLD AUTO: 0.05 X10*3/UL (ref 0.1–1)
MONOCYTES NFR BLD AUTO: 4.3 %
NEUTROPHILS # BLD AUTO: 0.29 X10*3/UL (ref 1.2–7.7)
NEUTROPHILS NFR BLD AUTO: 25 %
NRBC BLD-RTO: ABNORMAL /100{WBCS}
OVALOCYTES BLD QL SMEAR: NORMAL
PLATELET # BLD AUTO: 30 X10*3/UL (ref 150–450)
RBC # BLD AUTO: 2.25 X10*6/UL (ref 4.5–5.9)
RBC MORPH BLD: NORMAL
RH FACTOR (ANTIGEN D): NORMAL
WBC # BLD AUTO: 1.2 X10*3/UL (ref 4.4–11.3)

## 2024-08-16 PROCEDURE — 36591 DRAW BLOOD OFF VENOUS DEVICE: CPT

## 2024-08-16 PROCEDURE — 86901 BLOOD TYPING SEROLOGIC RH(D): CPT

## 2024-08-16 PROCEDURE — 85025 COMPLETE CBC W/AUTO DIFF WBC: CPT

## 2024-08-16 PROCEDURE — 2500000004 HC RX 250 GENERAL PHARMACY W/ HCPCS (ALT 636 FOR OP/ED): Performed by: INTERNAL MEDICINE

## 2024-08-16 RX ORDER — HEPARIN 100 UNIT/ML
500 SYRINGE INTRAVENOUS AS NEEDED
Status: CANCELLED | OUTPATIENT
Start: 2024-08-16

## 2024-08-16 RX ORDER — ALBUTEROL SULFATE 0.83 MG/ML
3 SOLUTION RESPIRATORY (INHALATION) AS NEEDED
Status: CANCELLED | OUTPATIENT
Start: 2024-08-16

## 2024-08-16 RX ORDER — EPINEPHRINE 0.3 MG/.3ML
0.3 INJECTION SUBCUTANEOUS EVERY 5 MIN PRN
Status: CANCELLED | OUTPATIENT
Start: 2024-08-16

## 2024-08-16 RX ORDER — FAMOTIDINE 10 MG/ML
20 INJECTION INTRAVENOUS ONCE AS NEEDED
Status: CANCELLED | OUTPATIENT
Start: 2024-08-16

## 2024-08-16 RX ORDER — LEVOFLOXACIN 500 MG/1
500 TABLET, FILM COATED ORAL DAILY
Qty: 30 TABLET | Refills: 0 | Status: SHIPPED | OUTPATIENT
Start: 2024-08-16 | End: 2024-09-15

## 2024-08-16 RX ORDER — HEPARIN SODIUM,PORCINE/PF 10 UNIT/ML
50 SYRINGE (ML) INTRAVENOUS AS NEEDED
Status: CANCELLED | OUTPATIENT
Start: 2024-08-16

## 2024-08-16 RX ORDER — HEPARIN SODIUM,PORCINE/PF 10 UNIT/ML
50 SYRINGE (ML) INTRAVENOUS AS NEEDED
Status: DISCONTINUED | OUTPATIENT
Start: 2024-08-16 | End: 2024-08-16 | Stop reason: HOSPADM

## 2024-08-16 RX ORDER — DIPHENHYDRAMINE HYDROCHLORIDE 50 MG/ML
50 INJECTION INTRAMUSCULAR; INTRAVENOUS AS NEEDED
Status: CANCELLED | OUTPATIENT
Start: 2024-08-16

## 2024-08-16 RX ORDER — HEPARIN 100 UNIT/ML
500 SYRINGE INTRAVENOUS AS NEEDED
Status: DISCONTINUED | OUTPATIENT
Start: 2024-08-16 | End: 2024-08-16 | Stop reason: HOSPADM

## 2024-08-16 NOTE — TELEPHONE ENCOUNTER
Discussed with patient that provider sent in antibiotic to local pharmacy, verbalized understanding. Patient encouraged to call pharmacy and inquire on when it is ready.     Reviewed appt/ infusion slot on 8/17 at [facility] infusion for replacement.

## 2024-08-16 NOTE — PROGRESS NOTES
Lab called with critical labs - hgb - 6.4/ plts -30/ anc - 290.  Patient to be transfused at [facility] tomorrow at 1200 - verified with Nannette flores.  Called patient and made him aware. Verbalized understanding of appointment time and place.

## 2024-08-17 ENCOUNTER — INFUSION (OUTPATIENT)
Dept: HEMATOLOGY/ONCOLOGY | Facility: HOSPITAL | Age: 69
End: 2024-08-17
Payer: MEDICARE

## 2024-08-17 ENCOUNTER — APPOINTMENT (OUTPATIENT)
Dept: HEMATOLOGY/ONCOLOGY | Facility: HOSPITAL | Age: 69
End: 2024-08-17
Payer: MEDICARE

## 2024-08-17 VITALS
WEIGHT: 195.11 LBS | BODY MASS INDEX: 31.49 KG/M2 | OXYGEN SATURATION: 99 % | DIASTOLIC BLOOD PRESSURE: 85 MMHG | SYSTOLIC BLOOD PRESSURE: 119 MMHG | HEART RATE: 69 BPM | RESPIRATION RATE: 18 BRPM | TEMPERATURE: 97.7 F

## 2024-08-17 DIAGNOSIS — D47.02 SYSTEMIC MASTOCYTOSIS WITH ASSOCIATED CLONAL HEMATOLOGICAL NON-MAST CELL LINEAGE DISEASE: ICD-10-CM

## 2024-08-17 DIAGNOSIS — C92.00 ACUTE MYELOID LEUKEMIA NOT HAVING ACHIEVED REMISSION (MULTI): ICD-10-CM

## 2024-08-17 DIAGNOSIS — M81.8 OTHER OSTEOPOROSIS WITHOUT CURRENT PATHOLOGICAL FRACTURE: ICD-10-CM

## 2024-08-17 LAB
BLOOD EXPIRATION DATE: NORMAL
DISPENSE STATUS: NORMAL
PRODUCT BLOOD TYPE: 9500
PRODUCT CODE: NORMAL
UNIT ABO: NORMAL
UNIT NUMBER: NORMAL
UNIT RH: NORMAL
UNIT VOLUME: 289
XM INTEP: NORMAL

## 2024-08-17 PROCEDURE — 2500000004 HC RX 250 GENERAL PHARMACY W/ HCPCS (ALT 636 FOR OP/ED): Performed by: INTERNAL MEDICINE

## 2024-08-17 PROCEDURE — 36430 TRANSFUSION BLD/BLD COMPNT: CPT

## 2024-08-17 RX ORDER — DIPHENHYDRAMINE HYDROCHLORIDE 50 MG/ML
50 INJECTION INTRAMUSCULAR; INTRAVENOUS AS NEEDED
OUTPATIENT
Start: 2024-08-17

## 2024-08-17 RX ORDER — ALBUTEROL SULFATE 0.83 MG/ML
3 SOLUTION RESPIRATORY (INHALATION) AS NEEDED
OUTPATIENT
Start: 2024-08-17

## 2024-08-17 RX ORDER — HEPARIN SODIUM,PORCINE/PF 10 UNIT/ML
50 SYRINGE (ML) INTRAVENOUS AS NEEDED
OUTPATIENT
Start: 2024-08-17

## 2024-08-17 RX ORDER — HEPARIN 100 UNIT/ML
500 SYRINGE INTRAVENOUS AS NEEDED
Status: DISCONTINUED | OUTPATIENT
Start: 2024-08-17 | End: 2024-08-17 | Stop reason: HOSPADM

## 2024-08-17 RX ORDER — EPINEPHRINE 0.3 MG/.3ML
0.3 INJECTION SUBCUTANEOUS EVERY 5 MIN PRN
OUTPATIENT
Start: 2024-08-17

## 2024-08-17 RX ORDER — LIDOCAINE AND PRILOCAINE 25; 25 MG/G; MG/G
CREAM TOPICAL
Qty: 30 G | Refills: 0 | Status: SHIPPED | OUTPATIENT
Start: 2024-08-17 | End: 2024-08-17

## 2024-08-17 RX ORDER — FAMOTIDINE 10 MG/ML
20 INJECTION INTRAVENOUS ONCE AS NEEDED
OUTPATIENT
Start: 2024-08-17

## 2024-08-17 RX ORDER — HEPARIN 100 UNIT/ML
500 SYRINGE INTRAVENOUS AS NEEDED
OUTPATIENT
Start: 2024-08-17

## 2024-08-17 ASSESSMENT — PAIN SCALES - GENERAL: PAINLEVEL: 0-NO PAIN

## 2024-08-17 NOTE — PROGRESS NOTES
Pt present today for 1 unit PRBC.  Transfusion tolerated with no issues.  Pt discharged to home in stable condition with copy of future appointments.

## 2024-08-18 ENCOUNTER — APPOINTMENT (OUTPATIENT)
Dept: HEMATOLOGY/ONCOLOGY | Facility: HOSPITAL | Age: 69
End: 2024-08-18
Payer: MEDICARE

## 2024-08-19 ENCOUNTER — APPOINTMENT (OUTPATIENT)
Dept: HEMATOLOGY/ONCOLOGY | Facility: HOSPITAL | Age: 69
End: 2024-08-19
Payer: MEDICARE

## 2024-08-19 LAB
BB ANTIBODY IDENTIFICATION: NORMAL
CASE #: NORMAL

## 2024-08-20 ENCOUNTER — OFFICE VISIT (OUTPATIENT)
Dept: HEMATOLOGY/ONCOLOGY | Facility: HOSPITAL | Age: 69
End: 2024-08-20
Payer: MEDICARE

## 2024-08-20 ENCOUNTER — APPOINTMENT (OUTPATIENT)
Dept: HEMATOLOGY/ONCOLOGY | Facility: HOSPITAL | Age: 69
End: 2024-08-20
Payer: MEDICARE

## 2024-08-20 ENCOUNTER — LAB (OUTPATIENT)
Dept: HEMATOLOGY/ONCOLOGY | Facility: HOSPITAL | Age: 69
End: 2024-08-20
Payer: MEDICARE

## 2024-08-20 VITALS
WEIGHT: 195.77 LBS | HEART RATE: 77 BPM | OXYGEN SATURATION: 100 % | RESPIRATION RATE: 18 BRPM | BODY MASS INDEX: 31.6 KG/M2 | TEMPERATURE: 97.2 F | DIASTOLIC BLOOD PRESSURE: 66 MMHG | SYSTOLIC BLOOD PRESSURE: 136 MMHG

## 2024-08-20 DIAGNOSIS — D84.9 IMMUNOCOMPROMISED STATE (MULTI): ICD-10-CM

## 2024-08-20 DIAGNOSIS — C92.00 ACUTE MYELOID LEUKEMIA NOT HAVING ACHIEVED REMISSION (MULTI): ICD-10-CM

## 2024-08-20 DIAGNOSIS — D47.02 SYSTEMIC MASTOCYTOSIS WITH ASSOCIATED CLONAL HEMATOLOGICAL NON-MAST CELL LINEAGE DISEASE: ICD-10-CM

## 2024-08-20 DIAGNOSIS — T45.1X5A TUMOR LYSIS SYNDROME FOLLOWING ANTINEOPLASTIC DRUG THERAPY (HHS-HCC): ICD-10-CM

## 2024-08-20 DIAGNOSIS — C92.00 ACUTE MYELOID LEUKEMIA NOT HAVING ACHIEVED REMISSION (MULTI): Primary | ICD-10-CM

## 2024-08-20 DIAGNOSIS — E88.3 TUMOR LYSIS SYNDROME FOLLOWING ANTINEOPLASTIC DRUG THERAPY (HHS-HCC): ICD-10-CM

## 2024-08-20 DIAGNOSIS — C92.02 AML (ACUTE MYELOID LEUKEMIA) IN RELAPSE (MULTI): ICD-10-CM

## 2024-08-20 LAB
ABO GROUP (TYPE) IN BLOOD: NORMAL
ALBUMIN SERPL BCP-MCNC: 4 G/DL (ref 3.4–5)
ALP SERPL-CCNC: 114 U/L (ref 33–136)
ALT SERPL W P-5'-P-CCNC: 6 U/L (ref 10–52)
ANION GAP SERPL CALC-SCNC: 11 MMOL/L (ref 10–20)
ANTIBODY SCREEN: NORMAL
AST SERPL W P-5'-P-CCNC: 8 U/L (ref 9–39)
BASOPHILS # BLD AUTO: 0.04 X10*3/UL (ref 0–0.1)
BASOPHILS NFR BLD AUTO: 2.6 %
BILIRUB SERPL-MCNC: 0.9 MG/DL (ref 0–1.2)
BUN SERPL-MCNC: 15 MG/DL (ref 6–23)
BURR CELLS BLD QL SMEAR: NORMAL
CALCIUM SERPL-MCNC: 8 MG/DL (ref 8.6–10.3)
CHLORIDE SERPL-SCNC: 106 MMOL/L (ref 98–107)
CO2 SERPL-SCNC: 22 MMOL/L (ref 21–32)
CREAT SERPL-MCNC: 1.05 MG/DL (ref 0.5–1.3)
EGFRCR SERPLBLD CKD-EPI 2021: 77 ML/MIN/1.73M*2
EOSINOPHIL # BLD AUTO: 0.28 X10*3/UL (ref 0–0.7)
EOSINOPHIL NFR BLD AUTO: 18.1 %
ERYTHROCYTE [DISTWIDTH] IN BLOOD BY AUTOMATED COUNT: 17.5 % (ref 11.5–14.5)
GLUCOSE SERPL-MCNC: 85 MG/DL (ref 74–99)
HCT VFR BLD AUTO: 19.7 % (ref 41–52)
HGB BLD-MCNC: 6.8 G/DL (ref 13.5–17.5)
IMM GRANULOCYTES # BLD AUTO: 0 X10*3/UL (ref 0–0.7)
IMM GRANULOCYTES NFR BLD AUTO: 0 % (ref 0–0.9)
LYMPHOCYTES # BLD AUTO: 0.71 X10*3/UL (ref 1.2–4.8)
LYMPHOCYTES NFR BLD AUTO: 45.8 %
MCH RBC QN AUTO: 28.7 PG (ref 26–34)
MCHC RBC AUTO-ENTMCNC: 34.5 G/DL (ref 32–36)
MCV RBC AUTO: 83 FL (ref 80–100)
MONOCYTES # BLD AUTO: 0.1 X10*3/UL (ref 0.1–1)
MONOCYTES NFR BLD AUTO: 6.5 %
NEUTROPHILS # BLD AUTO: 0.42 X10*3/UL (ref 1.2–7.7)
NEUTROPHILS NFR BLD AUTO: 27 %
NRBC BLD-RTO: 0 /100 WBCS (ref 0–0)
OVALOCYTES BLD QL SMEAR: NORMAL
PATH REV-IMMUNOHEMATOLOGY-PR30: NORMAL
PLATELET # BLD AUTO: 70 X10*3/UL (ref 150–450)
POLYCHROMASIA BLD QL SMEAR: NORMAL
POTASSIUM SERPL-SCNC: 3.8 MMOL/L (ref 3.5–5.3)
PROT SERPL-MCNC: 7.3 G/DL (ref 6.4–8.2)
RBC # BLD AUTO: 2.37 X10*6/UL (ref 4.5–5.9)
RBC MORPH BLD: NORMAL
RH FACTOR (ANTIGEN D): NORMAL
SCHISTOCYTES BLD QL SMEAR: NORMAL
SODIUM SERPL-SCNC: 135 MMOL/L (ref 136–145)
WBC # BLD AUTO: 1.6 X10*3/UL (ref 4.4–11.3)

## 2024-08-20 PROCEDURE — 1036F TOBACCO NON-USER: CPT | Performed by: STUDENT IN AN ORGANIZED HEALTH CARE EDUCATION/TRAINING PROGRAM

## 2024-08-20 PROCEDURE — 3075F SYST BP GE 130 - 139MM HG: CPT | Performed by: STUDENT IN AN ORGANIZED HEALTH CARE EDUCATION/TRAINING PROGRAM

## 2024-08-20 PROCEDURE — 99215 OFFICE O/P EST HI 40 MIN: CPT | Performed by: STUDENT IN AN ORGANIZED HEALTH CARE EDUCATION/TRAINING PROGRAM

## 2024-08-20 PROCEDURE — 1126F AMNT PAIN NOTED NONE PRSNT: CPT | Performed by: STUDENT IN AN ORGANIZED HEALTH CARE EDUCATION/TRAINING PROGRAM

## 2024-08-20 PROCEDURE — 86901 BLOOD TYPING SEROLOGIC RH(D): CPT

## 2024-08-20 PROCEDURE — 1159F MED LIST DOCD IN RCRD: CPT | Performed by: STUDENT IN AN ORGANIZED HEALTH CARE EDUCATION/TRAINING PROGRAM

## 2024-08-20 PROCEDURE — 86902 BLOOD TYPE ANTIGEN DONOR EA: CPT

## 2024-08-20 PROCEDURE — 36591 DRAW BLOOD OFF VENOUS DEVICE: CPT

## 2024-08-20 PROCEDURE — 2500000004 HC RX 250 GENERAL PHARMACY W/ HCPCS (ALT 636 FOR OP/ED): Performed by: INTERNAL MEDICINE

## 2024-08-20 PROCEDURE — 1111F DSCHRG MED/CURRENT MED MERGE: CPT | Performed by: STUDENT IN AN ORGANIZED HEALTH CARE EDUCATION/TRAINING PROGRAM

## 2024-08-20 PROCEDURE — 86922 COMPATIBILITY TEST ANTIGLOB: CPT

## 2024-08-20 PROCEDURE — 3078F DIAST BP <80 MM HG: CPT | Performed by: STUDENT IN AN ORGANIZED HEALTH CARE EDUCATION/TRAINING PROGRAM

## 2024-08-20 PROCEDURE — 80053 COMPREHEN METABOLIC PANEL: CPT

## 2024-08-20 PROCEDURE — 85025 COMPLETE CBC W/AUTO DIFF WBC: CPT

## 2024-08-20 PROCEDURE — 86870 RBC ANTIBODY IDENTIFICATION: CPT

## 2024-08-20 RX ORDER — CHLORHEXIDINE GLUCONATE ORAL RINSE 1.2 MG/ML
15 SOLUTION DENTAL 3 TIMES DAILY
Qty: 473 ML | Refills: 0 | Status: SHIPPED | OUTPATIENT
Start: 2024-08-20 | End: 2024-08-31

## 2024-08-20 RX ORDER — POSACONAZOLE 100 MG/1
300 TABLET, DELAYED RELEASE ORAL
Qty: 30 TABLET | Refills: 2 | Status: SHIPPED | OUTPATIENT
Start: 2024-08-20

## 2024-08-20 RX ORDER — HEPARIN SODIUM,PORCINE/PF 10 UNIT/ML
50 SYRINGE (ML) INTRAVENOUS AS NEEDED
Status: CANCELLED | OUTPATIENT
Start: 2024-08-20

## 2024-08-20 RX ORDER — ALBUTEROL SULFATE 0.83 MG/ML
3 SOLUTION RESPIRATORY (INHALATION) AS NEEDED
Status: CANCELLED | OUTPATIENT
Start: 2024-08-20

## 2024-08-20 RX ORDER — EPINEPHRINE 0.3 MG/.3ML
0.3 INJECTION SUBCUTANEOUS EVERY 5 MIN PRN
Status: CANCELLED | OUTPATIENT
Start: 2024-08-20

## 2024-08-20 RX ORDER — HEPARIN 100 UNIT/ML
500 SYRINGE INTRAVENOUS AS NEEDED
Status: CANCELLED | OUTPATIENT
Start: 2024-08-20

## 2024-08-20 RX ORDER — FAMOTIDINE 10 MG/ML
20 INJECTION INTRAVENOUS ONCE AS NEEDED
Status: CANCELLED | OUTPATIENT
Start: 2024-08-20

## 2024-08-20 RX ORDER — HEPARIN 100 UNIT/ML
500 SYRINGE INTRAVENOUS AS NEEDED
Status: DISCONTINUED | OUTPATIENT
Start: 2024-08-20 | End: 2024-08-20 | Stop reason: HOSPADM

## 2024-08-20 RX ORDER — DIPHENHYDRAMINE HYDROCHLORIDE 50 MG/ML
50 INJECTION INTRAMUSCULAR; INTRAVENOUS AS NEEDED
Status: CANCELLED | OUTPATIENT
Start: 2024-08-20

## 2024-08-20 ASSESSMENT — PAIN SCALES - GENERAL: PAINLEVEL: 0-NO PAIN

## 2024-08-20 NOTE — ASSESSMENT & PLAN NOTE
Continue Acyclovir. On Posaconazole & Levofloxacin when neutropenic (ANC less than 500 still). Refilled posa today.

## 2024-08-20 NOTE — ASSESSMENT & PLAN NOTE
Significant TLS complicating C1 of V-AZA requiring rasburicase, allopurinol, and IVF. Venetoclax was not re-started with the remainder of C1. Will assess post C1 BM to help assess tumor burden and risk of TLS with C2. sCr continues to improve since then.

## 2024-08-20 NOTE — ASSESSMENT & PLAN NOTE
C1D Venetoclax and Azacitidine. Admitted for early TLS with initial treatment requiring rasburicase, allopurinol and IVF. Venetoclax held as of D2 and for the remainder of C1. Counts showing early signs of recovery (plts up to 70k today) though ANC not yet recovered. Given his complications with TLS during C1 and ongoing recovery, will plan to re-assess disease with BM. Unfortunately earliest available is next week. Will plan to delay C2 tentatively to 9/3/24. Continue count checks twice weekly with ongoing anemia.

## 2024-08-20 NOTE — PROGRESS NOTES
"     Patient ID: Jin Reynolds is a 69 y.o. male.    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   8/23/2023 Initial Diagnosis    DX:  SMOLDERING SYSTEMIC MASTOCYTOSIS  Presented with skin rash and eosinophilia    [facility] CRITERIA  (For SM, Need major and 1 minor OR at least 3 minors)  - Multi-focal, dense infiltrates of MC (>= 15 mast cells in aggregates) in BM and/or other extra-cutaneous organs [major]  - In BM or extracutaneous organs, >25% MC spindle shaped OR have atypical morphology OR of all MC on BM aspirate smears, >25% are immature or atypical [minor]  - Detection of activating mutation KIT D816V in BM, PB, or other extracutaneous organ [minor]  - Serum tryptase persistently exceeds 20 ng/mL (unless associated clonal myeloid disorder, in which this paramenter is not valid) [minor]    \"B\" FINDINGS  - BM biopsy showing >30% infiltration (focal, dense aggregates) and/or serum tryptase > 200 ng/mL  - Signs of dysplasia or myeloproliferation, in non-MC lineages but insufficient criteria for definitive diagnosis of AHNMD with normal or slightly abnormal blood counts    \"C\" FINDINGS  None       8/23/2023 Biopsy    BONE MARROW (8/23/23)  Hypercellular (90-95%) with trilineage hematopoiesis, granulocytic hyperplasia, eosinophilia, and increased atypical mast cells  IP:  CD34+, +, CD7(bright)+, cCD3-, CD33-, CD15-, CD13+ blast population  IHC:  + increased spindle-shaped mast cells (15% cellularity), CD2-  Myeloid NGS:  CBL (31%), cKIT D816V (30%), RUNX1 x 2 (19%, 29%), SRSF2 (47%) [ASXL1 negative]  FISH:  PDGFRb negative    SERUM TRYPTASE  228 (8/23/23)  268 (9/5/23)     2/13/2024 -  Molecular Therapy    AVAPRITINIB   - Starting dose 25 mg daily (non-Adv SM)     7/16/2024 -  Chemotherapy    Venetoclax / AzaCITIDine, 28 Day Cycles - Induction / Consolidation     Acute myeloid leukemia not having achieved " remission (Multi)   6/27/2024 Initial Diagnosis    [facility] 2022 DX: Acute myeloid leukemia, NOS (>=20% blasts)  [id number] AML Risk Stratification: INTERMEDIATE: Cytogenetic and/or molecular abnormalities not classified as favorable or adverse  Presented with pancytopenia and circulating blasts    BONE MARROW (06/27/2024)  Marrow replaced by blasts, fibrotic foci, some atypical + cells; 7-8% circulating blasts; aspicular  - Unable to perform additional studies due to aspicular specimen    PERIPHERAL BLOOD (6/27/2024)  FISH:  positive for gain RUNX1    PERIPHERAL BLOOD (7/1/24)  IP:  abnl myeloblast population (CD34+, CD7+, CD4+, CD13+, CD38+, CD11+ dim, HLA=DR+, TdT+)  Myeloid NGS: CBL C404Y (41%), KIT D816V (8%), RUNX1 x2 (8%, 30%), SRSF2 (37%)     7/16/2024 -  Chemotherapy    Venetoclax / AzaCITIDine, 28 Day Cycles - Induction / Consolidation          Assessment & Plan  Acute myeloid leukemia not having achieved remission (Multi)  C1D Venetoclax and Azacitidine. Admitted for early TLS with initial treatment requiring rasburicase, allopurinol and IVF. Venetoclax held as of D2 and for the remainder of C1. Counts showing early signs of recovery (plts up to 70k today) though ANC not yet recovered. Given his complications with TLS during C1 and ongoing recovery, will plan to re-assess disease with BM. Unfortunately earliest available is next week. Will plan to delay C2 tentatively to 9/3/24. Continue count checks twice weekly with ongoing anemia.   Immunocompromised state (Multi)  Continue Acyclovir. On Posaconazole & Levofloxacin when neutropenic (ANC less than 500 still). Refilled posa today.   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 Venetoclax was not re-started with the remainder of C1. Will assess post C1 BM to help assess tumor burden and risk of TLS with C2. sCr continues to improve since then.    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  Continues to feel more tired.  No other new health issues.  Denies fevers, chills, nausea, vomiting, diarrhea, dyspnea, rash, and pain.    OBJECTIVE     There were no vitals taken for this visit.     Physical Exam  Vitals reviewed.   Constitutional:       Appearance: Normal appearance.   Eyes:      Conjunctiva/sclera: Conjunctivae normal.      Pupils: Pupils are equal, round, and reactive to light.   Skin:     General: Skin is warm and dry.      Findings: No rash.   Psychiatric:         Mood and Affect: Mood normal.                Kalia June PA-C

## 2024-08-21 ENCOUNTER — INFUSION (OUTPATIENT)
Dept: HEMATOLOGY/ONCOLOGY | Facility: HOSPITAL | Age: 69
End: 2024-08-21
Payer: MEDICARE

## 2024-08-21 VITALS
DIASTOLIC BLOOD PRESSURE: 78 MMHG | WEIGHT: 194.89 LBS | BODY MASS INDEX: 31.46 KG/M2 | RESPIRATION RATE: 16 BRPM | OXYGEN SATURATION: 99 % | HEART RATE: 79 BPM | TEMPERATURE: 98.1 F | SYSTOLIC BLOOD PRESSURE: 131 MMHG

## 2024-08-21 DIAGNOSIS — D47.02 SYSTEMIC MASTOCYTOSIS WITH ASSOCIATED CLONAL HEMATOLOGICAL NON-MAST CELL LINEAGE DISEASE: ICD-10-CM

## 2024-08-21 DIAGNOSIS — M81.8 OTHER OSTEOPOROSIS WITHOUT CURRENT PATHOLOGICAL FRACTURE: ICD-10-CM

## 2024-08-21 LAB
BB ANTIBODY IDENTIFICATION: NORMAL
BLOOD EXPIRATION DATE: NORMAL
CASE #: NORMAL
DISPENSE STATUS: NORMAL
PATH REV-IMMUNOHEMATOLOGY-PR30: NORMAL
PRODUCT BLOOD TYPE: 5100
PRODUCT CODE: NORMAL
UNIT ABO: NORMAL
UNIT NUMBER: NORMAL
UNIT RH: NORMAL
UNIT VOLUME: 350
XM INTEP: NORMAL

## 2024-08-21 PROCEDURE — 36430 TRANSFUSION BLD/BLD COMPNT: CPT

## 2024-08-21 PROCEDURE — 2500000004 HC RX 250 GENERAL PHARMACY W/ HCPCS (ALT 636 FOR OP/ED): Performed by: INTERNAL MEDICINE

## 2024-08-21 PROCEDURE — P9040 RBC LEUKOREDUCED IRRADIATED: HCPCS

## 2024-08-21 RX ORDER — HEPARIN SODIUM,PORCINE/PF 10 UNIT/ML
50 SYRINGE (ML) INTRAVENOUS AS NEEDED
Status: DISCONTINUED | OUTPATIENT
Start: 2024-08-21 | End: 2024-08-21 | Stop reason: HOSPADM

## 2024-08-21 RX ORDER — DIPHENHYDRAMINE HYDROCHLORIDE 50 MG/ML
50 INJECTION INTRAMUSCULAR; INTRAVENOUS AS NEEDED
OUTPATIENT
Start: 2024-08-21

## 2024-08-21 RX ORDER — HEPARIN SODIUM,PORCINE/PF 10 UNIT/ML
50 SYRINGE (ML) INTRAVENOUS AS NEEDED
Status: CANCELLED | OUTPATIENT
Start: 2024-08-21

## 2024-08-21 RX ORDER — EPINEPHRINE 0.3 MG/.3ML
0.3 INJECTION SUBCUTANEOUS EVERY 5 MIN PRN
OUTPATIENT
Start: 2024-08-21

## 2024-08-21 RX ORDER — HEPARIN 100 UNIT/ML
500 SYRINGE INTRAVENOUS AS NEEDED
Status: DISCONTINUED | OUTPATIENT
Start: 2024-08-21 | End: 2024-08-21 | Stop reason: HOSPADM

## 2024-08-21 RX ORDER — HEPARIN 100 UNIT/ML
500 SYRINGE INTRAVENOUS AS NEEDED
Status: CANCELLED | OUTPATIENT
Start: 2024-08-21

## 2024-08-21 RX ORDER — ALBUTEROL SULFATE 0.83 MG/ML
3 SOLUTION RESPIRATORY (INHALATION) AS NEEDED
OUTPATIENT
Start: 2024-08-21

## 2024-08-21 RX ORDER — FAMOTIDINE 10 MG/ML
20 INJECTION INTRAVENOUS ONCE AS NEEDED
OUTPATIENT
Start: 2024-08-21

## 2024-08-21 ASSESSMENT — PAIN SCALES - GENERAL: PAINLEVEL: 0-NO PAIN

## 2024-08-22 ENCOUNTER — APPOINTMENT (OUTPATIENT)
Dept: HEMATOLOGY/ONCOLOGY | Facility: HOSPITAL | Age: 69
End: 2024-08-22
Payer: MEDICARE

## 2024-08-23 ENCOUNTER — INFUSION (OUTPATIENT)
Dept: HEMATOLOGY/ONCOLOGY | Facility: HOSPITAL | Age: 69
End: 2024-08-23
Payer: MEDICARE

## 2024-08-23 VITALS
DIASTOLIC BLOOD PRESSURE: 79 MMHG | RESPIRATION RATE: 17 BRPM | OXYGEN SATURATION: 100 % | HEART RATE: 72 BPM | TEMPERATURE: 97.5 F | SYSTOLIC BLOOD PRESSURE: 135 MMHG | WEIGHT: 195.99 LBS | BODY MASS INDEX: 31.63 KG/M2

## 2024-08-23 DIAGNOSIS — C92.00 ACUTE MYELOID LEUKEMIA NOT HAVING ACHIEVED REMISSION (MULTI): ICD-10-CM

## 2024-08-23 DIAGNOSIS — D47.02 SYSTEMIC MASTOCYTOSIS WITH ASSOCIATED CLONAL HEMATOLOGICAL NON-MAST CELL LINEAGE DISEASE: ICD-10-CM

## 2024-08-23 LAB
ABO GROUP (TYPE) IN BLOOD: NORMAL
ANTIBODY SCREEN: NORMAL
BASOPHILS # BLD MANUAL: 0.05 X10*3/UL (ref 0–0.1)
BASOPHILS NFR BLD MANUAL: 3.6 %
EOSINOPHIL # BLD MANUAL: 0.07 X10*3/UL (ref 0–0.7)
EOSINOPHIL NFR BLD MANUAL: 4.6 %
ERYTHROCYTE [DISTWIDTH] IN BLOOD BY AUTOMATED COUNT: 17 % (ref 11.5–14.5)
HCT VFR BLD AUTO: 20.5 % (ref 41–52)
HGB BLD-MCNC: 7.1 G/DL (ref 13.5–17.5)
IMM GRANULOCYTES # BLD AUTO: 0.1 X10*3/UL (ref 0–0.7)
IMM GRANULOCYTES NFR BLD AUTO: 6.8 % (ref 0–0.9)
LYMPHOCYTES # BLD MANUAL: 0.74 X10*3/UL (ref 1.2–4.8)
LYMPHOCYTES NFR BLD MANUAL: 49.1 %
MCH RBC QN AUTO: 28.7 PG (ref 26–34)
MCHC RBC AUTO-ENTMCNC: 34.6 G/DL (ref 32–36)
MCV RBC AUTO: 83 FL (ref 80–100)
METAMYELOCYTES # BLD MANUAL: 0.01 X10*3/UL
METAMYELOCYTES NFR BLD MANUAL: 0.9 %
MONOCYTES # BLD MANUAL: 0.05 X10*3/UL (ref 0.1–1)
MONOCYTES NFR BLD MANUAL: 3.6 %
NEUTS SEG # BLD MANUAL: 0.56 X10*3/UL (ref 1.2–7)
NEUTS SEG NFR BLD MANUAL: 37.3 %
NRBC BLD-RTO: 0 /100 WBCS (ref 0–0)
PLATELET # BLD AUTO: 74 X10*3/UL (ref 150–450)
RBC # BLD AUTO: 2.47 X10*6/UL (ref 4.5–5.9)
RBC MORPH BLD: ABNORMAL
RH FACTOR (ANTIGEN D): NORMAL
TOTAL CELLS COUNTED BLD: 110
VARIANT LYMPHS # BLD MANUAL: 0.01 X10*3/UL (ref 0–0.5)
VARIANT LYMPHS NFR BLD: 0.9 %
WBC # BLD AUTO: 1.5 X10*3/UL (ref 4.4–11.3)

## 2024-08-23 PROCEDURE — 86901 BLOOD TYPING SEROLOGIC RH(D): CPT

## 2024-08-23 PROCEDURE — 86870 RBC ANTIBODY IDENTIFICATION: CPT

## 2024-08-23 PROCEDURE — 85007 BL SMEAR W/DIFF WBC COUNT: CPT

## 2024-08-23 PROCEDURE — 85027 COMPLETE CBC AUTOMATED: CPT

## 2024-08-23 RX ORDER — HEPARIN SODIUM,PORCINE/PF 10 UNIT/ML
50 SYRINGE (ML) INTRAVENOUS AS NEEDED
OUTPATIENT
Start: 2024-08-23

## 2024-08-23 RX ORDER — HEPARIN 100 UNIT/ML
500 SYRINGE INTRAVENOUS AS NEEDED
Status: DISCONTINUED | OUTPATIENT
Start: 2024-08-23 | End: 2024-08-23 | Stop reason: HOSPADM

## 2024-08-23 RX ORDER — HEPARIN 100 UNIT/ML
500 SYRINGE INTRAVENOUS AS NEEDED
OUTPATIENT
Start: 2024-08-23

## 2024-08-23 ASSESSMENT — PAIN SCALES - GENERAL: PAINLEVEL: 0-NO PAIN

## 2024-08-23 NOTE — PROGRESS NOTES
Pt here for count check. Labs reviewed and no transfusion needs identified.  Discharged home in stable condition.

## 2024-08-24 ENCOUNTER — APPOINTMENT (OUTPATIENT)
Dept: HEMATOLOGY/ONCOLOGY | Facility: HOSPITAL | Age: 69
End: 2024-08-24
Payer: MEDICARE

## 2024-08-25 ENCOUNTER — APPOINTMENT (OUTPATIENT)
Dept: HEMATOLOGY/ONCOLOGY | Facility: HOSPITAL | Age: 69
End: 2024-08-25
Payer: MEDICARE

## 2024-08-26 ENCOUNTER — APPOINTMENT (OUTPATIENT)
Dept: HEMATOLOGY/ONCOLOGY | Facility: HOSPITAL | Age: 69
End: 2024-08-26
Payer: MEDICARE

## 2024-08-26 ENCOUNTER — INFUSION (OUTPATIENT)
Dept: HEMATOLOGY/ONCOLOGY | Facility: CLINIC | Age: 69
End: 2024-08-26
Payer: MEDICARE

## 2024-08-26 VITALS
SYSTOLIC BLOOD PRESSURE: 121 MMHG | OXYGEN SATURATION: 98 % | DIASTOLIC BLOOD PRESSURE: 75 MMHG | HEART RATE: 75 BPM | RESPIRATION RATE: 18 BRPM | TEMPERATURE: 97.7 F

## 2024-08-26 DIAGNOSIS — C92.02 AML (ACUTE MYELOID LEUKEMIA) IN RELAPSE (MULTI): ICD-10-CM

## 2024-08-26 DIAGNOSIS — E88.3 TUMOR LYSIS SYNDROME FOLLOWING ANTINEOPLASTIC DRUG THERAPY (HHS-HCC): ICD-10-CM

## 2024-08-26 DIAGNOSIS — D84.9 IMMUNOCOMPROMISED STATE (MULTI): ICD-10-CM

## 2024-08-26 DIAGNOSIS — D47.02 SYSTEMIC MASTOCYTOSIS WITH ASSOCIATED CLONAL HEMATOLOGICAL NON-MAST CELL LINEAGE DISEASE: ICD-10-CM

## 2024-08-26 DIAGNOSIS — T45.1X5A TUMOR LYSIS SYNDROME FOLLOWING ANTINEOPLASTIC DRUG THERAPY (HHS-HCC): ICD-10-CM

## 2024-08-26 DIAGNOSIS — C92.00 ACUTE MYELOID LEUKEMIA NOT HAVING ACHIEVED REMISSION (MULTI): ICD-10-CM

## 2024-08-26 LAB
ABO GROUP (TYPE) IN BLOOD: NORMAL
ANTIBODY SCREEN: NORMAL
BASOPHILS # BLD AUTO: 0.05 X10*3/UL (ref 0–0.1)
BASOPHILS NFR BLD AUTO: 3.8 %
EOSINOPHIL # BLD AUTO: 0.35 X10*3/UL (ref 0–0.7)
EOSINOPHIL NFR BLD AUTO: 26.3 %
ERYTHROCYTE [DISTWIDTH] IN BLOOD BY AUTOMATED COUNT: 17.2 % (ref 11.5–14.5)
HCT VFR BLD AUTO: 20.3 % (ref 41–52)
HGB BLD-MCNC: 7 G/DL (ref 13.5–17.5)
IMM GRANULOCYTES # BLD AUTO: 0 X10*3/UL (ref 0–0.7)
IMM GRANULOCYTES NFR BLD AUTO: 0 % (ref 0–0.9)
LYMPHOCYTES # BLD AUTO: 0.62 X10*3/UL (ref 1.2–4.8)
LYMPHOCYTES NFR BLD AUTO: 46.6 %
MCH RBC QN AUTO: 29 PG (ref 26–34)
MCHC RBC AUTO-ENTMCNC: 34.5 G/DL (ref 32–36)
MCV RBC AUTO: 84 FL (ref 80–100)
MONOCYTES # BLD AUTO: 0.07 X10*3/UL (ref 0.1–1)
MONOCYTES NFR BLD AUTO: 5.3 %
NEUTROPHILS # BLD AUTO: 0.24 X10*3/UL (ref 1.2–7.7)
NEUTROPHILS NFR BLD AUTO: 18 %
NRBC BLD-RTO: ABNORMAL /100{WBCS}
PLATELET # BLD AUTO: 66 X10*3/UL (ref 150–450)
RBC # BLD AUTO: 2.41 X10*6/UL (ref 4.5–5.9)
RH FACTOR (ANTIGEN D): NORMAL
WBC # BLD AUTO: 1.3 X10*3/UL (ref 4.4–11.3)

## 2024-08-26 PROCEDURE — 36591 DRAW BLOOD OFF VENOUS DEVICE: CPT

## 2024-08-26 PROCEDURE — 86901 BLOOD TYPING SEROLOGIC RH(D): CPT

## 2024-08-26 PROCEDURE — 86870 RBC ANTIBODY IDENTIFICATION: CPT

## 2024-08-26 PROCEDURE — 2500000004 HC RX 250 GENERAL PHARMACY W/ HCPCS (ALT 636 FOR OP/ED): Performed by: INTERNAL MEDICINE

## 2024-08-26 PROCEDURE — 85025 COMPLETE CBC W/AUTO DIFF WBC: CPT

## 2024-08-26 RX ORDER — HEPARIN SODIUM,PORCINE/PF 10 UNIT/ML
50 SYRINGE (ML) INTRAVENOUS AS NEEDED
Status: DISCONTINUED | OUTPATIENT
Start: 2024-08-26 | End: 2024-08-26 | Stop reason: HOSPADM

## 2024-08-26 RX ORDER — HEPARIN 100 UNIT/ML
500 SYRINGE INTRAVENOUS AS NEEDED
Status: DISCONTINUED | OUTPATIENT
Start: 2024-08-26 | End: 2024-08-26 | Stop reason: HOSPADM

## 2024-08-26 RX ORDER — HEPARIN SODIUM,PORCINE/PF 10 UNIT/ML
50 SYRINGE (ML) INTRAVENOUS AS NEEDED
Status: CANCELLED | OUTPATIENT
Start: 2024-08-26

## 2024-08-26 RX ORDER — HEPARIN 100 UNIT/ML
500 SYRINGE INTRAVENOUS AS NEEDED
Status: CANCELLED | OUTPATIENT
Start: 2024-08-26

## 2024-08-26 ASSESSMENT — PAIN SCALES - GENERAL: PAINLEVEL: 0-NO PAIN

## 2024-08-27 LAB
BB ANTIBODY IDENTIFICATION: NORMAL
CASE #: NORMAL
PATH REV-IMMUNOHEMATOLOGY-PR30: NORMAL

## 2024-08-28 ENCOUNTER — PROCEDURE VISIT (OUTPATIENT)
Dept: HEMATOLOGY/ONCOLOGY | Facility: HOSPITAL | Age: 69
End: 2024-08-28
Payer: MEDICARE

## 2024-08-28 VITALS
BODY MASS INDEX: 31.91 KG/M2 | RESPIRATION RATE: 18 BRPM | HEART RATE: 81 BPM | OXYGEN SATURATION: 99 % | WEIGHT: 197.7 LBS | SYSTOLIC BLOOD PRESSURE: 146 MMHG | TEMPERATURE: 97 F | DIASTOLIC BLOOD PRESSURE: 75 MMHG

## 2024-08-28 DIAGNOSIS — D47.02 SYSTEMIC MASTOCYTOSIS WITH ASSOCIATED CLONAL HEMATOLOGICAL NON-MAST CELL LINEAGE DISEASE: ICD-10-CM

## 2024-08-28 DIAGNOSIS — C92.00 ACUTE MYELOID LEUKEMIA NOT HAVING ACHIEVED REMISSION (MULTI): ICD-10-CM

## 2024-08-28 LAB
ABO GROUP (TYPE) IN BLOOD: NORMAL
ACANTHOCYTES BLD QL SMEAR: NORMAL
ANTIBODY SCREEN: NORMAL
BASOPHILS # BLD AUTO: 0.01 X10*3/UL (ref 0–0.1)
BASOPHILS NFR BLD AUTO: 0.8 %
BB ANTIBODY IDENTIFICATION: NORMAL
BURR CELLS BLD QL SMEAR: NORMAL
CASE #: NORMAL
EOSINOPHIL # BLD AUTO: 0.35 X10*3/UL (ref 0–0.7)
EOSINOPHIL NFR BLD AUTO: 26.3 %
ERYTHROCYTE [DISTWIDTH] IN BLOOD BY AUTOMATED COUNT: 18 % (ref 11.5–14.5)
HCT VFR BLD AUTO: 19.7 % (ref 41–52)
HGB BLD-MCNC: 6.8 G/DL (ref 13.5–17.5)
IMM GRANULOCYTES # BLD AUTO: 0.01 X10*3/UL (ref 0–0.7)
IMM GRANULOCYTES NFR BLD AUTO: 0.8 % (ref 0–0.9)
LYMPHOCYTES # BLD AUTO: 0.58 X10*3/UL (ref 1.2–4.8)
LYMPHOCYTES NFR BLD AUTO: 43.6 %
MCH RBC QN AUTO: 29.3 PG (ref 26–34)
MCHC RBC AUTO-ENTMCNC: 34.5 G/DL (ref 32–36)
MCV RBC AUTO: 85 FL (ref 80–100)
MONOCYTES # BLD AUTO: 0.11 X10*3/UL (ref 0.1–1)
MONOCYTES NFR BLD AUTO: 8.3 %
NEUTROPHILS # BLD AUTO: 0.27 X10*3/UL (ref 1.2–7.7)
NEUTROPHILS NFR BLD AUTO: 20.2 %
NRBC BLD-RTO: 0 /100 WBCS (ref 0–0)
OVALOCYTES BLD QL SMEAR: NORMAL
PLATELET # BLD AUTO: 87 X10*3/UL (ref 150–450)
RBC # BLD AUTO: 2.32 X10*6/UL (ref 4.5–5.9)
RBC MORPH BLD: NORMAL
RH FACTOR (ANTIGEN D): NORMAL
WBC # BLD AUTO: 1.3 X10*3/UL (ref 4.4–11.3)

## 2024-08-28 PROCEDURE — 88184 FLOWCYTOMETRY/ TC 1 MARKER: CPT | Mod: TC | Performed by: PHYSICIAN ASSISTANT

## 2024-08-28 PROCEDURE — 86902 BLOOD TYPE ANTIGEN DONOR EA: CPT

## 2024-08-28 PROCEDURE — 2500000004 HC RX 250 GENERAL PHARMACY W/ HCPCS (ALT 636 FOR OP/ED): Performed by: INTERNAL MEDICINE

## 2024-08-28 PROCEDURE — 38222 DX BONE MARROW BX & ASPIR: CPT | Performed by: PHYSICIAN ASSISTANT

## 2024-08-28 PROCEDURE — 38222 DX BONE MARROW BX & ASPIR: CPT | Mod: RT | Performed by: PHYSICIAN ASSISTANT

## 2024-08-28 PROCEDURE — 86901 BLOOD TYPING SEROLOGIC RH(D): CPT | Performed by: PHYSICIAN ASSISTANT

## 2024-08-28 PROCEDURE — 86870 RBC ANTIBODY IDENTIFICATION: CPT

## 2024-08-28 PROCEDURE — 86922 COMPATIBILITY TEST ANTIGLOB: CPT

## 2024-08-28 PROCEDURE — 85025 COMPLETE CBC W/AUTO DIFF WBC: CPT | Performed by: PHYSICIAN ASSISTANT

## 2024-08-28 RX ORDER — HEPARIN 100 UNIT/ML
500 SYRINGE INTRAVENOUS AS NEEDED
Status: DISCONTINUED | OUTPATIENT
Start: 2024-08-28 | End: 2024-08-28 | Stop reason: HOSPADM

## 2024-08-28 RX ORDER — DIPHENHYDRAMINE HYDROCHLORIDE 50 MG/ML
50 INJECTION INTRAMUSCULAR; INTRAVENOUS AS NEEDED
OUTPATIENT
Start: 2024-08-28

## 2024-08-28 RX ORDER — ALBUTEROL SULFATE 0.83 MG/ML
3 SOLUTION RESPIRATORY (INHALATION) AS NEEDED
OUTPATIENT
Start: 2024-08-28

## 2024-08-28 RX ORDER — FAMOTIDINE 10 MG/ML
20 INJECTION INTRAVENOUS ONCE AS NEEDED
OUTPATIENT
Start: 2024-08-28

## 2024-08-28 RX ORDER — HEPARIN 100 UNIT/ML
500 SYRINGE INTRAVENOUS AS NEEDED
Status: CANCELLED | OUTPATIENT
Start: 2024-08-28

## 2024-08-28 RX ORDER — EPINEPHRINE 0.3 MG/.3ML
0.3 INJECTION SUBCUTANEOUS EVERY 5 MIN PRN
OUTPATIENT
Start: 2024-08-28

## 2024-08-28 RX ORDER — HEPARIN SODIUM,PORCINE/PF 10 UNIT/ML
50 SYRINGE (ML) INTRAVENOUS AS NEEDED
Status: CANCELLED | OUTPATIENT
Start: 2024-08-28

## 2024-08-28 ASSESSMENT — PAIN SCALES - GENERAL: PAINLEVEL: 0-NO PAIN

## 2024-08-28 NOTE — PROGRESS NOTES
Patient ID: Jin Reynolds is a 69 y.o. male.    Bone Marrow Aspiration and Biopsy    Date/Time: 8/28/2024 9:53 AM    Performed by: Oscar Flaherty PA-C  Authorized by: Oscar Flaherty PA-C    Consent:     Consent obtained:  Verbal    Consent given by:  Patient    Risks, benefits, and alternatives were discussed: yes      Risks discussed:  Bleeding, infection and pain    Alternatives discussed:  Observation  Universal protocol:     Procedure explained and questions answered to patient or proxy's satisfaction: yes      Relevant documents present and verified: yes      Test results available: yes      Site/side marked: yes      Immediately prior to procedure, a time out was called: yes      Patient identity confirmed:  Verbally with patient  Indications:     Indications:  Disease monitoring  Pre-procedure details:     Skin preparation:  Chlorhexidine    Preparation: Patient was prepped and draped in the usual sterile fashion    Sedation:     Sedation type:  None  Anesthesia:     Anesthesia method:  Local infiltration    Local anesthetic:  Lidocaine 1% w/o epi and lidocaine 2% w/o epi  Procedure specific details:      Informed consent was obtained and potential risks including bleeding, infection and pain were reviewed with the patient.     The patient was placed in the prone position, and the Right posterior iliac crest was prepped with chlorhexidine.     The skin, subcutaneous tissues, and periosteum were anesthetized with 5mL of 1% lidocaine and 10 mL of 2% lidocaine.     A small incision was made with a #15 scalpel, and the Jamshidi needle was advanced through the periosteum into the intramedullary space.    8.5 mL bone marrow was aspirated; the needle was then advanced further and a 2 cm core biopsy obtained. The specimen was sent for morphology, flow cytometry, cytogenetics, and FISH/molecular per Hematopathology.    The needle was removed and hemostasis achieved.     The procedure was tolerated  well and there were no complications.    Procedure completed by: Oscar Flaherty PA-C    Post-procedure details:     Procedure completion:  Tolerated

## 2024-08-28 NOTE — PROGRESS NOTES
Pt present today for bone marrow biopsy.  Labs drawn from port.  Hgb 6.8, Plt >20.  Provider notified and pt scheduled to receive blood transfusion tomorrow at [facility] at 1400.  Pt updated and agreeable with plan.  Site checked after procedure with no concerns and pt discharged to home in stable condition.

## 2024-08-29 ENCOUNTER — INFUSION (OUTPATIENT)
Dept: HEMATOLOGY/ONCOLOGY | Facility: CLINIC | Age: 69
End: 2024-08-29
Payer: MEDICARE

## 2024-08-29 VITALS
RESPIRATION RATE: 18 BRPM | SYSTOLIC BLOOD PRESSURE: 133 MMHG | HEART RATE: 69 BPM | DIASTOLIC BLOOD PRESSURE: 81 MMHG | WEIGHT: 196.65 LBS | TEMPERATURE: 98.2 F | OXYGEN SATURATION: 100 % | BODY MASS INDEX: 31.74 KG/M2

## 2024-08-29 DIAGNOSIS — Z76.82 STEM CELL TRANSPLANT CANDIDATE: ICD-10-CM

## 2024-08-29 DIAGNOSIS — D47.02 SYSTEMIC MASTOCYTOSIS WITH ASSOCIATED CLONAL HEMATOLOGICAL NON-MAST CELL LINEAGE DISEASE: ICD-10-CM

## 2024-08-29 DIAGNOSIS — C92.00 ACUTE MYELOID LEUKEMIA NOT HAVING ACHIEVED REMISSION (MULTI): ICD-10-CM

## 2024-08-29 DIAGNOSIS — D47.02 SYSTEMIC MASTOCYTOSIS WITH ASSOCIATED CLONAL HEMATOLOGICAL NON-MAST CELL LINEAGE DISEASE: Primary | ICD-10-CM

## 2024-08-29 DIAGNOSIS — M81.8 OTHER OSTEOPOROSIS WITHOUT CURRENT PATHOLOGICAL FRACTURE: ICD-10-CM

## 2024-08-29 LAB
BB ANTIBODY IDENTIFICATION: NORMAL
BLOOD EXPIRATION DATE: NORMAL
CASE #: NORMAL
CELL COUNT (BLOOD): 1.89 X10*3/UL
CELL POPULATIONS: NORMAL
DIAGNOSIS: NORMAL
DISPENSE STATUS: NORMAL
FLOW DIFFERENTIAL: NORMAL
FLOW TEST ORDERED: NORMAL
LAB TEST METHOD: NORMAL
NUMBER OF CELLS COLLECTED: NORMAL
PATH REPORT.TOTAL CANCER: NORMAL
PATH REV-IMMUNOHEMATOLOGY-PR30: NORMAL
PRODUCT BLOOD TYPE: 9500
PRODUCT CODE: NORMAL
SIGNATURE COMMENT: NORMAL
SPECIMEN VIABILITY: NORMAL
UNIT ABO: NORMAL
UNIT NUMBER: NORMAL
UNIT RH: NORMAL
UNIT VOLUME: 350
XM INTEP: NORMAL

## 2024-08-29 PROCEDURE — 2500000004 HC RX 250 GENERAL PHARMACY W/ HCPCS (ALT 636 FOR OP/ED): Performed by: INTERNAL MEDICINE

## 2024-08-29 PROCEDURE — 36430 TRANSFUSION BLD/BLD COMPNT: CPT

## 2024-08-29 PROCEDURE — P9040 RBC LEUKOREDUCED IRRADIATED: HCPCS

## 2024-08-29 RX ORDER — HEPARIN 100 UNIT/ML
500 SYRINGE INTRAVENOUS AS NEEDED
OUTPATIENT
Start: 2024-08-29

## 2024-08-29 RX ORDER — HEPARIN SODIUM,PORCINE/PF 10 UNIT/ML
50 SYRINGE (ML) INTRAVENOUS AS NEEDED
OUTPATIENT
Start: 2024-08-29

## 2024-08-29 RX ORDER — DIPHENHYDRAMINE HYDROCHLORIDE 50 MG/ML
50 INJECTION INTRAMUSCULAR; INTRAVENOUS AS NEEDED
Status: DISCONTINUED | OUTPATIENT
Start: 2024-08-29 | End: 2024-08-29 | Stop reason: HOSPADM

## 2024-08-29 RX ORDER — EPINEPHRINE 0.3 MG/.3ML
0.3 INJECTION SUBCUTANEOUS EVERY 5 MIN PRN
Status: DISCONTINUED | OUTPATIENT
Start: 2024-08-29 | End: 2024-08-29 | Stop reason: HOSPADM

## 2024-08-29 RX ORDER — ALBUTEROL SULFATE 0.83 MG/ML
3 SOLUTION RESPIRATORY (INHALATION) AS NEEDED
Status: DISCONTINUED | OUTPATIENT
Start: 2024-08-29 | End: 2024-08-29 | Stop reason: HOSPADM

## 2024-08-29 RX ORDER — HEPARIN 100 UNIT/ML
500 SYRINGE INTRAVENOUS AS NEEDED
Status: DISCONTINUED | OUTPATIENT
Start: 2024-08-29 | End: 2024-08-29 | Stop reason: HOSPADM

## 2024-08-29 RX ORDER — HEPARIN SODIUM,PORCINE/PF 10 UNIT/ML
50 SYRINGE (ML) INTRAVENOUS AS NEEDED
Status: DISCONTINUED | OUTPATIENT
Start: 2024-08-29 | End: 2024-08-29 | Stop reason: HOSPADM

## 2024-08-29 RX ORDER — FAMOTIDINE 10 MG/ML
20 INJECTION INTRAVENOUS ONCE AS NEEDED
Status: DISCONTINUED | OUTPATIENT
Start: 2024-08-29 | End: 2024-08-29 | Stop reason: HOSPADM

## 2024-08-29 ASSESSMENT — PAIN SCALES - GENERAL: PAINLEVEL: 0-NO PAIN

## 2024-08-30 ENCOUNTER — APPOINTMENT (OUTPATIENT)
Dept: HEMATOLOGY/ONCOLOGY | Facility: CLINIC | Age: 69
End: 2024-08-30
Payer: MEDICARE

## 2024-09-03 ENCOUNTER — APPOINTMENT (OUTPATIENT)
Dept: HEMATOLOGY/ONCOLOGY | Facility: HOSPITAL | Age: 69
DRG: 837 | End: 2024-09-03
Payer: MEDICARE

## 2024-09-03 ENCOUNTER — OFFICE VISIT (OUTPATIENT)
Dept: HEMATOLOGY/ONCOLOGY | Facility: HOSPITAL | Age: 69
End: 2024-09-03
Payer: MEDICARE

## 2024-09-03 ENCOUNTER — LAB (OUTPATIENT)
Dept: HEMATOLOGY/ONCOLOGY | Facility: HOSPITAL | Age: 69
End: 2024-09-03
Payer: MEDICARE

## 2024-09-03 VITALS
WEIGHT: 201.28 LBS | BODY MASS INDEX: 32.49 KG/M2 | SYSTOLIC BLOOD PRESSURE: 143 MMHG | RESPIRATION RATE: 16 BRPM | HEART RATE: 74 BPM | TEMPERATURE: 97.2 F | OXYGEN SATURATION: 100 % | DIASTOLIC BLOOD PRESSURE: 78 MMHG

## 2024-09-03 DIAGNOSIS — E88.3 TUMOR LYSIS SYNDROME FOLLOWING ANTINEOPLASTIC DRUG THERAPY (HHS-HCC): ICD-10-CM

## 2024-09-03 DIAGNOSIS — C92.00 ACUTE MYELOID LEUKEMIA NOT HAVING ACHIEVED REMISSION (MULTI): ICD-10-CM

## 2024-09-03 DIAGNOSIS — N17.9 AKI (ACUTE KIDNEY INJURY) (CMS-HCC): ICD-10-CM

## 2024-09-03 DIAGNOSIS — T45.1X5A TUMOR LYSIS SYNDROME FOLLOWING ANTINEOPLASTIC DRUG THERAPY (HHS-HCC): ICD-10-CM

## 2024-09-03 DIAGNOSIS — Z76.82 STEM CELL TRANSPLANT CANDIDATE: ICD-10-CM

## 2024-09-03 DIAGNOSIS — D84.9 IMMUNOCOMPROMISED STATE (MULTI): Primary | ICD-10-CM

## 2024-09-03 DIAGNOSIS — D47.02 SYSTEMIC MASTOCYTOSIS WITH ASSOCIATED CLONAL HEMATOLOGICAL NON-MAST CELL LINEAGE DISEASE: ICD-10-CM

## 2024-09-03 DIAGNOSIS — C92.02 AML (ACUTE MYELOID LEUKEMIA) IN RELAPSE (MULTI): ICD-10-CM

## 2024-09-03 DIAGNOSIS — D47.02 SYSTEMIC MASTOCYTOSIS WITH ASSOCIATED CLONAL HEMATOLOGICAL NON-MAST CELL LINEAGE DISEASE: Primary | ICD-10-CM

## 2024-09-03 DIAGNOSIS — D84.9 IMMUNOCOMPROMISED STATE (MULTI): ICD-10-CM

## 2024-09-03 PROBLEM — C92.01 AML (ACUTE MYELOID LEUKEMIA) IN REMISSION (MULTI): Status: ACTIVE | Noted: 2024-09-03

## 2024-09-03 PROBLEM — C92.01 AML (ACUTE MYELOID LEUKEMIA) IN REMISSION (MULTI): Status: RESOLVED | Noted: 2024-09-03 | Resolved: 2024-09-03

## 2024-09-03 LAB
ABO GROUP (TYPE) IN BLOOD: NORMAL
ALBUMIN SERPL BCP-MCNC: 3.7 G/DL (ref 3.4–5)
ALP SERPL-CCNC: 82 U/L (ref 33–136)
ALT SERPL W P-5'-P-CCNC: 8 U/L (ref 10–52)
ANION GAP SERPL CALC-SCNC: 11 MMOL/L (ref 10–20)
ANTIBODY SCREEN: NORMAL
AST SERPL W P-5'-P-CCNC: 9 U/L (ref 9–39)
BASOPHILS # BLD AUTO: 0.02 X10*3/UL (ref 0–0.1)
BASOPHILS NFR BLD AUTO: 1.3 %
BILIRUB SERPL-MCNC: 0.6 MG/DL (ref 0–1.2)
BUN SERPL-MCNC: 21 MG/DL (ref 6–23)
BURR CELLS BLD QL SMEAR: NORMAL
CALCIUM SERPL-MCNC: 7.7 MG/DL (ref 8.6–10.3)
CHLORIDE SERPL-SCNC: 111 MMOL/L (ref 98–107)
CO2 SERPL-SCNC: 21 MMOL/L (ref 21–32)
CREAT SERPL-MCNC: 1.02 MG/DL (ref 0.5–1.3)
DACRYOCYTES BLD QL SMEAR: NORMAL
EGFRCR SERPLBLD CKD-EPI 2021: 80 ML/MIN/1.73M*2
EOSINOPHIL # BLD AUTO: 0.35 X10*3/UL (ref 0–0.7)
EOSINOPHIL NFR BLD AUTO: 23.5 %
ERYTHROCYTE [DISTWIDTH] IN BLOOD BY AUTOMATED COUNT: 19 % (ref 11.5–14.5)
GLUCOSE SERPL-MCNC: 87 MG/DL (ref 74–99)
HCT VFR BLD AUTO: 20.5 % (ref 41–52)
HGB BLD-MCNC: 6.9 G/DL (ref 13.5–17.5)
IMM GRANULOCYTES # BLD AUTO: 0.01 X10*3/UL (ref 0–0.7)
IMM GRANULOCYTES NFR BLD AUTO: 0.7 % (ref 0–0.9)
LYMPHOCYTES # BLD AUTO: 0.67 X10*3/UL (ref 1.2–4.8)
LYMPHOCYTES NFR BLD AUTO: 45 %
MCH RBC QN AUTO: 29.5 PG (ref 26–34)
MCHC RBC AUTO-ENTMCNC: 33.7 G/DL (ref 32–36)
MCV RBC AUTO: 88 FL (ref 80–100)
MONOCYTES # BLD AUTO: 0.1 X10*3/UL (ref 0.1–1)
MONOCYTES NFR BLD AUTO: 6.7 %
NEUTROPHILS # BLD AUTO: 0.34 X10*3/UL (ref 1.2–7.7)
NEUTROPHILS NFR BLD AUTO: 22.8 %
NRBC BLD-RTO: 0 /100 WBCS (ref 0–0)
OVALOCYTES BLD QL SMEAR: NORMAL
PLATELET # BLD AUTO: 54 X10*3/UL (ref 150–450)
POLYCHROMASIA BLD QL SMEAR: NORMAL
POTASSIUM SERPL-SCNC: 4 MMOL/L (ref 3.5–5.3)
PROT SERPL-MCNC: 7.1 G/DL (ref 6.4–8.2)
RBC # BLD AUTO: 2.34 X10*6/UL (ref 4.5–5.9)
RBC MORPH BLD: NORMAL
RH FACTOR (ANTIGEN D): NORMAL
SARS-COV-2 RNA RESP QL NAA+PROBE: NOT DETECTED
SCHISTOCYTES BLD QL SMEAR: NORMAL
SODIUM SERPL-SCNC: 139 MMOL/L (ref 136–145)
WBC # BLD AUTO: 1.5 X10*3/UL (ref 4.4–11.3)

## 2024-09-03 PROCEDURE — 99215 OFFICE O/P EST HI 40 MIN: CPT | Performed by: STUDENT IN AN ORGANIZED HEALTH CARE EDUCATION/TRAINING PROGRAM

## 2024-09-03 PROCEDURE — 1126F AMNT PAIN NOTED NONE PRSNT: CPT | Performed by: STUDENT IN AN ORGANIZED HEALTH CARE EDUCATION/TRAINING PROGRAM

## 2024-09-03 PROCEDURE — 86902 BLOOD TYPE ANTIGEN DONOR EA: CPT

## 2024-09-03 PROCEDURE — 1159F MED LIST DOCD IN RCRD: CPT | Performed by: STUDENT IN AN ORGANIZED HEALTH CARE EDUCATION/TRAINING PROGRAM

## 2024-09-03 PROCEDURE — 84075 ASSAY ALKALINE PHOSPHATASE: CPT

## 2024-09-03 PROCEDURE — 85025 COMPLETE CBC W/AUTO DIFF WBC: CPT

## 2024-09-03 PROCEDURE — 2500000004 HC RX 250 GENERAL PHARMACY W/ HCPCS (ALT 636 FOR OP/ED): Performed by: INTERNAL MEDICINE

## 2024-09-03 PROCEDURE — 87635 SARS-COV-2 COVID-19 AMP PRB: CPT | Performed by: STUDENT IN AN ORGANIZED HEALTH CARE EDUCATION/TRAINING PROGRAM

## 2024-09-03 PROCEDURE — 86901 BLOOD TYPING SEROLOGIC RH(D): CPT

## 2024-09-03 PROCEDURE — 86922 COMPATIBILITY TEST ANTIGLOB: CPT

## 2024-09-03 PROCEDURE — 1036F TOBACCO NON-USER: CPT | Performed by: STUDENT IN AN ORGANIZED HEALTH CARE EDUCATION/TRAINING PROGRAM

## 2024-09-03 PROCEDURE — 3078F DIAST BP <80 MM HG: CPT | Performed by: STUDENT IN AN ORGANIZED HEALTH CARE EDUCATION/TRAINING PROGRAM

## 2024-09-03 PROCEDURE — 81371 HLA I & II TYPE VERIFY LR: CPT | Mod: OUT | Performed by: STUDENT IN AN ORGANIZED HEALTH CARE EDUCATION/TRAINING PROGRAM

## 2024-09-03 PROCEDURE — 36591 DRAW BLOOD OFF VENOUS DEVICE: CPT

## 2024-09-03 PROCEDURE — 3077F SYST BP >= 140 MM HG: CPT | Performed by: STUDENT IN AN ORGANIZED HEALTH CARE EDUCATION/TRAINING PROGRAM

## 2024-09-03 PROCEDURE — 86870 RBC ANTIBODY IDENTIFICATION: CPT

## 2024-09-03 PROCEDURE — 86885 COOMBS TEST INDIRECT QUAL: CPT

## 2024-09-03 RX ORDER — ACYCLOVIR 400 MG/1
400 TABLET ORAL 2 TIMES DAILY
Qty: 180 TABLET | Refills: 3 | Status: SHIPPED | OUTPATIENT
Start: 2024-09-03

## 2024-09-03 RX ORDER — EPINEPHRINE 0.3 MG/.3ML
0.3 INJECTION SUBCUTANEOUS EVERY 5 MIN PRN
Status: CANCELLED | OUTPATIENT
Start: 2024-09-10

## 2024-09-03 RX ORDER — FAMOTIDINE 10 MG/ML
20 INJECTION INTRAVENOUS ONCE AS NEEDED
Status: CANCELLED | OUTPATIENT
Start: 2024-09-09

## 2024-09-03 RX ORDER — PROCHLORPERAZINE MALEATE 10 MG
10 TABLET ORAL EVERY 6 HOURS PRN
Status: CANCELLED | OUTPATIENT
Start: 2024-09-09

## 2024-09-03 RX ORDER — EPINEPHRINE 0.3 MG/.3ML
0.3 INJECTION SUBCUTANEOUS EVERY 5 MIN PRN
Status: CANCELLED | OUTPATIENT
Start: 2024-09-05

## 2024-09-03 RX ORDER — DIPHENHYDRAMINE HYDROCHLORIDE 50 MG/ML
50 INJECTION INTRAMUSCULAR; INTRAVENOUS AS NEEDED
Status: CANCELLED | OUTPATIENT
Start: 2024-09-10

## 2024-09-03 RX ORDER — ALBUTEROL SULFATE 0.83 MG/ML
3 SOLUTION RESPIRATORY (INHALATION) AS NEEDED
Status: CANCELLED | OUTPATIENT
Start: 2024-09-08

## 2024-09-03 RX ORDER — EPINEPHRINE 0.3 MG/.3ML
0.3 INJECTION SUBCUTANEOUS EVERY 5 MIN PRN
Status: CANCELLED | OUTPATIENT
Start: 2024-09-08

## 2024-09-03 RX ORDER — DIPHENHYDRAMINE HYDROCHLORIDE 50 MG/ML
50 INJECTION INTRAMUSCULAR; INTRAVENOUS AS NEEDED
Status: CANCELLED | OUTPATIENT
Start: 2024-09-11

## 2024-09-03 RX ORDER — FAMOTIDINE 10 MG/ML
20 INJECTION INTRAVENOUS ONCE AS NEEDED
Status: CANCELLED | OUTPATIENT
Start: 2024-09-07

## 2024-09-03 RX ORDER — EPINEPHRINE 0.3 MG/.3ML
0.3 INJECTION SUBCUTANEOUS EVERY 5 MIN PRN
Status: CANCELLED | OUTPATIENT
Start: 2024-09-07

## 2024-09-03 RX ORDER — FAMOTIDINE 10 MG/ML
20 INJECTION INTRAVENOUS ONCE AS NEEDED
Status: CANCELLED | OUTPATIENT
Start: 2024-09-06

## 2024-09-03 RX ORDER — DIPHENHYDRAMINE HYDROCHLORIDE 50 MG/ML
50 INJECTION INTRAMUSCULAR; INTRAVENOUS AS NEEDED
Status: CANCELLED | OUTPATIENT
Start: 2024-09-05

## 2024-09-03 RX ORDER — DIPHENHYDRAMINE HYDROCHLORIDE 50 MG/ML
50 INJECTION INTRAMUSCULAR; INTRAVENOUS AS NEEDED
Status: CANCELLED | OUTPATIENT
Start: 2024-09-09

## 2024-09-03 RX ORDER — ONDANSETRON HYDROCHLORIDE 8 MG/1
8 TABLET, FILM COATED ORAL ONCE
Status: CANCELLED | OUTPATIENT
Start: 2024-09-08

## 2024-09-03 RX ORDER — HEPARIN 100 UNIT/ML
500 SYRINGE INTRAVENOUS AS NEEDED
Status: CANCELLED | OUTPATIENT
Start: 2024-09-03

## 2024-09-03 RX ORDER — PROCHLORPERAZINE EDISYLATE 5 MG/ML
10 INJECTION INTRAMUSCULAR; INTRAVENOUS EVERY 6 HOURS PRN
Status: CANCELLED | OUTPATIENT
Start: 2024-09-09

## 2024-09-03 RX ORDER — ALBUTEROL SULFATE 0.83 MG/ML
3 SOLUTION RESPIRATORY (INHALATION) AS NEEDED
Status: CANCELLED | OUTPATIENT
Start: 2024-09-09

## 2024-09-03 RX ORDER — ONDANSETRON HYDROCHLORIDE 8 MG/1
8 TABLET, FILM COATED ORAL ONCE
Status: CANCELLED | OUTPATIENT
Start: 2024-09-05

## 2024-09-03 RX ORDER — ONDANSETRON HYDROCHLORIDE 8 MG/1
8 TABLET, FILM COATED ORAL ONCE
Status: CANCELLED | OUTPATIENT
Start: 2024-09-11

## 2024-09-03 RX ORDER — ONDANSETRON HYDROCHLORIDE 8 MG/1
8 TABLET, FILM COATED ORAL ONCE
Status: CANCELLED | OUTPATIENT
Start: 2024-09-10

## 2024-09-03 RX ORDER — ALBUTEROL SULFATE 0.83 MG/ML
3 SOLUTION RESPIRATORY (INHALATION) AS NEEDED
Status: CANCELLED | OUTPATIENT
Start: 2024-09-10

## 2024-09-03 RX ORDER — EPINEPHRINE 0.3 MG/.3ML
0.3 INJECTION SUBCUTANEOUS EVERY 5 MIN PRN
OUTPATIENT
Start: 2024-09-03

## 2024-09-03 RX ORDER — ALBUTEROL SULFATE 0.83 MG/ML
3 SOLUTION RESPIRATORY (INHALATION) AS NEEDED
Status: CANCELLED | OUTPATIENT
Start: 2024-09-05

## 2024-09-03 RX ORDER — ONDANSETRON HYDROCHLORIDE 8 MG/1
8 TABLET, FILM COATED ORAL ONCE
Status: CANCELLED | OUTPATIENT
Start: 2024-09-06

## 2024-09-03 RX ORDER — EPINEPHRINE 0.3 MG/.3ML
0.3 INJECTION SUBCUTANEOUS EVERY 5 MIN PRN
Status: CANCELLED | OUTPATIENT
Start: 2024-09-09

## 2024-09-03 RX ORDER — FAMOTIDINE 10 MG/ML
20 INJECTION INTRAVENOUS ONCE AS NEEDED
Status: CANCELLED | OUTPATIENT
Start: 2024-09-05

## 2024-09-03 RX ORDER — ALBUTEROL SULFATE 0.83 MG/ML
3 SOLUTION RESPIRATORY (INHALATION) AS NEEDED
Status: CANCELLED | OUTPATIENT
Start: 2024-09-06

## 2024-09-03 RX ORDER — ONDANSETRON HYDROCHLORIDE 8 MG/1
8 TABLET, FILM COATED ORAL ONCE
Status: CANCELLED | OUTPATIENT
Start: 2024-09-07

## 2024-09-03 RX ORDER — PROCHLORPERAZINE MALEATE 10 MG
10 TABLET ORAL EVERY 6 HOURS PRN
Status: CANCELLED | OUTPATIENT
Start: 2024-09-05

## 2024-09-03 RX ORDER — FAMOTIDINE 10 MG/ML
20 INJECTION INTRAVENOUS ONCE AS NEEDED
Status: CANCELLED | OUTPATIENT
Start: 2024-09-08

## 2024-09-03 RX ORDER — ONDANSETRON HYDROCHLORIDE 8 MG/1
8 TABLET, FILM COATED ORAL ONCE
Status: CANCELLED | OUTPATIENT
Start: 2024-09-09

## 2024-09-03 RX ORDER — FAMOTIDINE 10 MG/ML
20 INJECTION INTRAVENOUS ONCE AS NEEDED
OUTPATIENT
Start: 2024-09-03

## 2024-09-03 RX ORDER — PROCHLORPERAZINE MALEATE 10 MG
10 TABLET ORAL EVERY 6 HOURS PRN
Status: CANCELLED | OUTPATIENT
Start: 2024-09-10

## 2024-09-03 RX ORDER — EPINEPHRINE 0.3 MG/.3ML
0.3 INJECTION SUBCUTANEOUS EVERY 5 MIN PRN
Status: CANCELLED | OUTPATIENT
Start: 2024-09-06

## 2024-09-03 RX ORDER — PROCHLORPERAZINE EDISYLATE 5 MG/ML
10 INJECTION INTRAMUSCULAR; INTRAVENOUS EVERY 6 HOURS PRN
Status: CANCELLED | OUTPATIENT
Start: 2024-09-05

## 2024-09-03 RX ORDER — DIPHENHYDRAMINE HYDROCHLORIDE 50 MG/ML
50 INJECTION INTRAMUSCULAR; INTRAVENOUS AS NEEDED
OUTPATIENT
Start: 2024-09-03

## 2024-09-03 RX ORDER — FAMOTIDINE 10 MG/ML
20 INJECTION INTRAVENOUS ONCE AS NEEDED
Status: CANCELLED | OUTPATIENT
Start: 2024-09-10

## 2024-09-03 RX ORDER — HEPARIN 100 UNIT/ML
500 SYRINGE INTRAVENOUS AS NEEDED
Status: DISCONTINUED | OUTPATIENT
Start: 2024-09-03 | End: 2024-09-03 | Stop reason: HOSPADM

## 2024-09-03 RX ORDER — DIPHENHYDRAMINE HYDROCHLORIDE 50 MG/ML
50 INJECTION INTRAMUSCULAR; INTRAVENOUS AS NEEDED
Status: CANCELLED | OUTPATIENT
Start: 2024-09-07

## 2024-09-03 RX ORDER — ALBUTEROL SULFATE 0.83 MG/ML
3 SOLUTION RESPIRATORY (INHALATION) AS NEEDED
Status: CANCELLED | OUTPATIENT
Start: 2024-09-07

## 2024-09-03 RX ORDER — ALLOPURINOL 300 MG/1
300 TABLET ORAL DAILY
Qty: 14 TABLET | Refills: 0 | Status: SHIPPED | OUTPATIENT
Start: 2024-09-03 | End: 2025-09-03

## 2024-09-03 RX ORDER — FAMOTIDINE 10 MG/ML
20 INJECTION INTRAVENOUS ONCE AS NEEDED
Status: CANCELLED | OUTPATIENT
Start: 2024-09-11

## 2024-09-03 RX ORDER — DIPHENHYDRAMINE HYDROCHLORIDE 50 MG/ML
50 INJECTION INTRAMUSCULAR; INTRAVENOUS AS NEEDED
Status: CANCELLED | OUTPATIENT
Start: 2024-09-06

## 2024-09-03 RX ORDER — PROCHLORPERAZINE EDISYLATE 5 MG/ML
10 INJECTION INTRAMUSCULAR; INTRAVENOUS EVERY 6 HOURS PRN
Status: CANCELLED | OUTPATIENT
Start: 2024-09-11

## 2024-09-03 RX ORDER — PROCHLORPERAZINE EDISYLATE 5 MG/ML
10 INJECTION INTRAMUSCULAR; INTRAVENOUS EVERY 6 HOURS PRN
Status: CANCELLED | OUTPATIENT
Start: 2024-09-10

## 2024-09-03 RX ORDER — HEPARIN SODIUM,PORCINE/PF 10 UNIT/ML
50 SYRINGE (ML) INTRAVENOUS AS NEEDED
Status: CANCELLED | OUTPATIENT
Start: 2024-09-03

## 2024-09-03 RX ORDER — ALBUTEROL SULFATE 0.83 MG/ML
3 SOLUTION RESPIRATORY (INHALATION) AS NEEDED
Status: CANCELLED | OUTPATIENT
Start: 2024-09-11

## 2024-09-03 RX ORDER — PROCHLORPERAZINE MALEATE 10 MG
10 TABLET ORAL EVERY 6 HOURS PRN
Status: CANCELLED | OUTPATIENT
Start: 2024-09-11

## 2024-09-03 RX ORDER — DIPHENHYDRAMINE HYDROCHLORIDE 50 MG/ML
50 INJECTION INTRAMUSCULAR; INTRAVENOUS AS NEEDED
Status: CANCELLED | OUTPATIENT
Start: 2024-09-08

## 2024-09-03 RX ORDER — ALBUTEROL SULFATE 0.83 MG/ML
3 SOLUTION RESPIRATORY (INHALATION) AS NEEDED
OUTPATIENT
Start: 2024-09-03

## 2024-09-03 RX ORDER — EPINEPHRINE 0.3 MG/.3ML
0.3 INJECTION SUBCUTANEOUS EVERY 5 MIN PRN
Status: CANCELLED | OUTPATIENT
Start: 2024-09-11

## 2024-09-03 ASSESSMENT — PAIN SCALES - GENERAL: PAINLEVEL: 0-NO PAIN

## 2024-09-03 NOTE — ASSESSMENT & PLAN NOTE
Significant TLS complicating C1 of V-AZA requiring rasburicase, allopurinol, and IVF. Venetoclax was not re-started with the remainder of C1. Post C1 BM with 27% blasts, still high risk for TLS with start of C2. Recommend inpatient ramp up of venetoclax. Instructed to start allopurinol tomorrow proactively.

## 2024-09-03 NOTE — ASSESSMENT & PLAN NOTE
Histamine release symptoms remain stable on current regimen.  still moderately positive on most recent bone marrow; could have contributed to his recent TLS with C1.

## 2024-09-03 NOTE — PATIENT INSTRUCTIONS
You will be admitting to [facility] around 9/5 for chemotherapy with venetoclax ramp up.     You will receive a call the day of regarding your admission time. Please call [phone] at 8am to find out if your bed is ready for you. If you receive a text, please disregard this until you speak with an actual person.     Please bring venetoclax tablets with you to your admission.     Ramp up plan is:  Day 1 = 20 mg (10 mg x 2 tablets)  Day 2 = 50 mg (1 tablet)  Day 3+ = 100 mg     Planned Date: 9/5/2024  Length of Stay: 4 days  Treatment Name/Cycle: Azacitidine + venetoclax/C2

## 2024-09-03 NOTE — PROGRESS NOTES
"     Patient ID: Jin Reynolds is a 69 y.o. male.    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   8/23/2023 Initial Diagnosis    DX:  SMOLDERING SYSTEMIC MASTOCYTOSIS  Presented with skin rash and eosinophilia    [facility] DI[facility]NO[facility] CRITERIA  (For SM, Need major and 1 minor OR at least 3 minors)  - Multi-focal, dense infiltrates of MC (>= 15 mast cells in aggregates) in BM and/or other extra-cutaneous organs [major]  - In BM or extracutaneous organs, >25% MC spindle shaped OR have atypical morphology OR of all MC on BM aspirate smears, >25% are immature or atypical [minor]  - Detection of activating mutation KIT D816V in BM, PB, or other extracutaneous organ [minor]  - Serum tryptase persistently exceeds 20 ng/mL (unless associated clonal myeloid disorder, in which this paramenter is not valid) [minor]    \"B\" FINDINGS  - BM biopsy showing >30% infiltration (focal, dense aggregates) and/or serum tryptase > 200 ng/mL  - Signs of dysplasia or myeloproliferation, in non-MC lineages but insufficient criteria for definitive diagnosis of AHNMD with normal or slightly abnormal blood counts    \"C\" FINDINGS  None       8/23/2023 Biopsy    BONE MARROW (8/23/23)  Hypercellular (90-95%) with trilineage hematopoiesis, granulocytic hyperplasia, eosinophilia, and increased atypical mast cells  IP:  CD34+, +, CD7(bright)+, cCD3-, CD33-, CD15-, CD13+ blast population  IHC:  + increased spindle-shaped mast cells (15% cellularity), CD2-  Myeloid NGS:  CBL (31%), cKIT D816V (30%), RUNX1 x 2 (19%, 29%), SRSF2 (47%) [ASXL1 negative]  FISH:  PDGFRb negative    SERUM TRYPTASE  228 (8/23/23)  268 (9/5/23)     2/13/2024 -  Molecular Therapy    AVAPRITINIB   - Starting dose 25 mg daily (non-Adv SM)     7/16/2024 -  Chemotherapy    Venetoclax / AzaCITIDine, 28 Day Cycles - Induction / Consolidation     Acute myeloid leukemia not having achieved remission " (Multi)   6/27/2024 Initial Diagnosis    ICC 2022 DX: Acute myeloid leukemia, NOS (>=20% blasts)  [id number] AML Risk Stratification: INTERMEDIATE: Cytogenetic and/or molecular abnormalities not classified as favorable or adverse  Presented with pancytopenia and circulating blasts    BONE MARROW (06/27/2024)  Marrow replaced by blasts, fibrotic foci, some atypical + cells; 7-8% circulating blasts; aspicular  - Unable to perform additional studies due to aspicular specimen    PERIPHERAL BLOOD (6/27/2024)  FISH:  positive for gain RUNX1    PERIPHERAL BLOOD (7/1/24)  IP:  abnl myeloblast population (CD34+, CD7+, CD4+, CD13+, CD38+, CD11+ dim, HLA=DR+, TdT+)  Myeloid NGS: CBL C404Y (41%), KIT D816V (8%), RUNX1 x2 (8%, 30%), SRSF2 (37%)     7/16/2024 -  Chemotherapy    Venetoclax / AzaCITIDine, 28 Day Cycles - Induction / Consolidation          Assessment & Plan  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  still moderately positive on most recent bone marrow; could have contributed to his recent TLS with C1.   Acute myeloid leukemia not having achieved remission (Multi)  C1D42 Venetoclax and Azacitidine. Admitted for early TLS with initial treatment requiring rasburicase, allopurinol and IVF. Venetoclax held as of D2 and for the remainder of C1. Given his complications with TLS during C1, a post C1 BM was completed last week (8/28/24) showing persistent disease with 27% blasts. In light of his blast percentage and complications with TLS with C1, will plan for ramp up venetoclax with C2 with recommendations for inpatient management. Will adjust dose of venetoclax (max 100 mg) now that he is on posaconazole for prolonged neutropenia secondary to disease. Ramp up plan: 20mg- 50 mg- 100mg. Reviewed with patient, new rx sent to  [facility]. Plan for post C2 BM- requested.   Immunocompromised state  (Multi)  Continue Acyclovir, Posaconazole, & Levofloxacin.   Stem cell transplant candidate  Met with transplant coordinator today for buccal swab.   PERRY (acute kidney injury) (CMS-HCC)  Resolved; sCr back to baseline.        ______________________________________________________________________________________________________________________________________________    SUBJECTIVE     Here today for planned follow up.  Continues to feel more tired but otherwise doing well. Had a small barbeque this weekend, needed to rest after.  No other new health issues.  Denies fevers, chills, nausea, vomiting, diarrhea, dyspnea, rash, and pain.    OBJECTIVE     /78   Pulse 74   Temp 36.2 °C (97.2 °F)   Resp 16   Wt 91.3 kg (201 lb 4.5 oz)   SpO2 100%   BMI 32.49 kg/m²      Physical Exam  Vitals reviewed.   Constitutional:       Appearance: Normal appearance.   Eyes:      Conjunctiva/sclera: Conjunctivae normal.      Pupils: Pupils are equal, round, and reactive to light.   Pulmonary:      Effort: Pulmonary effort is normal. No respiratory distress.   Skin:     General: Skin is warm and dry.      Findings: No rash.   Psychiatric:         Mood and Affect: Mood normal.                Kalia June PA-C

## 2024-09-03 NOTE — ASSESSMENT & PLAN NOTE
C1D42 Venetoclax and Azacitidine. Admitted for early TLS with initial treatment requiring rasburicase, allopurinol and IVF. Venetoclax held as of D2 and for the remainder of C1. Given his complications with TLS during C1, a post C1 BM was completed last week (8/28/24) showing persistent disease with 27% blasts. In light of his blast percentage and complications with TLS with C1, will plan for ramp up venetoclax with C2 with recommendations for inpatient management. Will adjust dose of venetoclax (max 100 mg) now that he is on posaconazole for prolonged neutropenia secondary to disease. Ramp up plan: 20mg- 50 mg- 100mg. Reviewed with patient, new rx sent to  Specialty Pharmacy. Plan for post C2 BM- requested.

## 2024-09-04 ENCOUNTER — INFUSION (OUTPATIENT)
Dept: HEMATOLOGY/ONCOLOGY | Facility: HOSPITAL | Age: 69
End: 2024-09-04
Payer: MEDICARE

## 2024-09-04 ENCOUNTER — SPECIALTY PHARMACY (OUTPATIENT)
Dept: PHARMACY | Facility: CLINIC | Age: 69
End: 2024-09-04

## 2024-09-04 ENCOUNTER — PHARMACY VISIT (OUTPATIENT)
Dept: PHARMACY | Facility: CLINIC | Age: 69
End: 2024-09-04
Payer: COMMERCIAL

## 2024-09-04 VITALS
SYSTOLIC BLOOD PRESSURE: 126 MMHG | DIASTOLIC BLOOD PRESSURE: 71 MMHG | WEIGHT: 200.4 LBS | TEMPERATURE: 97.3 F | RESPIRATION RATE: 16 BRPM | OXYGEN SATURATION: 100 % | HEART RATE: 62 BPM | BODY MASS INDEX: 32.35 KG/M2

## 2024-09-04 DIAGNOSIS — M81.8 OTHER OSTEOPOROSIS WITHOUT CURRENT PATHOLOGICAL FRACTURE: ICD-10-CM

## 2024-09-04 DIAGNOSIS — C92.00 ACUTE MYELOID LEUKEMIA NOT HAVING ACHIEVED REMISSION (MULTI): ICD-10-CM

## 2024-09-04 DIAGNOSIS — D47.02 SYSTEMIC MASTOCYTOSIS WITH ASSOCIATED CLONAL HEMATOLOGICAL NON-MAST CELL LINEAGE DISEASE: ICD-10-CM

## 2024-09-04 LAB
BB ANTIBODY IDENTIFICATION: NORMAL
BLOOD EXPIRATION DATE: NORMAL
CASE #: NORMAL
DISPENSE STATUS: NORMAL
PATH REV-IMMUNOHEMATOLOGY-PR30: NORMAL
PRODUCT BLOOD TYPE: 9500
PRODUCT CODE: NORMAL
UNIT ABO: NORMAL
UNIT NUMBER: NORMAL
UNIT RH: NORMAL
UNIT VOLUME: 287
XM INTEP: NORMAL

## 2024-09-04 PROCEDURE — P9040 RBC LEUKOREDUCED IRRADIATED: HCPCS

## 2024-09-04 PROCEDURE — 2500000004 HC RX 250 GENERAL PHARMACY W/ HCPCS (ALT 636 FOR OP/ED): Performed by: INTERNAL MEDICINE

## 2024-09-04 PROCEDURE — RXMED WILLOW AMBULATORY MEDICATION CHARGE

## 2024-09-04 PROCEDURE — 36430 TRANSFUSION BLD/BLD COMPNT: CPT

## 2024-09-04 RX ORDER — EPINEPHRINE 0.3 MG/.3ML
0.3 INJECTION SUBCUTANEOUS EVERY 5 MIN PRN
Status: DISCONTINUED | OUTPATIENT
Start: 2024-09-04 | End: 2024-09-04 | Stop reason: HOSPADM

## 2024-09-04 RX ORDER — HEPARIN 100 UNIT/ML
500 SYRINGE INTRAVENOUS AS NEEDED
Status: DISCONTINUED | OUTPATIENT
Start: 2024-09-04 | End: 2024-09-04 | Stop reason: HOSPADM

## 2024-09-04 RX ORDER — DIPHENHYDRAMINE HYDROCHLORIDE 50 MG/ML
50 INJECTION INTRAMUSCULAR; INTRAVENOUS AS NEEDED
Status: DISCONTINUED | OUTPATIENT
Start: 2024-09-04 | End: 2024-09-04 | Stop reason: HOSPADM

## 2024-09-04 RX ORDER — HEPARIN 100 UNIT/ML
500 SYRINGE INTRAVENOUS AS NEEDED
OUTPATIENT
Start: 2024-09-04

## 2024-09-04 RX ORDER — FAMOTIDINE 10 MG/ML
20 INJECTION INTRAVENOUS ONCE AS NEEDED
Status: DISCONTINUED | OUTPATIENT
Start: 2024-09-04 | End: 2024-09-04 | Stop reason: HOSPADM

## 2024-09-04 RX ORDER — ALBUTEROL SULFATE 0.83 MG/ML
3 SOLUTION RESPIRATORY (INHALATION) AS NEEDED
Status: DISCONTINUED | OUTPATIENT
Start: 2024-09-04 | End: 2024-09-04 | Stop reason: HOSPADM

## 2024-09-04 RX ORDER — HEPARIN SODIUM,PORCINE/PF 10 UNIT/ML
50 SYRINGE (ML) INTRAVENOUS AS NEEDED
OUTPATIENT
Start: 2024-09-04

## 2024-09-04 NOTE — PROGRESS NOTES
Pt received 1unit PRBC as ordered, tolerated well. Discharged in stable condition, no further needs at this time.

## 2024-09-05 ENCOUNTER — APPOINTMENT (OUTPATIENT)
Dept: HEMATOLOGY/ONCOLOGY | Facility: HOSPITAL | Age: 69
End: 2024-09-05
Payer: MEDICARE

## 2024-09-05 ENCOUNTER — HOSPITAL ENCOUNTER (INPATIENT)
Facility: HOSPITAL | Age: 69
LOS: 3 days | Discharge: HOME | End: 2024-09-08
Attending: INTERNAL MEDICINE | Admitting: REGISTERED NURSE
Payer: MEDICARE

## 2024-09-05 DIAGNOSIS — C92.00 ACUTE MYELOID LEUKEMIA NOT HAVING ACHIEVED REMISSION (MULTI): ICD-10-CM

## 2024-09-05 DIAGNOSIS — C92.02 AML (ACUTE MYELOID LEUKEMIA) IN RELAPSE (MULTI): ICD-10-CM

## 2024-09-05 DIAGNOSIS — I10 BENIGN HYPERTENSION: ICD-10-CM

## 2024-09-05 DIAGNOSIS — C92.01 AML (ACUTE MYELOID LEUKEMIA) IN REMISSION (MULTI): Primary | ICD-10-CM

## 2024-09-05 DIAGNOSIS — D47.02 SYSTEMIC MASTOCYTOSIS WITH ASSOCIATED CLONAL HEMATOLOGICAL NON-MAST CELL LINEAGE DISEASE: ICD-10-CM

## 2024-09-05 DIAGNOSIS — E55.9 VITAMIN D DEFICIENCY: ICD-10-CM

## 2024-09-05 LAB
ABO GROUP (TYPE) IN BLOOD: NORMAL
ALBUMIN SERPL BCP-MCNC: 3.7 G/DL (ref 3.4–5)
ANION GAP SERPL CALC-SCNC: 9 MMOL/L (ref 10–20)
ANTIBODY SCREEN: NORMAL
APPEARANCE UR: CLEAR
BILIRUB UR STRIP.AUTO-MCNC: NEGATIVE MG/DL
BUN SERPL-MCNC: 24 MG/DL (ref 6–23)
CALCIUM SERPL-MCNC: 7.5 MG/DL (ref 8.6–10.6)
CHLORIDE SERPL-SCNC: 110 MMOL/L (ref 98–107)
CO2 SERPL-SCNC: 22 MMOL/L (ref 21–32)
COLOR UR: NORMAL
CREAT SERPL-MCNC: 1.14 MG/DL (ref 0.5–1.3)
EGFRCR SERPLBLD CKD-EPI 2021: 70 ML/MIN/1.73M*2
GLUCOSE SERPL-MCNC: 107 MG/DL (ref 74–99)
GLUCOSE UR STRIP.AUTO-MCNC: NORMAL MG/DL
KETONES UR STRIP.AUTO-MCNC: NEGATIVE MG/DL
LEUKOCYTE ESTERASE UR QL STRIP.AUTO: NEGATIVE
MRSA DNA SPEC QL NAA+PROBE: NOT DETECTED
NITRITE UR QL STRIP.AUTO: NEGATIVE
PH UR STRIP.AUTO: 7 [PH]
PHOSPHATE SERPL-MCNC: 2.6 MG/DL (ref 2.5–4.9)
POTASSIUM SERPL-SCNC: 4.1 MMOL/L (ref 3.5–5.3)
PROT UR STRIP.AUTO-MCNC: NORMAL MG/DL
RBC # UR STRIP.AUTO: NEGATIVE /UL
RBC #/AREA URNS AUTO: NORMAL /HPF
RH FACTOR (ANTIGEN D): NORMAL
SODIUM SERPL-SCNC: 137 MMOL/L (ref 136–145)
SP GR UR STRIP.AUTO: 1.02
URATE SERPL-MCNC: 5.3 MG/DL (ref 4–7.5)
UROBILINOGEN UR STRIP.AUTO-MCNC: NORMAL MG/DL
WBC #/AREA URNS AUTO: NORMAL /HPF

## 2024-09-05 PROCEDURE — 2500000001 HC RX 250 WO HCPCS SELF ADMINISTERED DRUGS (ALT 637 FOR MEDICARE OP): Performed by: REGISTERED NURSE

## 2024-09-05 PROCEDURE — 2500000001 HC RX 250 WO HCPCS SELF ADMINISTERED DRUGS (ALT 637 FOR MEDICARE OP): Performed by: INTERNAL MEDICINE

## 2024-09-05 PROCEDURE — 84550 ASSAY OF BLOOD/URIC ACID: CPT | Performed by: REGISTERED NURSE

## 2024-09-05 PROCEDURE — 87641 MR-STAPH DNA AMP PROBE: CPT | Performed by: REGISTERED NURSE

## 2024-09-05 PROCEDURE — 1170000001 HC PRIVATE ONCOLOGY ROOM DAILY

## 2024-09-05 PROCEDURE — 2500000004 HC RX 250 GENERAL PHARMACY W/ HCPCS (ALT 636 FOR OP/ED): Performed by: REGISTERED NURSE

## 2024-09-05 PROCEDURE — 86922 COMPATIBILITY TEST ANTIGLOB: CPT

## 2024-09-05 PROCEDURE — 86077 PHYS BLOOD BANK SERV XMATCH: CPT | Performed by: STUDENT IN AN ORGANIZED HEALTH CARE EDUCATION/TRAINING PROGRAM

## 2024-09-05 PROCEDURE — XW0DXR5 INTRODUCTION OF VENETOCLAX ANTINEOPLASTIC INTO MOUTH AND PHARYNX, EXTERNAL APPROACH, NEW TECHNOLOGY GROUP 5: ICD-10-PCS | Performed by: REGISTERED NURSE

## 2024-09-05 PROCEDURE — 2500000005 HC RX 250 GENERAL PHARMACY W/O HCPCS: Performed by: REGISTERED NURSE

## 2024-09-05 PROCEDURE — 81001 URINALYSIS AUTO W/SCOPE: CPT | Performed by: REGISTERED NURSE

## 2024-09-05 PROCEDURE — 80069 RENAL FUNCTION PANEL: CPT | Performed by: REGISTERED NURSE

## 2024-09-05 PROCEDURE — 99223 1ST HOSP IP/OBS HIGH 75: CPT | Performed by: INTERNAL MEDICINE

## 2024-09-05 PROCEDURE — 86901 BLOOD TYPING SEROLOGIC RH(D): CPT | Performed by: REGISTERED NURSE

## 2024-09-05 PROCEDURE — 2500000004 HC RX 250 GENERAL PHARMACY W/ HCPCS (ALT 636 FOR OP/ED): Performed by: INTERNAL MEDICINE

## 2024-09-05 PROCEDURE — 86870 RBC ANTIBODY IDENTIFICATION: CPT

## 2024-09-05 RX ORDER — LIDOCAINE AND PRILOCAINE 25; 25 MG/G; MG/G
CREAM TOPICAL ONCE
Status: COMPLETED | OUTPATIENT
Start: 2024-09-05 | End: 2024-09-05

## 2024-09-05 RX ORDER — LIDOCAINE AND PRILOCAINE 25; 25 MG/G; MG/G
CREAM TOPICAL
Status: ON HOLD | COMMUNITY
Start: 2024-08-17 | End: 2024-09-05 | Stop reason: ALTCHOICE

## 2024-09-05 RX ORDER — ONDANSETRON HYDROCHLORIDE 8 MG/1
8 TABLET, FILM COATED ORAL ONCE
Status: DISCONTINUED | OUTPATIENT
Start: 2024-09-05 | End: 2024-09-08 | Stop reason: HOSPADM

## 2024-09-05 RX ORDER — ONDANSETRON HYDROCHLORIDE 8 MG/1
8 TABLET, FILM COATED ORAL EVERY 8 HOURS PRN
Status: DISCONTINUED | OUTPATIENT
Start: 2024-09-05 | End: 2024-09-08 | Stop reason: HOSPADM

## 2024-09-05 RX ORDER — ALBUTEROL SULFATE 0.83 MG/ML
3 SOLUTION RESPIRATORY (INHALATION) AS NEEDED
Status: DISCONTINUED | OUTPATIENT
Start: 2024-09-05 | End: 2024-09-08 | Stop reason: HOSPADM

## 2024-09-05 RX ORDER — FAMOTIDINE 20 MG/1
20 TABLET, FILM COATED ORAL DAILY
Status: DISCONTINUED | OUTPATIENT
Start: 2024-09-06 | End: 2024-09-08 | Stop reason: HOSPADM

## 2024-09-05 RX ORDER — EPINEPHRINE 0.3 MG/.3ML
0.3 INJECTION SUBCUTANEOUS EVERY 5 MIN PRN
Status: DISCONTINUED | OUTPATIENT
Start: 2024-09-05 | End: 2024-09-08 | Stop reason: HOSPADM

## 2024-09-05 RX ORDER — PROCHLORPERAZINE MALEATE 10 MG
10 TABLET ORAL EVERY 6 HOURS PRN
Status: DISCONTINUED | OUTPATIENT
Start: 2024-09-05 | End: 2024-09-08 | Stop reason: HOSPADM

## 2024-09-05 RX ORDER — FAMOTIDINE 10 MG/ML
20 INJECTION INTRAVENOUS ONCE AS NEEDED
Status: DISCONTINUED | OUTPATIENT
Start: 2024-09-05 | End: 2024-09-08 | Stop reason: HOSPADM

## 2024-09-05 RX ORDER — POSACONAZOLE 100 MG/1
300 TABLET, DELAYED RELEASE ORAL
Status: DISCONTINUED | OUTPATIENT
Start: 2024-09-06 | End: 2024-09-08 | Stop reason: HOSPADM

## 2024-09-05 RX ORDER — PANTOPRAZOLE SODIUM 40 MG/1
40 TABLET, DELAYED RELEASE ORAL
Status: DISCONTINUED | OUTPATIENT
Start: 2024-09-06 | End: 2024-09-08 | Stop reason: HOSPADM

## 2024-09-05 RX ORDER — SODIUM CHLORIDE 9 MG/ML
150 INJECTION, SOLUTION INTRAVENOUS CONTINUOUS
Status: DISCONTINUED | OUTPATIENT
Start: 2024-09-05 | End: 2024-09-08 | Stop reason: HOSPADM

## 2024-09-05 RX ORDER — AMLODIPINE BESYLATE 10 MG/1
10 TABLET ORAL DAILY
Status: DISCONTINUED | OUTPATIENT
Start: 2024-09-05 | End: 2024-09-08 | Stop reason: HOSPADM

## 2024-09-05 RX ORDER — ACYCLOVIR 400 MG/1
400 TABLET ORAL 2 TIMES DAILY
Status: DISCONTINUED | OUTPATIENT
Start: 2024-09-05 | End: 2024-09-08 | Stop reason: HOSPADM

## 2024-09-05 RX ORDER — POLYETHYLENE GLYCOL 3350 17 G/17G
17 POWDER, FOR SOLUTION ORAL DAILY
Status: DISCONTINUED | OUTPATIENT
Start: 2024-09-05 | End: 2024-09-08 | Stop reason: HOSPADM

## 2024-09-05 RX ORDER — MINERAL OIL
180 ENEMA (ML) RECTAL DAILY
Status: DISCONTINUED | OUTPATIENT
Start: 2024-09-05 | End: 2024-09-05

## 2024-09-05 RX ORDER — LEVOFLOXACIN 500 MG/1
500 TABLET, FILM COATED ORAL DAILY
Status: DISCONTINUED | OUTPATIENT
Start: 2024-09-06 | End: 2024-09-08 | Stop reason: HOSPADM

## 2024-09-05 RX ORDER — MONTELUKAST SODIUM 10 MG/1
10 TABLET ORAL DAILY
Status: DISCONTINUED | OUTPATIENT
Start: 2024-09-06 | End: 2024-09-08 | Stop reason: HOSPADM

## 2024-09-05 RX ORDER — PROCHLORPERAZINE EDISYLATE 5 MG/ML
10 INJECTION INTRAMUSCULAR; INTRAVENOUS EVERY 6 HOURS PRN
Status: DISCONTINUED | OUTPATIENT
Start: 2024-09-05 | End: 2024-09-08 | Stop reason: HOSPADM

## 2024-09-05 RX ORDER — ALLOPURINOL 300 MG/1
300 TABLET ORAL DAILY
Status: DISCONTINUED | OUTPATIENT
Start: 2024-09-05 | End: 2024-09-08 | Stop reason: HOSPADM

## 2024-09-05 RX ORDER — CYANOCOBALAMIN (VITAMIN B-12) 500 MCG
800 TABLET ORAL DAILY
Status: DISCONTINUED | OUTPATIENT
Start: 2024-09-05 | End: 2024-09-08 | Stop reason: HOSPADM

## 2024-09-05 RX ORDER — AMLODIPINE BESYLATE 10 MG/1
10 TABLET ORAL DAILY
Qty: 90 TABLET | Refills: 0 | Status: SHIPPED | OUTPATIENT
Start: 2024-09-05

## 2024-09-05 RX ORDER — DIPHENHYDRAMINE HYDROCHLORIDE 50 MG/ML
50 INJECTION INTRAMUSCULAR; INTRAVENOUS AS NEEDED
Status: DISCONTINUED | OUTPATIENT
Start: 2024-09-05 | End: 2024-09-08 | Stop reason: HOSPADM

## 2024-09-05 SDOH — SOCIAL STABILITY: SOCIAL INSECURITY: ARE THERE ANY APPARENT SIGNS OF INJURIES/BEHAVIORS THAT COULD BE RELATED TO ABUSE/NEGLECT?: NO

## 2024-09-05 SDOH — SOCIAL STABILITY: SOCIAL INSECURITY: HAVE YOU HAD THOUGHTS OF HARMING ANYONE ELSE?: NO

## 2024-09-05 SDOH — SOCIAL STABILITY: SOCIAL INSECURITY: HAVE YOU HAD ANY THOUGHTS OF HARMING ANYONE ELSE?: NO

## 2024-09-05 SDOH — SOCIAL STABILITY: SOCIAL INSECURITY: DOES ANYONE TRY TO KEEP YOU FROM HAVING/CONTACTING OTHER FRIENDS OR DOING THINGS OUTSIDE YOUR HOME?: NO

## 2024-09-05 SDOH — SOCIAL STABILITY: SOCIAL INSECURITY: DO YOU FEEL UNSAFE GOING BACK TO THE PLACE WHERE YOU ARE LIVING?: NO

## 2024-09-05 SDOH — SOCIAL STABILITY: SOCIAL INSECURITY: HAS ANYONE EVER THREATENED TO HURT YOUR FAMILY OR YOUR PETS?: NO

## 2024-09-05 SDOH — SOCIAL STABILITY: SOCIAL INSECURITY: ARE YOU OR HAVE YOU BEEN THREATENED OR ABUSED PHYSICALLY, EMOTIONALLY, OR SEXUALLY BY ANYONE?: NO

## 2024-09-05 SDOH — SOCIAL STABILITY: SOCIAL INSECURITY: DO YOU FEEL ANYONE HAS EXPLOITED OR TAKEN ADVANTAGE OF YOU FINANCIALLY OR OF YOUR PERSONAL PROPERTY?: NO

## 2024-09-05 SDOH — SOCIAL STABILITY: SOCIAL INSECURITY: WERE YOU ABLE TO COMPLETE ALL THE BEHAVIORAL HEALTH SCREENINGS?: YES

## 2024-09-05 SDOH — SOCIAL STABILITY: SOCIAL INSECURITY: ABUSE: ADULT

## 2024-09-05 ASSESSMENT — ACTIVITIES OF DAILY LIVING (ADL)
DRESSING YOURSELF: INDEPENDENT
PATIENT'S MEMORY ADEQUATE TO SAFELY COMPLETE DAILY ACTIVITIES?: YES
ADEQUATE_TO_COMPLETE_ADL: YES
LACK_OF_TRANSPORTATION: NO
GROOMING: INDEPENDENT
HEARING - RIGHT EAR: FUNCTIONAL
JUDGMENT_ADEQUATE_SAFELY_COMPLETE_DAILY_ACTIVITIES: YES
HEARING - LEFT EAR: FUNCTIONAL
FEEDING YOURSELF: INDEPENDENT
BATHING: INDEPENDENT
TOILETING: INDEPENDENT
WALKS IN HOME: INDEPENDENT

## 2024-09-05 ASSESSMENT — COGNITIVE AND FUNCTIONAL STATUS - GENERAL
PATIENT BASELINE BEDBOUND: NO
DAILY ACTIVITIY SCORE: 24
MOBILITY SCORE: 24

## 2024-09-05 ASSESSMENT — PAIN SCALES - GENERAL
PAINLEVEL_OUTOF10: 0 - NO PAIN
PAINLEVEL_OUTOF10: 0 - NO PAIN

## 2024-09-05 ASSESSMENT — ENCOUNTER SYMPTOMS
EYES NEGATIVE: 1
HEMATOLOGIC/LYMPHATIC NEGATIVE: 1
RESPIRATORY NEGATIVE: 1
GASTROINTESTINAL NEGATIVE: 1
CONSTITUTIONAL NEGATIVE: 1
ALLERGIC/IMMUNOLOGIC NEGATIVE: 1
ENDOCRINE NEGATIVE: 1
MUSCULOSKELETAL NEGATIVE: 1
FREQUENCY: 1
DYSURIA: 0
NEUROLOGICAL NEGATIVE: 1
PSYCHIATRIC NEGATIVE: 1
CARDIOVASCULAR NEGATIVE: 1

## 2024-09-05 ASSESSMENT — COLUMBIA-SUICIDE SEVERITY RATING SCALE - C-SSRS
2. HAVE YOU ACTUALLY HAD ANY THOUGHTS OF KILLING YOURSELF?: NO
1. IN THE PAST MONTH, HAVE YOU WISHED YOU WERE DEAD OR WISHED YOU COULD GO TO SLEEP AND NOT WAKE UP?: NO
6. HAVE YOU EVER DONE ANYTHING, STARTED TO DO ANYTHING, OR PREPARED TO DO ANYTHING TO END YOUR LIFE?: NO

## 2024-09-05 ASSESSMENT — LIFESTYLE VARIABLES
HOW MANY STANDARD DRINKS CONTAINING ALCOHOL DO YOU HAVE ON A TYPICAL DAY: PATIENT DOES NOT DRINK
HOW OFTEN DO YOU HAVE A DRINK CONTAINING ALCOHOL: NEVER
SKIP TO QUESTIONS 9-10: 1
AUDIT-C TOTAL SCORE: 0
HOW OFTEN DO YOU HAVE 6 OR MORE DRINKS ON ONE OCCASION: NEVER
AUDIT-C TOTAL SCORE: 0

## 2024-09-05 ASSESSMENT — PATIENT HEALTH QUESTIONNAIRE - PHQ9
SUM OF ALL RESPONSES TO PHQ9 QUESTIONS 1 & 2: 0
2. FEELING DOWN, DEPRESSED OR HOPELESS: NOT AT ALL
1. LITTLE INTEREST OR PLEASURE IN DOING THINGS: NOT AT ALL

## 2024-09-05 ASSESSMENT — PAIN - FUNCTIONAL ASSESSMENT
PAIN_FUNCTIONAL_ASSESSMENT: 0-10
PAIN_FUNCTIONAL_ASSESSMENT: 0-10

## 2024-09-05 NOTE — H&P
"History Of Present Illness  Jin Reynolds is a 69 y.o. male presenting with h/o Smoldering Systemic Mastocytosis, recent diagnosis of AML. Recently experienced TLS during C1 of Venetoclax/Azacitidine. Admitted for C2 of Jeremy/Aza for Jeremy ramp up dosing and monitoring of TLS     Past Medical History  He has a past medical history of Cancer (Multi), Esophageal ulcer with bleeding (01/01/2024), Personal history of diseases of the skin and subcutaneous tissue, and Umbilical hernia (05/30/2023).    Surgical History  He has no past surgical history on file.    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   8/23/2023 Initial Diagnosis    DX:  SMOLDERING SYSTEMIC MASTOCYTOSIS  Presented with skin rash and eosinophilia    [facility] DI[facility] CRITERIA  (For SM, Need major and 1 minor OR at least 3 minors)  - Multi-focal, dense infiltrates of MC (>= 15 mast cells in aggregates) in BM and/or other extra-cutaneous organs [major]  - In BM or extracutaneous organs, >25% MC spindle shaped OR have atypical morphology OR of all MC on BM aspirate smears, >25% are immature or atypical [minor]  - Detection of activating mutation KIT D816V in BM, PB, or other extracutaneous organ [minor]  - Serum tryptase persistently exceeds 20 ng/mL (unless associated clonal myeloid disorder, in which this paramenter is not valid) [minor]    \"B\" FINDINGS  - BM biopsy showing >30% infiltration (focal, dense aggregates) and/or serum tryptase > 200 ng/mL  - Signs of dysplasia or myeloproliferation, in non-MC lineages but insufficient criteria for definitive diagnosis of AHNMD with normal or slightly abnormal blood counts    \"C\" FINDINGS  None       8/23/2023 Biopsy    BONE MARROW (8/23/23)  Hypercellular (90-95%) with trilineage hematopoiesis, granulocytic hyperplasia, eosinophilia, and increased atypical mast cells  IP:  CD34+, +, CD7(bright)+, cCD3-, CD33-, CD15-, CD13+ blast population  IHC:  " + increased spindle-shaped mast cells (15% cellularity), CD2-  Myeloid NGS:  CBL (31%), cKIT D816V (30%), RUNX1 x 2 (19%, 29%), SRSF2 (47%) [ASXL1 negative]  FISH:  PDGFRb negative    SERUM TRYPTASE  228 (8/23/23)  268 (9/5/23)     2/13/2024 -  Molecular Therapy    AVAPRITINIB   - Starting dose 25 mg daily (non-Adv SM)     7/16/2024 -  Chemotherapy    C1D1- 7/24/2024  - Early TLS with initial treatment requiring rasburicase, allopurinol and IVF. Venetoclax held as of D2 and for the remainder of C1.   - Post C1 BM assessment done showing persistent disease with 27% blasts.  Venetoclax / AzaCITIDine, 28 Day Cycles - Induction / Consolidation     Acute myeloid leukemia not having achieved remission (Multi)   6/27/2024 Initial Diagnosis    ICC 2022 DX: Acute myeloid leukemia, NOS (>=20% blasts)  [id number] AML Risk Stratification: INTERMEDIATE: Cytogenetic and/or molecular abnormalities not classified as favorable or adverse  Presented with pancytopenia and circulating blasts    BONE MARROW (06/27/2024)  Marrow replaced by blasts, fibrotic foci, some atypical + cells; 7-8% circulating blasts; aspicular  - Unable to perform additional studies due to aspicular specimen    PERIPHERAL BLOOD (6/27/2024)  FISH:  positive for gain RUNX1    PERIPHERAL BLOOD (7/1/24)  IP:  abnl myeloblast population (CD34+, CD7+, CD4+, CD13+, CD38+, CD11+ dim, HLA=DR+, TdT+)  Myeloid NGS: CBL C404Y (41%), KIT D816V (8%), RUNX1 x2 (8%, 30%), SRSF2 (37%)     7/16/2024 -  Chemotherapy    C1D1- 7/24/2024  - Early TLS with initial treatment requiring rasburicase, allopurinol and IVF. Venetoclax held as of D2 and for the remainder of C1.   - Post C1 BM assessment done showing persistent disease with 27% blasts.  Venetoclax / AzaCITIDine, 28 Day Cycles - Induction / Consolidation     Due to TLS with Cycle one of Venetoclax & Azacitidine, admitted 9/5/24 for close monitoring of TLS.     Social History  He reports that he has never smoked. He has  never used smokeless tobacco. He reports current drug use. Drug: Marijuana. He reports that he does not drink alcohol.     Allergies  Zyrtec [cetirizine]    Review of Systems   Constitutional: Negative.    HENT: Negative.     Eyes: Negative.    Respiratory: Negative.     Cardiovascular: Negative.    Gastrointestinal: Negative.    Endocrine: Negative.    Genitourinary:  Positive for frequency. Negative for dysuria and urgency.   Musculoskeletal: Negative.    Allergic/Immunologic: Negative.    Neurological: Negative.    Hematological: Negative.    Psychiatric/Behavioral: Negative.          Physical Exam  Constitutional:       Appearance: Normal appearance.   HENT:      Head: Normocephalic and atraumatic.      Nose: Nose normal.      Mouth/Throat:      Mouth: Mucous membranes are moist.      Pharynx: Oropharynx is clear.   Eyes:      Conjunctiva/sclera: Conjunctivae normal.      Pupils: Pupils are equal, round, and reactive to light.   Cardiovascular:      Rate and Rhythm: Normal rate and regular rhythm.      Pulses: Normal pulses.      Heart sounds: Normal heart sounds.   Pulmonary:      Effort: Pulmonary effort is normal.      Breath sounds: Normal breath sounds.   Abdominal:      General: Bowel sounds are normal.      Palpations: Abdomen is soft.   Musculoskeletal:         General: Normal range of motion.      Cervical back: Normal range of motion and neck supple.   Skin:     General: Skin is warm and dry.      Capillary Refill: Capillary refill takes 2 to 3 seconds.      Comments: R chest wall Mediport. Mild skin discoloration but not erythematic. Incision well approximated. No discharge.    Neurological:      General: No focal deficit present.      Mental Status: He is alert and oriented to person, place, and time.   Psychiatric:         Mood and Affect: Mood normal.         Behavior: Behavior normal.     Labs dated 9/3/24 reviewed. TLS labs ordered BID on admit       Assessment/Plan     ONC:  # Smoldering System  Mastocytosis diagnosed 8/23/23  - S/p Molecular therapy started 2/13/24  # AML diagnosed per [facility] on 6/27/24. Positive for RUNX1, CKit, SRSF2 and CBL C404Y  - S/p C1D1 Venetoclax/Azacitidine on 7/24/24. Developed TLS with renal dysfunction  - [facility]MBx (8/28/24): c/w persistent disease and 27% blast  # AML. Admitted 9/5/24 for C2 of Jeremy/Aza with ramp up dosing. Close monitoring of possible TLS  - IVF's @ 150 ml/hr and Allopurinol  - TLS labs bid ordered on admit    HEME:  - Daily CBC/Diff    ID:  # Cont prophylaxis meds: Acyclovir & Posaconazole  # Neutropenia. Cont Levofloxacin    FEN/GI:  - Admit wt: 91.9 kg  - Cont home PPI, Pepcid and antiemetics  - Low pathogen diet    CARD:  - ECHO June 2023 c/w LA severely dilated  - Cont home Norvasc    RENAL:  # H/o hematuria, resolved  - S/p Cystoscopy (1/15/24): Normal exam  # C/o frequent urination especially at night  - UA & PSA ordered on admit. Last PSA 0.66 (5/31/23)    MISC:  - Cont home Allegra and Singular    Patient seen, examined and discussed with Dr. Lydia Day    I spent 45 minutes in the professional and overall care of this patient.      ROSA Matos-CNP

## 2024-09-05 NOTE — PROGRESS NOTES
Pharmacy Medication History Review    Jin Reynolds is a 69 y.o. male admitted for AML (acute myeloid leukemia) in remission (Multi). Pharmacy reviewed the patient's [id number] medications and allergies for accuracy.    Medications ADDED:  Emla cream    The list below reflects the updated PTA list. Comments regarding how patient may be taking medications differently can be found in the Admit Orders Activity  Prior to Admission Medications   Prescriptions Last Dose Informant   acyclovir (Zovirax) 400 mg tablet 9/5/2024 at 0800 Self   Sig: Take 1 tablet (400 mg) by mouth 2 times a day.   allopurinol (Zyloprim) 300 mg tablet 9/5/2024 at 0800 Self   Sig: Take 1 tablet (300 mg) by mouth once daily.   amLODIPine (Norvasc) 10 mg tablet 9/5/2024 at 0800 Self   Sig: TAKE 1 TABLET BY MOUTH EVERY DAY   chlorhexidine (Peridex) 0.12 % solution 9/4/2024 Self   Sig: Use 15 mL in the mouth or throat 3 times a day for 11 days.   cholecalciferol (Vitamin D-3) 10 MCG (400 UNIT) tablet Not Taking Self   Sig: Take 2 tablets (20 mcg) by mouth once daily.   Patient not taking: Reported on 9/5/2024   famotidine (Pepcid) 20 mg tablet 9/5/2024 at 0800 Self   Sig: Take 1 tablet (20 mg) by mouth once daily.   fexofenadine (Allegra) 180 mg tablet 9/5/2024 at 0800 Self   Sig: Take 1 tablet (180 mg) by mouth once daily.   levoFLOXacin (Levaquin) 500 mg tablet 9/5/2024 at 0800 Self   Sig: Take 1 tablet (500 mg) by mouth once daily.   lidocaine-prilocaine (Emla) 2.5-2.5 % cream  Self   Sig: APPLY TOPICALLY 1 TIME IF NEEDED FOR MILD PAIN (1 - 3) FOR UP TO 1 DOSE.   montelukast (Singulair) 10 mg tablet 9/5/2024 at 0800 Self   Sig: Take 1 tablet (10 mg) by mouth once daily.   ondansetron (Zofran) 8 mg tablet Past Month Self   Sig: Take 1 tablet (8 mg) by mouth every 8 hours if needed for nausea or vomiting.   oxymetazoline (Afrin) 0.05 % nasal spray Unknown    Sig: Administer 2 sprays into each nostril every 12 hours if needed for congestion  for up to 2 days. Do not use for more than 3 days.   pantoprazole (ProtoNix) 40 mg EC tablet 9/5/2024 at 0800 Self   Sig: Take 1 tablet (40 mg) by mouth once daily in the morning. Take before meals. Do not crush, chew, or split.   posaconazole (Noxafil) 100 mg DR tablet Unknown Self   Sig: Take 3 tablets (300 mg) by mouth once daily with breakfast. Do not crush, chew, or split.   prochlorperazine (Compazine) 10 mg tablet Unknown Self   Sig: Take 1 tablet (10 mg) by mouth every 6 hours if needed for nausea or vomiting.   sodium chloride (Saline Mist) 0.65 % nasal spray Unknown Self   Sig: Administer 1 spray into each nostril 2 times a day.   venetoclax (Venclexta) 10 mg tablet  Self   Sig: Take 2 tablets (20 mg total) by mouth 1 time for 1 dose.  Take with food. Take on day 1. Bring with you to hospital  Patient not taking: Reported 9/5/2024      venetoclax (Venclexta) 10 mg tablet  Self   Sig: Take 2 tablets (20 mg total) by mouth 1 time for 1 dose.  Take with food. Take on day 1. Bring with you to hospital  Duplicate   venetoclax (Venclexta) 50 mg tablet  Self   Sig: Take 1 tablet (50 mg total) by mouth 1 time for 1 dose.  Take with food. Take on day 2. Bring with you to hospital.  Patient not taking: Reported 9/5/2024      venetoclax (Venclexta) 50 mg tablet  Self   Sig: Take 1 tablet (50 mg total) by mouth 1 time for 1 dose.  Take with food. Take on day 2. Bring with you to hospital.  Duplicate      Facility-Administered Medications: None       The list below reflects the updated allergy list. Please review each documented allergy for additional clarification and justification.  Allergies  Reviewed by Aubree Kirk RN on 9/5/2024        Severity Reactions Comments    Zyrtec [cetirizine] Not Specified Headache          Patient declines M2B at discharge. Pharmacy has been updated to [facility] in [place].    Sources used to complete the med history include out patient fill history, OARRS, and patient  interview    Good historian. Patient interviewed at bedside, had current medication list  Hematology and Oncology note 9/3/2024  Blood and Marrow Transplant note 7/28/2024    Below are additional concerns with the patient's PTA list.  Patient will use Afrin + saline spray as needed for nose bleeds  Patient has not started taking Venclexta 10mg and 50mg yet    Sarah Parikh, Santy  Transitions of Care Pharmacist  [facility]  Please reach out via Secure Chat for questions, or if no response call [employer] or [facility]

## 2024-09-06 ENCOUNTER — APPOINTMENT (OUTPATIENT)
Dept: HEMATOLOGY/ONCOLOGY | Facility: HOSPITAL | Age: 69
End: 2024-09-06
Payer: MEDICARE

## 2024-09-06 LAB
ALBUMIN SERPL BCP-MCNC: 3.7 G/DL (ref 3.4–5)
ALBUMIN SERPL BCP-MCNC: 3.7 G/DL (ref 3.4–5)
ALBUMIN SERPL BCP-MCNC: 4 G/DL (ref 3.4–5)
ALP SERPL-CCNC: 74 U/L (ref 33–136)
ALT SERPL W P-5'-P-CCNC: 8 U/L (ref 10–52)
ANION GAP SERPL CALC-SCNC: 11 MMOL/L (ref 10–20)
ANION GAP SERPL CALC-SCNC: 14 MMOL/L (ref 10–20)
APTT PPP: 42 SECONDS (ref 27–38)
AST SERPL W P-5'-P-CCNC: 10 U/L (ref 9–39)
BASOPHILS # BLD AUTO: 0 X10*3/UL (ref 0–0.1)
BASOPHILS NFR BLD AUTO: 0 %
BILIRUB DIRECT SERPL-MCNC: 0.2 MG/DL (ref 0–0.3)
BILIRUB SERPL-MCNC: 0.7 MG/DL (ref 0–1.2)
BLOOD EXPIRATION DATE: NORMAL
BUN SERPL-MCNC: 21 MG/DL (ref 6–23)
BUN SERPL-MCNC: 23 MG/DL (ref 6–23)
CALCIUM SERPL-MCNC: 7.6 MG/DL (ref 8.6–10.6)
CALCIUM SERPL-MCNC: 7.6 MG/DL (ref 8.6–10.6)
CHLORIDE SERPL-SCNC: 106 MMOL/L (ref 98–107)
CHLORIDE SERPL-SCNC: 110 MMOL/L (ref 98–107)
CO2 SERPL-SCNC: 19 MMOL/L (ref 21–32)
CO2 SERPL-SCNC: 22 MMOL/L (ref 21–32)
CREAT SERPL-MCNC: 0.95 MG/DL (ref 0.5–1.3)
CREAT SERPL-MCNC: 1.02 MG/DL (ref 0.5–1.3)
DISPENSE STATUS: NORMAL
EGFRCR SERPLBLD CKD-EPI 2021: 80 ML/MIN/1.73M*2
EGFRCR SERPLBLD CKD-EPI 2021: 87 ML/MIN/1.73M*2
EOSINOPHIL # BLD AUTO: 0.19 X10*3/UL (ref 0–0.7)
EOSINOPHIL NFR BLD AUTO: 16.1 %
ERYTHROCYTE [DISTWIDTH] IN BLOOD BY AUTOMATED COUNT: 18.5 % (ref 11.5–14.5)
FIBRINOGEN PPP-MCNC: 313 MG/DL (ref 200–400)
GLUCOSE SERPL-MCNC: 139 MG/DL (ref 74–99)
GLUCOSE SERPL-MCNC: 89 MG/DL (ref 74–99)
HCT VFR BLD AUTO: 19 % (ref 41–52)
HGB BLD-MCNC: 6.3 G/DL (ref 13.5–17.5)
HOLD SPECIMEN: NORMAL
IMM GRANULOCYTES # BLD AUTO: 0 X10*3/UL (ref 0–0.7)
IMM GRANULOCYTES NFR BLD AUTO: 0 % (ref 0–0.9)
INR PPP: 1.3 (ref 0.9–1.1)
LDH SERPL L TO P-CCNC: 166 U/L (ref 84–246)
LYMPHOCYTES # BLD AUTO: 0.52 X10*3/UL (ref 1.2–4.8)
LYMPHOCYTES NFR BLD AUTO: 44.1 %
MAGNESIUM SERPL-MCNC: 2.03 MG/DL (ref 1.6–2.4)
MCH RBC QN AUTO: 29.2 PG (ref 26–34)
MCHC RBC AUTO-ENTMCNC: 33.2 G/DL (ref 32–36)
MCV RBC AUTO: 88 FL (ref 80–100)
MONOCYTES # BLD AUTO: 0.1 X10*3/UL (ref 0.1–1)
MONOCYTES NFR BLD AUTO: 8.5 %
NEUTROPHILS # BLD AUTO: 0.37 X10*3/UL (ref 1.2–7.7)
NEUTROPHILS NFR BLD AUTO: 31.3 %
NRBC BLD-RTO: 0 /100 WBCS (ref 0–0)
PATH REPORT.COMMENTS IMP SPEC: NORMAL
PATH REPORT.FINAL DX SPEC: NORMAL
PATH REPORT.GROSS SPEC: NORMAL
PATH REPORT.MICROSCOPIC SPEC OTHER STN: NORMAL
PATH REPORT.RELEVANT HX SPEC: NORMAL
PATH REPORT.TOTAL CANCER: NORMAL
PATH REV-IMMUNOHEMATOLOGY-PR30: NORMAL
PHOSPHATE SERPL-MCNC: 3.1 MG/DL (ref 2.5–4.9)
PHOSPHATE SERPL-MCNC: 3.4 MG/DL (ref 2.5–4.9)
PLATELET # BLD AUTO: 47 X10*3/UL (ref 150–450)
POTASSIUM SERPL-SCNC: 3.9 MMOL/L (ref 3.5–5.3)
POTASSIUM SERPL-SCNC: 4.7 MMOL/L (ref 3.5–5.3)
PRODUCT BLOOD TYPE: 5100
PRODUCT CODE: NORMAL
PROT SERPL-MCNC: 6.3 G/DL (ref 6.4–8.2)
PROTHROMBIN TIME: 15.1 SECONDS (ref 9.8–12.8)
PSA SERPL-MCNC: 0.92 NG/ML
RBC # BLD AUTO: 2.16 X10*6/UL (ref 4.5–5.9)
SODIUM SERPL-SCNC: 135 MMOL/L (ref 136–145)
SODIUM SERPL-SCNC: 138 MMOL/L (ref 136–145)
UNIT ABO: NORMAL
UNIT NUMBER: NORMAL
UNIT RH: NORMAL
UNIT VOLUME: 330
URATE SERPL-MCNC: 5.6 MG/DL (ref 4–7.5)
URATE SERPL-MCNC: 6.3 MG/DL (ref 4–7.5)
WBC # BLD AUTO: 1.2 X10*3/UL (ref 4.4–11.3)
XM INTEP: NORMAL

## 2024-09-06 PROCEDURE — 2500000002 HC RX 250 W HCPCS SELF ADMINISTERED DRUGS (ALT 637 FOR MEDICARE OP, ALT 636 FOR OP/ED): Performed by: REGISTERED NURSE

## 2024-09-06 PROCEDURE — 2500000004 HC RX 250 GENERAL PHARMACY W/ HCPCS (ALT 636 FOR OP/ED): Performed by: INTERNAL MEDICINE

## 2024-09-06 PROCEDURE — 82040 ASSAY OF SERUM ALBUMIN: CPT | Performed by: REGISTERED NURSE

## 2024-09-06 PROCEDURE — 83735 ASSAY OF MAGNESIUM: CPT | Performed by: REGISTERED NURSE

## 2024-09-06 PROCEDURE — 85025 COMPLETE CBC W/AUTO DIFF WBC: CPT | Performed by: REGISTERED NURSE

## 2024-09-06 PROCEDURE — 84153 ASSAY OF PSA TOTAL: CPT | Performed by: REGISTERED NURSE

## 2024-09-06 PROCEDURE — 2500000002 HC RX 250 W HCPCS SELF ADMINISTERED DRUGS (ALT 637 FOR MEDICARE OP, ALT 636 FOR OP/ED): Performed by: INTERNAL MEDICINE

## 2024-09-06 PROCEDURE — 85610 PROTHROMBIN TIME: CPT | Performed by: REGISTERED NURSE

## 2024-09-06 PROCEDURE — 80069 RENAL FUNCTION PANEL: CPT | Performed by: REGISTERED NURSE

## 2024-09-06 PROCEDURE — P9040 RBC LEUKOREDUCED IRRADIATED: HCPCS

## 2024-09-06 PROCEDURE — 99232 SBSQ HOSP IP/OBS MODERATE 35: CPT | Performed by: INTERNAL MEDICINE

## 2024-09-06 PROCEDURE — 1200000003 HC ONCOLOGY  ROOM WITH TELEMETRY DAILY

## 2024-09-06 PROCEDURE — 85730 THROMBOPLASTIN TIME PARTIAL: CPT | Performed by: REGISTERED NURSE

## 2024-09-06 PROCEDURE — 2500000004 HC RX 250 GENERAL PHARMACY W/ HCPCS (ALT 636 FOR OP/ED): Performed by: PHYSICIAN ASSISTANT

## 2024-09-06 PROCEDURE — 85384 FIBRINOGEN ACTIVITY: CPT | Performed by: REGISTERED NURSE

## 2024-09-06 PROCEDURE — 36430 TRANSFUSION BLD/BLD COMPNT: CPT

## 2024-09-06 PROCEDURE — 83615 LACTATE (LD) (LDH) ENZYME: CPT | Performed by: REGISTERED NURSE

## 2024-09-06 PROCEDURE — 2500000005 HC RX 250 GENERAL PHARMACY W/O HCPCS: Performed by: INTERNAL MEDICINE

## 2024-09-06 PROCEDURE — 86902 BLOOD TYPE ANTIGEN DONOR EA: CPT

## 2024-09-06 PROCEDURE — 2500000001 HC RX 250 WO HCPCS SELF ADMINISTERED DRUGS (ALT 637 FOR MEDICARE OP): Performed by: REGISTERED NURSE

## 2024-09-06 PROCEDURE — 84550 ASSAY OF BLOOD/URIC ACID: CPT | Performed by: REGISTERED NURSE

## 2024-09-06 PROCEDURE — 3E01305 INTRODUCTION OF OTHER ANTINEOPLASTIC INTO SUBCUTANEOUS TISSUE, PERCUTANEOUS APPROACH: ICD-10-PCS | Performed by: REGISTERED NURSE

## 2024-09-06 RX ORDER — FUROSEMIDE 10 MG/ML
20 INJECTION INTRAMUSCULAR; INTRAVENOUS ONCE
Status: COMPLETED | OUTPATIENT
Start: 2024-09-06 | End: 2024-09-06

## 2024-09-06 RX ORDER — DIPHENHYDRAMINE HYDROCHLORIDE 50 MG/ML
50 INJECTION INTRAMUSCULAR; INTRAVENOUS AS NEEDED
Status: DISCONTINUED | OUTPATIENT
Start: 2024-09-06 | End: 2024-09-08 | Stop reason: HOSPADM

## 2024-09-06 RX ORDER — EPINEPHRINE 0.3 MG/.3ML
0.3 INJECTION SUBCUTANEOUS EVERY 5 MIN PRN
Status: DISCONTINUED | OUTPATIENT
Start: 2024-09-06 | End: 2024-09-08 | Stop reason: HOSPADM

## 2024-09-06 RX ORDER — ALBUTEROL SULFATE 0.83 MG/ML
3 SOLUTION RESPIRATORY (INHALATION) AS NEEDED
Status: DISCONTINUED | OUTPATIENT
Start: 2024-09-06 | End: 2024-09-08 | Stop reason: HOSPADM

## 2024-09-06 RX ORDER — MINERAL OIL
180 ENEMA (ML) RECTAL DAILY
Status: DISCONTINUED | OUTPATIENT
Start: 2024-09-06 | End: 2024-09-08 | Stop reason: HOSPADM

## 2024-09-06 RX ORDER — FAMOTIDINE 10 MG/ML
20 INJECTION INTRAVENOUS ONCE AS NEEDED
Status: DISCONTINUED | OUTPATIENT
Start: 2024-09-06 | End: 2024-09-08 | Stop reason: HOSPADM

## 2024-09-06 RX ORDER — ONDANSETRON HYDROCHLORIDE 8 MG/1
8 TABLET, FILM COATED ORAL ONCE
Status: COMPLETED | OUTPATIENT
Start: 2024-09-06 | End: 2024-09-06

## 2024-09-06 ASSESSMENT — ACTIVITIES OF DAILY LIVING (ADL): LACK_OF_TRANSPORTATION: NO

## 2024-09-06 ASSESSMENT — PAIN - FUNCTIONAL ASSESSMENT
PAIN_FUNCTIONAL_ASSESSMENT: 0-10
PAIN_FUNCTIONAL_ASSESSMENT: 0-10

## 2024-09-06 ASSESSMENT — COGNITIVE AND FUNCTIONAL STATUS - GENERAL
DAILY ACTIVITIY SCORE: 24
MOBILITY SCORE: 24
DAILY ACTIVITIY SCORE: 24
MOBILITY SCORE: 24

## 2024-09-06 ASSESSMENT — PAIN SCALES - GENERAL
PAINLEVEL_OUTOF10: 0 - NO PAIN

## 2024-09-06 NOTE — PROGRESS NOTES
09/06/24 1226   Discharge Planning   Living Arrangements Spouse/significant other   Support Systems Spouse/significant other   Assistance Needed none   Type of Residence Private residence   Home or Post Acute Services None   Expected Discharge Disposition Home   Does the patient need discharge transport arranged? No   Financial Resource Strain   How hard is it for you to pay for the very basics like food, housing, medical care, and heating? Somewhat   Housing Stability   In the last 12 months, was there a time when you were not able to pay the mortgage or rent on time? N   In the past 12 months, how many times have you moved where you were living? 1   At any time in the past 12 months, were you homeless or living in a shelter (including now)? N   Transportation Needs   In the past 12 months, has lack of transportation kept you from medical appointments or from getting medications? no   In the past 12 months, has lack of transportation kept you from meetings, work, or from getting things needed for daily living? No     Pt with AML was admitted for Venetoclax ramp up.  He will be monitored for TLS.  Met with the pt to discuss any home going needs.  He will return to home with his wife after discharge and is still independent.  He doesn't need any home care or DME.  He has a mediport.  MD is Dr. Dawson.  The pt did mention having trouble paying his medical bills and asked if there is any assistance available.  Will notify SW.  Liz Thomas RN, TCC

## 2024-09-06 NOTE — PROGRESS NOTES
Jin Reynolds is a 69 y.o. male presenting with h/o Smoldering Systemic Mastocytosis, recent diagnosis of AML. Recently experienced TLS during C1 of Venetoclax/Azacitidine. Admitted for C2 of Jeremy/Aza for Jeremy ramp up dosing and monitoring of TLS     Subjective   Afebrile. No complaints today.    Denies recent sx of Ha, fever, chills, sinus pain, anorexia, stomatitis, odynophagia, CP, cough. Sob, dyspnea, abd pain, GERD, diarrhea, constipation, dysuria, bleeding, unexplained bruising, skin lesions, rashes, swelling, edema, paresthesia, & diff w balance, coordination, & gait.      Objective      Vitals:    09/06/24 1224 09/06/24 1229 09/06/24 1244 09/06/24 1329   BP: 125/77  119/70 132/78   BP Location:       Patient Position:       Pulse: 69  67 71   Resp: 16  16 16   Temp: 36.4 °C (97.5 °F)  36.3 °C (97.3 °F) 36.3 °C (97.3 °F)   TempSrc:  Temporal Temporal Temporal   SpO2: 96%  95% 98%   Weight:       Height:            Physical Exam    Constitutional:       Appearance: Normal appearance.   HENT:      Head: Normocephalic and atraumatic.      Nose: Nose normal.      Mouth/Throat:      Mouth: Mucous membranes are moist.      Pharynx: Oropharynx is clear.   Eyes:      Conjunctiva/sclera: Conjunctivae normal.      Pupils: Pupils are equal, round, and reactive to light.   Cardiovascular:      Rate and Rhythm: Normal rate and regular rhythm.      Pulses: Normal pulses.      Heart sounds: Normal heart sounds.   Pulmonary:      Effort: Pulmonary effort is normal.      Breath sounds: Normal breath sounds.   Abdominal:      General: Bowel sounds are normal.      Palpations: Abdomen is soft.   Musculoskeletal:         General: Normal range of motion.      Cervical back: Normal range of motion and neck supple.   Skin:     General: Skin is warm and dry.      Capillary Refill: Capillary refill takes 2 to 3 seconds.      Comments: R chest wall Mediport. Mild skin discoloration but not erythematic. Incision well approximated. No  discharge.    Neurological:      General: No focal deficit present.      Mental Status: He is alert and oriented to person, place, and time.   Psychiatric:         Mood and Affect: Mood normal.         Behavior: Behavior normal.     Scheduled medications  acyclovir, 400 mg, oral, BID  allopurinol, 300 mg, oral, Daily  amLODIPine, 10 mg, oral, Daily  azaCITIDine, 75 mg/m2 (Treatment Plan Recorded), subcutaneous, Once  cholecalciferol, 800 Units, oral, Daily  famotidine, 20 mg, oral, Daily  fexofenadine, 180 mg, oral, Daily  furosemide, 20 mg, intravenous, Once  levoFLOXacin, 500 mg, oral, Daily  montelukast, 10 mg, oral, Daily  ondansetron, 8 mg, oral, Once  ondansetron, 8 mg, oral, Once  pantoprazole, 40 mg, oral, Daily before breakfast  polyethylene glycol, 17 g, oral, Daily  posaconazole, 300 mg, oral, Daily with breakfast    Continuous medications  sodium chloride 0.9%, 150 mL/hr, Last Rate: 150 mL/hr (09/05/24 1400)    PRN medications  PRN medications: albuterol, albuterol, alteplase, dextrose, dextrose, diphenhydrAMINE, diphenhydrAMINE, EPINEPHrine, EPINEPHrine, famotidine, famotidine, methylPREDNISolone sodium succinate (PF), methylPREDNISolone sodium succinate (PF), ondansetron, prochlorperazine, prochlorperazine, prochlorperazine, sodium chloride, sodium chloride    Results from last 7 days   Lab Units 09/06/24  0622 09/03/24  1106   WBC AUTO x10*3/uL 1.2* 1.5*   HEMOGLOBIN g/dL 6.3* 6.9*   HEMATOCRIT % 19.0* 20.5*   PLATELETS AUTO x10*3/uL 47* 54*   NEUTROS PCT AUTO % 31.3 22.8   LYMPHS PCT AUTO % 44.1 45.0   MONOS PCT AUTO % 8.5 6.7   EOS PCT AUTO % 16.1 23.5     Results from last 7 days   Lab Units 09/06/24  1229 09/06/24  0622 09/05/24 2043 09/03/24  1106   SODIUM mmol/L  --  138 137 139   POTASSIUM mmol/L  --  4.7 4.1 4.0   CHLORIDE mmol/L  --  110* 110* 111*   CO2 mmol/L  --  22 22 21   BUN mg/dL  --  23 24* 21   CREATININE mg/dL  --  0.95 1.14 1.02   CALCIUM mg/dL  --  7.6* 7.5* 7.7*   PROTEIN TOTAL  g/dL 6.3*  --   --  7.1   BILIRUBIN TOTAL mg/dL 0.7  --   --  0.6   ALK PHOS U/L 74  --   --  82   ALT U/L 8*  --   --  8*   AST U/L 10  --   --  9   GLUCOSE mg/dL  --  89 107* 87     Results from last 7 days   Lab Units 09/06/24  0622   MAGNESIUM mg/dL 2.03     Assessment/Plan   Assessment & Plan  AML (acute myeloid leukemia) in remission (Multi)    ONC:  # Smoldering System Mastocytosis diagnosed 8/23/23  - S/p Molecular therapy started 2/13/24  # AML diagnosed per [facility] on 6/27/24. Positive for RUNX1, CKit, SRSF2 and CBL C404Y  - S/p C1D1 Venetoclax/Azacitidine on 7/24/24. Developed TLS with renal dysfunction  - [facility] (8/28/24): c/w persistent disease and 27% blast  # AML.    - Admitted 9/5/24 for C2 of Jeremy/Aza with Venetoclx ramp up dosing (D1 20mg, D2 50 mg, D3 onward 100 mg daily).   - Azacitadine subcutaneous on days 1-7  - Close monitoring of possible TLS  - Currently D2 C2 (9/6)   - IVF's @ 150 ml/hr and Allopurinol  - TLS labs bid ordered on admit     HEME:  - Pancytopenic, neutropenic  - Keep hgb > 7.0, plts > 10k  - Tx 1 unit prbc (9/6) w lasix 20 mg      ID:  # Cont prophylaxis meds: Acyclovir & Posaconazole  # Neutropenia. Cont Levofloxacin     FEN/GI:  - Admit wt: 91.9 kg, current wt :  92.7 kg  - Cont home PPI, Pepcid and antiemetics  - Low pathogen diet  - Monitor for fluid overload     CARD:  - ECHO June 2023 c/w LA severely dilated  - Cont home Norvasc     RENAL:  # H/o hematuria, resolved  - S/p Cystoscopy (1/15/24): Normal exam  # C/o frequent urination especially at night  - UA & PSA ordered on admit. Last PSA 0.66 (5/31/23)     MISC:  - Cont home Allegra and Singular        Patient examined & discussed with Dr. Lydia Miller PA-C

## 2024-09-06 NOTE — ASSESSMENT & PLAN NOTE
ONC:  # Smoldering System Mastocytosis diagnosed 8/23/23  - S/p Molecular therapy started 2/13/24  # AML diagnosed per [facility] on 6/27/24. Positive for RUNX1, CKit, SRSF2 and CBL C404Y  - S/p C1D1 Venetoclax/Azacitidine on 7/24/24. Developed TLS with renal dysfunction  - [facility] (8/28/24): c/w persistent disease and 27% blast  # AML.    - Admitted 9/5/24 for C2 of Jeremy/Aza with Venetoclx ramp up dosing (D1 20mg, D2 50 mg, D3 onward 100 mg daily).   - Azacitadine subcutaneous on days 1-7  - Close monitoring of possible TLS  - Currently D2 C2 (9/6)   - IVF's @ 150 ml/hr and Allopurinol  - TLS labs bid ordered on admit     HEME:  - Pancytopenic, neutropenic  - Keep hgb > 7.0, plts > 10k  - Tx 1 unit prbc (9/6) w lasix 20 mg      ID:  # Cont prophylaxis meds: Acyclovir & Posaconazole  # Neutropenia. Cont Levofloxacin     FEN/GI:  - Admit wt: 91.9 kg, current wt :  92.7 kg  - Cont home PPI, Pepcid and antiemetics  - Low pathogen diet  - Monitor for fluid overload     CARD:  - ECHO June 2023 c/w LA severely dilated  - Cont home Norvasc     RENAL:  # H/o hematuria, resolved  - S/p Cystoscopy (1/15/24): Normal exam  # C/o frequent urination especially at night  - UA & PSA ordered on admit. Last PSA 0.66 (5/31/23)     MISC:  - Cont home Allegra and Singular

## 2024-09-06 NOTE — NURSING NOTE
Patient admitted to [facility] for aza/venetoclax. Venetoclax pills brought from home and verified by pharmacy. VSS. Independent with ADLs. Patient declined living will info at this time. Right chest mediport accessed. Provider notified and aware of pt's arrival. New wristband applied and verified

## 2024-09-07 ENCOUNTER — APPOINTMENT (OUTPATIENT)
Dept: HEMATOLOGY/ONCOLOGY | Facility: HOSPITAL | Age: 69
End: 2024-09-07
Payer: MEDICARE

## 2024-09-07 LAB
ALBUMIN SERPL BCP-MCNC: 3.6 G/DL (ref 3.4–5)
ALBUMIN SERPL BCP-MCNC: 4 G/DL (ref 3.4–5)
ANION GAP SERPL CALC-SCNC: 10 MMOL/L (ref 10–20)
ANION GAP SERPL CALC-SCNC: 10 MMOL/L (ref 10–20)
BASOPHILS # BLD MANUAL: 0 X10*3/UL (ref 0–0.1)
BASOPHILS NFR BLD MANUAL: 0 %
BLOOD EXPIRATION DATE: NORMAL
BUN SERPL-MCNC: 16 MG/DL (ref 6–23)
BUN SERPL-MCNC: 22 MG/DL (ref 6–23)
CA-I BLD-SCNC: 1.01 MMOL/L (ref 1.1–1.33)
CALCIUM SERPL-MCNC: 6.9 MG/DL (ref 8.6–10.6)
CALCIUM SERPL-MCNC: 7.8 MG/DL (ref 8.6–10.6)
CHLORIDE SERPL-SCNC: 106 MMOL/L (ref 98–107)
CHLORIDE SERPL-SCNC: 109 MMOL/L (ref 98–107)
CO2 SERPL-SCNC: 21 MMOL/L (ref 21–32)
CO2 SERPL-SCNC: 22 MMOL/L (ref 21–32)
CREAT SERPL-MCNC: 1 MG/DL (ref 0.5–1.3)
CREAT SERPL-MCNC: 1.04 MG/DL (ref 0.5–1.3)
DISPENSE STATUS: NORMAL
EGFRCR SERPLBLD CKD-EPI 2021: 78 ML/MIN/1.73M*2
EGFRCR SERPLBLD CKD-EPI 2021: 82 ML/MIN/1.73M*2
EOSINOPHIL # BLD MANUAL: 0.06 X10*3/UL (ref 0–0.7)
EOSINOPHIL NFR BLD MANUAL: 10.1 %
ERYTHROCYTE [DISTWIDTH] IN BLOOD BY AUTOMATED COUNT: 17.6 % (ref 11.5–14.5)
GLUCOSE SERPL-MCNC: 92 MG/DL (ref 74–99)
GLUCOSE SERPL-MCNC: 96 MG/DL (ref 74–99)
HCT VFR BLD AUTO: 19.5 % (ref 41–52)
HGB BLD-MCNC: 6.7 G/DL (ref 13.5–17.5)
IMM GRANULOCYTES # BLD AUTO: 0 X10*3/UL (ref 0–0.7)
IMM GRANULOCYTES NFR BLD AUTO: 0 % (ref 0–0.9)
LDH SERPL L TO P-CCNC: 176 U/L (ref 84–246)
LYMPHOCYTES # BLD MANUAL: 0.19 X10*3/UL (ref 1.2–4.8)
LYMPHOCYTES NFR BLD MANUAL: 32.3 %
MAGNESIUM SERPL-MCNC: 1.98 MG/DL (ref 1.6–2.4)
MCH RBC QN AUTO: 29.5 PG (ref 26–34)
MCHC RBC AUTO-ENTMCNC: 34.4 G/DL (ref 32–36)
MCV RBC AUTO: 86 FL (ref 80–100)
MONOCYTES # BLD MANUAL: 0.03 X10*3/UL (ref 0.1–1)
MONOCYTES NFR BLD MANUAL: 5 %
NEUTS SEG # BLD MANUAL: 0.28 X10*3/UL (ref 1.2–7)
NEUTS SEG NFR BLD MANUAL: 46.5 %
NRBC BLD-RTO: 0 /100 WBCS (ref 0–0)
PATH REV-IMMUNOHEMATOLOGY-PR30: NORMAL
PHOSPHATE SERPL-MCNC: 2.8 MG/DL (ref 2.5–4.9)
PHOSPHATE SERPL-MCNC: 2.9 MG/DL (ref 2.5–4.9)
PLATELET # BLD AUTO: 35 X10*3/UL (ref 150–450)
POTASSIUM SERPL-SCNC: 3.7 MMOL/L (ref 3.5–5.3)
POTASSIUM SERPL-SCNC: 4.1 MMOL/L (ref 3.5–5.3)
PRODUCT BLOOD TYPE: 5100
PRODUCT CODE: NORMAL
RBC # BLD AUTO: 2.27 X10*6/UL (ref 4.5–5.9)
RBC MORPH BLD: ABNORMAL
SODIUM SERPL-SCNC: 134 MMOL/L (ref 136–145)
SODIUM SERPL-SCNC: 136 MMOL/L (ref 136–145)
TOTAL CELLS COUNTED BLD: 99
UNIT ABO: NORMAL
UNIT NUMBER: NORMAL
UNIT RH: NORMAL
UNIT VOLUME: 350
URATE SERPL-MCNC: 5.6 MG/DL (ref 4–7.5)
URATE SERPL-MCNC: 6.9 MG/DL (ref 4–7.5)
VARIANT LYMPHS # BLD MANUAL: 0.04 X10*3/UL (ref 0–0.5)
VARIANT LYMPHS NFR BLD: 6.1 %
WBC # BLD AUTO: 0.6 X10*3/UL (ref 4.4–11.3)
XM INTEP: NORMAL

## 2024-09-07 PROCEDURE — 82330 ASSAY OF CALCIUM: CPT

## 2024-09-07 PROCEDURE — 99232 SBSQ HOSP IP/OBS MODERATE 35: CPT | Performed by: INTERNAL MEDICINE

## 2024-09-07 PROCEDURE — 2500000002 HC RX 250 W HCPCS SELF ADMINISTERED DRUGS (ALT 637 FOR MEDICARE OP, ALT 636 FOR OP/ED): Performed by: PHYSICIAN ASSISTANT

## 2024-09-07 PROCEDURE — 2500000005 HC RX 250 GENERAL PHARMACY W/O HCPCS: Performed by: INTERNAL MEDICINE

## 2024-09-07 PROCEDURE — 85007 BL SMEAR W/DIFF WBC COUNT: CPT | Performed by: REGISTERED NURSE

## 2024-09-07 PROCEDURE — 85027 COMPLETE CBC AUTOMATED: CPT | Performed by: REGISTERED NURSE

## 2024-09-07 PROCEDURE — P9040 RBC LEUKOREDUCED IRRADIATED: HCPCS

## 2024-09-07 PROCEDURE — 83735 ASSAY OF MAGNESIUM: CPT | Performed by: REGISTERED NURSE

## 2024-09-07 PROCEDURE — 2500000002 HC RX 250 W HCPCS SELF ADMINISTERED DRUGS (ALT 637 FOR MEDICARE OP, ALT 636 FOR OP/ED): Performed by: INTERNAL MEDICINE

## 2024-09-07 PROCEDURE — 84550 ASSAY OF BLOOD/URIC ACID: CPT | Performed by: REGISTERED NURSE

## 2024-09-07 PROCEDURE — 2500000001 HC RX 250 WO HCPCS SELF ADMINISTERED DRUGS (ALT 637 FOR MEDICARE OP): Performed by: NURSE PRACTITIONER

## 2024-09-07 PROCEDURE — 83615 LACTATE (LD) (LDH) ENZYME: CPT | Performed by: REGISTERED NURSE

## 2024-09-07 PROCEDURE — 2500000004 HC RX 250 GENERAL PHARMACY W/ HCPCS (ALT 636 FOR OP/ED): Performed by: INTERNAL MEDICINE

## 2024-09-07 PROCEDURE — 2500000002 HC RX 250 W HCPCS SELF ADMINISTERED DRUGS (ALT 637 FOR MEDICARE OP, ALT 636 FOR OP/ED): Performed by: REGISTERED NURSE

## 2024-09-07 PROCEDURE — 2500000001 HC RX 250 WO HCPCS SELF ADMINISTERED DRUGS (ALT 637 FOR MEDICARE OP): Performed by: REGISTERED NURSE

## 2024-09-07 PROCEDURE — 80069 RENAL FUNCTION PANEL: CPT | Performed by: REGISTERED NURSE

## 2024-09-07 PROCEDURE — 36430 TRANSFUSION BLD/BLD COMPNT: CPT

## 2024-09-07 PROCEDURE — 1200000003 HC ONCOLOGY  ROOM WITH TELEMETRY DAILY

## 2024-09-07 PROCEDURE — 2500000004 HC RX 250 GENERAL PHARMACY W/ HCPCS (ALT 636 FOR OP/ED): Performed by: NURSE PRACTITIONER

## 2024-09-07 RX ORDER — ONDANSETRON HYDROCHLORIDE 8 MG/1
8 TABLET, FILM COATED ORAL ONCE
Status: COMPLETED | OUTPATIENT
Start: 2024-09-07 | End: 2024-09-07

## 2024-09-07 RX ORDER — FAMOTIDINE 10 MG/ML
20 INJECTION INTRAVENOUS ONCE AS NEEDED
Status: DISCONTINUED | OUTPATIENT
Start: 2024-09-07 | End: 2024-09-08 | Stop reason: HOSPADM

## 2024-09-07 RX ORDER — FUROSEMIDE 10 MG/ML
20 INJECTION INTRAMUSCULAR; INTRAVENOUS ONCE
Status: COMPLETED | OUTPATIENT
Start: 2024-09-07 | End: 2024-09-07

## 2024-09-07 RX ORDER — FERROUS SULFATE, DRIED 160(50) MG
1 TABLET, EXTENDED RELEASE ORAL DAILY
Status: DISCONTINUED | OUTPATIENT
Start: 2024-09-07 | End: 2024-09-08 | Stop reason: HOSPADM

## 2024-09-07 RX ORDER — DIPHENHYDRAMINE HYDROCHLORIDE 50 MG/ML
50 INJECTION INTRAMUSCULAR; INTRAVENOUS AS NEEDED
Status: DISCONTINUED | OUTPATIENT
Start: 2024-09-07 | End: 2024-09-08 | Stop reason: HOSPADM

## 2024-09-07 RX ORDER — ALBUTEROL SULFATE 0.83 MG/ML
3 SOLUTION RESPIRATORY (INHALATION) AS NEEDED
Status: DISCONTINUED | OUTPATIENT
Start: 2024-09-07 | End: 2024-09-08 | Stop reason: HOSPADM

## 2024-09-07 RX ORDER — EPINEPHRINE 0.3 MG/.3ML
0.3 INJECTION SUBCUTANEOUS EVERY 5 MIN PRN
Status: DISCONTINUED | OUTPATIENT
Start: 2024-09-07 | End: 2024-09-08 | Stop reason: HOSPADM

## 2024-09-07 ASSESSMENT — COGNITIVE AND FUNCTIONAL STATUS - GENERAL
DAILY ACTIVITIY SCORE: 24
MOBILITY SCORE: 24
MOBILITY SCORE: 24
DAILY ACTIVITIY SCORE: 24
MOBILITY SCORE: 24
DAILY ACTIVITIY SCORE: 24

## 2024-09-07 ASSESSMENT — PAIN SCALES - GENERAL
PAINLEVEL_OUTOF10: 0 - NO PAIN

## 2024-09-07 ASSESSMENT — PAIN - FUNCTIONAL ASSESSMENT
PAIN_FUNCTIONAL_ASSESSMENT: 0-10
PAIN_FUNCTIONAL_ASSESSMENT: 0-10

## 2024-09-07 NOTE — CARE PLAN
Problem: Fall/Injury  Goal: Not fall by end of shift  Outcome: Progressing  Goal: Be free from injury by end of the shift  Outcome: Progressing  Goal: Verbalize understanding of personal risk factors for fall in the hospital  Outcome: Progressing  Goal: Verbalize understanding of risk factor reduction measures to prevent injury from fall in the home  Outcome: Progressing  Goal: Use assistive devices by end of the shift  Outcome: Progressing  Goal: Pace activities to prevent fatigue by end of the shift  Outcome: Progressing     Problem: Pain - Adult  Goal: Verbalizes/displays adequate comfort level or baseline comfort level  Outcome: Progressing     Problem: Safety - Adult  Goal: Free from fall injury  Outcome: Progressing

## 2024-09-07 NOTE — NURSING NOTE
Dose of Azacitidine 155mg in 6.2mL given subcutaneously on both lower abdomen areas. Pre-medicated with zofran 8mg PO.  Patient, dose and rate verified with second RN, Remington Fay. NS infusing at 150mL/hr. Patient with no side effects. TLS labs were sent.

## 2024-09-07 NOTE — CARE PLAN
Problem: Fall/Injury  Goal: Not fall by end of shift  9/6/2024 1936 by Kirsty Hamilton RN  Outcome: Progressing  9/6/2024 1930 by Kirsty Hamilton RN  Outcome: Progressing  Goal: Be free from injury by end of the shift  9/6/2024 1936 by Kirsty Hamilton RN  Outcome: Progressing  9/6/2024 1930 by Kirsty Hamilton RN  Outcome: Progressing  Goal: Verbalize understanding of personal risk factors for fall in the hospital  9/6/2024 1936 by Kirsty Hamilton RN  Outcome: Progressing  9/6/2024 1930 by Kirsty Hamilton RN  Outcome: Progressing  Goal: Verbalize understanding of risk factor reduction measures to prevent injury from fall in the home  9/6/2024 1936 by Kirsty Hamilton RN  Outcome: Progressing  9/6/2024 1930 by Kirsty Hamilton RN  Outcome: Progressing  Goal: Use assistive devices by end of the shift  9/6/2024 1936 by Kirsty Hamilton RN  Outcome: Progressing  9/6/2024 1930 by Kirsty Hamilton RN  Outcome: Progressing  Goal: Pace activities to prevent fatigue by end of the shift  9/6/2024 1936 by Kirsty Hamilton RN  Outcome: Progressing  9/6/2024 1930 by Kirsty Hamilton RN  Outcome: Progressing     Problem: Pain - Adult  Goal: Verbalizes/displays adequate comfort level or baseline comfort level  9/6/2024 1936 by Kirsty Hamilton RN  Outcome: Progressing  9/6/2024 1930 by Kirsty Hamilton RN  Outcome: Progressing     Problem: Safety - Adult  Goal: Free from fall injury  9/6/2024 1936 by Kirsty Hamilton RN  Outcome: Progressing  9/6/2024 1930 by Kirsty Hamilton RN  Outcome: Progressing     Problem: Discharge Planning  Goal: Discharge to home or other facility with appropriate resources  9/6/2024 1936 by Kirsty Hamilton RN  Outcome: Progressing  9/6/2024 1930 by Kirsty Hamilton RN  Outcome: Progressing     Problem: Chronic Conditions and Co-morbidities  Goal: Patient's chronic conditions and co-morbidity symptoms are monitored and maintained or improved  9/6/2024 1936 by Kirsty Pelak, RN  Outcome: Progressing  9/6/2024 1930  by Kirsty Pelak, RN  Outcome: Progressing       The clinical goals for the shift include Patient will remain safe throughout shift

## 2024-09-07 NOTE — HOSPITAL COURSE
Jin Reynolds is a 69 y.o. male presenting with h/o Smoldering Systemic Mastocytosis, recent diagnosis of AML. Recently experienced TLS during C1 of Venetoclax/Azacitidine.  He was admitted  for C2 of Jeremy/Aza for Jeremy ramp up dosing and monitoring of TLS .       Hospital course  was complicates by Pancytopenia  and Neutropenia .   He required PRBC's  infusions   and continued on Levaquin while neutropenic.      Electrolytes and creatinine remained stable.     Patient has  infusion appointments previously scheduled.   Request  a MAL  [facility] clinic follow up for 9/10 .   He will follow up with Dr. Dawson on 10/1.

## 2024-09-07 NOTE — PROGRESS NOTES
Jin Reynolds is a 69 y.o. male presenting with h/o Smoldering Systemic Mastocytosis, recent diagnosis of AML. Recently experienced TLS during C1 of Venetoclax/Azacitidine. Admitted for C2 of Jeremy/Aza for Jeremy ramp up dosing and monitoring of TLS     Subjective   Patient is be resting well.  He states he hasn't been sleeping the best since being in the  hospital.  Patient continues to remain stable.        Objective      Vitals:    09/06/24 1224 09/06/24 1229 09/06/24 1244 09/06/24 1329   BP: 125/77  119/70 132/78   BP Location:       Patient Position:       Pulse: 69  67 71   Resp: 16  16 16   Temp: 36.4 °C (97.5 °F)  36.3 °C (97.3 °F) 36.3 °C (97.3 °F)   TempSrc:  Temporal Temporal Temporal   SpO2: 96%  95% 98%   Weight:       Height:            Physical Exam    Constitutional:       Appearance: Normal appearance.   HENT:      Head: Normocephalic and atraumatic.      Nose: Nose normal.      Mouth/Throat:      Mouth: Mucous membranes are moist.      Pharynx: Oropharynx is clear.   Eyes:      Conjunctiva/sclera: Conjunctivae normal.      Pupils: Pupils are equal, round, and reactive to light.   Cardiovascular:      Rate and Rhythm: Normal rate and regular rhythm.      Pulses: Normal pulses.      Heart sounds: Normal heart sounds.   Pulmonary:      Effort: Pulmonary effort is normal.      Breath sounds: Normal breath sounds.   Abdominal:      General: Bowel sounds are normal.      Palpations: Abdomen is soft.   Musculoskeletal:         General: Normal range of motion.      Cervical back: Normal range of motion and neck supple.   Skin:     General: Skin is warm and dry.      Capillary Refill: Capillary refill takes 2 to 3 seconds.      Comments: R chest wall Mediport. Mild skin discoloration but not erythematic. Incision well approximated. No discharge.    Neurological:      General: No focal deficit present.      Mental Status: He is alert and oriented to person, place, and time.   Psychiatric:         Mood and Affect:  Mood normal.         Behavior: Behavior normal.     Scheduled medications  acyclovir, 400 mg, oral, BID  allopurinol, 300 mg, oral, Daily  amLODIPine, 10 mg, oral, Daily  azaCITIDine, 75 mg/m2 (Treatment Plan Recorded), subcutaneous, Once  cholecalciferol, 800 Units, oral, Daily  famotidine, 20 mg, oral, Daily  fexofenadine, 180 mg, oral, Daily  furosemide, 20 mg, intravenous, Once  levoFLOXacin, 500 mg, oral, Daily  montelukast, 10 mg, oral, Daily  ondansetron, 8 mg, oral, Once  ondansetron, 8 mg, oral, Once  pantoprazole, 40 mg, oral, Daily before breakfast  polyethylene glycol, 17 g, oral, Daily  posaconazole, 300 mg, oral, Daily with breakfast    Continuous medications  sodium chloride 0.9%, 150 mL/hr, Last Rate: 150 mL/hr (09/05/24 1400)    PRN medications  PRN medications: albuterol, albuterol, alteplase, dextrose, dextrose, diphenhydrAMINE, diphenhydrAMINE, EPINEPHrine, EPINEPHrine, famotidine, famotidine, methylPREDNISolone sodium succinate (PF), methylPREDNISolone sodium succinate (PF), ondansetron, prochlorperazine, prochlorperazine, prochlorperazine, sodium chloride, sodium chloride        Assessment/Plan   Assessment & Plan  AML (acute myeloid leukemia) in remission (Multi)      ONC:  # Smoldering System Mastocytosis diagnosed 8/23/23  - S/p Molecular therapy started 2/13/24  # AML diagnosed per [facility] on 6/27/24. Positive for RUNX1, CKit, SRSF2 and CBL C404Y  - S/p C1D1 Venetoclax/Azacitidine on 7/24/24. Developed TLS with renal dysfunction  - [facility] (8/28/24): c/w persistent disease and 27% blast  # AML.    - Admitted 9/5/24 for C2 of Jeremy/Aza with Venetoclx ramp up dosing (D1 20mg, D2 50 mg, D3 onward 100 mg daily).   - Azacitadine subcutaneous on days 1-7  - Close monitoring of possible TLS  - Currently D3 C2   - IVF's @ 150 ml/hr and Allopurinol  - TLS labs bid ordered on admit     HEME:  - Pancytopenic, neutropenic  - Keep hgb > 7.0, plts > 10k  - Tx 1 unit prbc (9/6) w lasix 20 mg   - PRBC on 9/7 with  lasix 20mg given      ID:  # Cont prophylaxis meds: Acyclovir & Posaconazole  # Neutropenia. Cont Levofloxacin     FEN/GI:  - Admit wt: 91.9 kg, current wt :  91.9  kg  - Cont home PPI, Pepcid and antiemetics  - Low pathogen diet  - Monitor for fluid overload     CARD:  - ECHO June 2023 c/w LA severely dilated  - Cont home Norvasc     RENAL:  # H/o hematuria, resolved  - S/p Cystoscopy (1/15/24): Normal exam  # C/o frequent urination especially at night  - UA & PSA ordered on admit. Last PSA 0.66 (5/31/23)     MISC:  - Cont home Allegra and Singular         Patient examined & discussed with Dr. Lydia Day     Patient examined & discussed with ROSA Phillips-CNP

## 2024-09-08 ENCOUNTER — PHARMACY VISIT (OUTPATIENT)
Dept: PHARMACY | Facility: CLINIC | Age: 69
End: 2024-09-08
Payer: COMMERCIAL

## 2024-09-08 ENCOUNTER — APPOINTMENT (OUTPATIENT)
Dept: HEMATOLOGY/ONCOLOGY | Facility: HOSPITAL | Age: 69
End: 2024-09-08
Payer: MEDICARE

## 2024-09-08 VITALS
TEMPERATURE: 98.1 F | BODY MASS INDEX: 31.8 KG/M2 | HEIGHT: 67 IN | SYSTOLIC BLOOD PRESSURE: 150 MMHG | OXYGEN SATURATION: 99 % | WEIGHT: 202.6 LBS | DIASTOLIC BLOOD PRESSURE: 84 MMHG | RESPIRATION RATE: 20 BRPM | HEART RATE: 69 BPM

## 2024-09-08 LAB
ALBUMIN SERPL BCP-MCNC: 3.6 G/DL (ref 3.4–5)
ANION GAP SERPL CALC-SCNC: 11 MMOL/L (ref 10–20)
BASOPHILS # BLD MANUAL: 0 X10*3/UL (ref 0–0.1)
BASOPHILS NFR BLD MANUAL: 0 %
BLOOD EXPIRATION DATE: NORMAL
BUN SERPL-MCNC: 15 MG/DL (ref 6–23)
CALCIUM SERPL-MCNC: 7.3 MG/DL (ref 8.6–10.6)
CHLORIDE SERPL-SCNC: 109 MMOL/L (ref 98–107)
CO2 SERPL-SCNC: 20 MMOL/L (ref 21–32)
CREAT SERPL-MCNC: 1.06 MG/DL (ref 0.5–1.3)
DISPENSE STATUS: NORMAL
EGFRCR SERPLBLD CKD-EPI 2021: 76 ML/MIN/1.73M*2
EOSINOPHIL # BLD MANUAL: 0.1 X10*3/UL (ref 0–0.7)
EOSINOPHIL NFR BLD MANUAL: 12.4 %
ERYTHROCYTE [DISTWIDTH] IN BLOOD BY AUTOMATED COUNT: 16.4 % (ref 11.5–14.5)
GLUCOSE SERPL-MCNC: 95 MG/DL (ref 74–99)
HCT VFR BLD AUTO: 19.4 % (ref 41–52)
HGB BLD-MCNC: 7 G/DL (ref 13.5–17.5)
IMM GRANULOCYTES # BLD AUTO: 0 X10*3/UL (ref 0–0.7)
IMM GRANULOCYTES NFR BLD AUTO: 0 % (ref 0–0.9)
LDH SERPL L TO P-CCNC: 170 U/L (ref 84–246)
LYMPHOCYTES # BLD MANUAL: 0.52 X10*3/UL (ref 1.2–4.8)
LYMPHOCYTES NFR BLD MANUAL: 65.4 %
MAGNESIUM SERPL-MCNC: 1.97 MG/DL (ref 1.6–2.4)
MCH RBC QN AUTO: 29 PG (ref 26–34)
MCHC RBC AUTO-ENTMCNC: 36.1 G/DL (ref 32–36)
MCV RBC AUTO: 81 FL (ref 80–100)
MONOCYTES # BLD MANUAL: 0.01 X10*3/UL (ref 0.1–1)
MONOCYTES NFR BLD MANUAL: 1.2 %
NEUTS SEG # BLD MANUAL: 0.14 X10*3/UL (ref 1.2–7)
NEUTS SEG NFR BLD MANUAL: 17.3 %
NRBC BLD-RTO: 0 /100 WBCS (ref 0–0)
PHOSPHATE SERPL-MCNC: 2.7 MG/DL (ref 2.5–4.9)
PLATELET # BLD AUTO: 25 X10*3/UL (ref 150–450)
POTASSIUM SERPL-SCNC: 3.9 MMOL/L (ref 3.5–5.3)
PRODUCT BLOOD TYPE: 5100
PRODUCT CODE: NORMAL
RBC # BLD AUTO: 2.41 X10*6/UL (ref 4.5–5.9)
RBC MORPH BLD: ABNORMAL
SODIUM SERPL-SCNC: 136 MMOL/L (ref 136–145)
TOTAL CELLS COUNTED BLD: 81
UNIT ABO: NORMAL
UNIT NUMBER: NORMAL
UNIT RH: NORMAL
UNIT VOLUME: 274
URATE SERPL-MCNC: 5.5 MG/DL (ref 4–7.5)
VARIANT LYMPHS # BLD MANUAL: 0.03 X10*3/UL (ref 0–0.5)
VARIANT LYMPHS NFR BLD: 3.7 %
WBC # BLD AUTO: 0.8 X10*3/UL (ref 4.4–11.3)
XM INTEP: NORMAL

## 2024-09-08 PROCEDURE — 2500000004 HC RX 250 GENERAL PHARMACY W/ HCPCS (ALT 636 FOR OP/ED): Performed by: REGISTERED NURSE

## 2024-09-08 PROCEDURE — 2500000005 HC RX 250 GENERAL PHARMACY W/O HCPCS: Performed by: INTERNAL MEDICINE

## 2024-09-08 PROCEDURE — 85027 COMPLETE CBC AUTOMATED: CPT | Performed by: REGISTERED NURSE

## 2024-09-08 PROCEDURE — 2500000004 HC RX 250 GENERAL PHARMACY W/ HCPCS (ALT 636 FOR OP/ED): Performed by: INTERNAL MEDICINE

## 2024-09-08 PROCEDURE — 83735 ASSAY OF MAGNESIUM: CPT | Performed by: REGISTERED NURSE

## 2024-09-08 PROCEDURE — 2500000002 HC RX 250 W HCPCS SELF ADMINISTERED DRUGS (ALT 637 FOR MEDICARE OP, ALT 636 FOR OP/ED): Performed by: INTERNAL MEDICINE

## 2024-09-08 PROCEDURE — 80069 RENAL FUNCTION PANEL: CPT | Performed by: REGISTERED NURSE

## 2024-09-08 PROCEDURE — 85007 BL SMEAR W/DIFF WBC COUNT: CPT | Performed by: REGISTERED NURSE

## 2024-09-08 PROCEDURE — 99239 HOSP IP/OBS DSCHRG MGMT >30: CPT | Performed by: INTERNAL MEDICINE

## 2024-09-08 PROCEDURE — 83615 LACTATE (LD) (LDH) ENZYME: CPT | Performed by: REGISTERED NURSE

## 2024-09-08 PROCEDURE — 2500000002 HC RX 250 W HCPCS SELF ADMINISTERED DRUGS (ALT 637 FOR MEDICARE OP, ALT 636 FOR OP/ED): Performed by: REGISTERED NURSE

## 2024-09-08 PROCEDURE — RXMED WILLOW AMBULATORY MEDICATION CHARGE

## 2024-09-08 PROCEDURE — 2500000001 HC RX 250 WO HCPCS SELF ADMINISTERED DRUGS (ALT 637 FOR MEDICARE OP): Performed by: REGISTERED NURSE

## 2024-09-08 PROCEDURE — 36430 TRANSFUSION BLD/BLD COMPNT: CPT

## 2024-09-08 PROCEDURE — 2500000001 HC RX 250 WO HCPCS SELF ADMINISTERED DRUGS (ALT 637 FOR MEDICARE OP): Performed by: NURSE PRACTITIONER

## 2024-09-08 PROCEDURE — 84550 ASSAY OF BLOOD/URIC ACID: CPT | Performed by: REGISTERED NURSE

## 2024-09-08 PROCEDURE — 2500000002 HC RX 250 W HCPCS SELF ADMINISTERED DRUGS (ALT 637 FOR MEDICARE OP, ALT 636 FOR OP/ED): Performed by: PHYSICIAN ASSISTANT

## 2024-09-08 PROCEDURE — P9040 RBC LEUKOREDUCED IRRADIATED: HCPCS

## 2024-09-08 PROCEDURE — 2500000004 HC RX 250 GENERAL PHARMACY W/ HCPCS (ALT 636 FOR OP/ED): Performed by: NURSE PRACTITIONER

## 2024-09-08 RX ORDER — FERROUS SULFATE, DRIED 160(50) MG
1 TABLET, EXTENDED RELEASE ORAL DAILY
Qty: 30 TABLET | Refills: 0 | Status: SHIPPED | OUTPATIENT
Start: 2024-09-09 | End: 2024-10-09

## 2024-09-08 RX ORDER — HEPARIN 100 UNIT/ML
5 SYRINGE INTRAVENOUS AS NEEDED
Status: DISCONTINUED | OUTPATIENT
Start: 2024-09-08 | End: 2024-09-08 | Stop reason: HOSPADM

## 2024-09-08 RX ORDER — ALBUTEROL SULFATE 0.83 MG/ML
3 SOLUTION RESPIRATORY (INHALATION) AS NEEDED
Status: DISCONTINUED | OUTPATIENT
Start: 2024-09-08 | End: 2024-09-08 | Stop reason: HOSPADM

## 2024-09-08 RX ORDER — POSACONAZOLE 100 MG/1
300 TABLET, DELAYED RELEASE ORAL
Qty: 90 TABLET | Refills: 5 | Status: SHIPPED | OUTPATIENT
Start: 2024-09-08 | End: 2025-03-07

## 2024-09-08 RX ORDER — EPINEPHRINE 0.3 MG/.3ML
0.3 INJECTION SUBCUTANEOUS EVERY 5 MIN PRN
Status: DISCONTINUED | OUTPATIENT
Start: 2024-09-08 | End: 2024-09-08 | Stop reason: HOSPADM

## 2024-09-08 RX ORDER — FAMOTIDINE 10 MG/ML
20 INJECTION INTRAVENOUS ONCE AS NEEDED
Status: DISCONTINUED | OUTPATIENT
Start: 2024-09-08 | End: 2024-09-08 | Stop reason: HOSPADM

## 2024-09-08 RX ORDER — DIPHENHYDRAMINE HYDROCHLORIDE 50 MG/ML
50 INJECTION INTRAMUSCULAR; INTRAVENOUS AS NEEDED
Status: DISCONTINUED | OUTPATIENT
Start: 2024-09-08 | End: 2024-09-08 | Stop reason: HOSPADM

## 2024-09-08 RX ORDER — ONDANSETRON HYDROCHLORIDE 8 MG/1
8 TABLET, FILM COATED ORAL ONCE
Status: COMPLETED | OUTPATIENT
Start: 2024-09-08 | End: 2024-09-08

## 2024-09-08 ASSESSMENT — COGNITIVE AND FUNCTIONAL STATUS - GENERAL
MOBILITY SCORE: 24
DAILY ACTIVITIY SCORE: 24

## 2024-09-08 ASSESSMENT — PAIN SCALES - GENERAL: PAINLEVEL_OUTOF10: 0 - NO PAIN

## 2024-09-08 ASSESSMENT — PAIN - FUNCTIONAL ASSESSMENT: PAIN_FUNCTIONAL_ASSESSMENT: 0-10

## 2024-09-08 NOTE — CARE PLAN
Problem: Fall/Injury  Goal: Not fall by end of shift  Outcome: Met  Goal: Be free from injury by end of the shift  Outcome: Met  Goal: Verbalize understanding of personal risk factors for fall in the hospital  Outcome: Met  Goal: Verbalize understanding of risk factor reduction measures to prevent injury from fall in the home  Outcome: Met  Goal: Use assistive devices by end of the shift  Outcome: Met  Goal: Pace activities to prevent fatigue by end of the shift  Outcome: Met     Problem: Pain - Adult  Goal: Verbalizes/displays adequate comfort level or baseline comfort level  Outcome: Met     Problem: Safety - Adult  Goal: Free from fall injury  Outcome: Met     Problem: Discharge Planning  Goal: Discharge to home or other facility with appropriate resources  Outcome: Met     Problem: Chronic Conditions and Co-morbidities  Goal: Patient's chronic conditions and co-morbidity symptoms are monitored and maintained or improved  Outcome: Met    The clinical goals for the shift include Paitent will remain free of falls and HDS throughiut shift,,    VSS, afebrile, no complaints N/V/D this shift. Pt remained safe and free of falls/injury. No pain reported this shift. Patient received last inpatient dose of Venetoclax and Azacitidine. Patient discharged home in stable condition, aware of infusion appointment tomorrow at 4pm for next Azacitidine injection. AVS gone over w/ patient, all questions answered.     Taylor Clifford RN

## 2024-09-08 NOTE — NURSING NOTE
Order received for patient discharge. Right chest mediport flushed and heparin locked prior to needle being removed. Band-aid applied to insertion site for any post removal bleeding. Discharge instructions, medications, and follow up appointments reviewed w/ patient. Patient states understanding and teaches back to RN. Patient escorted to private vehicle via ambulation per patient request. All questions answered and no further needs at this time, Taylor Clifford RN.

## 2024-09-08 NOTE — DISCHARGE SUMMARY
Discharge Diagnosis  AML (acute myeloid leukemia) in remission (Multi)    Issues Requiring Follow-Up  Continuing Cycle 2     Test Results Pending At Discharge  Pending Labs       Order Current Status    BB ORDER ONLY - Antibody Identification In process            Hospital Course  Jin Reynolds is a 69 y.o. male presenting with h/o Smoldering Systemic Mastocytosis, recent diagnosis of AML. Recently experienced TLS during C1 of Venetoclax/Azacitidine.  He was admitted  for C2 of Jeremy/Aza for Jeremy ramp up dosing and monitoring of TLS .       Hospital course  was complicates by Pancytopenia  and Neutropenia .   He required PRBC's  infusions   and continued on Levaquin while neutropenic.      Electrolytes and creatinine remained stable.     Patient has  infusion appointments previously scheduled.   Request  a MAL  [facility] clinic follow up for 9/10 .   He will follow up with Dr. Dawson on 10/1.      Pertinent Physical Exam At Time of Discharge  Physical Exam  Constitutional:       Appearance: Normal appearance.   HENT:      Head: Normocephalic.      Nose: Nose normal.      Mouth/Throat:      Mouth: Mucous membranes are moist.      Pharynx: Oropharynx is clear.   Eyes:      Pupils: Pupils are equal, round, and reactive to light.   Cardiovascular:      Rate and Rhythm: Normal rate and regular rhythm.      Pulses: Normal pulses.      Heart sounds: Normal heart sounds.   Pulmonary:      Effort: Pulmonary effort is normal.      Breath sounds: Normal breath sounds.   Abdominal:      General: Bowel sounds are normal.      Palpations: Abdomen is soft.   Musculoskeletal:         General: Normal range of motion.      Cervical back: Normal range of motion.   Skin:     General: Skin is warm and dry.      Capillary Refill: Capillary refill takes 2 to 3 seconds.   Neurological:      General: No focal deficit present.      Mental Status: He is alert.   Psychiatric:         Mood and Affect: Mood normal.         Behavior: Behavior normal.          Home Medications     Medication List      START taking these medications     calcium carbonate-vitamin D3 500 mg-5 mcg (200 unit) tablet; Take 1   tablet by mouth once daily.; Start taking on: September 9, 2024     CHANGE how you take these medications     venetoclax 100 mg tablet; Commonly known as: Venclexta; Take 1 tablet   (100 mg total) by mouth once daily.  Take with food.; Start taking on:   September 9, 2024; What changed: medication strength, how much to take,   when to take this, additional instructions, Another medication with the   same name was removed. Continue taking this medication, and follow the   directions you see here.     CONTINUE taking these medications     acyclovir 400 mg tablet; Commonly known as: Zovirax; Take 1 tablet (400   mg) by mouth 2 times a day.   allopurinol 300 mg tablet; Commonly known as: Zyloprim; Take 1 tablet   (300 mg) by mouth once daily.   amLODIPine 10 mg tablet; Commonly known as: Norvasc; TAKE 1 TABLET BY   MOUTH EVERY DAY   famotidine 20 mg tablet; Commonly known as: Pepcid   fexofenadine 180 mg tablet; Commonly known as: Allegra   levoFLOXacin 500 mg tablet; Commonly known as: Levaquin; Take 1 tablet   (500 mg) by mouth once daily.   montelukast 10 mg tablet; Commonly known as: Singulair; Take 1 tablet   (10 mg) by mouth once daily.   ondansetron 8 mg tablet; Commonly known as: Zofran; Take 1 tablet (8 mg)   by mouth every 8 hours if needed for nausea or vomiting.   pantoprazole 40 mg EC tablet; Commonly known as: ProtoNix   posaconazole 100 mg DR tablet; Commonly known as: Noxafil; Take 3   tablets (300 mg) by mouth once daily with breakfast. Do not crush, chew,   or split.   prochlorperazine 10 mg tablet; Commonly known as: Compazine; Take 1   tablet (10 mg) by mouth every 6 hours if needed for nausea or vomiting.   Saline Mist 0.65 % nasal spray; Generic drug: sodium chloride;   Administer 1 spray into each nostril 2 times a day.     STOP taking these  medications     cholecalciferol 10 MCG (400 UNIT) tablet; Commonly known as: Vitamin D-3   oxymetazoline 0.05 % nasal spray; Commonly known as: Afrin       Outpatient Follow-Up  Future Appointments   Date Time Provider Department Center   9/9/2024  4:00 PM Henna Suggs RN [facility]   9/10/2024  4:00 PM INF 01 [facility]   9/11/2024  3:30 PM INF 17 [facility]   9/12/2024  3:30 PM INF 11 MINOFF [id number] [facility]   9/16/2024  7:30 AM INF 12 MINOFF [id number] [facility]   9/19/2024  4:00 PM INF 12 MINOFF [id number] [facility]   9/23/2024  1:00 PM INF 12 MINOFF [id number] [facility]   9/25/2024 10:00 AM INF 30 [facility]   9/25/2024 12:45 PM INF 08 [facility]   9/26/2024  1:30 PM INF 12 MINOFF [id number] [facility]   9/27/2024  1:00 PM Irish Verdugo APRN-CNP [id number] [facility]   9/30/2024  1:30 PM INF 12 MINOFF [id number] [facility]   10/1/2024  3:00 PM [facility] CENTRAL LINE 01 [id number] [facility]   10/1/2024  3:20 PM Nika Dawson MD [id number] [facility]   10/1/2024  3:45 PM INF 24 [facility]   10/2/2024  4:30 PM INF 10 MINOFF [id number] [facility]   10/3/2024  4:00 PM INF 01 MINOFF [id number] [facility]   10/4/2024  8:00 AM INF 10 MINOFF [id number] [facility]   10/5/2024 10:00 AM INF 07 [facility]   10/6/2024 12:00 PM INF 09 [facility]   10/7/2024  4:00 PM INF 01 MINOFF [id number] [facility]   10/10/2024  3:00 PM INF 12 MINOFF [id number] [facility]   10/14/2024  1:30 PM INF 12 MINOFF [id number] [facility]       Keyanna Delong APRN-CNP

## 2024-09-09 ENCOUNTER — OFFICE VISIT (OUTPATIENT)
Dept: HEMATOLOGY/ONCOLOGY | Facility: HOSPITAL | Age: 69
End: 2024-09-09
Payer: MEDICARE

## 2024-09-09 ENCOUNTER — INFUSION (OUTPATIENT)
Dept: HEMATOLOGY/ONCOLOGY | Facility: HOSPITAL | Age: 69
End: 2024-09-09
Payer: MEDICARE

## 2024-09-09 VITALS
WEIGHT: 200.4 LBS | SYSTOLIC BLOOD PRESSURE: 134 MMHG | TEMPERATURE: 96.6 F | OXYGEN SATURATION: 97 % | RESPIRATION RATE: 18 BRPM | BODY MASS INDEX: 31.75 KG/M2 | DIASTOLIC BLOOD PRESSURE: 81 MMHG | HEART RATE: 79 BPM

## 2024-09-09 DIAGNOSIS — D47.02 SYSTEMIC MASTOCYTOSIS WITH ASSOCIATED CLONAL HEMATOLOGICAL NON-MAST CELL LINEAGE DISEASE: ICD-10-CM

## 2024-09-09 DIAGNOSIS — C95.00 ACUTE LEUKEMIA NOT HAVING ACHIEVED REMISSION (MULTI): ICD-10-CM

## 2024-09-09 DIAGNOSIS — C95.00 ACUTE LEUKEMIA NOT HAVING ACHIEVED REMISSION (MULTI): Primary | ICD-10-CM

## 2024-09-09 DIAGNOSIS — C92.01 AML (ACUTE MYELOID LEUKEMIA) IN REMISSION (MULTI): ICD-10-CM

## 2024-09-09 DIAGNOSIS — C92.00 ACUTE MYELOID LEUKEMIA NOT HAVING ACHIEVED REMISSION (MULTI): ICD-10-CM

## 2024-09-09 LAB
ABO GROUP (TYPE) IN BLOOD: NORMAL
ALBUMIN SERPL BCP-MCNC: 3.9 G/DL (ref 3.4–5)
ALP SERPL-CCNC: 81 U/L (ref 33–136)
ALT SERPL W P-5'-P-CCNC: 10 U/L (ref 10–52)
ANION GAP SERPL CALC-SCNC: 11 MMOL/L (ref 10–20)
ANTIBODY SCREEN: NORMAL
AST SERPL W P-5'-P-CCNC: 13 U/L (ref 9–39)
BASOPHILS # BLD MANUAL: 0.01 X10*3/UL (ref 0–0.1)
BASOPHILS NFR BLD MANUAL: 1 %
BILIRUB SERPL-MCNC: 0.9 MG/DL (ref 0–1.2)
BUN SERPL-MCNC: 21 MG/DL (ref 6–23)
BURR CELLS BLD QL SMEAR: ABNORMAL
CALCIUM SERPL-MCNC: 8.1 MG/DL (ref 8.6–10.3)
CHLORIDE SERPL-SCNC: 109 MMOL/L (ref 98–107)
CO2 SERPL-SCNC: 19 MMOL/L (ref 21–32)
CREAT SERPL-MCNC: 1.27 MG/DL (ref 0.5–1.3)
EGFRCR SERPLBLD CKD-EPI 2021: 62 ML/MIN/1.73M*2
EOSINOPHIL # BLD MANUAL: 0.11 X10*3/UL (ref 0–0.7)
EOSINOPHIL NFR BLD MANUAL: 11 %
ERYTHROCYTE [DISTWIDTH] IN BLOOD BY AUTOMATED COUNT: 17.4 % (ref 11.5–14.5)
GLUCOSE SERPL-MCNC: 125 MG/DL (ref 74–99)
HCT VFR BLD AUTO: 22.5 % (ref 41–52)
HGB BLD-MCNC: 8 G/DL (ref 13.5–17.5)
IMM GRANULOCYTES # BLD AUTO: 0 X10*3/UL (ref 0–0.7)
IMM GRANULOCYTES NFR BLD AUTO: 0 % (ref 0–0.9)
LYMPHOCYTES # BLD MANUAL: 0.47 X10*3/UL (ref 1.2–4.8)
LYMPHOCYTES NFR BLD MANUAL: 47 %
MAGNESIUM SERPL-MCNC: 2.06 MG/DL (ref 1.6–2.4)
MCH RBC QN AUTO: 30.1 PG (ref 26–34)
MCHC RBC AUTO-ENTMCNC: 35.6 G/DL (ref 32–36)
MCV RBC AUTO: 85 FL (ref 80–100)
MONOCYTES # BLD MANUAL: 0.08 X10*3/UL (ref 0.1–1)
MONOCYTES NFR BLD MANUAL: 8 %
NEUTS SEG # BLD MANUAL: 0.31 X10*3/UL (ref 1.2–7)
NEUTS SEG NFR BLD MANUAL: 31 %
NRBC BLD-RTO: 0 /100 WBCS (ref 0–0)
OVALOCYTES BLD QL SMEAR: ABNORMAL
PHOSPHATE SERPL-MCNC: 3 MG/DL (ref 2.5–4.9)
PLATELET # BLD AUTO: 17 X10*3/UL (ref 150–450)
POLYCHROMASIA BLD QL SMEAR: ABNORMAL
POTASSIUM SERPL-SCNC: 3.9 MMOL/L (ref 3.5–5.3)
PROT SERPL-MCNC: 6.8 G/DL (ref 6.4–8.2)
RBC # BLD AUTO: 2.66 X10*6/UL (ref 4.5–5.9)
RBC MORPH BLD: ABNORMAL
RH FACTOR (ANTIGEN D): NORMAL
SODIUM SERPL-SCNC: 135 MMOL/L (ref 136–145)
TOTAL CELLS COUNTED BLD: 100
URATE SERPL-MCNC: 5.2 MG/DL (ref 4–7.5)
VARIANT LYMPHS # BLD MANUAL: 0.02 X10*3/UL (ref 0–0.5)
VARIANT LYMPHS NFR BLD: 2 %
WBC # BLD AUTO: 1 X10*3/UL (ref 4.4–11.3)

## 2024-09-09 PROCEDURE — 99215 OFFICE O/P EST HI 40 MIN: CPT | Mod: 25 | Performed by: NURSE PRACTITIONER

## 2024-09-09 PROCEDURE — 1036F TOBACCO NON-USER: CPT | Performed by: NURSE PRACTITIONER

## 2024-09-09 PROCEDURE — 85007 BL SMEAR W/DIFF WBC COUNT: CPT

## 2024-09-09 PROCEDURE — 3075F SYST BP GE 130 - 139MM HG: CPT | Performed by: NURSE PRACTITIONER

## 2024-09-09 PROCEDURE — 1160F RVW MEDS BY RX/DR IN RCRD: CPT | Performed by: NURSE PRACTITIONER

## 2024-09-09 PROCEDURE — 2500000004 HC RX 250 GENERAL PHARMACY W/ HCPCS (ALT 636 FOR OP/ED): Performed by: INTERNAL MEDICINE

## 2024-09-09 PROCEDURE — 86870 RBC ANTIBODY IDENTIFICATION: CPT

## 2024-09-09 PROCEDURE — 99215 OFFICE O/P EST HI 40 MIN: CPT | Performed by: NURSE PRACTITIONER

## 2024-09-09 PROCEDURE — 85027 COMPLETE CBC AUTOMATED: CPT

## 2024-09-09 PROCEDURE — 84100 ASSAY OF PHOSPHORUS: CPT

## 2024-09-09 PROCEDURE — 86901 BLOOD TYPING SEROLOGIC RH(D): CPT

## 2024-09-09 PROCEDURE — 1111F DSCHRG MED/CURRENT MED MERGE: CPT | Performed by: NURSE PRACTITIONER

## 2024-09-09 PROCEDURE — 83735 ASSAY OF MAGNESIUM: CPT

## 2024-09-09 PROCEDURE — 2500000005 HC RX 250 GENERAL PHARMACY W/O HCPCS: Performed by: INTERNAL MEDICINE

## 2024-09-09 PROCEDURE — 86922 COMPATIBILITY TEST ANTIGLOB: CPT

## 2024-09-09 PROCEDURE — 80053 COMPREHEN METABOLIC PANEL: CPT

## 2024-09-09 PROCEDURE — 86077 PHYS BLOOD BANK SERV XMATCH: CPT | Performed by: PATHOLOGY

## 2024-09-09 PROCEDURE — 1159F MED LIST DOCD IN RCRD: CPT | Performed by: NURSE PRACTITIONER

## 2024-09-09 PROCEDURE — 3079F DIAST BP 80-89 MM HG: CPT | Performed by: NURSE PRACTITIONER

## 2024-09-09 PROCEDURE — 96401 CHEMO ANTI-NEOPL SQ/IM: CPT

## 2024-09-09 PROCEDURE — 84550 ASSAY OF BLOOD/URIC ACID: CPT

## 2024-09-09 RX ORDER — HEPARIN 100 UNIT/ML
500 SYRINGE INTRAVENOUS AS NEEDED
Status: DISCONTINUED | OUTPATIENT
Start: 2024-09-09 | End: 2024-09-09 | Stop reason: HOSPADM

## 2024-09-09 RX ORDER — HEPARIN SODIUM,PORCINE/PF 10 UNIT/ML
50 SYRINGE (ML) INTRAVENOUS AS NEEDED
Status: CANCELLED | OUTPATIENT
Start: 2024-09-09

## 2024-09-09 RX ORDER — ONDANSETRON HYDROCHLORIDE 8 MG/1
8 TABLET, FILM COATED ORAL ONCE
Status: COMPLETED | OUTPATIENT
Start: 2024-09-09 | End: 2024-09-09

## 2024-09-09 RX ORDER — HEPARIN 100 UNIT/ML
500 SYRINGE INTRAVENOUS AS NEEDED
Status: CANCELLED | OUTPATIENT
Start: 2024-09-09

## 2024-09-09 ASSESSMENT — ENCOUNTER SYMPTOMS: FATIGUE: 1

## 2024-09-09 NOTE — PROGRESS NOTES
"Patient ID: Jin Reynolds is a 69 y.o. male.  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 Discharged yesterday after start of cycle 2 leonor/aza with leonor ramp up. Ramp up consisted of: 20mg- 50 mg- 100mg. Tolerated treatment well.   - Plan for post C2 BM scheduled for 9/25/24.    Immunocompromised state (Multi)   Continue Acyclovir, Posaconazole, & Levofloxacin.     PERRY (acute kidney injury) (CMS-HCC)  Resolved; sCr back within normal limits. As of 9/9/24 creatinine was 1.27, which is higher than baseline. Discussed increasing fluid intake and how important it is for kidney function. Pre-ordered 1L NS bolus for 9/10/24. CMP ordered for 9/10/24 as well to reassess kidney function.    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   8/23/2023 Initial Diagnosis    DX:  SMOLDERING SYSTEMIC MASTOCYTOSIS  Presented with skin rash and eosinophilia    BRENDON DIAGNOSTIC CRITERIA  (For SM, Need major and 1 minor OR at least 3 minors)  - Multi-focal, dense infiltrates of MC (>= 15 mast cells in aggregates) in BM and/or other extra-cutaneous organs [major]  - In BM or extracutaneous organs, >25% MC spindle shaped OR have atypical morphology OR of all MC on BM aspirate smears, >25% are immature or atypical [minor]  - Detection of activating mutation KIT D816V in BM, PB, or other extracutaneous organ [minor]  - Serum tryptase persistently exceeds 20 ng/mL (unless associated clonal myeloid disorder, in which this paramenter is not valid) [minor]    \"B\" FINDINGS  - BM biopsy showing >30% infiltration (focal, dense aggregates) and/or serum tryptase > 200 ng/mL  - Signs of dysplasia or myeloproliferation, in non-MC lineages but insufficient criteria for " "definitive diagnosis of AHNMD with normal or slightly abnormal blood counts    \"C\" FINDINGS  None       8/23/2023 Biopsy    BONE MARROW (8/23/23)  Hypercellular (90-95%) with trilineage hematopoiesis, granulocytic hyperplasia, eosinophilia, and increased atypical mast cells  IP:  CD34+, +, CD7(bright)+, cCD3-, CD33-, CD15-, CD13+ blast population  IHC:  + increased spindle-shaped mast cells (15% cellularity), CD2-  Myeloid NGS:  CBL (31%), cKIT D816V (30%), RUNX1 x 2 (19%, 29%), SRSF2 (47%) [ASXL1 negative]  FISH:  PDGFRb negative    SERUM TRYPTASE  228 (8/23/23)  268 (9/5/23)     2/13/2024 -  Molecular Therapy    AVAPRITINIB   - Starting dose 25 mg daily (non-Adv SM)     7/16/2024 -  Chemotherapy    C1D1- 7/24/2024  - Early TLS with initial treatment requiring rasburicase, allopurinol and IVF. Venetoclax held as of D2 and for the remainder of C1.   - Post C1 BM assessment done showing persistent disease with 27% blasts.  Venetoclax / AzaCITIDine, 28 Day Cycles - Induction / Consolidation     Acute myeloid leukemia not having achieved remission (Multi)   6/27/2024 Initial Diagnosis    ICC 2022 DX: Acute myeloid leukemia, NOS (>=20% blasts)  [id number] AML Risk Stratification: INTERMEDIATE: Cytogenetic and/or molecular abnormalities not classified as favorable or adverse  Presented with pancytopenia and circulating blasts    BONE MARROW (06/27/2024)  Marrow replaced by blasts, fibrotic foci, some atypical + cells; 7-8% circulating blasts; aspicular  - Unable to perform additional studies due to aspicular specimen    PERIPHERAL BLOOD (6/27/2024)  FISH:  positive for gain RUNX1    PERIPHERAL BLOOD (7/1/24)  IP:  abnl myeloblast population (CD34+, CD7+, CD4+, CD13+, CD38+, CD11+ dim, HLA=DR+, TdT+)  Myeloid NGS: CBL C404Y (41%), KIT D816V (8%), RUNX1 x2 (8%, 30%), SRSF2 (37%)     7/16/2024 -  Chemotherapy    C1D1- 7/24/2024  - Early TLS with initial treatment requiring rasburicase, allopurinol and IVF. " Venetoclax held as of D2 and for the remainder of C1.   - Post C1 BM assessment done showing persistent disease with 27% blasts.  Venetoclax / AzaCITIDine, 28 Day Cycles - Induction / Consolidation        SUBJECTIVE:  Jin Reynolds presents today for follow up and count check after being discharged on 9/8 for C2 of Jeremy/Aza for Jeremy ramp up dosing and monitoring for TLS.    Feels good but a little fatigue after returning home yesterday. Eating and drinking well. Denies N/V/D. Denies HA, lightheadedness or dizziness.       Review of Systems   Constitutional:  Positive for fatigue.   HENT: Negative.     Eyes: Negative.    Respiratory: Negative.     Cardiovascular: Negative.    Gastrointestinal: Negative.    Endocrine: Negative.    Genitourinary: Negative.    Musculoskeletal: Negative.    Skin: Negative.        OBJECTIVE:  KPS: Karnofsky Score: 90 - Able to carry on normal activity; minor signs or symptoms of disease    VS:  /81 (BP Location: Right arm, Patient Position: Sitting, BP Cuff Size: Adult)   Pulse 79   Temp 35.9 °C (96.6 °F) (Temporal)   Resp 18   Wt 90.9 kg (200 lb 6.4 oz)   SpO2 97%   BMI 31.75 kg/m²   BSA: 2.07 meters squared    Physical Exam  Vitals reviewed.   Constitutional:       Appearance: Normal appearance.   HENT:      Head: Normocephalic and atraumatic.      Mouth/Throat:      Mouth: Mucous membranes are moist.   Eyes:      Conjunctiva/sclera: Conjunctivae normal.      Pupils: Pupils are equal, round, and reactive to light.   Cardiovascular:      Rate and Rhythm: Normal rate and regular rhythm.      Pulses: Normal pulses.      Heart sounds: Normal heart sounds.   Pulmonary:      Effort: Pulmonary effort is normal.      Breath sounds: Normal breath sounds.   Abdominal:      General: Abdomen is flat. Bowel sounds are normal.      Palpations: Abdomen is soft.   Musculoskeletal:         General: Normal range of motion.      Cervical back: Normal range of motion and neck supple.   Skin:      General: Skin is warm and dry.   Neurological:      General: No focal deficit present.      Mental Status: He is alert and oriented to person, place, and time.   Psychiatric:         Mood and Affect: Mood normal.         Behavior: Behavior normal.         Thought Content: Thought content normal.         Judgment: Judgment normal.         Current Outpatient Medications   Medication Instructions    acyclovir (ZOVIRAX) 400 mg, oral, 2 times daily    allopurinol (ZYLOPRIM) 300 mg, oral, Daily    amLODIPine (NORVASC) 10 mg, oral, Daily    calcium carbonate-vitamin D3 500 mg-5 mcg (200 unit) tablet 1 tablet, oral, Daily    famotidine (PEPCID) 20 mg, oral, Daily    fexofenadine (ALLEGRA) 180 mg, oral, Daily    levoFLOXacin (LEVAQUIN) 500 mg, oral, Daily    montelukast (SINGULAIR) 10 mg, oral, Daily    ondansetron (ZOFRAN) 8 mg, oral, Every 8 hours PRN    pantoprazole (PROTONIX) 40 mg, oral, Daily before breakfast, Do not crush, chew, or split.    posaconazole (NOXAFIL) 300 mg, oral, Daily with breakfast, Do not crush, chew, or split.    prochlorperazine (COMPAZINE) 10 mg, oral, Every 6 hours PRN    sodium chloride (Saline Mist) 0.65 % nasal spray 1 spray, Each Nostril, 2 times daily    venetoclax (VENCLEXTA) 100 mg, oral, Daily, Take with food.        Laboratory:  The pertinent laboratory results were reviewed and discussed with the patient.    Lab Results   Component Value Date    WBC 1.0 (LL) 09/09/2024    HCT 22.5 (L) 09/09/2024    HGB 8.0 (L) 09/09/2024    PLT 17 (LL) 09/09/2024    K 3.9 09/09/2024    CALCIUM 8.1 (L) 09/09/2024     (L) 09/09/2024    MG 2.06 09/09/2024    BILITOT 0.9 09/09/2024    ALT 10 09/09/2024    AST 13 09/09/2024    BUN 21 09/09/2024    CREATININE 1.27 09/09/2024    PHOS 3.0 09/09/2024      Note: for a comprehensive list of the patient's lab results, access the Results Review activity.    RTC: 9/10 (Infusion)    ROSA Massey-CNP

## 2024-09-09 NOTE — PROGRESS NOTES
Pt presents to OP infusion for D5C2 azacitidine. Labs drawn via implanted chest port. Pt tolerated both injections without incidence. Redness noted to previous injection sites. Patient reports no new complaints at this visit. Patient discharged to home in stable condition and is agreeable to the upcoming treatment schedule that he follows via My Chart.

## 2024-09-10 ENCOUNTER — APPOINTMENT (OUTPATIENT)
Dept: HEMATOLOGY/ONCOLOGY | Facility: HOSPITAL | Age: 69
End: 2024-09-10
Payer: MEDICARE

## 2024-09-10 ENCOUNTER — INFUSION (OUTPATIENT)
Dept: HEMATOLOGY/ONCOLOGY | Facility: HOSPITAL | Age: 69
End: 2024-09-10
Payer: MEDICARE

## 2024-09-10 VITALS
TEMPERATURE: 98.6 F | RESPIRATION RATE: 18 BRPM | HEART RATE: 78 BPM | DIASTOLIC BLOOD PRESSURE: 80 MMHG | WEIGHT: 202 LBS | SYSTOLIC BLOOD PRESSURE: 145 MMHG | BODY MASS INDEX: 32.01 KG/M2 | OXYGEN SATURATION: 99 %

## 2024-09-10 DIAGNOSIS — D47.02 SYSTEMIC MASTOCYTOSIS WITH ASSOCIATED CLONAL HEMATOLOGICAL NON-MAST CELL LINEAGE DISEASE: ICD-10-CM

## 2024-09-10 DIAGNOSIS — C92.00 ACUTE MYELOID LEUKEMIA NOT HAVING ACHIEVED REMISSION (MULTI): ICD-10-CM

## 2024-09-10 DIAGNOSIS — C95.00 ACUTE LEUKEMIA NOT HAVING ACHIEVED REMISSION (MULTI): ICD-10-CM

## 2024-09-10 LAB
ALBUMIN SERPL BCP-MCNC: 3.8 G/DL (ref 3.4–5)
ALP SERPL-CCNC: 74 U/L (ref 33–136)
ALT SERPL W P-5'-P-CCNC: 9 U/L (ref 10–52)
ANION GAP SERPL CALC-SCNC: 9 MMOL/L (ref 10–20)
AST SERPL W P-5'-P-CCNC: 13 U/L (ref 9–39)
BASOPHILS # BLD AUTO: 0.01 X10*3/UL (ref 0–0.1)
BASOPHILS NFR BLD AUTO: 0.9 %
BB ANTIBODY IDENTIFICATION: NORMAL
BILIRUB SERPL-MCNC: 0.8 MG/DL (ref 0–1.2)
BUN SERPL-MCNC: 21 MG/DL (ref 6–23)
BURR CELLS BLD QL SMEAR: NORMAL
CALCIUM SERPL-MCNC: 8 MG/DL (ref 8.6–10.3)
CASE #: NORMAL
CHLORIDE SERPL-SCNC: 110 MMOL/L (ref 98–107)
CO2 SERPL-SCNC: 21 MMOL/L (ref 21–32)
CREAT SERPL-MCNC: 1.25 MG/DL (ref 0.5–1.3)
EGFRCR SERPLBLD CKD-EPI 2021: 63 ML/MIN/1.73M*2
EOSINOPHIL # BLD AUTO: 0.14 X10*3/UL (ref 0–0.7)
EOSINOPHIL NFR BLD AUTO: 12.4 %
ERYTHROCYTE [DISTWIDTH] IN BLOOD BY AUTOMATED COUNT: 17.6 % (ref 11.5–14.5)
GLUCOSE SERPL-MCNC: 104 MG/DL (ref 74–99)
HCT VFR BLD AUTO: 19.7 % (ref 41–52)
HGB BLD-MCNC: 6.9 G/DL (ref 13.5–17.5)
IMM GRANULOCYTES # BLD AUTO: 0.01 X10*3/UL (ref 0–0.7)
IMM GRANULOCYTES NFR BLD AUTO: 0.9 % (ref 0–0.9)
LYMPHOCYTES # BLD AUTO: 0.54 X10*3/UL (ref 1.2–4.8)
LYMPHOCYTES NFR BLD AUTO: 47.8 %
MCH RBC QN AUTO: 30.1 PG (ref 26–34)
MCHC RBC AUTO-ENTMCNC: 35 G/DL (ref 32–36)
MCV RBC AUTO: 86 FL (ref 80–100)
MONOCYTES # BLD AUTO: 0.11 X10*3/UL (ref 0.1–1)
MONOCYTES NFR BLD AUTO: 9.7 %
NEUTROPHILS # BLD AUTO: 0.32 X10*3/UL (ref 1.2–7.7)
NEUTROPHILS NFR BLD AUTO: 28.3 %
NRBC BLD-RTO: 0 /100 WBCS (ref 0–0)
OVALOCYTES BLD QL SMEAR: NORMAL
PLATELET # BLD AUTO: 14 X10*3/UL (ref 150–450)
POLYCHROMASIA BLD QL SMEAR: NORMAL
POTASSIUM SERPL-SCNC: 4.1 MMOL/L (ref 3.5–5.3)
PROT SERPL-MCNC: 6.6 G/DL (ref 6.4–8.2)
RBC # BLD AUTO: 2.29 X10*6/UL (ref 4.5–5.9)
RBC MORPH BLD: NORMAL
SCHISTOCYTES BLD QL SMEAR: NORMAL
SODIUM SERPL-SCNC: 136 MMOL/L (ref 136–145)
WBC # BLD AUTO: 1.1 X10*3/UL (ref 4.4–11.3)

## 2024-09-10 PROCEDURE — 85025 COMPLETE CBC W/AUTO DIFF WBC: CPT

## 2024-09-10 PROCEDURE — 96401 CHEMO ANTI-NEOPL SQ/IM: CPT

## 2024-09-10 PROCEDURE — 2500000004 HC RX 250 GENERAL PHARMACY W/ HCPCS (ALT 636 FOR OP/ED): Performed by: INTERNAL MEDICINE

## 2024-09-10 PROCEDURE — 2500000005 HC RX 250 GENERAL PHARMACY W/O HCPCS: Performed by: INTERNAL MEDICINE

## 2024-09-10 PROCEDURE — 2500000004 HC RX 250 GENERAL PHARMACY W/ HCPCS (ALT 636 FOR OP/ED)

## 2024-09-10 PROCEDURE — 96360 HYDRATION IV INFUSION INIT: CPT | Mod: INF

## 2024-09-10 PROCEDURE — 80053 COMPREHEN METABOLIC PANEL: CPT

## 2024-09-10 RX ORDER — ONDANSETRON HYDROCHLORIDE 8 MG/1
8 TABLET, FILM COATED ORAL ONCE
Status: COMPLETED | OUTPATIENT
Start: 2024-09-10 | End: 2024-09-10

## 2024-09-10 RX ORDER — HEPARIN 100 UNIT/ML
500 SYRINGE INTRAVENOUS AS NEEDED
Status: CANCELLED | OUTPATIENT
Start: 2024-09-10

## 2024-09-10 RX ORDER — ALBUTEROL SULFATE 0.83 MG/ML
3 SOLUTION RESPIRATORY (INHALATION) AS NEEDED
OUTPATIENT
Start: 2024-09-10

## 2024-09-10 RX ORDER — DIPHENHYDRAMINE HYDROCHLORIDE 50 MG/ML
50 INJECTION INTRAMUSCULAR; INTRAVENOUS AS NEEDED
Status: DISCONTINUED | OUTPATIENT
Start: 2024-09-10 | End: 2024-09-10 | Stop reason: HOSPADM

## 2024-09-10 RX ORDER — HEPARIN 100 UNIT/ML
500 SYRINGE INTRAVENOUS AS NEEDED
Status: DISCONTINUED | OUTPATIENT
Start: 2024-09-10 | End: 2024-09-10 | Stop reason: HOSPADM

## 2024-09-10 RX ORDER — ALBUTEROL SULFATE 0.83 MG/ML
3 SOLUTION RESPIRATORY (INHALATION) AS NEEDED
Status: DISCONTINUED | OUTPATIENT
Start: 2024-09-10 | End: 2024-09-10 | Stop reason: HOSPADM

## 2024-09-10 RX ORDER — EPINEPHRINE 0.3 MG/.3ML
0.3 INJECTION SUBCUTANEOUS EVERY 5 MIN PRN
OUTPATIENT
Start: 2024-09-10

## 2024-09-10 RX ORDER — PROCHLORPERAZINE EDISYLATE 5 MG/ML
10 INJECTION INTRAMUSCULAR; INTRAVENOUS EVERY 6 HOURS PRN
Status: DISCONTINUED | OUTPATIENT
Start: 2024-09-10 | End: 2024-09-10 | Stop reason: HOSPADM

## 2024-09-10 RX ORDER — HEPARIN SODIUM,PORCINE/PF 10 UNIT/ML
50 SYRINGE (ML) INTRAVENOUS AS NEEDED
Status: CANCELLED | OUTPATIENT
Start: 2024-09-10

## 2024-09-10 RX ORDER — PROCHLORPERAZINE MALEATE 10 MG
10 TABLET ORAL EVERY 6 HOURS PRN
Status: DISCONTINUED | OUTPATIENT
Start: 2024-09-10 | End: 2024-09-10 | Stop reason: HOSPADM

## 2024-09-10 RX ORDER — FAMOTIDINE 10 MG/ML
20 INJECTION INTRAVENOUS ONCE AS NEEDED
Status: DISCONTINUED | OUTPATIENT
Start: 2024-09-10 | End: 2024-09-10 | Stop reason: HOSPADM

## 2024-09-10 RX ORDER — DIPHENHYDRAMINE HYDROCHLORIDE 50 MG/ML
50 INJECTION INTRAMUSCULAR; INTRAVENOUS AS NEEDED
OUTPATIENT
Start: 2024-09-10

## 2024-09-10 RX ORDER — EPINEPHRINE 0.3 MG/.3ML
0.3 INJECTION SUBCUTANEOUS EVERY 5 MIN PRN
Status: DISCONTINUED | OUTPATIENT
Start: 2024-09-10 | End: 2024-09-10 | Stop reason: HOSPADM

## 2024-09-10 RX ORDER — FAMOTIDINE 10 MG/ML
20 INJECTION INTRAVENOUS ONCE AS NEEDED
OUTPATIENT
Start: 2024-09-10

## 2024-09-10 ASSESSMENT — PAIN SCALES - GENERAL: PAINLEVEL: 0-NO PAIN

## 2024-09-10 ASSESSMENT — ENCOUNTER SYMPTOMS
EYES NEGATIVE: 1
MUSCULOSKELETAL NEGATIVE: 1
ENDOCRINE NEGATIVE: 1
RESPIRATORY NEGATIVE: 1
GASTROINTESTINAL NEGATIVE: 1
CARDIOVASCULAR NEGATIVE: 1

## 2024-09-10 NOTE — PROGRESS NOTES
Jin Reynolds presents to OP Infusion for day 6 cycle 2 of Azacitidine injections.  He also had labs drawn and 1 L NaCl fluid bolus.  His hgb 6.9, blood pre-ordered for his visit tomorrow.  He tolerated all treatments without incident.  His port was de-accessed and flushed per protocol. Bacitracin was applied to small open spot above port from previous dressing, detachol was used to remove port dressing to prevent further breakdown.He was discharged home in stable condition.

## 2024-09-11 ENCOUNTER — INFUSION (OUTPATIENT)
Dept: HEMATOLOGY/ONCOLOGY | Facility: HOSPITAL | Age: 69
End: 2024-09-11
Payer: MEDICARE

## 2024-09-11 VITALS
HEART RATE: 72 BPM | TEMPERATURE: 97 F | RESPIRATION RATE: 16 BRPM | BODY MASS INDEX: 31.51 KG/M2 | SYSTOLIC BLOOD PRESSURE: 133 MMHG | DIASTOLIC BLOOD PRESSURE: 75 MMHG | WEIGHT: 198.85 LBS | OXYGEN SATURATION: 100 %

## 2024-09-11 DIAGNOSIS — C92.00 ACUTE MYELOID LEUKEMIA NOT HAVING ACHIEVED REMISSION (MULTI): ICD-10-CM

## 2024-09-11 DIAGNOSIS — C95.00 ACUTE LEUKEMIA NOT HAVING ACHIEVED REMISSION (MULTI): ICD-10-CM

## 2024-09-11 DIAGNOSIS — M81.8 OTHER OSTEOPOROSIS WITHOUT CURRENT PATHOLOGICAL FRACTURE: ICD-10-CM

## 2024-09-11 DIAGNOSIS — D47.02 SYSTEMIC MASTOCYTOSIS WITH ASSOCIATED CLONAL HEMATOLOGICAL NON-MAST CELL LINEAGE DISEASE: ICD-10-CM

## 2024-09-11 LAB
ALBUMIN SERPL BCP-MCNC: 3.9 G/DL (ref 3.4–5)
ALP SERPL-CCNC: 73 U/L (ref 33–136)
ALT SERPL W P-5'-P-CCNC: 10 U/L (ref 10–52)
ANION GAP SERPL CALC-SCNC: 10 MMOL/L (ref 10–20)
AST SERPL W P-5'-P-CCNC: 14 U/L (ref 9–39)
BB ANTIBODY IDENTIFICATION: NORMAL
BILIRUB SERPL-MCNC: 0.8 MG/DL (ref 0–1.2)
BLOOD EXPIRATION DATE: NORMAL
BUN SERPL-MCNC: 17 MG/DL (ref 6–23)
CALCIUM SERPL-MCNC: 8.3 MG/DL (ref 8.6–10.3)
CASE #: NORMAL
CHLORIDE SERPL-SCNC: 109 MMOL/L (ref 98–107)
CO2 SERPL-SCNC: 21 MMOL/L (ref 21–32)
CREAT SERPL-MCNC: 1.23 MG/DL (ref 0.5–1.3)
DISPENSE STATUS: NORMAL
EGFRCR SERPLBLD CKD-EPI 2021: 64 ML/MIN/1.73M*2
GLUCOSE SERPL-MCNC: 93 MG/DL (ref 74–99)
PATH REV-IMMUNOHEMATOLOGY-PR30: NORMAL
POTASSIUM SERPL-SCNC: 4 MMOL/L (ref 3.5–5.3)
PRODUCT BLOOD TYPE: 5100
PRODUCT CODE: NORMAL
PROT SERPL-MCNC: 6.7 G/DL (ref 6.4–8.2)
SODIUM SERPL-SCNC: 136 MMOL/L (ref 136–145)
UNIT ABO: NORMAL
UNIT NUMBER: NORMAL
UNIT RH: NORMAL
UNIT VOLUME: 287
XM INTEP: NORMAL

## 2024-09-11 PROCEDURE — 36430 TRANSFUSION BLD/BLD COMPNT: CPT

## 2024-09-11 PROCEDURE — 2500000004 HC RX 250 GENERAL PHARMACY W/ HCPCS (ALT 636 FOR OP/ED): Performed by: INTERNAL MEDICINE

## 2024-09-11 PROCEDURE — P9040 RBC LEUKOREDUCED IRRADIATED: HCPCS

## 2024-09-11 PROCEDURE — 80053 COMPREHEN METABOLIC PANEL: CPT

## 2024-09-11 PROCEDURE — 2500000005 HC RX 250 GENERAL PHARMACY W/O HCPCS: Performed by: INTERNAL MEDICINE

## 2024-09-11 PROCEDURE — 96401 CHEMO ANTI-NEOPL SQ/IM: CPT

## 2024-09-11 RX ORDER — PROCHLORPERAZINE MALEATE 10 MG
10 TABLET ORAL EVERY 6 HOURS PRN
Status: DISCONTINUED | OUTPATIENT
Start: 2024-09-11 | End: 2024-09-11 | Stop reason: HOSPADM

## 2024-09-11 RX ORDER — HEPARIN 100 UNIT/ML
500 SYRINGE INTRAVENOUS AS NEEDED
Status: DISCONTINUED | OUTPATIENT
Start: 2024-09-11 | End: 2024-09-11 | Stop reason: HOSPADM

## 2024-09-11 RX ORDER — PROCHLORPERAZINE EDISYLATE 5 MG/ML
10 INJECTION INTRAMUSCULAR; INTRAVENOUS EVERY 6 HOURS PRN
Status: DISCONTINUED | OUTPATIENT
Start: 2024-09-11 | End: 2024-09-11 | Stop reason: HOSPADM

## 2024-09-11 RX ORDER — HEPARIN SODIUM,PORCINE/PF 10 UNIT/ML
50 SYRINGE (ML) INTRAVENOUS AS NEEDED
OUTPATIENT
Start: 2024-09-11

## 2024-09-11 RX ORDER — DIPHENHYDRAMINE HYDROCHLORIDE 50 MG/ML
50 INJECTION INTRAMUSCULAR; INTRAVENOUS AS NEEDED
Status: DISCONTINUED | OUTPATIENT
Start: 2024-09-11 | End: 2024-09-11 | Stop reason: HOSPADM

## 2024-09-11 RX ORDER — ALBUTEROL SULFATE 0.83 MG/ML
3 SOLUTION RESPIRATORY (INHALATION) AS NEEDED
Status: DISCONTINUED | OUTPATIENT
Start: 2024-09-11 | End: 2024-09-11 | Stop reason: HOSPADM

## 2024-09-11 RX ORDER — ONDANSETRON HYDROCHLORIDE 8 MG/1
8 TABLET, FILM COATED ORAL ONCE
Status: COMPLETED | OUTPATIENT
Start: 2024-09-11 | End: 2024-09-11

## 2024-09-11 RX ORDER — EPINEPHRINE 0.3 MG/.3ML
0.3 INJECTION SUBCUTANEOUS EVERY 5 MIN PRN
Status: DISCONTINUED | OUTPATIENT
Start: 2024-09-11 | End: 2024-09-11 | Stop reason: HOSPADM

## 2024-09-11 RX ORDER — FAMOTIDINE 10 MG/ML
20 INJECTION INTRAVENOUS ONCE AS NEEDED
Status: DISCONTINUED | OUTPATIENT
Start: 2024-09-11 | End: 2024-09-11 | Stop reason: HOSPADM

## 2024-09-11 RX ORDER — HEPARIN 100 UNIT/ML
500 SYRINGE INTRAVENOUS AS NEEDED
OUTPATIENT
Start: 2024-09-11

## 2024-09-11 NOTE — PROGRESS NOTES
[facility] Infusion Nursing Note  09/11/24    Jin Reynolds is a 69 y.o. year old male patient presenting to outpatient infusion for cycle 2 day 7 of the following regimen:    Treatment Plans       Name Type Plan Dates Plan Provider         Active    Venetoclax / AzaCITIDine, 28 Day Cycles - Induction / Consolidation Oncology Treatment  7/16/2024 - Present Nika Dawson MD                  Since the last visit, he reports doing well. Overall, he states that energy level is energy level is good. Appetite has been unchanged and good. he reports no complaints.     1 unit prbc transfused per order. Vidaza given subcutaneous, one injection in each side of lower abdomen per pt request.     Line type: R chest mediport  Line removed/maintained prior to discharge: heplocked and deaccessed    Administrations This Visit       azaCITIDine (Vidaza) subQ syringe 155 mg       Admin Date  09/11/2024 Action  Given Dose  155 mg Route  subcutaneous Documented By  Jaylene Gonzalez RN              heparin flush 100 unit/mL syringe 500 Units       Admin Date  09/11/2024 Action  Given Dose  500 Units Route  intra-catheter Documented By  Jaylene Gonzalez RN              ondansetron (Zofran) tablet 8 mg       Admin Date  09/11/2024 Action  Given Dose  8 mg Route  oral Documented By  Jaylene Gonzalez RN                  Hypersensitivity reaction noted: No  Patient tolerated treatment well. Discharged home in stable condition.    Follow-up Plan: tomorrow Minoff ct check    Jaylene Gonzalez RN

## 2024-09-12 ENCOUNTER — APPOINTMENT (OUTPATIENT)
Dept: HEMATOLOGY/ONCOLOGY | Facility: CLINIC | Age: 69
End: 2024-09-12
Payer: MEDICARE

## 2024-09-13 LAB
CHROM ANALY OVERALL INTERP-IMP: NORMAL
ELECTRONICALLY SIGNED BY CYTOGENETICS: NORMAL

## 2024-09-16 ENCOUNTER — TELEPHONE (OUTPATIENT)
Dept: HEMATOLOGY/ONCOLOGY | Facility: CLINIC | Age: 69
End: 2024-09-16

## 2024-09-16 ENCOUNTER — INFUSION (OUTPATIENT)
Dept: HEMATOLOGY/ONCOLOGY | Facility: CLINIC | Age: 69
End: 2024-09-16
Payer: MEDICARE

## 2024-09-16 VITALS
DIASTOLIC BLOOD PRESSURE: 79 MMHG | TEMPERATURE: 97.5 F | HEART RATE: 86 BPM | OXYGEN SATURATION: 98 % | SYSTOLIC BLOOD PRESSURE: 128 MMHG | RESPIRATION RATE: 18 BRPM

## 2024-09-16 DIAGNOSIS — C92.00 ACUTE MYELOID LEUKEMIA NOT HAVING ACHIEVED REMISSION (MULTI): ICD-10-CM

## 2024-09-16 DIAGNOSIS — C95.00 ACUTE LEUKEMIA NOT HAVING ACHIEVED REMISSION (MULTI): ICD-10-CM

## 2024-09-16 DIAGNOSIS — D47.02 SYSTEMIC MASTOCYTOSIS WITH ASSOCIATED CLONAL HEMATOLOGICAL NON-MAST CELL LINEAGE DISEASE: ICD-10-CM

## 2024-09-16 LAB
ABO GROUP (TYPE) IN BLOOD: NORMAL
ALBUMIN SERPL BCP-MCNC: 3.9 G/DL (ref 3.4–5)
ALP SERPL-CCNC: 71 U/L (ref 33–136)
ALT SERPL W P-5'-P-CCNC: 9 U/L (ref 10–52)
ANION GAP SERPL CALC-SCNC: 11 MMOL/L (ref 10–20)
ANTIBODY SCREEN: NORMAL
AST SERPL W P-5'-P-CCNC: 12 U/L (ref 9–39)
BASOPHILS # BLD AUTO: 0.01 X10*3/UL (ref 0–0.1)
BASOPHILS NFR BLD AUTO: 1.1 %
BILIRUB SERPL-MCNC: 1.1 MG/DL (ref 0–1.2)
BUN SERPL-MCNC: 18 MG/DL (ref 6–23)
BURR CELLS BLD QL SMEAR: NORMAL
CALCIUM SERPL-MCNC: 7.7 MG/DL (ref 8.6–10.6)
CHLORIDE SERPL-SCNC: 111 MMOL/L (ref 98–107)
CO2 SERPL-SCNC: 22 MMOL/L (ref 21–32)
CREAT SERPL-MCNC: 1.21 MG/DL (ref 0.5–1.3)
EGFRCR SERPLBLD CKD-EPI 2021: 65 ML/MIN/1.73M*2
EOSINOPHIL # BLD AUTO: 0.08 X10*3/UL (ref 0–0.7)
EOSINOPHIL NFR BLD AUTO: 8.9 %
ERYTHROCYTE [DISTWIDTH] IN BLOOD BY AUTOMATED COUNT: 16.2 % (ref 11.5–14.5)
GLUCOSE SERPL-MCNC: 98 MG/DL (ref 74–99)
HCT VFR BLD AUTO: 17.4 % (ref 41–52)
HGB BLD-MCNC: 6.1 G/DL (ref 13.5–17.5)
IMM GRANULOCYTES # BLD AUTO: 0.01 X10*3/UL (ref 0–0.7)
IMM GRANULOCYTES NFR BLD AUTO: 1.1 % (ref 0–0.9)
LYMPHOCYTES # BLD AUTO: 0.52 X10*3/UL (ref 1.2–4.8)
LYMPHOCYTES NFR BLD AUTO: 57.8 %
MCH RBC QN AUTO: 29.8 PG (ref 26–34)
MCHC RBC AUTO-ENTMCNC: 35.1 G/DL (ref 32–36)
MCV RBC AUTO: 85 FL (ref 80–100)
MONOCYTES # BLD AUTO: 0.06 X10*3/UL (ref 0.1–1)
MONOCYTES NFR BLD AUTO: 6.7 %
NEUTROPHILS # BLD AUTO: 0.22 X10*3/UL (ref 1.2–7.7)
NEUTROPHILS NFR BLD AUTO: 24.4 %
NRBC BLD-RTO: ABNORMAL /100{WBCS}
OVALOCYTES BLD QL SMEAR: NORMAL
PLATELET # BLD AUTO: 5 X10*3/UL (ref 150–450)
POLYCHROMASIA BLD QL SMEAR: NORMAL
POTASSIUM SERPL-SCNC: 3.6 MMOL/L (ref 3.5–5.3)
PROT SERPL-MCNC: 6.8 G/DL (ref 6.4–8.2)
RBC # BLD AUTO: 2.05 X10*6/UL (ref 4.5–5.9)
RBC MORPH BLD: NORMAL
RH FACTOR (ANTIGEN D): NORMAL
SCHISTOCYTES BLD QL SMEAR: NORMAL
SODIUM SERPL-SCNC: 140 MMOL/L (ref 136–145)
WBC # BLD AUTO: 0.9 X10*3/UL (ref 4.4–11.3)

## 2024-09-16 PROCEDURE — 36591 DRAW BLOOD OFF VENOUS DEVICE: CPT

## 2024-09-16 PROCEDURE — 86902 BLOOD TYPE ANTIGEN DONOR EA: CPT

## 2024-09-16 PROCEDURE — 86901 BLOOD TYPING SEROLOGIC RH(D): CPT

## 2024-09-16 PROCEDURE — 86870 RBC ANTIBODY IDENTIFICATION: CPT

## 2024-09-16 PROCEDURE — 85025 COMPLETE CBC W/AUTO DIFF WBC: CPT

## 2024-09-16 PROCEDURE — 80053 COMPREHEN METABOLIC PANEL: CPT

## 2024-09-16 PROCEDURE — 86922 COMPATIBILITY TEST ANTIGLOB: CPT

## 2024-09-16 PROCEDURE — 2500000004 HC RX 250 GENERAL PHARMACY W/ HCPCS (ALT 636 FOR OP/ED): Performed by: INTERNAL MEDICINE

## 2024-09-16 RX ORDER — HEPARIN 100 UNIT/ML
500 SYRINGE INTRAVENOUS AS NEEDED
Status: DISCONTINUED | OUTPATIENT
Start: 2024-09-16 | End: 2024-09-16 | Stop reason: HOSPADM

## 2024-09-16 RX ORDER — HEPARIN 100 UNIT/ML
500 SYRINGE INTRAVENOUS AS NEEDED
Status: CANCELLED | OUTPATIENT
Start: 2024-09-16

## 2024-09-16 RX ORDER — FAMOTIDINE 10 MG/ML
20 INJECTION INTRAVENOUS ONCE AS NEEDED
Status: CANCELLED | OUTPATIENT
Start: 2024-09-16

## 2024-09-16 RX ORDER — DIPHENHYDRAMINE HYDROCHLORIDE 50 MG/ML
50 INJECTION INTRAMUSCULAR; INTRAVENOUS AS NEEDED
Status: CANCELLED | OUTPATIENT
Start: 2024-09-16

## 2024-09-16 RX ORDER — HEPARIN SODIUM,PORCINE/PF 10 UNIT/ML
50 SYRINGE (ML) INTRAVENOUS AS NEEDED
Status: CANCELLED | OUTPATIENT
Start: 2024-09-16

## 2024-09-16 RX ORDER — EPINEPHRINE 0.3 MG/.3ML
0.3 INJECTION SUBCUTANEOUS EVERY 5 MIN PRN
Status: CANCELLED | OUTPATIENT
Start: 2024-09-16

## 2024-09-16 RX ORDER — ALBUTEROL SULFATE 0.83 MG/ML
3 SOLUTION RESPIRATORY (INHALATION) AS NEEDED
Status: CANCELLED | OUTPATIENT
Start: 2024-09-16

## 2024-09-16 ASSESSMENT — PAIN SCALES - GENERAL: PAINLEVEL: 0-NO PAIN

## 2024-09-17 ENCOUNTER — INFUSION (OUTPATIENT)
Dept: HEMATOLOGY/ONCOLOGY | Facility: CLINIC | Age: 69
End: 2024-09-17
Payer: MEDICARE

## 2024-09-17 ENCOUNTER — LAB REQUISITION (OUTPATIENT)
Dept: LAB | Facility: CLINIC | Age: 69
DRG: 837 | End: 2024-09-17
Payer: MEDICARE

## 2024-09-17 VITALS
OXYGEN SATURATION: 100 % | DIASTOLIC BLOOD PRESSURE: 78 MMHG | SYSTOLIC BLOOD PRESSURE: 132 MMHG | HEART RATE: 72 BPM | RESPIRATION RATE: 18 BRPM | WEIGHT: 199.74 LBS | BODY MASS INDEX: 31.65 KG/M2 | TEMPERATURE: 96.8 F

## 2024-09-17 DIAGNOSIS — M81.8 OTHER OSTEOPOROSIS WITHOUT CURRENT PATHOLOGICAL FRACTURE: ICD-10-CM

## 2024-09-17 DIAGNOSIS — D47.02 SYSTEMIC MASTOCYTOSIS WITH ASSOCIATED CLONAL HEMATOLOGICAL NON-MAST CELL LINEAGE DISEASE: ICD-10-CM

## 2024-09-17 DIAGNOSIS — C92.00 ACUTE MYELOID LEUKEMIA NOT HAVING ACHIEVED REMISSION (MULTI): ICD-10-CM

## 2024-09-17 DIAGNOSIS — C95.00 ACUTE LEUKEMIA NOT HAVING ACHIEVED REMISSION (MULTI): ICD-10-CM

## 2024-09-17 DIAGNOSIS — C92.00 ACUTE MYELOBLASTIC LEUKEMIA, NOT HAVING ACHIEVED REMISSION (MULTI): ICD-10-CM

## 2024-09-17 LAB
BB ANTIBODY IDENTIFICATION: NORMAL
BLOOD EXPIRATION DATE: NORMAL
BLOOD EXPIRATION DATE: NORMAL
CASE #: NORMAL
DISPENSE STATUS: NORMAL
DISPENSE STATUS: NORMAL
DNA CLASS I + II VERIFICATION TYPING: NORMAL
HLA CLS I TYP PNL BLD/T DONR HIGH RES: NORMAL
HLA RESULTS: NORMAL
HLA RESULTS: NORMAL
HLA-DP2 QL: NORMAL
HLA-DQB1 HIGH RES: NORMAL
HLA-DRB1 HIGH RES: NORMAL
PRODUCT BLOOD TYPE: 6200
PRODUCT BLOOD TYPE: 9500
PRODUCT CODE: NORMAL
PRODUCT CODE: NORMAL
UNIT ABO: NORMAL
UNIT ABO: NORMAL
UNIT NUMBER: NORMAL
UNIT NUMBER: NORMAL
UNIT RH: NORMAL
UNIT RH: NORMAL
UNIT VOLUME: 203
UNIT VOLUME: 350
XM INTEP: NORMAL

## 2024-09-17 PROCEDURE — P9040 RBC LEUKOREDUCED IRRADIATED: HCPCS

## 2024-09-17 PROCEDURE — 36430 TRANSFUSION BLD/BLD COMPNT: CPT

## 2024-09-17 PROCEDURE — 2500000004 HC RX 250 GENERAL PHARMACY W/ HCPCS (ALT 636 FOR OP/ED): Performed by: INTERNAL MEDICINE

## 2024-09-17 PROCEDURE — P9073 PLATELETS PHERESIS PATH REDU: HCPCS

## 2024-09-17 RX ORDER — HEPARIN 100 UNIT/ML
500 SYRINGE INTRAVENOUS AS NEEDED
Status: CANCELLED | OUTPATIENT
Start: 2024-09-17

## 2024-09-17 RX ORDER — HEPARIN SODIUM,PORCINE/PF 10 UNIT/ML
50 SYRINGE (ML) INTRAVENOUS AS NEEDED
Status: CANCELLED | OUTPATIENT
Start: 2024-09-17

## 2024-09-17 RX ORDER — HEPARIN SODIUM,PORCINE/PF 10 UNIT/ML
50 SYRINGE (ML) INTRAVENOUS AS NEEDED
Status: DISCONTINUED | OUTPATIENT
Start: 2024-09-17 | End: 2024-09-17 | Stop reason: HOSPADM

## 2024-09-17 RX ORDER — HEPARIN 100 UNIT/ML
500 SYRINGE INTRAVENOUS AS NEEDED
Status: DISCONTINUED | OUTPATIENT
Start: 2024-09-17 | End: 2024-09-17 | Stop reason: HOSPADM

## 2024-09-17 ASSESSMENT — PAIN SCALES - GENERAL: PAINLEVEL: 0-NO PAIN

## 2024-09-18 LAB — PATH REV-IMMUNOHEMATOLOGY-PR30: NORMAL

## 2024-09-19 ENCOUNTER — INFUSION (OUTPATIENT)
Dept: HEMATOLOGY/ONCOLOGY | Facility: CLINIC | Age: 69
End: 2024-09-19
Payer: MEDICARE

## 2024-09-19 VITALS
HEART RATE: 99 BPM | TEMPERATURE: 97.3 F | SYSTOLIC BLOOD PRESSURE: 129 MMHG | RESPIRATION RATE: 18 BRPM | OXYGEN SATURATION: 96 % | DIASTOLIC BLOOD PRESSURE: 83 MMHG

## 2024-09-19 DIAGNOSIS — C92.00 ACUTE MYELOID LEUKEMIA NOT HAVING ACHIEVED REMISSION (MULTI): ICD-10-CM

## 2024-09-19 DIAGNOSIS — C95.00 ACUTE LEUKEMIA NOT HAVING ACHIEVED REMISSION (MULTI): ICD-10-CM

## 2024-09-19 DIAGNOSIS — D47.02 SYSTEMIC MASTOCYTOSIS WITH ASSOCIATED CLONAL HEMATOLOGICAL NON-MAST CELL LINEAGE DISEASE: ICD-10-CM

## 2024-09-19 LAB
ALBUMIN SERPL BCP-MCNC: 4 G/DL (ref 3.4–5)
ALP SERPL-CCNC: 75 U/L (ref 33–136)
ALT SERPL W P-5'-P-CCNC: 9 U/L (ref 10–52)
ANION GAP SERPL CALC-SCNC: 8 MMOL/L (ref 10–20)
AST SERPL W P-5'-P-CCNC: 11 U/L (ref 9–39)
BASOPHILS # BLD AUTO: 0 X10*3/UL (ref 0–0.1)
BASOPHILS NFR BLD AUTO: 0 %
BILIRUB SERPL-MCNC: 1.6 MG/DL (ref 0–1.2)
BUN SERPL-MCNC: 16 MG/DL (ref 6–23)
BURR CELLS BLD QL SMEAR: NORMAL
CALCIUM SERPL-MCNC: 8.1 MG/DL (ref 8.6–10.6)
CHLORIDE SERPL-SCNC: 109 MMOL/L (ref 98–107)
CO2 SERPL-SCNC: 24 MMOL/L (ref 21–32)
CREAT SERPL-MCNC: 1.14 MG/DL (ref 0.5–1.3)
DACRYOCYTES BLD QL SMEAR: NORMAL
EGFRCR SERPLBLD CKD-EPI 2021: 70 ML/MIN/1.73M*2
EOSINOPHIL # BLD AUTO: 0.04 X10*3/UL (ref 0–0.7)
EOSINOPHIL NFR BLD AUTO: 4 %
ERYTHROCYTE [DISTWIDTH] IN BLOOD BY AUTOMATED COUNT: 15.8 % (ref 11.5–14.5)
GLUCOSE SERPL-MCNC: 102 MG/DL (ref 74–99)
HCT VFR BLD AUTO: 18.6 % (ref 41–52)
HGB BLD-MCNC: 6.5 G/DL (ref 13.5–17.5)
IMM GRANULOCYTES # BLD AUTO: 0 X10*3/UL (ref 0–0.7)
IMM GRANULOCYTES NFR BLD AUTO: 0 % (ref 0–0.9)
LYMPHOCYTES # BLD AUTO: 0.51 X10*3/UL (ref 1.2–4.8)
LYMPHOCYTES NFR BLD AUTO: 51.5 %
MAGNESIUM SERPL-MCNC: 1.99 MG/DL (ref 1.6–2.4)
MCH RBC QN AUTO: 30.1 PG (ref 26–34)
MCHC RBC AUTO-ENTMCNC: 34.9 G/DL (ref 32–36)
MCV RBC AUTO: 86 FL (ref 80–100)
MONOCYTES # BLD AUTO: 0.23 X10*3/UL (ref 0.1–1)
MONOCYTES NFR BLD AUTO: 23.2 %
NEUTROPHILS # BLD AUTO: 0.21 X10*3/UL (ref 1.2–7.7)
NEUTROPHILS NFR BLD AUTO: 21.3 %
NRBC BLD-RTO: ABNORMAL /100{WBCS}
OVALOCYTES BLD QL SMEAR: NORMAL
PLATELET # BLD AUTO: 9 X10*3/UL (ref 150–450)
POLYCHROMASIA BLD QL SMEAR: NORMAL
POTASSIUM SERPL-SCNC: 3.4 MMOL/L (ref 3.5–5.3)
PROT SERPL-MCNC: 6.7 G/DL (ref 6.4–8.2)
RBC # BLD AUTO: 2.16 X10*6/UL (ref 4.5–5.9)
RBC MORPH BLD: NORMAL
SCHISTOCYTES BLD QL SMEAR: NORMAL
SODIUM SERPL-SCNC: 138 MMOL/L (ref 136–145)
WBC # BLD AUTO: 1 X10*3/UL (ref 4.4–11.3)

## 2024-09-19 PROCEDURE — 2500000004 HC RX 250 GENERAL PHARMACY W/ HCPCS (ALT 636 FOR OP/ED): Performed by: INTERNAL MEDICINE

## 2024-09-19 PROCEDURE — 86901 BLOOD TYPING SEROLOGIC RH(D): CPT

## 2024-09-19 PROCEDURE — 36591 DRAW BLOOD OFF VENOUS DEVICE: CPT

## 2024-09-19 PROCEDURE — 80053 COMPREHEN METABOLIC PANEL: CPT

## 2024-09-19 PROCEDURE — 83735 ASSAY OF MAGNESIUM: CPT

## 2024-09-19 PROCEDURE — 85025 COMPLETE CBC W/AUTO DIFF WBC: CPT

## 2024-09-19 RX ORDER — HEPARIN 100 UNIT/ML
500 SYRINGE INTRAVENOUS AS NEEDED
Status: CANCELLED | OUTPATIENT
Start: 2024-09-19

## 2024-09-19 RX ORDER — FAMOTIDINE 10 MG/ML
20 INJECTION INTRAVENOUS ONCE AS NEEDED
OUTPATIENT
Start: 2024-09-19

## 2024-09-19 RX ORDER — HEPARIN SODIUM,PORCINE/PF 10 UNIT/ML
50 SYRINGE (ML) INTRAVENOUS AS NEEDED
Status: CANCELLED | OUTPATIENT
Start: 2024-09-19

## 2024-09-19 RX ORDER — ALBUTEROL SULFATE 0.83 MG/ML
3 SOLUTION RESPIRATORY (INHALATION) AS NEEDED
OUTPATIENT
Start: 2024-09-19

## 2024-09-19 RX ORDER — EPINEPHRINE 0.3 MG/.3ML
0.3 INJECTION SUBCUTANEOUS EVERY 5 MIN PRN
OUTPATIENT
Start: 2024-09-19

## 2024-09-19 RX ORDER — DIPHENHYDRAMINE HYDROCHLORIDE 50 MG/ML
50 INJECTION INTRAMUSCULAR; INTRAVENOUS AS NEEDED
OUTPATIENT
Start: 2024-09-19

## 2024-09-19 RX ORDER — HEPARIN 100 UNIT/ML
500 SYRINGE INTRAVENOUS AS NEEDED
Status: DISCONTINUED | OUTPATIENT
Start: 2024-09-19 | End: 2024-09-19 | Stop reason: HOSPADM

## 2024-09-19 ASSESSMENT — PAIN SCALES - GENERAL: PAINLEVEL: 0-NO PAIN

## 2024-09-20 ENCOUNTER — INFUSION (OUTPATIENT)
Dept: HEMATOLOGY/ONCOLOGY | Facility: CLINIC | Age: 69
End: 2024-09-20
Payer: MEDICARE

## 2024-09-20 VITALS
TEMPERATURE: 97.3 F | HEART RATE: 78 BPM | OXYGEN SATURATION: 99 % | SYSTOLIC BLOOD PRESSURE: 130 MMHG | DIASTOLIC BLOOD PRESSURE: 71 MMHG | RESPIRATION RATE: 18 BRPM | WEIGHT: 191.25 LBS | BODY MASS INDEX: 30.3 KG/M2

## 2024-09-20 DIAGNOSIS — C92.00 ACUTE MYELOID LEUKEMIA NOT HAVING ACHIEVED REMISSION (MULTI): ICD-10-CM

## 2024-09-20 DIAGNOSIS — D47.02 SYSTEMIC MASTOCYTOSIS WITH ASSOCIATED CLONAL HEMATOLOGICAL NON-MAST CELL LINEAGE DISEASE: ICD-10-CM

## 2024-09-20 DIAGNOSIS — M81.8 OTHER OSTEOPOROSIS WITHOUT CURRENT PATHOLOGICAL FRACTURE: ICD-10-CM

## 2024-09-20 LAB
ABO GROUP (TYPE) IN BLOOD: NORMAL
ANTIBODY SCREEN: NORMAL
BB ANTIBODY IDENTIFICATION: NORMAL
BLOOD EXPIRATION DATE: NORMAL
BLOOD EXPIRATION DATE: NORMAL
CASE #: NORMAL
DISPENSE STATUS: NORMAL
DISPENSE STATUS: NORMAL
PRODUCT BLOOD TYPE: 5100
PRODUCT BLOOD TYPE: 5100
PRODUCT CODE: NORMAL
PRODUCT CODE: NORMAL
RH FACTOR (ANTIGEN D): NORMAL
UNIT ABO: NORMAL
UNIT ABO: NORMAL
UNIT NUMBER: NORMAL
UNIT NUMBER: NORMAL
UNIT RH: NORMAL
UNIT RH: NORMAL
UNIT VOLUME: 277
UNIT VOLUME: 350
XM INTEP: NORMAL

## 2024-09-20 PROCEDURE — 2500000004 HC RX 250 GENERAL PHARMACY W/ HCPCS (ALT 636 FOR OP/ED): Performed by: INTERNAL MEDICINE

## 2024-09-20 PROCEDURE — 36430 TRANSFUSION BLD/BLD COMPNT: CPT

## 2024-09-20 RX ORDER — HEPARIN SODIUM,PORCINE/PF 10 UNIT/ML
50 SYRINGE (ML) INTRAVENOUS AS NEEDED
Status: DISCONTINUED | OUTPATIENT
Start: 2024-09-20 | End: 2024-09-20 | Stop reason: HOSPADM

## 2024-09-20 RX ORDER — HEPARIN SODIUM,PORCINE/PF 10 UNIT/ML
50 SYRINGE (ML) INTRAVENOUS AS NEEDED
OUTPATIENT
Start: 2024-09-20

## 2024-09-20 RX ORDER — HEPARIN 100 UNIT/ML
500 SYRINGE INTRAVENOUS AS NEEDED
Status: DISCONTINUED | OUTPATIENT
Start: 2024-09-20 | End: 2024-09-20 | Stop reason: HOSPADM

## 2024-09-20 RX ORDER — HEPARIN 100 UNIT/ML
500 SYRINGE INTRAVENOUS AS NEEDED
OUTPATIENT
Start: 2024-09-20

## 2024-09-20 ASSESSMENT — PAIN SCALES - GENERAL: PAINLEVEL: 0-NO PAIN

## 2024-09-22 LAB — PATH REV-IMMUNOHEMATOLOGY-PR30: NORMAL

## 2024-09-23 ENCOUNTER — INFUSION (OUTPATIENT)
Dept: HEMATOLOGY/ONCOLOGY | Facility: CLINIC | Age: 69
End: 2024-09-23
Payer: MEDICARE

## 2024-09-23 VITALS
RESPIRATION RATE: 17 BRPM | HEART RATE: 75 BPM | TEMPERATURE: 97.7 F | BODY MASS INDEX: 30.63 KG/M2 | OXYGEN SATURATION: 95 % | SYSTOLIC BLOOD PRESSURE: 135 MMHG | WEIGHT: 193.34 LBS | DIASTOLIC BLOOD PRESSURE: 81 MMHG

## 2024-09-23 DIAGNOSIS — C92.00 ACUTE MYELOID LEUKEMIA NOT HAVING ACHIEVED REMISSION (MULTI): ICD-10-CM

## 2024-09-23 DIAGNOSIS — D47.02 SYSTEMIC MASTOCYTOSIS WITH ASSOCIATED CLONAL HEMATOLOGICAL NON-MAST CELL LINEAGE DISEASE: ICD-10-CM

## 2024-09-23 DIAGNOSIS — C95.00 ACUTE LEUKEMIA NOT HAVING ACHIEVED REMISSION (MULTI): ICD-10-CM

## 2024-09-23 LAB
ABO GROUP (TYPE) IN BLOOD: NORMAL
ACANTHOCYTES BLD QL SMEAR: ABNORMAL
ALBUMIN SERPL BCP-MCNC: 4.2 G/DL (ref 3.4–5)
ALP SERPL-CCNC: 74 U/L (ref 33–136)
ALT SERPL W P-5'-P-CCNC: 9 U/L (ref 10–52)
ANION GAP SERPL CALC-SCNC: 13 MMOL/L (ref 10–20)
ANTIBODY SCREEN: NORMAL
AST SERPL W P-5'-P-CCNC: 9 U/L (ref 9–39)
BASOPHILS # BLD MANUAL: 0.01 X10*3/UL (ref 0–0.1)
BASOPHILS NFR BLD MANUAL: 1 %
BILIRUB SERPL-MCNC: 1.3 MG/DL (ref 0–1.2)
BUN SERPL-MCNC: 11 MG/DL (ref 6–23)
CALCIUM SERPL-MCNC: 8.4 MG/DL (ref 8.6–10.6)
CHLORIDE SERPL-SCNC: 108 MMOL/L (ref 98–107)
CO2 SERPL-SCNC: 22 MMOL/L (ref 21–32)
CREAT SERPL-MCNC: 1.03 MG/DL (ref 0.5–1.3)
DACRYOCYTES BLD QL SMEAR: ABNORMAL
EGFRCR SERPLBLD CKD-EPI 2021: 79 ML/MIN/1.73M*2
EOSINOPHIL # BLD MANUAL: 0.02 X10*3/UL (ref 0–0.7)
EOSINOPHIL NFR BLD MANUAL: 2 %
ERYTHROCYTE [DISTWIDTH] IN BLOOD BY AUTOMATED COUNT: 18.2 % (ref 11.5–14.5)
GLUCOSE SERPL-MCNC: 97 MG/DL (ref 74–99)
HCT VFR BLD AUTO: 20.7 % (ref 41–52)
HGB BLD-MCNC: 7.1 G/DL (ref 13.5–17.5)
IMM GRANULOCYTES # BLD AUTO: 0.01 X10*3/UL (ref 0–0.7)
IMM GRANULOCYTES NFR BLD AUTO: 0.9 % (ref 0–0.9)
LYMPHOCYTES # BLD MANUAL: 0.65 X10*3/UL (ref 1.2–4.8)
LYMPHOCYTES NFR BLD MANUAL: 54 %
MAGNESIUM SERPL-MCNC: 2.2 MG/DL (ref 1.6–2.4)
MCH RBC QN AUTO: 29.6 PG (ref 26–34)
MCHC RBC AUTO-ENTMCNC: 34.3 G/DL (ref 32–36)
MCV RBC AUTO: 86 FL (ref 80–100)
MONOCYTES # BLD MANUAL: 0.05 X10*3/UL (ref 0.1–1)
MONOCYTES NFR BLD MANUAL: 4 %
NEUTS SEG # BLD MANUAL: 0.47 X10*3/UL (ref 1.2–7)
NEUTS SEG NFR BLD MANUAL: 39 %
NRBC BLD-RTO: ABNORMAL /100{WBCS}
OVALOCYTES BLD QL SMEAR: ABNORMAL
PLATELET # BLD AUTO: 18 X10*3/UL (ref 150–450)
POLYCHROMASIA BLD QL SMEAR: ABNORMAL
POTASSIUM SERPL-SCNC: 3.4 MMOL/L (ref 3.5–5.3)
PROT SERPL-MCNC: 7 G/DL (ref 6.4–8.2)
RBC # BLD AUTO: 2.4 X10*6/UL (ref 4.5–5.9)
RBC MORPH BLD: ABNORMAL
RH FACTOR (ANTIGEN D): NORMAL
SCHISTOCYTES BLD QL SMEAR: ABNORMAL
SODIUM SERPL-SCNC: 140 MMOL/L (ref 136–145)
TOTAL CELLS COUNTED BLD: 100
WBC # BLD AUTO: 1.2 X10*3/UL (ref 4.4–11.3)

## 2024-09-23 PROCEDURE — 86870 RBC ANTIBODY IDENTIFICATION: CPT

## 2024-09-23 PROCEDURE — 2500000004 HC RX 250 GENERAL PHARMACY W/ HCPCS (ALT 636 FOR OP/ED): Performed by: INTERNAL MEDICINE

## 2024-09-23 PROCEDURE — 83735 ASSAY OF MAGNESIUM: CPT

## 2024-09-23 PROCEDURE — 86922 COMPATIBILITY TEST ANTIGLOB: CPT

## 2024-09-23 PROCEDURE — 85007 BL SMEAR W/DIFF WBC COUNT: CPT

## 2024-09-23 PROCEDURE — 85027 COMPLETE CBC AUTOMATED: CPT

## 2024-09-23 PROCEDURE — 80053 COMPREHEN METABOLIC PANEL: CPT

## 2024-09-23 PROCEDURE — 36591 DRAW BLOOD OFF VENOUS DEVICE: CPT

## 2024-09-23 PROCEDURE — 86901 BLOOD TYPING SEROLOGIC RH(D): CPT

## 2024-09-23 RX ORDER — HEPARIN 100 UNIT/ML
500 SYRINGE INTRAVENOUS AS NEEDED
Status: CANCELLED | OUTPATIENT
Start: 2024-09-23

## 2024-09-23 RX ORDER — HEPARIN SODIUM,PORCINE/PF 10 UNIT/ML
50 SYRINGE (ML) INTRAVENOUS AS NEEDED
Status: DISCONTINUED | OUTPATIENT
Start: 2024-09-23 | End: 2024-09-23 | Stop reason: HOSPADM

## 2024-09-23 RX ORDER — HEPARIN 100 UNIT/ML
500 SYRINGE INTRAVENOUS AS NEEDED
Status: DISCONTINUED | OUTPATIENT
Start: 2024-09-23 | End: 2024-09-23 | Stop reason: HOSPADM

## 2024-09-23 RX ORDER — HEPARIN SODIUM,PORCINE/PF 10 UNIT/ML
50 SYRINGE (ML) INTRAVENOUS AS NEEDED
Status: CANCELLED | OUTPATIENT
Start: 2024-09-23

## 2024-09-23 ASSESSMENT — PAIN SCALES - GENERAL: PAINLEVEL: 0-NO PAIN

## 2024-09-24 LAB
BB ANTIBODY IDENTIFICATION: NORMAL
CASE #: NORMAL
PATH REV-IMMUNOHEMATOLOGY-PR30: NORMAL

## 2024-09-25 ENCOUNTER — APPOINTMENT (OUTPATIENT)
Dept: HEMATOLOGY/ONCOLOGY | Facility: HOSPITAL | Age: 69
End: 2024-09-25
Payer: MEDICARE

## 2024-09-25 ENCOUNTER — INFUSION (OUTPATIENT)
Dept: HEMATOLOGY/ONCOLOGY | Facility: HOSPITAL | Age: 69
End: 2024-09-25
Payer: MEDICARE

## 2024-09-25 ENCOUNTER — PROCEDURE VISIT (OUTPATIENT)
Dept: HEMATOLOGY/ONCOLOGY | Facility: HOSPITAL | Age: 69
End: 2024-09-25
Payer: MEDICARE

## 2024-09-25 VITALS
DIASTOLIC BLOOD PRESSURE: 76 MMHG | WEIGHT: 194.67 LBS | SYSTOLIC BLOOD PRESSURE: 124 MMHG | OXYGEN SATURATION: 100 % | BODY MASS INDEX: 30.84 KG/M2 | HEART RATE: 77 BPM | RESPIRATION RATE: 18 BRPM | TEMPERATURE: 97.5 F

## 2024-09-25 VITALS
RESPIRATION RATE: 16 BRPM | SYSTOLIC BLOOD PRESSURE: 126 MMHG | DIASTOLIC BLOOD PRESSURE: 63 MMHG | TEMPERATURE: 96.8 F | OXYGEN SATURATION: 99 % | HEART RATE: 70 BPM

## 2024-09-25 DIAGNOSIS — D47.02 SYSTEMIC MASTOCYTOSIS WITH ASSOCIATED CLONAL HEMATOLOGICAL NON-MAST CELL LINEAGE DISEASE: ICD-10-CM

## 2024-09-25 DIAGNOSIS — M81.8 OTHER OSTEOPOROSIS WITHOUT CURRENT PATHOLOGICAL FRACTURE: ICD-10-CM

## 2024-09-25 DIAGNOSIS — C95.00 ACUTE LEUKEMIA NOT HAVING ACHIEVED REMISSION (MULTI): ICD-10-CM

## 2024-09-25 DIAGNOSIS — C92.00 ACUTE MYELOID LEUKEMIA NOT HAVING ACHIEVED REMISSION (MULTI): ICD-10-CM

## 2024-09-25 LAB
ALBUMIN SERPL BCP-MCNC: 4 G/DL (ref 3.4–5)
ALP SERPL-CCNC: 70 U/L (ref 33–136)
ALT SERPL W P-5'-P-CCNC: 9 U/L (ref 10–52)
ANION GAP SERPL CALC-SCNC: 11 MMOL/L (ref 10–20)
AST SERPL W P-5'-P-CCNC: 11 U/L (ref 9–39)
BASOPHILS # BLD MANUAL: 0.08 X10*3/UL (ref 0–0.1)
BASOPHILS NFR BLD MANUAL: 7 %
BILIRUB SERPL-MCNC: 1 MG/DL (ref 0–1.2)
BLOOD EXPIRATION DATE: NORMAL
BUN SERPL-MCNC: 16 MG/DL (ref 6–23)
BURR CELLS BLD QL SMEAR: ABNORMAL
CALCIUM SERPL-MCNC: 8.5 MG/DL (ref 8.6–10.3)
CHLORIDE SERPL-SCNC: 108 MMOL/L (ref 98–107)
CO2 SERPL-SCNC: 22 MMOL/L (ref 21–32)
CREAT SERPL-MCNC: 1.24 MG/DL (ref 0.5–1.3)
DACRYOCYTES BLD QL SMEAR: ABNORMAL
DISPENSE STATUS: NORMAL
EGFRCR SERPLBLD CKD-EPI 2021: 63 ML/MIN/1.73M*2
EOSINOPHIL # BLD MANUAL: 0 X10*3/UL (ref 0–0.7)
EOSINOPHIL NFR BLD MANUAL: 0 %
ERYTHROCYTE [DISTWIDTH] IN BLOOD BY AUTOMATED COUNT: 18.8 % (ref 11.5–14.5)
GLUCOSE SERPL-MCNC: 109 MG/DL (ref 74–99)
HCT VFR BLD AUTO: 20 % (ref 41–52)
HGB BLD-MCNC: 6.7 G/DL (ref 13.5–17.5)
IMM GRANULOCYTES # BLD AUTO: 0 X10*3/UL (ref 0–0.7)
IMM GRANULOCYTES NFR BLD AUTO: 0 % (ref 0–0.9)
LYMPHOCYTES # BLD MANUAL: 0.59 X10*3/UL (ref 1.2–4.8)
LYMPHOCYTES NFR BLD MANUAL: 54 %
MAGNESIUM SERPL-MCNC: 1.91 MG/DL (ref 1.6–2.4)
MCH RBC QN AUTO: 28.9 PG (ref 26–34)
MCHC RBC AUTO-ENTMCNC: 33.5 G/DL (ref 32–36)
MCV RBC AUTO: 86 FL (ref 80–100)
MONOCYTES # BLD MANUAL: 0.06 X10*3/UL (ref 0.1–1)
MONOCYTES NFR BLD MANUAL: 5 %
NEUTROPHILS # BLD MANUAL: 0.37 X10*3/UL (ref 1.2–7.7)
NEUTS BAND # BLD MANUAL: 0.04 X10*3/UL (ref 0–0.7)
NEUTS BAND NFR BLD MANUAL: 4 %
NEUTS SEG # BLD MANUAL: 0.33 X10*3/UL (ref 1.2–7)
NEUTS SEG NFR BLD MANUAL: 30 %
NRBC BLD-RTO: 1.8 /100 WBCS (ref 0–0)
OVALOCYTES BLD QL SMEAR: ABNORMAL
PLATELET # BLD AUTO: 39 X10*3/UL (ref 150–450)
POLYCHROMASIA BLD QL SMEAR: ABNORMAL
POTASSIUM SERPL-SCNC: 3.5 MMOL/L (ref 3.5–5.3)
PRODUCT BLOOD TYPE: 9500
PRODUCT CODE: NORMAL
PROT SERPL-MCNC: 6.8 G/DL (ref 6.4–8.2)
RBC # BLD AUTO: 2.32 X10*6/UL (ref 4.5–5.9)
RBC MORPH BLD: ABNORMAL
SCHISTOCYTES BLD QL SMEAR: ABNORMAL
SODIUM SERPL-SCNC: 137 MMOL/L (ref 136–145)
TOTAL CELLS COUNTED BLD: 100
UNIT ABO: NORMAL
UNIT NUMBER: NORMAL
UNIT RH: NORMAL
UNIT VOLUME: 280
WBC # BLD AUTO: 1.1 X10*3/UL (ref 4.4–11.3)
XM INTEP: NORMAL

## 2024-09-25 PROCEDURE — 80053 COMPREHEN METABOLIC PANEL: CPT

## 2024-09-25 PROCEDURE — 85007 BL SMEAR W/DIFF WBC COUNT: CPT | Performed by: PHYSICIAN ASSISTANT

## 2024-09-25 PROCEDURE — 85097 BONE MARROW INTERPRETATION: CPT | Performed by: PHYSICIAN ASSISTANT

## 2024-09-25 PROCEDURE — 36430 TRANSFUSION BLD/BLD COMPNT: CPT

## 2024-09-25 PROCEDURE — 38222 DX BONE MARROW BX & ASPIR: CPT | Performed by: PHYSICIAN ASSISTANT

## 2024-09-25 PROCEDURE — 88184 FLOWCYTOMETRY/ TC 1 MARKER: CPT | Mod: TC | Performed by: PHYSICIAN ASSISTANT

## 2024-09-25 PROCEDURE — 86902 BLOOD TYPE ANTIGEN DONOR EA: CPT

## 2024-09-25 PROCEDURE — 2500000004 HC RX 250 GENERAL PHARMACY W/ HCPCS (ALT 636 FOR OP/ED): Performed by: INTERNAL MEDICINE

## 2024-09-25 PROCEDURE — 83735 ASSAY OF MAGNESIUM: CPT

## 2024-09-25 PROCEDURE — 85027 COMPLETE CBC AUTOMATED: CPT | Performed by: PHYSICIAN ASSISTANT

## 2024-09-25 PROCEDURE — P9040 RBC LEUKOREDUCED IRRADIATED: HCPCS

## 2024-09-25 RX ORDER — FAMOTIDINE 10 MG/ML
20 INJECTION INTRAVENOUS ONCE AS NEEDED
OUTPATIENT
Start: 2024-09-25

## 2024-09-25 RX ORDER — DIPHENHYDRAMINE HYDROCHLORIDE 50 MG/ML
50 INJECTION INTRAMUSCULAR; INTRAVENOUS AS NEEDED
OUTPATIENT
Start: 2024-09-25

## 2024-09-25 RX ORDER — ALBUTEROL SULFATE 0.83 MG/ML
3 SOLUTION RESPIRATORY (INHALATION) AS NEEDED
Status: DISCONTINUED | OUTPATIENT
Start: 2024-09-25 | End: 2024-09-25 | Stop reason: HOSPADM

## 2024-09-25 RX ORDER — HEPARIN 100 UNIT/ML
500 SYRINGE INTRAVENOUS AS NEEDED
Status: CANCELLED | OUTPATIENT
Start: 2024-09-25

## 2024-09-25 RX ORDER — EPINEPHRINE 0.3 MG/.3ML
0.3 INJECTION SUBCUTANEOUS EVERY 5 MIN PRN
Status: DISCONTINUED | OUTPATIENT
Start: 2024-09-25 | End: 2024-09-25 | Stop reason: HOSPADM

## 2024-09-25 RX ORDER — HEPARIN SODIUM,PORCINE/PF 10 UNIT/ML
50 SYRINGE (ML) INTRAVENOUS AS NEEDED
Status: CANCELLED | OUTPATIENT
Start: 2024-09-25

## 2024-09-25 RX ORDER — FAMOTIDINE 10 MG/ML
20 INJECTION INTRAVENOUS ONCE AS NEEDED
Status: DISCONTINUED | OUTPATIENT
Start: 2024-09-25 | End: 2024-09-25 | Stop reason: HOSPADM

## 2024-09-25 RX ORDER — ALBUTEROL SULFATE 0.83 MG/ML
3 SOLUTION RESPIRATORY (INHALATION) AS NEEDED
OUTPATIENT
Start: 2024-09-25

## 2024-09-25 RX ORDER — HEPARIN 100 UNIT/ML
500 SYRINGE INTRAVENOUS AS NEEDED
Status: DISCONTINUED | OUTPATIENT
Start: 2024-09-25 | End: 2024-09-25 | Stop reason: HOSPADM

## 2024-09-25 RX ORDER — DIPHENHYDRAMINE HYDROCHLORIDE 50 MG/ML
50 INJECTION INTRAMUSCULAR; INTRAVENOUS AS NEEDED
Status: DISCONTINUED | OUTPATIENT
Start: 2024-09-25 | End: 2024-09-25 | Stop reason: HOSPADM

## 2024-09-25 RX ORDER — EPINEPHRINE 0.3 MG/.3ML
0.3 INJECTION SUBCUTANEOUS EVERY 5 MIN PRN
OUTPATIENT
Start: 2024-09-25

## 2024-09-25 ASSESSMENT — PAIN SCALES - GENERAL: PAINLEVEL: 0-NO PAIN

## 2024-09-25 NOTE — PROGRESS NOTES
Patient ID: Jin Reynolds is a 69 y.o. male.    Bone marrow aspiration and biopsy    Date/Time: 9/25/2024 10:55 AM    Performed by: Oscar Flaherty PA-C  Authorized by: Oscar Flaherty PA-C    Consent:     Consent obtained:  Verbal    Consent given by:  Patient    Risks, benefits, and alternatives were discussed: yes      Risks discussed:  Bleeding, infection and pain    Alternatives discussed:  Observation  Universal protocol:     Procedure explained and questions answered to patient or proxy's satisfaction: yes      Relevant documents present and verified: yes      Test results available: yes      Site/side marked: yes      Immediately prior to procedure, a time out was called: yes      Patient identity confirmed:  Verbally with patient  Indications:     Indications:  Disease monitoring  Pre-procedure details:     Skin preparation:  Chlorhexidine    Preparation: Patient was prepped and draped in the usual sterile fashion    Sedation:     Sedation type:  None  Anesthesia:     Anesthesia method:  Local infiltration    Local anesthetic:  Lidocaine 1% w/o epi and lidocaine 2% w/o epi  Procedure specific details:      Informed consent was obtained and potential risks including bleeding, infection and pain were reviewed with the patient.     The patient was placed in the prone position, and the Left posterior iliac crest was prepped with chlorhexidine.     The skin, subcutaneous tissues, and periosteum were anesthetized with 10mL of 1% lidocaine and 10 mL of 2% lidocaine.     A small incision was made with a #15 scalpel, and the Jamshidi needle was advanced through the periosteum into the intramedullary space.    10 mL bone marrow was aspirated; the needle was then advanced further and a 0.5 cm core biopsy obtained. The specimen was sent for morphology, flow cytometry, cytogenetics, and FISH/molecular per Hematopathology.    The needle was removed and hemostasis achieved.     The procedure was  tolerated well and there were no complications.    Procedure completed by: Oscar Flaherty PA-C    Post-procedure details:     Procedure completion:  Tolerated

## 2024-09-26 ENCOUNTER — INFUSION (OUTPATIENT)
Dept: HEMATOLOGY/ONCOLOGY | Facility: CLINIC | Age: 69
End: 2024-09-26
Payer: MEDICARE

## 2024-09-26 VITALS
HEART RATE: 70 BPM | RESPIRATION RATE: 18 BRPM | DIASTOLIC BLOOD PRESSURE: 79 MMHG | TEMPERATURE: 97.3 F | SYSTOLIC BLOOD PRESSURE: 150 MMHG

## 2024-09-26 DIAGNOSIS — C92.00 ACUTE MYELOID LEUKEMIA NOT HAVING ACHIEVED REMISSION (MULTI): ICD-10-CM

## 2024-09-26 DIAGNOSIS — C95.00 ACUTE LEUKEMIA NOT HAVING ACHIEVED REMISSION (MULTI): ICD-10-CM

## 2024-09-26 DIAGNOSIS — D47.02 SYSTEMIC MASTOCYTOSIS WITH ASSOCIATED CLONAL HEMATOLOGICAL NON-MAST CELL LINEAGE DISEASE: ICD-10-CM

## 2024-09-26 LAB
ABO GROUP (TYPE) IN BLOOD: NORMAL
ACANTHOCYTES BLD QL SMEAR: ABNORMAL
ALBUMIN SERPL BCP-MCNC: 4.2 G/DL (ref 3.4–5)
ALP SERPL-CCNC: 72 U/L (ref 33–136)
ALT SERPL W P-5'-P-CCNC: 9 U/L (ref 10–52)
ANION GAP SERPL CALC-SCNC: 13 MMOL/L (ref 10–20)
ANTIBODY SCREEN: NORMAL
AST SERPL W P-5'-P-CCNC: 10 U/L (ref 9–39)
BASOPHILS # BLD MANUAL: 0.04 X10*3/UL (ref 0–0.1)
BASOPHILS NFR BLD MANUAL: 3 %
BILIRUB SERPL-MCNC: 1.1 MG/DL (ref 0–1.2)
BUN SERPL-MCNC: 16 MG/DL (ref 6–23)
CALCIUM SERPL-MCNC: 8.2 MG/DL (ref 8.6–10.6)
CHLORIDE SERPL-SCNC: 108 MMOL/L (ref 98–107)
CO2 SERPL-SCNC: 21 MMOL/L (ref 21–32)
CREAT SERPL-MCNC: 1.1 MG/DL (ref 0.5–1.3)
EGFRCR SERPLBLD CKD-EPI 2021: 73 ML/MIN/1.73M*2
EOSINOPHIL # BLD MANUAL: 0.01 X10*3/UL (ref 0–0.7)
EOSINOPHIL NFR BLD MANUAL: 1 %
ERYTHROCYTE [DISTWIDTH] IN BLOOD BY AUTOMATED COUNT: 18.5 % (ref 11.5–14.5)
GLUCOSE SERPL-MCNC: 82 MG/DL (ref 74–99)
HCT VFR BLD AUTO: 22.4 % (ref 41–52)
HGB BLD-MCNC: 7.7 G/DL (ref 13.5–17.5)
IMM GRANULOCYTES # BLD AUTO: 0 X10*3/UL (ref 0–0.7)
IMM GRANULOCYTES NFR BLD AUTO: 0 % (ref 0–0.9)
LYMPHOCYTES # BLD MANUAL: 0.71 X10*3/UL (ref 1.2–4.8)
LYMPHOCYTES NFR BLD MANUAL: 59 %
MAGNESIUM SERPL-MCNC: 2.1 MG/DL (ref 1.6–2.4)
MCH RBC QN AUTO: 29.7 PG (ref 26–34)
MCHC RBC AUTO-ENTMCNC: 34.4 G/DL (ref 32–36)
MCV RBC AUTO: 87 FL (ref 80–100)
MONOCYTES # BLD MANUAL: 0.07 X10*3/UL (ref 0.1–1)
MONOCYTES NFR BLD MANUAL: 6 %
NEUTROPHILS # BLD MANUAL: 0.37 X10*3/UL (ref 1.2–7.7)
NEUTS BAND # BLD MANUAL: 0.02 X10*3/UL (ref 0–0.7)
NEUTS BAND NFR BLD MANUAL: 2 %
NEUTS SEG # BLD MANUAL: 0.35 X10*3/UL (ref 1.2–7)
NEUTS SEG NFR BLD MANUAL: 29 %
NRBC BLD-RTO: ABNORMAL /100{WBCS}
OVALOCYTES BLD QL SMEAR: ABNORMAL
PLATELET # BLD AUTO: 48 X10*3/UL (ref 150–450)
POLYCHROMASIA BLD QL SMEAR: ABNORMAL
POTASSIUM SERPL-SCNC: 3.7 MMOL/L (ref 3.5–5.3)
PROT SERPL-MCNC: 6.8 G/DL (ref 6.4–8.2)
RBC # BLD AUTO: 2.59 X10*6/UL (ref 4.5–5.9)
RBC MORPH BLD: ABNORMAL
RH FACTOR (ANTIGEN D): NORMAL
SCHISTOCYTES BLD QL SMEAR: ABNORMAL
SODIUM SERPL-SCNC: 138 MMOL/L (ref 136–145)
TOTAL CELLS COUNTED BLD: 100
WBC # BLD AUTO: 1.2 X10*3/UL (ref 4.4–11.3)

## 2024-09-26 PROCEDURE — 36591 DRAW BLOOD OFF VENOUS DEVICE: CPT

## 2024-09-26 PROCEDURE — 84075 ASSAY ALKALINE PHOSPHATASE: CPT

## 2024-09-26 PROCEDURE — 85007 BL SMEAR W/DIFF WBC COUNT: CPT

## 2024-09-26 PROCEDURE — 83735 ASSAY OF MAGNESIUM: CPT

## 2024-09-26 PROCEDURE — 85027 COMPLETE CBC AUTOMATED: CPT

## 2024-09-26 PROCEDURE — 2500000004 HC RX 250 GENERAL PHARMACY W/ HCPCS (ALT 636 FOR OP/ED): Performed by: INTERNAL MEDICINE

## 2024-09-26 PROCEDURE — 86901 BLOOD TYPING SEROLOGIC RH(D): CPT

## 2024-09-26 RX ORDER — ALBUTEROL SULFATE 0.83 MG/ML
3 SOLUTION RESPIRATORY (INHALATION) AS NEEDED
OUTPATIENT
Start: 2024-09-26

## 2024-09-26 RX ORDER — HEPARIN SODIUM,PORCINE/PF 10 UNIT/ML
50 SYRINGE (ML) INTRAVENOUS AS NEEDED
OUTPATIENT
Start: 2024-09-26

## 2024-09-26 RX ORDER — HEPARIN 100 UNIT/ML
500 SYRINGE INTRAVENOUS AS NEEDED
OUTPATIENT
Start: 2024-09-26

## 2024-09-26 RX ORDER — HEPARIN 100 UNIT/ML
500 SYRINGE INTRAVENOUS AS NEEDED
Status: DISCONTINUED | OUTPATIENT
Start: 2024-09-26 | End: 2024-09-26 | Stop reason: HOSPADM

## 2024-09-26 RX ORDER — FAMOTIDINE 10 MG/ML
20 INJECTION INTRAVENOUS ONCE AS NEEDED
OUTPATIENT
Start: 2024-09-26

## 2024-09-26 RX ORDER — DIPHENHYDRAMINE HYDROCHLORIDE 50 MG/ML
50 INJECTION INTRAMUSCULAR; INTRAVENOUS AS NEEDED
OUTPATIENT
Start: 2024-09-26

## 2024-09-26 RX ORDER — HEPARIN SODIUM,PORCINE/PF 10 UNIT/ML
50 SYRINGE (ML) INTRAVENOUS AS NEEDED
Status: DISCONTINUED | OUTPATIENT
Start: 2024-09-26 | End: 2024-09-26 | Stop reason: HOSPADM

## 2024-09-26 RX ORDER — EPINEPHRINE 0.3 MG/.3ML
0.3 INJECTION SUBCUTANEOUS EVERY 5 MIN PRN
OUTPATIENT
Start: 2024-09-26

## 2024-09-27 ENCOUNTER — APPOINTMENT (OUTPATIENT)
Dept: PRIMARY CARE | Facility: CLINIC | Age: 69
End: 2024-09-27
Payer: MEDICARE

## 2024-09-27 LAB
BB ANTIBODY IDENTIFICATION: NORMAL
CASE #: NORMAL
CELL COUNT (BLOOD): 1.14 X10*3/UL
CELL POPULATIONS: NORMAL
DIAGNOSIS: NORMAL
FLOW DIFFERENTIAL: NORMAL
FLOW TEST ORDERED: NORMAL
LAB TEST METHOD: NORMAL
NUMBER OF CELLS COLLECTED: NORMAL
PATH REPORT.TOTAL CANCER: NORMAL
PATH REV-IMMUNOHEMATOLOGY-PR30: NORMAL
SIGNATURE COMMENT: NORMAL
SPECIMEN VIABILITY: NORMAL

## 2024-09-30 ENCOUNTER — INFUSION (OUTPATIENT)
Dept: HEMATOLOGY/ONCOLOGY | Facility: CLINIC | Age: 69
End: 2024-09-30
Payer: MEDICARE

## 2024-09-30 DIAGNOSIS — C95.00 ACUTE LEUKEMIA NOT HAVING ACHIEVED REMISSION (MULTI): ICD-10-CM

## 2024-09-30 DIAGNOSIS — C92.00 ACUTE MYELOID LEUKEMIA NOT HAVING ACHIEVED REMISSION (MULTI): ICD-10-CM

## 2024-09-30 DIAGNOSIS — D47.02 SYSTEMIC MASTOCYTOSIS WITH ASSOCIATED CLONAL HEMATOLOGICAL NON-MAST CELL LINEAGE DISEASE: ICD-10-CM

## 2024-09-30 LAB
ABO GROUP (TYPE) IN BLOOD: NORMAL
ALBUMIN SERPL BCP-MCNC: 4.2 G/DL (ref 3.4–5)
ALP SERPL-CCNC: 75 U/L (ref 33–136)
ALT SERPL W P-5'-P-CCNC: 12 U/L (ref 10–52)
ANION GAP SERPL CALC-SCNC: 10 MMOL/L (ref 10–20)
ANTIBODY SCREEN: NORMAL
AST SERPL W P-5'-P-CCNC: 13 U/L (ref 9–39)
BASOPHILS # BLD AUTO: 0.04 X10*3/UL (ref 0–0.1)
BASOPHILS NFR BLD AUTO: 2 %
BILIRUB SERPL-MCNC: 1 MG/DL (ref 0–1.2)
BUN SERPL-MCNC: 11 MG/DL (ref 6–23)
BURR CELLS BLD QL SMEAR: NORMAL
CALCIUM SERPL-MCNC: 8.8 MG/DL (ref 8.6–10.6)
CHLORIDE SERPL-SCNC: 107 MMOL/L (ref 98–107)
CO2 SERPL-SCNC: 25 MMOL/L (ref 21–32)
CREAT SERPL-MCNC: 1.11 MG/DL (ref 0.5–1.3)
DACRYOCYTES BLD QL SMEAR: NORMAL
EGFRCR SERPLBLD CKD-EPI 2021: 72 ML/MIN/1.73M*2
EOSINOPHIL # BLD AUTO: 0.19 X10*3/UL (ref 0–0.7)
EOSINOPHIL NFR BLD AUTO: 9.6 %
ERYTHROCYTE [DISTWIDTH] IN BLOOD BY AUTOMATED COUNT: 20.1 % (ref 11.5–14.5)
GLUCOSE SERPL-MCNC: 88 MG/DL (ref 74–99)
HCT VFR BLD AUTO: 23.8 % (ref 41–52)
HGB BLD-MCNC: 8 G/DL (ref 13.5–17.5)
IMM GRANULOCYTES # BLD AUTO: 0 X10*3/UL (ref 0–0.7)
IMM GRANULOCYTES NFR BLD AUTO: 0 % (ref 0–0.9)
LYMPHOCYTES # BLD AUTO: 0.71 X10*3/UL (ref 1.2–4.8)
LYMPHOCYTES NFR BLD AUTO: 36 %
MAGNESIUM SERPL-MCNC: 2.05 MG/DL (ref 1.6–2.4)
MCH RBC QN AUTO: 29.6 PG (ref 26–34)
MCHC RBC AUTO-ENTMCNC: 33.6 G/DL (ref 32–36)
MCV RBC AUTO: 88 FL (ref 80–100)
MONOCYTES # BLD AUTO: 0.7 X10*3/UL (ref 0.1–1)
MONOCYTES NFR BLD AUTO: 35.5 %
NEUTROPHILS # BLD AUTO: 0.33 X10*3/UL (ref 1.2–7.7)
NEUTROPHILS NFR BLD AUTO: 16.9 %
NRBC BLD-RTO: ABNORMAL /100{WBCS}
OVALOCYTES BLD QL SMEAR: NORMAL
PLATELET # BLD AUTO: 85 X10*3/UL (ref 150–450)
POLYCHROMASIA BLD QL SMEAR: NORMAL
POTASSIUM SERPL-SCNC: 3.7 MMOL/L (ref 3.5–5.3)
PROT SERPL-MCNC: 7 G/DL (ref 6.4–8.2)
RBC # BLD AUTO: 2.7 X10*6/UL (ref 4.5–5.9)
RBC MORPH BLD: NORMAL
RH FACTOR (ANTIGEN D): NORMAL
SCHISTOCYTES BLD QL SMEAR: NORMAL
SODIUM SERPL-SCNC: 138 MMOL/L (ref 136–145)
WBC # BLD AUTO: 2 X10*3/UL (ref 4.4–11.3)

## 2024-09-30 PROCEDURE — 36591 DRAW BLOOD OFF VENOUS DEVICE: CPT

## 2024-09-30 PROCEDURE — 86901 BLOOD TYPING SEROLOGIC RH(D): CPT

## 2024-09-30 PROCEDURE — 86870 RBC ANTIBODY IDENTIFICATION: CPT

## 2024-09-30 PROCEDURE — 85025 COMPLETE CBC W/AUTO DIFF WBC: CPT

## 2024-09-30 PROCEDURE — 80053 COMPREHEN METABOLIC PANEL: CPT

## 2024-09-30 PROCEDURE — 83735 ASSAY OF MAGNESIUM: CPT

## 2024-10-01 ENCOUNTER — APPOINTMENT (OUTPATIENT)
Dept: HEMATOLOGY/ONCOLOGY | Facility: HOSPITAL | Age: 69
End: 2024-10-01
Payer: MEDICARE

## 2024-10-01 ENCOUNTER — OFFICE VISIT (OUTPATIENT)
Dept: HEMATOLOGY/ONCOLOGY | Facility: HOSPITAL | Age: 69
End: 2024-10-01
Payer: MEDICARE

## 2024-10-01 ENCOUNTER — LAB (OUTPATIENT)
Dept: HEMATOLOGY/ONCOLOGY | Facility: HOSPITAL | Age: 69
End: 2024-10-01
Payer: MEDICARE

## 2024-10-01 VITALS
HEART RATE: 73 BPM | WEIGHT: 196.43 LBS | TEMPERATURE: 97.2 F | OXYGEN SATURATION: 100 % | RESPIRATION RATE: 16 BRPM | BODY MASS INDEX: 31.12 KG/M2 | SYSTOLIC BLOOD PRESSURE: 136 MMHG | DIASTOLIC BLOOD PRESSURE: 74 MMHG

## 2024-10-01 DIAGNOSIS — C92.00 ACUTE MYELOID LEUKEMIA NOT HAVING ACHIEVED REMISSION (MULTI): ICD-10-CM

## 2024-10-01 DIAGNOSIS — D47.02 SYSTEMIC MASTOCYTOSIS WITH ASSOCIATED CLONAL HEMATOLOGICAL NON-MAST CELL LINEAGE DISEASE: ICD-10-CM

## 2024-10-01 DIAGNOSIS — C92.00 ACUTE MYELOID LEUKEMIA NOT HAVING ACHIEVED REMISSION (MULTI): Primary | ICD-10-CM

## 2024-10-01 DIAGNOSIS — N17.9 AKI (ACUTE KIDNEY INJURY) (CMS-HCC): ICD-10-CM

## 2024-10-01 DIAGNOSIS — C95.00 ACUTE LEUKEMIA NOT HAVING ACHIEVED REMISSION (MULTI): ICD-10-CM

## 2024-10-01 DIAGNOSIS — E88.3 TUMOR LYSIS SYNDROME FOLLOWING ANTINEOPLASTIC DRUG THERAPY (HHS-HCC): ICD-10-CM

## 2024-10-01 DIAGNOSIS — D84.9 IMMUNOCOMPROMISED STATE: ICD-10-CM

## 2024-10-01 DIAGNOSIS — C92.02 AML (ACUTE MYELOID LEUKEMIA) IN RELAPSE (MULTI): ICD-10-CM

## 2024-10-01 DIAGNOSIS — T45.1X5A TUMOR LYSIS SYNDROME FOLLOWING ANTINEOPLASTIC DRUG THERAPY (HHS-HCC): ICD-10-CM

## 2024-10-01 DIAGNOSIS — Z76.82 STEM CELL TRANSPLANT CANDIDATE: ICD-10-CM

## 2024-10-01 PROBLEM — T80.911A DELAYED HEMOLYTIC TRANSFUSION REACTION: Status: RESOLVED | Noted: 2024-07-18 | Resolved: 2024-10-01

## 2024-10-01 LAB
ABO GROUP (TYPE) IN BLOOD: NORMAL
ALBUMIN SERPL BCP-MCNC: 4.3 G/DL (ref 3.4–5)
ALP SERPL-CCNC: 74 U/L (ref 33–136)
ALT SERPL W P-5'-P-CCNC: 9 U/L (ref 10–52)
ANION GAP SERPL CALC-SCNC: 10 MMOL/L (ref 10–20)
ANTIBODY SCREEN: NORMAL
AST SERPL W P-5'-P-CCNC: 11 U/L (ref 9–39)
BASOPHILS # BLD AUTO: 0.03 X10*3/UL (ref 0–0.1)
BASOPHILS NFR BLD AUTO: 1.2 %
BB ANTIBODY IDENTIFICATION: NORMAL
BILIRUB SERPL-MCNC: 0.9 MG/DL (ref 0–1.2)
BUN SERPL-MCNC: 11 MG/DL (ref 6–23)
BURR CELLS BLD QL SMEAR: NORMAL
CALCIUM SERPL-MCNC: 9.1 MG/DL (ref 8.6–10.3)
CASE #: NORMAL
CHLORIDE SERPL-SCNC: 106 MMOL/L (ref 98–107)
CO2 SERPL-SCNC: 25 MMOL/L (ref 21–32)
CREAT SERPL-MCNC: 1.08 MG/DL (ref 0.5–1.3)
DACRYOCYTES BLD QL SMEAR: NORMAL
EGFRCR SERPLBLD CKD-EPI 2021: 75 ML/MIN/1.73M*2
EOSINOPHIL # BLD AUTO: 0.28 X10*3/UL (ref 0–0.7)
EOSINOPHIL NFR BLD AUTO: 11.5 %
ERYTHROCYTE [DISTWIDTH] IN BLOOD BY AUTOMATED COUNT: 20.9 % (ref 11.5–14.5)
GLUCOSE SERPL-MCNC: 84 MG/DL (ref 74–99)
HCT VFR BLD AUTO: 24.3 % (ref 41–52)
HGB BLD-MCNC: 8.2 G/DL (ref 13.5–17.5)
IMM GRANULOCYTES # BLD AUTO: 0 X10*3/UL (ref 0–0.7)
IMM GRANULOCYTES NFR BLD AUTO: 0 % (ref 0–0.9)
LYMPHOCYTES # BLD AUTO: 0.77 X10*3/UL (ref 1.2–4.8)
LYMPHOCYTES NFR BLD AUTO: 31.7 %
MAGNESIUM SERPL-MCNC: 2.09 MG/DL (ref 1.6–2.4)
MCH RBC QN AUTO: 30.1 PG (ref 26–34)
MCHC RBC AUTO-ENTMCNC: 33.7 G/DL (ref 32–36)
MCV RBC AUTO: 89 FL (ref 80–100)
MONOCYTES # BLD AUTO: 0.99 X10*3/UL (ref 0.1–1)
MONOCYTES NFR BLD AUTO: 40.7 %
NEUTROPHILS # BLD AUTO: 0.36 X10*3/UL (ref 1.2–7.7)
NEUTROPHILS NFR BLD AUTO: 14.9 %
NRBC BLD-RTO: 0 /100 WBCS (ref 0–0)
OVALOCYTES BLD QL SMEAR: NORMAL
PATH REV-IMMUNOHEMATOLOGY-PR30: NORMAL
PLATELET # BLD AUTO: 99 X10*3/UL (ref 150–450)
POLYCHROMASIA BLD QL SMEAR: NORMAL
POTASSIUM SERPL-SCNC: 3.6 MMOL/L (ref 3.5–5.3)
PROT SERPL-MCNC: 7.2 G/DL (ref 6.4–8.2)
RBC # BLD AUTO: 2.72 X10*6/UL (ref 4.5–5.9)
RBC MORPH BLD: NORMAL
RH FACTOR (ANTIGEN D): NORMAL
SCHISTOCYTES BLD QL SMEAR: NORMAL
SODIUM SERPL-SCNC: 137 MMOL/L (ref 136–145)
WBC # BLD AUTO: 2.4 X10*3/UL (ref 4.4–11.3)

## 2024-10-01 PROCEDURE — 1036F TOBACCO NON-USER: CPT | Performed by: INTERNAL MEDICINE

## 2024-10-01 PROCEDURE — 83735 ASSAY OF MAGNESIUM: CPT

## 2024-10-01 PROCEDURE — 1159F MED LIST DOCD IN RCRD: CPT | Performed by: INTERNAL MEDICINE

## 2024-10-01 PROCEDURE — 84075 ASSAY ALKALINE PHOSPHATASE: CPT

## 2024-10-01 PROCEDURE — 36591 DRAW BLOOD OFF VENOUS DEVICE: CPT

## 2024-10-01 PROCEDURE — 86870 RBC ANTIBODY IDENTIFICATION: CPT

## 2024-10-01 PROCEDURE — 1126F AMNT PAIN NOTED NONE PRSNT: CPT | Performed by: INTERNAL MEDICINE

## 2024-10-01 PROCEDURE — G2211 COMPLEX E/M VISIT ADD ON: HCPCS | Performed by: INTERNAL MEDICINE

## 2024-10-01 PROCEDURE — 1111F DSCHRG MED/CURRENT MED MERGE: CPT | Performed by: INTERNAL MEDICINE

## 2024-10-01 PROCEDURE — 2500000004 HC RX 250 GENERAL PHARMACY W/ HCPCS (ALT 636 FOR OP/ED)

## 2024-10-01 PROCEDURE — 86901 BLOOD TYPING SEROLOGIC RH(D): CPT

## 2024-10-01 PROCEDURE — 85025 COMPLETE CBC W/AUTO DIFF WBC: CPT

## 2024-10-01 PROCEDURE — 1160F RVW MEDS BY RX/DR IN RCRD: CPT | Performed by: INTERNAL MEDICINE

## 2024-10-01 PROCEDURE — 99215 OFFICE O/P EST HI 40 MIN: CPT | Performed by: INTERNAL MEDICINE

## 2024-10-01 PROCEDURE — 3078F DIAST BP <80 MM HG: CPT | Performed by: INTERNAL MEDICINE

## 2024-10-01 PROCEDURE — 3075F SYST BP GE 130 - 139MM HG: CPT | Performed by: INTERNAL MEDICINE

## 2024-10-01 RX ORDER — PROCHLORPERAZINE EDISYLATE 5 MG/ML
10 INJECTION INTRAMUSCULAR; INTRAVENOUS EVERY 6 HOURS PRN
OUTPATIENT
Start: 2024-10-08

## 2024-10-01 RX ORDER — PROCHLORPERAZINE MALEATE 10 MG
10 TABLET ORAL EVERY 6 HOURS PRN
OUTPATIENT
Start: 2024-10-08

## 2024-10-01 RX ORDER — POSACONAZOLE 100 MG/1
300 TABLET, DELAYED RELEASE ORAL
Qty: 90 TABLET | Refills: 5 | Status: SHIPPED | OUTPATIENT
Start: 2024-10-01 | End: 2025-03-30

## 2024-10-01 RX ORDER — HEPARIN 100 UNIT/ML
SYRINGE INTRAVENOUS
Status: COMPLETED
Start: 2024-10-01 | End: 2024-10-01

## 2024-10-01 RX ORDER — ONDANSETRON HYDROCHLORIDE 8 MG/1
8 TABLET, FILM COATED ORAL ONCE
OUTPATIENT
Start: 2024-10-08

## 2024-10-01 RX ORDER — FAMOTIDINE 20 MG/1
20 TABLET, FILM COATED ORAL DAILY
Qty: 90 TABLET | Refills: 3 | OUTPATIENT
Start: 2024-10-01

## 2024-10-01 RX ORDER — FAMOTIDINE 10 MG/ML
20 INJECTION INTRAVENOUS ONCE AS NEEDED
OUTPATIENT
Start: 2024-10-08

## 2024-10-01 RX ORDER — DIPHENHYDRAMINE HYDROCHLORIDE 50 MG/ML
50 INJECTION INTRAMUSCULAR; INTRAVENOUS AS NEEDED
OUTPATIENT
Start: 2024-10-08

## 2024-10-01 RX ORDER — HEPARIN 100 UNIT/ML
500 SYRINGE INTRAVENOUS AS NEEDED
OUTPATIENT
Start: 2024-10-01

## 2024-10-01 RX ORDER — ALLOPURINOL 300 MG/1
300 TABLET ORAL DAILY
Qty: 30 TABLET | Refills: 0 | Status: SHIPPED | OUTPATIENT
Start: 2024-10-01 | End: 2024-10-31

## 2024-10-01 RX ORDER — FAMOTIDINE 20 MG/1
20 TABLET, FILM COATED ORAL DAILY
Qty: 90 TABLET | Refills: 3 | Status: SHIPPED | OUTPATIENT
Start: 2024-10-01 | End: 2025-09-26

## 2024-10-01 RX ORDER — ALBUTEROL SULFATE 0.83 MG/ML
3 SOLUTION RESPIRATORY (INHALATION) AS NEEDED
OUTPATIENT
Start: 2024-10-08

## 2024-10-01 RX ORDER — EPINEPHRINE 0.3 MG/.3ML
0.3 INJECTION SUBCUTANEOUS EVERY 5 MIN PRN
OUTPATIENT
Start: 2024-10-08

## 2024-10-01 RX ORDER — HEPARIN SODIUM,PORCINE/PF 10 UNIT/ML
50 SYRINGE (ML) INTRAVENOUS AS NEEDED
OUTPATIENT
Start: 2024-10-01

## 2024-10-01 ASSESSMENT — PAIN SCALES - GENERAL: PAINLEVEL: 0-NO PAIN

## 2024-10-01 NOTE — ASSESSMENT & PLAN NOTE
No active signs or symptoms of infection.  Will continue prophylaxis with acyclovir, levofloxacin, and posaconazole.

## 2024-10-01 NOTE — PROGRESS NOTES
"     Patient ID: Jin Reynolds is a 69 y.o. male.    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   8/23/2023 Initial Diagnosis    DX:  SMOLDERING SYSTEMIC MASTOCYTOSIS  Presented with skin rash and eosinophilia    BRENDON DIAGNOSTIC CRITERIA  (For SM, Need major and 1 minor OR at least 3 minors)  - Multi-focal, dense infiltrates of MC (>= 15 mast cells in aggregates) in BM and/or other extra-cutaneous organs [major]  - In BM or extracutaneous organs, >25% MC spindle shaped OR have atypical morphology OR of all MC on BM aspirate smears, >25% are immature or atypical [minor]  - Detection of activating mutation KIT D816V in BM, PB, or other extracutaneous organ [minor]  - Serum tryptase persistently exceeds 20 ng/mL (unless associated clonal myeloid disorder, in which this paramenter is not valid) [minor]    \"B\" FINDINGS  - BM biopsy showing >30% infiltration (focal, dense aggregates) and/or serum tryptase > 200 ng/mL  - Signs of dysplasia or myeloproliferation, in non-MC lineages but insufficient criteria for definitive diagnosis of AHNMD with normal or slightly abnormal blood counts    \"C\" FINDINGS  None       8/23/2023 Biopsy    BONE MARROW (8/23/23)  Hypercellular (90-95%) with trilineage hematopoiesis, granulocytic hyperplasia, eosinophilia, and increased atypical mast cells  IP:  CD34+, +, CD7(bright)+, cCD3-, CD33-, CD15-, CD13+ blast population  IHC:  + increased spindle-shaped mast cells (15% cellularity), CD2-  Myeloid NGS:  CBL (31%), cKIT D816V (30%), RUNX1 x 2 (19%, 29%), SRSF2 (47%) [ASXL1 negative]  FISH:  PDGFRb negative    SERUM TRYPTASE  228 (8/23/23)  268 (9/5/23)     2/13/2024 -  Molecular Therapy    AVAPRITINIB   - Starting dose 25 mg daily (non-Adv SM)     7/16/2024 -  Chemotherapy    C1D1- 7/24/2024  - Early TLS with initial treatment requiring rasburicase, allopurinol and IVF. Venetoclax held as of " D2 and for the remainder of C1.   - Post C1 BM assessment done showing persistent disease with 27% blasts.  Venetoclax / AzaCITIDine, 28 Day Cycles - Induction / Consolidation     Acute myeloid leukemia not having achieved remission (Multi)   6/27/2024 Initial Diagnosis    ICC 2022 DX: Acute myeloid leukemia, NOS (>=20% blasts)  [id number] AML Risk Stratification: INTERMEDIATE: Cytogenetic and/or molecular abnormalities not classified as favorable or adverse  Presented with pancytopenia and circulating blasts    BONE MARROW (06/27/2024)  Marrow replaced by blasts, fibrotic foci, some atypical + cells; 7-8% circulating blasts; aspicular  - Unable to perform additional studies due to aspicular specimen    PERIPHERAL BLOOD (6/27/2024)  FISH:  positive for gain RUNX1    PERIPHERAL BLOOD (7/1/24)  IP:  abnl myeloblast population (CD34+, CD7+, CD4+, CD13+, CD38+, CD11+ dim, HLA=DR+, TdT+, CD33-)  Myeloid NGS: CBL C404Y (41%), KIT D816V (8%), RUNX1 x2 (8%, 30%), SRSF2 (37%)     7/16/2024 -  Chemotherapy    C1D1- 7/24/2024  - Early TLS with initial treatment requiring rasburicase, allopurinol and IVF. Venetoclax held as of D2 and for the remainder of C1.   - Post C1 BM assessment done showing persistent disease with 27% blasts.  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  No significant TLS with C2.  Flow negative in recent BM, awaiting final read.  Expect that he has achieved CR, although ANC not quite recovered.  Will hold venetoclax and defer treatment 1 week.      Matched sibling has been identified.  Assuming we have achieved CR1, would recommend proceeding with sibling allogeneic stem cell transplant.  Not [id number] candidate as he is CD33 negative.  Will begin pre BMT evaluation.  Immunocompromised state  No active signs or symptoms of infection.  Will continue  prophylaxis with acyclovir, levofloxacin, and posaconazole.        ______________________________________________________________________________________________________________________________________________    SUBJECTIVE     Here today for planned follow up.  Feeling well overall.  Pleased about recent blood counts.   No new health issues.  Denies fevers, chills, nausea, vomiting, diarrhea, dyspnea, rash, and pain.    OBJECTIVE     /74   Pulse 73   Temp 36.2 °C (97.2 °F)   Resp 16   Wt 89.1 kg (196 lb 6.9 oz)   SpO2 100%   BMI 31.12 kg/m²      Physical Exam  Vitals reviewed.   Constitutional:       Appearance: Normal appearance.   Eyes:      Conjunctiva/sclera: Conjunctivae normal.      Pupils: Pupils are equal, round, and reactive to light.   Skin:     General: Skin is warm and dry.      Findings: No rash.   Psychiatric:         Mood and Affect: Mood normal.              Nika Dawson MD

## 2024-10-01 NOTE — ASSESSMENT & PLAN NOTE
Here for C2D29 Venetoclax and azacitidine.  No significant TLS with C2.  Flow negative in recent BM, awaiting final read.  Expect that he has achieved CR, although ANC not quite recovered.  Will hold venetoclax and defer treatment 1 week.      Matched sibling has been identified.  Assuming we have achieved CR1, would recommend proceeding with sibling allogeneic stem cell transplant.  Not [id number] candidate as he is CD33 negative.  Will begin pre BMT evaluation.

## 2024-10-02 ENCOUNTER — APPOINTMENT (OUTPATIENT)
Dept: HEMATOLOGY/ONCOLOGY | Facility: CLINIC | Age: 69
End: 2024-10-02
Payer: MEDICARE

## 2024-10-02 DIAGNOSIS — C92.00 ACUTE MYELOID LEUKEMIA NOT HAVING ACHIEVED REMISSION (MULTI): ICD-10-CM

## 2024-10-02 DIAGNOSIS — R79.1 ABNORMAL COAGULATION PROFILE: ICD-10-CM

## 2024-10-02 DIAGNOSIS — Z79.899 HIGH RISK MEDICATION USE: ICD-10-CM

## 2024-10-02 DIAGNOSIS — I42.7 CARDIOTOXICITY (MULTI): ICD-10-CM

## 2024-10-02 DIAGNOSIS — R06.9 UNSPECIFIED ABNORMALITIES OF BREATHING: ICD-10-CM

## 2024-10-02 DIAGNOSIS — Z76.82 STEM CELL TRANSPLANT CANDIDATE: ICD-10-CM

## 2024-10-02 LAB
BB ANTIBODY IDENTIFICATION: NORMAL
CASE #: NORMAL
CHROM ANALY OVERALL INTERP-IMP: NORMAL
ELECTRONICALLY SIGNED BY CYTOGENETICS: NORMAL
PATH REV-IMMUNOHEMATOLOGY-PR30: NORMAL

## 2024-10-03 ENCOUNTER — EDUCATION (OUTPATIENT)
Dept: OTHER | Facility: HOSPITAL | Age: 69
End: 2024-10-03
Payer: MEDICARE

## 2024-10-03 ENCOUNTER — APPOINTMENT (OUTPATIENT)
Dept: HEMATOLOGY/ONCOLOGY | Facility: CLINIC | Age: 69
End: 2024-10-03
Payer: MEDICARE

## 2024-10-03 LAB
PATH REPORT.ADDENDUM SPEC: NORMAL
PATH REPORT.COMMENTS IMP SPEC: NORMAL
PATH REPORT.FINAL DX SPEC: NORMAL
PATH REPORT.GROSS SPEC: NORMAL
PATH REPORT.MICROSCOPIC SPEC OTHER STN: NORMAL
PATH REPORT.RELEVANT HX SPEC: NORMAL
PATH REPORT.TOTAL CANCER: NORMAL

## 2024-10-03 NOTE — PROGRESS NOTES
Patient Education  Learner: patient  Educated on: allo transplant  Readiness: acceptance and eager  Preferred learning method: explanation  Method used: explanation  Response: demonstrated understanding and verbalizes understanding  Barriers: none  Preferred language: english    I met with Jin today for allogenic stem cell transplant education. We discussed the general concept of transplant including chemotherapy administration and cell infusion. We reviewed the workup process including organ function testing and laboratory testing, required for MD and financial clearance to proceed with stem cell collection and transplant. We discussed hospital admission for transplant and that Jin will remain in the hospital for 4-6 weeks. Jin will be receiving fludarabine/melphalan/TBI as the preparative regimen prior to transplant. Administration and potential side effects of this treatment were reviewed and patient will be provided Lexicomp material specific to this medication. We discussed neutropenia, anemia, thrombocytopenia and the potential complications associated with these events. Infection prevention measures such as strict hand washing, low pathogen diet, daily showering/CHG bathing and frequent walking were reviewed.  Patient was provided with patient information sheets on transplant related topics. 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 We discussed the goals of discharge and the need for a caregiver and someone to accompany him to post transplant visits in the [facility], which will occur 2-3 times per week, at minimum.  We discussed the importance of having a reliable caregiver post-transplant to drive patient to appointments, help with medication adherence, and assist with ADLS such as cooking and cleaning. The patient verbalized understanding of these measures and his wife states that she will be the primary caregiver, but that they have several family and friends that can help  assist. Patient was given copies of all applicable consents related to upcoming therapy and transplant so they can review prior to signing with Dr. Dawson.

## 2024-10-04 ENCOUNTER — APPOINTMENT (OUTPATIENT)
Dept: HEMATOLOGY/ONCOLOGY | Facility: CLINIC | Age: 69
End: 2024-10-04
Payer: MEDICARE

## 2024-10-05 ENCOUNTER — APPOINTMENT (OUTPATIENT)
Dept: HEMATOLOGY/ONCOLOGY | Facility: HOSPITAL | Age: 69
End: 2024-10-05
Payer: MEDICARE

## 2024-10-06 ENCOUNTER — APPOINTMENT (OUTPATIENT)
Dept: HEMATOLOGY/ONCOLOGY | Facility: HOSPITAL | Age: 69
End: 2024-10-06
Payer: MEDICARE

## 2024-10-07 ENCOUNTER — INFUSION (OUTPATIENT)
Dept: HEMATOLOGY/ONCOLOGY | Facility: CLINIC | Age: 69
End: 2024-10-07
Payer: MEDICARE

## 2024-10-07 ENCOUNTER — APPOINTMENT (OUTPATIENT)
Dept: HEMATOLOGY/ONCOLOGY | Facility: CLINIC | Age: 69
End: 2024-10-07
Payer: MEDICARE

## 2024-10-07 DIAGNOSIS — Z76.82 STEM CELL TRANSPLANT CANDIDATE: ICD-10-CM

## 2024-10-07 DIAGNOSIS — C95.00 ACUTE LEUKEMIA NOT HAVING ACHIEVED REMISSION (MULTI): ICD-10-CM

## 2024-10-07 DIAGNOSIS — R06.9 UNSPECIFIED ABNORMALITIES OF BREATHING: ICD-10-CM

## 2024-10-07 DIAGNOSIS — C92.02 AML (ACUTE MYELOID LEUKEMIA) IN RELAPSE (MULTI): ICD-10-CM

## 2024-10-07 DIAGNOSIS — C92.00 ACUTE MYELOID LEUKEMIA NOT HAVING ACHIEVED REMISSION (MULTI): ICD-10-CM

## 2024-10-07 DIAGNOSIS — D47.02 SYSTEMIC MASTOCYTOSIS WITH ASSOCIATED CLONAL HEMATOLOGICAL NON-MAST CELL LINEAGE DISEASE: ICD-10-CM

## 2024-10-07 DIAGNOSIS — R79.1 ABNORMAL COAGULATION PROFILE: ICD-10-CM

## 2024-10-07 LAB
ALBUMIN SERPL BCP-MCNC: 4.3 G/DL (ref 3.4–5)
ALP SERPL-CCNC: 77 U/L (ref 33–136)
ALT SERPL W P-5'-P-CCNC: 8 U/L (ref 10–52)
ANION GAP SERPL CALC-SCNC: 12 MMOL/L (ref 10–20)
APTT PPP: 43 SECONDS (ref 27–38)
AST SERPL W P-5'-P-CCNC: 10 U/L (ref 9–39)
BASOPHILS # BLD AUTO: 0.05 X10*3/UL (ref 0–0.1)
BASOPHILS NFR BLD AUTO: 1.6 %
BILIRUB SERPL-MCNC: 1 MG/DL (ref 0–1.2)
BNP SERPL-MCNC: 52 PG/ML (ref 0–99)
BUN SERPL-MCNC: 10 MG/DL (ref 6–23)
BURR CELLS BLD QL SMEAR: NORMAL
CALCIUM SERPL-MCNC: 8.6 MG/DL (ref 8.6–10.6)
CARDIAC TROPONIN I PNL SERPL HS: 4 NG/L (ref 0–53)
CHLORIDE SERPL-SCNC: 105 MMOL/L (ref 98–107)
CMV IGG AVIDITY SERPL IA-RTO: NONREACTIVE %
CO2 SERPL-SCNC: 21 MMOL/L (ref 21–32)
CREAT SERPL-MCNC: 1 MG/DL (ref 0.5–1.3)
DACRYOCYTES BLD QL SMEAR: NORMAL
EGFRCR SERPLBLD CKD-EPI 2021: 82 ML/MIN/1.73M*2
EOSINOPHIL # BLD AUTO: 0.2 X10*3/UL (ref 0–0.7)
EOSINOPHIL NFR BLD AUTO: 6.2 %
ERYTHROCYTE [DISTWIDTH] IN BLOOD BY AUTOMATED COUNT: 22.1 % (ref 11.5–14.5)
GLUCOSE SERPL-MCNC: 104 MG/DL (ref 74–99)
HBV CORE AB SER QL: NONREACTIVE
HBV CORE IGM SER QL: NONREACTIVE
HBV SURFACE AB SER-ACNC: 175.1 MIU/ML
HBV SURFACE AG SERPL QL IA: NONREACTIVE
HCT VFR BLD AUTO: 26.4 % (ref 41–52)
HCV AB SER QL: NONREACTIVE
HGB BLD-MCNC: 8.8 G/DL (ref 13.5–17.5)
HIV 1+2 AB+HIV1 P24 AG SERPL QL IA: NONREACTIVE
IMM GRANULOCYTES # BLD AUTO: 0.01 X10*3/UL (ref 0–0.7)
IMM GRANULOCYTES NFR BLD AUTO: 0.3 % (ref 0–0.9)
INR PPP: 1.4 (ref 0.9–1.1)
LYMPHOCYTES # BLD AUTO: 0.75 X10*3/UL (ref 1.2–4.8)
LYMPHOCYTES NFR BLD AUTO: 23.4 %
MAGNESIUM SERPL-MCNC: 2.09 MG/DL (ref 1.6–2.4)
MCH RBC QN AUTO: 30.4 PG (ref 26–34)
MCHC RBC AUTO-ENTMCNC: 33.3 G/DL (ref 32–36)
MCV RBC AUTO: 91 FL (ref 80–100)
MONOCYTES # BLD AUTO: 1.53 X10*3/UL (ref 0.1–1)
MONOCYTES NFR BLD AUTO: 47.7 %
NEUTROPHILS # BLD AUTO: 0.67 X10*3/UL (ref 1.2–7.7)
NEUTROPHILS NFR BLD AUTO: 20.8 %
NRBC BLD-RTO: ABNORMAL /100{WBCS}
OVALOCYTES BLD QL SMEAR: NORMAL
PLATELET # BLD AUTO: 82 X10*3/UL (ref 150–450)
POLYCHROMASIA BLD QL SMEAR: NORMAL
POTASSIUM SERPL-SCNC: 3.4 MMOL/L (ref 3.5–5.3)
PROT SERPL-MCNC: 7.3 G/DL (ref 6.4–8.2)
PROTHROMBIN TIME: 16.3 SECONDS (ref 9.8–12.8)
RBC # BLD AUTO: 2.89 X10*6/UL (ref 4.5–5.9)
RBC MORPH BLD: NORMAL
SCHISTOCYTES BLD QL SMEAR: NORMAL
SODIUM SERPL-SCNC: 135 MMOL/L (ref 136–145)
T GONDII IGG SER-ACNC: NONREACTIVE
TREPONEMA PALLIDUM IGG+IGM AB [PRESENCE] IN SERUM OR PLASMA BY IMMUNOASSAY: NONREACTIVE
WBC # BLD AUTO: 3.2 X10*3/UL (ref 4.4–11.3)

## 2024-10-07 PROCEDURE — 84484 ASSAY OF TROPONIN QUANT: CPT

## 2024-10-07 PROCEDURE — 80053 COMPREHEN METABOLIC PANEL: CPT

## 2024-10-07 PROCEDURE — 86706 HEP B SURFACE ANTIBODY: CPT

## 2024-10-07 PROCEDURE — 86705 HEP B CORE ANTIBODY IGM: CPT

## 2024-10-07 PROCEDURE — 86644 CMV ANTIBODY: CPT

## 2024-10-07 PROCEDURE — 86645 CMV ANTIBODY IGM: CPT

## 2024-10-07 PROCEDURE — 83880 ASSAY OF NATRIURETIC PEPTIDE: CPT

## 2024-10-07 PROCEDURE — 85610 PROTHROMBIN TIME: CPT

## 2024-10-07 PROCEDURE — 86832 HLA CLASS I HIGH DEFIN QUAL: CPT

## 2024-10-07 PROCEDURE — 85730 THROMBOPLASTIN TIME PARTIAL: CPT

## 2024-10-07 PROCEDURE — 87340 HEPATITIS B SURFACE AG IA: CPT

## 2024-10-07 PROCEDURE — 86787 VARICELLA-ZOSTER ANTIBODY: CPT

## 2024-10-07 PROCEDURE — 86780 TREPONEMA PALLIDUM: CPT

## 2024-10-07 PROCEDURE — 83735 ASSAY OF MAGNESIUM: CPT

## 2024-10-07 PROCEDURE — 36591 DRAW BLOOD OFF VENOUS DEVICE: CPT

## 2024-10-07 PROCEDURE — 86790 VIRUS ANTIBODY NOS: CPT

## 2024-10-07 PROCEDURE — 86665 EPSTEIN-BARR CAPSID VCA: CPT

## 2024-10-07 PROCEDURE — 86777 TOXOPLASMA ANTIBODY: CPT

## 2024-10-07 PROCEDURE — 86870 RBC ANTIBODY IDENTIFICATION: CPT

## 2024-10-07 PROCEDURE — 86704 HEP B CORE ANTIBODY TOTAL: CPT

## 2024-10-07 PROCEDURE — 87389 HIV-1 AG W/HIV-1&-2 AB AG IA: CPT

## 2024-10-07 PROCEDURE — 86885 COOMBS TEST INDIRECT QUAL: CPT

## 2024-10-07 PROCEDURE — 86803 HEPATITIS C AB TEST: CPT

## 2024-10-07 PROCEDURE — 85025 COMPLETE CBC W/AUTO DIFF WBC: CPT

## 2024-10-07 PROCEDURE — 86901 BLOOD TYPING SEROLOGIC RH(D): CPT

## 2024-10-07 PROCEDURE — 86695 HERPES SIMPLEX TYPE 1 TEST: CPT

## 2024-10-08 ENCOUNTER — APPOINTMENT (OUTPATIENT)
Dept: HEMATOLOGY/ONCOLOGY | Facility: CLINIC | Age: 69
End: 2024-10-08
Payer: MEDICARE

## 2024-10-08 DIAGNOSIS — D47.02 SYSTEMIC MASTOCYTOSIS WITH ASSOCIATED CLONAL HEMATOLOGICAL NON-MAST CELL LINEAGE DISEASE: Primary | ICD-10-CM

## 2024-10-08 DIAGNOSIS — C92.00 ACUTE MYELOID LEUKEMIA NOT HAVING ACHIEVED REMISSION (MULTI): ICD-10-CM

## 2024-10-08 LAB
ABO GROUP (TYPE) IN BLOOD: NORMAL
ANTIBODY SCREEN: NORMAL
BB ANTIBODY IDENTIFICATION: NORMAL
CASE #: NORMAL
EBV EA IGG SER QL: NEGATIVE
EBV NA AB SER QL: POSITIVE
EBV VCA IGG SER IA-ACNC: NEGATIVE
EBV VCA IGM SER IA-ACNC: NEGATIVE
HERPES SIMPLEX VIRUS 1 IGG: 0.4 INDEX
HERPES SIMPLEX VIRUS 2 IGG: 0.3 INDEX
RH FACTOR (ANTIGEN D): NORMAL
VARICELLA ZOSTER IGG INDEX: 6.2 IA
VZV IGG SER QL IA: POSITIVE

## 2024-10-08 NOTE — PROGRESS NOTES
Pt presenting within their normal limits, no complaints or concerns at this time, pt had port lab draw for transplant pre-lab work as well as CBC,CMP, Mg and Type and screen, Hgb resulted 8.8 today 10/7, Pt plan to return for Vidaza tx this week, pt assessed and educated on plan of care for the day, pt given support for future medical encounters

## 2024-10-09 ENCOUNTER — APPOINTMENT (OUTPATIENT)
Dept: HEMATOLOGY/ONCOLOGY | Facility: CLINIC | Age: 69
End: 2024-10-09
Payer: MEDICARE

## 2024-10-09 LAB
CMV IGM SERPL-ACNC: <8 AU/ML
HTLV I+II AB SER QL IA: NEGATIVE

## 2024-10-10 ENCOUNTER — APPOINTMENT (OUTPATIENT)
Dept: HEMATOLOGY/ONCOLOGY | Facility: CLINIC | Age: 69
End: 2024-10-10
Payer: MEDICARE

## 2024-10-10 LAB
HLA RESULTS: NORMAL
HLA-A+B+C AB NFR SER: NORMAL %
HLA-DP+DQ+DR AB NFR SER: NORMAL %

## 2024-10-11 ENCOUNTER — APPOINTMENT (OUTPATIENT)
Dept: HEMATOLOGY/ONCOLOGY | Facility: CLINIC | Age: 69
End: 2024-10-11
Payer: MEDICARE

## 2024-10-11 LAB — PATH REV-IMMUNOHEMATOLOGY-PR30: NORMAL

## 2024-10-12 ENCOUNTER — APPOINTMENT (OUTPATIENT)
Dept: HEMATOLOGY/ONCOLOGY | Facility: HOSPITAL | Age: 69
End: 2024-10-12
Payer: MEDICARE

## 2024-10-13 ENCOUNTER — APPOINTMENT (OUTPATIENT)
Dept: HEMATOLOGY/ONCOLOGY | Facility: HOSPITAL | Age: 69
End: 2024-10-13
Payer: MEDICARE

## 2024-10-14 ENCOUNTER — INFUSION (OUTPATIENT)
Dept: HEMATOLOGY/ONCOLOGY | Facility: CLINIC | Age: 69
End: 2024-10-14
Payer: MEDICARE

## 2024-10-14 ENCOUNTER — APPOINTMENT (OUTPATIENT)
Dept: HEMATOLOGY/ONCOLOGY | Facility: CLINIC | Age: 69
End: 2024-10-14
Payer: MEDICARE

## 2024-10-14 ENCOUNTER — SOCIAL WORK (OUTPATIENT)
Dept: CASE MANAGEMENT | Facility: HOSPITAL | Age: 69
End: 2024-10-14

## 2024-10-14 VITALS
HEART RATE: 75 BPM | OXYGEN SATURATION: 99 % | BODY MASS INDEX: 29.34 KG/M2 | TEMPERATURE: 99.1 F | RESPIRATION RATE: 16 BRPM | WEIGHT: 186.9 LBS | SYSTOLIC BLOOD PRESSURE: 151 MMHG | HEIGHT: 67 IN | DIASTOLIC BLOOD PRESSURE: 79 MMHG

## 2024-10-14 DIAGNOSIS — D47.02 SYSTEMIC MASTOCYTOSIS WITH ASSOCIATED CLONAL HEMATOLOGICAL NON-MAST CELL LINEAGE DISEASE: ICD-10-CM

## 2024-10-14 DIAGNOSIS — C92.00 ACUTE MYELOID LEUKEMIA NOT HAVING ACHIEVED REMISSION (MULTI): ICD-10-CM

## 2024-10-14 DIAGNOSIS — C92.02 AML (ACUTE MYELOID LEUKEMIA) IN RELAPSE (MULTI): ICD-10-CM

## 2024-10-14 LAB
ALBUMIN SERPL BCP-MCNC: 4.1 G/DL (ref 3.4–5)
ALP SERPL-CCNC: 76 U/L (ref 33–136)
ALT SERPL W P-5'-P-CCNC: 8 U/L (ref 10–52)
ANION GAP SERPL CALC-SCNC: 11 MMOL/L (ref 10–20)
AST SERPL W P-5'-P-CCNC: 10 U/L (ref 9–39)
BASOPHILS # BLD AUTO: 0.1 X10*3/UL (ref 0–0.1)
BASOPHILS NFR BLD AUTO: 2.7 %
BILIRUB SERPL-MCNC: 0.9 MG/DL (ref 0–1.2)
BUN SERPL-MCNC: 15 MG/DL (ref 6–23)
BURR CELLS BLD QL SMEAR: NORMAL
CALCIUM SERPL-MCNC: 8.5 MG/DL (ref 8.6–10.6)
CHLORIDE SERPL-SCNC: 106 MMOL/L (ref 98–107)
CO2 SERPL-SCNC: 24 MMOL/L (ref 21–32)
CREAT SERPL-MCNC: 1.08 MG/DL (ref 0.5–1.3)
DACRYOCYTES BLD QL SMEAR: NORMAL
EGFRCR SERPLBLD CKD-EPI 2021: 75 ML/MIN/1.73M*2
EOSINOPHIL # BLD AUTO: 0.51 X10*3/UL (ref 0–0.7)
EOSINOPHIL NFR BLD AUTO: 13.6 %
ERYTHROCYTE [DISTWIDTH] IN BLOOD BY AUTOMATED COUNT: 22.2 % (ref 11.5–14.5)
GLUCOSE SERPL-MCNC: 99 MG/DL (ref 74–99)
HCT VFR BLD AUTO: 25.1 % (ref 41–52)
HGB BLD-MCNC: 8.4 G/DL (ref 13.5–17.5)
IMM GRANULOCYTES # BLD AUTO: 0.01 X10*3/UL (ref 0–0.7)
IMM GRANULOCYTES NFR BLD AUTO: 0.3 % (ref 0–0.9)
LYMPHOCYTES # BLD AUTO: 0.86 X10*3/UL (ref 1.2–4.8)
LYMPHOCYTES NFR BLD AUTO: 22.9 %
MCH RBC QN AUTO: 31.1 PG (ref 26–34)
MCHC RBC AUTO-ENTMCNC: 33.5 G/DL (ref 32–36)
MCV RBC AUTO: 93 FL (ref 80–100)
MONOCYTES # BLD AUTO: 1.4 X10*3/UL (ref 0.1–1)
MONOCYTES NFR BLD AUTO: 37.3 %
NEUTROPHILS # BLD AUTO: 0.87 X10*3/UL (ref 1.2–7.7)
NEUTROPHILS NFR BLD AUTO: 23.2 %
NRBC BLD-RTO: ABNORMAL /100{WBCS}
OVALOCYTES BLD QL SMEAR: NORMAL
PLATELET # BLD AUTO: 98 X10*3/UL (ref 150–450)
POLYCHROMASIA BLD QL SMEAR: NORMAL
POTASSIUM SERPL-SCNC: 3.2 MMOL/L (ref 3.5–5.3)
PROT SERPL-MCNC: 6.8 G/DL (ref 6.4–8.2)
RBC # BLD AUTO: 2.7 X10*6/UL (ref 4.5–5.9)
RBC MORPH BLD: NORMAL
SCHISTOCYTES BLD QL SMEAR: NORMAL
SODIUM SERPL-SCNC: 138 MMOL/L (ref 136–145)
WBC # BLD AUTO: 3.8 X10*3/UL (ref 4.4–11.3)

## 2024-10-14 PROCEDURE — 80053 COMPREHEN METABOLIC PANEL: CPT

## 2024-10-14 PROCEDURE — 36591 DRAW BLOOD OFF VENOUS DEVICE: CPT

## 2024-10-14 PROCEDURE — 85025 COMPLETE CBC W/AUTO DIFF WBC: CPT

## 2024-10-14 ASSESSMENT — PAIN SCALES - GENERAL: PAINLEVEL: 0-NO PAIN

## 2024-10-15 ENCOUNTER — INFUSION (OUTPATIENT)
Dept: HEMATOLOGY/ONCOLOGY | Facility: CLINIC | Age: 69
End: 2024-10-15
Payer: MEDICARE

## 2024-10-15 VITALS
WEIGHT: 190.04 LBS | BODY MASS INDEX: 30.11 KG/M2 | DIASTOLIC BLOOD PRESSURE: 78 MMHG | RESPIRATION RATE: 16 BRPM | TEMPERATURE: 96.4 F | HEART RATE: 79 BPM | SYSTOLIC BLOOD PRESSURE: 138 MMHG

## 2024-10-15 DIAGNOSIS — C92.00 ACUTE MYELOID LEUKEMIA NOT HAVING ACHIEVED REMISSION (MULTI): ICD-10-CM

## 2024-10-15 DIAGNOSIS — D47.02 SYSTEMIC MASTOCYTOSIS WITH ASSOCIATED CLONAL HEMATOLOGICAL NON-MAST CELL LINEAGE DISEASE: ICD-10-CM

## 2024-10-15 DIAGNOSIS — Z76.82 STEM CELL TRANSPLANT CANDIDATE: ICD-10-CM

## 2024-10-15 PROCEDURE — 2500000005 HC RX 250 GENERAL PHARMACY W/O HCPCS: Performed by: INTERNAL MEDICINE

## 2024-10-15 PROCEDURE — 96401 CHEMO ANTI-NEOPL SQ/IM: CPT

## 2024-10-15 PROCEDURE — 2500000004 HC RX 250 GENERAL PHARMACY W/ HCPCS (ALT 636 FOR OP/ED): Performed by: INTERNAL MEDICINE

## 2024-10-15 RX ORDER — PROCHLORPERAZINE EDISYLATE 5 MG/ML
10 INJECTION INTRAMUSCULAR; INTRAVENOUS EVERY 6 HOURS PRN
Status: DISCONTINUED | OUTPATIENT
Start: 2024-10-15 | End: 2024-10-15 | Stop reason: HOSPADM

## 2024-10-15 RX ORDER — DIPHENHYDRAMINE HYDROCHLORIDE 50 MG/ML
50 INJECTION INTRAMUSCULAR; INTRAVENOUS AS NEEDED
OUTPATIENT
Start: 2024-10-21

## 2024-10-15 RX ORDER — FAMOTIDINE 10 MG/ML
20 INJECTION INTRAVENOUS ONCE AS NEEDED
OUTPATIENT
Start: 2024-10-21

## 2024-10-15 RX ORDER — FAMOTIDINE 10 MG/ML
20 INJECTION INTRAVENOUS ONCE AS NEEDED
Status: CANCELLED | OUTPATIENT
Start: 2024-10-20

## 2024-10-15 RX ORDER — PROCHLORPERAZINE MALEATE 10 MG
10 TABLET ORAL EVERY 6 HOURS PRN
Status: CANCELLED | OUTPATIENT
Start: 2024-10-19

## 2024-10-15 RX ORDER — PROCHLORPERAZINE MALEATE 10 MG
10 TABLET ORAL EVERY 6 HOURS PRN
Status: CANCELLED | OUTPATIENT
Start: 2024-10-17

## 2024-10-15 RX ORDER — DIPHENHYDRAMINE HYDROCHLORIDE 50 MG/ML
50 INJECTION INTRAMUSCULAR; INTRAVENOUS AS NEEDED
Status: CANCELLED | OUTPATIENT
Start: 2024-10-19

## 2024-10-15 RX ORDER — FAMOTIDINE 10 MG/ML
20 INJECTION INTRAVENOUS ONCE AS NEEDED
Status: CANCELLED | OUTPATIENT
Start: 2024-10-18

## 2024-10-15 RX ORDER — ONDANSETRON HYDROCHLORIDE 8 MG/1
8 TABLET, FILM COATED ORAL ONCE
Status: COMPLETED | OUTPATIENT
Start: 2024-10-15 | End: 2024-10-15

## 2024-10-15 RX ORDER — PROCHLORPERAZINE EDISYLATE 5 MG/ML
10 INJECTION INTRAMUSCULAR; INTRAVENOUS EVERY 6 HOURS PRN
Status: CANCELLED | OUTPATIENT
Start: 2024-10-17

## 2024-10-15 RX ORDER — EPINEPHRINE 0.3 MG/.3ML
0.3 INJECTION SUBCUTANEOUS EVERY 5 MIN PRN
Status: CANCELLED | OUTPATIENT
Start: 2024-10-18

## 2024-10-15 RX ORDER — EPINEPHRINE 0.3 MG/.3ML
0.3 INJECTION SUBCUTANEOUS EVERY 5 MIN PRN
OUTPATIENT
Start: 2024-10-21

## 2024-10-15 RX ORDER — DIPHENHYDRAMINE HYDROCHLORIDE 50 MG/ML
50 INJECTION INTRAMUSCULAR; INTRAVENOUS AS NEEDED
Status: CANCELLED | OUTPATIENT
Start: 2024-10-20

## 2024-10-15 RX ORDER — ALBUTEROL SULFATE 0.83 MG/ML
3 SOLUTION RESPIRATORY (INHALATION) AS NEEDED
Status: DISCONTINUED | OUTPATIENT
Start: 2024-10-15 | End: 2024-10-15 | Stop reason: HOSPADM

## 2024-10-15 RX ORDER — PROCHLORPERAZINE EDISYLATE 5 MG/ML
10 INJECTION INTRAMUSCULAR; INTRAVENOUS EVERY 6 HOURS PRN
Status: CANCELLED | OUTPATIENT
Start: 2024-10-20

## 2024-10-15 RX ORDER — PROCHLORPERAZINE MALEATE 10 MG
10 TABLET ORAL EVERY 6 HOURS PRN
Status: CANCELLED | OUTPATIENT
Start: 2024-10-18

## 2024-10-15 RX ORDER — FAMOTIDINE 10 MG/ML
20 INJECTION INTRAVENOUS ONCE AS NEEDED
Status: DISCONTINUED | OUTPATIENT
Start: 2024-10-15 | End: 2024-10-15 | Stop reason: HOSPADM

## 2024-10-15 RX ORDER — PROCHLORPERAZINE MALEATE 10 MG
10 TABLET ORAL EVERY 6 HOURS PRN
Status: DISCONTINUED | OUTPATIENT
Start: 2024-10-15 | End: 2024-10-15 | Stop reason: HOSPADM

## 2024-10-15 RX ORDER — PROCHLORPERAZINE EDISYLATE 5 MG/ML
10 INJECTION INTRAMUSCULAR; INTRAVENOUS EVERY 6 HOURS PRN
Status: CANCELLED | OUTPATIENT
Start: 2024-10-18

## 2024-10-15 RX ORDER — ONDANSETRON HYDROCHLORIDE 8 MG/1
8 TABLET, FILM COATED ORAL ONCE
Status: CANCELLED | OUTPATIENT
Start: 2024-10-19

## 2024-10-15 RX ORDER — PROCHLORPERAZINE MALEATE 10 MG
10 TABLET ORAL EVERY 6 HOURS PRN
Status: CANCELLED | OUTPATIENT
Start: 2024-10-20

## 2024-10-15 RX ORDER — FAMOTIDINE 10 MG/ML
20 INJECTION INTRAVENOUS ONCE AS NEEDED
Status: CANCELLED | OUTPATIENT
Start: 2024-10-17

## 2024-10-15 RX ORDER — FAMOTIDINE 10 MG/ML
20 INJECTION INTRAVENOUS ONCE AS NEEDED
Status: CANCELLED | OUTPATIENT
Start: 2024-10-19

## 2024-10-15 RX ORDER — PROCHLORPERAZINE MALEATE 10 MG
10 TABLET ORAL EVERY 6 HOURS PRN
OUTPATIENT
Start: 2024-10-21

## 2024-10-15 RX ORDER — ALBUTEROL SULFATE 0.83 MG/ML
3 SOLUTION RESPIRATORY (INHALATION) AS NEEDED
Status: CANCELLED | OUTPATIENT
Start: 2024-10-17

## 2024-10-15 RX ORDER — ONDANSETRON HYDROCHLORIDE 8 MG/1
8 TABLET, FILM COATED ORAL ONCE
Status: CANCELLED | OUTPATIENT
Start: 2024-10-18

## 2024-10-15 RX ORDER — ALBUTEROL SULFATE 0.83 MG/ML
3 SOLUTION RESPIRATORY (INHALATION) AS NEEDED
Status: CANCELLED | OUTPATIENT
Start: 2024-10-18

## 2024-10-15 RX ORDER — ONDANSETRON HYDROCHLORIDE 8 MG/1
8 TABLET, FILM COATED ORAL ONCE
Status: CANCELLED | OUTPATIENT
Start: 2024-10-20

## 2024-10-15 RX ORDER — DIPHENHYDRAMINE HYDROCHLORIDE 50 MG/ML
50 INJECTION INTRAMUSCULAR; INTRAVENOUS AS NEEDED
Status: CANCELLED | OUTPATIENT
Start: 2024-10-18

## 2024-10-15 RX ORDER — PROCHLORPERAZINE EDISYLATE 5 MG/ML
10 INJECTION INTRAMUSCULAR; INTRAVENOUS EVERY 6 HOURS PRN
Status: CANCELLED | OUTPATIENT
Start: 2024-10-19

## 2024-10-15 RX ORDER — ONDANSETRON HYDROCHLORIDE 8 MG/1
8 TABLET, FILM COATED ORAL ONCE
Status: CANCELLED | OUTPATIENT
Start: 2024-10-17

## 2024-10-15 RX ORDER — ALBUTEROL SULFATE 0.83 MG/ML
3 SOLUTION RESPIRATORY (INHALATION) AS NEEDED
Status: CANCELLED | OUTPATIENT
Start: 2024-10-20

## 2024-10-15 RX ORDER — EPINEPHRINE 0.3 MG/.3ML
0.3 INJECTION SUBCUTANEOUS EVERY 5 MIN PRN
Status: CANCELLED | OUTPATIENT
Start: 2024-10-19

## 2024-10-15 RX ORDER — EPINEPHRINE 0.3 MG/.3ML
0.3 INJECTION SUBCUTANEOUS EVERY 5 MIN PRN
Status: CANCELLED | OUTPATIENT
Start: 2024-10-20

## 2024-10-15 RX ORDER — EPINEPHRINE 0.3 MG/.3ML
0.3 INJECTION SUBCUTANEOUS EVERY 5 MIN PRN
Status: DISCONTINUED | OUTPATIENT
Start: 2024-10-15 | End: 2024-10-15 | Stop reason: HOSPADM

## 2024-10-15 RX ORDER — EPINEPHRINE 0.3 MG/.3ML
0.3 INJECTION SUBCUTANEOUS EVERY 5 MIN PRN
Status: CANCELLED | OUTPATIENT
Start: 2024-10-17

## 2024-10-15 RX ORDER — ALBUTEROL SULFATE 0.83 MG/ML
3 SOLUTION RESPIRATORY (INHALATION) AS NEEDED
Status: CANCELLED | OUTPATIENT
Start: 2024-10-19

## 2024-10-15 RX ORDER — ALBUTEROL SULFATE 0.83 MG/ML
3 SOLUTION RESPIRATORY (INHALATION) AS NEEDED
OUTPATIENT
Start: 2024-10-21

## 2024-10-15 RX ORDER — ONDANSETRON HYDROCHLORIDE 8 MG/1
8 TABLET, FILM COATED ORAL ONCE
OUTPATIENT
Start: 2024-10-21

## 2024-10-15 RX ORDER — PROCHLORPERAZINE EDISYLATE 5 MG/ML
10 INJECTION INTRAMUSCULAR; INTRAVENOUS EVERY 6 HOURS PRN
OUTPATIENT
Start: 2024-10-21

## 2024-10-15 RX ORDER — DIPHENHYDRAMINE HYDROCHLORIDE 50 MG/ML
50 INJECTION INTRAMUSCULAR; INTRAVENOUS AS NEEDED
Status: CANCELLED | OUTPATIENT
Start: 2024-10-17

## 2024-10-15 RX ORDER — DIPHENHYDRAMINE HYDROCHLORIDE 50 MG/ML
50 INJECTION INTRAMUSCULAR; INTRAVENOUS AS NEEDED
Status: DISCONTINUED | OUTPATIENT
Start: 2024-10-15 | End: 2024-10-15 | Stop reason: HOSPADM

## 2024-10-15 ASSESSMENT — PAIN SCALES - GENERAL: PAINLEVEL: 0-NO PAIN

## 2024-10-15 NOTE — PROGRESS NOTES
PSYCHOSOCIAL ASSESSMENT     Demographic Information  Jin Reynolds  1955  [id number]  Transplant Type: Autologous  Assessment Type:  Pre-Transplant Psychosocial  Date of assessment: 10/15/24  Provider(s): Dr. Dawson   Diagnosis: Acute Myeloid Leukemia   Person(s) present during assessment: Patient   Primary language: English   Interpretive services used: None   County: [facility]                   Living Environment/Support Systems  Partner Status:   Children: 2 adult daughters   Support systems: wife and two daughters   24/7 Primary caregiver: Aracely (daughter)/ Marry Rodolfo (wife)  Secondary caregiver: Julieta (daughter)  Employment Status Caregiver: Full time   Caregiver concerns: None   Current Living Situation: House Own  Resides with: Marry Reynolds (wife)  Concerns with Housing Environment: None  Comments:    Lodging  Distance from transplant center: 25 minutes   Lodging Needed? : No  Comments:      Safety  Safety at Home: No safety concerns reported   History of Domestic Violence: None reported       Functional Status   Functional status: Independent  Patient currently ambulates: Independently  Patient has following equipment: None  Other physical health issues that the patient is experiencing: None   What supports are in place to assist the patient: None  Transportation:  Self  Comments:         Finances/Insurance  Insurance: [employer] Medicare  Does the patient have any pending insurance applications: No   Hospital Financial Assistance: None  Patient's income source:  [facility]  Work History: Retired from [employer]    History: No  Background  Food Insecurity: No   Patient financial plans during transplant: Pension   Does the patient have any financial concerns: Yes, Affording co-pays   Any difficulties affording medications? No   Applicable [place]: Yes, [facility]mesh   Comments:      Advance Directives  No Advance Directives- Additional information  provided  Health Care Agent Status:Not Activated  Health Care Agent, When applicable: None   Comments: LSW provided education on HPOA. Patient would like time to speak with family regarding completion of HPOA. LSW agreed and will follow up at next visit.     Legal Involvement  Relevant current or previous legal concerns: None      Caodaism or Spiritual Identity  Comments: Baptism of [employer]     Mental Health  Active SI/HI: No  Mental Health Diagnosis, if applicable: None   Family History of Mental Health? No   Mood: Euthymic   Distress Level over past week (0-10)? 2, due to delay in treatment but is hopeful and counts are better.  Hobbies/Interests? Watching sports   Patient identified coping skills: Watching sports   Patient identified coping skills related to admission: Watching television   Motivation for treatment? To get your energy back   Learning Preferences: Will assess at next visit   Understanding of Diagnosis and Transplant? Reports having a satisfactory understanding of diagnosis and transplant process    Cognitive Comments: none   Relevant Providers: none  Comments:     Substance Use  Use of Tobacco Products? No  Alcohol Use? No  Other Substance Use? Marijuana   Concerns being able to stop any substance use for treatment? No   Family History of Substance Use? No   Comments: Patient reports daily use of marijuana but has been abstaining from use per providers recommendation.         Assessment  SIPAT Total Score: 5  Patient is a Excellent Candidate for transplant from a psychosocial perspective.  Additional Comments:     Potential Barriers to Care: Financial Concerns  Patient Strengths: Motivated for Treatment    Plan   Referrals:N/A  Applications:Leslie  Other: N/A    Narrative: LSW met with Jin Reynolds  over the phone  to complete the pre transplant psychosocial assessment. Jin Reynolds is a 69 years old  male diagnosed with AML and a candidate for an auto stem cell transplant. [place] [facility] [place] need lodging accommodations post-transplant. Patient identifies daughter, Aracely,  as their care partner and wife , Marry, as their secondary partner. Patient reports having a large support group consisting of wife and two adult daughters and feels well supported by them. Patient is motivated by getting his energy back. Patient is compliant with care plan and has shown a satisfactory knowledge of diagnosis and treatment.     Patient's primary source of income is care home. Their financial plan during admission and recovery is to continue to receive care home . LSW educated patient on enrique eligibility. Patient was interested in applying for grants. LSW will apply for AML co-pay assistance with [employer] en[employer]. LSW will follow up. Patient reports not having a completed HPOA. LSW provided education. Patient requested time to talk with family before agreeing to complete. LSW will follow up at next  visit.     Patient denies use of tobacco products and alcohol. Patient endorses daily marijuana use but has abstained for approximately 1 week per provider's recommendations. Patient denies current mental health diagnosis or history of mental health challenges. Patient denies current or historic SI or HI. Patient reports their distress level to be a 2 due to transpalnt being pushed back but counts are looking better now. Pt manish with stressor by watching sports Patient denies participation in mental health support and peer to peer supports. LSW educated patient on various mental health and peer to peer supports available to them if needed. Patient denied connection to supports at this time. Patient has no reported concerns for LSW. LSW will continue to follow.

## 2024-10-16 ENCOUNTER — INFUSION (OUTPATIENT)
Dept: HEMATOLOGY/ONCOLOGY | Facility: CLINIC | Age: 69
End: 2024-10-16
Payer: MEDICARE

## 2024-10-16 ENCOUNTER — SOCIAL WORK (OUTPATIENT)
Dept: CASE MANAGEMENT | Facility: HOSPITAL | Age: 69
End: 2024-10-16

## 2024-10-16 VITALS
BODY MASS INDEX: 30.21 KG/M2 | TEMPERATURE: 96.4 F | RESPIRATION RATE: 16 BRPM | WEIGHT: 190.7 LBS | DIASTOLIC BLOOD PRESSURE: 78 MMHG | HEART RATE: 85 BPM | SYSTOLIC BLOOD PRESSURE: 132 MMHG

## 2024-10-16 DIAGNOSIS — D47.02 SYSTEMIC MASTOCYTOSIS WITH ASSOCIATED CLONAL HEMATOLOGICAL NON-MAST CELL LINEAGE DISEASE: ICD-10-CM

## 2024-10-16 DIAGNOSIS — C92.00 ACUTE MYELOID LEUKEMIA NOT HAVING ACHIEVED REMISSION (MULTI): ICD-10-CM

## 2024-10-16 PROCEDURE — 2500000005 HC RX 250 GENERAL PHARMACY W/O HCPCS: Performed by: INTERNAL MEDICINE

## 2024-10-16 PROCEDURE — 2500000004 HC RX 250 GENERAL PHARMACY W/ HCPCS (ALT 636 FOR OP/ED): Performed by: INTERNAL MEDICINE

## 2024-10-16 PROCEDURE — 96401 CHEMO ANTI-NEOPL SQ/IM: CPT

## 2024-10-16 RX ORDER — ONDANSETRON HYDROCHLORIDE 8 MG/1
8 TABLET, FILM COATED ORAL ONCE
Status: COMPLETED | OUTPATIENT
Start: 2024-10-16 | End: 2024-10-16

## 2024-10-16 RX ORDER — ALBUTEROL SULFATE 0.83 MG/ML
3 SOLUTION RESPIRATORY (INHALATION) AS NEEDED
Status: DISCONTINUED | OUTPATIENT
Start: 2024-10-16 | End: 2024-10-16 | Stop reason: HOSPADM

## 2024-10-16 RX ORDER — EPINEPHRINE 0.3 MG/.3ML
0.3 INJECTION SUBCUTANEOUS EVERY 5 MIN PRN
Status: DISCONTINUED | OUTPATIENT
Start: 2024-10-16 | End: 2024-10-16 | Stop reason: HOSPADM

## 2024-10-16 RX ORDER — DIPHENHYDRAMINE HYDROCHLORIDE 50 MG/ML
50 INJECTION INTRAMUSCULAR; INTRAVENOUS AS NEEDED
Status: DISCONTINUED | OUTPATIENT
Start: 2024-10-16 | End: 2024-10-16 | Stop reason: HOSPADM

## 2024-10-16 RX ORDER — PROCHLORPERAZINE EDISYLATE 5 MG/ML
10 INJECTION INTRAMUSCULAR; INTRAVENOUS EVERY 6 HOURS PRN
Status: DISCONTINUED | OUTPATIENT
Start: 2024-10-16 | End: 2024-10-16 | Stop reason: HOSPADM

## 2024-10-16 RX ORDER — PROCHLORPERAZINE MALEATE 10 MG
10 TABLET ORAL EVERY 6 HOURS PRN
Status: DISCONTINUED | OUTPATIENT
Start: 2024-10-16 | End: 2024-10-16 | Stop reason: HOSPADM

## 2024-10-16 RX ORDER — FAMOTIDINE 10 MG/ML
20 INJECTION INTRAVENOUS ONCE AS NEEDED
Status: DISCONTINUED | OUTPATIENT
Start: 2024-10-16 | End: 2024-10-16 | Stop reason: HOSPADM

## 2024-10-16 ASSESSMENT — PAIN SCALES - GENERAL: PAINLEVEL_OUTOF10: 0-NO PAIN

## 2024-10-16 NOTE — PROGRESS NOTES
Umesh Application    Identified Need: Assistance with Co-Pays   Umesh Name: [facility]   Application Submitted via: online on 6/11/2025  Anticipated Umesh Award Amount: 10,000. Pt was unaware umesh was awarded. Pt currently has a $6700.33 available balance. On 7/12/2024 the umesh was used by  [facility] in the amount of $3299.67. LSW mailed to patient umesh award letter and term/ conditions.     SW will continue to follow.

## 2024-10-17 ENCOUNTER — TELEPHONE (OUTPATIENT)
Dept: OTHER | Facility: HOSPITAL | Age: 69
End: 2024-10-17

## 2024-10-17 ENCOUNTER — APPOINTMENT (OUTPATIENT)
Dept: HEMATOLOGY/ONCOLOGY | Facility: CLINIC | Age: 69
End: 2024-10-17
Payer: MEDICARE

## 2024-10-17 ENCOUNTER — INFUSION (OUTPATIENT)
Dept: HEMATOLOGY/ONCOLOGY | Facility: CLINIC | Age: 69
End: 2024-10-17
Payer: MEDICARE

## 2024-10-17 VITALS
SYSTOLIC BLOOD PRESSURE: 138 MMHG | WEIGHT: 189.71 LBS | RESPIRATION RATE: 18 BRPM | TEMPERATURE: 98.3 F | DIASTOLIC BLOOD PRESSURE: 76 MMHG | BODY MASS INDEX: 30.06 KG/M2 | HEART RATE: 69 BPM | OXYGEN SATURATION: 98 %

## 2024-10-17 DIAGNOSIS — C92.00 ACUTE MYELOID LEUKEMIA NOT HAVING ACHIEVED REMISSION (MULTI): ICD-10-CM

## 2024-10-17 DIAGNOSIS — D47.02 SYSTEMIC MASTOCYTOSIS WITH ASSOCIATED CLONAL HEMATOLOGICAL NON-MAST CELL LINEAGE DISEASE: ICD-10-CM

## 2024-10-17 PROCEDURE — 96401 CHEMO ANTI-NEOPL SQ/IM: CPT

## 2024-10-17 PROCEDURE — 2500000005 HC RX 250 GENERAL PHARMACY W/O HCPCS: Performed by: INTERNAL MEDICINE

## 2024-10-17 PROCEDURE — 2500000004 HC RX 250 GENERAL PHARMACY W/ HCPCS (ALT 636 FOR OP/ED): Performed by: INTERNAL MEDICINE

## 2024-10-17 RX ORDER — PROCHLORPERAZINE EDISYLATE 5 MG/ML
10 INJECTION INTRAMUSCULAR; INTRAVENOUS EVERY 6 HOURS PRN
Status: DISCONTINUED | OUTPATIENT
Start: 2024-10-17 | End: 2024-10-17 | Stop reason: HOSPADM

## 2024-10-17 RX ORDER — EPINEPHRINE 0.3 MG/.3ML
0.3 INJECTION SUBCUTANEOUS EVERY 5 MIN PRN
Status: DISCONTINUED | OUTPATIENT
Start: 2024-10-17 | End: 2024-10-17 | Stop reason: HOSPADM

## 2024-10-17 RX ORDER — ONDANSETRON HYDROCHLORIDE 8 MG/1
8 TABLET, FILM COATED ORAL ONCE
Status: COMPLETED | OUTPATIENT
Start: 2024-10-17 | End: 2024-10-17

## 2024-10-17 RX ORDER — PROCHLORPERAZINE MALEATE 10 MG
10 TABLET ORAL EVERY 6 HOURS PRN
Status: DISCONTINUED | OUTPATIENT
Start: 2024-10-17 | End: 2024-10-17 | Stop reason: HOSPADM

## 2024-10-17 RX ORDER — ALBUTEROL SULFATE 0.83 MG/ML
3 SOLUTION RESPIRATORY (INHALATION) AS NEEDED
Status: DISCONTINUED | OUTPATIENT
Start: 2024-10-17 | End: 2024-10-17 | Stop reason: HOSPADM

## 2024-10-17 RX ORDER — FAMOTIDINE 10 MG/ML
20 INJECTION INTRAVENOUS ONCE AS NEEDED
Status: DISCONTINUED | OUTPATIENT
Start: 2024-10-17 | End: 2024-10-17 | Stop reason: HOSPADM

## 2024-10-17 RX ORDER — DIPHENHYDRAMINE HYDROCHLORIDE 50 MG/ML
50 INJECTION INTRAMUSCULAR; INTRAVENOUS AS NEEDED
Status: DISCONTINUED | OUTPATIENT
Start: 2024-10-17 | End: 2024-10-17 | Stop reason: HOSPADM

## 2024-10-17 NOTE — PROGRESS NOTES
Called to update patient on schedule for testing and transplant. Patient verbalized understanding and had no questions.

## 2024-10-18 ENCOUNTER — INFUSION (OUTPATIENT)
Dept: HEMATOLOGY/ONCOLOGY | Facility: CLINIC | Age: 69
End: 2024-10-18
Payer: MEDICARE

## 2024-10-18 VITALS
RESPIRATION RATE: 18 BRPM | BODY MASS INDEX: 29.32 KG/M2 | TEMPERATURE: 98.4 F | OXYGEN SATURATION: 95 % | HEART RATE: 85 BPM | SYSTOLIC BLOOD PRESSURE: 125 MMHG | DIASTOLIC BLOOD PRESSURE: 74 MMHG | WEIGHT: 185.08 LBS

## 2024-10-18 DIAGNOSIS — D47.02 SYSTEMIC MASTOCYTOSIS WITH ASSOCIATED CLONAL HEMATOLOGICAL NON-MAST CELL LINEAGE DISEASE: ICD-10-CM

## 2024-10-18 DIAGNOSIS — C92.00 ACUTE MYELOID LEUKEMIA NOT HAVING ACHIEVED REMISSION (MULTI): ICD-10-CM

## 2024-10-18 PROCEDURE — 2500000005 HC RX 250 GENERAL PHARMACY W/O HCPCS: Performed by: INTERNAL MEDICINE

## 2024-10-18 PROCEDURE — 2500000004 HC RX 250 GENERAL PHARMACY W/ HCPCS (ALT 636 FOR OP/ED): Performed by: INTERNAL MEDICINE

## 2024-10-18 PROCEDURE — 96401 CHEMO ANTI-NEOPL SQ/IM: CPT

## 2024-10-18 RX ORDER — FAMOTIDINE 10 MG/ML
20 INJECTION INTRAVENOUS ONCE AS NEEDED
Status: DISCONTINUED | OUTPATIENT
Start: 2024-10-18 | End: 2024-10-18 | Stop reason: HOSPADM

## 2024-10-18 RX ORDER — ALBUTEROL SULFATE 0.83 MG/ML
3 SOLUTION RESPIRATORY (INHALATION) AS NEEDED
Status: DISCONTINUED | OUTPATIENT
Start: 2024-10-18 | End: 2024-10-18 | Stop reason: HOSPADM

## 2024-10-18 RX ORDER — ONDANSETRON HYDROCHLORIDE 8 MG/1
8 TABLET, FILM COATED ORAL ONCE
Status: COMPLETED | OUTPATIENT
Start: 2024-10-18 | End: 2024-10-18

## 2024-10-18 RX ORDER — PROCHLORPERAZINE MALEATE 10 MG
10 TABLET ORAL EVERY 6 HOURS PRN
Status: DISCONTINUED | OUTPATIENT
Start: 2024-10-18 | End: 2024-10-18 | Stop reason: HOSPADM

## 2024-10-18 RX ORDER — DIPHENHYDRAMINE HYDROCHLORIDE 50 MG/ML
50 INJECTION INTRAMUSCULAR; INTRAVENOUS AS NEEDED
Status: DISCONTINUED | OUTPATIENT
Start: 2024-10-18 | End: 2024-10-18 | Stop reason: HOSPADM

## 2024-10-18 RX ORDER — EPINEPHRINE 0.3 MG/.3ML
0.3 INJECTION SUBCUTANEOUS EVERY 5 MIN PRN
Status: DISCONTINUED | OUTPATIENT
Start: 2024-10-18 | End: 2024-10-18 | Stop reason: HOSPADM

## 2024-10-18 RX ORDER — PROCHLORPERAZINE EDISYLATE 5 MG/ML
10 INJECTION INTRAMUSCULAR; INTRAVENOUS EVERY 6 HOURS PRN
Status: DISCONTINUED | OUTPATIENT
Start: 2024-10-18 | End: 2024-10-18 | Stop reason: HOSPADM

## 2024-10-18 ASSESSMENT — PAIN SCALES - GENERAL: PAINLEVEL_OUTOF10: 0-NO PAIN

## 2024-10-19 ENCOUNTER — INFUSION (OUTPATIENT)
Dept: HEMATOLOGY/ONCOLOGY | Facility: HOSPITAL | Age: 69
End: 2024-10-19
Payer: MEDICARE

## 2024-10-19 VITALS
RESPIRATION RATE: 18 BRPM | OXYGEN SATURATION: 99 % | DIASTOLIC BLOOD PRESSURE: 75 MMHG | HEART RATE: 85 BPM | WEIGHT: 193 LBS | SYSTOLIC BLOOD PRESSURE: 133 MMHG | BODY MASS INDEX: 30.58 KG/M2

## 2024-10-19 DIAGNOSIS — D47.02 SYSTEMIC MASTOCYTOSIS WITH ASSOCIATED CLONAL HEMATOLOGICAL NON-MAST CELL LINEAGE DISEASE: ICD-10-CM

## 2024-10-19 DIAGNOSIS — C92.00 ACUTE MYELOID LEUKEMIA NOT HAVING ACHIEVED REMISSION (MULTI): ICD-10-CM

## 2024-10-19 PROCEDURE — 2500000005 HC RX 250 GENERAL PHARMACY W/O HCPCS: Performed by: INTERNAL MEDICINE

## 2024-10-19 PROCEDURE — 2500000004 HC RX 250 GENERAL PHARMACY W/ HCPCS (ALT 636 FOR OP/ED): Performed by: INTERNAL MEDICINE

## 2024-10-19 PROCEDURE — 96401 CHEMO ANTI-NEOPL SQ/IM: CPT

## 2024-10-19 RX ORDER — EPINEPHRINE 0.3 MG/.3ML
0.3 INJECTION SUBCUTANEOUS EVERY 5 MIN PRN
Status: DISCONTINUED | OUTPATIENT
Start: 2024-10-19 | End: 2024-10-19 | Stop reason: HOSPADM

## 2024-10-19 RX ORDER — ALBUTEROL SULFATE 0.83 MG/ML
3 SOLUTION RESPIRATORY (INHALATION) AS NEEDED
Status: DISCONTINUED | OUTPATIENT
Start: 2024-10-19 | End: 2024-10-19 | Stop reason: HOSPADM

## 2024-10-19 RX ORDER — PROCHLORPERAZINE EDISYLATE 5 MG/ML
10 INJECTION INTRAMUSCULAR; INTRAVENOUS EVERY 6 HOURS PRN
Status: DISCONTINUED | OUTPATIENT
Start: 2024-10-19 | End: 2024-10-19 | Stop reason: HOSPADM

## 2024-10-19 RX ORDER — PROCHLORPERAZINE MALEATE 10 MG
10 TABLET ORAL EVERY 6 HOURS PRN
Status: DISCONTINUED | OUTPATIENT
Start: 2024-10-19 | End: 2024-10-19 | Stop reason: HOSPADM

## 2024-10-19 RX ORDER — FAMOTIDINE 10 MG/ML
20 INJECTION INTRAVENOUS ONCE AS NEEDED
Status: DISCONTINUED | OUTPATIENT
Start: 2024-10-19 | End: 2024-10-19 | Stop reason: HOSPADM

## 2024-10-19 RX ORDER — ONDANSETRON HYDROCHLORIDE 8 MG/1
8 TABLET, FILM COATED ORAL ONCE
Status: COMPLETED | OUTPATIENT
Start: 2024-10-19 | End: 2024-10-19

## 2024-10-19 RX ORDER — DIPHENHYDRAMINE HYDROCHLORIDE 50 MG/ML
50 INJECTION INTRAMUSCULAR; INTRAVENOUS AS NEEDED
Status: DISCONTINUED | OUTPATIENT
Start: 2024-10-19 | End: 2024-10-19 | Stop reason: HOSPADM

## 2024-10-19 ASSESSMENT — PAIN SCALES - GENERAL: PAINLEVEL_OUTOF10: 0-NO PAIN

## 2024-10-19 NOTE — PROGRESS NOTES
Patient presents to infusion appointment in stable condition. C3D5 azacitidine injections tolerated without incident. Discharged in stable condition.

## 2024-10-20 ENCOUNTER — INFUSION (OUTPATIENT)
Dept: HEMATOLOGY/ONCOLOGY | Facility: HOSPITAL | Age: 69
End: 2024-10-20
Payer: MEDICARE

## 2024-10-20 VITALS
OXYGEN SATURATION: 100 % | WEIGHT: 190 LBS | BODY MASS INDEX: 30.1 KG/M2 | DIASTOLIC BLOOD PRESSURE: 73 MMHG | TEMPERATURE: 97.3 F | RESPIRATION RATE: 18 BRPM | HEART RATE: 73 BPM | SYSTOLIC BLOOD PRESSURE: 121 MMHG

## 2024-10-20 DIAGNOSIS — C92.00 ACUTE MYELOID LEUKEMIA NOT HAVING ACHIEVED REMISSION (MULTI): ICD-10-CM

## 2024-10-20 DIAGNOSIS — D47.02 SYSTEMIC MASTOCYTOSIS WITH ASSOCIATED CLONAL HEMATOLOGICAL NON-MAST CELL LINEAGE DISEASE: ICD-10-CM

## 2024-10-20 PROCEDURE — 96401 CHEMO ANTI-NEOPL SQ/IM: CPT

## 2024-10-20 PROCEDURE — 2500000005 HC RX 250 GENERAL PHARMACY W/O HCPCS: Performed by: INTERNAL MEDICINE

## 2024-10-20 PROCEDURE — 2500000004 HC RX 250 GENERAL PHARMACY W/ HCPCS (ALT 636 FOR OP/ED): Performed by: INTERNAL MEDICINE

## 2024-10-20 RX ORDER — ONDANSETRON HYDROCHLORIDE 8 MG/1
8 TABLET, FILM COATED ORAL ONCE
Status: COMPLETED | OUTPATIENT
Start: 2024-10-20 | End: 2024-10-20

## 2024-10-20 RX ORDER — EPINEPHRINE 0.3 MG/.3ML
0.3 INJECTION SUBCUTANEOUS EVERY 5 MIN PRN
Status: DISCONTINUED | OUTPATIENT
Start: 2024-10-20 | End: 2024-10-20 | Stop reason: HOSPADM

## 2024-10-20 RX ORDER — ALBUTEROL SULFATE 0.83 MG/ML
3 SOLUTION RESPIRATORY (INHALATION) AS NEEDED
Status: DISCONTINUED | OUTPATIENT
Start: 2024-10-20 | End: 2024-10-20 | Stop reason: HOSPADM

## 2024-10-20 RX ORDER — FAMOTIDINE 10 MG/ML
20 INJECTION INTRAVENOUS ONCE AS NEEDED
Status: DISCONTINUED | OUTPATIENT
Start: 2024-10-20 | End: 2024-10-20 | Stop reason: HOSPADM

## 2024-10-20 RX ORDER — DIPHENHYDRAMINE HYDROCHLORIDE 50 MG/ML
50 INJECTION INTRAMUSCULAR; INTRAVENOUS AS NEEDED
Status: DISCONTINUED | OUTPATIENT
Start: 2024-10-20 | End: 2024-10-20 | Stop reason: HOSPADM

## 2024-10-20 RX ADMIN — ONDANSETRON HYDROCHLORIDE 8 MG: 8 TABLET, FILM COATED ORAL at 12:18

## 2024-10-20 RX ADMIN — AZACITIDINE 155 MG: 100 INJECTION, POWDER, LYOPHILIZED, FOR SOLUTION INTRAVENOUS; SUBCUTANEOUS at 12:21

## 2024-10-20 ASSESSMENT — PAIN SCALES - GENERAL: PAINLEVEL_OUTOF10: 0-NO PAIN

## 2024-10-20 NOTE — PROGRESS NOTES
Pt arrived for C3D6 Azacitidine. Pt given Zofran. Pt received azacitidine without incident in left arm. RN reviewed and confirmed tomorrow's appt at [facility] with patient. Pt had all questions answered and ambulated out of area without incident.

## 2024-10-21 ENCOUNTER — INFUSION (OUTPATIENT)
Dept: HEMATOLOGY/ONCOLOGY | Facility: CLINIC | Age: 69
End: 2024-10-21
Payer: MEDICARE

## 2024-10-21 ENCOUNTER — APPOINTMENT (OUTPATIENT)
Dept: HEMATOLOGY/ONCOLOGY | Facility: CLINIC | Age: 69
End: 2024-10-21
Payer: MEDICARE

## 2024-10-21 VITALS
TEMPERATURE: 97.2 F | SYSTOLIC BLOOD PRESSURE: 115 MMHG | RESPIRATION RATE: 18 BRPM | DIASTOLIC BLOOD PRESSURE: 67 MMHG | OXYGEN SATURATION: 97 %

## 2024-10-21 DIAGNOSIS — C92.00 ACUTE MYELOID LEUKEMIA NOT HAVING ACHIEVED REMISSION (MULTI): ICD-10-CM

## 2024-10-21 DIAGNOSIS — D47.02 SYSTEMIC MASTOCYTOSIS WITH ASSOCIATED CLONAL HEMATOLOGICAL NON-MAST CELL LINEAGE DISEASE: ICD-10-CM

## 2024-10-21 PROCEDURE — 2500000004 HC RX 250 GENERAL PHARMACY W/ HCPCS (ALT 636 FOR OP/ED): Performed by: INTERNAL MEDICINE

## 2024-10-21 PROCEDURE — 96401 CHEMO ANTI-NEOPL SQ/IM: CPT

## 2024-10-21 PROCEDURE — 2500000005 HC RX 250 GENERAL PHARMACY W/O HCPCS: Performed by: INTERNAL MEDICINE

## 2024-10-21 RX ORDER — DIPHENHYDRAMINE HYDROCHLORIDE 50 MG/ML
50 INJECTION INTRAMUSCULAR; INTRAVENOUS AS NEEDED
Status: DISCONTINUED | OUTPATIENT
Start: 2024-10-21 | End: 2024-10-21 | Stop reason: HOSPADM

## 2024-10-21 RX ORDER — FAMOTIDINE 10 MG/ML
20 INJECTION INTRAVENOUS ONCE AS NEEDED
Status: DISCONTINUED | OUTPATIENT
Start: 2024-10-21 | End: 2024-10-21 | Stop reason: HOSPADM

## 2024-10-21 RX ORDER — ONDANSETRON HYDROCHLORIDE 8 MG/1
8 TABLET, FILM COATED ORAL ONCE
Status: COMPLETED | OUTPATIENT
Start: 2024-10-21 | End: 2024-10-21

## 2024-10-21 RX ORDER — EPINEPHRINE 0.3 MG/.3ML
0.3 INJECTION SUBCUTANEOUS EVERY 5 MIN PRN
Status: DISCONTINUED | OUTPATIENT
Start: 2024-10-21 | End: 2024-10-21 | Stop reason: HOSPADM

## 2024-10-21 RX ORDER — PROCHLORPERAZINE EDISYLATE 5 MG/ML
10 INJECTION INTRAMUSCULAR; INTRAVENOUS EVERY 6 HOURS PRN
Status: DISCONTINUED | OUTPATIENT
Start: 2024-10-21 | End: 2024-10-21 | Stop reason: HOSPADM

## 2024-10-21 RX ORDER — PROCHLORPERAZINE MALEATE 10 MG
10 TABLET ORAL EVERY 6 HOURS PRN
Status: DISCONTINUED | OUTPATIENT
Start: 2024-10-21 | End: 2024-10-21 | Stop reason: HOSPADM

## 2024-10-21 RX ORDER — ALBUTEROL SULFATE 0.83 MG/ML
3 SOLUTION RESPIRATORY (INHALATION) AS NEEDED
Status: DISCONTINUED | OUTPATIENT
Start: 2024-10-21 | End: 2024-10-21 | Stop reason: HOSPADM

## 2024-10-21 ASSESSMENT — PAIN SCALES - GENERAL: PAINLEVEL_OUTOF10: 0-NO PAIN

## 2024-10-23 ENCOUNTER — OFFICE VISIT (OUTPATIENT)
Dept: CARDIOLOGY | Facility: HOSPITAL | Age: 69
End: 2024-10-23
Payer: MEDICARE

## 2024-10-23 ENCOUNTER — HOSPITAL ENCOUNTER (OUTPATIENT)
Dept: RESPIRATORY THERAPY | Facility: HOSPITAL | Age: 69
Discharge: HOME | End: 2024-10-23
Payer: MEDICARE

## 2024-10-23 ENCOUNTER — HOSPITAL ENCOUNTER (OUTPATIENT)
Dept: CARDIOLOGY | Facility: HOSPITAL | Age: 69
Discharge: HOME | End: 2024-10-23
Payer: MEDICARE

## 2024-10-23 ENCOUNTER — PROCEDURE VISIT (OUTPATIENT)
Dept: HEMATOLOGY/ONCOLOGY | Facility: HOSPITAL | Age: 69
End: 2024-10-23
Payer: MEDICARE

## 2024-10-23 ENCOUNTER — HOSPITAL ENCOUNTER (OUTPATIENT)
Dept: RADIOLOGY | Facility: HOSPITAL | Age: 69
Discharge: HOME | End: 2024-10-23
Payer: MEDICARE

## 2024-10-23 VITALS — DIASTOLIC BLOOD PRESSURE: 77 MMHG | HEART RATE: 74 BPM | SYSTOLIC BLOOD PRESSURE: 131 MMHG

## 2024-10-23 VITALS
RESPIRATION RATE: 18 BRPM | SYSTOLIC BLOOD PRESSURE: 131 MMHG | TEMPERATURE: 97.5 F | DIASTOLIC BLOOD PRESSURE: 77 MMHG | BODY MASS INDEX: 30.25 KG/M2 | WEIGHT: 190.92 LBS | HEART RATE: 74 BPM | OXYGEN SATURATION: 99 %

## 2024-10-23 DIAGNOSIS — C92.00 ACUTE MYELOID LEUKEMIA NOT HAVING ACHIEVED REMISSION (MULTI): ICD-10-CM

## 2024-10-23 DIAGNOSIS — Z76.82 STEM CELL TRANSPLANT CANDIDATE: ICD-10-CM

## 2024-10-23 DIAGNOSIS — C92.00 ACUTE MYELOID LEUKEMIA NOT HAVING ACHIEVED REMISSION (MULTI): Primary | ICD-10-CM

## 2024-10-23 DIAGNOSIS — I49.3 PVC (PREMATURE VENTRICULAR CONTRACTION): ICD-10-CM

## 2024-10-23 DIAGNOSIS — I10 BENIGN HYPERTENSION: ICD-10-CM

## 2024-10-23 DIAGNOSIS — I42.7 CARDIOTOXICITY (MULTI): ICD-10-CM

## 2024-10-23 DIAGNOSIS — D47.02 SYSTEMIC MASTOCYTOSIS WITH ASSOCIATED CLONAL HEMATOLOGICAL NON-MAST CELL LINEAGE DISEASE: ICD-10-CM

## 2024-10-23 LAB
ABO GROUP (TYPE) IN BLOOD: NORMAL
ACANTHOCYTES BLD QL SMEAR: NORMAL
ANTIBODY SCREEN: NORMAL
BASOPHILS # BLD AUTO: 0.16 X10*3/UL (ref 0–0.1)
BASOPHILS NFR BLD AUTO: 3.5 %
BURR CELLS BLD QL SMEAR: NORMAL
CMV IGG AVIDITY SERPL IA-RTO: NONREACTIVE %
DACRYOCYTES BLD QL SMEAR: NORMAL
EBV EA IGG SER QL: NEGATIVE
EBV NA AB SER QL: POSITIVE
EBV VCA IGG SER IA-ACNC: NEGATIVE
EBV VCA IGM SER IA-ACNC: NEGATIVE
EOSINOPHIL # BLD AUTO: 1.13 X10*3/UL (ref 0–0.7)
EOSINOPHIL NFR BLD AUTO: 24.5 %
ERYTHROCYTE [DISTWIDTH] IN BLOOD BY AUTOMATED COUNT: 22.1 % (ref 11.5–14.5)
HBV CORE AB SER QL: NONREACTIVE
HBV CORE IGM SER QL: NONREACTIVE
HBV SURFACE AB SER-ACNC: 157.5 MIU/ML
HBV SURFACE AG SERPL QL IA: NONREACTIVE
HCT VFR BLD AUTO: 27.1 % (ref 41–52)
HCV AB SER QL: NONREACTIVE
HERPES SIMPLEX VIRUS 1 IGG: 0.3 INDEX
HERPES SIMPLEX VIRUS 2 IGG: 0.3 INDEX
HGB BLD-MCNC: 9 G/DL (ref 13.5–17.5)
HIV 1+2 AB+HIV1 P24 AG SERPL QL IA: NONREACTIVE
IMM GRANULOCYTES # BLD AUTO: 0.02 X10*3/UL (ref 0–0.7)
IMM GRANULOCYTES NFR BLD AUTO: 0.4 % (ref 0–0.9)
LYMPHOCYTES # BLD AUTO: 0.95 X10*3/UL (ref 1.2–4.8)
LYMPHOCYTES NFR BLD AUTO: 20.6 %
MCH RBC QN AUTO: 31.6 PG (ref 26–34)
MCHC RBC AUTO-ENTMCNC: 33.2 G/DL (ref 32–36)
MCV RBC AUTO: 95 FL (ref 80–100)
MONOCYTES # BLD AUTO: 0.77 X10*3/UL (ref 0.1–1)
MONOCYTES NFR BLD AUTO: 16.7 %
NEUTROPHILS # BLD AUTO: 1.58 X10*3/UL (ref 1.2–7.7)
NEUTROPHILS NFR BLD AUTO: 34.3 %
NRBC BLD-RTO: 0 /100 WBCS (ref 0–0)
OVALOCYTES BLD QL SMEAR: NORMAL
PLATELET # BLD AUTO: 70 X10*3/UL (ref 150–450)
POLYCHROMASIA BLD QL SMEAR: NORMAL
RBC # BLD AUTO: 2.85 X10*6/UL (ref 4.5–5.9)
RBC MORPH BLD: NORMAL
RH FACTOR (ANTIGEN D): NORMAL
SCHISTOCYTES BLD QL SMEAR: NORMAL
T GONDII IGG SER-ACNC: NONREACTIVE
TREPONEMA PALLIDUM IGG+IGM AB [PRESENCE] IN SERUM OR PLASMA BY IMMUNOASSAY: NONREACTIVE
VARICELLA ZOSTER IGG INDEX: 6 IA
VZV IGG SER QL IA: POSITIVE
WBC # BLD AUTO: 4.6 X10*3/UL (ref 4.4–11.3)

## 2024-10-23 PROCEDURE — 1159F MED LIST DOCD IN RCRD: CPT | Performed by: INTERNAL MEDICINE

## 2024-10-23 PROCEDURE — 38222 DX BONE MARROW BX & ASPIR: CPT

## 2024-10-23 PROCEDURE — 94729 DIFFUSING CAPACITY: CPT | Performed by: STUDENT IN AN ORGANIZED HEALTH CARE EDUCATION/TRAINING PROGRAM

## 2024-10-23 PROCEDURE — 99214 OFFICE O/P EST MOD 30 MIN: CPT | Mod: 25 | Performed by: INTERNAL MEDICINE

## 2024-10-23 PROCEDURE — 71250 CT THORAX DX C-: CPT

## 2024-10-23 PROCEDURE — 3075F SYST BP GE 130 - 139MM HG: CPT | Performed by: INTERNAL MEDICINE

## 2024-10-23 PROCEDURE — 86803 HEPATITIS C AB TEST: CPT

## 2024-10-23 PROCEDURE — 86645 CMV ANTIBODY IGM: CPT

## 2024-10-23 PROCEDURE — 94726 PLETHYSMOGRAPHY LUNG VOLUMES: CPT

## 2024-10-23 PROCEDURE — 3078F DIAST BP <80 MM HG: CPT | Performed by: INTERNAL MEDICINE

## 2024-10-23 PROCEDURE — 94726 PLETHYSMOGRAPHY LUNG VOLUMES: CPT | Performed by: STUDENT IN AN ORGANIZED HEALTH CARE EDUCATION/TRAINING PROGRAM

## 2024-10-23 PROCEDURE — 93306 TTE W/DOPPLER COMPLETE: CPT | Performed by: STUDENT IN AN ORGANIZED HEALTH CARE EDUCATION/TRAINING PROGRAM

## 2024-10-23 PROCEDURE — 99204 OFFICE O/P NEW MOD 45 MIN: CPT | Performed by: INTERNAL MEDICINE

## 2024-10-23 PROCEDURE — 86901 BLOOD TYPING SEROLOGIC RH(D): CPT

## 2024-10-23 PROCEDURE — 93306 TTE W/DOPPLER COMPLETE: CPT

## 2024-10-23 PROCEDURE — 86695 HERPES SIMPLEX TYPE 1 TEST: CPT

## 2024-10-23 PROCEDURE — 86780 TREPONEMA PALLIDUM: CPT

## 2024-10-23 PROCEDURE — 87799 DETECT AGENT NOS DNA QUANT: CPT

## 2024-10-23 PROCEDURE — 87340 HEPATITIS B SURFACE AG IA: CPT

## 2024-10-23 PROCEDURE — 85025 COMPLETE CBC W/AUTO DIFF WBC: CPT

## 2024-10-23 PROCEDURE — 86644 CMV ANTIBODY: CPT

## 2024-10-23 PROCEDURE — 71250 CT THORAX DX C-: CPT | Performed by: RADIOLOGY

## 2024-10-23 PROCEDURE — 38222 DX BONE MARROW BX & ASPIR: CPT | Mod: RT

## 2024-10-23 PROCEDURE — 2500000004 HC RX 250 GENERAL PHARMACY W/ HCPCS (ALT 636 FOR OP/ED): Performed by: INTERNAL MEDICINE

## 2024-10-23 PROCEDURE — 94010 BREATHING CAPACITY TEST: CPT | Performed by: STUDENT IN AN ORGANIZED HEALTH CARE EDUCATION/TRAINING PROGRAM

## 2024-10-23 PROCEDURE — 86787 VARICELLA-ZOSTER ANTIBODY: CPT

## 2024-10-23 PROCEDURE — 86790 VIRUS ANTIBODY NOS: CPT

## 2024-10-23 PROCEDURE — 86706 HEP B SURFACE ANTIBODY: CPT

## 2024-10-23 PROCEDURE — 88237 TISSUE CULTURE BONE MARROW: CPT

## 2024-10-23 PROCEDURE — 96523 IRRIG DRUG DELIVERY DEVICE: CPT

## 2024-10-23 PROCEDURE — 86870 RBC ANTIBODY IDENTIFICATION: CPT

## 2024-10-23 PROCEDURE — 86777 TOXOPLASMA ANTIBODY: CPT

## 2024-10-23 PROCEDURE — 86705 HEP B CORE ANTIBODY IGM: CPT

## 2024-10-23 PROCEDURE — 86663 EPSTEIN-BARR ANTIBODY: CPT

## 2024-10-23 PROCEDURE — 1036F TOBACCO NON-USER: CPT | Performed by: INTERNAL MEDICINE

## 2024-10-23 PROCEDURE — 85097 BONE MARROW INTERPRETATION: CPT

## 2024-10-23 PROCEDURE — 87389 HIV-1 AG W/HIV-1&-2 AB AG IA: CPT

## 2024-10-23 PROCEDURE — 86704 HEP B CORE ANTIBODY TOTAL: CPT

## 2024-10-23 PROCEDURE — 93005 ELECTROCARDIOGRAM TRACING: CPT

## 2024-10-23 RX ORDER — HEPARIN 100 UNIT/ML
500 SYRINGE INTRAVENOUS AS NEEDED
Status: DISCONTINUED | OUTPATIENT
Start: 2024-10-23 | End: 2024-10-23 | Stop reason: HOSPADM

## 2024-10-23 RX ORDER — HEPARIN 100 UNIT/ML
500 SYRINGE INTRAVENOUS AS NEEDED
Status: CANCELLED | OUTPATIENT
Start: 2024-10-23

## 2024-10-23 RX ORDER — HEPARIN SODIUM,PORCINE/PF 10 UNIT/ML
50 SYRINGE (ML) INTRAVENOUS AS NEEDED
Status: CANCELLED | OUTPATIENT
Start: 2024-10-23

## 2024-10-23 ASSESSMENT — ENCOUNTER SYMPTOMS
CONSTIPATION: 1
LIGHT-HEADEDNESS: 1
DIFFICULTY URINATING: 1
ARTHRALGIAS: 1
ABDOMINAL DISTENTION: 1
CARDIOVASCULAR NEGATIVE: 1
FATIGUE: 1
BRUISES/BLEEDS EASILY: 1
SHORTNESS OF BREATH: 1

## 2024-10-23 ASSESSMENT — PAIN SCALES - GENERAL: PAINLEVEL_OUTOF10: 0-NO PAIN

## 2024-10-23 NOTE — PROGRESS NOTES
Bone Marrow Biopsy and Aspiration Procedure Note     Informed consent was obtained and potential risks including bleeding, infection and pain were reviewed with the patient.     No premeds were given to the patient prior to the procedure    The right posterior iliac crest was prepped with chlorhexidine.     8 ml of lidocaine 2% local anesthesia infiltrated into the subcutaneous tissue.    Right bone marrow biopsy and right bone marrow aspirate was obtained.     The procedure was tolerated well and there were no complications.    Specimens sent for: routine histopathologic stains and sectioning, flow cytometry, cytogenetics, and molecular analysis    Procedure completed by: Gregor Gusman PA-C

## 2024-10-23 NOTE — PROGRESS NOTES
CARDIOLOGY NEW PATIENT OFFICE VISIT    Patient:  Jin Reynolds  YOB: 1955  MRN: [id number]       Chief Complaint/Active Symptoms:       Jin Reynolds is a 69 y.o. male who is being seen today at the request of Lydia Aragon for evaluation of Stem cell transplant.    No chief complaint on file.      History of Present Illness:   HPI    Patient here for CV evaluation prior to stem cell transplant for AML. Patient has had CV evaluation in June 2023 when found incidentally (while hospitalized for GI bleed) to have PVCs and short runs of NSVT (3 beats). Saw cardiology and echo with normal heart function and stress echo normal. 7 Day monitor with 1.7% PVC burden and several triplets. No sustained VT.     Patient has no awareness of any palpitations or tachycardia. No chest pain or discomfort, no shortness of breath, othopnea or PND. Has episodic lightheadedness with standing/turning quickly. No problems with edema.     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     Risk factors: Age, HTN, PVC    Testing:  Echo 6/2023 EF 55-60%  Echo today normal  Stress test 7/2023 - normal / low risk for MI   Monitor: low volume PVC, several asymptomatic triplets.     Allergies:     Allergies   Allergen Reactions    Zyrtec [Cetirizine] Headache        Outpatient Medications:     Current Outpatient Medications   Medication Instructions    acyclovir (ZOVIRAX) 400 mg, oral, 2 times daily    allopurinol (ZYLOPRIM) 300 mg, oral, Daily    amLODIPine (NORVASC) 10 mg, oral, Daily    famotidine (PEPCID) 20 mg, oral, Daily    fexofenadine (ALLEGRA) 180 mg, oral, Daily    montelukast (SINGULAIR) 10 mg, oral, Daily    ondansetron (ZOFRAN) 8 mg, oral, Every 8 hours PRN    posaconazole (NOXAFIL) 300 mg, oral, Daily with breakfast, Do not crush, chew, or split.    prochlorperazine (COMPAZINE) 10 mg, oral,  Every 6 hours PRN    sodium chloride (Saline Mist) 0.65 % nasal spray 1 spray, Each Nostril, 2 times daily    venetoclax (VENCLEXTA) 100 mg, oral, Daily, Take with food.        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    Esophageal ulcer with bleeding 01/01/2024    Personal history of diseases of the skin and subcutaneous tissue     History of alopecia    Umbilical hernia 05/30/2023       Social History:     Social History     Socioeconomic History    Marital status:    Tobacco Use    Smoking status: Never    Smokeless tobacco: Never   Substance and Sexual Activity    Alcohol use: Never    Drug use: Yes     Types: Marijuana     Social Drivers of Health     Financial Resource Strain: Medium Risk (9/6/2024)    Overall Financial Resource Strain (CARDIA)     Difficulty of Paying Living Expenses: Somewhat hard   Transportation Needs: No Transportation Needs (9/6/2024)    PRAPARE - Transportation     Lack of Transportation (Medical): No     Lack of Transportation (Non-Medical): No   Housing Stability: Low Risk  (9/6/2024)    Housing Stability Vital Sign     Unable to Pay for Housing in the Last Year: No     Number of Times Moved in the Last Year: 1     Homeless in the Last Year: No       Family History:        Family History   Problem Relation Name Age of Onset    Cancer Father         Review of Systems:     Review of Systems   Constitutional:  Positive for fatigue.   HENT:  Positive for hearing loss.    Respiratory:  Positive for shortness of breath.    Cardiovascular: Negative.    Gastrointestinal:  Positive for abdominal distention and constipation.   Genitourinary:  Positive for difficulty urinating.   Musculoskeletal:  Positive for arthralgias.   Neurological:  Positive for light-headedness.   Hematological:  Bruises/bleeds easily.   All other systems reviewed and are negative.      Objective:     Visit Vitals  BP  131/77   Pulse 74   Smoking Status Never     Physical Examination:   GENERAL:  Well developed, well nourished, in no acute distress.  HEENT: NC AT EOMI with anicteric sclera  NECK:  Supple, no JVD, no bruit.  CHEST:  Symmetric and nontender.  LUNGS:  Normal respiratory effort. Bilateral breath sounds clear to auscultation.   HEART:  PMI nondisplaced. Rate and rhythm regular with no evident murmur, no gallop appreciated. There are no rubs, clicks or heaves.  PERIPHERAL VASCULAR:  Pulses present and equally palpable; 2+ throughout.  ABDOMEN: Soft, NT, ND without HSM or palpable organomegaly, no bruits, normoactive bowel sounds  MUSCULOSKELETAL: OA changes   EXTREMITIES:  Warm with good color, no clubbing or cyanosis.  There is no edema noted.  NEURO/PSYCH:  Alert and oriented times three with approppriate behavior and responses.  SKIN: No rashes on exposed skin, no reported skin lesions. Patches of mild erythema and bruising on anterior abdominal wall.     Lab:     CBC:   Lab Results   Component Value Date    WBC 4.6 10/23/2024    RBC 2.85 (L) 10/23/2024    HGB 9.0 (L) 10/23/2024    HCT 27.1 (L) 10/23/2024    PLT 70 (L) 10/23/2024      Lab Results   Component Value Date    WBC 4.6 10/23/2024    WBC 3.8 (L) 10/14/2024    WBC 3.2 (L) 10/07/2024    RBC 2.85 (L) 10/23/2024    RBC 2.70 (L) 10/14/2024    RBC 2.89 (L) 10/07/2024    HGB 9.0 (L) 10/23/2024    HGB 8.4 (L) 10/14/2024    HGB 8.8 (L) 10/07/2024    HCT 27.1 (L) 10/23/2024    HCT 25.1 (L) 10/14/2024    HCT 26.4 (L) 10/07/2024    MCV 95 10/23/2024    MCV 93 10/14/2024    MCV 91 10/07/2024    MCH 31.6 10/23/2024    MCH 31.1 10/14/2024    MCH 30.4 10/07/2024    MCHC 33.2 10/23/2024    MCHC 33.5 10/14/2024    MCHC 33.3 10/07/2024    RDW 22.1 (H) 10/23/2024    RDW 22.2 (H) 10/14/2024    RDW 22.1 (H) 10/07/2024    PLT 70 (L) 10/23/2024    PLT 98 (L) 10/14/2024    PLT 82 (L) 10/07/2024    MPV 10.3 11/01/2023       CMP:    Lab Results   Component Value Date      "10/14/2024    K 3.2 (L) 10/14/2024     10/14/2024    CO2 24 10/14/2024    BUN 15 10/14/2024    CREATININE 1.08 10/14/2024    GLUCOSE 99 10/14/2024    CALCIUM 8.5 (L) 10/14/2024     Lab Results   Component Value Date     10/14/2024     (L) 10/07/2024     10/01/2024    K 3.2 (L) 10/14/2024    K 3.4 (L) 10/07/2024    K 3.6 10/01/2024     10/14/2024     10/07/2024     10/01/2024    CO2 24 10/14/2024    CO2 21 10/07/2024    CO2 25 10/01/2024    BUN 15 10/14/2024    BUN 10 10/07/2024    BUN 11 10/01/2024    CREATININE 1.08 10/14/2024    CREATININE 1.00 10/07/2024    CREATININE 1.08 10/01/2024     Lab Results   Component Value Date    ALKPHOS 76 10/14/2024    ALT 8 (L) 10/14/2024    AST 10 10/14/2024    PROT 6.8 10/14/2024    BILITOT 0.9 10/14/2024    BILIDIR 0.2 09/06/2024       Magnesium:    Lab Results   Component Value Date    MG 2.09 10/07/2024       Lipid Profile:    Lab Results   Component Value Date    TRIG 68 06/09/2023    HDL 35.8 (A) 05/31/2023       TSH:    Lab Results   Component Value Date    TSH 2.51 08/23/2023       BNP:   Lab Results   Component Value Date    BNP 52 10/07/2024        PT/INR:    Lab Results   Component Value Date    PROTIME 16.3 (H) 10/07/2024    INR 1.4 (H) 10/07/2024       HgBA1c:    No results found for: \"HGBA1C\"    Cardiac Enzymes:    Lab Results   Component Value Date    TROPHS 4 10/07/2024    TROPHS 16 06/06/2023         Diagnostic Studies:     Testing reviewed    Radiology:     No valid procedures specified.    Assessment:     Problem List Items Addressed This Visit       Benign hypertension    Acute myeloid leukemia not having achieved remission (Multi) - Primary    PVC (premature ventricular contraction)    Stem cell transplant candidate       Plan:     AML, transplant candidate.  The patient has no signs or symptoms of heart failure or angina.  His echo shows normal LV function.  He had a stress test last year that was normal or low risk " and his monitor did not show any high degree of ventricular arrhythmia.  At this time I do not see any contraindications to him proceeding with his stem cell bone marrow transplant for his leukemia.  Obviously due to age she may have some occult coronary artery disease.  He may also see increased PVCs during periods of rest during his transplant.  Will try to keep his potassium and magnesium both corrected.  Would follow him clinically but if significant arrhythmias are seen consult cardiology evidence patient basis.  There is no issues we will see him back posttransplant with an echo.,  2.  PVCs.  As mentioned he had been submitted monitor that did not show high PVC burden with normal LV function the patient stress test is normal with normal heart function.  This is low risk for heart attack.  If the patient is still having chest discomfort please have him keep a symptom diary and bring to the follow-up appointment.  He is just being observed.  3.  Benign essential hypertension.  Blood pressures are controlled on his current medical regimen.    As discussed we will see him in follow-up post transplant with a follow-up echo.  Thank you for this referral.

## 2024-10-23 NOTE — PROGRESS NOTES
Jin Reynolds presents to OP Infusion for bone marrow biopsy.  His port was accessed and labs were drawn.  Platelets 70.  His site was checked by YUDELKA Alexandre and this RN, no bleeding.  His port was flushed with heparin and de-accessed per protocol.  He was discharged home in stable condition.

## 2024-10-24 ENCOUNTER — INFUSION (OUTPATIENT)
Dept: HEMATOLOGY/ONCOLOGY | Facility: CLINIC | Age: 69
End: 2024-10-24
Payer: MEDICARE

## 2024-10-24 DIAGNOSIS — C92.02 AML (ACUTE MYELOID LEUKEMIA) IN RELAPSE (MULTI): ICD-10-CM

## 2024-10-24 DIAGNOSIS — C92.00 ACUTE MYELOID LEUKEMIA NOT HAVING ACHIEVED REMISSION (MULTI): ICD-10-CM

## 2024-10-24 DIAGNOSIS — D47.02 SYSTEMIC MASTOCYTOSIS WITH ASSOCIATED CLONAL HEMATOLOGICAL NON-MAST CELL LINEAGE DISEASE: ICD-10-CM

## 2024-10-24 LAB
BB ANTIBODY IDENTIFICATION: NORMAL
CASE #: NORMAL
CMV DNA SERPL NAA+PROBE-LOG IU: NORMAL {LOG_IU}/ML
HTLV I+II AB SER QL IA: NEGATIVE
LABORATORY COMMENT REPORT: NOT DETECTED
MGC ASCENT PFT - FEV1 - PRE: 3.08
MGC ASCENT PFT - FEV1 - PREDICTED: 2.68
MGC ASCENT PFT - FVC - PRE: 4.34
MGC ASCENT PFT - FVC - PREDICTED: 3.49

## 2024-10-24 RX ORDER — HEPARIN 100 UNIT/ML
500 SYRINGE INTRAVENOUS AS NEEDED
OUTPATIENT
Start: 2024-10-24

## 2024-10-24 RX ORDER — HEPARIN SODIUM,PORCINE/PF 10 UNIT/ML
50 SYRINGE (ML) INTRAVENOUS AS NEEDED
Status: ACTIVE | OUTPATIENT
Start: 2024-10-24

## 2024-10-24 RX ORDER — HEPARIN 100 UNIT/ML
500 SYRINGE INTRAVENOUS AS NEEDED
Status: ACTIVE | OUTPATIENT
Start: 2024-10-24

## 2024-10-24 RX ORDER — HEPARIN SODIUM,PORCINE/PF 10 UNIT/ML
50 SYRINGE (ML) INTRAVENOUS AS NEEDED
OUTPATIENT
Start: 2024-10-24

## 2024-10-25 DIAGNOSIS — C92.00 ACUTE MYELOID LEUKEMIA NOT HAVING ACHIEVED REMISSION (MULTI): ICD-10-CM

## 2024-10-25 LAB
ADENOVIRUS QPCR,PLASMA, VIRC: NOT DETECTED COPIES/ML
AORTIC VALVE PEAK VELOCITY: 1.54 M/S
AV PEAK GRADIENT: 9.5 MMHG
AVA (PEAK VEL): 3.19 CM2
CHROM ANALY OVERALL INTERP-IMP: NORMAL
CMV IGM SERPL-ACNC: <8 AU/ML
EBV DNA SPEC NAA+PROBE-LOG#: NORMAL {LOG_COPIES}/ML
EJECTION FRACTION APICAL 4 CHAMBER: 68.2
EJECTION FRACTION: 53 %
ELECTRONICALLY SIGNED BY CYTOGENETICS: NORMAL
LABORATORY COMMENT REPORT: NOT DETECTED
LEFT ATRIUM VOLUME AREA LENGTH INDEX BSA: 56.1 ML/M2
LEFT VENTRICLE INTERNAL DIMENSION DIASTOLE: 5.63 CM (ref 3.5–6)
LEFT VENTRICULAR OUTFLOW TRACT DIAMETER: 2.34 CM
MITRAL VALVE E/A RATIO: 0.65
PATH REV-IMMUNOHEMATOLOGY-PR30: NORMAL
RIGHT VENTRICLE FREE WALL PEAK S': 13 CM/S
RIGHT VENTRICLE PEAK SYSTOLIC PRESSURE: 38.7 MMHG
TRICUSPID ANNULAR PLANE SYSTOLIC EXCURSION: 2.5 CM

## 2024-10-28 ENCOUNTER — INFUSION (OUTPATIENT)
Dept: HEMATOLOGY/ONCOLOGY | Facility: CLINIC | Age: 69
End: 2024-10-28
Payer: MEDICARE

## 2024-10-28 VITALS
RESPIRATION RATE: 18 BRPM | SYSTOLIC BLOOD PRESSURE: 145 MMHG | WEIGHT: 194 LBS | OXYGEN SATURATION: 97 % | HEART RATE: 76 BPM | TEMPERATURE: 97.5 F | DIASTOLIC BLOOD PRESSURE: 86 MMHG | BODY MASS INDEX: 30.74 KG/M2

## 2024-10-28 DIAGNOSIS — Z76.82 STEM CELL TRANSPLANT CANDIDATE: ICD-10-CM

## 2024-10-28 DIAGNOSIS — C92.02 AML (ACUTE MYELOID LEUKEMIA) IN RELAPSE (MULTI): ICD-10-CM

## 2024-10-28 DIAGNOSIS — D47.02 SYSTEMIC MASTOCYTOSIS WITH ASSOCIATED CLONAL HEMATOLOGICAL NON-MAST CELL LINEAGE DISEASE: ICD-10-CM

## 2024-10-28 DIAGNOSIS — C92.00 ACUTE MYELOID LEUKEMIA NOT HAVING ACHIEVED REMISSION (MULTI): ICD-10-CM

## 2024-10-28 LAB
BASOPHILS # BLD AUTO: 0.14 X10*3/UL (ref 0–0.1)
BASOPHILS NFR BLD AUTO: 3.2 %
BURR CELLS BLD QL SMEAR: NORMAL
CELL COUNT (BLOOD): 11.8 X10*3/UL
CELL POPULATIONS: NORMAL
DACRYOCYTES BLD QL SMEAR: NORMAL
DIAGNOSIS: NORMAL
EOSINOPHIL # BLD AUTO: 1 X10*3/UL (ref 0–0.7)
EOSINOPHIL NFR BLD AUTO: 23.1 %
ERYTHROCYTE [DISTWIDTH] IN BLOOD BY AUTOMATED COUNT: 22.9 % (ref 11.5–14.5)
FLOW DIFFERENTIAL: NORMAL
FLOW TEST ORDERED: NORMAL
HCT VFR BLD AUTO: 27.2 % (ref 41–52)
HGB BLD-MCNC: 8.9 G/DL (ref 13.5–17.5)
IMM GRANULOCYTES # BLD AUTO: 0.02 X10*3/UL (ref 0–0.7)
IMM GRANULOCYTES NFR BLD AUTO: 0.5 % (ref 0–0.9)
LAB TEST METHOD: NORMAL
LYMPHOCYTES # BLD AUTO: 1.15 X10*3/UL (ref 1.2–4.8)
LYMPHOCYTES NFR BLD AUTO: 26.6 %
MCH RBC QN AUTO: 31.9 PG (ref 26–34)
MCHC RBC AUTO-ENTMCNC: 32.7 G/DL (ref 32–36)
MCV RBC AUTO: 98 FL (ref 80–100)
MONOCYTES # BLD AUTO: 0.77 X10*3/UL (ref 0.1–1)
MONOCYTES NFR BLD AUTO: 17.8 %
NEUTROPHILS # BLD AUTO: 1.24 X10*3/UL (ref 1.2–7.7)
NEUTROPHILS NFR BLD AUTO: 28.8 %
NRBC BLD-RTO: ABNORMAL /100{WBCS}
NUMBER OF CELLS COLLECTED: NORMAL
OVALOCYTES BLD QL SMEAR: NORMAL
PATH REPORT.COMMENTS IMP SPEC: NORMAL
PATH REPORT.FINAL DX SPEC: NORMAL
PATH REPORT.GROSS SPEC: NORMAL
PATH REPORT.MICROSCOPIC SPEC OTHER STN: NORMAL
PATH REPORT.RELEVANT HX SPEC: NORMAL
PATH REPORT.RELEVANT HX SPEC: NORMAL
PATH REPORT.TOTAL CANCER: NORMAL
PATH REPORT.TOTAL CANCER: NORMAL
PLATELET # BLD AUTO: 38 X10*3/UL (ref 150–450)
POLYCHROMASIA BLD QL SMEAR: NORMAL
RBC # BLD AUTO: 2.79 X10*6/UL (ref 4.5–5.9)
RBC MORPH BLD: NORMAL
SCHISTOCYTES BLD QL SMEAR: NORMAL
SIGNATURE COMMENT: NORMAL
SPECIMEN VIABILITY: NORMAL
WBC # BLD AUTO: 4.3 X10*3/UL (ref 4.4–11.3)

## 2024-10-28 PROCEDURE — 85025 COMPLETE CBC W/AUTO DIFF WBC: CPT | Performed by: INTERNAL MEDICINE

## 2024-10-28 PROCEDURE — 86833 HLA CLASS II HIGH DEFIN QUAL: CPT

## 2024-10-28 PROCEDURE — 36591 DRAW BLOOD OFF VENOUS DEVICE: CPT

## 2024-10-28 PROCEDURE — 2500000004 HC RX 250 GENERAL PHARMACY W/ HCPCS (ALT 636 FOR OP/ED): Performed by: INTERNAL MEDICINE

## 2024-10-28 RX ORDER — HEPARIN 100 UNIT/ML
500 SYRINGE INTRAVENOUS AS NEEDED
Status: DISCONTINUED | OUTPATIENT
Start: 2024-10-28 | End: 2024-10-28 | Stop reason: HOSPADM

## 2024-10-28 RX ORDER — HEPARIN SODIUM,PORCINE/PF 10 UNIT/ML
50 SYRINGE (ML) INTRAVENOUS AS NEEDED
OUTPATIENT
Start: 2024-10-28

## 2024-10-28 RX ORDER — HEPARIN 100 UNIT/ML
500 SYRINGE INTRAVENOUS AS NEEDED
OUTPATIENT
Start: 2024-10-28

## 2024-10-28 ASSESSMENT — PAIN SCALES - GENERAL: PAINLEVEL_OUTOF10: 0-NO PAIN

## 2024-10-30 LAB
CHIMERISM INTERPRETATION: NORMAL
ELECTRONICALLY SIGNED BY: NORMAL
HLA RESULTS: NORMAL
HLA-A+B+C AB NFR SER: NORMAL %
HLA-DP+DQ+DR AB NFR SER: NORMAL %

## 2024-10-31 ENCOUNTER — INFUSION (OUTPATIENT)
Dept: HEMATOLOGY/ONCOLOGY | Facility: CLINIC | Age: 69
End: 2024-10-31
Payer: MEDICARE

## 2024-10-31 ENCOUNTER — APPOINTMENT (OUTPATIENT)
Dept: RADIATION ONCOLOGY | Facility: HOSPITAL | Age: 69
End: 2024-10-31
Payer: MEDICARE

## 2024-10-31 VITALS
OXYGEN SATURATION: 96 % | TEMPERATURE: 97.3 F | SYSTOLIC BLOOD PRESSURE: 133 MMHG | DIASTOLIC BLOOD PRESSURE: 81 MMHG | WEIGHT: 193 LBS | BODY MASS INDEX: 30.58 KG/M2 | HEART RATE: 71 BPM | RESPIRATION RATE: 18 BRPM

## 2024-10-31 DIAGNOSIS — Z76.82 STEM CELL TRANSPLANT CANDIDATE: ICD-10-CM

## 2024-10-31 DIAGNOSIS — C92.00 ACUTE MYELOID LEUKEMIA NOT HAVING ACHIEVED REMISSION (MULTI): ICD-10-CM

## 2024-10-31 DIAGNOSIS — C92.02 AML (ACUTE MYELOID LEUKEMIA) IN RELAPSE (MULTI): ICD-10-CM

## 2024-10-31 DIAGNOSIS — D47.02 SYSTEMIC MASTOCYTOSIS WITH ASSOCIATED CLONAL HEMATOLOGICAL NON-MAST CELL LINEAGE DISEASE: ICD-10-CM

## 2024-10-31 LAB
BASOPHILS # BLD AUTO: 0.17 X10*3/UL (ref 0–0.1)
BASOPHILS NFR BLD AUTO: 3.5 %
BURR CELLS BLD QL SMEAR: NORMAL
DACRYOCYTES BLD QL SMEAR: NORMAL
EOSINOPHIL # BLD AUTO: 1 X10*3/UL (ref 0–0.7)
EOSINOPHIL NFR BLD AUTO: 20.5 %
ERYTHROCYTE [DISTWIDTH] IN BLOOD BY AUTOMATED COUNT: 23.1 % (ref 11.5–14.5)
HCT VFR BLD AUTO: 28.2 % (ref 41–52)
HGB BLD-MCNC: 9.1 G/DL (ref 13.5–17.5)
IMM GRANULOCYTES # BLD AUTO: 0.02 X10*3/UL (ref 0–0.7)
IMM GRANULOCYTES NFR BLD AUTO: 0.4 % (ref 0–0.9)
LYMPHOCYTES # BLD AUTO: 1.09 X10*3/UL (ref 1.2–4.8)
LYMPHOCYTES NFR BLD AUTO: 22.4 %
MCH RBC QN AUTO: 31.8 PG (ref 26–34)
MCHC RBC AUTO-ENTMCNC: 32.3 G/DL (ref 32–36)
MCV RBC AUTO: 99 FL (ref 80–100)
MONOCYTES # BLD AUTO: 1.15 X10*3/UL (ref 0.1–1)
MONOCYTES NFR BLD AUTO: 23.6 %
NEUTROPHILS # BLD AUTO: 1.44 X10*3/UL (ref 1.2–7.7)
NEUTROPHILS NFR BLD AUTO: 29.6 %
NRBC BLD-RTO: ABNORMAL /100{WBCS}
OVALOCYTES BLD QL SMEAR: NORMAL
PLATELET # BLD AUTO: 32 X10*3/UL (ref 150–450)
POLYCHROMASIA BLD QL SMEAR: NORMAL
RBC # BLD AUTO: 2.86 X10*6/UL (ref 4.5–5.9)
RBC MORPH BLD: NORMAL
SCHISTOCYTES BLD QL SMEAR: NORMAL
WBC # BLD AUTO: 4.9 X10*3/UL (ref 4.4–11.3)

## 2024-10-31 PROCEDURE — 85025 COMPLETE CBC W/AUTO DIFF WBC: CPT

## 2024-10-31 PROCEDURE — 36591 DRAW BLOOD OFF VENOUS DEVICE: CPT

## 2024-10-31 PROCEDURE — 86901 BLOOD TYPING SEROLOGIC RH(D): CPT

## 2024-10-31 ASSESSMENT — PAIN SCALES - GENERAL: PAINLEVEL_OUTOF10: 0-NO PAIN

## 2024-11-01 PROBLEM — C92.01 AML (ACUTE MYELOID LEUKEMIA) IN REMISSION (MULTI): Status: ACTIVE | Noted: 2024-11-01

## 2024-11-01 LAB
ABO GROUP (TYPE) IN BLOOD: NORMAL
ANTIBODY SCREEN: NORMAL
BB ANTIBODY IDENTIFICATION: NORMAL
CASE #: NORMAL
ELECTRONICALLY SIGNED BY: NORMAL
MYELOID NGS RESULTS: NORMAL
PATH REV-IMMUNOHEMATOLOGY-PR30: NORMAL
RH FACTOR (ANTIGEN D): NORMAL

## 2024-11-05 ENCOUNTER — APPOINTMENT (OUTPATIENT)
Dept: HEMATOLOGY/ONCOLOGY | Facility: HOSPITAL | Age: 69
End: 2024-11-05
Payer: MEDICARE

## 2024-11-05 LAB
PATH REPORT.ADDENDUM SPEC: NORMAL
PATH REPORT.COMMENTS IMP SPEC: NORMAL
PATH REPORT.FINAL DX SPEC: NORMAL
PATH REPORT.GROSS SPEC: NORMAL
PATH REPORT.MICROSCOPIC SPEC OTHER STN: NORMAL
PATH REPORT.RELEVANT HX SPEC: NORMAL
PATH REPORT.RELEVANT HX SPEC: NORMAL
PATH REPORT.TOTAL CANCER: NORMAL

## 2024-11-06 ENCOUNTER — TELEPHONE (OUTPATIENT)
Dept: RADIATION ONCOLOGY | Facility: HOSPITAL | Age: 69
End: 2024-11-06
Payer: MEDICARE

## 2024-11-06 ENCOUNTER — APPOINTMENT (OUTPATIENT)
Dept: HEMATOLOGY/ONCOLOGY | Facility: CLINIC | Age: 69
End: 2024-11-06
Payer: MEDICARE

## 2024-11-06 ENCOUNTER — DOCUMENTATION (OUTPATIENT)
Dept: OTHER | Facility: HOSPITAL | Age: 69
End: 2024-11-06
Payer: MEDICARE

## 2024-11-06 LAB
DNA CLASS I + II VERIFICATION TYPING: NORMAL
HLA RESULTS: NORMAL

## 2024-11-06 NOTE — PROGRESS NOTES
Patient was presented in 11/6 transplant planning meeting. Patient will admit on 11/15 with a T=0 of 11/21. Patient's donor who is his sister. She is O+, CMV+.

## 2024-11-07 ENCOUNTER — TELEPHONE (OUTPATIENT)
Dept: ADMISSION | Facility: HOSPITAL | Age: 69
End: 2024-11-07
Payer: MEDICARE

## 2024-11-07 ENCOUNTER — APPOINTMENT (OUTPATIENT)
Dept: HEMATOLOGY/ONCOLOGY | Facility: CLINIC | Age: 69
End: 2024-11-07
Payer: MEDICARE

## 2024-11-07 DIAGNOSIS — C92.00 ACUTE MYELOID LEUKEMIA NOT HAVING ACHIEVED REMISSION (MULTI): ICD-10-CM

## 2024-11-07 RX ORDER — ALLOPURINOL 300 MG/1
300 TABLET ORAL DAILY
Qty: 90 TABLET | Refills: 3 | Status: SHIPPED | OUTPATIENT
Start: 2024-11-07 | End: 2024-11-12 | Stop reason: ALTCHOICE

## 2024-11-07 RX ORDER — ALLOPURINOL 300 MG/1
300 TABLET ORAL DAILY
Qty: 90 TABLET | Refills: 1 | OUTPATIENT
Start: 2024-11-07

## 2024-11-07 NOTE — TELEPHONE ENCOUNTER
Jin is asking for a call from the team to discuss his plan of care. He was under the impression he would be admitted on 11/15 for his stem cell transplant, however, he was contacted by scheduling to set his CVC placement up for that day and he is also scheduled at [facility] for [facility] that afternoon (and the 3 subsequent days).     He is also questioning his Aza infusions set up for next week on the 12, 13 and 14th and would like to discuss everything with team directly.

## 2024-11-08 ENCOUNTER — APPOINTMENT (OUTPATIENT)
Dept: HEMATOLOGY/ONCOLOGY | Facility: CLINIC | Age: 69
End: 2024-11-08
Payer: MEDICARE

## 2024-11-08 ENCOUNTER — HOSPITAL ENCOUNTER (OUTPATIENT)
Dept: RADIATION ONCOLOGY | Facility: HOSPITAL | Age: 69
Setting detail: RADIATION/ONCOLOGY SERIES
Discharge: HOME | End: 2024-11-08
Payer: MEDICARE

## 2024-11-08 VITALS
DIASTOLIC BLOOD PRESSURE: 81 MMHG | RESPIRATION RATE: 18 BRPM | SYSTOLIC BLOOD PRESSURE: 137 MMHG | OXYGEN SATURATION: 98 % | WEIGHT: 190.8 LBS | BODY MASS INDEX: 30.23 KG/M2 | TEMPERATURE: 97.3 F | HEART RATE: 62 BPM

## 2024-11-08 DIAGNOSIS — C92.01 AML (ACUTE MYELOID LEUKEMIA) IN REMISSION (MULTI): Primary | ICD-10-CM

## 2024-11-08 PROCEDURE — 99204 OFFICE O/P NEW MOD 45 MIN: CPT | Performed by: RADIOLOGY

## 2024-11-08 PROCEDURE — 99214 OFFICE O/P EST MOD 30 MIN: CPT | Performed by: RADIOLOGY

## 2024-11-08 ASSESSMENT — PAIN SCALES - GENERAL: PAINLEVEL_OUTOF10: 0-NO PAIN

## 2024-11-08 ASSESSMENT — ENCOUNTER SYMPTOMS
CARDIOVASCULAR NEGATIVE: 1
GASTROINTESTINAL NEGATIVE: 1
EYES NEGATIVE: 1
RESPIRATORY NEGATIVE: 1
NEUROLOGICAL NEGATIVE: 1
HEMATOLOGIC/LYMPHATIC NEGATIVE: 1
ENDOCRINE NEGATIVE: 1
PSYCHIATRIC NEGATIVE: 1
MUSCULOSKELETAL NEGATIVE: 1
CONSTITUTIONAL NEGATIVE: 1

## 2024-11-08 NOTE — PROGRESS NOTES
"Radiation Oncology Outpatient Consult    Patient Name:  Jin Reynolds  MRN:  [id number]  :  1955    Referring Provider: Nika Dawson MD  Primary Care Provider: DELFINO Bone  Care Team: Patient Care Team:  DELFINO Bone as PCP - General (Family Medicine)  Clive Hernandez MD as PCP - [employer] PCP  Nika Dawson MD as Consulting Physician (Hematology and Oncology)  DELFINO Massey as Nurse Practitioner (Hematology and Oncology)  Henrietta Blackburn RN as Nurse Navigator (Hematology and Oncology)    Date of Service: 2024     History of Present Illness:  Jin Reynolds is a 69 y.o. male who was referred by Nika Dawson MD, for a consultation to the [facility] Department of Radiation Oncology.  He is presenting for evaluation and management of Acute myeloid leukemia planned for Allogenic SCT that includes TBI conditioning. He also has a diagnosis of smoldering systemic mastocytosis.    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   2023 Initial Diagnosis    DX:  SMOLDERING SYSTEMIC MASTOCYTOSIS  Presented with skin rash and eosinophilia    BRENDON DIAGNOSTIC CRITERIA  (For SM, Need major and 1 minor OR at least 3 minors)  - Multi-focal, dense infiltrates of MC (>= 15 mast cells in aggregates) in BM and/or other extra-cutaneous organs [major]  - In BM or extracutaneous organs, >25% MC spindle shaped OR have atypical morphology OR of all MC on BM aspirate smears, >25% are immature or atypical [minor]  - Detection of activating mutation KIT D816V in BM, PB, or other extracutaneous organ [minor]  - Serum tryptase persistently exceeds 20 ng/mL (unless associated clonal myeloid disorder, in which this paramenter is not valid) [minor]    \"B\" FINDINGS  - BM biopsy showing >30% infiltration (focal, " "dense aggregates) and/or serum tryptase > 200 ng/mL  - Signs of dysplasia or myeloproliferation, in non-MC lineages but insufficient criteria for definitive diagnosis of AHNMD with normal or slightly abnormal blood counts    \"C\" FINDINGS  None       8/23/2023 Biopsy    BONE MARROW (8/23/23)  Hypercellular (90-95%) with trilineage hematopoiesis, granulocytic hyperplasia, eosinophilia, and increased atypical mast cells  IP:  CD34+, +, CD7(bright)+, cCD3-, CD33-, CD15-, CD13+ blast population  IHC:  + increased spindle-shaped mast cells (15% cellularity), CD2-  Myeloid NGS:  CBL (31%), cKIT D816V (30%), RUNX1 x 2 (19%, 29%), SRSF2 (47%) [ASXL1 negative]  FISH:  PDGFRb negative    SERUM TRYPTASE  228 (8/23/23)  268 (9/5/23)     2/13/2024 -  Molecular Therapy    AVAPRITINIB   - Starting dose 25 mg daily (non-Adv SM)     7/16/2024 - 10/21/2024 Chemotherapy    C1D1- 7/24/2024  - Early TLS with initial treatment requiring rasburicase, allopurinol and IVF. Venetoclax held as of D2 and for the remainder of C1.   - Post C1 BM assessment done showing persistent disease with 27% blasts.  Venetoclax / AzaCITIDine, 28 Day Cycles - Induction / Consolidation     Acute myeloid leukemia not having achieved remission (Multi)   6/27/2024 Initial Diagnosis    ICC 2022 DX: Acute myeloid leukemia, NOS (>=20% blasts)  [id number] AML Risk Stratification: INTERMEDIATE: Cytogenetic and/or molecular abnormalities not classified as favorable or adverse  Presented with pancytopenia and circulating blasts    BONE MARROW (06/27/2024)  Marrow replaced by blasts, fibrotic foci, some atypical + cells; 7-8% circulating blasts; aspicular  - Unable to perform additional studies due to aspicular specimen    PERIPHERAL BLOOD (6/27/2024)  FISH:  positive for gain RUNX1    PERIPHERAL BLOOD (7/1/24)  IP:  abnl myeloblast population (CD34+, CD7+, CD4+, CD13+, CD38+, CD11+ dim, HLA=DR+, TdT+, CD33-)  Myeloid NGS: CBL C404Y (41%), KIT D816V (8%), " RUNX1 x2 (8%, 30%), SRSF2 (37%)     7/16/2024 - 10/21/2024 Chemotherapy    C1D1- 7/24/2024  - Early TLS with initial treatment requiring rasburicase, allopurinol and IVF. Venetoclax held as of D2 and for the remainder of C1.   - Post C1 BM assessment done showing persistent disease with 27% blasts.  Venetoclax / AzaCITIDine, 28 Day Cycles - Induction / Consolidation        Pathology Review:  The pertinent pathology results were reviewed from EMR and discussed with the patient.       Imaging:  The pertinent imaging results were reviewed from EMR/PACS with key results discussed with the patient.    PFT 10/23/2024:  FEV1: 3.08 L/ 114% predicted.  FVC: 4.34 L/ 124% predicted.  DLCOuc: 68% predicted    TTE 10/23/2024:  Left ventricular ejection fraction is low normal, by visual estimate at 50-55%.     Review of Systems: See separately signed RN note.  Doing well overall. No acute symptoms.    RADIATION SCREENING QUESTIONS:  Prior radiation therapy: No  Pacemaker: No  Other implantable devices: No  Connective tissue disease: No    Current Systemic Treatment: Per below  azaCITIDine (Vidaza) subQ syringe 155 mg, 75 mg/m2 = 155 mg, subcutaneous, Once, 3 of 13 cycles  Administration: 155 mg (7/16/2024), 155 mg (7/23/2024), 155 mg (7/25/2024), 155 mg (9/5/2024), 155 mg (9/6/2024), 155 mg (10/19/2024), 155 mg (7/24/2024), 155 mg (7/26/2024), 155 mg (7/27/2024), 155 mg (7/28/2024), 155 mg (9/7/2024), 155 mg (9/8/2024), 155 mg (9/9/2024), 155 mg (9/10/2024), 155 mg (9/11/2024), 155 mg (10/15/2024), 155 mg (10/16/2024), 155 mg (10/17/2024), 155 mg (10/18/2024), 155 mg (10/20/2024), 155 mg (10/21/2024)     Past Medical History:    Past Medical History:   Diagnosis Date    Cancer (Multi)     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 No pertinent surgical history.   Family History:  Cancer-related family history includes Esophageal cancer in his father.  Social History:    Social History     Tobacco Use    Smoking status: Never    Smokeless tobacco: Never   Substance Use Topics    Alcohol use: Never    Drug use: Yes     Types: Marijuana     Comment: daily     Allergies:    Allergies   Allergen Reactions    Zyrtec [Cetirizine] Headache     Medications:    Current Outpatient Medications:     acyclovir (Zovirax) 400 mg tablet, Take 1 tablet (400 mg) by mouth 2 times a day., Disp: 180 tablet, Rfl: 3    amLODIPine (Norvasc) 10 mg tablet, TAKE 1 TABLET BY MOUTH EVERY DAY, Disp: 90 tablet, Rfl: 0    famotidine (Pepcid) 20 mg tablet, Take 1 tablet (20 mg) by mouth once daily., Disp: 90 tablet, Rfl: 3    fexofenadine (Allegra) 180 mg tablet, Take 1 tablet (180 mg) by mouth once daily., Disp: , Rfl:     ondansetron (Zofran) 8 mg tablet, Take 1 tablet (8 mg) by mouth every 8 hours if needed for nausea or vomiting., Disp: 30 tablet, Rfl: 5    posaconazole (Noxafil) 100 mg DR tablet, Take 3 tablets (300 mg) by mouth once daily with breakfast. Do not crush, chew, or split., Disp: 90 tablet, Rfl: 5    prochlorperazine (Compazine) 10 mg tablet, Take 1 tablet (10 mg) by mouth every 6 hours if needed for nausea or vomiting., Disp: 30 tablet, Rfl: 5    sodium chloride (Saline Mist) 0.65 % nasal spray, Administer 1 spray into each nostril 2 times a day., Disp: 15 mL, Rfl: 0    venetoclax (Venclexta) 100 mg tablet, Take 1 tablet (100 mg total) by mouth once daily.  Take with food., Disp: , Rfl:     allopurinol (Zyloprim) 300 mg tablet, Take 1 tablet (300 mg) by mouth once daily. (Patient not taking: Reported on 11/8/2024), Disp: 90 tablet, Rfl: 3    montelukast (Singulair) 10 mg tablet, Take 1 tablet (10 mg) by mouth once daily., Disp: 30 tablet, Rfl: 11  No current facility-administered medications for this encounter.    Facility-Administered  Medications Ordered in Other Encounters:     alteplase (Cathflo Activase) injection 2 mg, 2 mg, intra-catheter, PRN, Nika Dawson MD    heparin flush 10 unit/mL syringe 50 Units, 50 Units, intra-catheter, PRN, Nika Dawson MD    heparin flush 100 unit/mL syringe 500 Units, 500 Units, intra-catheter, PRN, Nika Dawson MD      Performance Status:  The Karnofsky performance scale today is 90, Able to carry on normal activity; minor signs or symptoms of disease (ECOG equivalent 0).     OBJECTIVE  Physical Exam:  /81   Pulse 62   Temp 36.3 °C (97.3 °F) (Skin)   Resp 18   Wt 86.5 kg (190 lb 12.8 oz)   SpO2 98%   BMI 30.23 kg/m²    Physical Exam  Constitutional:       General: He is not in acute distress.     Appearance: He is not ill-appearing.   HENT:      Head: Atraumatic.   Eyes:      General: No scleral icterus.  Pulmonary:      Effort: Pulmonary effort is normal. No respiratory distress.      Breath sounds: No wheezing.   Abdominal:      General: There is no distension.   Musculoskeletal:      Right lower leg: No edema.      Left lower leg: No edema.   Neurological:      General: No focal deficit present.      Mental Status: He is alert and oriented to person, place, and time.      Cranial Nerves: No cranial nerve deficit.      Motor: No weakness.      Gait: Gait normal.   Psychiatric:         Mood and Affect: Mood normal.          Laboratory Review:  The pertinent lab results were reviewed and discussed with the patient.      ASSESSMENT:  Jin Reynolds is a 69 y.o. male with Acute myeloid leukemia planned for Allogenic SCT that includes TBI conditioning. He also has a diagnosis of smoldering systemic mastocytosis.  The patient has been recommended Allogenic Stem Cell Transplant and will receive total body irradiation 200 cGy/ single fraction as part of the conditioning regimen and presents to our clinic for evaluation and planning.  Details as below:    Type of Transplant: Allo,  Possible MRD  Donor type: Sister  Admit date: 11/15  Prep Regimen: flu, maurilio, and  cGy  TBI date: 11/20  T=0 date: 11/21    PLAN:    Mr. Reynolds's pertinent history, exam, imaging and pathology details were reviewed.   Accompanied by Self.    Treatment recommendations/Alternatives:  NCCN Guidelines were applicable to guide this patients treatment plan.      Today, we reviewed the role of total body irradiation as part of the conditioning regimen with the goal to eradicate circulating tumor cells. Per the treatment schema communicated to us by the transplant team, radiation will be given as a single treatment to a total dose of 200 cGy delivered. Because of the low dose, no Lung and Kidney blocks will be used.     Radiation treatment logistics:  We did review the logistics for planning TBI including the need for a measurement session. We reviewed our TBI technique which involves treatment in supine position with lateral fields.     We then reviewed possible side effects (Acute and long-term). Common and uncommon side effects with risks as relevant for his case were discussed.     Following this discussion, the patient elected to undergo TBI in our department. Informed consent was updated and measurements for TBI treatment planning were obtained.      TBI treatments will be coordinated with the floor.     Post-TBI, we will not schedule follow up with the patient as he will be following closely with the transplant team and medical oncologist, however, we encouraged him to contact us should he develop any concerns or questions regarding the radiation treatment.     Following this, any and all questions were answered to the patient's satisfaction.      Thank you again for referring this patient to our care.     The patient was provided my contact information.     Orders placed:  1) Radon intent to treat: TBI     Network location: The patient will be treated at the [facility] location. Simulation will be done at the  [facility] location.    The patient was provided my contact information.     Shayna Garcia MD, MMM  Senior Attending Physician, [facility]  Professor, [employer] [facility] [employer]   Our [place]: “To Heal, To Teach, To Discover.”  RN partner: [phone]/ [email or web address]    Phone (scheduling): [phone]/ [email or web address]  Proton Therapy (scheduling): [phone]/ [email or web address]  Phone (after hours): [phone]

## 2024-11-09 ENCOUNTER — APPOINTMENT (OUTPATIENT)
Dept: HEMATOLOGY/ONCOLOGY | Facility: HOSPITAL | Age: 69
End: 2024-11-09
Payer: MEDICARE

## 2024-11-10 ENCOUNTER — APPOINTMENT (OUTPATIENT)
Dept: HEMATOLOGY/ONCOLOGY | Facility: HOSPITAL | Age: 69
End: 2024-11-10
Payer: MEDICARE

## 2024-11-11 ENCOUNTER — APPOINTMENT (OUTPATIENT)
Dept: HEMATOLOGY/ONCOLOGY | Facility: CLINIC | Age: 69
End: 2024-11-11
Payer: MEDICARE

## 2024-11-11 PROBLEM — C92.01 ACUTE MYELOID LEUKEMIA IN REMISSION (MULTI): Status: ACTIVE | Noted: 2024-07-02

## 2024-11-11 PROBLEM — C92.01 AML (ACUTE MYELOID LEUKEMIA) IN REMISSION (MULTI): Status: RESOLVED | Noted: 2024-11-01 | Resolved: 2024-11-11

## 2024-11-11 ASSESSMENT — EJECTION FRACTION: LAST EJECTION FRACTION (EF) PRIOR TO CONDITIONING (%): NO

## 2024-11-11 ASSESSMENT — PULMONARY FUNCTION TESTS: FEV1 (%PREDICTED): 114

## 2024-11-11 NOTE — ASSESSMENT & PLAN NOTE
Mr. Reynolds is here today for pre-transplant evaluation.  Has had a good response from AML perspective to his treatment, although background of systemic mastocytosis persists.  The role of allogeneic stem cell transplant in the management of AML was reviewed.  The co-morbidity index is 3 (age, duodenal ulcer).  Pretransplant KPS is 90 - Able to carry on normal activity; minor signs or symptoms of disease. The potential risks, including by not limited to,  cytopenias, alopecia, fevers, GVHD, need for transfusion support, mucositis, CINV, diarrhea, taste alterations, anorexia, bleeding, and fatigue were highlighted.  Rare complications such as severe infections (bloodstream, pneumonia, etc.), DIC, severe tumor lysis syndrome, thromboembolic disease, and organ dysfunction (renal, liver, respiratory, cardiac, etc.) were discussed.  The ongoing risk of relapse despite transplant was discussed.  The concept of GVHD in particular was reviewed, including as a potential life threatening complication or one that significantly impacts quality of life.  Its management is predominantly immunosuppressive therapy, and potential long term risk for opportunistic infections will remain if GVHD develops.  Additional long term health risks, such as secondary skin cancers, osteoporosis, and cataracts, were reviewed.   Donor is matched sibling.  PB stem cells  will be collected.  Estimates for relapse, early mortality, and GVHD were provided.  The patient had opportunity to ask questions which were addressed.  The patient signed consent, and verbally commits to proceeding.  Conditioning will include fludarabine, melphalan, and total body irradiation with tacrolimus, mycophenolate, and post-transplant cyclophosphamide for GVHD prophylaxis.  Admit dates were provided.

## 2024-11-11 NOTE — ASSESSMENT & PLAN NOTE
No active signs or symptoms of infection.  He is CMV and toxo negative.  Will plan for prophylaxis with acyclovir, posaconazole, and TMP/SMX post transplant.

## 2024-11-12 ENCOUNTER — DOCUMENTATION (OUTPATIENT)
Dept: OTHER | Facility: HOSPITAL | Age: 69
End: 2024-11-12

## 2024-11-12 ENCOUNTER — OFFICE VISIT (OUTPATIENT)
Dept: HEMATOLOGY/ONCOLOGY | Facility: HOSPITAL | Age: 69
End: 2024-11-12
Payer: MEDICARE

## 2024-11-12 ENCOUNTER — LAB (OUTPATIENT)
Dept: HEMATOLOGY/ONCOLOGY | Facility: HOSPITAL | Age: 69
End: 2024-11-12
Payer: MEDICARE

## 2024-11-12 ENCOUNTER — APPOINTMENT (OUTPATIENT)
Dept: HEMATOLOGY/ONCOLOGY | Facility: HOSPITAL | Age: 69
DRG: 014 | End: 2024-11-12
Payer: MEDICARE

## 2024-11-12 ENCOUNTER — SPECIALTY PHARMACY (OUTPATIENT)
Dept: HEMATOLOGY/ONCOLOGY | Facility: HOSPITAL | Age: 69
End: 2024-11-12

## 2024-11-12 ENCOUNTER — LAB REQUISITION (OUTPATIENT)
Dept: LAB | Facility: CLINIC | Age: 69
End: 2024-11-12
Payer: MEDICARE

## 2024-11-12 VITALS
RESPIRATION RATE: 16 BRPM | TEMPERATURE: 97.9 F | DIASTOLIC BLOOD PRESSURE: 72 MMHG | HEART RATE: 64 BPM | BODY MASS INDEX: 30.6 KG/M2 | WEIGHT: 193.12 LBS | OXYGEN SATURATION: 99 % | SYSTOLIC BLOOD PRESSURE: 140 MMHG

## 2024-11-12 DIAGNOSIS — C95.00 ACUTE LEUKEMIA NOT HAVING ACHIEVED REMISSION (MULTI): ICD-10-CM

## 2024-11-12 DIAGNOSIS — D47.02 SYSTEMIC MASTOCYTOSIS WITH ASSOCIATED CLONAL HEMATOLOGICAL NON-MAST CELL LINEAGE DISEASE: ICD-10-CM

## 2024-11-12 DIAGNOSIS — Z76.82 STEM CELL TRANSPLANT CANDIDATE: ICD-10-CM

## 2024-11-12 DIAGNOSIS — C92.01 ACUTE MYELOID LEUKEMIA IN REMISSION (MULTI): Primary | ICD-10-CM

## 2024-11-12 DIAGNOSIS — C92.00 ACUTE MYELOBLASTIC LEUKEMIA, NOT HAVING ACHIEVED REMISSION (MULTI): ICD-10-CM

## 2024-11-12 DIAGNOSIS — C92.00 ACUTE MYELOID LEUKEMIA NOT HAVING ACHIEVED REMISSION (MULTI): ICD-10-CM

## 2024-11-12 DIAGNOSIS — I10 BENIGN HYPERTENSION: ICD-10-CM

## 2024-11-12 DIAGNOSIS — D84.9 IMMUNOCOMPROMISED STATE: ICD-10-CM

## 2024-11-12 LAB
ABO GROUP (TYPE) IN BLOOD: NORMAL
ALBUMIN SERPL BCP-MCNC: 4.4 G/DL (ref 3.4–5)
ALP SERPL-CCNC: 65 U/L (ref 33–136)
ALT SERPL W P-5'-P-CCNC: 9 U/L (ref 10–52)
ANION GAP SERPL CALC-SCNC: 11 MMOL/L (ref 10–20)
ANTIBODY SCREEN: NORMAL
AST SERPL W P-5'-P-CCNC: 11 U/L (ref 9–39)
BASOPHILS # BLD AUTO: 0.54 X10*3/UL (ref 0–0.1)
BASOPHILS NFR BLD AUTO: 9.5 %
BILIRUB SERPL-MCNC: 0.7 MG/DL (ref 0–1.2)
BUN SERPL-MCNC: 22 MG/DL (ref 6–23)
BURR CELLS BLD QL SMEAR: NORMAL
CALCIUM SERPL-MCNC: 8.6 MG/DL (ref 8.6–10.3)
CHLORIDE SERPL-SCNC: 106 MMOL/L (ref 98–107)
CO2 SERPL-SCNC: 24 MMOL/L (ref 21–32)
CREAT SERPL-MCNC: 1.15 MG/DL (ref 0.5–1.3)
DACRYOCYTES BLD QL SMEAR: NORMAL
EGFRCR SERPLBLD CKD-EPI 2021: 69 ML/MIN/1.73M*2
EOSINOPHIL # BLD AUTO: 1.92 X10*3/UL (ref 0–0.7)
EOSINOPHIL NFR BLD AUTO: 33.8 %
ERYTHROCYTE [DISTWIDTH] IN BLOOD BY AUTOMATED COUNT: 20 % (ref 11.5–14.5)
GLUCOSE SERPL-MCNC: 98 MG/DL (ref 74–99)
HCT VFR BLD AUTO: 32.3 % (ref 41–52)
HGB BLD-MCNC: 10.7 G/DL (ref 13.5–17.5)
HLA CLS I TYP PNL BLD/T DONR HIGH RES: NORMAL
HLA RESULTS: NORMAL
HLA-DP2 QL: NORMAL
HLA-DQB1 HIGH RES: NORMAL
HLA-DRB1 HIGH RES: NORMAL
IMM GRANULOCYTES # BLD AUTO: 0.01 X10*3/UL (ref 0–0.7)
IMM GRANULOCYTES NFR BLD AUTO: 0.2 % (ref 0–0.9)
LYMPHOCYTES # BLD AUTO: 0.94 X10*3/UL (ref 1.2–4.8)
LYMPHOCYTES NFR BLD AUTO: 16.5 %
MAGNESIUM SERPL-MCNC: 2.16 MG/DL (ref 1.6–2.4)
MCH RBC QN AUTO: 32.4 PG (ref 26–34)
MCHC RBC AUTO-ENTMCNC: 33.1 G/DL (ref 32–36)
MCV RBC AUTO: 98 FL (ref 80–100)
MONOCYTES # BLD AUTO: 0.44 X10*3/UL (ref 0.1–1)
MONOCYTES NFR BLD AUTO: 7.7 %
NEUTROPHILS # BLD AUTO: 1.83 X10*3/UL (ref 1.2–7.7)
NEUTROPHILS NFR BLD AUTO: 32.3 %
NRBC BLD-RTO: 0 /100 WBCS (ref 0–0)
OVALOCYTES BLD QL SMEAR: NORMAL
PLATELET # BLD AUTO: 229 X10*3/UL (ref 150–450)
POLYCHROMASIA BLD QL SMEAR: NORMAL
POTASSIUM SERPL-SCNC: 4 MMOL/L (ref 3.5–5.3)
PROT SERPL-MCNC: 7.2 G/DL (ref 6.4–8.2)
RBC # BLD AUTO: 3.3 X10*6/UL (ref 4.5–5.9)
RBC MORPH BLD: NORMAL
RH FACTOR (ANTIGEN D): NORMAL
SARS-COV-2 RNA RESP QL NAA+PROBE: NOT DETECTED
SCHISTOCYTES BLD QL SMEAR: NORMAL
SODIUM SERPL-SCNC: 137 MMOL/L (ref 136–145)
WBC # BLD AUTO: 5.7 X10*3/UL (ref 4.4–11.3)

## 2024-11-12 PROCEDURE — 3077F SYST BP >= 140 MM HG: CPT | Performed by: INTERNAL MEDICINE

## 2024-11-12 PROCEDURE — 83735 ASSAY OF MAGNESIUM: CPT

## 2024-11-12 PROCEDURE — 86870 RBC ANTIBODY IDENTIFICATION: CPT

## 2024-11-12 PROCEDURE — 99215 OFFICE O/P EST HI 40 MIN: CPT | Performed by: INTERNAL MEDICINE

## 2024-11-12 PROCEDURE — 87635 SARS-COV-2 COVID-19 AMP PRB: CPT | Performed by: INTERNAL MEDICINE

## 2024-11-12 PROCEDURE — 1160F RVW MEDS BY RX/DR IN RCRD: CPT | Performed by: INTERNAL MEDICINE

## 2024-11-12 PROCEDURE — 86850 RBC ANTIBODY SCREEN: CPT

## 2024-11-12 PROCEDURE — 99417 PROLNG OP E/M EACH 15 MIN: CPT | Performed by: INTERNAL MEDICINE

## 2024-11-12 PROCEDURE — 1126F AMNT PAIN NOTED NONE PRSNT: CPT | Performed by: INTERNAL MEDICINE

## 2024-11-12 PROCEDURE — 3078F DIAST BP <80 MM HG: CPT | Performed by: INTERNAL MEDICINE

## 2024-11-12 PROCEDURE — 36591 DRAW BLOOD OFF VENOUS DEVICE: CPT

## 2024-11-12 PROCEDURE — 80053 COMPREHEN METABOLIC PANEL: CPT

## 2024-11-12 PROCEDURE — 1159F MED LIST DOCD IN RCRD: CPT | Performed by: INTERNAL MEDICINE

## 2024-11-12 PROCEDURE — 2500000004 HC RX 250 GENERAL PHARMACY W/ HCPCS (ALT 636 FOR OP/ED): Performed by: INTERNAL MEDICINE

## 2024-11-12 PROCEDURE — 85025 COMPLETE CBC W/AUTO DIFF WBC: CPT

## 2024-11-12 RX ORDER — CHOLECALCIFEROL (VITAMIN D3) 25 MCG
2000 TABLET ORAL DAILY
Status: ON HOLD | COMMUNITY

## 2024-11-12 RX ORDER — HEPARIN SODIUM,PORCINE/PF 10 UNIT/ML
50 SYRINGE (ML) INTRAVENOUS AS NEEDED
OUTPATIENT
Start: 2024-11-12

## 2024-11-12 RX ORDER — HEPARIN 100 UNIT/ML
500 SYRINGE INTRAVENOUS AS NEEDED
Status: CANCELLED | OUTPATIENT
Start: 2024-11-12

## 2024-11-12 RX ORDER — HEPARIN 100 UNIT/ML
500 SYRINGE INTRAVENOUS AS NEEDED
Status: DISCONTINUED | OUTPATIENT
Start: 2024-11-12 | End: 2024-11-12 | Stop reason: HOSPADM

## 2024-11-12 RX ORDER — HEPARIN 100 UNIT/ML
500 SYRINGE INTRAVENOUS AS NEEDED
OUTPATIENT
Start: 2024-11-12

## 2024-11-12 ASSESSMENT — PAIN SCALES - GENERAL: PAINLEVEL_OUTOF10: 0-NO PAIN

## 2024-11-12 NOTE — PROGRESS NOTES
"     Patient ID: Jin Reynolds is a 69 y.o. male.    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   8/23/2023 Initial Diagnosis    DX:  SMOLDERING SYSTEMIC MASTOCYTOSIS  Presented with skin rash and eosinophilia    BRENDON DIAGNOSTIC CRITERIA  (For SM, Need major and 1 minor OR at least 3 minors)  - Multi-focal, dense infiltrates of MC (>= 15 mast cells in aggregates) in BM and/or other extra-cutaneous organs [major]  - In BM or extracutaneous organs, >25% MC spindle shaped OR have atypical morphology OR of all MC on BM aspirate smears, >25% are immature or atypical [minor]  - Detection of activating mutation KIT D816V in BM, PB, or other extracutaneous organ [minor]  - Serum tryptase persistently exceeds 20 ng/mL (unless associated clonal myeloid disorder, in which this paramenter is not valid) [minor]    \"B\" FINDINGS  - BM biopsy showing >30% infiltration (focal, dense aggregates) and/or serum tryptase > 200 ng/mL  - Signs of dysplasia or myeloproliferation, in non-MC lineages but insufficient criteria for definitive diagnosis of AHNMD with normal or slightly abnormal blood counts    \"C\" FINDINGS  None       8/23/2023 Biopsy    BONE MARROW (8/23/23)  Hypercellular (90-95%) with trilineage hematopoiesis, granulocytic hyperplasia, eosinophilia, and increased atypical mast cells  IP:  CD34+, +, CD7(bright)+, cCD3-, CD33-, CD15-, CD13+ blast population  IHC:  + increased spindle-shaped mast cells (15% cellularity), CD2-  Myeloid NGS:  CBL (31%), cKIT D816V (30%), RUNX1 x 2 (19%, 29%), SRSF2 (47%) [ASXL1 negative]  FISH:  PDGFRb negative    SERUM TRYPTASE  228 (8/23/23)  268 (9/5/23)     2/13/2024 -  Molecular Therapy    AVAPRITINIB   - Starting dose 25 mg daily (non-Adv SM)     7/16/2024 - 10/21/2024 Chemotherapy    Venetoclax / AzaCITIDine  - C1D1 7/17/24:  complicated by TLS, venetoclax held after D1  - Post C1 BM " (8/28/24):  hypercellular with >50% blasts, background systemic mastocytosis  - C2D1 9/5/24:  venetoclax restarted with C2, no TLS  - Post C2 BM (9/25/24):  hypocellular (<5%), atypical myeloblasts by IP/IHC (1-2%), persistent systemic mastocytosis  - C3D1 10/15/24:  delayed 1 week for count recovery  - Post C3 BM (10/23/24):  hypercellular with systemic mastocytosis, no increase blasts, 0.2% atypical blasts by IP, NGS w/ CBL (73%), cKIT (4%), RUNX1 x 2 (4%, 38%), SRSF2 (40%)       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)   6/27/2024 Initial Diagnosis    ICC 2022 DX: Acute myeloid leukemia, NOS (>=20% blasts)  [id number] AML Risk Stratification: INTERMEDIATE: Cytogenetic and/or molecular abnormalities not classified as favorable or adverse  Presented with pancytopenia and circulating blasts    BONE MARROW (06/27/2024)  Marrow replaced by blasts, fibrotic foci, some atypical + cells; 7-8% circulating blasts; aspicular  - Unable to perform additional studies due to aspicular specimen    PERIPHERAL BLOOD (6/27/2024)  FISH:  positive for gain RUNX1    PERIPHERAL BLOOD (7/1/24)  IP:  abnl myeloblast population (CD34+, CD7+, CD4+, CD13+, CD38+, CD11+ dim, HLA=DR+, TdT+, CD33-)  Myeloid NGS: CBL C404Y (41%), KIT D816V (8%), RUNX1 x2 (8%, 30%), SRSF2 (37%)     7/16/2024 - 10/21/2024 Chemotherapy    Venetoclax / AzaCITIDine  - C1D1 7/17/24:  complicated by TLS, venetoclax held after D1  - Post C1 BM (8/28/24):  hypercellular with >50% blasts, background systemic mastocytosis  - C2D1 9/5/24:  venetoclax restarted with C2, no TLS  - Post C2 BM (9/25/24):  hypocellular (<5%), atypical myeloblasts by IP/IHC (1-2%), persistent systemic mastocytosis  - C3D1 10/15/24:  delayed 1 week for count recovery  - Post C3 BM (10/23/24):  hypercellular with systemic mastocytosis, no increase blasts, 0.2% atypical blasts by IP, NGS w/ CBL (73%), cKIT (4%), RUNX1 x 2 (4%, 38%), SRSF2 (40%)            Assessment & Plan  Acute myeloid  leukemia in remission (Multi)  Mr. Reynodls is here today for pre-transplant evaluation.  Has had a good response from AML perspective to his treatment, although background of systemic mastocytosis persists.  The role of allogeneic stem cell transplant in the management of AML was reviewed.  The co-morbidity index is 3 (age, duodenal ulcer).  Pretransplant KPS is 90 - Able to carry on normal activity; minor signs or symptoms of disease. The potential risks, including by not limited to,  cytopenias, alopecia, fevers, GVHD, need for transfusion support, mucositis, CINV, diarrhea, taste alterations, anorexia, bleeding, and fatigue were highlighted.  Rare complications such as severe infections (bloodstream, pneumonia, etc.), DIC, severe tumor lysis syndrome, thromboembolic disease, and organ dysfunction (renal, liver, respiratory, cardiac, etc.) were discussed.  The ongoing risk of relapse despite transplant was discussed.  The concept of GVHD in particular was reviewed, including as a potential life threatening complication or one that significantly impacts quality of life.  Its management is predominantly immunosuppressive therapy, and potential long term risk for opportunistic infections will remain if GVHD develops.  Additional long term health risks, such as secondary skin cancers, osteoporosis, and cataracts, were reviewed.   Donor is matched sibling.  PB stem cells  will be collected.  Estimates for relapse, early mortality, and GVHD were provided.  The patient had opportunity to ask questions which were addressed.  The patient signed consent, and verbally commits to proceeding.  Conditioning will include fludarabine, melphalan, and total body irradiation with tacrolimus, mycophenolate, and post-transplant cyclophosphamide for GVHD prophylaxis.  Admit dates were provided.   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  Immunocompromised state  No active signs or symptoms of infection.  He is CMV and toxo negative.  Will plan for prophylaxis with acyclovir, posaconazole, and TMP/SMX post transplant.       ______________________________________________________________________________________________________________________________________________    SUBJECTIVE     Here today for planned follow up.  Has been feeling well.  Preparing for transplant.  No new health issues.  Denies fevers, chills, nausea, vomiting, diarrhea, dyspnea, rash, and pain.    OBJECTIVE     /72 (BP Location: Left arm, Patient Position: Sitting, BP Cuff Size: Adult)   Pulse 64   Temp 36.6 °C (97.9 °F) (Temporal)   Resp 16   Wt 87.6 kg (193 lb 2 oz)   SpO2 99%   BMI 30.60 kg/m²      Physical Exam  Vitals reviewed.   Constitutional:       Appearance: Normal appearance.   Eyes:      Conjunctiva/sclera: Conjunctivae normal.      Pupils: Pupils are equal, round, and reactive to light.   Skin:     General: Skin is warm and dry.      Findings: No rash.   Psychiatric:         Mood and Affect: Mood normal.              Nika Dawson MD

## 2024-11-12 NOTE — PROGRESS NOTES
I was present during patient's appointment with Dr. Dawson. Patient signed consent for transplant and storage. Calendar was reviewed with patient and he received lexicomp sheets related to transplant medications. Patient had no questions at this time.

## 2024-11-13 ENCOUNTER — APPOINTMENT (OUTPATIENT)
Dept: HEMATOLOGY/ONCOLOGY | Facility: CLINIC | Age: 69
End: 2024-11-13
Payer: MEDICARE

## 2024-11-13 LAB
BB ANTIBODY IDENTIFICATION: NORMAL
CASE #: NORMAL
PATH REV-IMMUNOHEMATOLOGY-PR30: NORMAL

## 2024-11-13 RX ORDER — URSODIOL 300 MG/1
300 CAPSULE ORAL 3 TIMES DAILY
Qty: 90 CAPSULE | Refills: 2 | Status: ON HOLD | OUTPATIENT
Start: 2024-11-13

## 2024-11-13 RX ORDER — TACROLIMUS 0.5 MG/1
0.5 CAPSULE ORAL EVERY 12 HOURS
Qty: 60 CAPSULE | Refills: 2 | Status: ON HOLD | OUTPATIENT
Start: 2024-11-13

## 2024-11-13 RX ORDER — ACYCLOVIR 400 MG/1
400 TABLET ORAL EVERY 12 HOURS
Qty: 60 TABLET | Refills: 2 | Status: ON HOLD | OUTPATIENT
Start: 2024-11-13

## 2024-11-13 RX ORDER — MYCOPHENOLATE MOFETIL 250 MG/1
1000 CAPSULE ORAL 3 TIMES DAILY
Qty: 300 CAPSULE | Refills: 0 | Status: ON HOLD | OUTPATIENT
Start: 2024-11-13

## 2024-11-13 RX ORDER — POSACONAZOLE 100 MG/1
300 TABLET, DELAYED RELEASE ORAL DAILY
Qty: 90 TABLET | Refills: 2 | Status: ON HOLD | OUTPATIENT
Start: 2024-11-13

## 2024-11-13 RX ORDER — SULFAMETHOXAZOLE AND TRIMETHOPRIM 800; 160 MG/1; MG/1
1 TABLET ORAL 3 TIMES WEEKLY
Qty: 12 TABLET | Refills: 2 | Status: ON HOLD | OUTPATIENT
Start: 2024-11-13

## 2024-11-13 RX ORDER — ALLOPURINOL 300 MG/1
300 TABLET ORAL DAILY
Status: ON HOLD | COMMUNITY

## 2024-11-13 RX ORDER — TACROLIMUS 1 MG/1
2 CAPSULE ORAL EVERY 12 HOURS
Qty: 120 CAPSULE | Refills: 2 | Status: ON HOLD | OUTPATIENT
Start: 2024-11-13

## 2024-11-13 NOTE — PROGRESS NOTES
PRE-ALLOGENEIC STEM CELL TRANSPLANT ONCOLOGY PHARMACY EDUCATION NOTE   Jin Reynolds is a 69 y.o. 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 The patient will receive conditioning chemotherapy with fludarabine 30 mg/m2 IV D-5, D-4, D-3, melphalan 140 mg/m2 IV D-2, Total Body Irradiation (TBI) 200 cGy D-1 and GVHD prophylaxis with cyclophosphamide 50 mg/kg IV D+3, D+4, tacrolimus 0.03 mg/kg every 12 hours starting on D+5, and mycophenolate 1000 mg three times daily starting on D+5. The patient's primary transplant oncologist is Dr. Dawson. Patient presents to clinic today for evaluation. Pharmacist was consulted to provide education regarding conditioning chemotherapy, which will be initiated on 11/16/2024 and assist with transitions of care.    Chemotherapy Education: The chemotherapy regimen was discussed with the patient including treatment schedule, potential side effects, and management of side effects. Potential side effects discussed include: myelosuppression, infection, nausea/vomiting, diarrhea, alopecia, fatigue, hepatotoxicity, hemorrhagic cystitis, cardiotoxicity, mucositis, taste changes, and GVHD. Management techniques of these side effects were discussed. Supportive care medications were briefly discussed in terms of use and potential side effects. Patient has ondansetron and prochlorperazine available at home.    Home Medications: Reviewed medication list. Drug interactions identified include: none. Patient currently does not take herbal supplements. Discussed that herbal supplements are not regulated by the FDA and thus have not been well studied to assess drug-drug, drug-food, drug-disease interactions.     Anti-Infective Prophylaxis: The patient will require the following anti-infective medications. [facility] assist with hospital discharge planning and medication  access. A pharmacy support liaison will assist with managing co-pays and prior authorizations.    HSV CMV Fungal Bacterial C Difficile  PJP   Acyclovir 400 mg tablets: take 1 tablet by mouth every 12 hours Letermovir 480 mg tablets: take 1 tablet by mouth once daily Posaconazole 100 mg tablets: take 3 tablets by mouth once daily Levofloxacin 500 mg tablets: take 1 tablet by mouth once daily Vancomycin 125 mg capsules: take 1 capsule by mouth once daily Bactrim /160 mg tablets: take 1 tablet by mouth on Mondays, Wednesdays, and Fridays   Start on Day 0 and continue for at least 15 months after transplant  (Pt already on) Pt CMV IgG -: will not require CMV prophylaxis Start on Day +1 and continue until off immunosuppression  (Pt already on) Start once neutropenic and continue until engraftment Start upon admission and continue until discharge Start on day +30 and continue for at least 12 months after transplant     GVHD Prophylaxis: The following GVHD prophylactic medications were sent to  [facility] to assist with hospital discharge planning and medication access. A pharmacy support liaison will assist with managing co-pays and prior authorizations.  Tacrolimus   0.5 mg capsules Tacrolimus   1.0 mg capsules Mycophenolate Mofetil   250 mg capsules     Sinusoidal Obstruction Syndrome (SOS) Prophylaxis: Ursodiol 300 mg capsules were sent to  [facility] to assist with hospital discharge planning and medication access. A pharmacy support liaison will assist with managing co-pays and prior authorizations.     All questions were answered. Patient/family verbalized understanding of the plan of care and information provided. Will follow as necessary. Time spent with patient/family and/or coordinating care: 120 minutes.      Liz Cedeno, PharmD, [facility]  Ambulatory Stem Cell Transplant Transplant Pharmacist    Current Outpatient Medications   Medication Instructions    acyclovir (ZOVIRAX) 400 mg, oral,  Every 12 hours    allopurinol (ZYLOPRIM) 300 mg, Daily    amLODIPine (NORVASC) 10 mg, oral, Daily    cholecalciferol (VITAMIN D-3) 2,000 Units, Daily    famotidine (PEPCID) 20 mg, oral, Daily    fexofenadine (ALLEGRA) 180 mg, Daily    magnesium, amino acid chelate, 133 mg tablet 266 mg, oral, 3 times daily, Or as instructed on your medication list.    mycophenolate (CELLCEPT) 1,000 mg, oral, 3 times daily    ondansetron (ZOFRAN) 8 mg, oral, Every 8 hours PRN    posaconazole (NOXAFIL) 300 mg, oral, Daily, Do not crush, chew, or split. This is to prevent fungal infections.    prochlorperazine (COMPAZINE) 10 mg, oral, Every 6 hours PRN    sulfamethoxazole-trimethoprim (Bactrim DS) 800-160 mg tablet 1 tablet, oral, 3 times weekly, Take on Mondays, Wednesdays, and Fridays. Do not start until instructed.    tacrolimus (PROGRAF) 0.5 mg, oral, Every 12 hours, Or as instructed on your medication list.    tacrolimus (PROGRAF) 2 mg, oral, Every 12 hours, Or as instructed on your medication list.    ursodiol (ACTIGALL) 300 mg, oral, 3 times daily

## 2024-11-14 ENCOUNTER — APPOINTMENT (OUTPATIENT)
Dept: HEMATOLOGY/ONCOLOGY | Facility: CLINIC | Age: 69
End: 2024-11-14
Payer: MEDICARE

## 2024-11-14 RX ORDER — PALONOSETRON 0.05 MG/ML
0.25 INJECTION, SOLUTION INTRAVENOUS ONCE
Status: CANCELLED | OUTPATIENT
Start: 2024-11-24 | End: 2024-11-24

## 2024-11-14 RX ORDER — ONDANSETRON HYDROCHLORIDE 8 MG/1
16 TABLET, FILM COATED ORAL ONCE
Status: CANCELLED | OUTPATIENT
Start: 2024-11-18 | End: 2024-11-18

## 2024-11-14 RX ORDER — LORAZEPAM 1 MG/1
1 TABLET ORAL ONCE AS NEEDED
Status: CANCELLED | OUTPATIENT
Start: 2024-11-21 | End: 2024-11-21

## 2024-11-14 RX ORDER — VANCOMYCIN HYDROCHLORIDE 125 MG/1
125 CAPSULE ORAL DAILY
Status: CANCELLED | OUTPATIENT
Start: 2024-11-16

## 2024-11-14 RX ORDER — MELPHALAN HCL 50 MG
140 VIAL (EA) INTRAVENOUS ONCE
Status: CANCELLED | OUTPATIENT
Start: 2024-11-19 | End: 2024-11-19

## 2024-11-14 RX ORDER — URSODIOL 300 MG/1
300 CAPSULE ORAL 3 TIMES DAILY
Status: CANCELLED | OUTPATIENT
Start: 2024-11-16

## 2024-11-14 RX ORDER — ALLOPURINOL 100 MG/1
300 TABLET ORAL DAILY
Status: CANCELLED | OUTPATIENT
Start: 2024-11-16

## 2024-11-14 RX ORDER — DIPHENHYDRAMINE HCL 25 MG
25 CAPSULE ORAL ONCE
Status: CANCELLED | OUTPATIENT
Start: 2024-11-21 | End: 2024-11-21

## 2024-11-14 RX ORDER — ONDANSETRON HYDROCHLORIDE 8 MG/1
16 TABLET, FILM COATED ORAL ONCE
Status: CANCELLED | OUTPATIENT
Start: 2024-11-16 | End: 2024-11-16

## 2024-11-14 RX ORDER — ALBUTEROL SULFATE 0.83 MG/ML
3 SOLUTION RESPIRATORY (INHALATION) AS NEEDED
Status: CANCELLED | OUTPATIENT
Start: 2024-11-16

## 2024-11-14 RX ORDER — PALONOSETRON 0.05 MG/ML
0.25 INJECTION, SOLUTION INTRAVENOUS ONCE
Status: CANCELLED | OUTPATIENT
Start: 2024-11-19 | End: 2024-11-19

## 2024-11-14 RX ORDER — SODIUM CHLORIDE 9 MG/ML
100 INJECTION, SOLUTION INTRAVENOUS CONTINUOUS
Status: CANCELLED | OUTPATIENT
Start: 2024-11-24

## 2024-11-14 RX ORDER — FAMOTIDINE 10 MG/ML
20 INJECTION INTRAVENOUS AS NEEDED
Status: CANCELLED | OUTPATIENT
Start: 2024-11-16

## 2024-11-14 RX ORDER — PROCHLORPERAZINE MALEATE 10 MG
10 TABLET ORAL EVERY 6 HOURS PRN
Status: CANCELLED | OUTPATIENT
Start: 2024-11-16

## 2024-11-14 RX ORDER — DIPHENHYDRAMINE HYDROCHLORIDE 50 MG/ML
50 INJECTION INTRAMUSCULAR; INTRAVENOUS AS NEEDED
Status: CANCELLED | OUTPATIENT
Start: 2024-11-16

## 2024-11-14 RX ORDER — MYCOPHENOLATE MOFETIL 250 MG/1
1000 CAPSULE ORAL 3 TIMES DAILY
OUTPATIENT
Start: 2024-11-26

## 2024-11-14 RX ORDER — POSACONAZOLE 100 MG/1
300 TABLET, DELAYED RELEASE ORAL EVERY 12 HOURS
Status: CANCELLED | OUTPATIENT
Start: 2024-11-22

## 2024-11-14 RX ORDER — POSACONAZOLE 100 MG/1
300 TABLET, DELAYED RELEASE ORAL EVERY 24 HOURS
Status: CANCELLED | OUTPATIENT
Start: 2024-11-23

## 2024-11-14 RX ORDER — EPINEPHRINE 1 MG/ML
0.3 INJECTION INTRAMUSCULAR; INTRAVENOUS; SUBCUTANEOUS EVERY 5 MIN PRN
Status: CANCELLED | OUTPATIENT
Start: 2024-11-16

## 2024-11-14 RX ORDER — ONDANSETRON HYDROCHLORIDE 8 MG/1
16 TABLET, FILM COATED ORAL ONCE
Status: CANCELLED | OUTPATIENT
Start: 2024-11-17 | End: 2024-11-17

## 2024-11-14 RX ORDER — TACROLIMUS 0.5 MG/1
2.5 CAPSULE ORAL
OUTPATIENT
Start: 2024-11-26

## 2024-11-14 RX ORDER — ONDANSETRON HYDROCHLORIDE 2 MG/ML
8 INJECTION, SOLUTION INTRAVENOUS ONCE
Status: CANCELLED | OUTPATIENT
Start: 2024-11-21 | End: 2024-11-21

## 2024-11-14 RX ORDER — PROCHLORPERAZINE EDISYLATE 5 MG/ML
10 INJECTION INTRAMUSCULAR; INTRAVENOUS EVERY 6 HOURS PRN
Status: CANCELLED | OUTPATIENT
Start: 2024-11-16

## 2024-11-14 RX ORDER — ACYCLOVIR 400 MG/1
400 TABLET ORAL EVERY 12 HOURS
Status: CANCELLED | OUTPATIENT
Start: 2024-11-21

## 2024-11-15 ENCOUNTER — APPOINTMENT (OUTPATIENT)
Dept: HEMATOLOGY/ONCOLOGY | Facility: CLINIC | Age: 69
DRG: 014 | End: 2024-11-15
Payer: MEDICARE

## 2024-11-15 ENCOUNTER — HOSPITAL ENCOUNTER (INPATIENT)
Facility: HOSPITAL | Age: 69
End: 2024-11-15
Attending: INTERNAL MEDICINE
Payer: MEDICARE

## 2024-11-15 ENCOUNTER — HOSPITAL ENCOUNTER (OUTPATIENT)
Dept: RADIOLOGY | Facility: HOSPITAL | Age: 69
Discharge: HOME | End: 2024-11-15
Payer: MEDICARE

## 2024-11-15 VITALS
HEART RATE: 66 BPM | OXYGEN SATURATION: 93 % | TEMPERATURE: 98.6 F | SYSTOLIC BLOOD PRESSURE: 133 MMHG | DIASTOLIC BLOOD PRESSURE: 68 MMHG | RESPIRATION RATE: 18 BRPM

## 2024-11-15 DIAGNOSIS — C92.00 ACUTE MYELOID LEUKEMIA NOT HAVING ACHIEVED REMISSION (MULTI): ICD-10-CM

## 2024-11-15 DIAGNOSIS — C92.01 ACUTE MYELOID LEUKEMIA IN REMISSION (MULTI): ICD-10-CM

## 2024-11-15 DIAGNOSIS — D47.02 SMOLDERING SYSTEMIC MASTOCYTOSIS: ICD-10-CM

## 2024-11-15 DIAGNOSIS — Z76.82 STEM CELL TRANSPLANT CANDIDATE: ICD-10-CM

## 2024-11-15 DIAGNOSIS — C92.02 AML (ACUTE MYELOID LEUKEMIA) IN RELAPSE (MULTI): Primary | ICD-10-CM

## 2024-11-15 DIAGNOSIS — D47.02 SYSTEMIC MASTOCYTOSIS WITH ASSOCIATED CLONAL HEMATOLOGICAL NON-MAST CELL LINEAGE DISEASE: ICD-10-CM

## 2024-11-15 LAB
ANION GAP SERPL CALC-SCNC: 11 MMOL/L (ref 10–20)
APTT PPP: 41 SECONDS (ref 27–38)
BASOPHILS # BLD MANUAL: 0.49 X10*3/UL (ref 0–0.1)
BASOPHILS NFR BLD MANUAL: 9.6 %
BUN SERPL-MCNC: 22 MG/DL (ref 6–23)
CALCIUM SERPL-MCNC: 8.8 MG/DL (ref 8.6–10.6)
CHLORIDE SERPL-SCNC: 106 MMOL/L (ref 98–107)
CO2 SERPL-SCNC: 23 MMOL/L (ref 21–32)
CREAT SERPL-MCNC: 1.1 MG/DL (ref 0.5–1.3)
EGFRCR SERPLBLD CKD-EPI 2021: 73 ML/MIN/1.73M*2
EOSINOPHIL # BLD MANUAL: 2.17 X10*3/UL (ref 0–0.7)
EOSINOPHIL NFR BLD MANUAL: 42.6 %
ERYTHROCYTE [DISTWIDTH] IN BLOOD BY AUTOMATED COUNT: 19.5 % (ref 11.5–14.5)
GLUCOSE SERPL-MCNC: 85 MG/DL (ref 74–99)
HBV CORE IGM SER QL: NONREACTIVE
HCT VFR BLD AUTO: 34.6 % (ref 41–52)
HGB BLD-MCNC: 10.9 G/DL (ref 13.5–17.5)
IMM GRANULOCYTES # BLD AUTO: 0.01 X10*3/UL (ref 0–0.7)
IMM GRANULOCYTES NFR BLD AUTO: 0.2 % (ref 0–0.9)
INR PPP: 1.5 (ref 0.9–1.1)
LDH SERPL L TO P-CCNC: 157 U/L (ref 84–246)
LYMPHOCYTES # BLD MANUAL: 0.93 X10*3/UL (ref 1.2–4.8)
LYMPHOCYTES NFR BLD MANUAL: 18.3 %
MAGNESIUM SERPL-MCNC: 2.37 MG/DL (ref 1.6–2.4)
MCH RBC QN AUTO: 31.1 PG (ref 26–34)
MCHC RBC AUTO-ENTMCNC: 31.5 G/DL (ref 32–36)
MCV RBC AUTO: 99 FL (ref 80–100)
MONOCYTES # BLD MANUAL: 0.09 X10*3/UL (ref 0.1–1)
MONOCYTES NFR BLD MANUAL: 1.7 %
NEUTS SEG # BLD MANUAL: 1.42 X10*3/UL (ref 1.2–7)
NEUTS SEG NFR BLD MANUAL: 27.8 %
NRBC BLD-RTO: 0 /100 WBCS (ref 0–0)
PHOSPHATE SERPL-MCNC: 3.6 MG/DL (ref 2.5–4.9)
PLATELET # BLD AUTO: 247 X10*3/UL (ref 150–450)
POTASSIUM SERPL-SCNC: 4.1 MMOL/L (ref 3.5–5.3)
PROTHROMBIN TIME: 16.4 SECONDS (ref 9.8–12.8)
RBC # BLD AUTO: 3.5 X10*6/UL (ref 4.5–5.9)
RBC MORPH BLD: ABNORMAL
SARS-COV-2 RNA RESP QL NAA+PROBE: NOT DETECTED
SODIUM SERPL-SCNC: 136 MMOL/L (ref 136–145)
TOTAL CELLS COUNTED BLD: 115
WBC # BLD AUTO: 5.1 X10*3/UL (ref 4.4–11.3)

## 2024-11-15 PROCEDURE — 83735 ASSAY OF MAGNESIUM: CPT

## 2024-11-15 PROCEDURE — 87635 SARS-COV-2 COVID-19 AMP PRB: CPT

## 2024-11-15 PROCEDURE — 99153 MOD SED SAME PHYS/QHP EA: CPT | Performed by: RADIOLOGY

## 2024-11-15 PROCEDURE — 2780000003 HC OR 278 NO HCPCS

## 2024-11-15 PROCEDURE — C1751 CATH, INF, PER/CENT/MIDLINE: HCPCS

## 2024-11-15 PROCEDURE — 77001 FLUOROGUIDE FOR VEIN DEVICE: CPT | Performed by: RADIOLOGY

## 2024-11-15 PROCEDURE — XW053T9 INTRODUCTION OF MELPHALAN HYDROCHLORIDE ANTINEOPLASTIC INTO PERIPHERAL ARTERY, PERCUTANEOUS APPROACH, NEW TECHNOLOGY GROUP 9: ICD-10-PCS | Performed by: INTERNAL MEDICINE

## 2024-11-15 PROCEDURE — 85007 BL SMEAR W/DIFF WBC COUNT: CPT

## 2024-11-15 PROCEDURE — 86705 HEP B CORE ANTIBODY IGM: CPT

## 2024-11-15 PROCEDURE — 85027 COMPLETE CBC AUTOMATED: CPT

## 2024-11-15 PROCEDURE — 1170000001 HC PRIVATE ONCOLOGY ROOM DAILY

## 2024-11-15 PROCEDURE — 7100000010 HC PHASE TWO TIME - EACH INCREMENTAL 1 MINUTE

## 2024-11-15 PROCEDURE — 36010 PLACE CATHETER IN VEIN: CPT | Performed by: RADIOLOGY

## 2024-11-15 PROCEDURE — 99152 MOD SED SAME PHYS/QHP 5/>YRS: CPT

## 2024-11-15 PROCEDURE — 02HV33Z INSERTION OF INFUSION DEVICE INTO SUPERIOR VENA CAVA, PERCUTANEOUS APPROACH: ICD-10-PCS | Performed by: RADIOLOGY

## 2024-11-15 PROCEDURE — 83615 LACTATE (LD) (LDH) ENZYME: CPT

## 2024-11-15 PROCEDURE — 7100000009 HC PHASE TWO TIME - INITIAL BASE CHARGE

## 2024-11-15 PROCEDURE — C1894 INTRO/SHEATH, NON-LASER: HCPCS

## 2024-11-15 PROCEDURE — 85730 THROMBOPLASTIN TIME PARTIAL: CPT

## 2024-11-15 PROCEDURE — C1769 GUIDE WIRE: HCPCS

## 2024-11-15 PROCEDURE — 2500000001 HC RX 250 WO HCPCS SELF ADMINISTERED DRUGS (ALT 637 FOR MEDICARE OP)

## 2024-11-15 PROCEDURE — 76937 US GUIDE VASCULAR ACCESS: CPT | Performed by: RADIOLOGY

## 2024-11-15 PROCEDURE — B518ZZA FLUOROSCOPY OF SUPERIOR VENA CAVA, GUIDANCE: ICD-10-PCS | Performed by: RADIOLOGY

## 2024-11-15 PROCEDURE — 2500000004 HC RX 250 GENERAL PHARMACY W/ HCPCS (ALT 636 FOR OP/ED): Performed by: RADIOLOGY

## 2024-11-15 PROCEDURE — 99152 MOD SED SAME PHYS/QHP 5/>YRS: CPT | Performed by: RADIOLOGY

## 2024-11-15 PROCEDURE — 99153 MOD SED SAME PHYS/QHP EA: CPT

## 2024-11-15 PROCEDURE — 84100 ASSAY OF PHOSPHORUS: CPT

## 2024-11-15 PROCEDURE — 2720000007 HC OR 272 NO HCPCS

## 2024-11-15 PROCEDURE — 36556 INSERT NON-TUNNEL CV CATH: CPT | Performed by: RADIOLOGY

## 2024-11-15 PROCEDURE — 80048 BASIC METABOLIC PNL TOTAL CA: CPT

## 2024-11-15 RX ORDER — AMLODIPINE BESYLATE 10 MG/1
10 TABLET ORAL DAILY
Status: DISCONTINUED | OUTPATIENT
Start: 2024-11-15 | End: 2024-12-07 | Stop reason: HOSPADM

## 2024-11-15 RX ORDER — CHOLECALCIFEROL (VITAMIN D3) 25 MCG
2000 TABLET ORAL DAILY
Status: DISCONTINUED | OUTPATIENT
Start: 2024-11-15 | End: 2024-12-07 | Stop reason: HOSPADM

## 2024-11-15 RX ORDER — POLYETHYLENE GLYCOL 3350 17 G/17G
17 POWDER, FOR SOLUTION ORAL DAILY PRN
Status: DISCONTINUED | OUTPATIENT
Start: 2024-11-15 | End: 2024-12-07 | Stop reason: HOSPADM

## 2024-11-15 RX ORDER — ALLOPURINOL 300 MG/1
300 TABLET ORAL DAILY
Status: DISCONTINUED | OUTPATIENT
Start: 2024-11-15 | End: 2024-11-18

## 2024-11-15 RX ORDER — ONDANSETRON HYDROCHLORIDE 8 MG/1
8 TABLET, FILM COATED ORAL EVERY 8 HOURS PRN
Status: DISCONTINUED | OUTPATIENT
Start: 2024-11-15 | End: 2024-12-07 | Stop reason: HOSPADM

## 2024-11-15 RX ORDER — FENTANYL CITRATE 50 UG/ML
INJECTION, SOLUTION INTRAMUSCULAR; INTRAVENOUS
Status: COMPLETED | OUTPATIENT
Start: 2024-11-15 | End: 2024-11-15

## 2024-11-15 RX ORDER — ACYCLOVIR 400 MG/1
400 TABLET ORAL EVERY 12 HOURS
Status: DISCONTINUED | OUTPATIENT
Start: 2024-11-15 | End: 2024-12-07 | Stop reason: HOSPADM

## 2024-11-15 RX ORDER — PROCHLORPERAZINE MALEATE 10 MG
10 TABLET ORAL EVERY 6 HOURS PRN
Status: DISCONTINUED | OUTPATIENT
Start: 2024-11-15 | End: 2024-12-07 | Stop reason: HOSPADM

## 2024-11-15 RX ORDER — MINERAL OIL
180 ENEMA (ML) RECTAL DAILY
Status: DISCONTINUED | OUTPATIENT
Start: 2024-11-16 | End: 2024-12-07 | Stop reason: HOSPADM

## 2024-11-15 RX ORDER — MIDAZOLAM HYDROCHLORIDE 1 MG/ML
INJECTION INTRAMUSCULAR; INTRAVENOUS
Status: COMPLETED | OUTPATIENT
Start: 2024-11-15 | End: 2024-11-15

## 2024-11-15 RX ORDER — CETIRIZINE HYDROCHLORIDE 10 MG/1
10 TABLET ORAL DAILY
Status: DISCONTINUED | OUTPATIENT
Start: 2024-11-15 | End: 2024-11-15

## 2024-11-15 RX ORDER — FAMOTIDINE 20 MG/1
20 TABLET, FILM COATED ORAL DAILY
Status: DISCONTINUED | OUTPATIENT
Start: 2024-11-15 | End: 2024-12-07 | Stop reason: HOSPADM

## 2024-11-15 RX ADMIN — ACYCLOVIR 400 MG: 400 TABLET ORAL at 20:53

## 2024-11-15 RX ADMIN — FAMOTIDINE 20 MG: 20 TABLET ORAL at 20:53

## 2024-11-15 RX ADMIN — Medication 2000 UNITS: at 20:53

## 2024-11-15 SDOH — SOCIAL STABILITY: SOCIAL INSECURITY
WITHIN THE LAST YEAR, HAVE YOU BEEN RAPED OR FORCED TO HAVE ANY KIND OF SEXUAL ACTIVITY BY YOUR PARTNER OR EX-PARTNER?: NO

## 2024-11-15 SDOH — SOCIAL STABILITY: SOCIAL INSECURITY: DO YOU FEEL UNSAFE GOING BACK TO THE PLACE WHERE YOU ARE LIVING?: NO

## 2024-11-15 SDOH — SOCIAL STABILITY: SOCIAL INSECURITY: WITHIN THE LAST YEAR, HAVE YOU BEEN HUMILIATED OR EMOTIONALLY ABUSED IN OTHER WAYS BY YOUR PARTNER OR EX-PARTNER?: NO

## 2024-11-15 SDOH — SOCIAL STABILITY: SOCIAL INSECURITY: DO YOU FEEL ANYONE HAS EXPLOITED OR TAKEN ADVANTAGE OF YOU FINANCIALLY OR OF YOUR PERSONAL PROPERTY?: NO

## 2024-11-15 SDOH — ECONOMIC STABILITY: INCOME INSECURITY: IN THE PAST 12 MONTHS HAS THE ELECTRIC, GAS, OIL, OR WATER COMPANY THREATENED TO SHUT OFF SERVICES IN YOUR HOME?: NO

## 2024-11-15 SDOH — SOCIAL STABILITY: SOCIAL INSECURITY
WITHIN THE LAST YEAR, HAVE YOU BEEN KICKED, HIT, SLAPPED, OR OTHERWISE PHYSICALLY HURT BY YOUR PARTNER OR EX-PARTNER?: NO

## 2024-11-15 SDOH — SOCIAL STABILITY: SOCIAL INSECURITY: WERE YOU ABLE TO COMPLETE ALL THE BEHAVIORAL HEALTH SCREENINGS?: YES

## 2024-11-15 SDOH — ECONOMIC STABILITY: FOOD INSECURITY: WITHIN THE PAST 12 MONTHS, THE FOOD YOU BOUGHT JUST DIDN'T LAST AND YOU DIDN'T HAVE MONEY TO GET MORE.: NEVER TRUE

## 2024-11-15 SDOH — SOCIAL STABILITY: SOCIAL INSECURITY: ABUSE: ADULT

## 2024-11-15 SDOH — ECONOMIC STABILITY: FOOD INSECURITY: WITHIN THE PAST 12 MONTHS, YOU WORRIED THAT YOUR FOOD WOULD RUN OUT BEFORE YOU GOT THE MONEY TO BUY MORE.: NEVER TRUE

## 2024-11-15 SDOH — SOCIAL STABILITY: SOCIAL INSECURITY: HAS ANYONE EVER THREATENED TO HURT YOUR FAMILY OR YOUR PETS?: NO

## 2024-11-15 SDOH — SOCIAL STABILITY: SOCIAL INSECURITY: DOES ANYONE TRY TO KEEP YOU FROM HAVING/CONTACTING OTHER FRIENDS OR DOING THINGS OUTSIDE YOUR HOME?: NO

## 2024-11-15 SDOH — SOCIAL STABILITY: SOCIAL INSECURITY: WITHIN THE LAST YEAR, HAVE YOU BEEN AFRAID OF YOUR PARTNER OR EX-PARTNER?: NO

## 2024-11-15 SDOH — SOCIAL STABILITY: SOCIAL INSECURITY: ARE YOU OR HAVE YOU BEEN THREATENED OR ABUSED PHYSICALLY, EMOTIONALLY, OR SEXUALLY BY ANYONE?: NO

## 2024-11-15 SDOH — SOCIAL STABILITY: SOCIAL INSECURITY: ARE THERE ANY APPARENT SIGNS OF INJURIES/BEHAVIORS THAT COULD BE RELATED TO ABUSE/NEGLECT?: NO

## 2024-11-15 SDOH — SOCIAL STABILITY: SOCIAL INSECURITY: HAVE YOU HAD THOUGHTS OF HARMING ANYONE ELSE?: NO

## 2024-11-15 ASSESSMENT — COGNITIVE AND FUNCTIONAL STATUS - GENERAL
MOBILITY SCORE: 24
DAILY ACTIVITIY SCORE: 24
PATIENT BASELINE BEDBOUND: NO

## 2024-11-15 ASSESSMENT — PAIN SCALES - GENERAL
PAINLEVEL_OUTOF10: 3
PAINLEVEL_OUTOF10: 0 - NO PAIN

## 2024-11-15 ASSESSMENT — LIFESTYLE VARIABLES
HOW OFTEN DO YOU HAVE 6 OR MORE DRINKS ON ONE OCCASION: NEVER
HOW OFTEN DO YOU HAVE A DRINK CONTAINING ALCOHOL: NEVER
AUDIT-C TOTAL SCORE: 0
HOW MANY STANDARD DRINKS CONTAINING ALCOHOL DO YOU HAVE ON A TYPICAL DAY: PATIENT DOES NOT DRINK
AUDIT-C TOTAL SCORE: 0
SKIP TO QUESTIONS 9-10: 1

## 2024-11-15 ASSESSMENT — ENCOUNTER SYMPTOMS
CARDIOVASCULAR NEGATIVE: 1
NEUROLOGICAL NEGATIVE: 1
MUSCULOSKELETAL NEGATIVE: 1
GASTROINTESTINAL NEGATIVE: 1
HEMATOLOGIC/LYMPHATIC NEGATIVE: 1
EYES NEGATIVE: 1
CONSTITUTIONAL NEGATIVE: 1
RESPIRATORY NEGATIVE: 1

## 2024-11-15 ASSESSMENT — PAIN - FUNCTIONAL ASSESSMENT
PAIN_FUNCTIONAL_ASSESSMENT: 0-10

## 2024-11-15 ASSESSMENT — PATIENT HEALTH QUESTIONNAIRE - PHQ9
2. FEELING DOWN, DEPRESSED OR HOPELESS: NOT AT ALL
SUM OF ALL RESPONSES TO PHQ9 QUESTIONS 1 & 2: 0
1. LITTLE INTEREST OR PLEASURE IN DOING THINGS: NOT AT ALL

## 2024-11-15 ASSESSMENT — ACTIVITIES OF DAILY LIVING (ADL)
LACK_OF_TRANSPORTATION: NO
LACK_OF_TRANSPORTATION: NO

## 2024-11-15 NOTE — PRE-PROCEDURE NOTE
Interventional Radiology Preprocedure Note    Indication for procedure: hx of AML requiring stem cell transplant    Relevant review of systems:  No chest pain or shortness of breath    Relevant Labs:   Lab Results   Component Value Date    CREATININE 1.15 11/12/2024    EGFR 69 11/12/2024    INR 1.4 (H) 10/07/2024    PROTIME 16.3 (H) 10/07/2024       Planned Sedation/Anesthesia: Moderate    Airway assessment: normal    Directed physical examination:    General: normal appearance, behavior, cognition and NAD  Heart: heart regular rate and rhythm  Lungs: no increased work of breathing  Abdomen: soft and nontender  Psych: oriented to time, place and person     Mallampati: II (hard and soft palate, upper portion of tonsils and uvula visible)    ASA Score: ASA 3 - Patient with moderate systemic disease with functional limitations    Benefits, risks and alternatives of procedure and planned sedation have been discussed with the patient and/or their representative. All questions answered and they agree to proceed.

## 2024-11-15 NOTE — POST-PROCEDURE NOTE
Interventional Radiology Brief Postprocedure Note    Attending: Taz Cook MD    Assistant: Nima Hammond MD    Diagnosis: AML    Description of procedure:   Central Line Placement  Side: right    A time out was preformed identifying the correct procedure, the correct location with the nursing staff.  The right lower cervical neck was prepped with 2% chlorhexidine and draped with a full length sterile sheet in the usual fashion.  1% lidocaine was administered subcutaneously for local anesthesia.  The right internal jugular vein was accessed under ultrasound guidance with an 18 gauge needle.  A 8F double lumen blue Arrowguard catheter was inserted via the seldinger technique.  Dark, nonpulsatile blood was withdrawn flushed easily with normal saline.  The catheter was sutured in place and a sterile dressing was applied over the site prior to removal of drapes.      The patient tolerated the procedure well and there were no complications.    Anesthesia:  MAC Moderate    Complications: None    Estimated Blood Loss: minimal    Medications (Filter: Administrations occurring from 1033 to 1125 on 11/15/24) As of 11/15/24 1125      fentaNYL PF (Sublimaze) injection (mcg) Total dose:  50 mcg      Date/Time Rate/Dose/Volume Action       11/15/24  1043 50 mcg Given               midazolam (Versed) injection (mg) Total dose:  1 mg      Date/Time Rate/Dose/Volume Action       11/15/24  1044 1 mg Given                   No specimens collected      See detailed result report with images in PACS.    The patient tolerated the procedure well without incident or complication and is in stable condition.

## 2024-11-16 ENCOUNTER — APPOINTMENT (OUTPATIENT)
Dept: HEMATOLOGY/ONCOLOGY | Facility: HOSPITAL | Age: 69
End: 2024-11-16
Payer: MEDICARE

## 2024-11-16 LAB
ACANTHOCYTES BLD QL SMEAR: ABNORMAL
ALBUMIN SERPL BCP-MCNC: 4.2 G/DL (ref 3.4–5)
ALP SERPL-CCNC: 61 U/L (ref 33–136)
ALT SERPL W P-5'-P-CCNC: 9 U/L (ref 10–52)
ANION GAP SERPL CALC-SCNC: 12 MMOL/L (ref 10–20)
APTT PPP: 39 SECONDS (ref 27–38)
AST SERPL W P-5'-P-CCNC: 11 U/L (ref 9–39)
BASOPHILS # BLD MANUAL: 0.67 X10*3/UL (ref 0–0.1)
BASOPHILS NFR BLD MANUAL: 13.9 %
BILIRUB SERPL-MCNC: 0.7 MG/DL (ref 0–1.2)
BUN SERPL-MCNC: 19 MG/DL (ref 6–23)
BURR CELLS BLD QL SMEAR: ABNORMAL
CALCIUM SERPL-MCNC: 8.3 MG/DL (ref 8.6–10.6)
CHLORIDE SERPL-SCNC: 106 MMOL/L (ref 98–107)
CMV IGG AVIDITY SERPL IA-RTO: NONREACTIVE %
CO2 SERPL-SCNC: 23 MMOL/L (ref 21–32)
CREAT SERPL-MCNC: 1 MG/DL (ref 0.5–1.3)
DACRYOCYTES BLD QL SMEAR: ABNORMAL
EBV EA IGG SER QL: NEGATIVE
EBV NA AB SER QL: NEGATIVE
EBV VCA IGG SER IA-ACNC: NEGATIVE
EBV VCA IGM SER IA-ACNC: NEGATIVE
EGFRCR SERPLBLD CKD-EPI 2021: 82 ML/MIN/1.73M*2
EOSINOPHIL # BLD MANUAL: 1.84 X10*3/UL (ref 0–0.7)
EOSINOPHIL NFR BLD MANUAL: 38.3 %
ERYTHROCYTE [DISTWIDTH] IN BLOOD BY AUTOMATED COUNT: 19.1 % (ref 11.5–14.5)
FERRITIN SERPL-MCNC: 914 NG/ML (ref 20–300)
GLUCOSE SERPL-MCNC: 79 MG/DL (ref 74–99)
HBV CORE AB SER QL: NONREACTIVE
HBV CORE IGM SER QL: NONREACTIVE
HBV SURFACE AB SER-ACNC: 163.6 MIU/ML
HBV SURFACE AG SERPL QL IA: NONREACTIVE
HCT VFR BLD AUTO: 30.8 % (ref 41–52)
HCV AB SER QL: NONREACTIVE
HERPES SIMPLEX VIRUS 1 IGG: 0.3 INDEX
HERPES SIMPLEX VIRUS 2 IGG: 0.2 INDEX
HGB BLD-MCNC: 10 G/DL (ref 13.5–17.5)
HIV 1+2 AB+HIV1 P24 AG SERPL QL IA: NONREACTIVE
IMM GRANULOCYTES # BLD AUTO: 0.01 X10*3/UL (ref 0–0.7)
IMM GRANULOCYTES NFR BLD AUTO: 0.2 % (ref 0–0.9)
INR PPP: 1.4 (ref 0.9–1.1)
LDH SERPL L TO P-CCNC: 132 U/L (ref 84–246)
LYMPHOCYTES # BLD MANUAL: 1.13 X10*3/UL (ref 1.2–4.8)
LYMPHOCYTES NFR BLD MANUAL: 23.5 %
MAGNESIUM SERPL-MCNC: 2.18 MG/DL (ref 1.6–2.4)
MCH RBC QN AUTO: 32.1 PG (ref 26–34)
MCHC RBC AUTO-ENTMCNC: 32.5 G/DL (ref 32–36)
MCV RBC AUTO: 99 FL (ref 80–100)
MONOCYTES # BLD MANUAL: 0.12 X10*3/UL (ref 0.1–1)
MONOCYTES NFR BLD MANUAL: 2.6 %
NEUTS SEG # BLD MANUAL: 1.04 X10*3/UL (ref 1.2–7)
NEUTS SEG NFR BLD MANUAL: 21.7 %
NRBC BLD-RTO: 0 /100 WBCS (ref 0–0)
OVALOCYTES BLD QL SMEAR: ABNORMAL
PHOSPHATE SERPL-MCNC: 3.2 MG/DL (ref 2.5–4.9)
PLATELET # BLD AUTO: 219 X10*3/UL (ref 150–450)
POLYCHROMASIA BLD QL SMEAR: ABNORMAL
POTASSIUM SERPL-SCNC: 4.1 MMOL/L (ref 3.5–5.3)
PROT SERPL-MCNC: 6.9 G/DL (ref 6.4–8.2)
PROTHROMBIN TIME: 15.8 SECONDS (ref 9.8–12.8)
RBC # BLD AUTO: 3.12 X10*6/UL (ref 4.5–5.9)
RBC MORPH BLD: ABNORMAL
SODIUM SERPL-SCNC: 137 MMOL/L (ref 136–145)
T GONDII IGG SER-ACNC: NONREACTIVE
TOTAL CELLS COUNTED BLD: 115
TREPONEMA PALLIDUM IGG+IGM AB [PRESENCE] IN SERUM OR PLASMA BY IMMUNOASSAY: NONREACTIVE
URATE SERPL-MCNC: 5 MG/DL (ref 4–7.5)
VARICELLA ZOSTER IGG INDEX: 5.8 IA
VZV IGG SER QL IA: POSITIVE
WBC # BLD AUTO: 4.8 X10*3/UL (ref 4.4–11.3)

## 2024-11-16 PROCEDURE — 86790 VIRUS ANTIBODY NOS: CPT

## 2024-11-16 PROCEDURE — 86644 CMV ANTIBODY: CPT

## 2024-11-16 PROCEDURE — 86780 TREPONEMA PALLIDUM: CPT

## 2024-11-16 PROCEDURE — 86787 VARICELLA-ZOSTER ANTIBODY: CPT

## 2024-11-16 PROCEDURE — 85007 BL SMEAR W/DIFF WBC COUNT: CPT

## 2024-11-16 PROCEDURE — 87340 HEPATITIS B SURFACE AG IA: CPT

## 2024-11-16 PROCEDURE — 86705 HEP B CORE ANTIBODY IGM: CPT

## 2024-11-16 PROCEDURE — 86706 HEP B SURFACE ANTIBODY: CPT

## 2024-11-16 PROCEDURE — 2500000005 HC RX 250 GENERAL PHARMACY W/O HCPCS: Performed by: INTERNAL MEDICINE

## 2024-11-16 PROCEDURE — 86696 HERPES SIMPLEX TYPE 2 TEST: CPT

## 2024-11-16 PROCEDURE — 87389 HIV-1 AG W/HIV-1&-2 AB AG IA: CPT

## 2024-11-16 PROCEDURE — 2500000001 HC RX 250 WO HCPCS SELF ADMINISTERED DRUGS (ALT 637 FOR MEDICARE OP)

## 2024-11-16 PROCEDURE — 84550 ASSAY OF BLOOD/URIC ACID: CPT

## 2024-11-16 PROCEDURE — 2500000001 HC RX 250 WO HCPCS SELF ADMINISTERED DRUGS (ALT 637 FOR MEDICARE OP): Performed by: INTERNAL MEDICINE

## 2024-11-16 PROCEDURE — 83735 ASSAY OF MAGNESIUM: CPT

## 2024-11-16 PROCEDURE — 85027 COMPLETE CBC AUTOMATED: CPT

## 2024-11-16 PROCEDURE — 85610 PROTHROMBIN TIME: CPT

## 2024-11-16 PROCEDURE — 1170000001 HC PRIVATE ONCOLOGY ROOM DAILY

## 2024-11-16 PROCEDURE — 2500000004 HC RX 250 GENERAL PHARMACY W/ HCPCS (ALT 636 FOR OP/ED): Mod: JZ | Performed by: INTERNAL MEDICINE

## 2024-11-16 PROCEDURE — 84075 ASSAY ALKALINE PHOSPHATASE: CPT

## 2024-11-16 PROCEDURE — 84100 ASSAY OF PHOSPHORUS: CPT

## 2024-11-16 PROCEDURE — 86777 TOXOPLASMA ANTIBODY: CPT

## 2024-11-16 PROCEDURE — 83520 IMMUNOASSAY QUANT NOS NONAB: CPT | Performed by: NURSE PRACTITIONER

## 2024-11-16 PROCEDURE — 86645 CMV ANTIBODY IGM: CPT

## 2024-11-16 PROCEDURE — 85060 BLOOD SMEAR INTERPRETATION: CPT | Performed by: PATHOLOGY

## 2024-11-16 PROCEDURE — 86665 EPSTEIN-BARR CAPSID VCA: CPT

## 2024-11-16 PROCEDURE — 86803 HEPATITIS C AB TEST: CPT

## 2024-11-16 PROCEDURE — 2500000002 HC RX 250 W HCPCS SELF ADMINISTERED DRUGS (ALT 637 FOR MEDICARE OP, ALT 636 FOR OP/ED)

## 2024-11-16 PROCEDURE — 83615 LACTATE (LD) (LDH) ENZYME: CPT

## 2024-11-16 PROCEDURE — 2500000001 HC RX 250 WO HCPCS SELF ADMINISTERED DRUGS (ALT 637 FOR MEDICARE OP): Performed by: NURSE PRACTITIONER

## 2024-11-16 PROCEDURE — 99233 SBSQ HOSP IP/OBS HIGH 50: CPT | Performed by: INTERNAL MEDICINE

## 2024-11-16 PROCEDURE — 86704 HEP B CORE ANTIBODY TOTAL: CPT

## 2024-11-16 PROCEDURE — 82728 ASSAY OF FERRITIN: CPT

## 2024-11-16 PROCEDURE — 2500000002 HC RX 250 W HCPCS SELF ADMINISTERED DRUGS (ALT 637 FOR MEDICARE OP, ALT 636 FOR OP/ED): Performed by: INTERNAL MEDICINE

## 2024-11-16 RX ORDER — PROCHLORPERAZINE MALEATE 10 MG
10 TABLET ORAL EVERY 6 HOURS PRN
Status: DISCONTINUED | OUTPATIENT
Start: 2024-11-16 | End: 2024-12-07 | Stop reason: HOSPADM

## 2024-11-16 RX ORDER — URSODIOL 300 MG/1
300 CAPSULE ORAL 3 TIMES DAILY
Status: DISCONTINUED | OUTPATIENT
Start: 2024-11-16 | End: 2024-12-07 | Stop reason: HOSPADM

## 2024-11-16 RX ORDER — ALBUTEROL SULFATE 0.83 MG/ML
3 SOLUTION RESPIRATORY (INHALATION) AS NEEDED
Status: DISCONTINUED | OUTPATIENT
Start: 2024-11-16 | End: 2024-12-07 | Stop reason: HOSPADM

## 2024-11-16 RX ORDER — DIPHENHYDRAMINE HYDROCHLORIDE 50 MG/ML
50 INJECTION INTRAMUSCULAR; INTRAVENOUS AS NEEDED
Status: DISCONTINUED | OUTPATIENT
Start: 2024-11-16 | End: 2024-12-06

## 2024-11-16 RX ORDER — ONDANSETRON HYDROCHLORIDE 8 MG/1
16 TABLET, FILM COATED ORAL ONCE
Status: COMPLETED | OUTPATIENT
Start: 2024-11-16 | End: 2024-11-16

## 2024-11-16 RX ORDER — ALLOPURINOL 300 MG/1
300 TABLET ORAL DAILY
Status: DISCONTINUED | OUTPATIENT
Start: 2024-11-16 | End: 2024-12-07 | Stop reason: HOSPADM

## 2024-11-16 RX ORDER — FAMOTIDINE 10 MG/ML
20 INJECTION INTRAVENOUS AS NEEDED
Status: DISCONTINUED | OUTPATIENT
Start: 2024-11-16 | End: 2024-12-06

## 2024-11-16 RX ORDER — VANCOMYCIN HYDROCHLORIDE 125 MG/1
125 CAPSULE ORAL DAILY
Status: DISCONTINUED | OUTPATIENT
Start: 2024-11-16 | End: 2024-12-07 | Stop reason: HOSPADM

## 2024-11-16 RX ORDER — ACETAMINOPHEN 325 MG/1
650 TABLET ORAL ONCE
Status: COMPLETED | OUTPATIENT
Start: 2024-11-16 | End: 2024-11-16

## 2024-11-16 RX ORDER — EPINEPHRINE 1 MG/ML
0.3 INJECTION, SOLUTION, CONCENTRATE INTRAVENOUS EVERY 5 MIN PRN
Status: DISCONTINUED | OUTPATIENT
Start: 2024-11-16 | End: 2024-12-06

## 2024-11-16 RX ORDER — PROCHLORPERAZINE EDISYLATE 5 MG/ML
10 INJECTION INTRAMUSCULAR; INTRAVENOUS EVERY 6 HOURS PRN
Status: DISCONTINUED | OUTPATIENT
Start: 2024-11-16 | End: 2024-12-07 | Stop reason: HOSPADM

## 2024-11-16 RX ADMIN — Medication 2000 UNITS: at 08:36

## 2024-11-16 RX ADMIN — ALLOPURINOL 300 MG: 300 TABLET ORAL at 08:36

## 2024-11-16 RX ADMIN — ACETAMINOPHEN 650 MG: 325 TABLET ORAL at 16:13

## 2024-11-16 RX ADMIN — FLUDARABINE PHOSPHATE 61 MG: 25 INJECTION, SOLUTION INTRAVENOUS at 10:09

## 2024-11-16 RX ADMIN — URSODIOL 300 MG: 300 CAPSULE ORAL at 15:34

## 2024-11-16 RX ADMIN — URSODIOL 300 MG: 300 CAPSULE ORAL at 08:36

## 2024-11-16 RX ADMIN — FAMOTIDINE 20 MG: 20 TABLET ORAL at 08:35

## 2024-11-16 RX ADMIN — VANCOMYCIN HYDROCHLORIDE 125 MG: 125 CAPSULE ORAL at 08:37

## 2024-11-16 RX ADMIN — FEXOFENADINE HCL 180 MG: 180 TABLET ORAL at 08:44

## 2024-11-16 RX ADMIN — AMLODIPINE BESYLATE 10 MG: 10 TABLET ORAL at 08:37

## 2024-11-16 RX ADMIN — ACYCLOVIR 400 MG: 400 TABLET ORAL at 08:36

## 2024-11-16 RX ADMIN — ONDANSETRON HYDROCHLORIDE 16 MG: 8 TABLET, FILM COATED ORAL at 08:35

## 2024-11-16 RX ADMIN — ACYCLOVIR 400 MG: 400 TABLET ORAL at 20:56

## 2024-11-16 RX ADMIN — URSODIOL 300 MG: 300 CAPSULE ORAL at 20:56

## 2024-11-16 ASSESSMENT — PAIN SCALES - GENERAL
PAINLEVEL_OUTOF10: 0 - NO PAIN
PAINLEVEL_OUTOF10: 3
PAINLEVEL_OUTOF10: 0 - NO PAIN
PAINLEVEL_OUTOF10: 0 - NO PAIN

## 2024-11-16 ASSESSMENT — COGNITIVE AND FUNCTIONAL STATUS - GENERAL
DAILY ACTIVITIY SCORE: 24
MOBILITY SCORE: 24

## 2024-11-16 ASSESSMENT — PAIN DESCRIPTION - LOCATION: LOCATION: HEAD

## 2024-11-16 ASSESSMENT — PAIN - FUNCTIONAL ASSESSMENT
PAIN_FUNCTIONAL_ASSESSMENT: 0-10

## 2024-11-16 NOTE — PROGRESS NOTES
"Jin Reynolds is a 69 y.o. male on day 1 of admission presenting with AML (acute myeloid leukemia) in remission (Multi).    Subjective   No acute events over night. Afebrile. Admitted yesterday and doing well. Eating and drinking. Denies any fever, rash, HA, SOB, CP, Abd pain, N/V/D/C. Remaining ROS unremarkable.        Objective     Physical Exam  Constitutional:       Appearance: Normal appearance.   HENT:      Head: Normocephalic.      Mouth/Throat:      Mouth: Mucous membranes are moist.      Pharynx: Oropharynx is clear.   Eyes:      Extraocular Movements: Extraocular movements intact.      Pupils: Pupils are equal, round, and reactive to light.   Cardiovascular:      Rate and Rhythm: Normal rate and regular rhythm.      Pulses: Normal pulses.      Heart sounds: Normal heart sounds.   Pulmonary:      Effort: Pulmonary effort is normal.      Breath sounds: Normal breath sounds.   Abdominal:      General: Bowel sounds are normal.      Palpations: Abdomen is soft.   Musculoskeletal:         General: Normal range of motion.      Cervical back: Normal range of motion.   Skin:     General: Skin is warm.      Capillary Refill: Capillary refill takes less than 2 seconds.      Findings: No rash.   Neurological:      Mental Status: He is oriented to person, place, and time.         Last Recorded Vitals  Blood pressure (!) 157/95, pulse 74, temperature 36.5 °C (97.7 °F), temperature source Temporal, resp. rate 16, height 1.691 m (5' 6.58\"), weight 86.1 kg (189 lb 13.1 oz), SpO2 99%.  Intake/Output last 3 Shifts:  No intake/output data recorded.    Relevant Results  Scheduled medications  acyclovir, 400 mg, oral, q12h  allopurinol, 300 mg, oral, Daily  allopurinol, 300 mg, oral, Daily  amLODIPine, 10 mg, oral, Daily  cholecalciferol, 2,000 Units, oral, Daily  famotidine, 20 mg, oral, Daily  fexofenadine, 180 mg, oral, Daily  ursodiol, 300 mg, oral, TID  vancomycin, 125 mg, oral, Daily      Continuous medications     PRN " medications  PRN medications: albuterol, alteplase, dextrose, diphenhydrAMINE, EPINEPHrine HCl, famotidine, methylPREDNISolone sodium succinate (PF), ondansetron, polyethylene glycol, prochlorperazine, prochlorperazine, prochlorperazine, sodium chloride    Results for orders placed or performed during the hospital encounter of 11/15/24 (from the past 24 hours)   Basic Metabolic Panel   Result Value Ref Range    Glucose 85 74 - 99 mg/dL    Sodium 136 136 - 145 mmol/L    Potassium 4.1 3.5 - 5.3 mmol/L    Chloride 106 98 - 107 mmol/L    Bicarbonate 23 21 - 32 mmol/L    Anion Gap 11 10 - 20 mmol/L    Urea Nitrogen 22 6 - 23 mg/dL    Creatinine 1.10 0.50 - 1.30 mg/dL    eGFR 73 >60 mL/min/1.73m*2    Calcium 8.8 8.6 - 10.6 mg/dL   CBC and Auto Differential   Result Value Ref Range    WBC 5.1 4.4 - 11.3 x10*3/uL    nRBC 0.0 0.0 - 0.0 /100 WBCs    RBC 3.50 (L) 4.50 - 5.90 x10*6/uL    Hemoglobin 10.9 (L) 13.5 - 17.5 g/dL    Hematocrit 34.6 (L) 41.0 - 52.0 %    MCV 99 80 - 100 fL    MCH 31.1 26.0 - 34.0 pg    MCHC 31.5 (L) 32.0 - 36.0 g/dL    RDW 19.5 (H) 11.5 - 14.5 %    Platelets 247 150 - 450 x10*3/uL    Immature Granulocytes %, Automated 0.2 0.0 - 0.9 %    Immature Granulocytes Absolute, Automated 0.01 0.00 - 0.70 x10*3/uL   Coagulation Screen   Result Value Ref Range    Protime 16.4 (H) 9.8 - 12.8 seconds    INR 1.5 (H) 0.9 - 1.1    aPTT 41 (H) 27 - 38 seconds   Lactate Dehydrogenase   Result Value Ref Range     84 - 246 U/L   Magnesium   Result Value Ref Range    Magnesium 2.37 1.60 - 2.40 mg/dL   Phosphorus   Result Value Ref Range    Phosphorus 3.6 2.5 - 4.9 mg/dL   Hepatitis B core antibody, IgM   Result Value Ref Range    Hepatitis B Core AB; IgM Nonreactive Nonreactive   Manual Differential   Result Value Ref Range    Neutrophils %, Manual 27.8 40.0 - 80.0 %    Lymphocytes %, Manual 18.3 13.0 - 44.0 %    Monocytes %, Manual 1.7 2.0 - 10.0 %    Eosinophils %, Manual 42.6 0.0 - 6.0 %    Basophils %, Manual 9.6  0.0 - 2.0 %    Seg Neutrophils Absolute, Manual 1.42 1.20 - 7.00 x10*3/uL    Lymphocytes Absolute, Manual 0.93 (L) 1.20 - 4.80 x10*3/uL    Monocytes Absolute, Manual 0.09 (L) 0.10 - 1.00 x10*3/uL    Eosinophils Absolute, Manual 2.17 (H) 0.00 - 0.70 x10*3/uL    Basophils Absolute, Manual 0.49 (H) 0.00 - 0.10 x10*3/uL    Total Cells Counted 115     RBC Morphology See Below    Sars-CoV-2 PCR   Result Value Ref Range    Coronavirus 2019, PCR Not Detected Not Detected   CBC and Auto Differential   Result Value Ref Range    WBC 4.8 4.4 - 11.3 x10*3/uL    nRBC 0.0 0.0 - 0.0 /100 WBCs    RBC 3.12 (L) 4.50 - 5.90 x10*6/uL    Hemoglobin 10.0 (L) 13.5 - 17.5 g/dL    Hematocrit 30.8 (L) 41.0 - 52.0 %    MCV 99 80 - 100 fL    MCH 32.1 26.0 - 34.0 pg    MCHC 32.5 32.0 - 36.0 g/dL    RDW 19.1 (H) 11.5 - 14.5 %    Platelets 219 150 - 450 x10*3/uL    Immature Granulocytes %, Automated 0.2 0.0 - 0.9 %    Immature Granulocytes Absolute, Automated 0.01 0.00 - 0.70 x10*3/uL   Coagulation Screen   Result Value Ref Range    Protime 15.8 (H) 9.8 - 12.8 seconds    INR 1.4 (H) 0.9 - 1.1    aPTT 39 (H) 27 - 38 seconds   Comprehensive Metabolic Panel   Result Value Ref Range    Glucose 79 74 - 99 mg/dL    Sodium 137 136 - 145 mmol/L    Potassium 4.1 3.5 - 5.3 mmol/L    Chloride 106 98 - 107 mmol/L    Bicarbonate 23 21 - 32 mmol/L    Anion Gap 12 10 - 20 mmol/L    Urea Nitrogen 19 6 - 23 mg/dL    Creatinine 1.00 0.50 - 1.30 mg/dL    eGFR 82 >60 mL/min/1.73m*2    Calcium 8.3 (L) 8.6 - 10.6 mg/dL    Albumin 4.2 3.4 - 5.0 g/dL    Alkaline Phosphatase 61 33 - 136 U/L    Total Protein 6.9 6.4 - 8.2 g/dL    AST 11 9 - 39 U/L    Bilirubin, Total 0.7 0.0 - 1.2 mg/dL    ALT 9 (L) 10 - 52 U/L   Lactate Dehydrogenase   Result Value Ref Range     84 - 246 U/L   Magnesium   Result Value Ref Range    Magnesium 2.18 1.60 - 2.40 mg/dL   Phosphorus   Result Value Ref Range    Phosphorus 3.2 2.5 - 4.9 mg/dL   Ferritin   Result Value Ref Range    Ferritin  914 (H) 20 - 300 ng/mL   Uric Acid   Result Value Ref Range    Uric Acid 5.0 4.0 - 7.5 mg/dL   Cytomegalovirus IgG   Result Value Ref Range    Cytomegalovirus IgG Nonreactive Nonreactive   Hepatitis C Antibody   Result Value Ref Range    Hepatitis C AB Nonreactive Nonreactive   Hepatitis B Core Antibody, IgM   Result Value Ref Range    Hepatitis B Core AB; IgM Nonreactive Nonreactive   Hepatitis B Core Antibody, Total   Result Value Ref Range    Hepatitis B Core AB- Total Nonreactive Nonreactive   Hepatitis B Surface Antibody   Result Value Ref Range    Hepatitis B Surface .6 (H) <10.0 mIU/mL   Toxoplasma IgG   Result Value Ref Range    Toxoplasma IgG Nonreactive Nonreactive   HIV 1/2 Antigen/Antibody Screen with Reflex to Confirmation   Result Value Ref Range    HIV 1/2 Antigen/Antibody Screen with Reflex to Confirmation Nonreactive Nonreactive   Hepatitis B Surface Antigen   Result Value Ref Range    Hepatitis B Surface AG Nonreactive Nonreactive   Manual Differential   Result Value Ref Range    Neutrophils %, Manual 21.7 40.0 - 80.0 %    Lymphocytes %, Manual 23.5 13.0 - 44.0 %    Monocytes %, Manual 2.6 2.0 - 10.0 %    Eosinophils %, Manual 38.3 0.0 - 6.0 %    Basophils %, Manual 13.9 0.0 - 2.0 %    Seg Neutrophils Absolute, Manual 1.04 (L) 1.20 - 7.00 x10*3/uL    Lymphocytes Absolute, Manual 1.13 (L) 1.20 - 4.80 x10*3/uL    Monocytes Absolute, Manual 0.12 0.10 - 1.00 x10*3/uL    Eosinophils Absolute, Manual 1.84 (H) 0.00 - 0.70 x10*3/uL    Basophils Absolute, Manual 0.67 (H) 0.00 - 0.10 x10*3/uL    Total Cells Counted 115     RBC Morphology See Below     Polychromasia Mild     Ovalocytes Few     Teardrop Cells Few     [place] Cells Few     Acanthocytes Few      *Note: Due to a large number of results and/or encounters for the requested time period, some results have not been displayed. A complete set of results can be found in Results Review.     IR CVC nontunneled    Result Date: 11/15/2024  Interpreted By:   Keyshawn Hamlin  and Stephany Herring STUDY: IR CVC NONTUNNELED; 11/15/2024 11:23 am   INDICATION: Signs/Symptoms:allo PBSCT.   COMPARISON: None.   ACCESSION NUMBER(S): [id number]   ORDERING CLINICIAN: ALISSON CUMMINGS   TECHNIQUE: INTERVENTIONALIST(S): MD RISA GORDON MD   CONSENT: The patient/patient's POA/next of kin was informed of the nature of the proposed procedure. The purposes, alternatives, risks, and benefits were explained and discussed. All questions were answered and consent was obtained.   RADIATION EXPOSURE: Fluoroscopy time: 0.6 min. Dose: 6.3 mGy. Dose Area Product (DAP): 620   SEDATION: Moderate conscious IV sedation services (supervision of administration, induction, and maintenance) were provided by the physician performing the procedure with intravenous fentanyl 50 mcg and versed 1 mg for 52 minutes. The physician was assisted by an independent trained observer, an interventional radiology nurse, in the continuous monitoring of patient level of consciousness and physiologic status.   MEDICATION/CONTRAST: No additional   TIME OUT: A time out was performed immediately prior to procedure start with the interventional team, correctly identifying the patient name, date of birth, MRN, procedure, anatomy (including marking of site and side), patient position, procedure consent form, relevant laboratory and imaging test results, antibiotic administration, safety precautions, and procedure-specific equipment needs.   COMPLICATIONS: No immediate adverse events identified.   FINDINGS: The patient was positioned supine on the angiography table. The right  supraclavicular cutaneous tissues were prepared and draped in sterile manner.  1% lidocaine local anesthesia was instilled into the subcutaneous soft tissues at the selected access site for local anesthesia. Ultrasound images demonstrate a patent and compressible right  internal jugular vein. Utilizing direct ultrasound guidance and  micropuncture/Seldinger technique, the  right  internal jugular vein was accessed. An ultrasound digital spot image was acquired and stored on the  PACS. After confirmation of location, a 018 [place]-Mandrel guidewire was introduced and advanced into the inferior vena cava utilizing intermittent fluoroscopy.   The needle was removed with the guidewire left in place and exchanged for a 5-English coaxial dilator.  The inner dilator and wire were removed and a J-tipped 035 guidewire was introduced through the access sheath.  The skin tract was dilated with successive increase in size of vascular dilators under direct fluoroscopic visualization.   Subsequently, a temporary 8 English x 16 cm catheter was advanced with its tip overlying the cavoatrial junction under direct fluoroscopic guidance.  The catheter ports were aspirated and flushed with normal saline and charged with heparin.  The external portions of the catheter were secured in place with sterile suture dressings.   The patient tolerated the procedure without complication.       1.  Technically uneventful placement of a   right  internal jugular double-lumen blue arrow guard catheter under direct ultrasound and fluoroscopic guidance - optimal catheter tip position at the right atrial superior vena caval junction and the catheter is ready for utilization.   I was present for and/or performed the critical portions of the procedure and immediately available throughout the entire procedure.   I personally reviewed the image(s) / study and resident interpretation. I agree with the findings as stated.   Performed and dictated at [facility].   Signed by: Keyshawn Hamlin 11/15/2024 2:39 PM Dictation workstation:   [id number]    Transthoracic Echo (TTE) Complete    Result Date: 10/25/2024   [facility], [address] [place] [address]                Tel [phone] and Fax [phone] TRANSTHORACIC  ECHOCARDIOGRAM REPORT  Patient Name:      ANGELO ROE        Reading Physician:    [id number] Tod Garrett MD Study Date:        10/23/2024           Ordering Provider:    [id number] ALISSON MAXWELL                                                               ZOILA MRN/PID:           [id number]             Fellow: Accession#:        [id number]         Nurse: Date of Birth/Age: 1955 / 69 years  Sonographer:          CIARA Bourne RDCS Gender:            M                    Additional Staff: Height:            167.64 cm            Admit Date: Weight:            86.18 kg             Admission Status:     Outpatient BSA / BMI:         1.96 m2 / 30.67      Encounter#:           [id number]                    kg/m2 Blood Pressure:    135/81 mmHg          Department Location:  [facility] Echo Lab Study Type:    TRANSTHORACIC ECHO (TTE) COMPLETE 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 anemia, AML not in remission. Study Detail: The following Echo studies were performed: 2D, M-Mode, Doppler and               color flow.  PHYSICIAN INTERPRETATION: Left Ventricle: Left ventricular ejection fraction is low normal, by visual estimate at 50-55%. There are no regional left ventricular wall motion abnormalities. The left ventricular cavity size is normal. The left ventricular septal wall thickness is mildly increased. There is mildly increased left ventricular posterior wall thickness. There is a false tendon visualized in the left ventricle. There is moderate concentric left ventricular hypertrophy. Spectral Doppler shows a pseudonormal pattern of left ventricular diastolic filling. There is an elevated mean left atrial pressure. Left Atrium:  The left atrium is severely dilated. Right Ventricle: The right ventricle is normal in size. There is normal right ventricular global systolic function. Right Atrium: The right atrium is moderately dilated. Aortic Valve: The aortic valve is trileaflet. There is no evidence of aortic valve regurgitation. The peak instantaneous gradient of the aortic valve is 9.5 mmHg. Mitral Valve: The mitral valve is normal in structure. There is trace mitral valve regurgitation. Tricuspid Valve: The tricuspid valve is structurally normal. There is mild tricuspid regurgitation. The Doppler estimated RVSP is mildly elevated at 38.7 mmHg. Pulmonic Valve: The pulmonic valve is structurally normal. There is physiologic pulmonic valve regurgitation. Pericardium: Trivial pericardial effusion. Aorta: The aortic root is normal. In comparison to the previous echocardiogram(s): Compared with study dated 6/14/2023, no significant change . RVSP is again mildly elevated in context of grade II diastolic dysfunction.  CONCLUSIONS:  1. Left ventricular ejection fraction is low normal, by visual estimate at 50-55%.  2. Spectral Doppler shows a pseudonormal pattern of left ventricular diastolic filling.  3. There is an elevated mean left atrial pressure.  4. There is moderate concentric left ventricular hypertrophy.  5. There is normal right ventricular global systolic function.  6. Mildly elevated right ventricular systolic pressure.  7. The left atrium is severely dilated.  8. The right atrium is moderately dilated.  9. Compared with study dated 6/14/2023, no significant change . RVSP is again mildly elevated in context of grade II diastolic dysfunction. QUANTITATIVE DATA SUMMARY:  2D MEASUREMENTS:          Normal Ranges: Ao Root d:       3.30 cm  (2.0-3.7cm) LAs:             5.25 cm  (2.7-4.0cm) RVIDd:           3.60 cm  (0.9-3.6cm) IVSd:            1.09 cm  (0.6-1.1cm) LVPWd:           1.19 cm  (0.6-1.1cm) LVIDd:           5.63 cm  (3.9-5.9cm)  LVIDs:           3.76 cm LV Mass Index:   135 g/m2 LVEDV Index:     67 ml/m2 LV % FS          33.1 %  LA VOLUME:                    Normal Ranges: LA Vol A4C:        91.2 ml    (22+/-6mL/m2) LA Vol A2C:        130.1 ml LA Vol BP:         109.8 ml LA Vol Index A4C:  46.6ml/m2 LA Vol Index A2C:  66.5 ml/m2 LA Vol Index BP:   56.1 ml/m2 LA Area A4C:       26.0 cm2 LA Area A2C:       30.8 cm2 LA Major Axis A4C: 6.3 cm LA Major Axis A2C: 6.2 cm LA Volume Index:   56.0 ml/m2  RA VOLUME BY A/L METHOD:          Normal Ranges: RA Area A4C:             24.8 cm2  M-MODE MEASUREMENTS:         Normal Ranges: Ao Root:             3.70 cm (2.0-3.7cm) LAs:                 5.15 cm (2.7-4.0cm)  AORTA MEASUREMENTS:         Normal Ranges: Asc Ao, d:          3.30 cm (2.1-3.4cm)  LV SYSTOLIC FUNCTION BY 2D PLANIMETRY (MOD):                      Normal Ranges: EF-A4C View:    68 % (>=55%) EF-A2C View:    57 % EF-Biplane:     62 % EF-Visual:      53 % LV EF Reported: 53 %  LV DIASTOLIC FUNCTION:             Normal Ranges: MV Peak E:             0.50 m/s    (0.7-1.2 m/s) MV Peak A:             0.77 m/s    (0.42-0.7 m/s) E/A Ratio:             0.65        (1.0-2.2) MV e'                  0.055 m/s   (>8.0) MV lateral e'          0.07 m/s MV medial e'           0.04 m/s MV A Dur:              110.73 msec E/e' Ratio:            9.15        (<8.0) a'                     0.15 m/s PulmV Sys Earle:         105.56 cm/s PulmV Patrick Earle:        54.20 cm/s PulmV S/D Earle:         1.95 PulmV A Revs Earle:      36.27 cm/s PulmV A Revs Dur:      87.66 msec  MITRAL VALVE:          Normal Ranges: MV DT:        255 msec (150-240msec)  AORTIC VALVE:           Normal Ranges: AoV Vmax:      1.54 m/s (<=1.7m/s) AoV Peak P.5 mmHg (<20mmHg) LVOT Max Earle:  1.14 m/s (<=1.1m/s) LVOT VTI:      25.62 cm LVOT Diameter: 2.34 cm  (1.8-2.4cm) AoV Area,Vmax: 3.19 cm2 (2.5-4.5cm2)  RIGHT VENTRICLE: RV Basal 3.90 cm RV Mid   3.60 cm RV Major 6.4 cm TAPSE:   25.0 mm RV s'     0.13 m/s  TRICUSPID VALVE/RVSP:          Normal Ranges: Peak TR Velocity:     2.99 m/s Est. RA Pressure:     3 mmHg RV Syst Pressure:     39 mmHg  (< 30mmHg) IVC Diam:             1.90 cm  PULMONIC VALVE:          Normal Ranges: PV Accel Time:  71 msec  (>120ms) PV Max Earle:     1.0 m/s  (0.6-0.9m/s) PV Max P.4 mmHg  Pulmonary Veins: PulmV A Revs Dur: 87.66 msec PulmV A Revs Earle: 36.27 cm/s PulmV Patrick Earle:   54.20 cm/s PulmV S/D Earle:    1.95 PulmV Sys Earle:    105.56 cm/s  AORTA: Asc Ao Diam 3.26 cm  [id number] Tod Garrett MD Electronically signed on 10/25/2024 at 6:00:02 PM  ** Final **     Complete Pulmonary Function Test (Spirometry/DLCO/Lung Volumes)    Result Date: 10/24/2024  Normal spirometry. Lung volumes are normal. The DLCO corrected for hemoglobin is normal.    CT chest wo IV contrast    Result Date: 10/23/2024  Interpreted By:  Felicita Holden, STUDY: CT CHEST WO IV CONTRAST;  10/23/2024 3:14 pm   INDICATION: Signs/Symptoms:pre allo PBSCT testing.   ,C92.00 Acute myeloblastic leukemia, not having achieved remission (Multi),Z76.82 Awaiting organ transplant status   COMPARISON: CT dated 2023   ACCESSION NUMBER(S): [id number]   ORDERING CLINICIAN: ALISSON CUMMINGS   TECHNIQUE: Helical data acquisition of the chest was obtained  without IV contrast material.  Images were reformatted in axial, coronal, and sagittal planes.   FINDINGS: LUNGS AND AIRWAYS: The trachea and central airways are patent. No endobronchial lesion.   Lungs are clear. No focal consolidation, pleural effusion, edema or pneumothorax.   Mild emphysema in the upper lungs.   MEDIASTINUM AND JENNIFER, LOWER NECK AND AXILLA: Heterogeneous enlargement of the thyroid gland with nodules in bilateral thyroid lobes. Consider correlation with thyroid ultrasound   No evidence of thoracic lymphadenopathy by CT criteria.   Esophagus appears within normal limits as seen.   HEART AND VESSELS: The thoracic aorta is of normal course and  caliber without vascular calcifications.   Main pulmonary artery is slightly dilated measuring 3.4 cm   No coronary artery calcifications are seen. The study is not optimized for evaluation of coronary arteries.   The cardiac chambers are not enlarged. Hypodensity of the blood pool compared to adjacent myocardium.   Small pericardial effusion.   Right IJ MediPort is in place terminating in the cavoatrial junction/right atrium.   UPPER ABDOMEN: Multiple hypodense lesions are identified in the liver, incompletely characterized but likely cysts. Bilateral renal cysts in the visualized portion of the kidneys.   CHEST WALL AND OSSEOUS STRUCTURES: There are no suspicious osseous lesions. Multilevel degenerative changes are present       1.  No CT evidence of acute process in the chest. No focal infiltrate or pleural effusion. 2. Mild emphysema in the upper lungs. 3.   Dilated main pulmonary artery. Correlate with pulmonary artery hypertension. 4. CT findings suggestive of anemia. 5. 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 male presenting with h/o Smoldering Systemic Mastocytosis, recent diagnosis of AML. Admitted for MRD Allo transplant (T=0 11/21/24).     T-5 Allo MRD      ONC:  # Smoldering System Mastocytosis diagnosed 8/23/23  - S/p Molecular therapy started 2/13/24  - Continue home Allegra for symptom management  - Ordered Tyrptase level 11/16, pending   # AML.    - AML diagnosed per [facility] on 6/27/24. Positive for RUNX1, CKit, SRSF2 and CBL C404Y  - S/p C1D1 Venetoclax/Azacitidine on 7/24/24. Developed TLS with renal dysfunction  - [facility] (8/28/24): c/w persistent disease and 27% blast  - Admitted 9/5/24 for C2 of Jeremy/Aza with Venetoclx ramp up dosing (D1 20mg, D2 50 mg, D3 onward 100 mg  daily).   - s/p 2 cycles of Jeremy/Aza  - Admit 11/15 for Allo MRD (T=0 11/21/24)     Chemo:  - Fludarabine 30 mg/m2 IV D-5, D-4, D-3, D-2   - Melphalan 140 mg/m2 IV D-2   - Total Body Irradiation (TBI) 200 cGy on D-1      GVHD Prophy:  - Tacro level:   - Tacro dose: 2.5mg q12h starting on T+5  - Post-tx Cytoxan 50 mg/kg on T+3 and T+4  - MMF 1000 mg TID to begin T+5 to T+35     TRANSPLANT:   - HLA matched related donor /12 match  - ABO Donor: O+, CMV Positive   - ABO Recipient: O+, CMV negative      HEME:  #Pancytopenia 2/2 to disease  - Keep hgb > 7.0, plts > 10k     ID:  # Cont prophylaxis meds: Acyclovir  - Plan for Posaconazole and Levofloxacin ppx when neutropenic     FEN/GI:  - Admit wt: 87.6 kg  - Cont home PPI, Pepcid and antiemetics  - Low pathogen diet  - Monitor for fluid overload     CARD:  - ECHO June 2023 c/w LA severely dilated, EF 50-55%  - Cont home Norvasc     RENAL:  # H/o hematuria, resolved  - S/p Cystoscopy (1/15/24): Normal exam  # C/o frequent urination especially at night  - UA & PSA ordered on prior admit. Last PSA 0.66 (5/31/23)     MISC:  - Cont home Allegra and Singular     DISPO:  - Access: Central Line  - Full Code  - Primary Oncologist: Dr. Nika Dawson      Patient discussed with Dr. Lydia Escamilla, APRN-CNP

## 2024-11-16 NOTE — CARE PLAN
The patient's goals for the shift include      Problem: Pain - Adult  Goal: Verbalizes/displays adequate comfort level or baseline comfort level  Outcome: Progressing     Problem: Safety - Adult  Goal: Free from fall injury  Outcome: Progressing     Problem: Discharge Planning  Goal: Discharge to home or other facility with appropriate resources  Outcome: Progressing     Problem: Chronic Conditions and Co-morbidities  Goal: Patient's chronic conditions and co-morbidity symptoms are monitored and maintained or improved  Outcome: Progressing     The clinical goals for the shift include patient shall remain safe and free from falls

## 2024-11-16 NOTE — H&P
"History Of Present Illness  Jin Reynolds is a 69 y.o. male presenting with h/o Smoldering Systemic Mastocytosis, recent diagnosis of AML. Admitted for MRD Allo transplant (T=0 11/21/24).    Denies acute complaints. Doing well. Reports daughter I sick but he had limited contact with her, only seeing her once while she wore a mask. No acute complaints. No prodromal symptoms or fevers. Remainder of ROS negative.    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    Surgical History  He has no past surgical history on file.    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   8/23/2023 Initial Diagnosis    DX:  SMOLDERING SYSTEMIC MASTOCYTOSIS  Presented with skin rash and eosinophilia    [facility]ITERIA  (For SM, Need major and 1 minor OR at least 3 minors)  - Multi-focal, dense infiltrates of MC (>= 15 mast cells in aggregates) in BM and/or other extra-cutaneous organs [major]  - In BM or extracutaneous organs, >25% MC spindle shaped OR have atypical morphology OR of all MC on BM aspirate smears, >25% are immature or atypical [minor]  - Detection of activating mutation KIT D816V in BM, PB, or other extracutaneous organ [minor]  - Serum tryptase persistently exceeds 20 ng/mL (unless associated clonal myeloid disorder, in which this paramenter is not valid) [minor]    \"B\" FINDINGS  - BM biopsy showing >30% infiltration (focal, dense aggregates) and/or serum tryptase > 200 ng/mL  - Signs of dysplasia or myeloproliferation, in non-MC lineages but insufficient criteria for definitive diagnosis of AHNMD with normal or slightly abnormal blood counts    \"C\" FINDINGS  None       8/23/2023 Biopsy    BONE MARROW " (8/23/23)  Hypercellular (90-95%) with trilineage hematopoiesis, granulocytic hyperplasia, eosinophilia, and increased atypical mast cells  IP:  CD34+, +, CD7(bright)+, cCD3-, CD33-, CD15-, CD13+ blast population  IHC:  + increased spindle-shaped mast cells (15% cellularity), CD2-  Myeloid NGS:  CBL (31%), cKIT D816V (30%), RUNX1 x 2 (19%, 29%), SRSF2 (47%) [ASXL1 negative]  FISH:  PDGFRb negative    SERUM TRYPTASE  228 (8/23/23)  268 (9/5/23)     2/13/2024 -  Molecular Therapy    AVAPRITINIB   - Starting dose 25 mg daily (non-Adv SM)     7/16/2024 - 10/21/2024 Chemotherapy    Venetoclax / AzaCITIDine  - C1D1 7/17/24:  complicated by TLS, venetoclax held after D1  - Post C1 BM (8/28/24):  hypercellular with >50% blasts, background systemic mastocytosis  - C2D1 9/5/24:  venetoclax restarted with C2, no TLS  - Post C2 BM (9/25/24):  hypocellular (<5%), atypical myeloblasts by IP/IHC (1-2%), persistent systemic mastocytosis  - C3D1 10/15/24:  delayed 1 week for count recovery  - Post C3 BM (10/23/24):  hypercellular with systemic mastocytosis, no increase blasts, 0.2% atypical blasts by IP, NGS w/ CBL (73%), cKIT (4%), RUNX1 x 2 (4%, 38%), SRSF2 (40%)       11/16/2024 -  Bone Marrow Transplant       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)   6/27/2024 Initial Diagnosis    ICC 2022 DX: Acute myeloid leukemia, NOS (>=20% blasts)  [id number] AML Risk Stratification: INTERMEDIATE: Cytogenetic and/or molecular abnormalities not classified as favorable or adverse  Presented with pancytopenia and circulating blasts    BONE MARROW (06/27/2024)  Marrow replaced by blasts, fibrotic foci, some atypical + cells; 7-8% circulating blasts; aspicular  - Unable to perform additional studies due to aspicular specimen    PERIPHERAL BLOOD (6/27/2024)  FISH:  positive for gain RUNX1    PERIPHERAL BLOOD (7/1/24)  IP:  abnl myeloblast population (CD34+, CD7+, CD4+, CD13+, CD38+, CD11+ dim, HLA=DR+, TdT+, CD33-)  Myeloid NGS:  CBL C404Y (41%), KIT D816V (8%), RUNX1 x2 (8%, 30%), SRSF2 (37%)     7/16/2024 - 10/21/2024 Chemotherapy    Venetoclax / AzaCITIDine  - C1D1 7/17/24:  complicated by TLS, venetoclax held after D1  - Post C1 BM (8/28/24):  hypercellular with >50% blasts, background systemic mastocytosis  - C2D1 9/5/24:  venetoclax restarted with C2, no TLS  - Post C2 BM (9/25/24):  hypocellular (<5%), atypical myeloblasts by IP/IHC (1-2%), persistent systemic mastocytosis  - C3D1 10/15/24:  delayed 1 week for count recovery  - Post C3 BM (10/23/24):  hypercellular with systemic mastocytosis, no increase blasts, 0.2% atypical blasts by IP, NGS w/ CBL (73%), cKIT (4%), RUNX1 x 2 (4%, 38%), SRSF2 (40%)       11/16/2024 -  Bone Marrow Transplant            Social History  He reports that he has never smoked. He has never used smokeless tobacco. He reports current drug use. Drug: Marijuana. He reports that he does not drink alcohol.     Allergies  Zyrtec [cetirizine]    Review of Systems   Constitutional: Negative.    HENT: Negative.     Eyes: Negative.    Respiratory: Negative.     Cardiovascular: Negative.    Gastrointestinal: Negative.    Genitourinary: Negative.    Musculoskeletal: Negative.    Neurological: Negative.    Hematological: Negative.         Physical Exam  Constitutional:       General: He is not in acute distress.     Appearance: Normal appearance. He is normal weight. He is not ill-appearing.   HENT:      Head: Normocephalic.      Mouth/Throat:      Mouth: Mucous membranes are moist.      Pharynx: Oropharynx is clear. No oropharyngeal exudate or posterior oropharyngeal erythema.   Eyes:      Extraocular Movements: Extraocular movements intact.      Conjunctiva/sclera: Conjunctivae normal.   Cardiovascular:      Rate and Rhythm: Normal rate and regular rhythm.      Heart sounds: Normal heart sounds. No murmur heard.     No friction rub. No gallop.   Pulmonary:      Effort: Pulmonary effort is normal. No respiratory  "distress.      Breath sounds: Normal breath sounds. No wheezing, rhonchi or rales.   Abdominal:      General: Abdomen is flat. Bowel sounds are normal.      Palpations: Abdomen is soft.   Musculoskeletal:         General: Normal range of motion.   Skin:     General: Skin is warm and dry.   Neurological:      General: No focal deficit present.      Mental Status: He is alert and oriented to person, place, and time. Mental status is at baseline.          Last Recorded Vitals  Blood pressure 154/88, pulse 94, temperature 36.1 °C (97 °F), temperature source Temporal, resp. rate 15, height 1.691 m (5' 6.58\"), weight 86.1 kg (189 lb 13.1 oz), SpO2 95%.    Relevant Results        IR CVC nontunneled    Result Date: 11/15/2024  Interpreted By:  Keyshawn Hamlin and Booth Cameron STUDY: IR CVC NONTUNNELED; 11/15/2024 11:23 am   INDICATION: Signs/Symptoms:allo PBSCT.   COMPARISON: None.   ACCESSION NUMBER(S): [id number]   ORDERING CLINICIAN: ALISSON CUMMINGS   TECHNIQUE: INTERVENTIONALIST(S): MD RISA GORDON MD   CONSENT: The patient/patient's POA/next of kin was informed of the nature of the proposed procedure. The purposes, alternatives, risks, and benefits were explained and discussed. All questions were answered and consent was obtained.   RADIATION EXPOSURE: Fluoroscopy time: 0.6 min. Dose: 6.3 mGy. Dose Area Product (DAP): 620   SEDATION: Moderate conscious IV sedation services (supervision of administration, induction, and maintenance) were provided by the physician performing the procedure with intravenous fentanyl 50 mcg and versed 1 mg for 52 minutes. The physician was assisted by an independent trained observer, an interventional radiology nurse, in the continuous monitoring of patient level of consciousness and physiologic status.   MEDICATION/CONTRAST: No additional   TIME OUT: A time out was performed immediately prior to procedure start with the interventional team, correctly identifying the " patient name, date of birth, MRN, procedure, anatomy (including marking of site and side), patient position, procedure consent form, relevant laboratory and imaging test results, antibiotic administration, safety precautions, and procedure-specific equipment needs.   COMPLICATIONS: No immediate adverse events identified.   FINDINGS: The patient was positioned supine on the angiography table. The right  supraclavicular cutaneous tissues were prepared and draped in sterile manner.  1% lidocaine local anesthesia was instilled into the subcutaneous soft tissues at the selected access site for local anesthesia. Ultrasound images demonstrate a patent and compressible right  internal jugular vein. Utilizing direct ultrasound guidance and micropuncture/Seldinger technique, the  right  internal jugular vein was accessed. An ultrasound digital spot image was acquired and stored on the  PACS. After confirmation of location, a 018 [place]-Mandrel guidewire was introduced and advanced into the inferior vena cava utilizing intermittent fluoroscopy.   The needle was removed with the guidewire left in place and exchanged for a 5-[place] coaxial dilator.  The inner dilator and wire were removed and a J-tipped 035 guidewire was introduced through the access sheath.  The skin tract was dilated with successive increase in size of vascular dilators under direct fluoroscopic visualization.   Subsequently, a temporary 8 [place] x 16 cm catheter was advanced with its tip overlying the cavoatrial junction under direct fluoroscopic guidance.  The catheter ports were aspirated and flushed with normal saline and charged with heparin.  The external portions of the catheter were secured in place with sterile suture dressings.   The patient tolerated the procedure without complication.       1.  Technically uneventful placement of a   right  internal jugular double-lumen blue arrow guard catheter under direct ultrasound and fluoroscopic guidance -  optimal catheter tip position at the right atrial superior vena caval junction and the catheter is ready for utilization.   I was present for and/or performed the critical portions of the procedure and immediately available throughout the entire procedure.   I personally reviewed the image(s) / study and resident interpretation. I agree with the findings as stated.   Performed and dictated at [facility].   Signed by: Keyshawn Hamlin 11/15/2024 2:39 PM Dictation workstation:   [id number]     Results for orders placed or performed during the hospital encounter of 11/15/24 (from the past 24 hours)   Basic Metabolic Panel   Result Value Ref Range    Glucose 85 74 - 99 mg/dL    Sodium 136 136 - 145 mmol/L    Potassium 4.1 3.5 - 5.3 mmol/L    Chloride 106 98 - 107 mmol/L    Bicarbonate 23 21 - 32 mmol/L    Anion Gap 11 10 - 20 mmol/L    Urea Nitrogen 22 6 - 23 mg/dL    Creatinine 1.10 0.50 - 1.30 mg/dL    eGFR 73 >60 mL/min/1.73m*2    Calcium 8.8 8.6 - 10.6 mg/dL   CBC and Auto Differential   Result Value Ref Range    WBC 5.1 4.4 - 11.3 x10*3/uL    nRBC 0.0 0.0 - 0.0 /100 WBCs    RBC 3.50 (L) 4.50 - 5.90 x10*6/uL    Hemoglobin 10.9 (L) 13.5 - 17.5 g/dL    Hematocrit 34.6 (L) 41.0 - 52.0 %    MCV 99 80 - 100 fL    MCH 31.1 26.0 - 34.0 pg    MCHC 31.5 (L) 32.0 - 36.0 g/dL    RDW 19.5 (H) 11.5 - 14.5 %    Platelets 247 150 - 450 x10*3/uL    Immature Granulocytes %, Automated 0.2 0.0 - 0.9 %    Immature Granulocytes Absolute, Automated 0.01 0.00 - 0.70 x10*3/uL   Coagulation Screen   Result Value Ref Range    Protime 16.4 (H) 9.8 - 12.8 seconds    INR 1.5 (H) 0.9 - 1.1    aPTT 41 (H) 27 - 38 seconds   Lactate Dehydrogenase   Result Value Ref Range     84 - 246 U/L   Magnesium   Result Value Ref Range    Magnesium 2.37 1.60 - 2.40 mg/dL   Phosphorus   Result Value Ref Range    Phosphorus 3.6 2.5 - 4.9 mg/dL   Manual Differential   Result Value Ref Range    Neutrophils %, Manual 27.8  40.0 - 80.0 %    Lymphocytes %, Manual 18.3 13.0 - 44.0 %    Monocytes %, Manual 1.7 2.0 - 10.0 %    Eosinophils %, Manual 42.6 0.0 - 6.0 %    Basophils %, Manual 9.6 0.0 - 2.0 %    Seg Neutrophils Absolute, Manual 1.42 1.20 - 7.00 x10*3/uL    Lymphocytes Absolute, Manual 0.93 (L) 1.20 - 4.80 x10*3/uL    Monocytes Absolute, Manual 0.09 (L) 0.10 - 1.00 x10*3/uL    Eosinophils Absolute, Manual 2.17 (H) 0.00 - 0.70 x10*3/uL    Basophils Absolute, Manual 0.49 (H) 0.00 - 0.10 x10*3/uL    Total Cells Counted 115     RBC Morphology See Below    Sars-CoV-2 PCR   Result Value Ref Range    Coronavirus 2019, PCR Not Detected Not Detected     *Note: Due to a large number of results and/or encounters for the requested time period, some results have not been displayed. A complete set of results can be found in Results Review.          Assessment/Plan   Assessment & Plan  AML (acute myeloid leukemia) in relapse (Multi)      Jin Reynolds is a 69 y.o. male presenting with h/o Smoldering Systemic Mastocytosis, recent diagnosis of AML. Admitted for MRD Allo transplant (T=0 11/21/24).    T-6 Allo MRD     ONC:  # Smoldering System Mastocytosis diagnosed 8/23/23  - S/p Molecular therapy started 2/13/24  - Continue home Allegra for symptom management  # AML.    - AML diagnosed per [facility] on 6/27/24. Positive for RUNX1, CKit, SRSF2 and CBL C404Y  - S/p C1D1 Venetoclax/Azacitidine on 7/24/24. Developed TLS with renal dysfunction  - [facility]MBx (8/28/24): c/w persistent disease and 27% blast  - Admitted 9/5/24 for C2 of Jeremy/Aza with Venetoclx ramp up dosing (D1 20mg, D2 50 mg, D3 onward 100 mg daily).   - s/p 2 cycles of Jeremy/Aza  - Admit 11/15 for Allo MRD (T=0 11/21/24)     Chemo:  - Fludarabine 30 mg/m2 IV D-5, D-4, D-3, D-2   - Melphalan 140 mg/m2 IV D-2   - Total Body Irradiation (TBI) 200 cGy on D-1     GVHD Prophy:  - Tacro level:   - Tacro dose: 2.5mg q12h starting on T+5  - Post-tx Cytoxan 50 mg/kg on T+3 and T+4  - MMF 1000 mg TID to begin  T+5 to T+35     TRANSPLANT:   - HLA matched related donor /12 match  - ABO Donor: O+, CMV Positive   - ABO Recipient: O+, CMV negative     HEME:  #Pancytopenia 2/2 to disease  - Keep hgb > 7.0, plts > 10k     ID:  # Cont prophylaxis meds: Acyclovir  - Plan for Posaconazole and Levofloxacin ppx when neutropenic     FEN/GI:  - Admit wt: 87.6 kg  - Cont home PPI, Pepcid and antiemetics  - Low pathogen diet  - Monitor for fluid overload     CARD:  - ECHO June 2023 c/w LA severely dilated  - Cont home Norvasc     RENAL:  # H/o hematuria, resolved  - S/p Cystoscopy (1/15/24): Normal exam  # C/o frequent urination especially at night  - UA & PSA ordered on prior admit. Last PSA 0.66 (5/31/23)     MISC:  - Cont home Allegra and Singular     DISPO:  - Access: Central Line  - Full Code  - Primary Oncologist: Dr. Nika Dawson      Patient discussed with Dr. Lydia Day       I spent 60 minutes in the professional and overall care of this patient.      Gregor Gusman PA-C

## 2024-11-16 NOTE — CARE PLAN
The patient's goals for the shift include      The clinical goals for the shift include patient shall remain safe and free from falls    Over the shift, the patient did not make progress toward the following goals. Barriers to progression include n/a. Recommendations to address these barriers include n/a.      Problem: Pain - Adult  Goal: Verbalizes/displays adequate comfort level or baseline comfort level  Outcome: Progressing     Problem: Safety - Adult  Goal: Free from fall injury  Outcome: Progressing     Problem: Discharge Planning  Goal: Discharge to home or other facility with appropriate resources  Outcome: Progressing     Problem: Chronic Conditions and Co-morbidities  Goal: Patient's chronic conditions and co-morbidity symptoms are monitored and maintained or improved  Outcome: Progressing

## 2024-11-16 NOTE — PROGRESS NOTES
Pharmacy Medication History Review    Jin Reynolds is a 69 y.o. male admitted for AML (acute myeloid leukemia) in remission (Multi). Pharmacy reviewed the patient's [id number] medications and allergies for accuracy.    Medications ADDED:  none  Medications CHANGED:  none  Medications REMOVED:   none     The list below reflects the updated PTA list.   Prior to Admission Medications   Prescriptions Last Dose Informant Patient Reported? Taking?   acyclovir (Zovirax) 400 mg tablet  Self No No   Sig: Take 1 tablet (400 mg) by mouth every 12 hours.   allopurinol (Zyloprim) 300 mg tablet  Self Yes No   Sig: Take 1 tablet (300 mg) by mouth once daily.   amLODIPine (Norvasc) 10 mg tablet  Self No No   Sig: TAKE 1 TABLET BY MOUTH EVERY DAY   cholecalciferol (Vitamin D-3) 25 MCG (1000 UT) tablet  Self Yes No   Sig: Take 2 tablets (2,000 Units) by mouth once daily.   famotidine (Pepcid) 20 mg tablet  Self No No   Sig: Take 1 tablet (20 mg) by mouth once daily.   fexofenadine (Allegra) 180 mg tablet  Self Yes No   Sig: Take 1 tablet (180 mg) by mouth once daily.   ondansetron (Zofran) 8 mg tablet  Self No No   Sig: Take 1 tablet (8 mg) by mouth every 8 hours if needed for nausea or vomiting.   posaconazole (Noxafil) 100 mg DR tablet  Self No No   Sig: Take 3 tablets (300 mg) by mouth once daily. Do not crush, chew, or split. This is to prevent fungal infections.  Pt states that he ran out about a week ago     prochlorperazine (Compazine) 10 mg tablet  Self No No   Sig: Take 1 tablet (10 mg) by mouth every 6 hours if needed for nausea or vomiting.      Facility-Administered Medications: None        The list below reflects the updated allergy list. Please review each documented allergy for additional clarification and justification.  Allergies  Reviewed by Shreya Benson, PharmD on 11/16/2024        Severity Reactions Comments    Zyrtec [cetirizine] Low Headache Headaches do not occur with fexofenadine (Allegra)       "      Patient accepts M2B at discharge.     Sources:   [facility]  Pharmacy dispense history  Patient interview Moderate historian  Chart Review  Oncology note from 11/12  Care Everywhere    Additional Comments:  Pt mentioned that he ran out of posaconazole about a week ago.       Shreya Benson, PharmD  Transitions of Care Pharmacist  11/16/24     Secure Chat preferred   If no response call [phone] or Vocera \"Med Rec\"    "

## 2024-11-16 NOTE — SIGNIFICANT EVENT
11/16/24 0108   Prechemo Checklist   Has the patient been in the hospital, ED, or urgent care since last date of service Yes   Chemo/Immuno Consent Completed and Signed Yes   Protocol/Indications Verified Yes   Confirmed to previous date/time of medication N/A   Compared to previous dose N/A   All medications are dated accurately Yes   Pregnancy Test Negative Not applicable   Parameters Met Yes  (per provider, treat regardless of ANC and platelet count)   BSA/Weight-Height Verified Yes   Dose Calculations Verified (current, total, cumulative) Yes

## 2024-11-17 ENCOUNTER — APPOINTMENT (OUTPATIENT)
Dept: HEMATOLOGY/ONCOLOGY | Facility: HOSPITAL | Age: 69
End: 2024-11-17
Payer: MEDICARE

## 2024-11-17 VITALS
HEIGHT: 67 IN | WEIGHT: 186.73 LBS | BODY MASS INDEX: 29.31 KG/M2 | OXYGEN SATURATION: 97 % | DIASTOLIC BLOOD PRESSURE: 84 MMHG | TEMPERATURE: 97.7 F | HEART RATE: 73 BPM | RESPIRATION RATE: 16 BRPM | SYSTOLIC BLOOD PRESSURE: 139 MMHG

## 2024-11-17 LAB
ALBUMIN SERPL BCP-MCNC: 4.1 G/DL (ref 3.4–5)
ALP SERPL-CCNC: 60 U/L (ref 33–136)
ALT SERPL W P-5'-P-CCNC: 8 U/L (ref 10–52)
ANION GAP SERPL CALC-SCNC: 12 MMOL/L (ref 10–20)
APTT PPP: 38 SECONDS (ref 27–38)
AST SERPL W P-5'-P-CCNC: 10 U/L (ref 9–39)
BASOPHILS # BLD AUTO: 0.18 X10*3/UL (ref 0–0.1)
BASOPHILS NFR BLD AUTO: 4.2 %
BILIRUB SERPL-MCNC: 0.8 MG/DL (ref 0–1.2)
BUN SERPL-MCNC: 18 MG/DL (ref 6–23)
CALCIUM SERPL-MCNC: 8.3 MG/DL (ref 8.6–10.6)
CHLORIDE SERPL-SCNC: 106 MMOL/L (ref 98–107)
CMV IGM SERPL-ACNC: <8 AU/ML
CO2 SERPL-SCNC: 24 MMOL/L (ref 21–32)
CREAT SERPL-MCNC: 1.12 MG/DL (ref 0.5–1.3)
EGFRCR SERPLBLD CKD-EPI 2021: 72 ML/MIN/1.73M*2
EOSINOPHIL # BLD AUTO: 2.32 X10*3/UL (ref 0–0.7)
EOSINOPHIL NFR BLD AUTO: 53.6 %
ERYTHROCYTE [DISTWIDTH] IN BLOOD BY AUTOMATED COUNT: 18.6 % (ref 11.5–14.5)
FERRITIN SERPL-MCNC: 860 NG/ML (ref 20–300)
GLUCOSE SERPL-MCNC: 83 MG/DL (ref 74–99)
HCT VFR BLD AUTO: 31.5 % (ref 41–52)
HGB BLD-MCNC: 9.9 G/DL (ref 13.5–17.5)
HYPOCHROMIA BLD QL SMEAR: NORMAL
IMM GRANULOCYTES # BLD AUTO: 0.01 X10*3/UL (ref 0–0.7)
IMM GRANULOCYTES NFR BLD AUTO: 0.2 % (ref 0–0.9)
INR PPP: 1.5 (ref 0.9–1.1)
LDH SERPL L TO P-CCNC: 119 U/L (ref 84–246)
LYMPHOCYTES # BLD AUTO: 0.75 X10*3/UL (ref 1.2–4.8)
LYMPHOCYTES NFR BLD AUTO: 17.3 %
MAGNESIUM SERPL-MCNC: 2.13 MG/DL (ref 1.6–2.4)
MCH RBC QN AUTO: 31 PG (ref 26–34)
MCHC RBC AUTO-ENTMCNC: 31.4 G/DL (ref 32–36)
MCV RBC AUTO: 99 FL (ref 80–100)
MONOCYTES # BLD AUTO: 0.25 X10*3/UL (ref 0.1–1)
MONOCYTES NFR BLD AUTO: 5.8 %
NEUTROPHILS # BLD AUTO: 0.82 X10*3/UL (ref 1.2–7.7)
NEUTROPHILS NFR BLD AUTO: 18.9 %
NRBC BLD-RTO: 0 /100 WBCS (ref 0–0)
OVALOCYTES BLD QL SMEAR: NORMAL
PHOSPHATE SERPL-MCNC: 3.6 MG/DL (ref 2.5–4.9)
PLATELET # BLD AUTO: 202 X10*3/UL (ref 150–450)
POTASSIUM SERPL-SCNC: 4.3 MMOL/L (ref 3.5–5.3)
PROT SERPL-MCNC: 6.8 G/DL (ref 6.4–8.2)
PROTHROMBIN TIME: 16.5 SECONDS (ref 9.8–12.8)
RBC # BLD AUTO: 3.19 X10*6/UL (ref 4.5–5.9)
RBC MORPH BLD: NORMAL
SODIUM SERPL-SCNC: 138 MMOL/L (ref 136–145)
URATE SERPL-MCNC: 4.5 MG/DL (ref 4–7.5)
WBC # BLD AUTO: 4.3 X10*3/UL (ref 4.4–11.3)

## 2024-11-17 PROCEDURE — 82728 ASSAY OF FERRITIN: CPT

## 2024-11-17 PROCEDURE — 2500000001 HC RX 250 WO HCPCS SELF ADMINISTERED DRUGS (ALT 637 FOR MEDICARE OP)

## 2024-11-17 PROCEDURE — 2500000002 HC RX 250 W HCPCS SELF ADMINISTERED DRUGS (ALT 637 FOR MEDICARE OP, ALT 636 FOR OP/ED): Performed by: INTERNAL MEDICINE

## 2024-11-17 PROCEDURE — 2500000002 HC RX 250 W HCPCS SELF ADMINISTERED DRUGS (ALT 637 FOR MEDICARE OP, ALT 636 FOR OP/ED)

## 2024-11-17 PROCEDURE — 84100 ASSAY OF PHOSPHORUS: CPT

## 2024-11-17 PROCEDURE — 2500000004 HC RX 250 GENERAL PHARMACY W/ HCPCS (ALT 636 FOR OP/ED): Mod: JZ | Performed by: INTERNAL MEDICINE

## 2024-11-17 PROCEDURE — 2500000005 HC RX 250 GENERAL PHARMACY W/O HCPCS: Performed by: INTERNAL MEDICINE

## 2024-11-17 PROCEDURE — 84550 ASSAY OF BLOOD/URIC ACID: CPT

## 2024-11-17 PROCEDURE — 1170000001 HC PRIVATE ONCOLOGY ROOM DAILY

## 2024-11-17 PROCEDURE — 85025 COMPLETE CBC W/AUTO DIFF WBC: CPT

## 2024-11-17 PROCEDURE — 2500000001 HC RX 250 WO HCPCS SELF ADMINISTERED DRUGS (ALT 637 FOR MEDICARE OP): Performed by: INTERNAL MEDICINE

## 2024-11-17 PROCEDURE — 80053 COMPREHEN METABOLIC PANEL: CPT

## 2024-11-17 PROCEDURE — 83735 ASSAY OF MAGNESIUM: CPT

## 2024-11-17 PROCEDURE — 83615 LACTATE (LD) (LDH) ENZYME: CPT

## 2024-11-17 PROCEDURE — 99232 SBSQ HOSP IP/OBS MODERATE 35: CPT | Performed by: INTERNAL MEDICINE

## 2024-11-17 PROCEDURE — 85610 PROTHROMBIN TIME: CPT

## 2024-11-17 RX ORDER — ONDANSETRON HYDROCHLORIDE 8 MG/1
16 TABLET, FILM COATED ORAL ONCE
Status: COMPLETED | OUTPATIENT
Start: 2024-11-17 | End: 2024-11-17

## 2024-11-17 RX ADMIN — AMLODIPINE BESYLATE 10 MG: 10 TABLET ORAL at 09:51

## 2024-11-17 RX ADMIN — Medication 2000 UNITS: at 09:51

## 2024-11-17 RX ADMIN — FLUDARABINE PHOSPHATE 61 MG: 25 INJECTION, SOLUTION INTRAVENOUS at 09:56

## 2024-11-17 RX ADMIN — URSODIOL 300 MG: 300 CAPSULE ORAL at 15:34

## 2024-11-17 RX ADMIN — VANCOMYCIN HYDROCHLORIDE 125 MG: 125 CAPSULE ORAL at 09:51

## 2024-11-17 RX ADMIN — FEXOFENADINE HCL 180 MG: 180 TABLET ORAL at 09:56

## 2024-11-17 RX ADMIN — ONDANSETRON HYDROCHLORIDE 16 MG: 8 TABLET, FILM COATED ORAL at 09:51

## 2024-11-17 RX ADMIN — ACYCLOVIR 400 MG: 400 TABLET ORAL at 20:04

## 2024-11-17 RX ADMIN — ACYCLOVIR 400 MG: 400 TABLET ORAL at 09:51

## 2024-11-17 RX ADMIN — FAMOTIDINE 20 MG: 20 TABLET ORAL at 09:51

## 2024-11-17 RX ADMIN — URSODIOL 300 MG: 300 CAPSULE ORAL at 09:51

## 2024-11-17 RX ADMIN — ALLOPURINOL 300 MG: 300 TABLET ORAL at 09:51

## 2024-11-17 RX ADMIN — URSODIOL 300 MG: 300 CAPSULE ORAL at 20:03

## 2024-11-17 ASSESSMENT — COGNITIVE AND FUNCTIONAL STATUS - GENERAL
MOBILITY SCORE: 24
DAILY ACTIVITIY SCORE: 24

## 2024-11-17 ASSESSMENT — PAIN SCALES - GENERAL: PAINLEVEL_OUTOF10: 0 - NO PAIN

## 2024-11-17 ASSESSMENT — PAIN - FUNCTIONAL ASSESSMENT: PAIN_FUNCTIONAL_ASSESSMENT: 0-10

## 2024-11-17 NOTE — CARE PLAN
The patient's goals for the shift include    Problem: Pain - Adult  Goal: Verbalizes/displays adequate comfort level or baseline comfort level  Outcome: Progressing     Problem: Safety - Adult  Goal: Free from fall injury  Outcome: Progressing     Problem: Discharge Planning  Goal: Discharge to home or other facility with appropriate resources  Outcome: Progressing     Problem: Chronic Conditions and Co-morbidities  Goal: Patient's chronic conditions and co-morbidity symptoms are monitored and maintained or improved  Outcome: Progressing       The clinical goals for the shift include pt will remain hds through shift

## 2024-11-17 NOTE — PROGRESS NOTES
"Jin Reynolds is a 69 y.o. male on day 2 of admission presenting with AML (acute myeloid leukemia) in remission (Multi).    Subjective   No acute events over night. Afebrile. Doing well, denies acute complaints. Anticipating thati nthe coming days he will deal with nausea and diarrhea from his transplant regimen. No diarrhea or nause aat thistime. Eating and drinking wihtout issue. Denies any fever, rash, HA, SOB, CP, Abd pain, N/V/D/C. Remaining ROS unremarkable.        Objective     Physical Exam  Constitutional:       Appearance: Normal appearance.   HENT:      Head: Normocephalic.      Mouth/Throat:      Mouth: Mucous membranes are moist.      Pharynx: Oropharynx is clear.   Eyes:      Extraocular Movements: Extraocular movements intact.      Pupils: Pupils are equal, round, and reactive to light.   Cardiovascular:      Rate and Rhythm: Normal rate and regular rhythm.      Pulses: Normal pulses.      Heart sounds: Normal heart sounds.   Pulmonary:      Effort: Pulmonary effort is normal.      Breath sounds: Normal breath sounds.   Abdominal:      General: Bowel sounds are normal.      Palpations: Abdomen is soft.   Musculoskeletal:         General: Normal range of motion.      Cervical back: Normal range of motion.   Skin:     General: Skin is warm.      Capillary Refill: Capillary refill takes less than 2 seconds.      Findings: No rash.   Neurological:      Mental Status: He is oriented to person, place, and time.         Last Recorded Vitals  Blood pressure 126/78, pulse 68, temperature 36 °C (96.8 °F), temperature source Temporal, resp. rate 18, height 1.691 m (5' 6.58\"), weight 84.7 kg (186 lb 11.7 oz), SpO2 98%.  Intake/Output last 3 Shifts:  No intake/output data recorded.    Relevant Results  Scheduled medications  acyclovir, 400 mg, oral, q12h  allopurinol, 300 mg, oral, Daily  allopurinol, 300 mg, oral, Daily  amLODIPine, 10 mg, oral, Daily  cholecalciferol, 2,000 Units, oral, Daily  famotidine, 20 mg, " oral, Daily  fexofenadine, 180 mg, oral, Daily  ursodiol, 300 mg, oral, TID  vancomycin, 125 mg, oral, Daily      Continuous medications     PRN medications  PRN medications: albuterol, alteplase, dextrose, diphenhydrAMINE, EPINEPHrine HCl, famotidine, methylPREDNISolone sodium succinate (PF), ondansetron, polyethylene glycol, prochlorperazine, prochlorperazine, prochlorperazine, sodium chloride    Results for orders placed or performed during the hospital encounter of 11/15/24 (from the past 24 hours)   CBC and Auto Differential   Result Value Ref Range    WBC 4.3 (L) 4.4 - 11.3 x10*3/uL    nRBC 0.0 0.0 - 0.0 /100 WBCs    RBC 3.19 (L) 4.50 - 5.90 x10*6/uL    Hemoglobin 9.9 (L) 13.5 - 17.5 g/dL    Hematocrit 31.5 (L) 41.0 - 52.0 %    MCV 99 80 - 100 fL    MCH 31.0 26.0 - 34.0 pg    MCHC 31.4 (L) 32.0 - 36.0 g/dL    RDW 18.6 (H) 11.5 - 14.5 %    Platelets 202 150 - 450 x10*3/uL    Neutrophils % 18.9 40.0 - 80.0 %    Immature Granulocytes %, Automated 0.2 0.0 - 0.9 %    Lymphocytes % 17.3 13.0 - 44.0 %    Monocytes % 5.8 2.0 - 10.0 %    Eosinophils % 53.6 0.0 - 6.0 %    Basophils % 4.2 0.0 - 2.0 %    Neutrophils Absolute 0.82 (L) 1.20 - 7.70 x10*3/uL    Immature Granulocytes Absolute, Automated 0.01 0.00 - 0.70 x10*3/uL    Lymphocytes Absolute 0.75 (L) 1.20 - 4.80 x10*3/uL    Monocytes Absolute 0.25 0.10 - 1.00 x10*3/uL    Eosinophils Absolute 2.32 (H) 0.00 - 0.70 x10*3/uL    Basophils Absolute 0.18 (H) 0.00 - 0.10 x10*3/uL   Coagulation Screen   Result Value Ref Range    Protime 16.5 (H) 9.8 - 12.8 seconds    INR 1.5 (H) 0.9 - 1.1    aPTT 38 27 - 38 seconds   Comprehensive Metabolic Panel   Result Value Ref Range    Glucose 83 74 - 99 mg/dL    Sodium 138 136 - 145 mmol/L    Potassium 4.3 3.5 - 5.3 mmol/L    Chloride 106 98 - 107 mmol/L    Bicarbonate 24 21 - 32 mmol/L    Anion Gap 12 10 - 20 mmol/L    Urea Nitrogen 18 6 - 23 mg/dL    Creatinine 1.12 0.50 - 1.30 mg/dL    eGFR 72 >60 mL/min/1.73m*2    Calcium 8.3 (L)  8.6 - 10.6 mg/dL    Albumin 4.1 3.4 - 5.0 g/dL    Alkaline Phosphatase 60 33 - 136 U/L    Total Protein 6.8 6.4 - 8.2 g/dL    AST 10 9 - 39 U/L    Bilirubin, Total 0.8 0.0 - 1.2 mg/dL    ALT 8 (L) 10 - 52 U/L   Lactate Dehydrogenase   Result Value Ref Range     84 - 246 U/L   Magnesium   Result Value Ref Range    Magnesium 2.13 1.60 - 2.40 mg/dL   Phosphorus   Result Value Ref Range    Phosphorus 3.6 2.5 - 4.9 mg/dL   Ferritin   Result Value Ref Range    Ferritin 860 (H) 20 - 300 ng/mL   Uric Acid   Result Value Ref Range    Uric Acid 4.5 4.0 - 7.5 mg/dL   Morphology   Result Value Ref Range    RBC Morphology See Below     Hypochromia Mild     Ovalocytes Few      *Note: Due to a large number of results and/or encounters for the requested time period, some results have not been displayed. A complete set of results can be found in Results Review.         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 male presenting with h/o Smoldering Systemic Mastocytosis, recent diagnosis of AML. Admitted for MRD Allo transplant (T=0 11/21/24).     T-4 Allo MRD      ONC:  # Smoldering System Mastocytosis diagnosed 8/23/23  - S/p Molecular therapy started 2/13/24  - Continue home Allegra for symptom management  - Ordered Tyrptase level 11/16, pending   # AML.    - AML diagnosed per [facility] on 6/27/24. Positive for RUNX1, CKit, SRSF2 and CBL C404Y  - S/p C1D1 Venetoclax/Azacitidine on 7/24/24. Developed TLS with renal dysfunction  - [facility] (8/28/24): c/w persistent disease and 27% blast  - Admitted 9/5/24 for C2 of Jeremy/Aza with Venetoclx ramp up dosing (D1 20mg, D2 50 mg, D3 onward 100 mg daily).   - s/p 2 cycles of Jeremy/Aza  - Admit 11/15 for Allo MRD (T=0 11/21/24)     Chemo:  - Fludarabine 30 mg/m2 IV D-5, D-4, D-3  - Melphalan 140 mg/m2 IV D-2   - Total Body Irradiation (TBI) 200 cGy on D-1      GVHD Prophy:  - Tacro level:   - Tacro dose: 2.5mg q12h starting on  T+5  - Post-tx Cytoxan 50 mg/kg on T+3 and T+4  - MMF 1000 mg TID to begin T+5 to T+35     TRANSPLANT:   - HLA matched related donor /12 match  - ABO Donor: O+, CMV Positive   - ABO Recipient: O+, CMV negative      HEME:  #Pancytopenia 2/2 to disease  - Keep hgb > 7.0, plts > 10k     ID:  # Cont prophylaxis meds: Acyclovir  - Plan for Posaconazole and Levofloxacin ppx when neutropenic     FEN/GI:  - Admit wt: 87.6 kg, Daily wt: 84.7kg  - Cont home PPI, Pepcid and antiemetics  - Low pathogen diet  - Monitor for fluid overload     CARD:  - ECHO June 2023 c/w LA severely dilated, EF 50-55%  - Cont home Norvasc     RENAL:  # H/o hematuria, resolved  - S/p Cystoscopy (1/15/24): Normal exam  # C/o frequent urination especially at night  - UA & PSA ordered on prior admit. Last PSA 0.66 (5/31/23)     MISC:  - Cont home Allegra and Singular     DISPO:  - Access: Central Line  - Full Code  - Primary Oncologist: Dr. Nika Dawson      Patient discussed with Dr. Lydia Gusman PA-C

## 2024-11-17 NOTE — CARE PLAN
The patient's goals for the shift include      The clinical goals for the shift include patient shall remain safe and free from falls    Over the shift, the patient did not make progress toward the following goals. Barriers to progression include n/a. Recommendations to address these barriers include n/a.  .    Problem: Pain - Adult  Goal: Verbalizes/displays adequate comfort level or baseline comfort level  Outcome: Progressing     Problem: Safety - Adult  Goal: Free from fall injury  Outcome: Progressing     Problem: Discharge Planning  Goal: Discharge to home or other facility with appropriate resources  Outcome: Progressing     Problem: Chronic Conditions and Co-morbidities  Goal: Patient's chronic conditions and co-morbidity symptoms are monitored and maintained or improved  Outcome: Progressing

## 2024-11-18 ENCOUNTER — APPOINTMENT (OUTPATIENT)
Dept: HEMATOLOGY/ONCOLOGY | Facility: CLINIC | Age: 69
End: 2024-11-18
Payer: MEDICARE

## 2024-11-18 LAB
ALBUMIN SERPL BCP-MCNC: 4.3 G/DL (ref 3.4–5)
ALP SERPL-CCNC: 59 U/L (ref 33–136)
ALT SERPL W P-5'-P-CCNC: 8 U/L (ref 10–52)
ANION GAP SERPL CALC-SCNC: 12 MMOL/L (ref 10–20)
APTT PPP: 39 SECONDS (ref 27–38)
AST SERPL W P-5'-P-CCNC: 9 U/L (ref 9–39)
BASOPHILS # BLD AUTO: 0.17 X10*3/UL (ref 0–0.1)
BASOPHILS NFR BLD AUTO: 4.9 %
BILIRUB SERPL-MCNC: 0.9 MG/DL (ref 0–1.2)
BUN SERPL-MCNC: 14 MG/DL (ref 6–23)
CALCIUM SERPL-MCNC: 8.3 MG/DL (ref 8.6–10.6)
CHLORIDE SERPL-SCNC: 103 MMOL/L (ref 98–107)
CO2 SERPL-SCNC: 23 MMOL/L (ref 21–32)
CREAT SERPL-MCNC: 1.03 MG/DL (ref 0.5–1.3)
EGFRCR SERPLBLD CKD-EPI 2021: 79 ML/MIN/1.73M*2
EOSINOPHIL # BLD AUTO: 1.74 X10*3/UL (ref 0–0.7)
EOSINOPHIL NFR BLD AUTO: 50.1 %
ERYTHROCYTE [DISTWIDTH] IN BLOOD BY AUTOMATED COUNT: 18.1 % (ref 11.5–14.5)
FERRITIN SERPL-MCNC: 845 NG/ML (ref 20–300)
GLUCOSE SERPL-MCNC: 84 MG/DL (ref 74–99)
HCT VFR BLD AUTO: 31.6 % (ref 41–52)
HGB BLD-MCNC: 10.4 G/DL (ref 13.5–17.5)
HTLV I+II AB SER QL IA: NEGATIVE
IMM GRANULOCYTES # BLD AUTO: 0 X10*3/UL (ref 0–0.7)
IMM GRANULOCYTES NFR BLD AUTO: 0 % (ref 0–0.9)
INR PPP: 1.4 (ref 0.9–1.1)
LDH SERPL L TO P-CCNC: 120 U/L (ref 84–246)
LYMPHOCYTES # BLD AUTO: 0.36 X10*3/UL (ref 1.2–4.8)
LYMPHOCYTES NFR BLD AUTO: 10.4 %
MAGNESIUM SERPL-MCNC: 2.14 MG/DL (ref 1.6–2.4)
MCH RBC QN AUTO: 31.8 PG (ref 26–34)
MCHC RBC AUTO-ENTMCNC: 32.9 G/DL (ref 32–36)
MCV RBC AUTO: 97 FL (ref 80–100)
MONOCYTES # BLD AUTO: 0.15 X10*3/UL (ref 0.1–1)
MONOCYTES NFR BLD AUTO: 4.3 %
NEUTROPHILS # BLD AUTO: 1.05 X10*3/UL (ref 1.2–7.7)
NEUTROPHILS NFR BLD AUTO: 30.3 %
NRBC BLD-RTO: 0 /100 WBCS (ref 0–0)
PATH REVIEW-CBC DIFFERENTIAL: NORMAL
PHOSPHATE SERPL-MCNC: 3.7 MG/DL (ref 2.5–4.9)
PLATELET # BLD AUTO: 192 X10*3/UL (ref 150–450)
POTASSIUM SERPL-SCNC: 4.1 MMOL/L (ref 3.5–5.3)
PROT SERPL-MCNC: 7 G/DL (ref 6.4–8.2)
PROTHROMBIN TIME: 16.2 SECONDS (ref 9.8–12.8)
RBC # BLD AUTO: 3.27 X10*6/UL (ref 4.5–5.9)
SODIUM SERPL-SCNC: 134 MMOL/L (ref 136–145)
URATE SERPL-MCNC: 4.7 MG/DL (ref 4–7.5)
WBC # BLD AUTO: 3.5 X10*3/UL (ref 4.4–11.3)

## 2024-11-18 PROCEDURE — 84075 ASSAY ALKALINE PHOSPHATASE: CPT

## 2024-11-18 PROCEDURE — 85730 THROMBOPLASTIN TIME PARTIAL: CPT

## 2024-11-18 PROCEDURE — 99233 SBSQ HOSP IP/OBS HIGH 50: CPT | Performed by: STUDENT IN AN ORGANIZED HEALTH CARE EDUCATION/TRAINING PROGRAM

## 2024-11-18 PROCEDURE — 2500000001 HC RX 250 WO HCPCS SELF ADMINISTERED DRUGS (ALT 637 FOR MEDICARE OP)

## 2024-11-18 PROCEDURE — 83615 LACTATE (LD) (LDH) ENZYME: CPT

## 2024-11-18 PROCEDURE — 2500000002 HC RX 250 W HCPCS SELF ADMINISTERED DRUGS (ALT 637 FOR MEDICARE OP, ALT 636 FOR OP/ED): Performed by: INTERNAL MEDICINE

## 2024-11-18 PROCEDURE — 82728 ASSAY OF FERRITIN: CPT

## 2024-11-18 PROCEDURE — 85025 COMPLETE CBC W/AUTO DIFF WBC: CPT

## 2024-11-18 PROCEDURE — 84550 ASSAY OF BLOOD/URIC ACID: CPT

## 2024-11-18 PROCEDURE — 2500000002 HC RX 250 W HCPCS SELF ADMINISTERED DRUGS (ALT 637 FOR MEDICARE OP, ALT 636 FOR OP/ED)

## 2024-11-18 PROCEDURE — 1170000001 HC PRIVATE ONCOLOGY ROOM DAILY

## 2024-11-18 PROCEDURE — 2500000005 HC RX 250 GENERAL PHARMACY W/O HCPCS: Performed by: INTERNAL MEDICINE

## 2024-11-18 PROCEDURE — 2500000001 HC RX 250 WO HCPCS SELF ADMINISTERED DRUGS (ALT 637 FOR MEDICARE OP): Performed by: INTERNAL MEDICINE

## 2024-11-18 PROCEDURE — 2500000004 HC RX 250 GENERAL PHARMACY W/ HCPCS (ALT 636 FOR OP/ED): Performed by: INTERNAL MEDICINE

## 2024-11-18 PROCEDURE — 84100 ASSAY OF PHOSPHORUS: CPT

## 2024-11-18 PROCEDURE — 83735 ASSAY OF MAGNESIUM: CPT

## 2024-11-18 RX ORDER — ONDANSETRON HYDROCHLORIDE 8 MG/1
16 TABLET, FILM COATED ORAL ONCE
Status: COMPLETED | OUTPATIENT
Start: 2024-11-18 | End: 2024-11-18

## 2024-11-18 RX ADMIN — ONDANSETRON HYDROCHLORIDE 16 MG: 8 TABLET, FILM COATED ORAL at 08:06

## 2024-11-18 RX ADMIN — URSODIOL 300 MG: 300 CAPSULE ORAL at 08:06

## 2024-11-18 RX ADMIN — FEXOFENADINE HCL 180 MG: 180 TABLET ORAL at 09:31

## 2024-11-18 RX ADMIN — ACYCLOVIR 400 MG: 400 TABLET ORAL at 21:10

## 2024-11-18 RX ADMIN — ACYCLOVIR 400 MG: 400 TABLET ORAL at 08:06

## 2024-11-18 RX ADMIN — AMLODIPINE BESYLATE 10 MG: 10 TABLET ORAL at 08:06

## 2024-11-18 RX ADMIN — VANCOMYCIN HYDROCHLORIDE 125 MG: 125 CAPSULE ORAL at 08:06

## 2024-11-18 RX ADMIN — FLUDARABINE PHOSPHATE 61 MG: 25 INJECTION, SOLUTION INTRAVENOUS at 09:26

## 2024-11-18 RX ADMIN — ALLOPURINOL 300 MG: 300 TABLET ORAL at 08:06

## 2024-11-18 RX ADMIN — URSODIOL 300 MG: 300 CAPSULE ORAL at 14:14

## 2024-11-18 RX ADMIN — FAMOTIDINE 20 MG: 20 TABLET ORAL at 08:06

## 2024-11-18 RX ADMIN — URSODIOL 300 MG: 300 CAPSULE ORAL at 21:10

## 2024-11-18 RX ADMIN — Medication 2000 UNITS: at 08:06

## 2024-11-18 ASSESSMENT — COGNITIVE AND FUNCTIONAL STATUS - GENERAL
MOBILITY SCORE: 24
DAILY ACTIVITIY SCORE: 24

## 2024-11-18 ASSESSMENT — PAIN SCALES - GENERAL
PAINLEVEL_OUTOF10: 0 - NO PAIN

## 2024-11-18 ASSESSMENT — PAIN - FUNCTIONAL ASSESSMENT: PAIN_FUNCTIONAL_ASSESSMENT: 0-10

## 2024-11-18 ASSESSMENT — ACTIVITIES OF DAILY LIVING (ADL): LACK_OF_TRANSPORTATION: NO

## 2024-11-18 NOTE — PROGRESS NOTES
"Jin Reynolds is a 69 y.o. male on day 3 of admission presenting with AML (acute myeloid leukemia) in remission (Multi).    Subjective   No acute events over night. Afebrile. Doing well, receiving dose 3 fof his fludarabine today. Denies any nausea/vomiting or fatigue. Tolerating treatment without issue so far. Remainder of ROS is negative.        Objective     Physical Exam  Constitutional:       Appearance: Normal appearance.   HENT:      Head: Normocephalic.      Mouth/Throat:      Mouth: Mucous membranes are moist.      Pharynx: Oropharynx is clear.   Eyes:      Extraocular Movements: Extraocular movements intact.      Pupils: Pupils are equal, round, and reactive to light.   Cardiovascular:      Rate and Rhythm: Normal rate and regular rhythm.      Pulses: Normal pulses.      Heart sounds: Normal heart sounds.   Pulmonary:      Effort: Pulmonary effort is normal.      Breath sounds: Normal breath sounds.   Abdominal:      General: Bowel sounds are normal.      Palpations: Abdomen is soft.   Musculoskeletal:         General: Normal range of motion.      Cervical back: Normal range of motion.   Skin:     General: Skin is warm.      Capillary Refill: Capillary refill takes less than 2 seconds.      Findings: No rash.   Neurological:      Mental Status: He is oriented to person, place, and time.         Last Recorded Vitals  Blood pressure 131/85, pulse 65, temperature 36.4 °C (97.5 °F), resp. rate 16, height 1.691 m (5' 6.58\"), weight 84 kg (185 lb 3 oz), SpO2 100%.  Intake/Output last 3 Shifts:  I/O last 3 completed shifts:  In: 112.4 (1.3 mL/kg) [IV Piggyback:112.4]  Out: - (0 mL/kg)   Weight: 84.7 kg     Relevant Results  Scheduled medications  acyclovir, 400 mg, oral, q12h  allopurinol, 300 mg, oral, Daily  amLODIPine, 10 mg, oral, Daily  cholecalciferol, 2,000 Units, oral, Daily  famotidine, 20 mg, oral, Daily  fexofenadine, 180 mg, oral, Daily  ursodiol, 300 mg, oral, TID  vancomycin, 125 mg, oral, " Daily      Continuous medications     PRN medications  PRN medications: albuterol, alteplase, dextrose, diphenhydrAMINE, EPINEPHrine HCl, famotidine, methylPREDNISolone sodium succinate (PF), ondansetron, polyethylene glycol, prochlorperazine, prochlorperazine, prochlorperazine, sodium chloride    Results for orders placed or performed during the hospital encounter of 11/15/24 (from the past 24 hours)   CBC and Auto Differential   Result Value Ref Range    WBC 3.5 (L) 4.4 - 11.3 x10*3/uL    nRBC 0.0 0.0 - 0.0 /100 WBCs    RBC 3.27 (L) 4.50 - 5.90 x10*6/uL    Hemoglobin 10.4 (L) 13.5 - 17.5 g/dL    Hematocrit 31.6 (L) 41.0 - 52.0 %    MCV 97 80 - 100 fL    MCH 31.8 26.0 - 34.0 pg    MCHC 32.9 32.0 - 36.0 g/dL    RDW 18.1 (H) 11.5 - 14.5 %    Platelets 192 150 - 450 x10*3/uL    Neutrophils % 30.3 40.0 - 80.0 %    Immature Granulocytes %, Automated 0.0 0.0 - 0.9 %    Lymphocytes % 10.4 13.0 - 44.0 %    Monocytes % 4.3 2.0 - 10.0 %    Eosinophils % 50.1 0.0 - 6.0 %    Basophils % 4.9 0.0 - 2.0 %    Neutrophils Absolute 1.05 (L) 1.20 - 7.70 x10*3/uL    Immature Granulocytes Absolute, Automated 0.00 0.00 - 0.70 x10*3/uL    Lymphocytes Absolute 0.36 (L) 1.20 - 4.80 x10*3/uL    Monocytes Absolute 0.15 0.10 - 1.00 x10*3/uL    Eosinophils Absolute 1.74 (H) 0.00 - 0.70 x10*3/uL    Basophils Absolute 0.17 (H) 0.00 - 0.10 x10*3/uL   Coagulation Screen   Result Value Ref Range    Protime 16.2 (H) 9.8 - 12.8 seconds    INR 1.4 (H) 0.9 - 1.1    aPTT 39 (H) 27 - 38 seconds   Comprehensive Metabolic Panel   Result Value Ref Range    Glucose 84 74 - 99 mg/dL    Sodium 134 (L) 136 - 145 mmol/L    Potassium 4.1 3.5 - 5.3 mmol/L    Chloride 103 98 - 107 mmol/L    Bicarbonate 23 21 - 32 mmol/L    Anion Gap 12 10 - 20 mmol/L    Urea Nitrogen 14 6 - 23 mg/dL    Creatinine 1.03 0.50 - 1.30 mg/dL    eGFR 79 >60 mL/min/1.73m*2    Calcium 8.3 (L) 8.6 - 10.6 mg/dL    Albumin 4.3 3.4 - 5.0 g/dL    Alkaline Phosphatase 59 33 - 136 U/L    Total  Protein 7.0 6.4 - 8.2 g/dL    AST 9 9 - 39 U/L    Bilirubin, Total 0.9 0.0 - 1.2 mg/dL    ALT 8 (L) 10 - 52 U/L   Lactate Dehydrogenase   Result Value Ref Range     84 - 246 U/L   Magnesium   Result Value Ref Range    Magnesium 2.14 1.60 - 2.40 mg/dL   Phosphorus   Result Value Ref Range    Phosphorus 3.7 2.5 - 4.9 mg/dL   Ferritin   Result Value Ref Range    Ferritin 845 (H) 20 - 300 ng/mL   Uric Acid   Result Value Ref Range    Uric Acid 4.7 4.0 - 7.5 mg/dL     *Note: Due to a large number of results and/or encounters for the requested time period, some results have not been displayed. A complete set of results can be found in Results Review.         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 male presenting with h/o Smoldering Systemic Mastocytosis, recent diagnosis of AML. Admitted for MRD Allo transplant (T=0 11/21/24).     T-3 Allo MRD      ONC:  # Smoldering System Mastocytosis diagnosed 8/23/23  - S/p Molecular therapy started 2/13/24  - Continue home Allegra for symptom management  - Ordered Tryptase level 11/16, pending   # AML.    - AML diagnosed per [facility] on 6/27/24. Positive for RUNX1, CKit, SRSF2 and CBL C404Y  - S/p C1D1 Venetoclax/Azacitidine on 7/24/24. Developed TLS with renal dysfunction  - [facility] (8/28/24): c/w persistent disease and 27% blast  - Admitted 9/5/24 for C2 of Jeremy/Aza with Venetoclx ramp up dosing (D1 20mg, D2 50 mg, D3 onward 100 mg daily).   - s/p 2 cycles of Jeremy/Aza  - Admit 11/15 for Allo MRD (T=0 11/21/24)     Chemo:  - Fludarabine 30 mg/m2 IV D-5, D-4, D-3  - Melphalan 140 mg/m2 IV D-2   - Total Body Irradiation (TBI) 200 cGy on D-1      GVHD Prophy:  - Tacro level:   - Tacro dose: 2.5mg q12h starting on T+5  - Post-tx Cytoxan 50 mg/kg on T+3 and T+4  - MMF 1000 mg TID to begin T+5 to T+35     TRANSPLANT:   - HLA matched related donor /12 match  - ABO Donor: O+, CMV Positive   - ABO Recipient: O+, CMV  negative      HEME:  #Pancytopenia 2/2 to disease  - Keep hgb > 7.0, plts > 10k     ID:  # Cont prophylaxis meds: Acyclovir  - Plan for Posaconazole and Levofloxacin ppx when neutropenic     FEN/GI:  - Admit wt: 87.6 kg, Daily wt: 84.7kg  - Cont home PPI, Pepcid and antiemetics  - Low pathogen diet  - Monitor for fluid overload     CARD:  - ECHO June 2023 c/w LA severely dilated, EF 50-55%  - Cont home Norvasc     RENAL:  # H/o hematuria, resolved  - S/p Cystoscopy (1/15/24): Normal exam  # C/o frequent urination especially at night  - UA & PSA ordered on prior admit. Last PSA 0.66 (5/31/23)     MISC:  - Cont home Allegra and Singular     DISPO:  - Access: Central Line  - Full Code  - Primary Oncologist: Dr. Nika Dawson      Patient discussed with Dr. Lydia Gusman PA-C

## 2024-11-18 NOTE — PROGRESS NOTES
11/18/24 1700   Discharge Planning   Living Arrangements Spouse/significant other   Support Systems Spouse/significant other   Assistance Needed none   Type of Residence Private residence   Do you have animals or pets at home? No   Who is requesting discharge planning? Patient   Home or Post Acute Services None   Expected Discharge Disposition Home   Does the patient need discharge transport arranged? No   Financial Resource Strain   How hard is it for you to pay for the very basics like food, housing, medical care, and heating? Somewhat   Housing Stability   In the last 12 months, was there a time when you were not able to pay the mortgage or rent on time? N   In the past 12 months, how many times have you moved where you were living? 0   At any time in the past 12 months, were you homeless or living in a shelter (including now)? N   Transportation Needs   In the past 12 months, has lack of transportation kept you from medical appointments or from getting medications? no   In the past 12 months, has lack of transportation kept you from meetings, work, or from getting things needed for daily living? No     LSW and TCC met with pt at bedside to complete assessment. Pt admitted for Allot SCT, T=0 11/21. Pt follows with Dr. Dawson for dx AML and has a mediport and trialysis (to be pulled before DC). Pt plans to return home with wife and adult dtr lives 4 houses away and available to help as well. No discharge needs anticipated, pt declines any DME/HHC use/need at this time. Pt did report some difficulty paying copays, LSW to look into grants for pt. LSW to cont to follow.

## 2024-11-19 ENCOUNTER — HOSPITAL ENCOUNTER (OUTPATIENT)
Dept: RADIATION ONCOLOGY | Facility: HOSPITAL | Age: 69
Setting detail: RADIATION/ONCOLOGY SERIES
Discharge: HOME | End: 2024-11-19
Payer: MEDICARE

## 2024-11-19 LAB
ALBUMIN SERPL BCP-MCNC: 4.3 G/DL (ref 3.4–5)
ALP SERPL-CCNC: 64 U/L (ref 33–136)
ALT SERPL W P-5'-P-CCNC: 8 U/L (ref 10–52)
ANION GAP SERPL CALC-SCNC: 11 MMOL/L (ref 10–20)
APTT PPP: 38 SECONDS (ref 27–38)
AST SERPL W P-5'-P-CCNC: 9 U/L (ref 9–39)
BASOPHILS # BLD MANUAL: 0.24 X10*3/UL (ref 0–0.1)
BASOPHILS NFR BLD MANUAL: 10 %
BILIRUB SERPL-MCNC: 0.9 MG/DL (ref 0–1.2)
BUN SERPL-MCNC: 15 MG/DL (ref 6–23)
CALCIUM SERPL-MCNC: 8.4 MG/DL (ref 8.6–10.6)
CHLORIDE SERPL-SCNC: 103 MMOL/L (ref 98–107)
CO2 SERPL-SCNC: 24 MMOL/L (ref 21–32)
CREAT SERPL-MCNC: 1.02 MG/DL (ref 0.5–1.3)
EGFRCR SERPLBLD CKD-EPI 2021: 80 ML/MIN/1.73M*2
EOSINOPHIL # BLD MANUAL: 1.34 X10*3/UL (ref 0–0.7)
EOSINOPHIL NFR BLD MANUAL: 55.7 %
ERYTHROCYTE [DISTWIDTH] IN BLOOD BY AUTOMATED COUNT: 18 % (ref 11.5–14.5)
FERRITIN SERPL-MCNC: 834 NG/ML (ref 20–300)
GLUCOSE SERPL-MCNC: 84 MG/DL (ref 74–99)
HCT VFR BLD AUTO: 31.9 % (ref 41–52)
HGB BLD-MCNC: 10.4 G/DL (ref 13.5–17.5)
IMM GRANULOCYTES # BLD AUTO: 0 X10*3/UL (ref 0–0.7)
IMM GRANULOCYTES NFR BLD AUTO: 0 % (ref 0–0.9)
INR PPP: 1.5 (ref 0.9–1.1)
LDH SERPL L TO P-CCNC: 121 U/L (ref 84–246)
LYMPHOCYTES # BLD MANUAL: 0.07 X10*3/UL (ref 1.2–4.8)
LYMPHOCYTES NFR BLD MANUAL: 2.9 %
MAGNESIUM SERPL-MCNC: 2.16 MG/DL (ref 1.6–2.4)
MCH RBC QN AUTO: 31.7 PG (ref 26–34)
MCHC RBC AUTO-ENTMCNC: 32.6 G/DL (ref 32–36)
MCV RBC AUTO: 97 FL (ref 80–100)
MONOCYTES # BLD MANUAL: 0.07 X10*3/UL (ref 0.1–1)
MONOCYTES NFR BLD MANUAL: 2.9 %
NEUTS SEG # BLD MANUAL: 0.63 X10*3/UL (ref 1.2–7)
NEUTS SEG NFR BLD MANUAL: 26.4 %
NRBC BLD-RTO: 0 /100 WBCS (ref 0–0)
PHOSPHATE SERPL-MCNC: 3.7 MG/DL (ref 2.5–4.9)
PLATELET # BLD AUTO: 164 X10*3/UL (ref 150–450)
POTASSIUM SERPL-SCNC: 4.1 MMOL/L (ref 3.5–5.3)
PROT SERPL-MCNC: 7.1 G/DL (ref 6.4–8.2)
PROTHROMBIN TIME: 16.6 SECONDS (ref 9.8–12.8)
RBC # BLD AUTO: 3.28 X10*6/UL (ref 4.5–5.9)
RBC MORPH BLD: ABNORMAL
SODIUM SERPL-SCNC: 134 MMOL/L (ref 136–145)
TOTAL CELLS COUNTED BLD: 140
TRYPTASE SERPL-MCNC: 62.6 UG/L
URATE SERPL-MCNC: 4.9 MG/DL (ref 4–7.5)
VARIANT LYMPHS # BLD MANUAL: 0.05 X10*3/UL (ref 0–0.5)
VARIANT LYMPHS NFR BLD: 2.1 %
WBC # BLD AUTO: 2.4 X10*3/UL (ref 4.4–11.3)

## 2024-11-19 PROCEDURE — 1170000001 HC PRIVATE ONCOLOGY ROOM DAILY

## 2024-11-19 PROCEDURE — 2500000002 HC RX 250 W HCPCS SELF ADMINISTERED DRUGS (ALT 637 FOR MEDICARE OP, ALT 636 FOR OP/ED): Performed by: INTERNAL MEDICINE

## 2024-11-19 PROCEDURE — 85027 COMPLETE CBC AUTOMATED: CPT

## 2024-11-19 PROCEDURE — 83735 ASSAY OF MAGNESIUM: CPT

## 2024-11-19 PROCEDURE — 2500000002 HC RX 250 W HCPCS SELF ADMINISTERED DRUGS (ALT 637 FOR MEDICARE OP, ALT 636 FOR OP/ED)

## 2024-11-19 PROCEDURE — 2500000004 HC RX 250 GENERAL PHARMACY W/ HCPCS (ALT 636 FOR OP/ED): Performed by: INTERNAL MEDICINE

## 2024-11-19 PROCEDURE — 2500000001 HC RX 250 WO HCPCS SELF ADMINISTERED DRUGS (ALT 637 FOR MEDICARE OP): Performed by: INTERNAL MEDICINE

## 2024-11-19 PROCEDURE — 84550 ASSAY OF BLOOD/URIC ACID: CPT

## 2024-11-19 PROCEDURE — 99233 SBSQ HOSP IP/OBS HIGH 50: CPT | Performed by: STUDENT IN AN ORGANIZED HEALTH CARE EDUCATION/TRAINING PROGRAM

## 2024-11-19 PROCEDURE — 82728 ASSAY OF FERRITIN: CPT

## 2024-11-19 PROCEDURE — 2500000001 HC RX 250 WO HCPCS SELF ADMINISTERED DRUGS (ALT 637 FOR MEDICARE OP)

## 2024-11-19 PROCEDURE — 85007 BL SMEAR W/DIFF WBC COUNT: CPT

## 2024-11-19 PROCEDURE — 84100 ASSAY OF PHOSPHORUS: CPT

## 2024-11-19 PROCEDURE — 77370 RADIATION PHYSICS CONSULT: CPT | Performed by: RADIOLOGY

## 2024-11-19 PROCEDURE — 85730 THROMBOPLASTIN TIME PARTIAL: CPT

## 2024-11-19 PROCEDURE — 80053 COMPREHEN METABOLIC PANEL: CPT

## 2024-11-19 PROCEDURE — 77334 RADIATION TREATMENT AID(S): CPT | Performed by: RADIOLOGY

## 2024-11-19 PROCEDURE — 83615 LACTATE (LD) (LDH) ENZYME: CPT

## 2024-11-19 PROCEDURE — 77300 RADIATION THERAPY DOSE PLAN: CPT | Performed by: RADIOLOGY

## 2024-11-19 RX ORDER — MELPHALAN HCL 50 MG
140 VIAL (EA) INTRAVENOUS ONCE
Status: COMPLETED | OUTPATIENT
Start: 2024-11-19 | End: 2024-11-19

## 2024-11-19 RX ORDER — PALONOSETRON 0.05 MG/ML
0.25 INJECTION, SOLUTION INTRAVENOUS ONCE
Status: COMPLETED | OUTPATIENT
Start: 2024-11-19 | End: 2024-11-19

## 2024-11-19 RX ADMIN — FAMOTIDINE 20 MG: 20 TABLET ORAL at 09:16

## 2024-11-19 RX ADMIN — URSODIOL 300 MG: 300 CAPSULE ORAL at 15:44

## 2024-11-19 RX ADMIN — Medication 284.2 MG: at 11:11

## 2024-11-19 RX ADMIN — ALLOPURINOL 300 MG: 300 TABLET ORAL at 09:16

## 2024-11-19 RX ADMIN — URSODIOL 300 MG: 300 CAPSULE ORAL at 20:15

## 2024-11-19 RX ADMIN — URSODIOL 300 MG: 300 CAPSULE ORAL at 09:16

## 2024-11-19 RX ADMIN — ACYCLOVIR 400 MG: 400 TABLET ORAL at 09:16

## 2024-11-19 RX ADMIN — VANCOMYCIN HYDROCHLORIDE 125 MG: 125 CAPSULE ORAL at 09:16

## 2024-11-19 RX ADMIN — PROCHLORPERAZINE MALEATE 10 MG: 10 TABLET ORAL at 15:44

## 2024-11-19 RX ADMIN — AMLODIPINE BESYLATE 10 MG: 10 TABLET ORAL at 09:16

## 2024-11-19 RX ADMIN — FOSAPREPITANT 150 MG: 150 INJECTION, POWDER, LYOPHILIZED, FOR SOLUTION INTRAVENOUS at 09:28

## 2024-11-19 RX ADMIN — Medication 2000 UNITS: at 09:16

## 2024-11-19 RX ADMIN — FEXOFENADINE HCL 180 MG: 180 TABLET ORAL at 11:23

## 2024-11-19 RX ADMIN — PALONOSETRON HYDROCHLORIDE 250 MCG: 0.25 INJECTION INTRAVENOUS at 09:16

## 2024-11-19 RX ADMIN — ACYCLOVIR 400 MG: 400 TABLET ORAL at 20:15

## 2024-11-19 ASSESSMENT — COGNITIVE AND FUNCTIONAL STATUS - GENERAL
DAILY ACTIVITIY SCORE: 24
MOBILITY SCORE: 24

## 2024-11-19 ASSESSMENT — PAIN SCALES - GENERAL
PAINLEVEL_OUTOF10: 0 - NO PAIN

## 2024-11-19 ASSESSMENT — PAIN - FUNCTIONAL ASSESSMENT
PAIN_FUNCTIONAL_ASSESSMENT: 0-10
PAIN_FUNCTIONAL_ASSESSMENT: 0-10

## 2024-11-19 NOTE — CONSULTS
"Nutrition Initial Assessment:   Nutrition Assessment    ---->Reason for Assessment: Dietitian discretion    Patient is a 69 y.o. male with AML, admitted for MRD Allo Transplant (T=0 on 11/21/24).    Nutrition History:  This service met with pt earlier today, was sitting up on edge of bed at time of visit with him.    Tells appetite at home is good. Eats 2-3 meals/day, gets various sources of protein throughout the day. Has tried some type of oral nutrition supplement in the past, though could not recall which one and consuming these is not something he does on a regular basis at home.    Since admit, appetite started out as good, though has noticed over the last couple of days PO has been declining d/t intermittent nausea. Is still able to eat throughout the day, ordering 3 meals/day, \"just not eating as much at one time as what I had been.\" This am, ate all of a bowl of cereal, was saving a pastry and muffin for snacks for later.    Denied any issues with vomiting, diarrhea, taste changes, abdominal pains, or trouble chewing/swallowing since admit.    --Vitamin/Herbal Supplement Use: none  --Food Allergies/Intolerances: none       Anthropometrics:  Height: 169.1 cm (5' 6.58\")   Weight: 83.3 kg (183 lb 10.3 oz)   BMI (Calculated): 29.13  IBW/kg (Dietitian Calculated): 67.3 kg  Percent of IBW: 124 %       Weight History:   --Pt reports he had COVID ~3 years ago, lost ~60 lb at the time. Since then, his wt has remained stable at home, reports UBW is ~185-195 lb.    Wt Readings per review of EMR:   11/19/24              11/15/24 83.3 kg (183 lb 10.3 oz) (current wt/standing scale)--3% wt loss from admit to present (significant)--though some may be d/t fluids/fluid shifts and/or differences in time of day weights obtained  86.1 kg (admit wt/standing scale)   11/12/24 87.6 kg (193 lb 2 oz)   11/08/24 86.5 kg (190 lb 12.8 oz)   10/31/24 87.5 kg (193 lb)   10/28/24 88 kg (194 lb)   10/23/24 86.6 kg (190 lb 14.7 oz) "   10/20/24 86.2 kg (190 lb)   10/19/24 87.5 kg (193 lb)   10/18/24 84 kg (185 lb 1.2 oz)   10/17/24 86 kg (189 lb 11.3 oz)   10/16/24 86.5 kg (190 lb 11.2 oz)   10/15/24 86.2 kg (190 lb 0.6 oz)   10/14/24 84.8 kg (186 lb 14.4 oz)   10/01/24 89.1 kg (196 lb 6.9 oz)   09/25/24 88.3 kg (194 lb 10.7 oz)   09/23/24 87.7 kg (193 lb 5.5 oz)   09/20/24 86.7 kg (191 lb 4 oz)   09/17/24 90.6 kg (199 lb 11.8 oz)   09/11/24 90.2 kg (198 lb 13.7 oz)   09/10/24 91.6 kg (202 lb)   09/09/24 90.9 kg (200 lb 6.4 oz)   09/07/24 91.9 kg (202 lb 9.6 oz)   09/04/24 90.9 kg (200 lb 6.4 oz)   09/03/24 91.3 kg (201 lb 4.5 oz)   08/29/24 89.2 kg (196 lb 10.4 oz)   08/28/24 89.7 kg (197 lb 11.2 oz)   08/23/24 88.9 kg (195 lb 15.8 oz)   08/21/24 88.4 kg (194 lb 14.2 oz)   08/20/24 88.8 kg (195 lb 12.3 oz)   08/17/24 88.5 kg (195 lb 1.7 oz)--->3% wt loss from this date to admit (not significant)   08/12/24 89.4 kg (197 lb)   08/09/24 85.9 kg (189 lb 6 oz)   08/07/24 88.5 kg (195 lb)   08/06/24 90 kg (198 lb 6.4 oz)   08/02/24 92.6 kg (204 lb 2.3 oz)   07/31/24 95.7 kg (211 lb)   07/30/24 95.9 kg (211 lb 8 oz)   07/27/24 95.9 kg (211 lb 6.7 oz)   07/17/24 86.2 kg (190 lb)   07/16/24 86.6 kg (190 lb 14.7 oz)   07/12/24 88.7 kg (195 lb 8.8 oz)   07/09/24 89 kg (196 lb 3.2 oz)   07/05/24 89.1 kg (196 lb 6.9 oz)   07/03/24 87.4 kg (192 lb 10.9 oz)   07/02/24 87.2 kg (192 lb 4.8 oz)   06/27/24 89.6 kg (197 lb 8.5 oz)   06/24/24 87.5 kg (192 lb 14.4 oz)   06/21/24 88.2 kg (194 lb 7.1 oz)   03/05/24 89.4 kg (197 lb 1.5 oz)   01/08/24 85.7 kg (189 lb)     Nutrition Focused Physical Exam Findings:  Subcutaneous Fat Loss:   Orbital Fat Pads: Mild-Moderate (slight dark circles and slight hollowing)  Buccal Fat Pads: Mild-Moderate (flat cheeks, minimal bounce)  Triceps: Mild-Moderate (less than ample fat tissue)  Ribs: Defer  Muscle Wasting:  Temporalis: Mild-Moderate (slight depression)  Pectoralis (Clavicular Region): Mild-Moderate (some protrusion of  clavicle)  Deltoid/Trapezius: Mild-Moderate (slight protrusion of acromion process)  Interosseous: Mild-Moderate (slightly depressed area between thumb and forefinger)  Trapezius/Infraspinatus/Supraspinatus (Scapular Region): Mild-Moderate (slight protrusion of scapula)  Quadriceps: Defer  Gastrocnemius: Defer  Edema:  Edema: none  Physical Findings:  Hair: Negative  Eyes: Negative  Nails: Negative  Skin: Negative    Nutrition Significant Labs:  CBC Trend:   Results from last 7 days   Lab Units 11/19/24 0409 11/18/24 0443 11/17/24 0400 11/16/24 0407   WBC AUTO x10*3/uL 2.4* 3.5* 4.3* 4.8   RBC AUTO x10*6/uL 3.28* 3.27* 3.19* 3.12*   HEMOGLOBIN g/dL 10.4* 10.4* 9.9* 10.0*   HEMATOCRIT % 31.9* 31.6* 31.5* 30.8*   MCV fL 97 97 99 99   PLATELETS AUTO x10*3/uL 164 192 202 219   BMP Trend:   Results from last 7 days   Lab Units 11/19/24 0409 11/18/24 0443 11/17/24 0400 11/16/24 0407   GLUCOSE mg/dL 84 84 83 79   CALCIUM mg/dL 8.4* 8.3* 8.3* 8.3*   SODIUM mmol/L 134* 134* 138 137   POTASSIUM mmol/L 4.1 4.1 4.3 4.1   CO2 mmol/L 24 23 24 23   CHLORIDE mmol/L 103 103 106 106   BUN mg/dL 15 14 18 19   CREATININE mg/dL 1.02 1.03 1.12 1.00   Liver Function Trend:   Results from last 7 days   Lab Units 11/19/24 0409 11/18/24 0443 11/17/24 0400 11/16/24 0407   ALK PHOS U/L 64 59 60 61   AST U/L 9 9 10 11   ALT U/L 8* 8* 8* 9*   BILIRUBIN TOTAL mg/dL 0.9 0.9 0.8 0.7   Renal Lab Trend:   Results from last 7 days   Lab Units 11/19/24 0409 11/18/24 0443 11/17/24 0400 11/16/24 0407   POTASSIUM mmol/L 4.1 4.1 4.3 4.1   PHOSPHORUS mg/dL 3.7 3.7 3.6 3.2   SODIUM mmol/L 134* 134* 138 137   MAGNESIUM mg/dL 2.16 2.14 2.13 2.18   EGFR mL/min/1.73m*2 80 79 72 82   BUN mg/dL 15 14 18 19   CREATININE mg/dL 1.02 1.03 1.12 1.00   Vit D:   Lab Results   Component Value Date    VITD25 27 (L) 02/05/2024   Vit B12:   Lab Results   Component Value Date    CHSQNCZJ68 1,391 (H) 06/09/2023   Folate:   Lab Results   Component Value Date     FOLATE 3.5 (A) 06/09/2023      Medications:  Scheduled medications  acyclovir, 400 mg, oral, q12h  allopurinol, 300 mg, oral, Daily  amLODIPine, 10 mg, oral, Daily  cholecalciferol, 2,000 Units, oral, Daily  famotidine, 20 mg, oral, Daily  fexofenadine, 180 mg, oral, Daily  ursodiol, 300 mg, oral, TID  vancomycin, 125 mg, oral, Daily    PRN medications  PRN medications: albuterol, alteplase, dextrose, diphenhydrAMINE, EPINEPHrine HCl, famotidine, methylPREDNISolone sodium succinate (PF), ondansetron, polyethylene glycol, prochlorperazine, prochlorperazine, prochlorperazine, sodium chloride    I/O:   Last BM Date: 11/18/24    Dietary Orders (From admission, onward)       Start     Ordered    11/15/24 1836  May Participate in Room Service  ( ROOM SERVICE MAY PARTICIPATE)  Once        Question:  .  Answer:  Yes    11/15/24 1835    11/15/24 1723  Adult diet Low microbial  Diet effective now        Question:  Diet type  Answer:  Low microbial    11/15/24 1724                Estimated Needs:   Total Energy Estimated Needs (kCal): 2100+  Method for Estimating Needs: 67 (IBW) x ~30+  Total Protein Estimated Needs (g): 100+  Method for Estimating Needs: 67 (IBW) x ~1.5g/kg+  Total Fluid Estimated Needs (mL): 1ml/kcal or per MD/team        Nutrition Diagnosis   Malnutrition Diagnosis  Patient has Malnutrition Diagnosis: No (Muscle and fat losses noted above, likely at least in part, a factor of age. Is at risk for malnutrition, pending ongoing PO and wt status in-house.)    Nutrition Diagnosis  Patient has Nutrition Diagnosis: Yes  Diagnosis Status (1): New  Nutrition Diagnosis 1: Increased nutrient needs  Related to (1): increased metabolic demand  As Evidenced by (1): pt with AML, admitted for transplant    Additional Assessment Information: Offered to provide oral nutrition supplements, considering hypermetabolic state with pt report of declining PO, though pt would like to hold off for now and see how is appetite is  doing in the coming days.       Nutrition Interventions/Recommendations     1. Continue Low Microbial Diet, only as tolerated  --Pt declines oral nutrition supplements for now. In the coming days, pending ongoing PO, as needed, recommend addition of Ensure Plus daily-BID for added nutrition, if pt agreeable to accept.    2. Daily weights (standing preferred, if feasible).        Nutrition Prescription for Oral Nutrition: 2100+ kcals/day, 100+ g pro/day        Nutrition Interventions:   Interventions: Meals and snacks  Meals and Snacks: General healthful diet  Goal: Low Microbial Diet    Nutrition Education:   Education Documentation  Low Microbial Diet Guidelines/Food Safety Diet Guidelines, taught by Lita Mock RDN, NAIF at 11/19/2024  1:58 PM.  Learner: Patient  Readiness: Acceptance  Method: Explanation, Handout  Response: Verbalizes Understanding  Comment: Provided [employer] Low Microbial/Food Safety Diet handout to pt.           Nutrition Monitoring and Evaluation   Food/Nutrient Related History Monitoring  Monitoring and Evaluation Plan: Amount of food  Amount of Food: Estimated amout of food  Criteria: consume >50% of meals/snacks    Body Composition/Growth/Weight History  Monitoring and Evaluation Plan: Weight  Weight: Measured weight  Criteria: maintain stable wt    Biochemical Data, Medical Tests and Procedures  Monitoring and Evaluation Plan: Electrolyte/renal panel  Electrolyte and Renal Panel: Magnesium, Phosphorus, Potassium, Sodium  Criteria: WNL            Time Spent (min): 45 minutes

## 2024-11-19 NOTE — PROGRESS NOTES
"Jin Reynolds is a 69 y.o. male on day 5 of admission presenting with AML (acute myeloid leukemia) in remission (Multi).    Subjective   No acute events over night. Afebrile. Doing well, receiving melphalan today. Mild headache (2/10 pain scale) with chemo.  \"Low level\" nausea without emesis. Decreased appetite.   Normal BM's. Tolerating treatment without issue so far. Remainder of ROS is negative.        Objective     Vitals:    11/18/24 1927 11/19/24 0336 11/19/24 0747 11/19/24 1132   BP: 128/75 144/74 136/83 129/83   BP Location:  Right arm     Patient Position:  Sitting     Pulse: 72 71 67 61   Resp: 16 16 16 16   Temp: 36.2 °C (97.2 °F) 36.4 °C (97.5 °F) 36.6 °C (97.9 °F) 36.4 °C (97.5 °F)   TempSrc:  Temporal     SpO2: 98% 100% 99% 100%   Weight:   83.3 kg (183 lb 10.3 oz)    Height:            Physical Exam  Constitutional:       Appearance: Normal appearance.   HENT:      Head: Normocephalic.      Mouth/Throat:      Mouth: Mucous membranes are moist.      Pharynx: Oropharynx is clear.   Eyes:      Extraocular Movements: Extraocular movements intact.      Pupils: Pupils are equal, round, and reactive to light.   Cardiovascular:      Rate and Rhythm: Normal rate and regular rhythm.      Pulses: Normal pulses.      Heart sounds: Normal heart sounds.   Pulmonary:      Effort: Pulmonary effort is normal.      Breath sounds: Normal breath sounds.   Abdominal:      General: Bowel sounds are normal.      Palpations: Abdomen is soft.   Musculoskeletal:         General: Normal range of motion.      Cervical back: Normal range of motion.   Skin:     General: Skin is warm.      Capillary Refill: Capillary refill takes less than 2 seconds.      Findings: No rash.   Neurological:      Mental Status: He is oriented to person, place, and time.         Scheduled medications  acyclovir, 400 mg, oral, q12h  allopurinol, 300 mg, oral, Daily  amLODIPine, 10 mg, oral, Daily  cholecalciferol, 2,000 Units, oral, Daily  famotidine, " 20 mg, oral, Daily  fexofenadine, 180 mg, oral, Daily  ursodiol, 300 mg, oral, TID  vancomycin, 125 mg, oral, Daily      Continuous medications     PRN medications  PRN medications: albuterol, alteplase, dextrose, diphenhydrAMINE, EPINEPHrine HCl, famotidine, methylPREDNISolone sodium succinate (PF), ondansetron, polyethylene glycol, prochlorperazine, prochlorperazine, prochlorperazine, sodium chloride    Results from last 7 days   Lab Units 11/19/24 0409 11/18/24 0443 11/17/24 0400   WBC AUTO x10*3/uL 2.4* 3.5* 4.3*   HEMOGLOBIN g/dL 10.4* 10.4* 9.9*   HEMATOCRIT % 31.9* 31.6* 31.5*   PLATELETS AUTO x10*3/uL 164 192 202   NEUTROS PCT AUTO %  --  30.3 18.9   LYMPHO PCT MAN % 2.9  --   --    LYMPHS PCT AUTO %  --  10.4 17.3   MONO PCT MAN % 2.9  --   --    MONOS PCT AUTO %  --  4.3 5.8   EOSINO PCT MAN % 55.7  --   --    EOS PCT AUTO %  --  50.1 53.6     Results from last 7 days   Lab Units 11/19/24 0409 11/18/24 0443 11/17/24 0400   SODIUM mmol/L 134* 134* 138   POTASSIUM mmol/L 4.1 4.1 4.3   CHLORIDE mmol/L 103 103 106   CO2 mmol/L 24 23 24   BUN mg/dL 15 14 18   CREATININE mg/dL 1.02 1.03 1.12   CALCIUM mg/dL 8.4* 8.3* 8.3*   PROTEIN TOTAL g/dL 7.1 7.0 6.8   BILIRUBIN TOTAL mg/dL 0.9 0.9 0.8   ALK PHOS U/L 64 59 60   ALT U/L 8* 8* 8*   AST U/L 9 9 10   GLUCOSE mg/dL 84 84 83     Results from last 7 days   Lab Units 11/19/24 0409 11/18/24 0443 11/17/24 0400   MAGNESIUM mg/dL 2.16 2.14 2.13     Assessment/Plan   Assessment & Plan  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 male presenting with h/o Smoldering Systemic Mastocytosis, recent diagnosis of AML. Admitted for MRD Allo transplant (T=0 11/21/24).     T-2 Allo MRD      ONC:  # Smoldering System Mastocytosis diagnosed 8/23/23  - S/p Molecular therapy started 2/13/24  - Continue home Allegra for symptom management  - Ordered Tryptase level 11/16, pending   # AML.    - AML diagnosed per [facility] on 6/27/24. Positive for  RUNX1, CKit, SRSF2 and CBL C404Y  - S/p C1D1 Venetoclax/Azacitidine on 7/24/24. Developed TLS with renal dysfunction  - [facility] (8/28/24): c/w persistent disease and 27% blast  - Admitted 9/5/24 for C2 of Jeremy/Aza with Venetoclx ramp up dosing (D1 20mg, D2 50 mg, D3 onward 100 mg daily).   - s/p 2 cycles of Jeremy/Aza  - Admit 11/15 for Allo MRD (T=0 11/21/24)     Chemo:  - Fludarabine 30 mg/m2 IV D-5, D-4, D-3  - Melphalan 140 mg/m2 IV D-2   - Total Body Irradiation (TBI) 200 cGy on D-1      GVHD Prophy:  - Tacro level:   - Tacro dose: 2.5mg q12h starting on T+5  - Post-tx Cytoxan 50 mg/kg on T+3 and T+4  - MMF 1000 mg TID to begin T+5 to T+35     TRANSPLANT:   - HLA matched related donor /12 match  - ABO Donor: O+, CMV Positive   - ABO Recipient: O+, CMV negative      HEME:  #Pancytopenia 2/2 to disease  - Keep hgb > 7.0, plts > 10k     ID:  # Cont prophylaxis meds: Acyclovir  - Plan for Posaconazole and Levofloxacin ppx when neutropenic     FEN/GI:  - Admit wt: 87.6 kg, Daily wt: 83.3kg  - Cont home PPI, Pepcid and antiemetics  - Low pathogen diet  - Monitor for fluid overload     CARD:  - ECHO June 2023 c/w LA severely dilated, EF 50-55%  - Cont home Norvasc     RENAL:  # H/o hematuria, resolved  - S/p Cystoscopy (1/15/24): Normal exam  # C/o frequent urination especially at night  - UA & PSA ordered on prior admit. Last PSA 0.66 (5/31/23)     MISC:  - Cont home Allegra and Singular     DISPO:  - Access: Central Line  - Full Code  - Primary Oncologist: Dr. Nika Dawson      Patient discussed with Dr Bj Miller PA-C

## 2024-11-20 ENCOUNTER — HOSPITAL ENCOUNTER (OUTPATIENT)
Dept: RADIATION ONCOLOGY | Facility: HOSPITAL | Age: 69
Setting detail: RADIATION/ONCOLOGY SERIES
Discharge: HOME | End: 2024-11-20
Payer: MEDICARE

## 2024-11-20 ENCOUNTER — RADIATION ONCOLOGY OTV (OUTPATIENT)
Dept: RADIATION ONCOLOGY | Facility: HOSPITAL | Age: 69
End: 2024-11-20
Payer: MEDICARE

## 2024-11-20 DIAGNOSIS — C92.61: ICD-10-CM

## 2024-11-20 LAB
ALBUMIN SERPL BCP-MCNC: 4.2 G/DL (ref 3.4–5)
ALP SERPL-CCNC: 62 U/L (ref 33–136)
ALT SERPL W P-5'-P-CCNC: 7 U/L (ref 10–52)
ANION GAP SERPL CALC-SCNC: 13 MMOL/L (ref 10–20)
APTT PPP: 36 SECONDS (ref 27–38)
AST SERPL W P-5'-P-CCNC: 9 U/L (ref 9–39)
BASOPHILS # BLD AUTO: 0.04 X10*3/UL (ref 0–0.1)
BASOPHILS NFR BLD AUTO: 3.1 %
BILIRUB SERPL-MCNC: 0.8 MG/DL (ref 0–1.2)
BUN SERPL-MCNC: 15 MG/DL (ref 6–23)
CALCIUM SERPL-MCNC: 8.3 MG/DL (ref 8.6–10.6)
CHLORIDE SERPL-SCNC: 101 MMOL/L (ref 98–107)
CO2 SERPL-SCNC: 23 MMOL/L (ref 21–32)
CREAT SERPL-MCNC: 0.98 MG/DL (ref 0.5–1.3)
EGFRCR SERPLBLD CKD-EPI 2021: 84 ML/MIN/1.73M*2
EOSINOPHIL # BLD AUTO: 0.39 X10*3/UL (ref 0–0.7)
EOSINOPHIL NFR BLD AUTO: 30.5 %
ERYTHROCYTE [DISTWIDTH] IN BLOOD BY AUTOMATED COUNT: 17.9 % (ref 11.5–14.5)
FERRITIN SERPL-MCNC: 849 NG/ML (ref 20–300)
GLUCOSE SERPL-MCNC: 89 MG/DL (ref 74–99)
HCT VFR BLD AUTO: 29.1 % (ref 41–52)
HGB BLD-MCNC: 9.7 G/DL (ref 13.5–17.5)
IMM GRANULOCYTES # BLD AUTO: 0.01 X10*3/UL (ref 0–0.7)
IMM GRANULOCYTES NFR BLD AUTO: 0.8 % (ref 0–0.9)
INR PPP: 1.4 (ref 0.9–1.1)
LDH SERPL L TO P-CCNC: 130 U/L (ref 84–246)
LYMPHOCYTES # BLD AUTO: 0.04 X10*3/UL (ref 1.2–4.8)
LYMPHOCYTES NFR BLD AUTO: 3.1 %
MAGNESIUM SERPL-MCNC: 2.05 MG/DL (ref 1.6–2.4)
MCH RBC QN AUTO: 32 PG (ref 26–34)
MCHC RBC AUTO-ENTMCNC: 33.3 G/DL (ref 32–36)
MCV RBC AUTO: 96 FL (ref 80–100)
MONOCYTES # BLD AUTO: 0.05 X10*3/UL (ref 0.1–1)
MONOCYTES NFR BLD AUTO: 3.9 %
NEUTROPHILS # BLD AUTO: 0.75 X10*3/UL (ref 1.2–7.7)
NEUTROPHILS NFR BLD AUTO: 58.6 %
NRBC BLD-RTO: 0 /100 WBCS (ref 0–0)
OVALOCYTES BLD QL SMEAR: NORMAL
PHOSPHATE SERPL-MCNC: 4.2 MG/DL (ref 2.5–4.9)
PLATELET # BLD AUTO: 150 X10*3/UL (ref 150–450)
POTASSIUM SERPL-SCNC: 4.2 MMOL/L (ref 3.5–5.3)
PROT SERPL-MCNC: 6.5 G/DL (ref 6.4–8.2)
PROTHROMBIN TIME: 16.2 SECONDS (ref 9.8–12.8)
RAD ONC MSQ ACTUAL FRACTIONS DELIVERED: 1
RAD ONC MSQ ACTUAL SESSION DELIVERED DOSE: 200 CGRAY
RAD ONC MSQ ACTUAL TOTAL DOSE: 200 CGRAY
RAD ONC MSQ ELAPSED DAYS: 0
RAD ONC MSQ LAST DATE: NORMAL
RAD ONC MSQ PRESCRIBED FRACTIONAL DOSE: 200 CGRAY
RAD ONC MSQ PRESCRIBED NUMBER OF FRACTIONS: 1
RAD ONC MSQ PRESCRIBED TECHNIQUE: NORMAL
RAD ONC MSQ PRESCRIBED TOTAL DOSE: 200 CGRAY
RAD ONC MSQ PRESCRIPTION PATTERN COMMENT: NORMAL
RAD ONC MSQ START DATE: NORMAL
RAD ONC MSQ TREATMENT COURSE NUMBER: 1
RAD ONC MSQ TREATMENT SITE: NORMAL
RBC # BLD AUTO: 3.03 X10*6/UL (ref 4.5–5.9)
RBC MORPH BLD: NORMAL
SODIUM SERPL-SCNC: 133 MMOL/L (ref 136–145)
URATE SERPL-MCNC: 5.6 MG/DL (ref 4–7.5)
WBC # BLD AUTO: 1.3 X10*3/UL (ref 4.4–11.3)

## 2024-11-20 PROCEDURE — 83735 ASSAY OF MAGNESIUM: CPT

## 2024-11-20 PROCEDURE — 85730 THROMBOPLASTIN TIME PARTIAL: CPT

## 2024-11-20 PROCEDURE — 83615 LACTATE (LD) (LDH) ENZYME: CPT

## 2024-11-20 PROCEDURE — 2500000001 HC RX 250 WO HCPCS SELF ADMINISTERED DRUGS (ALT 637 FOR MEDICARE OP): Performed by: INTERNAL MEDICINE

## 2024-11-20 PROCEDURE — 77280 THER RAD SIMULAJ FIELD SMPL: CPT | Performed by: RADIOLOGY

## 2024-11-20 PROCEDURE — 80053 COMPREHEN METABOLIC PANEL: CPT

## 2024-11-20 PROCEDURE — 2500000002 HC RX 250 W HCPCS SELF ADMINISTERED DRUGS (ALT 637 FOR MEDICARE OP, ALT 636 FOR OP/ED)

## 2024-11-20 PROCEDURE — 2500000001 HC RX 250 WO HCPCS SELF ADMINISTERED DRUGS (ALT 637 FOR MEDICARE OP)

## 2024-11-20 PROCEDURE — 77412 RADIATION TX DELIVERY LVL 3: CPT | Performed by: RADIOLOGY

## 2024-11-20 PROCEDURE — 84550 ASSAY OF BLOOD/URIC ACID: CPT

## 2024-11-20 PROCEDURE — 1170000001 HC PRIVATE ONCOLOGY ROOM DAILY

## 2024-11-20 PROCEDURE — 77331 SPECIAL RADIATION DOSIMETRY: CPT | Mod: XU | Performed by: RADIOLOGY

## 2024-11-20 PROCEDURE — 2500000004 HC RX 250 GENERAL PHARMACY W/ HCPCS (ALT 636 FOR OP/ED): Performed by: INTERNAL MEDICINE

## 2024-11-20 PROCEDURE — 2500000002 HC RX 250 W HCPCS SELF ADMINISTERED DRUGS (ALT 637 FOR MEDICARE OP, ALT 636 FOR OP/ED): Performed by: INTERNAL MEDICINE

## 2024-11-20 PROCEDURE — 85025 COMPLETE CBC W/AUTO DIFF WBC: CPT

## 2024-11-20 PROCEDURE — 99233 SBSQ HOSP IP/OBS HIGH 50: CPT | Performed by: STUDENT IN AN ORGANIZED HEALTH CARE EDUCATION/TRAINING PROGRAM

## 2024-11-20 PROCEDURE — 82728 ASSAY OF FERRITIN: CPT

## 2024-11-20 PROCEDURE — 84100 ASSAY OF PHOSPHORUS: CPT

## 2024-11-20 RX ADMIN — VANCOMYCIN HYDROCHLORIDE 125 MG: 125 CAPSULE ORAL at 08:59

## 2024-11-20 RX ADMIN — ACYCLOVIR 400 MG: 400 TABLET ORAL at 20:51

## 2024-11-20 RX ADMIN — URSODIOL 300 MG: 300 CAPSULE ORAL at 16:29

## 2024-11-20 RX ADMIN — URSODIOL 300 MG: 300 CAPSULE ORAL at 08:59

## 2024-11-20 RX ADMIN — Medication 2000 UNITS: at 08:59

## 2024-11-20 RX ADMIN — AMLODIPINE BESYLATE 10 MG: 10 TABLET ORAL at 08:59

## 2024-11-20 RX ADMIN — URSODIOL 300 MG: 300 CAPSULE ORAL at 20:51

## 2024-11-20 RX ADMIN — ACYCLOVIR 400 MG: 400 TABLET ORAL at 08:59

## 2024-11-20 RX ADMIN — ALLOPURINOL 300 MG: 300 TABLET ORAL at 08:59

## 2024-11-20 RX ADMIN — FAMOTIDINE 20 MG: 20 TABLET ORAL at 08:59

## 2024-11-20 RX ADMIN — FEXOFENADINE HCL 180 MG: 180 TABLET ORAL at 09:16

## 2024-11-20 RX ADMIN — PROCHLORPERAZINE EDISYLATE 10 MG: 5 INJECTION INTRAMUSCULAR; INTRAVENOUS at 08:58

## 2024-11-20 ASSESSMENT — COGNITIVE AND FUNCTIONAL STATUS - GENERAL
DAILY ACTIVITIY SCORE: 24
DAILY ACTIVITIY SCORE: 24
MOBILITY SCORE: 24
MOBILITY SCORE: 24

## 2024-11-20 ASSESSMENT — PAIN SCALES - GENERAL
PAINLEVEL_OUTOF10: 0 - NO PAIN

## 2024-11-20 ASSESSMENT — PAIN - FUNCTIONAL ASSESSMENT
PAIN_FUNCTIONAL_ASSESSMENT: 0-10

## 2024-11-20 NOTE — CARE PLAN
Problem: Pain - Adult  Goal: Verbalizes/displays adequate comfort level or baseline comfort level  Outcome: Progressing     Problem: Safety - Adult  Goal: Free from fall injury  Outcome: Progressing     Problem: Discharge Planning  Goal: Discharge to home or other facility with appropriate resources  Outcome: Progressing     Problem: Chronic Conditions and Co-morbidities  Goal: Patient's chronic conditions and co-morbidity symptoms are monitored and maintained or improved  Outcome: Progressing       The clinical goals for the shift include Patient will be free from injury throughout shift

## 2024-11-20 NOTE — PROGRESS NOTES
Jin Reynolds is a 69 y.o. male on day 5 of admission presenting with AML (acute myeloid leukemia) in remission (Multi).    Subjective   Afebrile. No acute issues overnight. Tolerated chemotherapy and TBI. Mild nausea that is controlled. Denies HA, dizziness, CP, SOB, abd pain, or bleeding. Mild fatigue. ROS otherwise unremarkable.       Objective      Physical Exam  Constitutional:       Appearance: Normal appearance.   HENT:      Head: Normocephalic and atraumatic.      Mouth/Throat:      Mouth: Mucous membranes are moist.      Pharynx: Oropharynx is clear.   Eyes:      General: No scleral icterus.     Extraocular Movements: Extraocular movements intact.      Pupils: Pupils are equal, round, and reactive to light.   Cardiovascular:      Rate and Rhythm: Normal rate and regular rhythm.      Pulses: Normal pulses.      Heart sounds: Normal heart sounds.   Pulmonary:      Effort: Pulmonary effort is normal. No respiratory distress.      Breath sounds: Normal breath sounds.   Abdominal:      General: Bowel sounds are normal. There is no distension.      Palpations: Abdomen is soft.      Tenderness: There is no abdominal tenderness.   Musculoskeletal:         General: Normal range of motion.      Cervical back: Normal range of motion.   Skin:     General: Skin is warm.      Findings: No rash.   Neurological:      General: No focal deficit present.      Mental Status: He is alert and oriented to person, place, and time.         Scheduled medications  acyclovir, 400 mg, oral, q12h  allopurinol, 300 mg, oral, Daily  amLODIPine, 10 mg, oral, Daily  cholecalciferol, 2,000 Units, oral, Daily  famotidine, 20 mg, oral, Daily  fexofenadine, 180 mg, oral, Daily  ursodiol, 300 mg, oral, TID  vancomycin, 125 mg, oral, Daily      Continuous medications     PRN medications  PRN medications: albuterol, alteplase, dextrose, diphenhydrAMINE, EPINEPHrine HCl, famotidine, methylPREDNISolone sodium succinate (PF), ondansetron,  polyethylene glycol, prochlorperazine, prochlorperazine, prochlorperazine, sodium chloride    Assessment/Plan   Assessment & Plan  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 male presenting with h/o Smoldering Systemic Mastocytosis, recent diagnosis of AML. Admitted for MRD Allo transplant (T=0 11/21/24).     T-1 Allo MRD (sister)     ONC:  # Smoldering System Mastocytosis diagnosed 8/23/23  - S/p Molecular therapy started 2/13/24  - Continue home Allegra for symptom management  - Ordered Tryptase level 11/16, pending   # AML   - AML diagnosed per [facility] on 6/27/24. Positive for RUNX1, CKit, SRSF2 and CBL C404Y  - S/p C1D1 Venetoclax/Azacitidine on 7/24/24. Developed TLS with renal dysfunction  - [facility] (8/28/24): c/w persistent disease and 27% blast  - Admitted 9/5/24 for C2 of Jeremy/Aza with Venetoclx ramp up dosing (D1 20mg, D2 50 mg, D3 onward 100 mg daily).   - s/p 2 cycles of Jeremy/Aza  - Admit 11/15 for Allo MRD (T=0 11/21/24)     Chemo:  - Fludarabine 30 mg/m2 IV D-5, D-4, D-3  - Melphalan 140 mg/m2 IV D-2   - Total Body Irradiation (TBI) 200 cGy on D-1      GVHD Prophy:  - Tacro level: to begin 11/26  - Tacro dose: 2.5mg q12h starting on T+5  - Post-tx Cytoxan 50 mg/kg on T+3 and T+4  - MMF 1000 mg TID to begin T+5 to T+35     TRANSPLANT:   - HLA matched related donor /12 match  - ABO Donor: O+, CMV Positive   - ABO Recipient: O+, CMV negative     Surveillance: beginning 11/25  - Monitor CMV 2x weekly  - Monitor EBV weekly  - Monitor Adenovirus weekly  - Monitor Hapto/LDH weekly  - Monitor Fibrinogen 2x weekly  - Monitor UA/protein weekly     HEME:  #Pancytopenia 2/2 to chemotherapy  - Monitor counts daily  - Keep hgb > 7.0, plts > 10k     ID:  # Cont prophylaxis meds: Acyclovir  - Plan for Posaconazole and Levofloxacin ppx when neutropenic     FEN/GI:  - Admit wt: 87.6 kg, Daily wt: 83.7 kg (11/20)  - Cont home PPI, Pepcid and antiemetics  - Low pathogen diet  - Monitor for fluid  overload  - Monitor electrolytes, replace prn     CARD:  - ECHO (10/23/24): EF 50-55%, RA/LA enlargement  - Cont home Norvasc     RENAL:  # H/o hematuria, resolved  - S/p Cystoscopy (1/15/24): Normal exam  # C/o frequent urination especially at night  - Last PSA 0.92 (9/6/24)     MISC:  - Cont home Allegra and Singular     DISPO:  - Access: Central Line  - Full Code  - Primary Oncologist: Dr. Nika Dawson      Patient discussed with BRAYAN GoldbergC

## 2024-11-21 ENCOUNTER — LAB REQUISITION (OUTPATIENT)
Dept: LAB | Facility: HOSPITAL | Age: 69
End: 2024-11-21
Payer: MEDICARE

## 2024-11-21 DIAGNOSIS — C92.01 ACUTE MYELOBLASTIC LEUKEMIA, IN REMISSION (MULTI): ICD-10-CM

## 2024-11-21 LAB
ALBUMIN SERPL BCP-MCNC: 4.1 G/DL (ref 3.4–5)
ANION GAP SERPL CALC-SCNC: 13 MMOL/L (ref 10–20)
APTT PPP: 38 SECONDS (ref 27–38)
BASOPHILS # BLD AUTO: 0.02 X10*3/UL (ref 0–0.1)
BASOPHILS NFR BLD AUTO: 2.3 %
BUN SERPL-MCNC: 17 MG/DL (ref 6–23)
CALCIUM SERPL-MCNC: 8.2 MG/DL (ref 8.6–10.6)
CHLORIDE SERPL-SCNC: 101 MMOL/L (ref 98–107)
CO2 SERPL-SCNC: 23 MMOL/L (ref 21–32)
CREAT SERPL-MCNC: 0.97 MG/DL (ref 0.5–1.3)
EGFRCR SERPLBLD CKD-EPI 2021: 85 ML/MIN/1.73M*2
EOSINOPHIL # BLD AUTO: 0.24 X10*3/UL (ref 0–0.7)
EOSINOPHIL NFR BLD AUTO: 27.6 %
ERYTHROCYTE [DISTWIDTH] IN BLOOD BY AUTOMATED COUNT: 17.6 % (ref 11.5–14.5)
GLUCOSE SERPL-MCNC: 85 MG/DL (ref 74–99)
HCT VFR BLD AUTO: 28.4 % (ref 41–52)
HGB BLD-MCNC: 9.4 G/DL (ref 13.5–17.5)
HYPOCHROMIA BLD QL SMEAR: NORMAL
IMM GRANULOCYTES # BLD AUTO: 0.01 X10*3/UL (ref 0–0.7)
IMM GRANULOCYTES NFR BLD AUTO: 1.1 % (ref 0–0.9)
INR PPP: 1.4 (ref 0.9–1.1)
LYMPHOCYTES # BLD AUTO: 0.02 X10*3/UL (ref 1.2–4.8)
LYMPHOCYTES NFR BLD AUTO: 2.3 %
MAGNESIUM SERPL-MCNC: 2.1 MG/DL (ref 1.6–2.4)
MCH RBC QN AUTO: 31.3 PG (ref 26–34)
MCHC RBC AUTO-ENTMCNC: 33.1 G/DL (ref 32–36)
MCV RBC AUTO: 95 FL (ref 80–100)
MONOCYTES # BLD AUTO: 0.02 X10*3/UL (ref 0.1–1)
MONOCYTES NFR BLD AUTO: 2.3 %
NEUTROPHILS # BLD AUTO: 0.56 X10*3/UL (ref 1.2–7.7)
NEUTROPHILS NFR BLD AUTO: 64.4 %
NRBC BLD-RTO: 0 /100 WBCS (ref 0–0)
PHOSPHATE SERPL-MCNC: 4.2 MG/DL (ref 2.5–4.9)
PLATELET # BLD AUTO: 125 X10*3/UL (ref 150–450)
POTASSIUM SERPL-SCNC: 4.3 MMOL/L (ref 3.5–5.3)
PROTHROMBIN TIME: 16.1 SECONDS (ref 9.8–12.8)
RBC # BLD AUTO: 3 X10*6/UL (ref 4.5–5.9)
RBC MORPH BLD: NORMAL
SODIUM SERPL-SCNC: 133 MMOL/L (ref 136–145)
WBC # BLD AUTO: 0.9 X10*3/UL (ref 4.4–11.3)

## 2024-11-21 PROCEDURE — 38240 TRANSPLT ALLO HCT/DONOR: CPT

## 2024-11-21 PROCEDURE — 1170000001 HC PRIVATE ONCOLOGY ROOM DAILY

## 2024-11-21 PROCEDURE — 2500000004 HC RX 250 GENERAL PHARMACY W/ HCPCS (ALT 636 FOR OP/ED): Performed by: INTERNAL MEDICINE

## 2024-11-21 PROCEDURE — 80069 RENAL FUNCTION PANEL: CPT | Performed by: PHYSICIAN ASSISTANT

## 2024-11-21 PROCEDURE — 2500000001 HC RX 250 WO HCPCS SELF ADMINISTERED DRUGS (ALT 637 FOR MEDICARE OP)

## 2024-11-21 PROCEDURE — 38207 CRYOPRESERVE STEM CELLS: CPT

## 2024-11-21 PROCEDURE — 38214 VOLUME DEPLETE OF HARVEST: CPT

## 2024-11-21 PROCEDURE — 2500000002 HC RX 250 W HCPCS SELF ADMINISTERED DRUGS (ALT 637 FOR MEDICARE OP, ALT 636 FOR OP/ED): Performed by: INTERNAL MEDICINE

## 2024-11-21 PROCEDURE — 83735 ASSAY OF MAGNESIUM: CPT

## 2024-11-21 PROCEDURE — 2500000002 HC RX 250 W HCPCS SELF ADMINISTERED DRUGS (ALT 637 FOR MEDICARE OP, ALT 636 FOR OP/ED)

## 2024-11-21 PROCEDURE — 2500000001 HC RX 250 WO HCPCS SELF ADMINISTERED DRUGS (ALT 637 FOR MEDICARE OP): Performed by: INTERNAL MEDICINE

## 2024-11-21 PROCEDURE — 85025 COMPLETE CBC W/AUTO DIFF WBC: CPT

## 2024-11-21 PROCEDURE — 30243Y2 TRANSFUSION OF ALLOGENEIC RELATED HEMATOPOIETIC STEM CELLS INTO CENTRAL VEIN, PERCUTANEOUS APPROACH: ICD-10-PCS | Performed by: INTERNAL MEDICINE

## 2024-11-21 PROCEDURE — 2500000005 HC RX 250 GENERAL PHARMACY W/O HCPCS

## 2024-11-21 PROCEDURE — 87070 CULTURE OTHR SPECIMN AEROBIC: CPT

## 2024-11-21 PROCEDURE — 85730 THROMBOPLASTIN TIME PARTIAL: CPT

## 2024-11-21 PROCEDURE — 38230 BONE MARROW HARVEST ALLOGEN: CPT | Performed by: STUDENT IN AN ORGANIZED HEALTH CARE EDUCATION/TRAINING PROGRAM

## 2024-11-21 PROCEDURE — 2500000001 HC RX 250 WO HCPCS SELF ADMINISTERED DRUGS (ALT 637 FOR MEDICARE OP): Performed by: HOME HEALTH AIDE

## 2024-11-21 PROCEDURE — 87075 CULTR BACTERIA EXCEPT BLOOD: CPT

## 2024-11-21 RX ORDER — ONDANSETRON HYDROCHLORIDE 2 MG/ML
8 INJECTION, SOLUTION INTRAVENOUS ONCE
Status: COMPLETED | OUTPATIENT
Start: 2024-11-21 | End: 2024-11-21

## 2024-11-21 RX ORDER — POSACONAZOLE 100 MG/1
300 TABLET, DELAYED RELEASE ORAL EVERY 12 HOURS
Status: COMPLETED | OUTPATIENT
Start: 2024-11-22 | End: 2024-11-22

## 2024-11-21 RX ORDER — DIPHENHYDRAMINE HCL 25 MG
25 CAPSULE ORAL ONCE
Status: COMPLETED | OUTPATIENT
Start: 2024-11-21 | End: 2024-11-21

## 2024-11-21 RX ORDER — LEVOFLOXACIN 500 MG/1
500 TABLET, FILM COATED ORAL EVERY 24 HOURS
Status: DISCONTINUED | OUTPATIENT
Start: 2024-11-21 | End: 2024-12-06

## 2024-11-21 RX ORDER — POSACONAZOLE 100 MG/1
300 TABLET, DELAYED RELEASE ORAL EVERY 24 HOURS
Status: DISCONTINUED | OUTPATIENT
Start: 2024-11-23 | End: 2024-12-07 | Stop reason: HOSPADM

## 2024-11-21 RX ORDER — ACYCLOVIR 400 MG/1
400 TABLET ORAL EVERY 12 HOURS
Status: DISCONTINUED | OUTPATIENT
Start: 2024-11-21 | End: 2024-11-21

## 2024-11-21 RX ORDER — LORAZEPAM 1 MG/1
1 TABLET ORAL ONCE AS NEEDED
Status: ACTIVE | OUTPATIENT
Start: 2024-11-21 | End: 2024-11-21

## 2024-11-21 RX ADMIN — URSODIOL 300 MG: 300 CAPSULE ORAL at 14:56

## 2024-11-21 RX ADMIN — URSODIOL 300 MG: 300 CAPSULE ORAL at 20:21

## 2024-11-21 RX ADMIN — ONDANSETRON HYDROCHLORIDE 8 MG: 8 TABLET, FILM COATED ORAL at 20:25

## 2024-11-21 RX ADMIN — FAMOTIDINE 20 MG: 20 TABLET ORAL at 08:43

## 2024-11-21 RX ADMIN — URSODIOL 300 MG: 300 CAPSULE ORAL at 08:43

## 2024-11-21 RX ADMIN — SODIUM CHLORIDE 1000 ML: 9 INJECTION, SOLUTION INTRAVENOUS at 08:43

## 2024-11-21 RX ADMIN — ACYCLOVIR 400 MG: 400 TABLET ORAL at 08:43

## 2024-11-21 RX ADMIN — LEVOFLOXACIN 500 MG: 500 TABLET, FILM COATED ORAL at 08:49

## 2024-11-21 RX ADMIN — ACYCLOVIR 400 MG: 400 TABLET ORAL at 20:21

## 2024-11-21 RX ADMIN — ALLOPURINOL 300 MG: 300 TABLET ORAL at 08:43

## 2024-11-21 RX ADMIN — FEXOFENADINE HCL 180 MG: 180 TABLET ORAL at 09:20

## 2024-11-21 RX ADMIN — ONDANSETRON 8 MG: 2 INJECTION INTRAMUSCULAR; INTRAVENOUS at 10:26

## 2024-11-21 RX ADMIN — DIPHENHYDRAMINE HYDROCHLORIDE 25 MG: 25 CAPSULE ORAL at 10:27

## 2024-11-21 RX ADMIN — Medication 2000 UNITS: at 08:43

## 2024-11-21 RX ADMIN — AMLODIPINE BESYLATE 10 MG: 10 TABLET ORAL at 08:43

## 2024-11-21 RX ADMIN — ONDANSETRON HYDROCHLORIDE 8 MG: 8 TABLET, FILM COATED ORAL at 04:39

## 2024-11-21 RX ADMIN — VANCOMYCIN HYDROCHLORIDE 125 MG: 125 CAPSULE ORAL at 08:43

## 2024-11-21 ASSESSMENT — COGNITIVE AND FUNCTIONAL STATUS - GENERAL
DAILY ACTIVITIY SCORE: 24
MOBILITY SCORE: 24
DAILY ACTIVITIY SCORE: 24
MOBILITY SCORE: 24

## 2024-11-21 ASSESSMENT — PAIN - FUNCTIONAL ASSESSMENT
PAIN_FUNCTIONAL_ASSESSMENT: 0-10
PAIN_FUNCTIONAL_ASSESSMENT: 0-10

## 2024-11-21 ASSESSMENT — PAIN SCALES - GENERAL
PAINLEVEL_OUTOF10: 0 - NO PAIN

## 2024-11-21 NOTE — CARE PLAN
The patient's goals for the shift include  receive transplant safely.     The clinical goals for the shift include Patient well remain HDS      Problem: Pain - Adult  Goal: Verbalizes/displays adequate comfort level or baseline comfort level  Outcome: Progressing     Problem: Safety - Adult  Goal: Free from fall injury  Outcome: Progressing     Problem: Discharge Planning  Goal: Discharge to home or other facility with appropriate resources  Outcome: Progressing     Problem: Chronic Conditions and Co-morbidities  Goal: Patient's chronic conditions and co-morbidity symptoms are monitored and maintained or improved  Outcome: Progressing

## 2024-11-21 NOTE — PROGRESS NOTES
Jin Reynolds is a 69 y.o. male on day 5 of admission presenting with AML (acute myeloid leukemia) in remission (Multi).    Subjective   Patient reports nausea without emesis, diarrhea (nonbloody), & decreased appetite (still eating multiple meals a day). Notes he is drinking well. Denies HA, dizziness, CP, SOB, emesis, constipation. All other ROS otherwise negative.     Objective      Physical Exam  Constitutional:       General: He is not in acute distress.     Appearance: Normal appearance.   HENT:      Head: Normocephalic and atraumatic.      Nose: Nose normal.      Mouth/Throat:      Mouth: Mucous membranes are moist.      Pharynx: Oropharynx is clear.   Eyes:      General: No scleral icterus.     Extraocular Movements: Extraocular movements intact.      Pupils: Pupils are equal, round, and reactive to light.   Cardiovascular:      Rate and Rhythm: Normal rate and regular rhythm.      Pulses: Normal pulses.      Heart sounds: Normal heart sounds. No murmur heard.  Pulmonary:      Effort: Pulmonary effort is normal. No respiratory distress.      Breath sounds: Normal breath sounds.   Abdominal:      General: Abdomen is flat. Bowel sounds are normal. There is no distension.      Palpations: Abdomen is soft. There is no mass.      Tenderness: There is no abdominal tenderness. There is no guarding.   Musculoskeletal:         General: No swelling. Normal range of motion.      Cervical back: Normal range of motion.   Skin:     General: Skin is warm and dry.      Coloration: Skin is not jaundiced.      Findings: No bruising, erythema, lesion or rash.   Neurological:      General: No focal deficit present.      Mental Status: He is alert and oriented to person, place, and time.      Cranial Nerves: No cranial nerve deficit.      Sensory: No sensory deficit.      Motor: No weakness.   Psychiatric:         Mood and Affect: Mood normal.         Behavior: Behavior normal.      Comments: Fluent speech          Scheduled  medications  acyclovir, 400 mg, oral, q12h  allopurinol, 300 mg, oral, Daily  amLODIPine, 10 mg, oral, Daily  cholecalciferol, 2,000 Units, oral, Daily  famotidine, 20 mg, oral, Daily  fexofenadine, 180 mg, oral, Daily  levoFLOXacin, 500 mg, oral, q24h  [START ON 11/22/2024] posaconazole, 300 mg, oral, q12h  [START ON 11/23/2024] posaconazole, 300 mg, oral, q24h  sodium chloride, 1,000 mL, intravenous, Once  ursodiol, 300 mg, oral, TID  vancomycin, 125 mg, oral, Daily      Continuous medications     PRN medications  PRN medications: albuterol, alteplase, dextrose, diphenhydrAMINE, EPINEPHrine HCl, famotidine, LORazepam, methylPREDNISolone sodium succinate (PF), ondansetron, polyethylene glycol, prochlorperazine, prochlorperazine, prochlorperazine, sodium chloride      Assessment/Plan   Assessment & Plan  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 male presenting with h/o Smoldering Systemic Mastocytosis, recent diagnosis of AML. Admitted for MRD Allo transplant (T=0 11/21/24).     T0 Allo MRD (sister)     ONC:  # Smoldering System Mastocytosis diagnosed 8/23/23  - S/p Molecular therapy started 2/13/24  - Continue home Allegra for symptom management  - Ordered Tryptase level 11/16, pending   # AML   - AML diagnosed per [facility] on 6/27/24. Positive for RUNX1, CKit, SRSF2 and CBL C404Y  - S/p C1D1 Venetoclax/Azacitidine on 7/24/24. Developed TLS with renal dysfunction  - [facility] (8/28/24): c/w persistent disease and 27% blast  - Admitted 9/5/24 for C2 of Jeremy/Aza with Venetoclx ramp up dosing (D1 20mg, D2 50 mg, D3 onward 100 mg daily).   - s/p 2 cycles of Jeremy/Aza  - Admit 11/15 for Allo MRD (T=0 11/21/24)     Chemo:  - Fludarabine 30 mg/m2 IV D-5, D-4, D-3  - Melphalan 140 mg/m2 IV D-2   - Total Body Irradiation (TBI) 200 cGy on D-1      GVHD Prophy:  - Tacro level: to begin 11/26  - Tacro dose: 2.5mg q12h starting on T+5  - Post-tx Cytoxan 50 mg/kg on T+3 and T+4  - MMF 1000 mg TID to  begin T+5 to T+35     TRANSPLANT:   - HLA matched related donor /12 match  - ABO Donor: O+, CMV Positive   - ABO Recipient: O+, CMV negative     Surveillance: beginning 11/25  - Monitor CMV 2x weekly  - Monitor EBV weekly  - Monitor Adenovirus weekly  - Monitor Hapto/LDH weekly  - Monitor Fibrinogen 2x weekly  - Monitor UA/protein weekly     HEME:  #Pancytopenia 2/2 to chemotherapy  - Monitor counts daily  - Keep hgb > 7.0, plts > 10k     ID:  # Cont prophylaxis meds: acyclovir, posaconazole, po vancomycin, levofloxacin      FEN/GI:  - Admit wt: 87.6 kg, Daily wt: 82.9kg (11/21)   - Cont home PPI, Pepcid and antiemetics  - Low pathogen diet  - Monitor electrolytes, replace prn     CARD:  - ECHO (10/23/24): EF 50-55%, RA/LA enlargement  - Cont home Norvasc     RENAL:  # H/o hematuria, resolved  - S/p Cystoscopy (1/15/24): Normal exam  # C/o frequent urination especially at night  - Last PSA 0.92 (9/6/24)     MISC:  - Cont home Allegra and Singular     DISPO:  - Access: Central Line  - Full Code  - Primary Oncologist: Dr. Nika Dawson      Patient discussed with Dr Bj Villaseñor PA-C

## 2024-11-21 NOTE — PROGRESS NOTES
RADIATION ONCOLOGY ON-TREATMENT VISIT NOTE  Patient Name:  Jin Reynolds  MRN:  [id number]  :  1955    Radiation Oncologist: Kasia Tee MD  Referring Provider: No ref. provider found  Primary Care Provider: DELFINO Bone  Care Team: Patient Care Team:  DELFINO Bone as PCP - General (Family Medicine)  Clive Hernandez MD as PCP - [employer] PCP  Nika Dawson MD as Consulting Physician (Hematology and Oncology)  DELFINO Massey as Nurse Practitioner (Hematology and Oncology)  Henrietta Blackburn RN as Nurse Navigator (Hematology and Oncology)  Lydia Day MD as Consulting Physician (Hematology and Oncology)    Date of Service: 2024     No specialty comments available.   No matching staging information was found for the patient.    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     OBJECTIVE:   Vital Signs:  There were no vitals taken for this visit.   Pain Scale:   Pain score: 0/10  Well appearing, NAD, lying on hospital bed.    Toxicity Assessment          2024    10:18   Toxicity Assessment   Adverse Events Reviewed (WDL) No (Exceptions to WDL)   Treatment Site General   Anorexia Grade 0   Anxiety Grade 1       pt. anxious for transplant to be completed   Dehydration Grade 0   Dermatitis Radiation Grade 0   Diarrhea Grade 0       reviewed that he could have looser stools/diarrhea over the next few days   Fatigue Grade  0   Nausea Grade 0       reviewed that he may be nauseated from the TBI   Pain Grade 0   Vomiting Grade 0        ASSESSMENT/PLAN:  The patient is tolerating radiation therapy as anticipated.  Continue per current treatment plan.

## 2024-11-21 NOTE — NURSING NOTE
Allogeneic HSCT product [id number] delivered by Cellular Therapy personnel and verified at bedside with Corinne Ramsey .Patient premedicated per orders. Patient identification verified against protocol with second RN Keely Falk RN, using name, MRN, and . Product infused via Alaris pump at a rate of 250 ml/hr through white port of (R) double IJ . +BBR obtained prior to and following infusion. Infusion started at 1120 am and completed at 1150. Pt. received a total of 70 ml and a total dose of 5.43 x10^8/kg TNC  5.41 x 10^6 CD34 1.31 x^8/kg CD3 per WKS 0002. Patient monitored throughout and vitals remained stable throughout infusion. No complications noted. Patient instructed not to ambulate without supervision until cleared by medical team. Patient verbalized understanding of this safety measure. Patient to be monitored 1 hour following infusion. Tolerated infusion well.

## 2024-11-22 LAB
ALBUMIN SERPL BCP-MCNC: 3.9 G/DL (ref 3.4–5)
ANION GAP SERPL CALC-SCNC: 11 MMOL/L (ref 10–20)
APTT PPP: 37 SECONDS (ref 27–38)
BASOPHILS # BLD MANUAL: 0.02 X10*3/UL (ref 0–0.1)
BASOPHILS NFR BLD MANUAL: 2.6 %
BUN SERPL-MCNC: 15 MG/DL (ref 6–23)
CALCIUM SERPL-MCNC: 7.9 MG/DL (ref 8.6–10.6)
CHLORIDE SERPL-SCNC: 102 MMOL/L (ref 98–107)
CO2 SERPL-SCNC: 24 MMOL/L (ref 21–32)
CREAT SERPL-MCNC: 0.92 MG/DL (ref 0.5–1.3)
EGFRCR SERPLBLD CKD-EPI 2021: >90 ML/MIN/1.73M*2
EOSINOPHIL # BLD MANUAL: 0.18 X10*3/UL (ref 0–0.7)
EOSINOPHIL NFR BLD MANUAL: 22.4 %
ERYTHROCYTE [DISTWIDTH] IN BLOOD BY AUTOMATED COUNT: 17.7 % (ref 11.5–14.5)
FIBRINOGEN PPP-MCNC: 222 MG/DL (ref 200–400)
GLUCOSE SERPL-MCNC: 84 MG/DL (ref 74–99)
HCT VFR BLD AUTO: 28.2 % (ref 41–52)
HGB BLD-MCNC: 9.4 G/DL (ref 13.5–17.5)
HYPOCHROMIA BLD QL SMEAR: ABNORMAL
IMM GRANULOCYTES # BLD AUTO: 0.04 X10*3/UL (ref 0–0.7)
IMM GRANULOCYTES NFR BLD AUTO: 4.8 % (ref 0–0.9)
INR PPP: 1.5 (ref 0.9–1.1)
LYMPHOCYTES # BLD MANUAL: 0.01 X10*3/UL (ref 1.2–4.8)
LYMPHOCYTES NFR BLD MANUAL: 0.9 %
MAGNESIUM SERPL-MCNC: 2.03 MG/DL (ref 1.6–2.4)
MCH RBC QN AUTO: 31.3 PG (ref 26–34)
MCHC RBC AUTO-ENTMCNC: 33.3 G/DL (ref 32–36)
MCV RBC AUTO: 94 FL (ref 80–100)
METAMYELOCYTES # BLD MANUAL: 0.03 X10*3/UL
METAMYELOCYTES NFR BLD MANUAL: 3.4 %
MONOCYTES # BLD MANUAL: 0.02 X10*3/UL (ref 0.1–1)
MONOCYTES NFR BLD MANUAL: 2.6 %
NEUTROPHILS # BLD MANUAL: 0.53 X10*3/UL (ref 1.2–7.7)
NEUTS BAND # BLD MANUAL: 0.01 X10*3/UL (ref 0–0.7)
NEUTS BAND NFR BLD MANUAL: 0.9 %
NEUTS SEG # BLD MANUAL: 0.52 X10*3/UL (ref 1.2–7)
NEUTS SEG NFR BLD MANUAL: 65.5 %
NRBC BLD-RTO: 0 /100 WBCS (ref 0–0)
OVALOCYTES BLD QL SMEAR: ABNORMAL
PHOSPHATE SERPL-MCNC: 3.5 MG/DL (ref 2.5–4.9)
PLATELET # BLD AUTO: 107 X10*3/UL (ref 150–450)
POTASSIUM SERPL-SCNC: 4.2 MMOL/L (ref 3.5–5.3)
PROMYELOCYTES # BLD MANUAL: 0.01 X10*3/UL
PROMYELOCYTES NFR BLD MANUAL: 1.7 %
PROTHROMBIN TIME: 17.1 SECONDS (ref 9.8–12.8)
RBC # BLD AUTO: 3 X10*6/UL (ref 4.5–5.9)
RBC MORPH BLD: ABNORMAL
SODIUM SERPL-SCNC: 133 MMOL/L (ref 136–145)
TOTAL CELLS COUNTED BLD: 116
WBC # BLD AUTO: 0.8 X10*3/UL (ref 4.4–11.3)

## 2024-11-22 PROCEDURE — 1170000001 HC PRIVATE ONCOLOGY ROOM DAILY

## 2024-11-22 PROCEDURE — 97161 PT EVAL LOW COMPLEX 20 MIN: CPT | Mod: GP

## 2024-11-22 PROCEDURE — 2500000005 HC RX 250 GENERAL PHARMACY W/O HCPCS

## 2024-11-22 PROCEDURE — 99233 SBSQ HOSP IP/OBS HIGH 50: CPT | Performed by: STUDENT IN AN ORGANIZED HEALTH CARE EDUCATION/TRAINING PROGRAM

## 2024-11-22 PROCEDURE — 85027 COMPLETE CBC AUTOMATED: CPT

## 2024-11-22 PROCEDURE — 2500000001 HC RX 250 WO HCPCS SELF ADMINISTERED DRUGS (ALT 637 FOR MEDICARE OP): Performed by: HOME HEALTH AIDE

## 2024-11-22 PROCEDURE — 2500000001 HC RX 250 WO HCPCS SELF ADMINISTERED DRUGS (ALT 637 FOR MEDICARE OP): Performed by: INTERNAL MEDICINE

## 2024-11-22 PROCEDURE — 83735 ASSAY OF MAGNESIUM: CPT

## 2024-11-22 PROCEDURE — 85007 BL SMEAR W/DIFF WBC COUNT: CPT

## 2024-11-22 PROCEDURE — 2500000002 HC RX 250 W HCPCS SELF ADMINISTERED DRUGS (ALT 637 FOR MEDICARE OP, ALT 636 FOR OP/ED): Performed by: INTERNAL MEDICINE

## 2024-11-22 PROCEDURE — 85610 PROTHROMBIN TIME: CPT

## 2024-11-22 PROCEDURE — 2500000001 HC RX 250 WO HCPCS SELF ADMINISTERED DRUGS (ALT 637 FOR MEDICARE OP)

## 2024-11-22 PROCEDURE — 80069 RENAL FUNCTION PANEL: CPT | Performed by: PHYSICIAN ASSISTANT

## 2024-11-22 PROCEDURE — 85384 FIBRINOGEN ACTIVITY: CPT | Performed by: PHYSICIAN ASSISTANT

## 2024-11-22 PROCEDURE — 2500000002 HC RX 250 W HCPCS SELF ADMINISTERED DRUGS (ALT 637 FOR MEDICARE OP, ALT 636 FOR OP/ED)

## 2024-11-22 RX ORDER — BACLOFEN 10 MG/1
5 TABLET ORAL ONCE AS NEEDED
Status: DISCONTINUED | OUTPATIENT
Start: 2024-11-22 | End: 2024-12-07 | Stop reason: HOSPADM

## 2024-11-22 RX ADMIN — FAMOTIDINE 20 MG: 20 TABLET ORAL at 09:04

## 2024-11-22 RX ADMIN — ACYCLOVIR 400 MG: 400 TABLET ORAL at 20:28

## 2024-11-22 RX ADMIN — VANCOMYCIN HYDROCHLORIDE 125 MG: 125 CAPSULE ORAL at 09:04

## 2024-11-22 RX ADMIN — ACYCLOVIR 400 MG: 400 TABLET ORAL at 09:04

## 2024-11-22 RX ADMIN — LEVOFLOXACIN 500 MG: 500 TABLET, FILM COATED ORAL at 09:04

## 2024-11-22 RX ADMIN — ONDANSETRON HYDROCHLORIDE 8 MG: 8 TABLET, FILM COATED ORAL at 19:02

## 2024-11-22 RX ADMIN — Medication 2000 UNITS: at 09:04

## 2024-11-22 RX ADMIN — POSACONAZOLE 300 MG: 100 TABLET, DELAYED RELEASE ORAL at 20:28

## 2024-11-22 RX ADMIN — URSODIOL 300 MG: 300 CAPSULE ORAL at 20:28

## 2024-11-22 RX ADMIN — URSODIOL 300 MG: 300 CAPSULE ORAL at 14:20

## 2024-11-22 RX ADMIN — FEXOFENADINE HCL 180 MG: 180 TABLET ORAL at 09:03

## 2024-11-22 RX ADMIN — URSODIOL 300 MG: 300 CAPSULE ORAL at 09:04

## 2024-11-22 RX ADMIN — POSACONAZOLE 300 MG: 100 TABLET, DELAYED RELEASE ORAL at 09:03

## 2024-11-22 RX ADMIN — AMLODIPINE BESYLATE 10 MG: 10 TABLET ORAL at 09:04

## 2024-11-22 RX ADMIN — ALLOPURINOL 300 MG: 300 TABLET ORAL at 09:04

## 2024-11-22 ASSESSMENT — PAIN - FUNCTIONAL ASSESSMENT
PAIN_FUNCTIONAL_ASSESSMENT: 0-10

## 2024-11-22 ASSESSMENT — COGNITIVE AND FUNCTIONAL STATUS - GENERAL
MOBILITY SCORE: 24
DAILY ACTIVITIY SCORE: 24
DAILY ACTIVITIY SCORE: 24
MOBILITY SCORE: 24
MOBILITY SCORE: 24

## 2024-11-22 ASSESSMENT — PAIN SCALES - GENERAL
PAINLEVEL_OUTOF10: 0 - NO PAIN

## 2024-11-22 ASSESSMENT — ACTIVITIES OF DAILY LIVING (ADL): ADL_ASSISTANCE: INDEPENDENT

## 2024-11-22 NOTE — PROGRESS NOTES
"Jin Reynolds is a 69 y.o. male on day 5 of admission presenting with AML (acute myeloid leukemia) in remission (Multi).    Subjective   Patient reports his nausea is improving. Notes he didn't eat a lot yesterday. Drinking water/fluids well. Also notes his diarrhea is improving, stool is more formed this morning (pt notes \"some bulk\"). Denies HA, dizziness, CP, SOB, emesis, constipation. All other ROS otherwise negative.     Objective      Physical Exam  Constitutional:       General: He is not in acute distress.     Appearance: Normal appearance.   HENT:      Head: Normocephalic and atraumatic.      Nose: Nose normal.      Mouth/Throat:      Mouth: Mucous membranes are moist.      Pharynx: Oropharynx is clear.   Eyes:      General: No scleral icterus.     Extraocular Movements: Extraocular movements intact.      Pupils: Pupils are equal, round, and reactive to light.   Cardiovascular:      Rate and Rhythm: Normal rate and regular rhythm.      Pulses: Normal pulses.      Heart sounds: Normal heart sounds. No murmur heard.  Pulmonary:      Effort: Pulmonary effort is normal. No respiratory distress.      Breath sounds: Normal breath sounds.   Abdominal:      General: Abdomen is flat. Bowel sounds are normal. There is no distension.      Palpations: Abdomen is soft. There is no mass.      Tenderness: There is no abdominal tenderness. There is no guarding.   Musculoskeletal:         General: No swelling. Normal range of motion.      Cervical back: Normal range of motion.   Skin:     General: Skin is warm and dry.      Coloration: Skin is not jaundiced.      Findings: No bruising, erythema, lesion or rash.   Neurological:      General: No focal deficit present.      Mental Status: He is alert and oriented to person, place, and time.      Cranial Nerves: No cranial nerve deficit.      Sensory: No sensory deficit.      Motor: No weakness.   Psychiatric:         Mood and Affect: Mood normal.         Behavior: Behavior " normal.      Comments: Fluent speech          Scheduled medications  acyclovir, 400 mg, oral, q12h  allopurinol, 300 mg, oral, Daily  amLODIPine, 10 mg, oral, Daily  cholecalciferol, 2,000 Units, oral, Daily  famotidine, 20 mg, oral, Daily  fexofenadine, 180 mg, oral, Daily  levoFLOXacin, 500 mg, oral, q24h  posaconazole, 300 mg, oral, q12h  [START ON 11/23/2024] posaconazole, 300 mg, oral, q24h  ursodiol, 300 mg, oral, TID  vancomycin, 125 mg, oral, Daily      Continuous medications     PRN medications  PRN medications: albuterol, alteplase, dextrose, diphenhydrAMINE, EPINEPHrine HCl, famotidine, methylPREDNISolone sodium succinate (PF), ondansetron, polyethylene glycol, prochlorperazine, prochlorperazine, prochlorperazine, sodium chloride      Assessment/Plan   Assessment & Plan  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 male presenting with h/o Smoldering Systemic Mastocytosis, recent diagnosis of AML. Admitted for MRD Allo transplant (T=0 11/21/24).     T+1 Allo MRD (sister)     ONC:  # Smoldering System Mastocytosis diagnosed 8/23/23  - S/p Molecular therapy started 2/13/24  - Continue home Allegra for symptom management  - Ordered Tryptase level 11/16, pending   # AML   - AML diagnosed per [facility] on 6/27/24. Positive for RUNX1, CKit, SRSF2 and CBL C404Y  - S/p C1D1 Venetoclax/Azacitidine on 7/24/24. Developed TLS with renal dysfunction  - [facility] (8/28/24): c/w persistent disease and 27% blast  - Admitted 9/5/24 for C2 of Jeremy/Aza with Venetoclx ramp up dosing (D1 20mg, D2 50 mg, D3 onward 100 mg daily).   - s/p 2 cycles of Jeremy/Aza  - Admit 11/15 for Allo MRD (T=0 11/21/24)     Chemo:  - Fludarabine 30 mg/m2 IV D-5, D-4, D-3  - Melphalan 140 mg/m2 IV D-2   - Total Body Irradiation (TBI) 200 cGy on D-1      GVHD Prophy:  - Tacro level: to begin 11/26  - Tacro dose: 2.5mg q12h starting on T+5  - Post-tx Cytoxan 50 mg/kg on T+3 and T+4  - MMF 1000 mg TID to begin T+5 to T+35      TRANSPLANT:   - HLA matched related donor /12 match  - ABO Donor: O+, CMV Positive   - ABO Recipient: O+, CMV negative     Surveillance: beginning 11/25  - Monitor CMV 2x weekly  - Monitor EBV weekly  - Monitor Adenovirus weekly  - Monitor Hapto/LDH weekly  - Monitor Fibrinogen 2x weekly  - Monitor UA/protein weekly     HEME:  #Pancytopenia 2/2 to chemotherapy  - Monitor counts daily  - Keep hgb > 7.0, plts > 10k     ID:  # Cont prophylaxis meds: acyclovir, posaconazole, po vancomycin, levofloxacin      FEN/GI:  - Admit wt: 87.6 kg, Daily wt: 82.9kg (11/21)   - Cont home PPI, Pepcid and antiemetics  - Low pathogen diet  - Monitor electrolytes, replace prn  #JUSTIN; prn zofran and compazine available   #EDYTA  -Last bowel movement semi-formed (11/21)  -If bowel movements become liquid, will send c dif and stool pathogen panel      CARD:  - ECHO (10/23/24): EF 50-55%, RA/LA enlargement  - Cont home Norvasc     RENAL:  # H/o hematuria, resolved  - S/p Cystoscopy (1/15/24): Normal exam  # C/o frequent urination especially at night  - Last PSA 0.92 (9/6/24)     MISC:  - Cont home Allegra and Singular     DISPO:  - Access: Central Line  - Full Code  - Primary Oncologist: Dr. Nika Dawson      Patient seen, examined, and discussed with Dr Bj Villaseñor PA-C

## 2024-11-22 NOTE — PROGRESS NOTES
Physical Therapy    Physical Therapy Evaluation    Patient Name: Jin Reynolds  MRN: [id number]  Department: [facility]  Room: [address] [place] [address]  Today's Date: 11/22/2024   Time Calculation  Start Time: 1023  Stop Time: 1036  Time Calculation (min): 13 min    Assessment/Plan   PT Assessment  Rehab Prognosis: Good  Barriers to Discharge: medical needs  Evaluation/Treatment Tolerance: Patient tolerated treatment well  Medical Staff Made Aware: Yes  Strengths: Ability to acquire knowledge, Attitude of self  Barriers to Participation: Comorbidities  End of Session Communication: Bedside nurse  Assessment Comment: Pt is a 69 year old male who presents for MRD Allo transplant (T=0 11/21/24). Pt demonstrates no deficits at this time however pt would benefit from continued therapy during hospital stay to maintain current level of function due to potentially long hospital stay and medical treatment  End of Session Patient Position: Up in chair, Alarm off, not on at start of session  IP OR SWING BED PT PLAN  Inpatient or Swing Bed: Inpatient  PT Plan  Treatment/Interventions: Bed mobility, Transfer training, Gait training, Stair training, Balance training, Neuromuscular re-education, Strengthening, Endurance training, Range of motion, Therapeutic exercise, Therapeutic activity, Home exercise program, Positioning, Postural re-education  PT Plan: Ongoing PT  PT Frequency: 2 times per week  PT Discharge Recommendations: No PT needed after discharge  PT Recommended Transfer Status: Independent    Subjective   General Visit Information:  General  Reason for Referral: MRD Allo transplant (T=0 11/21/24)  Past Medical History Relevant to Rehab: Smoldering Systemic Mastocytosis, recent diagnosis of AML  Family/Caregiver Present: No  Prior to Session Communication: Bedside nurse  Patient Position Received: Up in chair, Alarm off, not on at start of session  Preferred Learning Style: verbal, visual  General Comment: Pt is pleasant, cooperative, and  willing to participate in therapy.  Home Living:  Home Living  Type of Home: House  Lives With: Spouse  Home Adaptive Equipment: None  Home Layout: Multi-level, Stairs to alternate level with rails  Alternate Level Stairs-Number of Steps: 14 (basement where pt showers, can use 1st floor)  Home Access: Stairs to enter with rails  Entrance Stairs-Number of Steps: 3  Bathroom Shower/Tub: Walk-in shower  Bathroom Toilet: Standard  Bathroom Equipment: None  Prior Level of Function:  Prior Function Per Pt/Caregiver Report  Level of [place]: Independent with ADLs and functional transfers, Independent with homemaking with ambulation  ADL Assistance: Independent  Homemaking Assistance: Independent  Ambulatory Assistance: Independent  Vocational: Retired  Leisure: enjoys playing basketball  Prior Function Comments: no hx of falls, (+) driving  Precautions:  Precautions  Medical Precautions: Fall precautions  Precautions Comment: Protective precautions (H/H: 9.4/28.2; Plts: 107; WBC: .8)     Vital Signs (Past 2hrs)        Date/Time Vitals Session Patient Position Pulse Resp SpO2 BP MAP (mmHg)    11/22/24 1220 --  --  62  16  100 %  136/80  99                         Objective   Pain:  Pain Assessment  Pain Assessment: 0-10  0-10 (Numeric) Pain Score: 0 - No pain  Cognition:  Cognition  Overall Cognitive Status: Within Functional Limits  Orientation Level: Oriented X4    General Assessments:  Activity Tolerance  Endurance: Tolerates 10 - 20 min exercise with multiple rests    Sensation  Sensation Comment: denies numbness and tingling       Static Sitting Balance  Static Sitting-Balance Support: Feet supported, No upper extremity supported  Static Sitting-Level of Assistance: Independent  Dynamic Sitting Balance  Dynamic Sitting-Balance Support: Feet supported, No upper extremity supported  Dynamic Sitting-Level of Assistance: Independent    Static Standing Balance  Static Standing-Balance Support: No upper extremity  supported  Static Standing-Level of Assistance: Distant supervision  Dynamic Standing Balance  Dynamic Standing-Balance Support: No upper extremity supported  Dynamic Standing-Level of Assistance: Distant supervision  Functional Assessments:  Bed Mobility  Bed Mobility: No    Transfers  Transfer: Yes  Transfer 1  Transfer From 1: Sit to, Stand to  Transfer to 1: Stand, Sit  Technique 1: Sit to stand, Stand to sit  Transfer Level of Assistance 1: Distant supervision, Minimal verbal cues  Trials/Comments 1: VCs, good control of COM over BELA, multiple trials during session    Ambulation/Gait Training  Ambulation/Gait Training Performed: Yes  Ambulation/Gait Training 1  Surface 1: Level tile  Device 1: No device  Assistance 1: Distant supervision  Quality of Gait 1:  (decreased nneka)  Comments/Distance (ft) 1: 30 ft    Stairs  Stairs: No  Extremity/Trunk Assessments:  RLE   RLE :  (>/= 3+/5 observed via function)  LLE   LLE :  (>/= 3+/5 observed via function)  Outcome Measures:  [facility] Basic Mobility  Turning from your back to your side while in a flat bed without using bedrails: None  Moving from lying on your back to sitting on the side of a flat bed without using bedrails: None  Moving to and from bed to chair (including a wheelchair): None  Standing up from a chair using your arms (e.g. wheelchair or bedside chair): None  To walk in hospital room: None  Climbing 3-5 steps with railing: None  Basic Mobility - Total Score: 24    Tinetti  Sitting Balance: Steady, safe  Arises: Able without using arms  Attempts to Arise: Able to arise, one attempt  Immediate Standing Balance (First 5 Seconds): Steady without walker or other support  Standing Balance: Narrow stance without support  Nudged: Steady without walker or other support  Eyes Closed: Steady  Turned 360 Degrees: Steadiness: Steady  Turned 360 Degrees: Continuity of Steps: Continuous  Sitting Down: Safe, smooth motion  Balance Score: 16  Initiation of Gait: No  hesitancy  Step Height: R Swing Foot: Right foot complete clears floor  Step Length: R Swing Foot: Passes left stance foot  Step Height: L Swing Foot: Left foot complete clears floor  Step Length: L Swing Foot: Passes right stance foot  Step Symmetry: Right and left step appear equal  Step Continuity: Steps appear continuous  Path: Straight without walking aid  Trunk: No sway, no flexion, no use of arms, no walking aid  Walking Time: Heels almost touching while walking  Gait Score: 12  Total Score: 28    Other Measures  6 min Walk Test: deferred due to diarrhea    Encounter Problems       Encounter Problems (Active)       Balance       Pt will score a 25/28 or greater on the Tinetti balance assessment in order to demonstrate low fall risk.        Start:  11/22/24    Expected End:  12/13/24               Mobility       STG - Patient will ambulate >1,000 ft independently with no LOB, progress as able and appropriate        Start:  11/22/24    Expected End:  12/13/24            Pt will ambulate 1,000 ft independently and no signs of fatigue or SOB        Start:  11/22/24    Expected End:  12/13/24               PT Transfers       STG - Patient will perform bed mobility independently        Start:  11/22/24    Expected End:  12/13/24            STG - Patient will transfer sit to and from stand independently        Start:  11/22/24    Expected End:  12/13/24            Pt will be able to perform 12 STS transfers in 30 seconds to demonstrate average LE strength of community dweller        Start:  11/22/24    Expected End:  12/13/24               Pain - Adult              Education Documentation  Precautions, taught by Ria Hankins PT at 11/22/2024 12:59 PM.  Learner: Patient  Readiness: Acceptance  Method: Explanation  Response: Verbalizes Understanding  Comment: bed mobility, transfers, gait, activity and symptom  monitoring    Mobility Training, taught by Ria Hankins PT at 11/22/2024 12:59 PM.  Learner:  Patient  Readiness: Acceptance  Method: Explanation  Response: Verbalizes Understanding  Comment: bed mobility, transfers, gait, activity and symptom  monitoring    Education Comments  No comments found.        11/22/24 at 1:01 PM - Ria Hankins, PT

## 2024-11-23 LAB
ALBUMIN SERPL BCP-MCNC: 4 G/DL (ref 3.4–5)
ANION GAP SERPL CALC-SCNC: 11 MMOL/L (ref 10–20)
APTT PPP: 38 SECONDS (ref 27–38)
BASOPHILS # BLD AUTO: 0.01 X10*3/UL (ref 0–0.1)
BASOPHILS NFR BLD AUTO: 1.4 %
BUN SERPL-MCNC: 12 MG/DL (ref 6–23)
CALCIUM SERPL-MCNC: 8 MG/DL (ref 8.6–10.6)
CHLORIDE SERPL-SCNC: 102 MMOL/L (ref 98–107)
CO2 SERPL-SCNC: 25 MMOL/L (ref 21–32)
CREAT SERPL-MCNC: 0.87 MG/DL (ref 0.5–1.3)
EGFRCR SERPLBLD CKD-EPI 2021: >90 ML/MIN/1.73M*2
EOSINOPHIL # BLD AUTO: 0.18 X10*3/UL (ref 0–0.7)
EOSINOPHIL NFR BLD AUTO: 25 %
ERYTHROCYTE [DISTWIDTH] IN BLOOD BY AUTOMATED COUNT: 17.4 % (ref 11.5–14.5)
GLUCOSE SERPL-MCNC: 87 MG/DL (ref 74–99)
HCT VFR BLD AUTO: 28.9 % (ref 41–52)
HGB BLD-MCNC: 9.5 G/DL (ref 13.5–17.5)
IMM GRANULOCYTES # BLD AUTO: 0.04 X10*3/UL (ref 0–0.7)
IMM GRANULOCYTES NFR BLD AUTO: 5.6 % (ref 0–0.9)
INR PPP: 1.4 (ref 0.9–1.1)
LYMPHOCYTES # BLD AUTO: 0.02 X10*3/UL (ref 1.2–4.8)
LYMPHOCYTES NFR BLD AUTO: 2.8 %
MAGNESIUM SERPL-MCNC: 1.98 MG/DL (ref 1.6–2.4)
MCH RBC QN AUTO: 30.8 PG (ref 26–34)
MCHC RBC AUTO-ENTMCNC: 32.9 G/DL (ref 32–36)
MCV RBC AUTO: 94 FL (ref 80–100)
MONOCYTES # BLD AUTO: 0.02 X10*3/UL (ref 0.1–1)
MONOCYTES NFR BLD AUTO: 2.8 %
NEUTROPHILS # BLD AUTO: 0.45 X10*3/UL (ref 1.2–7.7)
NEUTROPHILS NFR BLD AUTO: 62.4 %
NRBC BLD-RTO: 0 /100 WBCS (ref 0–0)
PHOSPHATE SERPL-MCNC: 3.4 MG/DL (ref 2.5–4.9)
PLATELET # BLD AUTO: 91 X10*3/UL (ref 150–450)
POTASSIUM SERPL-SCNC: 4.1 MMOL/L (ref 3.5–5.3)
PROTHROMBIN TIME: 15.6 SECONDS (ref 9.8–12.8)
RBC # BLD AUTO: 3.08 X10*6/UL (ref 4.5–5.9)
RBC MORPH BLD: NORMAL
SODIUM SERPL-SCNC: 134 MMOL/L (ref 136–145)
WBC # BLD AUTO: 0.7 X10*3/UL (ref 4.4–11.3)

## 2024-11-23 PROCEDURE — 2500000001 HC RX 250 WO HCPCS SELF ADMINISTERED DRUGS (ALT 637 FOR MEDICARE OP): Performed by: INTERNAL MEDICINE

## 2024-11-23 PROCEDURE — 85730 THROMBOPLASTIN TIME PARTIAL: CPT

## 2024-11-23 PROCEDURE — 85025 COMPLETE CBC W/AUTO DIFF WBC: CPT

## 2024-11-23 PROCEDURE — 2500000002 HC RX 250 W HCPCS SELF ADMINISTERED DRUGS (ALT 637 FOR MEDICARE OP, ALT 636 FOR OP/ED): Performed by: INTERNAL MEDICINE

## 2024-11-23 PROCEDURE — 99233 SBSQ HOSP IP/OBS HIGH 50: CPT | Performed by: STUDENT IN AN ORGANIZED HEALTH CARE EDUCATION/TRAINING PROGRAM

## 2024-11-23 PROCEDURE — 2500000001 HC RX 250 WO HCPCS SELF ADMINISTERED DRUGS (ALT 637 FOR MEDICARE OP)

## 2024-11-23 PROCEDURE — 2500000005 HC RX 250 GENERAL PHARMACY W/O HCPCS

## 2024-11-23 PROCEDURE — 2500000001 HC RX 250 WO HCPCS SELF ADMINISTERED DRUGS (ALT 637 FOR MEDICARE OP): Performed by: HOME HEALTH AIDE

## 2024-11-23 PROCEDURE — 1170000001 HC PRIVATE ONCOLOGY ROOM DAILY

## 2024-11-23 PROCEDURE — 2500000002 HC RX 250 W HCPCS SELF ADMINISTERED DRUGS (ALT 637 FOR MEDICARE OP, ALT 636 FOR OP/ED)

## 2024-11-23 PROCEDURE — 80069 RENAL FUNCTION PANEL: CPT | Performed by: PHYSICIAN ASSISTANT

## 2024-11-23 PROCEDURE — 83735 ASSAY OF MAGNESIUM: CPT

## 2024-11-23 RX ADMIN — ACYCLOVIR 400 MG: 400 TABLET ORAL at 20:00

## 2024-11-23 RX ADMIN — FAMOTIDINE 20 MG: 20 TABLET ORAL at 09:44

## 2024-11-23 RX ADMIN — ALLOPURINOL 300 MG: 300 TABLET ORAL at 09:44

## 2024-11-23 RX ADMIN — AMLODIPINE BESYLATE 10 MG: 10 TABLET ORAL at 09:44

## 2024-11-23 RX ADMIN — VANCOMYCIN HYDROCHLORIDE 125 MG: 125 CAPSULE ORAL at 09:44

## 2024-11-23 RX ADMIN — LEVOFLOXACIN 500 MG: 500 TABLET, FILM COATED ORAL at 09:44

## 2024-11-23 RX ADMIN — URSODIOL 300 MG: 300 CAPSULE ORAL at 09:44

## 2024-11-23 RX ADMIN — URSODIOL 300 MG: 300 CAPSULE ORAL at 20:00

## 2024-11-23 RX ADMIN — Medication 2000 UNITS: at 09:44

## 2024-11-23 RX ADMIN — ONDANSETRON HYDROCHLORIDE 8 MG: 8 TABLET, FILM COATED ORAL at 09:46

## 2024-11-23 RX ADMIN — POSACONAZOLE 300 MG: 100 TABLET, DELAYED RELEASE ORAL at 09:44

## 2024-11-23 RX ADMIN — FEXOFENADINE HCL 180 MG: 180 TABLET ORAL at 09:49

## 2024-11-23 RX ADMIN — URSODIOL 300 MG: 300 CAPSULE ORAL at 14:50

## 2024-11-23 RX ADMIN — ACYCLOVIR 400 MG: 400 TABLET ORAL at 09:44

## 2024-11-23 ASSESSMENT — COGNITIVE AND FUNCTIONAL STATUS - GENERAL
DAILY ACTIVITIY SCORE: 24
MOBILITY SCORE: 24

## 2024-11-23 ASSESSMENT — PAIN SCALES - GENERAL
PAINLEVEL_OUTOF10: 0 - NO PAIN
PAINLEVEL_OUTOF10: 0 - NO PAIN

## 2024-11-23 NOTE — PROGRESS NOTES
"Jin Reynolds is a 69 y.o. male on day 8 of admission presenting with AML (acute myeloid leukemia) in remission (Multi).    Subjective   C/o intermittent nausea and diarrhea. 3 episodes of nausea and 2 BMs, one large (11/23):  ROS otherwise unremarkable.      Objective     Physical Exam  Constitutional:       Appearance: Normal appearance.   HENT:      Head: Normocephalic and atraumatic.      Nose: Nose normal.      Mouth/Throat:      Mouth: Mucous membranes are moist.      Comments: Grade I mucositis on tongue and inner cheeks  Eyes:      Conjunctiva/sclera: Conjunctivae normal.      Pupils: Pupils are equal, round, and reactive to light.   Cardiovascular:      Rate and Rhythm: Normal rate and regular rhythm.      Pulses: Normal pulses.      Heart sounds: Normal heart sounds.   Pulmonary:      Effort: Pulmonary effort is normal.      Breath sounds: Normal breath sounds.   Abdominal:      General: Bowel sounds are normal.      Palpations: Abdomen is soft.   Musculoskeletal:         General: Normal range of motion.      Cervical back: Normal range of motion and neck supple.   Skin:     General: Skin is warm and dry.      Capillary Refill: Capillary refill takes 2 to 3 seconds.   Neurological:      General: No focal deficit present.      Mental Status: He is alert and oriented to person, place, and time.   Psychiatric:         Mood and Affect: Mood normal.         Behavior: Behavior normal.       Last Recorded Vitals  Blood pressure 120/81, pulse 69, temperature 36.4 °C (97.5 °F), temperature source Temporal, resp. rate 18, height 1.691 m (5' 6.58\"), weight 82.1 kg (181 lb), SpO2 99%.  Intake/Output last 3 Shifts:  No intake/output data recorded.    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 male presenting with h/o Smoldering Systemic Mastocytosis, recent diagnosis of AML. Admitted for MRD Allo transplant " (T=0 11/21/24).     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 T=0, 11/21/24)     ONC:  # Smoldering Systemiv Mastocytosis diagnosed 8/23/23  - S/p Molecular therapy started 2/13/24  - Continue home Allegra for symptom management  - Ordered Tryptase level 11/16 (62.6)    # AML   - AML diagnosed per [facility] on 6/27/24. Positive for RUNX1, CKit, SRSF2 and CBL C404Y  - S/p C1D1 Venetoclax/Azacitidine on 7/24/24. Developed TLS with renal dysfunction  - [facility] (8/28/24): c/w persistent disease and 27% blast  - Admitted 9/5/24 for C2 of Jeremy/Aza with Venetoclx ramp up dosing (D1 20mg, D2 50 mg, D3 onward 100 mg daily).   - s/p 2 cycles of Jeremy/Aza  - [facility] (9/25/24) and (10/23/24): c/w MRD (<1%)  - Admit 11/15 for Allo MRD SCT (T=0 11/21/24)     Chemo:  - Fludarabine 30 mg/m2 IV D-5, D-4, D-3  - Melphalan 140 mg/m2 IV D-2   - Total Body Irradiation (TBI) 200 cGy on D-1      GVHD Prophy:  - Tacro level: to begin 11/26  - Tacro dose: 2.5mg q12h starting on T+5  - Post-tx Cytoxan 50 mg/kg on T+3 and T+4  - MMF 1000 mg TID to begin T+5 to T+35     TRANSPLANT:   - HLA matched related donor /12 match  - Donor: O+, CMV positive   - Recipient: O+, CMV negative      Surveillance: beginning 11/25  - Monitor CMV 2x weekly  - Monitor EBV weekly  - Monitor Adenovirus weekly  - Monitor Hapto/LDH weekly  - Monitor Fibrinogen 2x weekly  - Monitor UA/protein weekly     HEME:  #Pancytopenia 2/2 to chemotherapy  - Monitor counts daily  - Keep Hgb > 7.0, Plts > 10k     ID:  # Cont prophylaxis meds: Acyclovir, Posaconazole, po Vancomycin, Levofloxacin      FEN/GI:  - Admit wt: 86.1 kg, Daily wt: 82.1 kg (11/23)  # 4 kgs weight loss  - Nutrition Consult ordered (11/23)   - Cont home PPI, Pepcid and antiemetics  - Low pathogen diet  - Monitor electrolytes, replace prn  #JUSTIN; prn Zofran and Compazine available   #EDYTA  -Last bowel movement semi-formed (11/21)  -If bowel movements become liquid, will send CDiff and stool pathogen panel  #Mucositis, mild  - Oral salt  water rinses ordered     CARD:  - ECHO (10/23/24): EF 50-55%, RA/LA enlargement  - Cont home Norvasc     RENAL:  # H/o hematuria, resolved  - S/p Cystoscopy (1/15/24): Normal exam  # C/o frequent urination especially at night  - Last PSA 0.92 (9/6/24)     MISC:  - Cont home Allegra and Singular     DISPO:  - Full Code  - Access: DL (non-tunneled) CVC  - Primary Oncologist: Dr. Nika Dawson      Patient seen, examined, and discussed with Dr Bj Dahl     I spent 45 minutes in the professional and overall care of this patient.      ROSA Matos-CNP

## 2024-11-23 NOTE — CARE PLAN
Problem: Pain - Adult  Goal: Verbalizes/displays adequate comfort level or baseline comfort level  Outcome: Progressing     Problem: Safety - Adult  Goal: Free from fall injury  Outcome: Progressing     Problem: Discharge Planning  Goal: Discharge to home or other facility with appropriate resources  Outcome: Progressing     Problem: Chronic Conditions and Co-morbidities  Goal: Patient's chronic conditions and co-morbidity symptoms are monitored and maintained or improved  Outcome: Progressing       The clinical goals for the shift include Patient will remain safe throughout shift

## 2024-11-24 VITALS
BODY MASS INDEX: 28.41 KG/M2 | WEIGHT: 181 LBS | TEMPERATURE: 97.9 F | RESPIRATION RATE: 18 BRPM | SYSTOLIC BLOOD PRESSURE: 135 MMHG | OXYGEN SATURATION: 100 % | HEIGHT: 67 IN | HEART RATE: 67 BPM | DIASTOLIC BLOOD PRESSURE: 85 MMHG

## 2024-11-24 LAB
ALBUMIN SERPL BCP-MCNC: 4.3 G/DL (ref 3.4–5)
ANION GAP SERPL CALC-SCNC: 13 MMOL/L (ref 10–20)
APTT PPP: 37 SECONDS (ref 27–38)
BACTERIA BPU CULT: NORMAL
BASOPHILS # BLD MANUAL: 0 X10*3/UL (ref 0–0.1)
BASOPHILS NFR BLD MANUAL: 0.8 %
BUN SERPL-MCNC: 11 MG/DL (ref 6–23)
CALCIUM SERPL-MCNC: 8.6 MG/DL (ref 8.6–10.6)
CHLORIDE SERPL-SCNC: 102 MMOL/L (ref 98–107)
CO2 SERPL-SCNC: 23 MMOL/L (ref 21–32)
CREAT SERPL-MCNC: 0.91 MG/DL (ref 0.5–1.3)
DACRYOCYTES BLD QL SMEAR: ABNORMAL
EGFRCR SERPLBLD CKD-EPI 2021: >90 ML/MIN/1.73M*2
EOSINOPHIL # BLD MANUAL: 0.13 X10*3/UL (ref 0–0.7)
EOSINOPHIL NFR BLD MANUAL: 25.9 %
ERYTHROCYTE [DISTWIDTH] IN BLOOD BY AUTOMATED COUNT: 17.1 % (ref 11.5–14.5)
GLUCOSE SERPL-MCNC: 95 MG/DL (ref 74–99)
HCT VFR BLD AUTO: 31.1 % (ref 41–52)
HGB BLD-MCNC: 10.4 G/DL (ref 13.5–17.5)
IMM GRANULOCYTES # BLD AUTO: 0.02 X10*3/UL (ref 0–0.7)
IMM GRANULOCYTES NFR BLD AUTO: 3.9 % (ref 0–0.9)
INR PPP: 1.3 (ref 0.9–1.1)
LYMPHOCYTES # BLD MANUAL: 0.02 X10*3/UL (ref 1.2–4.8)
LYMPHOCYTES NFR BLD MANUAL: 3.4 %
MAGNESIUM SERPL-MCNC: 2.1 MG/DL (ref 1.6–2.4)
MCH RBC QN AUTO: 31.2 PG (ref 26–34)
MCHC RBC AUTO-ENTMCNC: 33.4 G/DL (ref 32–36)
MCV RBC AUTO: 93 FL (ref 80–100)
METAMYELOCYTES # BLD MANUAL: 0 X10*3/UL
METAMYELOCYTES NFR BLD MANUAL: 0.9 %
MONOCYTES # BLD MANUAL: 0 X10*3/UL (ref 0.1–1)
MONOCYTES NFR BLD MANUAL: 0 %
NEUTROPHILS # BLD MANUAL: 0.34 X10*3/UL (ref 1.2–7.7)
NEUTS BAND # BLD MANUAL: 0 X10*3/UL (ref 0–0.7)
NEUTS BAND NFR BLD MANUAL: 0.9 %
NEUTS SEG # BLD MANUAL: 0.34 X10*3/UL (ref 1.2–7)
NEUTS SEG NFR BLD MANUAL: 68.1 %
NRBC BLD-RTO: 0 /100 WBCS (ref 0–0)
OVALOCYTES BLD QL SMEAR: ABNORMAL
PHOSPHATE SERPL-MCNC: 3 MG/DL (ref 2.5–4.9)
PLATELET # BLD AUTO: 76 X10*3/UL (ref 150–450)
POTASSIUM SERPL-SCNC: 3.9 MMOL/L (ref 3.5–5.3)
PROTHROMBIN TIME: 15.1 SECONDS (ref 9.8–12.8)
RBC # BLD AUTO: 3.33 X10*6/UL (ref 4.5–5.9)
RBC MORPH BLD: ABNORMAL
SCHISTOCYTES BLD QL SMEAR: ABNORMAL
SODIUM SERPL-SCNC: 134 MMOL/L (ref 136–145)
TOTAL CELLS COUNTED BLD: 116
WBC # BLD AUTO: 0.5 X10*3/UL (ref 4.4–11.3)

## 2024-11-24 PROCEDURE — 1170000001 HC PRIVATE ONCOLOGY ROOM DAILY

## 2024-11-24 PROCEDURE — 83735 ASSAY OF MAGNESIUM: CPT

## 2024-11-24 PROCEDURE — 85007 BL SMEAR W/DIFF WBC COUNT: CPT

## 2024-11-24 PROCEDURE — 2500000002 HC RX 250 W HCPCS SELF ADMINISTERED DRUGS (ALT 637 FOR MEDICARE OP, ALT 636 FOR OP/ED)

## 2024-11-24 PROCEDURE — 2500000002 HC RX 250 W HCPCS SELF ADMINISTERED DRUGS (ALT 637 FOR MEDICARE OP, ALT 636 FOR OP/ED): Performed by: INTERNAL MEDICINE

## 2024-11-24 PROCEDURE — 99233 SBSQ HOSP IP/OBS HIGH 50: CPT | Performed by: STUDENT IN AN ORGANIZED HEALTH CARE EDUCATION/TRAINING PROGRAM

## 2024-11-24 PROCEDURE — 2500000001 HC RX 250 WO HCPCS SELF ADMINISTERED DRUGS (ALT 637 FOR MEDICARE OP): Performed by: INTERNAL MEDICINE

## 2024-11-24 PROCEDURE — 2500000001 HC RX 250 WO HCPCS SELF ADMINISTERED DRUGS (ALT 637 FOR MEDICARE OP)

## 2024-11-24 PROCEDURE — 2500000001 HC RX 250 WO HCPCS SELF ADMINISTERED DRUGS (ALT 637 FOR MEDICARE OP): Performed by: HOME HEALTH AIDE

## 2024-11-24 PROCEDURE — 85610 PROTHROMBIN TIME: CPT

## 2024-11-24 PROCEDURE — 2500000004 HC RX 250 GENERAL PHARMACY W/ HCPCS (ALT 636 FOR OP/ED): Performed by: INTERNAL MEDICINE

## 2024-11-24 PROCEDURE — 85027 COMPLETE CBC AUTOMATED: CPT

## 2024-11-24 PROCEDURE — 80069 RENAL FUNCTION PANEL: CPT | Performed by: PHYSICIAN ASSISTANT

## 2024-11-24 RX ORDER — PALONOSETRON 0.05 MG/ML
0.25 INJECTION, SOLUTION INTRAVENOUS ONCE
Status: COMPLETED | OUTPATIENT
Start: 2024-11-24 | End: 2024-11-24

## 2024-11-24 RX ORDER — SODIUM CHLORIDE 9 MG/ML
100 INJECTION, SOLUTION INTRAVENOUS CONTINUOUS
Status: ACTIVE | OUTPATIENT
Start: 2024-11-24 | End: 2024-11-25

## 2024-11-24 RX ADMIN — URSODIOL 300 MG: 300 CAPSULE ORAL at 15:48

## 2024-11-24 RX ADMIN — ACYCLOVIR 400 MG: 400 TABLET ORAL at 08:40

## 2024-11-24 RX ADMIN — URSODIOL 300 MG: 300 CAPSULE ORAL at 20:02

## 2024-11-24 RX ADMIN — FOSAPREPITANT 150 MG: 150 INJECTION, POWDER, LYOPHILIZED, FOR SOLUTION INTRAVENOUS at 08:39

## 2024-11-24 RX ADMIN — POSACONAZOLE 300 MG: 100 TABLET, DELAYED RELEASE ORAL at 08:39

## 2024-11-24 RX ADMIN — MESNA 500 MG: 100 INJECTION, SOLUTION INTRAVENOUS at 15:47

## 2024-11-24 RX ADMIN — MESNA 500 MG: 100 INJECTION, SOLUTION INTRAVENOUS at 11:53

## 2024-11-24 RX ADMIN — SODIUM CHLORIDE 100 ML/HR: 9 INJECTION, SOLUTION INTRAVENOUS at 17:26

## 2024-11-24 RX ADMIN — FEXOFENADINE HCL 180 MG: 180 TABLET ORAL at 08:39

## 2024-11-24 RX ADMIN — SODIUM CHLORIDE 100 ML/HR: 9 INJECTION, SOLUTION INTRAVENOUS at 06:17

## 2024-11-24 RX ADMIN — AMLODIPINE BESYLATE 10 MG: 10 TABLET ORAL at 08:39

## 2024-11-24 RX ADMIN — ACYCLOVIR 400 MG: 400 TABLET ORAL at 20:02

## 2024-11-24 RX ADMIN — FAMOTIDINE 20 MG: 20 TABLET ORAL at 08:39

## 2024-11-24 RX ADMIN — LEVOFLOXACIN 500 MG: 500 TABLET, FILM COATED ORAL at 08:40

## 2024-11-24 RX ADMIN — ALLOPURINOL 300 MG: 300 TABLET ORAL at 08:39

## 2024-11-24 RX ADMIN — VANCOMYCIN HYDROCHLORIDE 125 MG: 125 CAPSULE ORAL at 08:40

## 2024-11-24 RX ADMIN — MESNA 500 MG: 100 INJECTION, SOLUTION INTRAVENOUS at 23:33

## 2024-11-24 RX ADMIN — MESNA 500 MG: 100 INJECTION, SOLUTION INTRAVENOUS at 20:02

## 2024-11-24 RX ADMIN — Medication 2000 UNITS: at 08:39

## 2024-11-24 RX ADMIN — PALONOSETRON HYDROCHLORIDE 250 MCG: 0.25 INJECTION INTRAVENOUS at 08:52

## 2024-11-24 RX ADMIN — URSODIOL 300 MG: 300 CAPSULE ORAL at 08:40

## 2024-11-24 RX ADMIN — CYCLOPHOSPHAMIDE 3260 MG: 1 INJECTION, POWDER, FOR SOLUTION INTRAVENOUS; ORAL at 09:34

## 2024-11-24 ASSESSMENT — PAIN SCALES - GENERAL: PAINLEVEL_OUTOF10: 0 - NO PAIN

## 2024-11-24 NOTE — CARE PLAN
Problem: Pain - Adult  Goal: Verbalizes/displays adequate comfort level or baseline comfort level  Outcome: Progressing     Problem: Safety - Adult  Goal: Free from fall injury  Outcome: Progressing     Problem: Discharge Planning  Goal: Discharge to home or other facility with appropriate resources  Outcome: Progressing     Problem: Chronic Conditions and Co-morbidities  Goal: Patient's chronic conditions and co-morbidity symptoms are monitored and maintained or improved  Outcome: Progressing   The patient's goals for the shift include      The clinical goals for the shift include remain HDS

## 2024-11-24 NOTE — CARE PLAN
The patient's goals for the shift include      The clinical goals for the shift include patient will remain HDS throughout shift      Problem: Pain - Adult  Goal: Verbalizes/displays adequate comfort level or baseline comfort level  Outcome: Progressing     Problem: Safety - Adult  Goal: Free from fall injury  Outcome: Progressing     Problem: Discharge Planning  Goal: Discharge to home or other facility with appropriate resources  Outcome: Progressing     Problem: Chronic Conditions and Co-morbidities  Goal: Patient's chronic conditions and co-morbidity symptoms are monitored and maintained or improved  Outcome: Progressing

## 2024-11-24 NOTE — PROGRESS NOTES
"Jin Reynolds is a 69 y.o. male on day 9 of admission presenting with AML (acute myeloid leukemia) in remission (Multi).    Subjective   Pt c/o light headedness, will get orthos. Otherwise cont IVF on T+3 & T+4.     Denies SOB, HA, CP, chills, n/v/d/c, abdom pain, dysuria, tarry stools. ROS otherwise neg.    Objective     Physical Exam  Constitutional:       Appearance: Normal appearance.   HENT:      Head: Normocephalic and atraumatic.      Nose: Nose normal.      Mouth/Throat:      Mouth: Mucous membranes are moist.      Comments: Grade I mucositis on tongue and inner cheeks  Eyes:      Conjunctiva/sclera: Conjunctivae normal.      Pupils: Pupils are equal, round, and reactive to light.   Cardiovascular:      Rate and Rhythm: Normal rate and regular rhythm.      Pulses: Normal pulses.      Heart sounds: Normal heart sounds.   Pulmonary:      Effort: Pulmonary effort is normal.      Breath sounds: Normal breath sounds.   Abdominal:      General: Bowel sounds are normal.      Palpations: Abdomen is soft.   Musculoskeletal:         General: Normal range of motion.      Cervical back: Normal range of motion and neck supple.   Skin:     General: Skin is warm and dry.      Capillary Refill: Capillary refill takes 2 to 3 seconds.   Neurological:      General: No focal deficit present.      Mental Status: He is alert and oriented to person, place, and time.   Psychiatric:         Mood and Affect: Mood normal.         Behavior: Behavior normal.       Last Recorded Vitals  Blood pressure 136/84, pulse 69, temperature 36.2 °C (97.2 °F), temperature source Temporal, resp. rate 18, height 1.691 m (5' 6.58\"), weight 82.1 kg (181 lb), SpO2 97%.  Intake/Output last 3 Shifts:  No intake/output data recorded.    Results for orders placed or performed during the hospital encounter of 11/15/24 (from the past 24 hours)   CBC and Auto Differential   Result Value Ref Range    WBC 0.5 (LL) 4.4 - 11.3 x10*3/uL    nRBC 0.0 0.0 - 0.0 /100 " WBCs    RBC 3.33 (L) 4.50 - 5.90 x10*6/uL    Hemoglobin 10.4 (L) 13.5 - 17.5 g/dL    Hematocrit 31.1 (L) 41.0 - 52.0 %    MCV 93 80 - 100 fL    MCH 31.2 26.0 - 34.0 pg    MCHC 33.4 32.0 - 36.0 g/dL    RDW 17.1 (H) 11.5 - 14.5 %    Platelets 76 (L) 150 - 450 x10*3/uL    Immature Granulocytes %, Automated 3.9 (H) 0.0 - 0.9 %    Immature Granulocytes Absolute, Automated 0.02 0.00 - 0.70 x10*3/uL   Coagulation Screen   Result Value Ref Range    Protime 15.1 (H) 9.8 - 12.8 seconds    INR 1.3 (H) 0.9 - 1.1    aPTT 37 27 - 38 seconds   Magnesium   Result Value Ref Range    Magnesium 2.10 1.60 - 2.40 mg/dL   Renal Function Panel   Result Value Ref Range    Glucose 95 74 - 99 mg/dL    Sodium 134 (L) 136 - 145 mmol/L    Potassium 3.9 3.5 - 5.3 mmol/L    Chloride 102 98 - 107 mmol/L    Bicarbonate 23 21 - 32 mmol/L    Anion Gap 13 10 - 20 mmol/L    Urea Nitrogen 11 6 - 23 mg/dL    Creatinine 0.91 0.50 - 1.30 mg/dL    eGFR >90 >60 mL/min/1.73m*2    Calcium 8.6 8.6 - 10.6 mg/dL    Phosphorus 3.0 2.5 - 4.9 mg/dL    Albumin 4.3 3.4 - 5.0 g/dL   Manual Differential   Result Value Ref Range    Neutrophils %, Manual 68.1 40.0 - 80.0 %    Bands %, Manual 0.9 0.0 - 5.0 %    Lymphocytes %, Manual 3.4 13.0 - 44.0 %    Monocytes %, Manual 0.0 2.0 - 10.0 %    Eosinophils %, Manual 25.9 0.0 - 6.0 %    Basophils %, Manual 0.8 0.0 - 2.0 %    Metamyelocytes %, Manual 0.9 0.0 - 0.0 %    Seg Neutrophils Absolute, Manual 0.34 (L) 1.20 - 7.00 x10*3/uL    Bands Absolute, Manual 0.00 0.00 - 0.70 x10*3/uL    Lymphocytes Absolute, Manual 0.02 (L) 1.20 - 4.80 x10*3/uL    Monocytes Absolute, Manual 0.00 (L) 0.10 - 1.00 x10*3/uL    Eosinophils Absolute, Manual 0.13 0.00 - 0.70 x10*3/uL    Basophils Absolute, Manual 0.00 0.00 - 0.10 x10*3/uL    Metamyelocytes Absolute, Manual 0.00 0.00 - 0.00 x10*3/uL    Total Cells Counted 116     Neutrophils Absolute, Manual 0.34 (L) 1.20 - 7.70 x10*3/uL    RBC Morphology See Below     RBC Fragments Few     Ovalocytes  Few     Teardrop Cells Few      *Note: Due to a large number of results and/or encounters for the requested time period, some results have not been displayed. A complete set of results can be found in Results Review.         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     T+3   Allo MRD     ONC:  # AML with MRD  - AML diagnosed per [facility] on 6/27/24. Positive for RUNX1, CKit, SRSF2 and CBL C404Y  - S/p C1D1 Venetoclax/Azacitidine on 7/24/24. Developed TLS with renal dysfunction  - [facility] (8/28/24): c/w persistent disease and 27% blast  - Admitted 9/5/24 for C2 of Jeremy/Aza with Venetoclx ramp up dosing (D1 20mg, D2 50 mg, D3 onward 100 mg daily).   - S/p 2 cycles of Jeremy/Aza  - [facility] (9/25/24) and (10/23/24): c/w MRD (<1%)  - [facility] (10/23): 0.2% blasts, hypercellular (90%) with systemic mastocytosis  - Admitted 11/15 for Allo MRD SCT (T=0 11/21/24)  # Smoldering Systemiv Mastocytosis diagnosed 8/23/23  - S/p Molecular therapy started 2/13/24  - Continue home Allegra for symptom management  - Ordered Tryptase level 11/16 (62.6)       Chemo:  - Fludarabine 30 mg/m2 IV D-5, D-4, D-3  - Melphalan 140 mg/m2 IV D-2   - Total Body Irradiation (TBI) 200 cGy on D-1      GVHD Prophy:  - Tacro level: to begin 11/26  - Tacro dose: 2.5mg q12h starting on T+5  - Post-tx Cytoxan 50 mg/kg on T+3 and T+4  - MMF 1000 mg TID to begin T+5 to T+35     TRANSPLANT:   - HLA matched related donor /12 match  - Donor: O+, CMV positive   - Recipient: O+, CMV negative      Surveillance: beginning 11/25  - Monitor CMV 2x weekly  - Monitor EBV weekly  - Monitor Adenovirus weekly  - Monitor Hapto/LDH weekly  - Monitor Fibrinogen 2x weekly  - Monitor UA/protein weekly     HEME:  # Pancytopenia 2/2 to chemotherapy  - Monitor counts daily  - Keep  Hgb > 7.0, Plts > 10k     ID:  # Cont prophylaxis meds: Acyclovir, Posaconazole, po Vancomycin, Levofloxacin      FEN/GI:  - Admit wt: 86.1 kg, Daily wt: 82.1 kg (11/23)  # Weight loss w/ poor PO intake  - Nutrition Consult ordered (11/23)   - Cont home PPI, Pepcid and antiemetics  - Low pathogen diet  - Monitor electrolytes, replace prn  # JUSTIN; prn Zofran and Compazine available   # EDYTA   -Last bowel movement semi-formed (11/21)  - If bowel movements become liquid, will send CDiff and stool pathogen panel  # Mucositis, mild  - Oral salt water rinses ordered     CARD:  - ECHO (10/23/24): EF 50-55%, RA/LA enlargement  - Cont home Norvasc     RENAL:  # H/o hematuria, resolved  - S/p Cystoscopy (1/15/24): Normal exam  # C/o frequent urination especially at night  - Last PSA 0.92 (9/6/24)     MISC:  - Cont home Allegra and Singular     DISPO:  - Full Code  - Access: DL (non-tunneled) CVC  - Primary On: Dr. Nika Dawson      Patient seen, examined, and discussed with ROSA Hartman-CNP

## 2024-11-25 LAB
ALBUMIN SERPL BCP-MCNC: 3.8 G/DL (ref 3.4–5)
ANION GAP SERPL CALC-SCNC: 11 MMOL/L (ref 10–20)
APTT PPP: 37 SECONDS (ref 27–38)
BASOPHILS # BLD MANUAL: 0 X10*3/UL (ref 0–0.1)
BASOPHILS NFR BLD MANUAL: 0 %
BUN SERPL-MCNC: 7 MG/DL (ref 6–23)
C DIF TOX TCDA+TCDB STL QL NAA+PROBE: NOT DETECTED
CALCIUM SERPL-MCNC: 7.8 MG/DL (ref 8.6–10.6)
CHLORIDE SERPL-SCNC: 106 MMOL/L (ref 98–107)
CO2 SERPL-SCNC: 23 MMOL/L (ref 21–32)
CREAT SERPL-MCNC: 0.72 MG/DL (ref 0.5–1.3)
EGFRCR SERPLBLD CKD-EPI 2021: >90 ML/MIN/1.73M*2
EOSINOPHIL # BLD MANUAL: 0.07 X10*3/UL (ref 0–0.7)
EOSINOPHIL NFR BLD MANUAL: 33.3 %
ERYTHROCYTE [DISTWIDTH] IN BLOOD BY AUTOMATED COUNT: 16.8 % (ref 11.5–14.5)
FIBRINOGEN PPP-MCNC: 213 MG/DL (ref 200–400)
GLUCOSE SERPL-MCNC: 83 MG/DL (ref 74–99)
HAPTOGLOB SERPL NEPH-MCNC: 42 MG/DL (ref 30–200)
HCT VFR BLD AUTO: 27 % (ref 41–52)
HGB BLD-MCNC: 9 G/DL (ref 13.5–17.5)
IMM GRANULOCYTES # BLD AUTO: 0.01 X10*3/UL (ref 0–0.7)
IMM GRANULOCYTES NFR BLD AUTO: 5.3 % (ref 0–0.9)
INR PPP: 1.4 (ref 0.9–1.1)
LDH SERPL L TO P-CCNC: 116 U/L (ref 84–246)
LYMPHOCYTES # BLD MANUAL: 0 X10*3/UL (ref 1.2–4.8)
LYMPHOCYTES NFR BLD MANUAL: 0 %
MAGNESIUM SERPL-MCNC: 1.97 MG/DL (ref 1.6–2.4)
MCH RBC QN AUTO: 31.3 PG (ref 26–34)
MCHC RBC AUTO-ENTMCNC: 33.3 G/DL (ref 32–36)
MCV RBC AUTO: 94 FL (ref 80–100)
MONOCYTES # BLD MANUAL: 0 X10*3/UL (ref 0.1–1)
MONOCYTES NFR BLD MANUAL: 0 %
NEUTS SEG # BLD MANUAL: 0.13 X10*3/UL (ref 1.2–7)
NEUTS SEG NFR BLD MANUAL: 66.7 %
NRBC BLD-RTO: 0 /100 WBCS (ref 0–0)
PHOSPHATE SERPL-MCNC: 2.9 MG/DL (ref 2.5–4.9)
PLATELET # BLD AUTO: 48 X10*3/UL (ref 150–450)
POTASSIUM SERPL-SCNC: 3.5 MMOL/L (ref 3.5–5.3)
PROTHROMBIN TIME: 15.9 SECONDS (ref 9.8–12.8)
RBC # BLD AUTO: 2.88 X10*6/UL (ref 4.5–5.9)
RBC MORPH BLD: ABNORMAL
SODIUM SERPL-SCNC: 136 MMOL/L (ref 136–145)
TOTAL CELLS COUNTED BLD: 33
WBC # BLD AUTO: 0.2 X10*3/UL (ref 4.4–11.3)

## 2024-11-25 PROCEDURE — 1170000001 HC PRIVATE ONCOLOGY ROOM DAILY

## 2024-11-25 PROCEDURE — 87799 DETECT AGENT NOS DNA QUANT: CPT | Performed by: PHYSICIAN ASSISTANT

## 2024-11-25 PROCEDURE — 87493 C DIFF AMPLIFIED PROBE: CPT

## 2024-11-25 PROCEDURE — 2500000005 HC RX 250 GENERAL PHARMACY W/O HCPCS

## 2024-11-25 PROCEDURE — 87506 IADNA-DNA/RNA PROBE TQ 6-11: CPT

## 2024-11-25 PROCEDURE — 2500000001 HC RX 250 WO HCPCS SELF ADMINISTERED DRUGS (ALT 637 FOR MEDICARE OP)

## 2024-11-25 PROCEDURE — 83735 ASSAY OF MAGNESIUM: CPT

## 2024-11-25 PROCEDURE — 2500000004 HC RX 250 GENERAL PHARMACY W/ HCPCS (ALT 636 FOR OP/ED): Performed by: INTERNAL MEDICINE

## 2024-11-25 PROCEDURE — 85730 THROMBOPLASTIN TIME PARTIAL: CPT

## 2024-11-25 PROCEDURE — 2500000001 HC RX 250 WO HCPCS SELF ADMINISTERED DRUGS (ALT 637 FOR MEDICARE OP): Performed by: HOME HEALTH AIDE

## 2024-11-25 PROCEDURE — 2500000001 HC RX 250 WO HCPCS SELF ADMINISTERED DRUGS (ALT 637 FOR MEDICARE OP): Performed by: NURSE PRACTITIONER

## 2024-11-25 PROCEDURE — 82565 ASSAY OF CREATININE: CPT | Performed by: PHYSICIAN ASSISTANT

## 2024-11-25 PROCEDURE — 87799 DETECT AGENT NOS DNA QUANT: CPT | Performed by: STUDENT IN AN ORGANIZED HEALTH CARE EDUCATION/TRAINING PROGRAM

## 2024-11-25 PROCEDURE — 85027 COMPLETE CBC AUTOMATED: CPT

## 2024-11-25 PROCEDURE — 83010 ASSAY OF HAPTOGLOBIN QUANT: CPT | Performed by: PHYSICIAN ASSISTANT

## 2024-11-25 PROCEDURE — 99233 SBSQ HOSP IP/OBS HIGH 50: CPT | Performed by: STUDENT IN AN ORGANIZED HEALTH CARE EDUCATION/TRAINING PROGRAM

## 2024-11-25 PROCEDURE — 85384 FIBRINOGEN ACTIVITY: CPT | Performed by: PHYSICIAN ASSISTANT

## 2024-11-25 PROCEDURE — 2500000002 HC RX 250 W HCPCS SELF ADMINISTERED DRUGS (ALT 637 FOR MEDICARE OP, ALT 636 FOR OP/ED): Performed by: INTERNAL MEDICINE

## 2024-11-25 PROCEDURE — 85007 BL SMEAR W/DIFF WBC COUNT: CPT

## 2024-11-25 PROCEDURE — 2500000002 HC RX 250 W HCPCS SELF ADMINISTERED DRUGS (ALT 637 FOR MEDICARE OP, ALT 636 FOR OP/ED)

## 2024-11-25 PROCEDURE — 83615 LACTATE (LD) (LDH) ENZYME: CPT | Performed by: STUDENT IN AN ORGANIZED HEALTH CARE EDUCATION/TRAINING PROGRAM

## 2024-11-25 PROCEDURE — 2500000001 HC RX 250 WO HCPCS SELF ADMINISTERED DRUGS (ALT 637 FOR MEDICARE OP): Performed by: INTERNAL MEDICINE

## 2024-11-25 RX ORDER — LOPERAMIDE HYDROCHLORIDE 2 MG/1
2 CAPSULE ORAL EVERY 4 HOURS
Status: DISCONTINUED | OUTPATIENT
Start: 2024-11-25 | End: 2024-11-27

## 2024-11-25 RX ORDER — LOPERAMIDE HYDROCHLORIDE 2 MG/1
2 CAPSULE ORAL 4 TIMES DAILY PRN
Status: DISCONTINUED | OUTPATIENT
Start: 2024-11-29 | End: 2024-12-07 | Stop reason: HOSPADM

## 2024-11-25 ASSESSMENT — COGNITIVE AND FUNCTIONAL STATUS - GENERAL
DAILY ACTIVITIY SCORE: 24
MOBILITY SCORE: 24
MOBILITY SCORE: 24
DAILY ACTIVITIY SCORE: 24
DAILY ACTIVITIY SCORE: 24
MOBILITY SCORE: 24

## 2024-11-25 ASSESSMENT — PAIN SCALES - GENERAL
PAINLEVEL_OUTOF10: 0 - NO PAIN

## 2024-11-25 ASSESSMENT — PAIN - FUNCTIONAL ASSESSMENT
PAIN_FUNCTIONAL_ASSESSMENT: 0-10

## 2024-11-25 NOTE — PROGRESS NOTES
"Jin Reynolds is a 69 y.o. male on day 10 of admission presenting with AML (acute myeloid leukemia) in remission (Multi).    Subjective   Pt c/o light headedness, will get orthos. Otherwise cont IVF on T+3 & T+4. Diarrhea continues, but not worse today.     Denies SOB, HA, CP, chills, n/v/d/c, abdom pain, dysuria, tarry stools. ROS otherwise neg.    Objective     Physical Exam  Constitutional:       Appearance: Normal appearance.   HENT:      Head: Normocephalic and atraumatic.      Nose: Nose normal.      Mouth/Throat:      Mouth: Mucous membranes are moist.      Comments: Grade I mucositis on tongue and inner cheeks  Eyes:      Conjunctiva/sclera: Conjunctivae normal.      Pupils: Pupils are equal, round, and reactive to light.   Cardiovascular:      Rate and Rhythm: Normal rate and regular rhythm.      Pulses: Normal pulses.      Heart sounds: Normal heart sounds.   Pulmonary:      Effort: Pulmonary effort is normal.      Breath sounds: Normal breath sounds.   Abdominal:      General: Bowel sounds are normal.      Palpations: Abdomen is soft.   Musculoskeletal:         General: Normal range of motion.      Cervical back: Normal range of motion and neck supple.   Skin:     General: Skin is warm and dry.      Capillary Refill: Capillary refill takes 2 to 3 seconds.   Neurological:      General: No focal deficit present.      Mental Status: He is alert and oriented to person, place, and time.   Psychiatric:         Mood and Affect: Mood normal.         Behavior: Behavior normal.       Last Recorded Vitals  Blood pressure 130/74, pulse 74, temperature 36 °C (96.8 °F), temperature source Temporal, resp. rate 16, height 1.691 m (5' 6.58\"), weight 82.1 kg (181 lb), SpO2 98%.  Intake/Output last 3 Shifts:  I/O last 3 completed shifts:  In: 2575 (31.4 mL/kg) [I.V.:2000 (24.4 mL/kg); IV Piggyback:575]  Out: - (0 mL/kg)   Weight: 82.1 kg     Results for orders placed or performed during the hospital encounter of 11/15/24 " (from the past 24 hours)   CBC and Auto Differential   Result Value Ref Range    WBC 0.2 (LL) 4.4 - 11.3 x10*3/uL    nRBC 0.0 0.0 - 0.0 /100 WBCs    RBC 2.88 (L) 4.50 - 5.90 x10*6/uL    Hemoglobin 9.0 (L) 13.5 - 17.5 g/dL    Hematocrit 27.0 (L) 41.0 - 52.0 %    MCV 94 80 - 100 fL    MCH 31.3 26.0 - 34.0 pg    MCHC 33.3 32.0 - 36.0 g/dL    RDW 16.8 (H) 11.5 - 14.5 %    Platelets 48 (L) 150 - 450 x10*3/uL    Immature Granulocytes %, Automated 5.3 (H) 0.0 - 0.9 %    Immature Granulocytes Absolute, Automated 0.01 0.00 - 0.70 x10*3/uL   Coagulation Screen   Result Value Ref Range    Protime 15.9 (H) 9.8 - 12.8 seconds    INR 1.4 (H) 0.9 - 1.1    aPTT 37 27 - 38 seconds   Magnesium   Result Value Ref Range    Magnesium 1.97 1.60 - 2.40 mg/dL   Renal Function Panel   Result Value Ref Range    Glucose 83 74 - 99 mg/dL    Sodium 136 136 - 145 mmol/L    Potassium 3.5 3.5 - 5.3 mmol/L    Chloride 106 98 - 107 mmol/L    Bicarbonate 23 21 - 32 mmol/L    Anion Gap 11 10 - 20 mmol/L    Urea Nitrogen 7 6 - 23 mg/dL    Creatinine 0.72 0.50 - 1.30 mg/dL    eGFR >90 >60 mL/min/1.73m*2    Calcium 7.8 (L) 8.6 - 10.6 mg/dL    Phosphorus 2.9 2.5 - 4.9 mg/dL    Albumin 3.8 3.4 - 5.0 g/dL   Fibrinogen   Result Value Ref Range    Fibrinogen 213 200 - 400 mg/dL   Haptoglobin   Result Value Ref Range    Haptoglobin 42 30 - 200 mg/dL   Manual Differential   Result Value Ref Range    Neutrophils %, Manual 66.7 40.0 - 80.0 %    Lymphocytes %, Manual 0.0 13.0 - 44.0 %    Monocytes %, Manual 0.0 2.0 - 10.0 %    Eosinophils %, Manual 33.3 0.0 - 6.0 %    Basophils %, Manual 0.0 0.0 - 2.0 %    Seg Neutrophils Absolute, Manual 0.13 (L) 1.20 - 7.00 x10*3/uL    Lymphocytes Absolute, Manual 0.00 (L) 1.20 - 4.80 x10*3/uL    Monocytes Absolute, Manual 0.00 (L) 0.10 - 1.00 x10*3/uL    Eosinophils Absolute, Manual 0.07 0.00 - 0.70 x10*3/uL    Basophils Absolute, Manual 0.00 0.00 - 0.10 x10*3/uL    Total Cells Counted 33     RBC Morphology No significant RBC  morphology present    C. difficile, PCR    Specimen: Stool   Result Value Ref Range    C. difficile, PCR Not Detected Not Detected     *Note: Due to a large number of results and/or encounters for the requested time period, some results have not been displayed. A complete set of results can be found in Results Review.         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     T+4   Allo MRD     ONC:  # AML with MRD  - AML diagnosed per [facility] on 6/27/24. Positive for RUNX1, CKit, SRSF2 and CBL C404Y  - S/p C1D1 Venetoclax/Azacitidine on 7/24/24. Developed TLS with renal dysfunction  - [facility] (8/28/24): c/w persistent disease and 27% blast  - Admitted 9/5/24 for C2 of Jeremy/Aza with Venetoclx ramp up dosing (D1 20mg, D2 50 mg, D3 onward 100 mg daily).   - S/p 2 cycles of Jeremy/Aza  - [facility] (9/25/24) and (10/23/24): c/w MRD (<1%)  - [facility] (10/23): 0.2% blasts, hypercellular (90%) with systemic mastocytosis  - Admitted 11/15 for Allo MRD SCT (T=0 11/21/24)  # Smoldering Systemiv Mastocytosis diagnosed 8/23/23  - S/p Molecular therapy started 2/13/24  - Continue home Allegra for symptom management  - Tryptase level 11/16 (62.6) normal     Chemo:  - Fludarabine 30 mg/m2 IV D-5, D-4, D-3  - Melphalan 140 mg/m2 IV D-2   - Total Body Irradiation (TBI) 200 cGy on D-1      GVHD Prophy:  - Tacro level: to begin 11/26  - Tacro dose: 2.5 mg q12h starting on T+5  - Post-tx Cytoxan 50 mg/kg on T+3 and T+4  - MMF 1000 mg TID to begin T+5 to T+35     TRANSPLANT:   - HLA matched related donor /12 match  - Donor: O+, CMV positive   - Recipient: O+, CMV negative      Surveillance: beginning 11/25  - Monitor CMV 2x weekly  - Monitor EBV weekly  - Monitor Adenovirus weekly  - Monitor Hapto/LDH weekly  - Monitor Fibrinogen 2x weekly  -  Monitor UA/protein weekly     HEME:  # Pancytopenia 2/2 to chemotherapy. No acute bleed.  - Monitor counts daily  - Keep Hgb > 7.0, Plts > 10k     ID:  # Cont prophylaxis meds: Acyclovir, Posaconazole, po Vancomycin, Levofloxacin      FEN/GI:  - Admit wt: 86.1 kg, Daily wt: 82.1 kg (11/23)  # Weight loss w/ poor PO intake  - Nutrition Consult ordered (11/23)   - Cont home PPI, Pepcid and antiemetics  - Low pathogen diet  - Monitor electrolytes, replace prn  # JUSTIN; prn Zofran and Compazine available   # EDYTA   -Last bowel movement semi-formed (11/21)  - If bowel movements become liquid, will send CDiff and stool pathogen panel  # Mucositis, mild  - Oral salt water rinses ordered  # Diarrhea  - Cdiff neg (11/25)  - Stool path pending  - Loperamide scheduled x 4 d, then PRN     CARD:  - ECHO (10/23/24): EF 50-55%, RA/LA enlargement  - Cont home Norvasc     RENAL:  # H/o hematuria, resolved  - S/p Cystoscopy (1/15/24): Normal exam  # C/o frequent urination especially at night  - Last PSA 0.92 (9/6/24)     MISC:  - Cont home Allegra and Singular     DISPO:  - Full Code  - Access: DL (non-tunneled) CVC  - Primary On: Dr. Nika Dawson      Patient seen, examined, and discussed with Dr Bj Mcgrath, APRN-CNP

## 2024-11-25 NOTE — CARE PLAN
The patient's goals for the shift include      Problem: Pain - Adult  Goal: Verbalizes/displays adequate comfort level or baseline comfort level  Outcome: Progressing     Problem: Safety - Adult  Goal: Free from fall injury  Outcome: Progressing     Problem: Discharge Planning  Goal: Discharge to home or other facility with appropriate resources  Outcome: Progressing     Problem: Chronic Conditions and Co-morbidities  Goal: Patient's chronic conditions and co-morbidity symptoms are monitored and maintained or improved  Outcome: Progressing     The clinical goals for the shift include pt will be free fronm injury throughout shift

## 2024-11-26 LAB
ADENOVIRUS QPCR,PLASMA, VIRC: NOT DETECTED COPIES/ML
ALBUMIN SERPL BCP-MCNC: 3.6 G/DL (ref 3.4–5)
ANION GAP SERPL CALC-SCNC: 11 MMOL/L (ref 10–20)
APTT PPP: 37 SECONDS (ref 27–38)
BASOPHILS # BLD AUTO: 0 X10*3/UL (ref 0–0.1)
BASOPHILS NFR BLD AUTO: 0 %
BUN SERPL-MCNC: 6 MG/DL (ref 6–23)
C COLI+JEJ+UPSA DNA STL QL NAA+PROBE: NOT DETECTED
CALCIUM SERPL-MCNC: 7.5 MG/DL (ref 8.6–10.6)
CHLORIDE SERPL-SCNC: 108 MMOL/L (ref 98–107)
CMV DNA SERPL NAA+PROBE-LOG IU: NORMAL {LOG_IU}/ML
CO2 SERPL-SCNC: 21 MMOL/L (ref 21–32)
CREAT SERPL-MCNC: 0.7 MG/DL (ref 0.5–1.3)
EBV DNA SPEC NAA+PROBE-LOG#: NORMAL {LOG_COPIES}/ML
EC STX1 GENE STL QL NAA+PROBE: NOT DETECTED
EC STX2 GENE STL QL NAA+PROBE: NOT DETECTED
EGFRCR SERPLBLD CKD-EPI 2021: >90 ML/MIN/1.73M*2
EOSINOPHIL # BLD AUTO: 0.01 X10*3/UL (ref 0–0.7)
EOSINOPHIL NFR BLD AUTO: 25 %
ERYTHROCYTE [DISTWIDTH] IN BLOOD BY AUTOMATED COUNT: 16.9 % (ref 11.5–14.5)
GLUCOSE SERPL-MCNC: 101 MG/DL (ref 74–99)
HCT VFR BLD AUTO: 24.3 % (ref 41–52)
HGB BLD-MCNC: 8.4 G/DL (ref 13.5–17.5)
IMM GRANULOCYTES # BLD AUTO: 0 X10*3/UL (ref 0–0.7)
IMM GRANULOCYTES NFR BLD AUTO: 0 % (ref 0–0.9)
INR PPP: 1.6 (ref 0.9–1.1)
LABORATORY COMMENT REPORT: NOT DETECTED
LABORATORY COMMENT REPORT: NOT DETECTED
LYMPHOCYTES # BLD AUTO: 0 X10*3/UL (ref 1.2–4.8)
LYMPHOCYTES NFR BLD AUTO: 0 %
MAGNESIUM SERPL-MCNC: 1.78 MG/DL (ref 1.6–2.4)
MCH RBC QN AUTO: 31.8 PG (ref 26–34)
MCHC RBC AUTO-ENTMCNC: 34.6 G/DL (ref 32–36)
MCV RBC AUTO: 92 FL (ref 80–100)
MONOCYTES # BLD AUTO: 0 X10*3/UL (ref 0.1–1)
MONOCYTES NFR BLD AUTO: 0 %
NEUTROPHILS # BLD AUTO: 0.03 X10*3/UL (ref 1.2–7.7)
NEUTROPHILS NFR BLD AUTO: 75 %
NOROVIRUS GI + GII RNA STL NAA+PROBE: NOT DETECTED
NRBC BLD-RTO: 0 /100 WBCS (ref 0–0)
PHOSPHATE SERPL-MCNC: 2.4 MG/DL (ref 2.5–4.9)
PLATELET # BLD AUTO: 33 X10*3/UL (ref 150–450)
POTASSIUM SERPL-SCNC: 3.2 MMOL/L (ref 3.5–5.3)
PROTHROMBIN TIME: 18.2 SECONDS (ref 9.8–12.8)
RBC # BLD AUTO: 2.64 X10*6/UL (ref 4.5–5.9)
RV RNA STL NAA+PROBE: NOT DETECTED
SALMONELLA DNA STL QL NAA+PROBE: NOT DETECTED
SHIGELLA DNA SPEC QL NAA+PROBE: NOT DETECTED
SODIUM SERPL-SCNC: 137 MMOL/L (ref 136–145)
V CHOLERAE DNA STL QL NAA+PROBE: NOT DETECTED
WBC # BLD AUTO: <0.1 X10*3/UL (ref 4.4–11.3)
Y ENTEROCOL DNA STL QL NAA+PROBE: NOT DETECTED

## 2024-11-26 PROCEDURE — 2500000005 HC RX 250 GENERAL PHARMACY W/O HCPCS

## 2024-11-26 PROCEDURE — 2500000002 HC RX 250 W HCPCS SELF ADMINISTERED DRUGS (ALT 637 FOR MEDICARE OP, ALT 636 FOR OP/ED): Performed by: INTERNAL MEDICINE

## 2024-11-26 PROCEDURE — 85730 THROMBOPLASTIN TIME PARTIAL: CPT

## 2024-11-26 PROCEDURE — 2500000004 HC RX 250 GENERAL PHARMACY W/ HCPCS (ALT 636 FOR OP/ED)

## 2024-11-26 PROCEDURE — 85025 COMPLETE CBC W/AUTO DIFF WBC: CPT

## 2024-11-26 PROCEDURE — 2500000004 HC RX 250 GENERAL PHARMACY W/ HCPCS (ALT 636 FOR OP/ED): Performed by: INTERNAL MEDICINE

## 2024-11-26 PROCEDURE — 2500000004 HC RX 250 GENERAL PHARMACY W/ HCPCS (ALT 636 FOR OP/ED): Performed by: NURSE PRACTITIONER

## 2024-11-26 PROCEDURE — 2500000001 HC RX 250 WO HCPCS SELF ADMINISTERED DRUGS (ALT 637 FOR MEDICARE OP): Performed by: HOME HEALTH AIDE

## 2024-11-26 PROCEDURE — 2500000002 HC RX 250 W HCPCS SELF ADMINISTERED DRUGS (ALT 637 FOR MEDICARE OP, ALT 636 FOR OP/ED)

## 2024-11-26 PROCEDURE — 2500000001 HC RX 250 WO HCPCS SELF ADMINISTERED DRUGS (ALT 637 FOR MEDICARE OP): Performed by: NURSE PRACTITIONER

## 2024-11-26 PROCEDURE — 1170000001 HC PRIVATE ONCOLOGY ROOM DAILY

## 2024-11-26 PROCEDURE — 83735 ASSAY OF MAGNESIUM: CPT

## 2024-11-26 PROCEDURE — 99233 SBSQ HOSP IP/OBS HIGH 50: CPT | Performed by: STUDENT IN AN ORGANIZED HEALTH CARE EDUCATION/TRAINING PROGRAM

## 2024-11-26 PROCEDURE — 2500000001 HC RX 250 WO HCPCS SELF ADMINISTERED DRUGS (ALT 637 FOR MEDICARE OP): Performed by: INTERNAL MEDICINE

## 2024-11-26 PROCEDURE — 2500000002 HC RX 250 W HCPCS SELF ADMINISTERED DRUGS (ALT 637 FOR MEDICARE OP, ALT 636 FOR OP/ED): Performed by: NURSE PRACTITIONER

## 2024-11-26 PROCEDURE — 2500000001 HC RX 250 WO HCPCS SELF ADMINISTERED DRUGS (ALT 637 FOR MEDICARE OP)

## 2024-11-26 PROCEDURE — 80069 RENAL FUNCTION PANEL: CPT | Performed by: PHYSICIAN ASSISTANT

## 2024-11-26 RX ORDER — SUCRALFATE 1 G/10ML
1 SUSPENSION ORAL 3 TIMES DAILY
Status: DISPENSED | OUTPATIENT
Start: 2024-11-26 | End: 2024-11-29

## 2024-11-26 RX ORDER — POTASSIUM CHLORIDE 29.8 MG/ML
40 INJECTION INTRAVENOUS ONCE
Status: COMPLETED | OUTPATIENT
Start: 2024-11-26 | End: 2024-11-26

## 2024-11-26 RX ORDER — MYCOPHENOLATE MOFETIL 250 MG/1
1000 CAPSULE ORAL 3 TIMES DAILY
Status: DISCONTINUED | OUTPATIENT
Start: 2024-11-26 | End: 2024-12-07 | Stop reason: HOSPADM

## 2024-11-26 RX ORDER — PANTOPRAZOLE SODIUM 40 MG/10ML
40 INJECTION, POWDER, LYOPHILIZED, FOR SOLUTION INTRAVENOUS ONCE
Status: COMPLETED | OUTPATIENT
Start: 2024-11-26 | End: 2024-11-26

## 2024-11-26 RX ORDER — PANTOPRAZOLE SODIUM 40 MG/1
40 TABLET, DELAYED RELEASE ORAL
Status: DISCONTINUED | OUTPATIENT
Start: 2024-11-27 | End: 2024-12-07 | Stop reason: HOSPADM

## 2024-11-26 RX ORDER — LORAZEPAM 2 MG/ML
0.5 INJECTION INTRAMUSCULAR EVERY 6 HOURS PRN
Status: DISCONTINUED | OUTPATIENT
Start: 2024-11-26 | End: 2024-12-07 | Stop reason: HOSPADM

## 2024-11-26 ASSESSMENT — COGNITIVE AND FUNCTIONAL STATUS - GENERAL
DAILY ACTIVITIY SCORE: 24
MOBILITY SCORE: 24
MOBILITY SCORE: 24
DAILY ACTIVITIY SCORE: 24

## 2024-11-26 ASSESSMENT — PAIN SCALES - GENERAL
PAINLEVEL_OUTOF10: 0 - NO PAIN
PAINLEVEL_OUTOF10: 0 - NO PAIN

## 2024-11-26 ASSESSMENT — PAIN - FUNCTIONAL ASSESSMENT: PAIN_FUNCTIONAL_ASSESSMENT: 0-10

## 2024-11-26 NOTE — PROGRESS NOTES
Jin Reynolds is a 69 y.o. male on day 11 of admission presenting with AML (acute myeloid leukemia) in remission (Multi).    SHIRAW applied for [facility] Patient Aid enrique on pt's behalf, application is pending at this time. LSW to cont to follow.      YUE Garay

## 2024-11-26 NOTE — PROGRESS NOTES
"Jin Reynolds is a 69 y.o. male on day 11 of admission presenting with AML (acute myeloid leukemia) in remission (Multi).    Subjective   Pt reporting diarrhea stable to improved, getting scheduled Loperamide. Starting Tacro today. Reports upset stomach, will order Protonix & Carafate.    Denies SOB, HA, CP, chills, n/v/d/c, abdom pain, dysuria, tarry stools. ROS otherwise neg.    Objective     Physical Exam  Constitutional:       Appearance: Normal appearance.   HENT:      Head: Normocephalic and atraumatic.      Nose: Nose normal.      Mouth/Throat:      Mouth: Mucous membranes are moist.      Comments: Grade I mucositis on tongue and inner cheeks  Eyes:      Conjunctiva/sclera: Conjunctivae normal.      Pupils: Pupils are equal, round, and reactive to light.   Cardiovascular:      Rate and Rhythm: Normal rate and regular rhythm.      Pulses: Normal pulses.      Heart sounds: Normal heart sounds.   Pulmonary:      Effort: Pulmonary effort is normal.      Breath sounds: Normal breath sounds.   Abdominal:      General: Bowel sounds are normal.      Palpations: Abdomen is soft.   Musculoskeletal:         General: Normal range of motion.      Cervical back: Normal range of motion and neck supple.   Skin:     General: Skin is warm and dry.      Capillary Refill: Capillary refill takes 2 to 3 seconds.   Neurological:      General: No focal deficit present.      Mental Status: He is alert and oriented to person, place, and time.   Psychiatric:         Mood and Affect: Mood normal.         Behavior: Behavior normal.       Last Recorded Vitals  Blood pressure 125/81, pulse 77, temperature 36 °C (96.8 °F), temperature source Temporal, resp. rate 18, height 1.691 m (5' 6.58\"), weight 83.4 kg (183 lb 13.8 oz), SpO2 98%.  Intake/Output last 3 Shifts:  I/O last 3 completed shifts:  In: 5062.3 (61.7 mL/kg) [I.V.:3000 (36.5 mL/kg); IV Piggyback:2062.3]  Out: - (0 mL/kg)   Weight: 82.1 kg     Results for orders placed or performed " during the hospital encounter of 11/15/24 (from the past 24 hours)   CBC and Auto Differential   Result Value Ref Range    WBC <0.1 (LL) 4.4 - 11.3 x10*3/uL    nRBC 0.0 0.0 - 0.0 /100 WBCs    RBC 2.64 (L) 4.50 - 5.90 x10*6/uL    Hemoglobin 8.4 (L) 13.5 - 17.5 g/dL    Hematocrit 24.3 (L) 41.0 - 52.0 %    MCV 92 80 - 100 fL    MCH 31.8 26.0 - 34.0 pg    MCHC 34.6 32.0 - 36.0 g/dL    RDW 16.9 (H) 11.5 - 14.5 %    Platelets 33 (LL) 150 - 450 x10*3/uL    Neutrophils % 75.0 40.0 - 80.0 %    Immature Granulocytes %, Automated 0.0 0.0 - 0.9 %    Lymphocytes % 0.0 13.0 - 44.0 %    Monocytes % 0.0 2.0 - 10.0 %    Eosinophils % 25.0 0.0 - 6.0 %    Basophils % 0.0 0.0 - 2.0 %    Neutrophils Absolute 0.03 (L) 1.20 - 7.70 x10*3/uL    Immature Granulocytes Absolute, Automated 0.00 0.00 - 0.70 x10*3/uL    Lymphocytes Absolute 0.00 (L) 1.20 - 4.80 x10*3/uL    Monocytes Absolute 0.00 (L) 0.10 - 1.00 x10*3/uL    Eosinophils Absolute 0.01 0.00 - 0.70 x10*3/uL    Basophils Absolute 0.00 0.00 - 0.10 x10*3/uL   Coagulation Screen   Result Value Ref Range    Protime 18.2 (H) 9.8 - 12.8 seconds    INR 1.6 (H) 0.9 - 1.1    aPTT 37 27 - 38 seconds   Magnesium   Result Value Ref Range    Magnesium 1.78 1.60 - 2.40 mg/dL   Renal Function Panel   Result Value Ref Range    Glucose 101 (H) 74 - 99 mg/dL    Sodium 137 136 - 145 mmol/L    Potassium 3.2 (L) 3.5 - 5.3 mmol/L    Chloride 108 (H) 98 - 107 mmol/L    Bicarbonate 21 21 - 32 mmol/L    Anion Gap 11 10 - 20 mmol/L    Urea Nitrogen 6 6 - 23 mg/dL    Creatinine 0.70 0.50 - 1.30 mg/dL    eGFR >90 >60 mL/min/1.73m*2    Calcium 7.5 (L) 8.6 - 10.6 mg/dL    Phosphorus 2.4 (L) 2.5 - 4.9 mg/dL    Albumin 3.6 3.4 - 5.0 g/dL     *Note: Due to a large number of results and/or encounters for the requested time period, some results have not been displayed. A complete set of results can be found in Results Review.         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     T+5   Allo MRD     ONC:  # AML with MRD  - AML diagnosed per [facility] on 6/27/24. Positive for RUNX1, CKit, SRSF2 and CBL C404Y  - S/p C1D1 Venetoclax/Azacitidine on 7/24/24. Developed TLS with renal dysfunction  - [facility] (8/28/24): c/w persistent disease and 27% blast  - Admitted 9/5/24 for C2 of Jeremy/Aza with Venetoclx ramp up dosing (D1 20mg, D2 50 mg, D3 onward 100 mg daily).   - S/p 2 cycles of Jeremy/Aza  - [facility] (9/25/24) and (10/23/24): c/w MRD (<1%)  - [facility] (10/23): 0.2% blasts, hypercellular (90%) with systemic mastocytosis  - Admitted 11/15 for Allo MRD SCT (T=0 11/21/24)  # Smoldering Systemiv Mastocytosis diagnosed 8/23/23  - S/p Molecular therapy started 2/13/24  - Continue home Allegra for symptom management  - Tryptase level 11/16 (62.6) normal     Chemo:  - Fludarabine 30 mg/m2 IV D-5, D-4, D-3  - Melphalan 140 mg/m2 IV D-2   - Total Body Irradiation (TBI) 200 cGy on D-1      GVHD Prophy:  - Tacro level: to begin 11/26  - Tacro dose: 2.5 mg q12h starting on T+5  - Post-tx Cytoxan 50 mg/kg on T+3 and T+4  - MMF 1000 mg TID to begin T+5 to T+35     TRANSPLANT:   - HLA matched related donor /12 match  - Donor: O+, CMV positive   - Recipient: O+, CMV negative      Surveillance: beginning 11/25  - Monitor CMV 2x weekly  - Monitor EBV weekly  - Monitor Adenovirus weekly  - Monitor Hapto/LDH weekly  - Monitor Fibrinogen 2x weekly  - Monitor UA/protein weekly     HEME:  # Pancytopenia 2/2 to chemotherapy. No acute bleed.  - Monitor counts daily  - Keep Hgb > 7.0, Plts > 10k     ID:  # Cont prophylaxis meds: Acyclovir, Posaconazole, po Vancomycin, Levofloxacin      FEN/GI:  - Admit wt: 86.1 kg, Daily wt: 83.4 kg (11/26)  # Weight loss w/ poor PO intake  - Nutrition Consult ordered (11/23)   - Cont home PPI, Pepcid and antiemetics  -  Low pathogen diet  - Monitor electrolytes, replace prn  # JUSTIN; prn Zofran and Compazine available   - Added Prn Ativan and Carafate  - Added Protonix  # Mucositis, mild  - Oral salt water rinses ordered  # Diarrhea, chemo induced  - Cdiff (11/25) negative  - Stool path negative  - Loperamide scheduled x 4 d, then PRN     CARD:  - ECHO (10/23/24): EF 50-55%, RA/LA enlargement  - Cont home Norvasc     RENAL:  # H/o hematuria, resolved  - S/p Cystoscopy (1/15/24): Normal exam  # C/o frequent urination especially at night  - Last PSA 0.92 (9/6/24)     MISC:  - Cont home Allegra and Singular     DISPO:  - Full Code  - Access: DL (non-tunneled) CVC  - Primary On: Dr. Nika Dawson      Patient seen, examined, and discussed with Dr Bj Mcgrath, APRN-CNP

## 2024-11-27 LAB
ALBUMIN SERPL BCP-MCNC: 3.4 G/DL (ref 3.4–5)
ANION GAP SERPL CALC-SCNC: 11 MMOL/L (ref 10–20)
APTT PPP: 38 SECONDS (ref 27–38)
BASOPHILS # BLD AUTO: 0 X10*3/UL (ref 0–0.1)
BASOPHILS NFR BLD AUTO: 0 %
BUN SERPL-MCNC: 8 MG/DL (ref 6–23)
CALCIUM SERPL-MCNC: 7.4 MG/DL (ref 8.6–10.6)
CHLORIDE SERPL-SCNC: 106 MMOL/L (ref 98–107)
CO2 SERPL-SCNC: 22 MMOL/L (ref 21–32)
CREAT SERPL-MCNC: 0.68 MG/DL (ref 0.5–1.3)
EGFRCR SERPLBLD CKD-EPI 2021: >90 ML/MIN/1.73M*2
EOSINOPHIL # BLD AUTO: 0 X10*3/UL (ref 0–0.7)
EOSINOPHIL NFR BLD AUTO: 0 %
ERYTHROCYTE [DISTWIDTH] IN BLOOD BY AUTOMATED COUNT: 16.9 % (ref 11.5–14.5)
FIBRINOGEN PPP-MCNC: 250 MG/DL (ref 200–400)
GLUCOSE SERPL-MCNC: 100 MG/DL (ref 74–99)
HCT VFR BLD AUTO: 23.7 % (ref 41–52)
HGB BLD-MCNC: 8.1 G/DL (ref 13.5–17.5)
IMM GRANULOCYTES # BLD AUTO: 0 X10*3/UL (ref 0–0.7)
IMM GRANULOCYTES NFR BLD AUTO: 0 % (ref 0–0.9)
INR PPP: 1.8 (ref 0.9–1.1)
LYMPHOCYTES # BLD AUTO: 0 X10*3/UL (ref 1.2–4.8)
LYMPHOCYTES NFR BLD AUTO: 0 %
MAGNESIUM SERPL-MCNC: 1.74 MG/DL (ref 1.6–2.4)
MCH RBC QN AUTO: 31.4 PG (ref 26–34)
MCHC RBC AUTO-ENTMCNC: 34.2 G/DL (ref 32–36)
MCV RBC AUTO: 92 FL (ref 80–100)
MONOCYTES # BLD AUTO: 0 X10*3/UL (ref 0.1–1)
MONOCYTES NFR BLD AUTO: 0 %
NEUTROPHILS # BLD AUTO: 0.02 X10*3/UL (ref 1.2–7.7)
NEUTROPHILS NFR BLD AUTO: 100 %
NRBC BLD-RTO: 0 /100 WBCS (ref 0–0)
PHOSPHATE SERPL-MCNC: 2 MG/DL (ref 2.5–4.9)
PLATELET # BLD AUTO: 19 X10*3/UL (ref 150–450)
POTASSIUM SERPL-SCNC: 3.3 MMOL/L (ref 3.5–5.3)
PROTHROMBIN TIME: 20.3 SECONDS (ref 9.8–12.8)
RBC # BLD AUTO: 2.58 X10*6/UL (ref 4.5–5.9)
RBC MORPH BLD: NORMAL
SODIUM SERPL-SCNC: 136 MMOL/L (ref 136–145)
TACROLIMUS BLD-MCNC: <2 NG/ML
TACROLIMUS BLD-MCNC: <2 NG/ML
WBC # BLD AUTO: <0.1 X10*3/UL (ref 4.4–11.3)

## 2024-11-27 PROCEDURE — 1170000001 HC PRIVATE ONCOLOGY ROOM DAILY

## 2024-11-27 PROCEDURE — 2500000002 HC RX 250 W HCPCS SELF ADMINISTERED DRUGS (ALT 637 FOR MEDICARE OP, ALT 636 FOR OP/ED): Performed by: INTERNAL MEDICINE

## 2024-11-27 PROCEDURE — 2500000001 HC RX 250 WO HCPCS SELF ADMINISTERED DRUGS (ALT 637 FOR MEDICARE OP): Performed by: NURSE PRACTITIONER

## 2024-11-27 PROCEDURE — 2500000005 HC RX 250 GENERAL PHARMACY W/O HCPCS

## 2024-11-27 PROCEDURE — 85025 COMPLETE CBC W/AUTO DIFF WBC: CPT

## 2024-11-27 PROCEDURE — 80197 ASSAY OF TACROLIMUS: CPT | Performed by: PHYSICIAN ASSISTANT

## 2024-11-27 PROCEDURE — 2500000004 HC RX 250 GENERAL PHARMACY W/ HCPCS (ALT 636 FOR OP/ED): Performed by: INTERNAL MEDICINE

## 2024-11-27 PROCEDURE — 2500000001 HC RX 250 WO HCPCS SELF ADMINISTERED DRUGS (ALT 637 FOR MEDICARE OP)

## 2024-11-27 PROCEDURE — 80069 RENAL FUNCTION PANEL: CPT | Performed by: PHYSICIAN ASSISTANT

## 2024-11-27 PROCEDURE — 2500000001 HC RX 250 WO HCPCS SELF ADMINISTERED DRUGS (ALT 637 FOR MEDICARE OP): Performed by: INTERNAL MEDICINE

## 2024-11-27 PROCEDURE — 2500000004 HC RX 250 GENERAL PHARMACY W/ HCPCS (ALT 636 FOR OP/ED)

## 2024-11-27 PROCEDURE — 83735 ASSAY OF MAGNESIUM: CPT

## 2024-11-27 PROCEDURE — 85610 PROTHROMBIN TIME: CPT

## 2024-11-27 PROCEDURE — 2500000001 HC RX 250 WO HCPCS SELF ADMINISTERED DRUGS (ALT 637 FOR MEDICARE OP): Performed by: HOME HEALTH AIDE

## 2024-11-27 PROCEDURE — 80197 ASSAY OF TACROLIMUS: CPT | Performed by: INTERNAL MEDICINE

## 2024-11-27 PROCEDURE — 85384 FIBRINOGEN ACTIVITY: CPT | Performed by: PHYSICIAN ASSISTANT

## 2024-11-27 PROCEDURE — 2500000002 HC RX 250 W HCPCS SELF ADMINISTERED DRUGS (ALT 637 FOR MEDICARE OP, ALT 636 FOR OP/ED): Performed by: STUDENT IN AN ORGANIZED HEALTH CARE EDUCATION/TRAINING PROGRAM

## 2024-11-27 PROCEDURE — 99233 SBSQ HOSP IP/OBS HIGH 50: CPT | Performed by: STUDENT IN AN ORGANIZED HEALTH CARE EDUCATION/TRAINING PROGRAM

## 2024-11-27 PROCEDURE — 2500000002 HC RX 250 W HCPCS SELF ADMINISTERED DRUGS (ALT 637 FOR MEDICARE OP, ALT 636 FOR OP/ED)

## 2024-11-27 RX ORDER — POTASSIUM CHLORIDE 29.8 MG/ML
40 INJECTION INTRAVENOUS ONCE
Status: COMPLETED | OUTPATIENT
Start: 2024-11-27 | End: 2024-11-27

## 2024-11-27 RX ORDER — PHYTONADIONE 5 MG/1
10 TABLET ORAL ONCE
Status: COMPLETED | OUTPATIENT
Start: 2024-11-27 | End: 2024-11-27

## 2024-11-27 ASSESSMENT — COGNITIVE AND FUNCTIONAL STATUS - GENERAL
DAILY ACTIVITIY SCORE: 24
MOBILITY SCORE: 24
MOBILITY SCORE: 24
DAILY ACTIVITIY SCORE: 24

## 2024-11-27 ASSESSMENT — PAIN - FUNCTIONAL ASSESSMENT
PAIN_FUNCTIONAL_ASSESSMENT: 0-10

## 2024-11-27 ASSESSMENT — PAIN SCALES - GENERAL
PAINLEVEL_OUTOF10: 0 - NO PAIN

## 2024-11-27 NOTE — PROGRESS NOTES
"Jin Reynolds is a 69 y.o. male on day 12 of admission presenting with AML (acute myeloid leukemia) in remission (Multi).    Subjective   Patient states diarrhea has improved, had 2 Bms yesterday. He is requesting the loperamide to no longer be scheduled, will have available PRN. Reporting some nausea, states zofran does not work as well.     Denies SOB, HA, CP, chills, n/v/d/c, abdom pain, dysuria, tarry stools. ROS otherwise neg.    Objective     Physical Exam  Constitutional:       Appearance: Normal appearance.   HENT:      Head: Normocephalic and atraumatic.      Nose: Nose normal.      Mouth/Throat:      Mouth: Mucous membranes are moist.      Comments: Grade I mucositis on tongue and inner cheeks  Eyes:      Conjunctiva/sclera: Conjunctivae normal.      Pupils: Pupils are equal, round, and reactive to light.   Cardiovascular:      Rate and Rhythm: Normal rate and regular rhythm.      Pulses: Normal pulses.      Heart sounds: Normal heart sounds.   Pulmonary:      Effort: Pulmonary effort is normal.      Breath sounds: Normal breath sounds.   Abdominal:      General: Bowel sounds are normal.      Palpations: Abdomen is soft.   Musculoskeletal:         General: Normal range of motion.      Cervical back: Normal range of motion and neck supple.   Skin:     General: Skin is warm and dry.      Capillary Refill: Capillary refill takes less than 2 seconds.   Neurological:      General: No focal deficit present.      Mental Status: He is alert and oriented to person, place, and time.   Psychiatric:         Mood and Affect: Mood normal.         Behavior: Behavior normal.       Last Recorded Vitals  Blood pressure 126/82, pulse 70, temperature 36.8 °C (98.2 °F), temperature source Temporal, resp. rate 18, height 1.691 m (5' 6.58\"), weight 83.2 kg (183 lb 6.8 oz), SpO2 98%.  Intake/Output last 3 Shifts:  I/O last 3 completed shifts:  In: 1886.3 (22.7 mL/kg) [I.V.:1250 (15 mL/kg); IV Piggyback:636.3]  Out: - (0 mL/kg) "   Weight: 83.2 kg     Results for orders placed or performed during the hospital encounter of 11/15/24 (from the past 24 hours)   CBC and Auto Differential   Result Value Ref Range    WBC <0.1 (LL) 4.4 - 11.3 x10*3/uL    nRBC 0.0 0.0 - 0.0 /100 WBCs    RBC 2.58 (L) 4.50 - 5.90 x10*6/uL    Hemoglobin 8.1 (L) 13.5 - 17.5 g/dL    Hematocrit 23.7 (L) 41.0 - 52.0 %    MCV 92 80 - 100 fL    MCH 31.4 26.0 - 34.0 pg    MCHC 34.2 32.0 - 36.0 g/dL    RDW 16.9 (H) 11.5 - 14.5 %    Platelets 19 (LL) 150 - 450 x10*3/uL    Neutrophils % 100.0 40.0 - 80.0 %    Immature Granulocytes %, Automated 0.0 0.0 - 0.9 %    Lymphocytes % 0.0 13.0 - 44.0 %    Monocytes % 0.0 2.0 - 10.0 %    Eosinophils % 0.0 0.0 - 6.0 %    Basophils % 0.0 0.0 - 2.0 %    Neutrophils Absolute 0.02 (L) 1.20 - 7.70 x10*3/uL    Immature Granulocytes Absolute, Automated 0.00 0.00 - 0.70 x10*3/uL    Lymphocytes Absolute 0.00 (L) 1.20 - 4.80 x10*3/uL    Monocytes Absolute 0.00 (L) 0.10 - 1.00 x10*3/uL    Eosinophils Absolute 0.00 0.00 - 0.70 x10*3/uL    Basophils Absolute 0.00 0.00 - 0.10 x10*3/uL   Coagulation Screen   Result Value Ref Range    Protime 20.3 (H) 9.8 - 12.8 seconds    INR 1.8 (H) 0.9 - 1.1    aPTT 38 27 - 38 seconds   Magnesium   Result Value Ref Range    Magnesium 1.74 1.60 - 2.40 mg/dL   Renal Function Panel   Result Value Ref Range    Glucose 100 (H) 74 - 99 mg/dL    Sodium 136 136 - 145 mmol/L    Potassium 3.3 (L) 3.5 - 5.3 mmol/L    Chloride 106 98 - 107 mmol/L    Bicarbonate 22 21 - 32 mmol/L    Anion Gap 11 10 - 20 mmol/L    Urea Nitrogen 8 6 - 23 mg/dL    Creatinine 0.68 0.50 - 1.30 mg/dL    eGFR >90 >60 mL/min/1.73m*2    Calcium 7.4 (L) 8.6 - 10.6 mg/dL    Phosphorus 2.0 (L) 2.5 - 4.9 mg/dL    Albumin 3.4 3.4 - 5.0 g/dL   Fibrinogen   Result Value Ref Range    Fibrinogen 250 200 - 400 mg/dL   Morphology   Result Value Ref Range    RBC Morphology No significant RBC morphology present          Assessment/Plan   Assessment & Plan  AML (acute  myeloid leukemia) in relapse (Multi)    Jin Reynolds is a 69 y.o. 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     T+6   Allo MRD     Updates 11/27:  -K and Phos repletion  -PO vitamin K 10mg for elevated PT 20.3 and INR 1.8. No evidence of bleeding  -continue supportive care    ONC:  # AML with MRD  - AML diagnosed per [facility] on 6/27/24. Positive for RUNX1, CKit, SRSF2 and CBL C404Y  - S/p C1D1 Venetoclax/Azacitidine on 7/24/24. Developed TLS with renal dysfunction  - [facility] (8/28/24): c/w persistent disease and 27% blast  - Admitted 9/5/24 for C2 of Jeremy/Aza with Venetoclx ramp up dosing (D1 20mg, D2 50 mg, D3 onward 100 mg daily).   - S/p 2 cycles of Jeremy/Aza  - [facility] (9/25/24) and (10/23/24): c/w MRD (<1%)  - [facility] (10/23): 0.2% blasts, hypercellular (90%) with systemic mastocytosis  - Admitted 11/15 for Allo MRD SCT (T=0 11/21/24)  # Smoldering Systemiv Mastocytosis diagnosed 8/23/23  - S/p Molecular therapy started 2/13/24  - Continue home Allegra for symptom management  - Tryptase level 11/16 (62.6) normal     Chemo:  - Fludarabine 30 mg/m2 IV D-5, D-4, D-3  - Melphalan 140 mg/m2 IV D-2   - Total Body Irradiation (TBI) 200 cGy on D-1      GVHD Prophy:  - Tacro level: < 2 on 11/27 (after 1 dose, will continue to follow)  - Tacro dose: 2.5 mg q12h starting on T+5  - Post-tx Cytoxan 50 mg/kg on T+3 and T+4  - MMF 1000 mg TID to begin T+5 to T+35     TRANSPLANT:   - HLA matched related donor /12 match  - Donor: O+, CMV positive   - Recipient: O+, CMV negative      Surveillance: beginning 11/25  - Monitor CMV 2x weekly  - Monitor EBV weekly  - Monitor Adenovirus weekly  - Monitor Hapto/LDH weekly  - Monitor Fibrinogen 2x weekly  - Monitor UA/protein weekly     HEME:  # Pancytopenia 2/2 to chemotherapy. No acute bleed.  - Monitor counts daily  - Keep Hgb > 7.0, Plts > 10k     ID:  # Cont prophylaxis meds: Acyclovir,  Posaconazole, po Vancomycin, Levofloxacin      FEN/GI:  - Admit wt: 86.1 kg, Daily wt: 83.4 kg (11/26)  # Weight loss w/ poor PO intake  - Nutrition Consult ordered (11/23)   - Cont home PPI, Pepcid and antiemetics  - Low pathogen diet  - Monitor electrolytes, replace prn  # JUSTIN; prn Zofran and Compazine available   - Added Prn Ativan and Carafate  - Added Protonix  # Mucositis, mild  - Oral salt water rinses ordered  # Diarrhea, chemo induced  - Cdiff (11/25) negative  - Stool path negative  - Loperamide scheduled x 4 d, then PRN     CARD:  - ECHO (10/23/24): EF 50-55%, RA/LA enlargement  - Cont home Norvasc     RENAL:  # H/o hematuria, resolved  - S/p Cystoscopy (1/15/24): Normal exam  # C/o frequent urination especially at night  - Last PSA 0.92 (9/6/24)     MISC:  - Cont home Allegra and Singular     DISPO:  - Full Code  - Access: DL (non-tunneled) CVC  - Primary On: Dr. Nika Dawson      Patient seen, examined, and discussed with Dr Bj Avila MD

## 2024-11-28 LAB
ABO GROUP (TYPE) IN BLOOD: NORMAL
ALBUMIN SERPL BCP-MCNC: 3.2 G/DL (ref 3.4–5)
ANION GAP SERPL CALC-SCNC: 9 MMOL/L (ref 10–20)
ANTIBODY SCREEN: NORMAL
APTT PPP: 37 SECONDS (ref 27–38)
BASOPHILS # BLD AUTO: 0 X10*3/UL (ref 0–0.1)
BASOPHILS NFR BLD AUTO: 0 %
BLOOD EXPIRATION DATE: NORMAL
BUN SERPL-MCNC: 8 MG/DL (ref 6–23)
CALCIUM SERPL-MCNC: 7.5 MG/DL (ref 8.6–10.6)
CHLORIDE SERPL-SCNC: 106 MMOL/L (ref 98–107)
CMV DNA SERPL NAA+PROBE-LOG IU: NORMAL {LOG_IU}/ML
CO2 SERPL-SCNC: 23 MMOL/L (ref 21–32)
CREAT SERPL-MCNC: 0.74 MG/DL (ref 0.5–1.3)
DISPENSE STATUS: NORMAL
EGFRCR SERPLBLD CKD-EPI 2021: >90 ML/MIN/1.73M*2
EOSINOPHIL # BLD AUTO: 0 X10*3/UL (ref 0–0.7)
EOSINOPHIL NFR BLD AUTO: 0 %
ERYTHROCYTE [DISTWIDTH] IN BLOOD BY AUTOMATED COUNT: 16.6 % (ref 11.5–14.5)
GLUCOSE SERPL-MCNC: 95 MG/DL (ref 74–99)
HCT VFR BLD AUTO: 22.2 % (ref 41–52)
HGB BLD-MCNC: 7.6 G/DL (ref 13.5–17.5)
IMM GRANULOCYTES # BLD AUTO: 0 X10*3/UL (ref 0–0.7)
IMM GRANULOCYTES NFR BLD AUTO: 0 % (ref 0–0.9)
INR PPP: 1.6 (ref 0.9–1.1)
LABORATORY COMMENT REPORT: NOT DETECTED
LYMPHOCYTES # BLD AUTO: 0.01 X10*3/UL (ref 1.2–4.8)
LYMPHOCYTES NFR BLD AUTO: 50 %
MAGNESIUM SERPL-MCNC: 1.68 MG/DL (ref 1.6–2.4)
MCH RBC QN AUTO: 31.1 PG (ref 26–34)
MCHC RBC AUTO-ENTMCNC: 34.2 G/DL (ref 32–36)
MCV RBC AUTO: 91 FL (ref 80–100)
MONOCYTES # BLD AUTO: 0 X10*3/UL (ref 0.1–1)
MONOCYTES NFR BLD AUTO: 0 %
NEUTROPHILS # BLD AUTO: 0.01 X10*3/UL (ref 1.2–7.7)
NEUTROPHILS NFR BLD AUTO: 50 %
NRBC BLD-RTO: 0 /100 WBCS (ref 0–0)
PHOSPHATE SERPL-MCNC: 2 MG/DL (ref 2.5–4.9)
PLATELET # BLD AUTO: 10 X10*3/UL (ref 150–450)
POTASSIUM SERPL-SCNC: 3.3 MMOL/L (ref 3.5–5.3)
PRODUCT BLOOD TYPE: 5100
PRODUCT CODE: NORMAL
PROTHROMBIN TIME: 18.5 SECONDS (ref 9.8–12.8)
RBC # BLD AUTO: 2.44 X10*6/UL (ref 4.5–5.9)
RH FACTOR (ANTIGEN D): NORMAL
SODIUM SERPL-SCNC: 135 MMOL/L (ref 136–145)
TACROLIMUS BLD-MCNC: <2 NG/ML
TACROLIMUS BLD-MCNC: <2 NG/ML
UNIT ABO: NORMAL
UNIT NUMBER: NORMAL
UNIT RH: NORMAL
UNIT VOLUME: 263
WBC # BLD AUTO: <0.1 X10*3/UL (ref 4.4–11.3)

## 2024-11-28 PROCEDURE — 85025 COMPLETE CBC W/AUTO DIFF WBC: CPT

## 2024-11-28 PROCEDURE — 85730 THROMBOPLASTIN TIME PARTIAL: CPT

## 2024-11-28 PROCEDURE — 2500000004 HC RX 250 GENERAL PHARMACY W/ HCPCS (ALT 636 FOR OP/ED): Mod: JZ | Performed by: INTERNAL MEDICINE

## 2024-11-28 PROCEDURE — 80197 ASSAY OF TACROLIMUS: CPT | Performed by: INTERNAL MEDICINE

## 2024-11-28 PROCEDURE — 2500000002 HC RX 250 W HCPCS SELF ADMINISTERED DRUGS (ALT 637 FOR MEDICARE OP, ALT 636 FOR OP/ED): Performed by: INTERNAL MEDICINE

## 2024-11-28 PROCEDURE — 86870 RBC ANTIBODY IDENTIFICATION: CPT

## 2024-11-28 PROCEDURE — 80069 RENAL FUNCTION PANEL: CPT | Performed by: PHYSICIAN ASSISTANT

## 2024-11-28 PROCEDURE — 2500000001 HC RX 250 WO HCPCS SELF ADMINISTERED DRUGS (ALT 637 FOR MEDICARE OP): Performed by: NURSE PRACTITIONER

## 2024-11-28 PROCEDURE — 99233 SBSQ HOSP IP/OBS HIGH 50: CPT | Performed by: STUDENT IN AN ORGANIZED HEALTH CARE EDUCATION/TRAINING PROGRAM

## 2024-11-28 PROCEDURE — 2500000005 HC RX 250 GENERAL PHARMACY W/O HCPCS: Performed by: STUDENT IN AN ORGANIZED HEALTH CARE EDUCATION/TRAINING PROGRAM

## 2024-11-28 PROCEDURE — 86850 RBC ANTIBODY SCREEN: CPT | Performed by: STUDENT IN AN ORGANIZED HEALTH CARE EDUCATION/TRAINING PROGRAM

## 2024-11-28 PROCEDURE — P9073 PLATELETS PHERESIS PATH REDU: HCPCS

## 2024-11-28 PROCEDURE — 2500000001 HC RX 250 WO HCPCS SELF ADMINISTERED DRUGS (ALT 637 FOR MEDICARE OP): Performed by: HOME HEALTH AIDE

## 2024-11-28 PROCEDURE — 36430 TRANSFUSION BLD/BLD COMPNT: CPT

## 2024-11-28 PROCEDURE — 2500000001 HC RX 250 WO HCPCS SELF ADMINISTERED DRUGS (ALT 637 FOR MEDICARE OP)

## 2024-11-28 PROCEDURE — 2500000004 HC RX 250 GENERAL PHARMACY W/ HCPCS (ALT 636 FOR OP/ED): Performed by: STUDENT IN AN ORGANIZED HEALTH CARE EDUCATION/TRAINING PROGRAM

## 2024-11-28 PROCEDURE — 2500000002 HC RX 250 W HCPCS SELF ADMINISTERED DRUGS (ALT 637 FOR MEDICARE OP, ALT 636 FOR OP/ED)

## 2024-11-28 PROCEDURE — 2500000001 HC RX 250 WO HCPCS SELF ADMINISTERED DRUGS (ALT 637 FOR MEDICARE OP): Performed by: INTERNAL MEDICINE

## 2024-11-28 PROCEDURE — 80197 ASSAY OF TACROLIMUS: CPT | Performed by: PHYSICIAN ASSISTANT

## 2024-11-28 PROCEDURE — 1170000001 HC PRIVATE ONCOLOGY ROOM DAILY

## 2024-11-28 PROCEDURE — 83735 ASSAY OF MAGNESIUM: CPT

## 2024-11-28 RX ORDER — TACROLIMUS 1 MG/1
3 CAPSULE ORAL
Status: DISCONTINUED | OUTPATIENT
Start: 2024-11-28 | End: 2024-11-30

## 2024-11-28 RX ORDER — POTASSIUM CHLORIDE 29.8 MG/ML
40 INJECTION INTRAVENOUS ONCE
Status: COMPLETED | OUTPATIENT
Start: 2024-11-28 | End: 2024-11-28

## 2024-11-28 RX ORDER — TACROLIMUS 1 MG/1
2 CAPSULE ORAL ONCE
Status: COMPLETED | OUTPATIENT
Start: 2024-11-28 | End: 2024-11-28

## 2024-11-28 RX ORDER — MAGNESIUM SULFATE HEPTAHYDRATE 40 MG/ML
2 INJECTION, SOLUTION INTRAVENOUS ONCE
Status: COMPLETED | OUTPATIENT
Start: 2024-11-28 | End: 2024-11-28

## 2024-11-28 ASSESSMENT — COGNITIVE AND FUNCTIONAL STATUS - GENERAL
MOBILITY SCORE: 24
DAILY ACTIVITIY SCORE: 24

## 2024-11-28 ASSESSMENT — PAIN SCALES - GENERAL
PAINLEVEL_OUTOF10: 0 - NO PAIN
PAINLEVEL_OUTOF10: 0 - NO PAIN

## 2024-11-28 ASSESSMENT — PAIN - FUNCTIONAL ASSESSMENT: PAIN_FUNCTIONAL_ASSESSMENT: 0-10

## 2024-11-28 NOTE — PROGRESS NOTES
"Jin Reynolds is a 69 y.o. male on day 13 of admission presenting with AML (acute myeloid leukemia) in remission (Multi).    Subjective   Reports feeling well this AM. He walked 7 labs this morning and felt that he had very good energy. No fatigued after his walking. Diarrhea improved. No nausea today. Appetite is ok. Denies mouth sores or rash.     Objective     Physical Exam  Constitutional:       Appearance: Normal appearance.   HENT:      Head: Normocephalic and atraumatic.      Nose: Nose normal.      Mouth/Throat:      Mouth: Mucous membranes are moist.      Comments: Grade I mucositis on tongue and inner cheeks  Eyes:      Conjunctiva/sclera: Conjunctivae normal.      Pupils: Pupils are equal, round, and reactive to light.   Cardiovascular:      Rate and Rhythm: Normal rate and regular rhythm.      Pulses: Normal pulses.      Heart sounds: Normal heart sounds.   Pulmonary:      Effort: Pulmonary effort is normal.      Breath sounds: Normal breath sounds.   Abdominal:      General: Bowel sounds are normal.      Palpations: Abdomen is soft.   Musculoskeletal:         General: Normal range of motion.      Cervical back: Normal range of motion and neck supple.   Skin:     General: Skin is warm and dry.      Capillary Refill: Capillary refill takes less than 2 seconds.   Neurological:      General: No focal deficit present.      Mental Status: He is alert and oriented to person, place, and time.   Psychiatric:         Mood and Affect: Mood normal.         Behavior: Behavior normal.       Last Recorded Vitals  Blood pressure 115/75, pulse 74, temperature 36 °C (96.8 °F), temperature source Temporal, resp. rate 18, height 1.691 m (5' 6.58\"), weight 83.2 kg (183 lb 6.8 oz), SpO2 99%.  Intake/Output last 3 Shifts:  I/O last 3 completed shifts:  In: 172 (2.1 mL/kg) [IV Piggyback:172]  Out: - (0 mL/kg)   Weight: 83.2 kg     Results for orders placed or performed during the hospital encounter of 11/15/24 (from the past " 24 hours)   CBC and Auto Differential   Result Value Ref Range    WBC <0.1 (LL) 4.4 - 11.3 x10*3/uL    nRBC 0.0 0.0 - 0.0 /100 WBCs    RBC 2.44 (L) 4.50 - 5.90 x10*6/uL    Hemoglobin 7.6 (L) 13.5 - 17.5 g/dL    Hematocrit 22.2 (L) 41.0 - 52.0 %    MCV 91 80 - 100 fL    MCH 31.1 26.0 - 34.0 pg    MCHC 34.2 32.0 - 36.0 g/dL    RDW 16.6 (H) 11.5 - 14.5 %    Platelets 10 (LL) 150 - 450 x10*3/uL    Neutrophils % 50.0 40.0 - 80.0 %    Immature Granulocytes %, Automated 0.0 0.0 - 0.9 %    Lymphocytes % 50.0 13.0 - 44.0 %    Monocytes % 0.0 2.0 - 10.0 %    Eosinophils % 0.0 0.0 - 6.0 %    Basophils % 0.0 0.0 - 2.0 %    Neutrophils Absolute 0.01 (L) 1.20 - 7.70 x10*3/uL    Immature Granulocytes Absolute, Automated 0.00 0.00 - 0.70 x10*3/uL    Lymphocytes Absolute 0.01 (L) 1.20 - 4.80 x10*3/uL    Monocytes Absolute 0.00 (L) 0.10 - 1.00 x10*3/uL    Eosinophils Absolute 0.00 0.00 - 0.70 x10*3/uL    Basophils Absolute 0.00 0.00 - 0.10 x10*3/uL   Coagulation Screen   Result Value Ref Range    Protime 18.5 (H) 9.8 - 12.8 seconds    INR 1.6 (H) 0.9 - 1.1    aPTT 37 27 - 38 seconds   Magnesium   Result Value Ref Range    Magnesium 1.68 1.60 - 2.40 mg/dL   Tacrolimus level   Result Value Ref Range    Tacrolimus  <2.0 <=15.0 ng/mL   Renal Function Panel   Result Value Ref Range    Glucose 95 74 - 99 mg/dL    Sodium 135 (L) 136 - 145 mmol/L    Potassium 3.3 (L) 3.5 - 5.3 mmol/L    Chloride 106 98 - 107 mmol/L    Bicarbonate 23 21 - 32 mmol/L    Anion Gap 9 (L) 10 - 20 mmol/L    Urea Nitrogen 8 6 - 23 mg/dL    Creatinine 0.74 0.50 - 1.30 mg/dL    eGFR >90 >60 mL/min/1.73m*2    Calcium 7.5 (L) 8.6 - 10.6 mg/dL    Phosphorus 2.0 (L) 2.5 - 4.9 mg/dL    Albumin 3.2 (L) 3.4 - 5.0 g/dL   Tacrolimus level   Result Value Ref Range    Tacrolimus  <2.0 <=15.0 ng/mL   Prepare Platelets: 1 Units, Irradiated, Leukocytes Reduced (CMV reduced risk)   Result Value Ref Range    PRODUCT CODE B9520I71     Unit Number [id number]     Unit ABO O      Unit RH POS     Dispense Status IS     Blood Expiration Date 11/28/2024 11:59:00 PM EST     PRODUCT BLOOD TYPE 5100     UNIT VOLUME 263          Assessment/Plan   Assessment & Plan  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     T+7   Allo MRD     Updates 11/28:  -K and Phos repletion  -plan for plt transfusion today, consented   -increasing tacrolimus dosing  -continue supportive care    ONC:  # AML with MRD  - AML diagnosed per [facility] on 6/27/24. Positive for RUNX1, CKit, SRSF2 and CBL C404Y  - S/p C1D1 Venetoclax/Azacitidine on 7/24/24. Developed TLS with renal dysfunction  - [facility] (8/28/24): c/w persistent disease and 27% blast  - Admitted 9/5/24 for C2 of Jeremy/Aza with Venetoclx ramp up dosing (D1 20mg, D2 50 mg, D3 onward 100 mg daily).   - S/p 2 cycles of Jeremy/Aza  - [facility] (9/25/24) and (10/23/24): c/w MRD (<1%)  - [facility] (10/23): 0.2% blasts, hypercellular (90%) with systemic mastocytosis  - Admitted 11/15 for Allo MRD SCT (T=0 11/21/24)  # Smoldering Systemiv Mastocytosis diagnosed 8/23/23  - S/p Molecular therapy started 2/13/24  - Continue home Allegra for symptom management  - Tryptase level 11/16 (62.6) normal     Chemo:  - Fludarabine 30 mg/m2 IV D-5, D-4, D-3  - Melphalan 140 mg/m2 IV D-2   - Total Body Irradiation (TBI) 200 cGy on D-1      GVHD Prophy:  - Tacro level: < 2 on 11/27 (after 1 dose, will continue to follow); < 2 on 11/28  - Tacro dose: Increase to 3mg BID with extra 2mg dose today   - Post-tx Cytoxan 50 mg/kg on T+3 and T+4  - MMF 1000 mg TID to begin T+5 to T+35     TRANSPLANT:   - HLA matched related donor /12 match  - Donor: O+, CMV positive   - Recipient: O+, CMV negative      Surveillance: beginning 11/25  - Monitor CMV 2x weekly  - Monitor EBV weekly  - Monitor Adenovirus weekly  - Monitor Hapto/LDH weekly  -  Monitor Fibrinogen 2x weekly  - Monitor UA/protein weekly     HEME:  # Pancytopenia 2/2 to chemotherapy. No acute bleed.  - Monitor counts daily  - Keep Hgb > 7.0, Plts > 10k     ID:  # Cont prophylaxis meds: Acyclovir, Posaconazole, po Vancomycin, Levofloxacin      FEN/GI:  - Admit wt: 86.1 kg, Daily wt: 83.4 kg (11/26)  # Weight loss w/ poor PO intake  - Nutrition Consult ordered (11/23)   - Cont home PPI, Pepcid and antiemetics  - Low pathogen diet  - Monitor electrolytes, replace prn  # JUSTIN; prn Zofran and Compazine available   - Added Prn Ativan and Carafate  - Added Protonix  # Mucositis, mild  - Oral salt water rinses ordered  # Diarrhea, chemo induced  - Cdiff (11/25) negative  - Stool path negative  - Loperamide scheduled x 4 d, then PRN     CARD:  - ECHO (10/23/24): EF 50-55%, RA/LA enlargement  - Cont home Norvasc     RENAL:  # H/o hematuria, resolved  - S/p Cystoscopy (1/15/24): Normal exam  # C/o frequent urination especially at night  - Last PSA 0.92 (9/6/24)     MISC:  - Cont home Allegra and Singular     DISPO:  - Full Code  - Access: DL (non-tunneled) CVC  - Primary On: Dr. Nika Dawson      Patient seen, examined, and discussed with Dr Bj Avila MD

## 2024-11-28 NOTE — CARE PLAN
The patient's goals for the shift include      The clinical goals for the shift include Pt will remain HDS    Problem: Pain - Adult  Goal: Verbalizes/displays adequate comfort level or baseline comfort level  Outcome: Progressing     Problem: Safety - Adult  Goal: Free from fall injury  Outcome: Progressing     Problem: Discharge Planning  Goal: Discharge to home or other facility with appropriate resources  Outcome: Progressing     Problem: Chronic Conditions and Co-morbidities  Goal: Patient's chronic conditions and co-morbidity symptoms are monitored and maintained or improved  Outcome: Progressing

## 2024-11-29 LAB
ALBUMIN SERPL BCP-MCNC: 3.2 G/DL (ref 3.4–5)
ANION GAP SERPL CALC-SCNC: 9 MMOL/L (ref 10–20)
APTT PPP: 36 SECONDS (ref 27–38)
BASOPHILS # BLD AUTO: 0 X10*3/UL (ref 0–0.1)
BASOPHILS NFR BLD AUTO: 0 %
BB ANTIBODY IDENTIFICATION: NORMAL
BUN SERPL-MCNC: 7 MG/DL (ref 6–23)
CALCIUM SERPL-MCNC: 7.1 MG/DL (ref 8.6–10.6)
CASE #: NORMAL
CHLORIDE SERPL-SCNC: 105 MMOL/L (ref 98–107)
CO2 SERPL-SCNC: 24 MMOL/L (ref 21–32)
CREAT SERPL-MCNC: 0.64 MG/DL (ref 0.5–1.3)
EGFRCR SERPLBLD CKD-EPI 2021: >90 ML/MIN/1.73M*2
EOSINOPHIL # BLD AUTO: 0 X10*3/UL (ref 0–0.7)
EOSINOPHIL NFR BLD AUTO: 0 %
ERYTHROCYTE [DISTWIDTH] IN BLOOD BY AUTOMATED COUNT: 16.5 % (ref 11.5–14.5)
FIBRINOGEN PPP-MCNC: 236 MG/DL (ref 200–400)
GLUCOSE SERPL-MCNC: 93 MG/DL (ref 74–99)
HCT VFR BLD AUTO: 21.6 % (ref 41–52)
HGB BLD-MCNC: 7.3 G/DL (ref 13.5–17.5)
IMM GRANULOCYTES # BLD AUTO: 0 X10*3/UL (ref 0–0.7)
IMM GRANULOCYTES NFR BLD AUTO: 0 % (ref 0–0.9)
INR PPP: 1.5 (ref 0.9–1.1)
LYMPHOCYTES # BLD AUTO: 0.01 X10*3/UL (ref 1.2–4.8)
LYMPHOCYTES NFR BLD AUTO: 50 %
MAGNESIUM SERPL-MCNC: 1.78 MG/DL (ref 1.6–2.4)
MCH RBC QN AUTO: 30.5 PG (ref 26–34)
MCHC RBC AUTO-ENTMCNC: 33.8 G/DL (ref 32–36)
MCV RBC AUTO: 90 FL (ref 80–100)
MONOCYTES # BLD AUTO: 0 X10*3/UL (ref 0.1–1)
MONOCYTES NFR BLD AUTO: 0 %
NEUTROPHILS # BLD AUTO: 0.01 X10*3/UL (ref 1.2–7.7)
NEUTROPHILS NFR BLD AUTO: 50 %
NRBC BLD-RTO: 0 /100 WBCS (ref 0–0)
PHOSPHATE SERPL-MCNC: 2.2 MG/DL (ref 2.5–4.9)
PLATELET # BLD AUTO: 11 X10*3/UL (ref 150–450)
POTASSIUM SERPL-SCNC: 3.3 MMOL/L (ref 3.5–5.3)
PROTHROMBIN TIME: 16.6 SECONDS (ref 9.8–12.8)
RBC # BLD AUTO: 2.39 X10*6/UL (ref 4.5–5.9)
RBC MORPH BLD: NORMAL
SODIUM SERPL-SCNC: 135 MMOL/L (ref 136–145)
TACROLIMUS BLD-MCNC: 3.4 NG/ML
WBC # BLD AUTO: <0.1 X10*3/UL (ref 4.4–11.3)

## 2024-11-29 PROCEDURE — 85025 COMPLETE CBC W/AUTO DIFF WBC: CPT

## 2024-11-29 PROCEDURE — 80069 RENAL FUNCTION PANEL: CPT | Performed by: PHYSICIAN ASSISTANT

## 2024-11-29 PROCEDURE — 1170000001 HC PRIVATE ONCOLOGY ROOM DAILY

## 2024-11-29 PROCEDURE — 2500000001 HC RX 250 WO HCPCS SELF ADMINISTERED DRUGS (ALT 637 FOR MEDICARE OP): Performed by: INTERNAL MEDICINE

## 2024-11-29 PROCEDURE — 2500000001 HC RX 250 WO HCPCS SELF ADMINISTERED DRUGS (ALT 637 FOR MEDICARE OP): Performed by: NURSE PRACTITIONER

## 2024-11-29 PROCEDURE — 2500000005 HC RX 250 GENERAL PHARMACY W/O HCPCS: Performed by: STUDENT IN AN ORGANIZED HEALTH CARE EDUCATION/TRAINING PROGRAM

## 2024-11-29 PROCEDURE — 2500000004 HC RX 250 GENERAL PHARMACY W/ HCPCS (ALT 636 FOR OP/ED): Mod: JZ | Performed by: INTERNAL MEDICINE

## 2024-11-29 PROCEDURE — 85610 PROTHROMBIN TIME: CPT

## 2024-11-29 PROCEDURE — 2500000001 HC RX 250 WO HCPCS SELF ADMINISTERED DRUGS (ALT 637 FOR MEDICARE OP): Performed by: HOME HEALTH AIDE

## 2024-11-29 PROCEDURE — 2500000001 HC RX 250 WO HCPCS SELF ADMINISTERED DRUGS (ALT 637 FOR MEDICARE OP): Performed by: STUDENT IN AN ORGANIZED HEALTH CARE EDUCATION/TRAINING PROGRAM

## 2024-11-29 PROCEDURE — 2500000001 HC RX 250 WO HCPCS SELF ADMINISTERED DRUGS (ALT 637 FOR MEDICARE OP)

## 2024-11-29 PROCEDURE — 83735 ASSAY OF MAGNESIUM: CPT

## 2024-11-29 PROCEDURE — 80197 ASSAY OF TACROLIMUS: CPT | Performed by: INTERNAL MEDICINE

## 2024-11-29 PROCEDURE — 2500000002 HC RX 250 W HCPCS SELF ADMINISTERED DRUGS (ALT 637 FOR MEDICARE OP, ALT 636 FOR OP/ED)

## 2024-11-29 PROCEDURE — 2500000004 HC RX 250 GENERAL PHARMACY W/ HCPCS (ALT 636 FOR OP/ED): Performed by: STUDENT IN AN ORGANIZED HEALTH CARE EDUCATION/TRAINING PROGRAM

## 2024-11-29 PROCEDURE — 85384 FIBRINOGEN ACTIVITY: CPT | Performed by: PHYSICIAN ASSISTANT

## 2024-11-29 PROCEDURE — 2500000002 HC RX 250 W HCPCS SELF ADMINISTERED DRUGS (ALT 637 FOR MEDICARE OP, ALT 636 FOR OP/ED): Performed by: INTERNAL MEDICINE

## 2024-11-29 PROCEDURE — 99233 SBSQ HOSP IP/OBS HIGH 50: CPT | Performed by: STUDENT IN AN ORGANIZED HEALTH CARE EDUCATION/TRAINING PROGRAM

## 2024-11-29 RX ORDER — MAGNESIUM SULFATE HEPTAHYDRATE 40 MG/ML
2 INJECTION, SOLUTION INTRAVENOUS ONCE
Status: COMPLETED | OUTPATIENT
Start: 2024-11-29 | End: 2024-11-29

## 2024-11-29 RX ORDER — TACROLIMUS 1 MG/1
2 CAPSULE ORAL ONCE
Status: COMPLETED | OUTPATIENT
Start: 2024-11-29 | End: 2024-11-29

## 2024-11-29 RX ORDER — POTASSIUM CHLORIDE 29.8 MG/ML
40 INJECTION INTRAVENOUS ONCE
Status: COMPLETED | OUTPATIENT
Start: 2024-11-29 | End: 2024-11-29

## 2024-11-29 ASSESSMENT — PAIN - FUNCTIONAL ASSESSMENT
PAIN_FUNCTIONAL_ASSESSMENT: 0-10

## 2024-11-29 ASSESSMENT — COGNITIVE AND FUNCTIONAL STATUS - GENERAL
DAILY ACTIVITIY SCORE: 24
MOBILITY SCORE: 24
DAILY ACTIVITIY SCORE: 24
MOBILITY SCORE: 24

## 2024-11-29 ASSESSMENT — PAIN SCALES - GENERAL
PAINLEVEL_OUTOF10: 0 - NO PAIN

## 2024-11-29 NOTE — PROGRESS NOTES
"Jin Reynolds is a 69 y.o. male on day 14 of admission presenting with AML (acute myeloid leukemia) in remission (Multi).    Subjective   Reports feeling well this AM. Reports feeling \"film\" in his mouth but denies mouth pain or sores. Will trial use of biotene mouthwash. He reports fatigue and occasional dizziness. Reports 3 BM yesterday. No nausea/emesis. No rash or abd pain. No f/c.     Objective     Physical Exam:  General: well appearing, alert, conversant, in no apparent distress  HEENT: normocephalic, atraumatic, EOMI, no scleral icterus. No mucositis   CV: RRR, no murmurs  Pulm: CTAB, no wheezes or crackles, no increased work of breathing  Abd: soft, non tender, non distended  : no sheikh   Ext: warm, no lower extremity edema  Skin: no rashes  Neuro: moving all extremities  Psych: normal affect      Last Recorded Vitals  Blood pressure 121/80, pulse 88, temperature 36 °C (96.8 °F), temperature source Temporal, resp. rate 18, height 1.691 m (5' 6.58\"), weight 83.2 kg (183 lb 6.8 oz), SpO2 100%.  Intake/Output last 3 Shifts:  I/O last 3 completed shifts:  In: 788 (9.5 mL/kg) [P.O.:120; I.V.:50 (0.6 mL/kg); Blood:263; IV Piggyback:355]  Out: - (0 mL/kg)   Weight: 83.2 kg     Results for orders placed or performed during the hospital encounter of 11/15/24 (from the past 24 hours)   Prepare Platelets: 1 Units, Irradiated, Leukocytes Reduced (CMV reduced risk)   Result Value Ref Range    PRODUCT CODE U4400D87     Unit Number [id number]     Unit ABO O     Unit RH POS     Dispense Status TR     Blood Expiration Date 11/28/2024 11:59:00 PM EST     PRODUCT BLOOD TYPE 5100     UNIT VOLUME 263    Type and screen   Result Value Ref Range    ABO TYPE O     Rh TYPE POS     ANTIBODY SCREEN POS    CBC and Auto Differential   Result Value Ref Range    WBC <0.1 (LL) 4.4 - 11.3 x10*3/uL    nRBC 0.0 0.0 - 0.0 /100 WBCs    RBC 2.39 (L) 4.50 - 5.90 x10*6/uL    Hemoglobin 7.3 (L) 13.5 - 17.5 g/dL    Hematocrit 21.6 (L) 41.0 - " 52.0 %    MCV 90 80 - 100 fL    MCH 30.5 26.0 - 34.0 pg    MCHC 33.8 32.0 - 36.0 g/dL    RDW 16.5 (H) 11.5 - 14.5 %    Platelets 11 (LL) 150 - 450 x10*3/uL    Neutrophils % 50.0 40.0 - 80.0 %    Immature Granulocytes %, Automated 0.0 0.0 - 0.9 %    Lymphocytes % 50.0 13.0 - 44.0 %    Monocytes % 0.0 2.0 - 10.0 %    Eosinophils % 0.0 0.0 - 6.0 %    Basophils % 0.0 0.0 - 2.0 %    Neutrophils Absolute 0.01 (L) 1.20 - 7.70 x10*3/uL    Immature Granulocytes Absolute, Automated 0.00 0.00 - 0.70 x10*3/uL    Lymphocytes Absolute 0.01 (L) 1.20 - 4.80 x10*3/uL    Monocytes Absolute 0.00 (L) 0.10 - 1.00 x10*3/uL    Eosinophils Absolute 0.00 0.00 - 0.70 x10*3/uL    Basophils Absolute 0.00 0.00 - 0.10 x10*3/uL   Coagulation Screen   Result Value Ref Range    Protime 16.6 (H) 9.8 - 12.8 seconds    INR 1.5 (H) 0.9 - 1.1    aPTT 36 27 - 38 seconds   Magnesium   Result Value Ref Range    Magnesium 1.78 1.60 - 2.40 mg/dL   Renal Function Panel   Result Value Ref Range    Glucose 93 74 - 99 mg/dL    Sodium 135 (L) 136 - 145 mmol/L    Potassium 3.3 (L) 3.5 - 5.3 mmol/L    Chloride 105 98 - 107 mmol/L    Bicarbonate 24 21 - 32 mmol/L    Anion Gap 9 (L) 10 - 20 mmol/L    Urea Nitrogen 7 6 - 23 mg/dL    Creatinine 0.64 0.50 - 1.30 mg/dL    eGFR >90 >60 mL/min/1.73m*2    Calcium 7.1 (L) 8.6 - 10.6 mg/dL    Phosphorus 2.2 (L) 2.5 - 4.9 mg/dL    Albumin 3.2 (L) 3.4 - 5.0 g/dL   Fibrinogen   Result Value Ref Range    Fibrinogen 236 200 - 400 mg/dL   Tacrolimus level   Result Value Ref Range    Tacrolimus  3.4 <=15.0 ng/mL   Morphology   Result Value Ref Range    RBC Morphology No significant RBC morphology present          Assessment/Plan   Assessment & Plan  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     T+8  Allo MRD     Updates 11/29:  -will give an extra dose of  tacro 2mg today  -continue supportive care    ONC:  # AML with MRD  - AML diagnosed per [facility] on 6/27/24. Positive for RUNX1, CKit, SRSF2 and CBL C404Y  - S/p C1D1 Venetoclax/Azacitidine on 7/24/24. Developed TLS with renal dysfunction  - [facility] (8/28/24): c/w persistent disease and 27% blast  - Admitted 9/5/24 for C2 of Jeremy/Aza with Venetoclx ramp up dosing (D1 20mg, D2 50 mg, D3 onward 100 mg daily).   - S/p 2 cycles of Jeremy/Aza  - [facility] (9/25/24) and (10/23/24): c/w MRD (<1%)  - [facility] (10/23): 0.2% blasts, hypercellular (90%) with systemic mastocytosis  - Admitted 11/15 for Allo MRD SCT (T=0 11/21/24)  # Smoldering Systemiv Mastocytosis diagnosed 8/23/23  - S/p Molecular therapy started 2/13/24  - Continue home Allegra for symptom management  - Tryptase level 11/16 (62.6) normal     Chemo:  - Fludarabine 30 mg/m2 IV D-5, D-4, D-3  - Melphalan 140 mg/m2 IV D-2   - Total Body Irradiation (TBI) 200 cGy on D-1      GVHD Prophy:  - Tacro level: < 2 on 11/27 (after 1 dose, will continue to follow); < 2 on 11/28; 3.4 (11/29)  - Tacro dose: 3mg BID. Will give an extra dose of 2mg on 11/29  - Post-tx Cytoxan 50 mg/kg on T+3 and T+4  - MMF 1000 mg TID to begin T+5 to T+35     TRANSPLANT:   - HLA matched related donor /12 match  - Donor: O+, CMV positive   - Recipient: O+, CMV negative      Surveillance: beginning 11/25  - Monitor CMV 2x weekly  - Monitor EBV weekly  - Monitor Adenovirus weekly  - Monitor Hapto/LDH weekly  - Monitor Fibrinogen 2x weekly  - Monitor UA/protein weekly     HEME:  # Pancytopenia 2/2 to chemotherapy. No acute bleed.  - Monitor counts daily  - Keep Hgb > 7.0, Plts > 10k     ID:  # Cont prophylaxis meds: Acyclovir, Posaconazole, po Vancomycin, Levofloxacin      FEN/GI:  - Admit wt: 86.1 kg, Daily wt: 83.4 kg (11/26)  # Weight loss w/ poor PO intake  - Nutrition Consult ordered (11/23)   - Cont home PPI, Pepcid and antiemetics  - Low pathogen diet  - Monitor electrolytes, replace prn  # JUSTIN; prn Zofran  and Compazine available   - Added Prn Ativan and Carafate  - Added Protonix  # Mucositis, mild  - Oral salt water rinses and biotene ordered  # Diarrhea, chemo induced  - Cdiff (11/25) negative  - Stool path negative  - Loperamide PRN     CARD:  - ECHO (10/23/24): EF 50-55%, RA/LA enlargement  - Cont home Norvasc     RENAL:  # H/o hematuria, resolved  - S/p Cystoscopy (1/15/24): Normal exam  # C/o frequent urination especially at night  - Last PSA 0.92 (9/6/24)     MISC:  - Cont home Allegra and Singular     DISPO:  - Full Code  - Access: DL (non-tunneled) CVC  - Primary On: Dr. Nika Dawson      Patient seen, examined, and discussed with Dr Bj Avila MD

## 2024-11-29 NOTE — PROGRESS NOTES
Physical Therapy                 Therapy Communication Note    Patient Name: Jin Reynolds  MRN: [id number]  Department: [facility] 3  Room: [address] [place], [place] [address]  Today's Date: 11/29/2024     Discipline: Physical Therapy    Missed Visit Reason: Missed Visit Reason:  (Pt and PT decided to hold therapy this date due to pt being lightheaded. RN and team already aware. Pt reports no issues with ambulation and has been walking the halls when feeling good. Will continue to follow in house)    Missed Time: Attempt

## 2024-11-29 NOTE — CARE PLAN
Problem: Pain - Adult  Goal: Verbalizes/displays adequate comfort level or baseline comfort level  Outcome: Progressing     Problem: Safety - Adult  Goal: Free from fall injury  Outcome: Progressing     Problem: Discharge Planning  Goal: Discharge to home or other facility with appropriate resources  Outcome: Progressing     Problem: Chronic Conditions and Co-morbidities  Goal: Patient's chronic conditions and co-morbidity symptoms are monitored and maintained or improved  Outcome: Progressing   The patient's goals for the shift include      The clinical goals for the shift include patient will remain falls free, VSS, A&Ox4, denies any c/o discomfort

## 2024-11-30 LAB
ALBUMIN SERPL BCP-MCNC: 3.3 G/DL (ref 3.4–5)
ANION GAP SERPL CALC-SCNC: 8 MMOL/L (ref 10–20)
APTT PPP: 36 SECONDS (ref 27–38)
BASOPHILS # BLD AUTO: 0 X10*3/UL (ref 0–0.1)
BASOPHILS NFR BLD AUTO: 0 %
BLOOD EXPIRATION DATE: NORMAL
BUN SERPL-MCNC: 7 MG/DL (ref 6–23)
CALCIUM SERPL-MCNC: 7.4 MG/DL (ref 8.6–10.6)
CHLORIDE SERPL-SCNC: 107 MMOL/L (ref 98–107)
CO2 SERPL-SCNC: 24 MMOL/L (ref 21–32)
CREAT SERPL-MCNC: 0.64 MG/DL (ref 0.5–1.3)
DISPENSE STATUS: NORMAL
EGFRCR SERPLBLD CKD-EPI 2021: >90 ML/MIN/1.73M*2
EOSINOPHIL # BLD AUTO: 0 X10*3/UL (ref 0–0.7)
EOSINOPHIL NFR BLD AUTO: 0 %
ERYTHROCYTE [DISTWIDTH] IN BLOOD BY AUTOMATED COUNT: 16.4 % (ref 11.5–14.5)
GLUCOSE SERPL-MCNC: 89 MG/DL (ref 74–99)
HCT VFR BLD AUTO: 20.6 % (ref 41–52)
HGB BLD-MCNC: 7.3 G/DL (ref 13.5–17.5)
IMM GRANULOCYTES # BLD AUTO: 0 X10*3/UL (ref 0–0.7)
IMM GRANULOCYTES NFR BLD AUTO: 0 % (ref 0–0.9)
INR PPP: 1.4 (ref 0.9–1.1)
LYMPHOCYTES # BLD AUTO: 0 X10*3/UL (ref 1.2–4.8)
LYMPHOCYTES NFR BLD AUTO: 0 %
MAGNESIUM SERPL-MCNC: 1.8 MG/DL (ref 1.6–2.4)
MCH RBC QN AUTO: 32 PG (ref 26–34)
MCHC RBC AUTO-ENTMCNC: 35.4 G/DL (ref 32–36)
MCV RBC AUTO: 90 FL (ref 80–100)
MONOCYTES # BLD AUTO: 0 X10*3/UL (ref 0.1–1)
MONOCYTES NFR BLD AUTO: 0 %
NEUTROPHILS # BLD AUTO: 0.01 X10*3/UL (ref 1.2–7.7)
NEUTROPHILS NFR BLD AUTO: 100 %
NRBC BLD-RTO: ABNORMAL /100{WBCS}
PHOSPHATE SERPL-MCNC: 2.3 MG/DL (ref 2.5–4.9)
PLATELET # BLD AUTO: 6 X10*3/UL (ref 150–450)
POTASSIUM SERPL-SCNC: 3.5 MMOL/L (ref 3.5–5.3)
PRODUCT BLOOD TYPE: 5100
PRODUCT CODE: NORMAL
PROTHROMBIN TIME: 15.9 SECONDS (ref 9.8–12.8)
RBC # BLD AUTO: 2.28 X10*6/UL (ref 4.5–5.9)
SODIUM SERPL-SCNC: 135 MMOL/L (ref 136–145)
TACROLIMUS BLD-MCNC: 4.7 NG/ML
UNIT ABO: NORMAL
UNIT NUMBER: NORMAL
UNIT RH: NORMAL
UNIT VOLUME: 284
WBC # BLD AUTO: <0.1 X10*3/UL (ref 4.4–11.3)

## 2024-11-30 PROCEDURE — 2500000002 HC RX 250 W HCPCS SELF ADMINISTERED DRUGS (ALT 637 FOR MEDICARE OP, ALT 636 FOR OP/ED): Performed by: STUDENT IN AN ORGANIZED HEALTH CARE EDUCATION/TRAINING PROGRAM

## 2024-11-30 PROCEDURE — 99233 SBSQ HOSP IP/OBS HIGH 50: CPT | Performed by: STUDENT IN AN ORGANIZED HEALTH CARE EDUCATION/TRAINING PROGRAM

## 2024-11-30 PROCEDURE — 2500000001 HC RX 250 WO HCPCS SELF ADMINISTERED DRUGS (ALT 637 FOR MEDICARE OP): Performed by: NURSE PRACTITIONER

## 2024-11-30 PROCEDURE — 2500000005 HC RX 250 GENERAL PHARMACY W/O HCPCS: Performed by: STUDENT IN AN ORGANIZED HEALTH CARE EDUCATION/TRAINING PROGRAM

## 2024-11-30 PROCEDURE — 85025 COMPLETE CBC W/AUTO DIFF WBC: CPT

## 2024-11-30 PROCEDURE — 2500000004 HC RX 250 GENERAL PHARMACY W/ HCPCS (ALT 636 FOR OP/ED): Performed by: INTERNAL MEDICINE

## 2024-11-30 PROCEDURE — P9073 PLATELETS PHERESIS PATH REDU: HCPCS

## 2024-11-30 PROCEDURE — 2500000002 HC RX 250 W HCPCS SELF ADMINISTERED DRUGS (ALT 637 FOR MEDICARE OP, ALT 636 FOR OP/ED)

## 2024-11-30 PROCEDURE — 1170000001 HC PRIVATE ONCOLOGY ROOM DAILY

## 2024-11-30 PROCEDURE — 80197 ASSAY OF TACROLIMUS: CPT | Performed by: INTERNAL MEDICINE

## 2024-11-30 PROCEDURE — 2500000002 HC RX 250 W HCPCS SELF ADMINISTERED DRUGS (ALT 637 FOR MEDICARE OP, ALT 636 FOR OP/ED): Performed by: INTERNAL MEDICINE

## 2024-11-30 PROCEDURE — 36430 TRANSFUSION BLD/BLD COMPNT: CPT

## 2024-11-30 PROCEDURE — 2500000001 HC RX 250 WO HCPCS SELF ADMINISTERED DRUGS (ALT 637 FOR MEDICARE OP)

## 2024-11-30 PROCEDURE — 85730 THROMBOPLASTIN TIME PARTIAL: CPT

## 2024-11-30 PROCEDURE — 2500000001 HC RX 250 WO HCPCS SELF ADMINISTERED DRUGS (ALT 637 FOR MEDICARE OP): Performed by: HOME HEALTH AIDE

## 2024-11-30 PROCEDURE — 80069 RENAL FUNCTION PANEL: CPT | Performed by: PHYSICIAN ASSISTANT

## 2024-11-30 PROCEDURE — 2500000001 HC RX 250 WO HCPCS SELF ADMINISTERED DRUGS (ALT 637 FOR MEDICARE OP): Performed by: INTERNAL MEDICINE

## 2024-11-30 PROCEDURE — 83735 ASSAY OF MAGNESIUM: CPT

## 2024-11-30 PROCEDURE — 2500000004 HC RX 250 GENERAL PHARMACY W/ HCPCS (ALT 636 FOR OP/ED): Performed by: STUDENT IN AN ORGANIZED HEALTH CARE EDUCATION/TRAINING PROGRAM

## 2024-11-30 RX ORDER — MAGNESIUM SULFATE HEPTAHYDRATE 40 MG/ML
2 INJECTION, SOLUTION INTRAVENOUS ONCE
Status: COMPLETED | OUTPATIENT
Start: 2024-11-30 | End: 2024-11-30

## 2024-11-30 RX ORDER — POTASSIUM CHLORIDE 20 MEQ/1
20 TABLET, EXTENDED RELEASE ORAL ONCE
Status: COMPLETED | OUTPATIENT
Start: 2024-11-30 | End: 2024-11-30

## 2024-11-30 RX ORDER — TACROLIMUS 1 MG/1
2 CAPSULE ORAL ONCE
Status: COMPLETED | OUTPATIENT
Start: 2024-11-30 | End: 2024-11-30

## 2024-11-30 ASSESSMENT — COGNITIVE AND FUNCTIONAL STATUS - GENERAL
DAILY ACTIVITIY SCORE: 24
MOBILITY SCORE: 24
DAILY ACTIVITIY SCORE: 24
MOBILITY SCORE: 24
DAILY ACTIVITIY SCORE: 24
MOBILITY SCORE: 24

## 2024-11-30 ASSESSMENT — PAIN SCALES - GENERAL
PAINLEVEL_OUTOF10: 0 - NO PAIN

## 2024-11-30 ASSESSMENT — PAIN - FUNCTIONAL ASSESSMENT: PAIN_FUNCTIONAL_ASSESSMENT: 0-10

## 2024-11-30 NOTE — PROGRESS NOTES
"Jin Reynolds is a 69 y.o. male on day 15 of admission presenting with AML (acute myeloid leukemia) in remission (Multi).    Subjective   He reports feeling better today than yesterday. Energy is improved. No mouth sores, thick \"film\" is better with the mouthwash. Lightheadedness has resolved. No nausea, abd pain. Had 1 formed BM yesterday. No chest pain or SOB.     Objective     Physical Exam:  General: well appearing, alert, conversant, in no apparent distress  HEENT: normocephalic, atraumatic, EOMI, no scleral icterus. Mild erythema along buccal mucosa  CV: RRR, no murmurs  Pulm: CTAB, no wheezes or crackles, no increased work of breathing  Abd: soft, non tender, non distended  : no sheikh   Ext: warm, no lower extremity edema  Skin: no rashes  Neuro: moving all extremities  Psych: normal affect      Last Recorded Vitals  Blood pressure 129/83, pulse 70, temperature 36.5 °C (97.7 °F), temperature source Temporal, resp. rate 18, height 1.691 m (5' 6.58\"), weight 82.2 kg (181 lb 3.5 oz), SpO2 99%.  Intake/Output last 3 Shifts:  I/O last 3 completed shifts:  In: 412 (5 mL/kg) [I.V.:50 (0.6 mL/kg); IV Piggyback:362]  Out: - (0 mL/kg)   Weight: 82.2 kg     Results for orders placed or performed during the hospital encounter of 11/15/24 (from the past 24 hours)   CBC and Auto Differential   Result Value Ref Range    WBC <0.1 (LL) 4.4 - 11.3 x10*3/uL    nRBC      RBC 2.28 (L) 4.50 - 5.90 x10*6/uL    Hemoglobin 7.3 (L) 13.5 - 17.5 g/dL    Hematocrit 20.6 (L) 41.0 - 52.0 %    MCV 90 80 - 100 fL    MCH 32.0 26.0 - 34.0 pg    MCHC 35.4 32.0 - 36.0 g/dL    RDW 16.4 (H) 11.5 - 14.5 %    Platelets 6 (LL) 150 - 450 x10*3/uL    Neutrophils % 100.0 40.0 - 80.0 %    Immature Granulocytes %, Automated 0.0 0.0 - 0.9 %    Lymphocytes % 0.0 13.0 - 44.0 %    Monocytes % 0.0 2.0 - 10.0 %    Eosinophils % 0.0 0.0 - 6.0 %    Basophils % 0.0 0.0 - 2.0 %    Neutrophils Absolute 0.01 (L) 1.20 - 7.70 x10*3/uL    Immature Granulocytes " Absolute, Automated 0.00 0.00 - 0.70 x10*3/uL    Lymphocytes Absolute 0.00 (L) 1.20 - 4.80 x10*3/uL    Monocytes Absolute 0.00 (L) 0.10 - 1.00 x10*3/uL    Eosinophils Absolute 0.00 0.00 - 0.70 x10*3/uL    Basophils Absolute 0.00 0.00 - 0.10 x10*3/uL   Coagulation Screen   Result Value Ref Range    Protime 15.9 (H) 9.8 - 12.8 seconds    INR 1.4 (H) 0.9 - 1.1    aPTT 36 27 - 38 seconds   Magnesium   Result Value Ref Range    Magnesium 1.80 1.60 - 2.40 mg/dL   Renal Function Panel   Result Value Ref Range    Glucose 89 74 - 99 mg/dL    Sodium 135 (L) 136 - 145 mmol/L    Potassium 3.5 3.5 - 5.3 mmol/L    Chloride 107 98 - 107 mmol/L    Bicarbonate 24 21 - 32 mmol/L    Anion Gap 8 (L) 10 - 20 mmol/L    Urea Nitrogen 7 6 - 23 mg/dL    Creatinine 0.64 0.50 - 1.30 mg/dL    eGFR >90 >60 mL/min/1.73m*2    Calcium 7.4 (L) 8.6 - 10.6 mg/dL    Phosphorus 2.3 (L) 2.5 - 4.9 mg/dL    Albumin 3.3 (L) 3.4 - 5.0 g/dL   Prepare Platelets: 1 Units, Irradiated, Leukocytes Reduced (CMV reduced risk)   Result Value Ref Range    PRODUCT CODE O5443E92     Unit Number [id number]     Unit ABO O     Unit RH POS     Dispense Status IS     Blood Expiration Date 11/30/2024 11:59:00 PM EST     PRODUCT BLOOD TYPE 5100     UNIT VOLUME 284          Assessment/Plan   Assessment & Plan  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     T+9  Allo MRD     Updates 11/30/24  -plt transfusion ordered  -tacro level 3.4 > 4.7. will give extra dose of 2mg and increase scheduled dose to 3.5mg BID  -continue supportive care    ONC:  # AML with MRD  - AML diagnosed per [facility] on 6/27/24. Positive for RUNX1, CKit, SRSF2 and CBL C404Y  - S/p C1D1 Venetoclax/Azacitidine on 7/24/24. Developed TLS with renal dysfunction  - [facility] (8/28/24): c/w persistent disease and 27% blast  - Admitted  9/5/24 for C2 of Jeremy/Aza with Venetoclx ramp up dosing (D1 20mg, D2 50 mg, D3 onward 100 mg daily).   - S/p 2 cycles of Jeremy/Aza  - [facility]x (9/25/24) and (10/23/24): c/w MRD (<1%)  - [facility] (10/23): 0.2% blasts, hypercellular (90%) with systemic mastocytosis  - Admitted 11/15 for Allo MRD SCT (T=0 11/21/24)  # Smoldering Systemiv Mastocytosis diagnosed 8/23/23  - S/p Molecular therapy started 2/13/24  - Continue home Allegra for symptom management  - Tryptase level 11/16 (62.6) normal     Chemo:  - Fludarabine 30 mg/m2 IV D-5, D-4, D-3  - Melphalan 140 mg/m2 IV D-2   - Total Body Irradiation (TBI) 200 cGy on D-1      GVHD Prophy:  - Tacro level: < 2 on 11/27 (after 1 dose, will continue to follow); < 2 on 11/28; 3.4 (11/29); 4.7 (11/30)  - Tacro dose: increase to 3.5 mg BID. Will give an extra dose of 2mg on 11/30  - Post-tx Cytoxan 50 mg/kg on T+3 and T+4  - MMF 1000 mg TID to begin T+5 to T+35     TRANSPLANT:   - HLA matched related donor /12 match  - Donor: O+, CMV positive   - Recipient: O+, CMV negative      Surveillance: beginning 11/25  - Monitor CMV 2x weekly  - Monitor EBV weekly  - Monitor Adenovirus weekly  - Monitor Hapto/LDH weekly  - Monitor Fibrinogen 2x weekly  - Monitor UA/protein weekly     HEME:  # Pancytopenia 2/2 to chemotherapy. No acute bleed.  - Monitor counts daily  - Keep Hgb > 7.0, Plts > 10k     ID:  # Cont prophylaxis meds: Acyclovir, Posaconazole, po Vancomycin, Levofloxacin      FEN/GI:  - Admit wt: 86.1 kg, Daily wt: 83.4 kg (11/26)  # Weight loss w/ poor PO intake  - Nutrition Consult ordered (11/23)   - Cont home PPI, Pepcid and antiemetics  - Low pathogen diet  - Monitor electrolytes, replace prn  # JUSTIN; prn Zofran and Compazine available   - Added Prn Ativan and Carafate  - Added Protonix  # Mucositis, mild  - Oral salt water rinses and biotene ordered  # Diarrhea, chemo induced  - Cdiff (11/25) negative  - Stool path negative  - Loperamide PRN     CARD:  - ECHO (10/23/24): EF 50-55%,  RA/LA enlargement  - Cont home Norvasc     RENAL:  # H/o hematuria, resolved  - S/p Cystoscopy (1/15/24): Normal exam  # C/o frequent urination especially at night  - Last PSA 0.92 (9/6/24)     MISC:  - Cont home Allegra and Singular     DISPO:  - Full Code  - Access: DL (non-tunneled) CVC  - Primary On: Dr. Nika Dawson      Patient seen, examined, and discussed with Dr Bj Avila MD

## 2024-12-01 LAB
ABO GROUP (TYPE) IN BLOOD: NORMAL
ALBUMIN SERPL BCP-MCNC: 3.3 G/DL (ref 3.4–5)
ANION GAP SERPL CALC-SCNC: 8 MMOL/L (ref 10–20)
ANTIBODY SCREEN: NORMAL
APTT PPP: 36 SECONDS (ref 27–38)
BACTERIA BPU CULT: NORMAL
BASOPHILS # BLD AUTO: 0 X10*3/UL (ref 0–0.1)
BASOPHILS # BLD AUTO: 0 X10*3/UL (ref 0–0.1)
BASOPHILS NFR BLD AUTO: 0 %
BASOPHILS NFR BLD AUTO: 0 %
BLOOD EXPIRATION DATE: NORMAL
BUN SERPL-MCNC: 8 MG/DL (ref 6–23)
CALCIUM SERPL-MCNC: 7.5 MG/DL (ref 8.6–10.6)
CHLORIDE SERPL-SCNC: 106 MMOL/L (ref 98–107)
CO2 SERPL-SCNC: 24 MMOL/L (ref 21–32)
CREAT SERPL-MCNC: 0.63 MG/DL (ref 0.5–1.3)
DISPENSE STATUS: NORMAL
EGFRCR SERPLBLD CKD-EPI 2021: >90 ML/MIN/1.73M*2
EOSINOPHIL # BLD AUTO: 0 X10*3/UL (ref 0–0.7)
EOSINOPHIL # BLD AUTO: 0 X10*3/UL (ref 0–0.7)
EOSINOPHIL NFR BLD AUTO: 0 %
EOSINOPHIL NFR BLD AUTO: 0 %
ERYTHROCYTE [DISTWIDTH] IN BLOOD BY AUTOMATED COUNT: 16 % (ref 11.5–14.5)
ERYTHROCYTE [DISTWIDTH] IN BLOOD BY AUTOMATED COUNT: 16.9 % (ref 11.5–14.5)
GLUCOSE SERPL-MCNC: 95 MG/DL (ref 74–99)
HCT VFR BLD AUTO: 19.9 % (ref 41–52)
HCT VFR BLD AUTO: 25.3 % (ref 41–52)
HGB BLD-MCNC: 6.9 G/DL (ref 13.5–17.5)
HGB BLD-MCNC: 8.8 G/DL (ref 13.5–17.5)
HYPOCHROMIA BLD QL SMEAR: NORMAL
IMM GRANULOCYTES # BLD AUTO: 0 X10*3/UL (ref 0–0.7)
IMM GRANULOCYTES # BLD AUTO: 0 X10*3/UL (ref 0–0.7)
IMM GRANULOCYTES NFR BLD AUTO: 0 % (ref 0–0.9)
IMM GRANULOCYTES NFR BLD AUTO: 0 % (ref 0–0.9)
INR PPP: 1.5 (ref 0.9–1.1)
LYMPHOCYTES # BLD AUTO: 0 X10*3/UL (ref 1.2–4.8)
LYMPHOCYTES # BLD AUTO: 0 X10*3/UL (ref 1.2–4.8)
LYMPHOCYTES NFR BLD AUTO: 0 %
LYMPHOCYTES NFR BLD AUTO: 0 %
MAGNESIUM SERPL-MCNC: 1.68 MG/DL (ref 1.6–2.4)
MCH RBC QN AUTO: 30.8 PG (ref 26–34)
MCH RBC QN AUTO: 31.2 PG (ref 26–34)
MCHC RBC AUTO-ENTMCNC: 34.7 G/DL (ref 32–36)
MCHC RBC AUTO-ENTMCNC: 34.8 G/DL (ref 32–36)
MCV RBC AUTO: 89 FL (ref 80–100)
MCV RBC AUTO: 90 FL (ref 80–100)
MONOCYTES # BLD AUTO: 0 X10*3/UL (ref 0.1–1)
MONOCYTES # BLD AUTO: 0 X10*3/UL (ref 0.1–1)
MONOCYTES NFR BLD AUTO: 0 %
MONOCYTES NFR BLD AUTO: 0 %
NEUTROPHILS # BLD AUTO: 0.01 X10*3/UL (ref 1.2–7.7)
NEUTROPHILS # BLD AUTO: 0.01 X10*3/UL (ref 1.2–7.7)
NEUTROPHILS NFR BLD AUTO: 100 %
NEUTROPHILS NFR BLD AUTO: 100 %
NRBC BLD-RTO: 0 /100 WBCS (ref 0–0)
NRBC BLD-RTO: 0 /100 WBCS (ref 0–0)
OVALOCYTES BLD QL SMEAR: NORMAL
OVALOCYTES BLD QL SMEAR: NORMAL
PHOSPHATE SERPL-MCNC: 2.6 MG/DL (ref 2.5–4.9)
PLATELET # BLD AUTO: 4 X10*3/UL (ref 150–450)
PLATELET # BLD AUTO: 6 X10*3/UL (ref 150–450)
POTASSIUM SERPL-SCNC: 3.4 MMOL/L (ref 3.5–5.3)
PRODUCT BLOOD TYPE: 5100
PRODUCT BLOOD TYPE: 5100
PRODUCT BLOOD TYPE: 6200
PRODUCT CODE: NORMAL
PROTHROMBIN TIME: 16.6 SECONDS (ref 9.8–12.8)
RBC # BLD AUTO: 2.21 X10*6/UL (ref 4.5–5.9)
RBC # BLD AUTO: 2.86 X10*6/UL (ref 4.5–5.9)
RBC MORPH BLD: NORMAL
RBC MORPH BLD: NORMAL
RH FACTOR (ANTIGEN D): NORMAL
SODIUM SERPL-SCNC: 135 MMOL/L (ref 136–145)
TACROLIMUS BLD-MCNC: 4.3 NG/ML
UNIT ABO: NORMAL
UNIT NUMBER: NORMAL
UNIT RH: NORMAL
UNIT VOLUME: 176
UNIT VOLUME: 270
UNIT VOLUME: 350
WBC # BLD AUTO: <0.1 X10*3/UL (ref 4.4–11.3)
WBC # BLD AUTO: <0.1 X10*3/UL (ref 4.4–11.3)
XM INTEP: NORMAL

## 2024-12-01 PROCEDURE — 2500000004 HC RX 250 GENERAL PHARMACY W/ HCPCS (ALT 636 FOR OP/ED): Performed by: STUDENT IN AN ORGANIZED HEALTH CARE EDUCATION/TRAINING PROGRAM

## 2024-12-01 PROCEDURE — 2500000002 HC RX 250 W HCPCS SELF ADMINISTERED DRUGS (ALT 637 FOR MEDICARE OP, ALT 636 FOR OP/ED): Performed by: INTERNAL MEDICINE

## 2024-12-01 PROCEDURE — 85025 COMPLETE CBC W/AUTO DIFF WBC: CPT | Performed by: STUDENT IN AN ORGANIZED HEALTH CARE EDUCATION/TRAINING PROGRAM

## 2024-12-01 PROCEDURE — 86870 RBC ANTIBODY IDENTIFICATION: CPT

## 2024-12-01 PROCEDURE — 2500000001 HC RX 250 WO HCPCS SELF ADMINISTERED DRUGS (ALT 637 FOR MEDICARE OP): Performed by: HOME HEALTH AIDE

## 2024-12-01 PROCEDURE — 84100 ASSAY OF PHOSPHORUS: CPT | Performed by: PHYSICIAN ASSISTANT

## 2024-12-01 PROCEDURE — 2500000002 HC RX 250 W HCPCS SELF ADMINISTERED DRUGS (ALT 637 FOR MEDICARE OP, ALT 636 FOR OP/ED)

## 2024-12-01 PROCEDURE — 2500000004 HC RX 250 GENERAL PHARMACY W/ HCPCS (ALT 636 FOR OP/ED): Mod: JZ | Performed by: INTERNAL MEDICINE

## 2024-12-01 PROCEDURE — 2500000001 HC RX 250 WO HCPCS SELF ADMINISTERED DRUGS (ALT 637 FOR MEDICARE OP)

## 2024-12-01 PROCEDURE — 80069 RENAL FUNCTION PANEL: CPT | Performed by: PHYSICIAN ASSISTANT

## 2024-12-01 PROCEDURE — P9037 PLATE PHERES LEUKOREDU IRRAD: HCPCS

## 2024-12-01 PROCEDURE — 30233R1 TRANSFUSION OF NONAUTOLOGOUS PLATELETS INTO PERIPHERAL VEIN, PERCUTANEOUS APPROACH: ICD-10-PCS | Performed by: INTERNAL MEDICINE

## 2024-12-01 PROCEDURE — 2500000001 HC RX 250 WO HCPCS SELF ADMINISTERED DRUGS (ALT 637 FOR MEDICARE OP): Performed by: INTERNAL MEDICINE

## 2024-12-01 PROCEDURE — 2500000004 HC RX 250 GENERAL PHARMACY W/ HCPCS (ALT 636 FOR OP/ED)

## 2024-12-01 PROCEDURE — 86902 BLOOD TYPE ANTIGEN DONOR EA: CPT

## 2024-12-01 PROCEDURE — 36430 TRANSFUSION BLD/BLD COMPNT: CPT

## 2024-12-01 PROCEDURE — 86850 RBC ANTIBODY SCREEN: CPT | Performed by: STUDENT IN AN ORGANIZED HEALTH CARE EDUCATION/TRAINING PROGRAM

## 2024-12-01 PROCEDURE — 83735 ASSAY OF MAGNESIUM: CPT

## 2024-12-01 PROCEDURE — 85610 PROTHROMBIN TIME: CPT

## 2024-12-01 PROCEDURE — 30233N1 TRANSFUSION OF NONAUTOLOGOUS RED BLOOD CELLS INTO PERIPHERAL VEIN, PERCUTANEOUS APPROACH: ICD-10-PCS | Performed by: INTERNAL MEDICINE

## 2024-12-01 PROCEDURE — P9040 RBC LEUKOREDUCED IRRADIATED: HCPCS

## 2024-12-01 PROCEDURE — 2500000001 HC RX 250 WO HCPCS SELF ADMINISTERED DRUGS (ALT 637 FOR MEDICARE OP): Performed by: NURSE PRACTITIONER

## 2024-12-01 PROCEDURE — 80197 ASSAY OF TACROLIMUS: CPT | Performed by: INTERNAL MEDICINE

## 2024-12-01 PROCEDURE — 85025 COMPLETE CBC W/AUTO DIFF WBC: CPT

## 2024-12-01 PROCEDURE — 99233 SBSQ HOSP IP/OBS HIGH 50: CPT | Performed by: STUDENT IN AN ORGANIZED HEALTH CARE EDUCATION/TRAINING PROGRAM

## 2024-12-01 PROCEDURE — 86922 COMPATIBILITY TEST ANTIGLOB: CPT

## 2024-12-01 PROCEDURE — 1170000001 HC PRIVATE ONCOLOGY ROOM DAILY

## 2024-12-01 RX ORDER — TACROLIMUS 1 MG/1
2 CAPSULE ORAL ONCE
Status: COMPLETED | OUTPATIENT
Start: 2024-12-01 | End: 2024-12-01

## 2024-12-01 RX ORDER — GUAIFENESIN 600 MG/1
600 TABLET, EXTENDED RELEASE ORAL 2 TIMES DAILY PRN
Status: DISCONTINUED | OUTPATIENT
Start: 2024-12-01 | End: 2024-12-07 | Stop reason: HOSPADM

## 2024-12-01 RX ORDER — TACROLIMUS 1 MG/1
4 CAPSULE ORAL
Status: DISCONTINUED | OUTPATIENT
Start: 2024-12-01 | End: 2024-12-02

## 2024-12-01 ASSESSMENT — COGNITIVE AND FUNCTIONAL STATUS - GENERAL
MOBILITY SCORE: 24
DAILY ACTIVITIY SCORE: 24
MOBILITY SCORE: 24
DAILY ACTIVITIY SCORE: 24

## 2024-12-01 ASSESSMENT — PAIN - FUNCTIONAL ASSESSMENT: PAIN_FUNCTIONAL_ASSESSMENT: 0-10

## 2024-12-01 ASSESSMENT — PAIN SCALES - GENERAL
PAINLEVEL_OUTOF10: 0 - NO PAIN
PAINLEVEL_OUTOF10: 0 - NO PAIN

## 2024-12-01 NOTE — PROGRESS NOTES
"Jin Reynolds is a 69 y.o. male on day 16 of admission presenting with AML (acute myeloid leukemia) in remission (Multi).    Subjective   Reports feeling better today than yesterday. No mouth sores, still using the mouth washes. Eating okay. Having daily BM but not diarrhea. No easy bleeding or bruising.     Objective     Physical Exam:  General: well appearing, alert, conversant, in no apparent distress  HEENT: normocephalic, atraumatic, EOMI, no scleral icterus. No mucositis  CV: RRR, no murmurs  Pulm: CTAB, no wheezes or crackles, no increased work of breathing  Abd: soft, non tender, non distended  : no sheikh   Ext: warm, no lower extremity edema  Skin: no rashes  Neuro: moving all extremities  Psych: normal affect      Last Recorded Vitals  Blood pressure 120/81, pulse 95, temperature 36 °C (96.8 °F), temperature source Temporal, resp. rate 18, height 1.691 m (5' 6.58\"), weight 82.3 kg (181 lb 7 oz), SpO2 99%.  Intake/Output last 3 Shifts:  I/O last 3 completed shifts:  In: 541 (6.6 mL/kg) [Blood:284; IV Piggyback:257]  Out: - (0 mL/kg)   Weight: 82.2 kg     Results for orders placed or performed during the hospital encounter of 11/15/24 (from the past 24 hours)   CBC and Auto Differential   Result Value Ref Range    WBC <0.1 (LL) 4.4 - 11.3 x10*3/uL    nRBC 0.0 0.0 - 0.0 /100 WBCs    RBC 2.21 (L) 4.50 - 5.90 x10*6/uL    Hemoglobin 6.9 (L) 13.5 - 17.5 g/dL    Hematocrit 19.9 (L) 41.0 - 52.0 %    MCV 90 80 - 100 fL    MCH 31.2 26.0 - 34.0 pg    MCHC 34.7 32.0 - 36.0 g/dL    RDW 16.0 (H) 11.5 - 14.5 %    Platelets 4 (LL) 150 - 450 x10*3/uL    Neutrophils % 100.0 40.0 - 80.0 %    Immature Granulocytes %, Automated 0.0 0.0 - 0.9 %    Lymphocytes % 0.0 13.0 - 44.0 %    Monocytes % 0.0 2.0 - 10.0 %    Eosinophils % 0.0 0.0 - 6.0 %    Basophils % 0.0 0.0 - 2.0 %    Neutrophils Absolute 0.01 (L) 1.20 - 7.70 x10*3/uL    Immature Granulocytes Absolute, Automated 0.00 0.00 - 0.70 x10*3/uL    Lymphocytes Absolute 0.00 " (L) 1.20 - 4.80 x10*3/uL    Monocytes Absolute 0.00 (L) 0.10 - 1.00 x10*3/uL    Eosinophils Absolute 0.00 0.00 - 0.70 x10*3/uL    Basophils Absolute 0.00 0.00 - 0.10 x10*3/uL   Coagulation Screen   Result Value Ref Range    Protime 16.6 (H) 9.8 - 12.8 seconds    INR 1.5 (H) 0.9 - 1.1    aPTT 36 27 - 38 seconds   Magnesium   Result Value Ref Range    Magnesium 1.68 1.60 - 2.40 mg/dL   Renal Function Panel   Result Value Ref Range    Glucose 95 74 - 99 mg/dL    Sodium 135 (L) 136 - 145 mmol/L    Potassium 3.4 (L) 3.5 - 5.3 mmol/L    Chloride 106 98 - 107 mmol/L    Bicarbonate 24 21 - 32 mmol/L    Anion Gap 8 (L) 10 - 20 mmol/L    Urea Nitrogen 8 6 - 23 mg/dL    Creatinine 0.63 0.50 - 1.30 mg/dL    eGFR >90 >60 mL/min/1.73m*2    Calcium 7.5 (L) 8.6 - 10.6 mg/dL    Phosphorus 2.6 2.5 - 4.9 mg/dL    Albumin 3.3 (L) 3.4 - 5.0 g/dL   Tacrolimus level   Result Value Ref Range    Tacrolimus  4.3 <=15.0 ng/mL   Type and screen   Result Value Ref Range    ABO TYPE O     Rh TYPE POS     ANTIBODY SCREEN POS    Morphology   Result Value Ref Range    RBC Morphology See Below     Hypochromia Mild     Ovalocytes Few    Prepare Platelets: 1 Units, Irradiated, Leukocytes Reduced (CMV reduced risk)   Result Value Ref Range    PRODUCT CODE N6623L15     Unit Number [id number]     Unit ABO A     Unit RH POS     Dispense Status TR     Blood Expiration Date 12/1/2024 11:59:00 PM EST     PRODUCT BLOOD TYPE 6200     UNIT VOLUME 270    Prepare RBC: 1 Units, Irradiated, Leukocytes Reduced (CMV reduced risk)   Result Value Ref Range    PRODUCT CODE P1831H72     Unit Number [id number]     Unit ABO O     Unit RH POS     XM INTEP COMP     Dispense Status IS     Blood Expiration Date 12/20/2024 11:59:00 PM EST     PRODUCT BLOOD TYPE 5100     UNIT VOLUME 350      *Note: Due to a large number of results and/or encounters for the requested time period, some results have not been displayed. A complete set of results can be found in Results  Review.         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     T+10  Allo MRD     Updates 12/01/24  -planned for another plt transfusion today and RBC transfusion  -tacro level today 4.3, will increase to 4mg BID and give extra dose of 2mg today  -continue supportive care    ONC:  # AML with MRD  - AML diagnosed per [facility] on 6/27/24. Positive for RUNX1, CKit, SRSF2 and CBL C404Y  - S/p C1D1 Venetoclax/Azacitidine on 7/24/24. Developed TLS with renal dysfunction  - [facility] (8/28/24): c/w persistent disease and 27% blast  - Admitted 9/5/24 for C2 of Jeremy/Aza with Venetoclx ramp up dosing (D1 20mg, D2 50 mg, D3 onward 100 mg daily).   - S/p 2 cycles of Jeremy/Aza  - [facility]x (9/25/24) and (10/23/24): c/w MRD (<1%)  - [facility]x (10/23): 0.2% blasts, hypercellular (90%) with systemic mastocytosis  - Admitted 11/15 for Allo MRD SCT (T=0 11/21/24)  # Smoldering Systemiv Mastocytosis diagnosed 8/23/23  - S/p Molecular therapy started 2/13/24  - Continue home Allegra for symptom management  - Tryptase level 11/16 (62.6) normal     Chemo:  - Fludarabine 30 mg/m2 IV D-5, D-4, D-3  - Melphalan 140 mg/m2 IV D-2   - Total Body Irradiation (TBI) 200 cGy on D-1      GVHD Prophy:  - Tacro level: < 2 on 11/27 (after 1 dose, will continue to follow); < 2 on 11/28; 3.4 (11/29); 4.7 (11/30); 4.3 (12/1)  - Tacro dose: increase to 4 mg BID. Will give an extra dose of 2mg on 12/1  - Post-tx Cytoxan 50 mg/kg on T+3 and T+4  - MMF 1000 mg TID to begin T+5 to T+35     TRANSPLANT:   - HLA matched related donor /12 match  - Donor: O+, CMV positive   - Recipient: O+, CMV negative      Surveillance: beginning 11/25  - Monitor CMV 2x weekly: 11/28 ND  - Monitor EBV weekly  - Monitor Adenovirus weekly: 11/25 ND  - Monitor Hapto/LDH weekly: 11/25 wnl  -  Monitor Fibrinogen 2x weekly: 11/29 236  - Monitor UA/protein weekly     HEME:  # Pancytopenia 2/2 to chemotherapy. No acute bleed.  - Monitor counts daily  - Keep Hgb > 7.0, Plts > 10k  - last plt transfusion: 12/1, 11/30     ID:  # Cont prophylaxis meds: Acyclovir, Posaconazole, po Vancomycin, Levofloxacin      FEN/GI:  - Admit wt: 86.1 kg, Daily wt: 83.4 kg (11/26)  # Weight loss w/ poor PO intake  - Nutrition Consult ordered (11/23)   - Cont home PPI, Pepcid and antiemetics  - Low pathogen diet  - Monitor electrolytes, replace prn  # JUSTIN; prn Zofran and Compazine available   - Added Prn Ativan and Carafate  - Added Protonix  # Mucositis, mild  - Oral salt water rinses and biotene ordered  # Diarrhea, chemo induced  - Cdiff (11/25) negative  - Stool path negative  - Loperamide PRN     CARD:  - ECHO (10/23/24): EF 50-55%, RA/LA enlargement  - Cont home Norvasc     RENAL:  # H/o hematuria, resolved  - S/p Cystoscopy (1/15/24): Normal exam  # C/o frequent urination especially at night  - Last PSA 0.92 (9/6/24)     MISC:  - Cont home Allegra and Singular     DISPO:  - Full Code  - Access: DL (non-tunneled) CVC  - Primary On: Dr. Nika Dawson      Patient seen, examined, and discussed with Dr Bj Avila MD

## 2024-12-02 LAB
ALBUMIN SERPL BCP-MCNC: 3.4 G/DL (ref 3.4–5)
ANION GAP SERPL CALC-SCNC: 11 MMOL/L (ref 10–20)
APTT PPP: 36 SECONDS (ref 27–38)
BASOPHILS # BLD AUTO: 0 X10*3/UL (ref 0–0.1)
BASOPHILS NFR BLD AUTO: 0 %
BB ANTIBODY IDENTIFICATION: NORMAL
BUN SERPL-MCNC: 8 MG/DL (ref 6–23)
CALCIUM SERPL-MCNC: 7.7 MG/DL (ref 8.6–10.6)
CASE #: NORMAL
CHLORIDE SERPL-SCNC: 105 MMOL/L (ref 98–107)
CO2 SERPL-SCNC: 23 MMOL/L (ref 21–32)
CREAT SERPL-MCNC: 0.67 MG/DL (ref 0.5–1.3)
EGFRCR SERPLBLD CKD-EPI 2021: >90 ML/MIN/1.73M*2
EOSINOPHIL # BLD AUTO: 0 X10*3/UL (ref 0–0.7)
EOSINOPHIL NFR BLD AUTO: 0 %
ERYTHROCYTE [DISTWIDTH] IN BLOOD BY AUTOMATED COUNT: 16.8 % (ref 11.5–14.5)
FIBRINOGEN PPP-MCNC: 274 MG/DL (ref 200–400)
GLUCOSE SERPL-MCNC: 90 MG/DL (ref 74–99)
HAPTOGLOB SERPL NEPH-MCNC: 91 MG/DL (ref 30–200)
HCT VFR BLD AUTO: 21.5 % (ref 41–52)
HGB BLD-MCNC: 7.5 G/DL (ref 13.5–17.5)
IMM GRANULOCYTES # BLD AUTO: 0 X10*3/UL (ref 0–0.7)
IMM GRANULOCYTES NFR BLD AUTO: 0 % (ref 0–0.9)
INR PPP: 1.5 (ref 0.9–1.1)
LDH SERPL L TO P-CCNC: 86 U/L (ref 84–246)
LYMPHOCYTES # BLD AUTO: 0 X10*3/UL (ref 1.2–4.8)
LYMPHOCYTES NFR BLD AUTO: 0 %
MAGNESIUM SERPL-MCNC: 1.48 MG/DL (ref 1.6–2.4)
MCH RBC QN AUTO: 30.5 PG (ref 26–34)
MCHC RBC AUTO-ENTMCNC: 34.9 G/DL (ref 32–36)
MCV RBC AUTO: 87 FL (ref 80–100)
MONOCYTES # BLD AUTO: 0 X10*3/UL (ref 0.1–1)
MONOCYTES NFR BLD AUTO: 0 %
NEUTROPHILS # BLD AUTO: 0.01 X10*3/UL (ref 1.2–7.7)
NEUTROPHILS NFR BLD AUTO: 100 %
NRBC BLD-RTO: 0 /100 WBCS (ref 0–0)
PATH REV-IMMUNOHEMATOLOGY-PR30: NORMAL
PATH REV-IMMUNOHEMATOLOGY-PR30: NORMAL
PHOSPHATE SERPL-MCNC: 2.1 MG/DL (ref 2.5–4.9)
PLATELET # BLD AUTO: 10 X10*3/UL (ref 150–450)
POTASSIUM SERPL-SCNC: 3.4 MMOL/L (ref 3.5–5.3)
PROTHROMBIN TIME: 17.5 SECONDS (ref 9.8–12.8)
RBC # BLD AUTO: 2.46 X10*6/UL (ref 4.5–5.9)
SODIUM SERPL-SCNC: 136 MMOL/L (ref 136–145)
TACROLIMUS BLD-MCNC: 5.3 NG/ML
WBC # BLD AUTO: <0.1 X10*3/UL (ref 4.4–11.3)

## 2024-12-02 PROCEDURE — 80197 ASSAY OF TACROLIMUS: CPT | Performed by: INTERNAL MEDICINE

## 2024-12-02 PROCEDURE — 2500000004 HC RX 250 GENERAL PHARMACY W/ HCPCS (ALT 636 FOR OP/ED): Performed by: INTERNAL MEDICINE

## 2024-12-02 PROCEDURE — 1170000001 HC PRIVATE ONCOLOGY ROOM DAILY

## 2024-12-02 PROCEDURE — 80069 RENAL FUNCTION PANEL: CPT | Performed by: PHYSICIAN ASSISTANT

## 2024-12-02 PROCEDURE — 83010 ASSAY OF HAPTOGLOBIN QUANT: CPT | Performed by: PHYSICIAN ASSISTANT

## 2024-12-02 PROCEDURE — 85730 THROMBOPLASTIN TIME PARTIAL: CPT

## 2024-12-02 PROCEDURE — 83615 LACTATE (LD) (LDH) ENZYME: CPT | Performed by: STUDENT IN AN ORGANIZED HEALTH CARE EDUCATION/TRAINING PROGRAM

## 2024-12-02 PROCEDURE — 87799 DETECT AGENT NOS DNA QUANT: CPT | Performed by: STUDENT IN AN ORGANIZED HEALTH CARE EDUCATION/TRAINING PROGRAM

## 2024-12-02 PROCEDURE — 2500000001 HC RX 250 WO HCPCS SELF ADMINISTERED DRUGS (ALT 637 FOR MEDICARE OP): Performed by: INTERNAL MEDICINE

## 2024-12-02 PROCEDURE — 2500000004 HC RX 250 GENERAL PHARMACY W/ HCPCS (ALT 636 FOR OP/ED)

## 2024-12-02 PROCEDURE — 2500000001 HC RX 250 WO HCPCS SELF ADMINISTERED DRUGS (ALT 637 FOR MEDICARE OP): Performed by: HOME HEALTH AIDE

## 2024-12-02 PROCEDURE — 2500000001 HC RX 250 WO HCPCS SELF ADMINISTERED DRUGS (ALT 637 FOR MEDICARE OP)

## 2024-12-02 PROCEDURE — 2500000005 HC RX 250 GENERAL PHARMACY W/O HCPCS: Performed by: STUDENT IN AN ORGANIZED HEALTH CARE EDUCATION/TRAINING PROGRAM

## 2024-12-02 PROCEDURE — 87799 DETECT AGENT NOS DNA QUANT: CPT | Performed by: PHYSICIAN ASSISTANT

## 2024-12-02 PROCEDURE — 85384 FIBRINOGEN ACTIVITY: CPT | Performed by: PHYSICIAN ASSISTANT

## 2024-12-02 PROCEDURE — 2500000001 HC RX 250 WO HCPCS SELF ADMINISTERED DRUGS (ALT 637 FOR MEDICARE OP): Performed by: NURSE PRACTITIONER

## 2024-12-02 PROCEDURE — 83735 ASSAY OF MAGNESIUM: CPT

## 2024-12-02 PROCEDURE — 85025 COMPLETE CBC W/AUTO DIFF WBC: CPT

## 2024-12-02 PROCEDURE — 99232 SBSQ HOSP IP/OBS MODERATE 35: CPT | Performed by: STUDENT IN AN ORGANIZED HEALTH CARE EDUCATION/TRAINING PROGRAM

## 2024-12-02 PROCEDURE — 2500000004 HC RX 250 GENERAL PHARMACY W/ HCPCS (ALT 636 FOR OP/ED): Performed by: STUDENT IN AN ORGANIZED HEALTH CARE EDUCATION/TRAINING PROGRAM

## 2024-12-02 PROCEDURE — 2500000002 HC RX 250 W HCPCS SELF ADMINISTERED DRUGS (ALT 637 FOR MEDICARE OP, ALT 636 FOR OP/ED): Performed by: STUDENT IN AN ORGANIZED HEALTH CARE EDUCATION/TRAINING PROGRAM

## 2024-12-02 PROCEDURE — 2500000002 HC RX 250 W HCPCS SELF ADMINISTERED DRUGS (ALT 637 FOR MEDICARE OP, ALT 636 FOR OP/ED): Performed by: INTERNAL MEDICINE

## 2024-12-02 PROCEDURE — 2500000002 HC RX 250 W HCPCS SELF ADMINISTERED DRUGS (ALT 637 FOR MEDICARE OP, ALT 636 FOR OP/ED)

## 2024-12-02 RX ORDER — TACROLIMUS 1 MG/1
5 CAPSULE ORAL
Status: DISCONTINUED | OUTPATIENT
Start: 2024-12-02 | End: 2024-12-05

## 2024-12-02 RX ORDER — MAGNESIUM SULFATE HEPTAHYDRATE 40 MG/ML
2 INJECTION, SOLUTION INTRAVENOUS ONCE
Status: COMPLETED | OUTPATIENT
Start: 2024-12-02 | End: 2024-12-02

## 2024-12-02 RX ORDER — POTASSIUM CHLORIDE 20 MEQ/1
20 TABLET, EXTENDED RELEASE ORAL ONCE
Status: COMPLETED | OUTPATIENT
Start: 2024-12-02 | End: 2024-12-02

## 2024-12-02 ASSESSMENT — COGNITIVE AND FUNCTIONAL STATUS - GENERAL
MOBILITY SCORE: 24
DAILY ACTIVITIY SCORE: 24

## 2024-12-02 ASSESSMENT — PAIN SCALES - GENERAL
PAINLEVEL_OUTOF10: 0 - NO PAIN
PAINLEVEL_OUTOF10: 0 - NO PAIN

## 2024-12-02 NOTE — PROGRESS NOTES
"Jin Reynolds is a 69 y.o. male on day 17 of admission presenting with AML (acute myeloid leukemia) in remission (Multi).    Subjective   Reports feeling okay. Still with fatigue. No easy bleeding/bruising. No f/c. No mouth sores. Eating okay.     Objective     Physical Exam:  General: well appearing, alert, conversant, in no apparent distress  HEENT: normocephalic, atraumatic, EOMI, no scleral icterus. No mucositis. Petechiae present in the posterior oropharynx   CV: RRR, no murmurs  Pulm: CTAB, no wheezes or crackles, no increased work of breathing  Abd: soft, non tender, non distended  : no sheikh   Ext: warm, no lower extremity edema  Skin: no rashes  Neuro: moving all extremities  Psych: normal affect      Last Recorded Vitals  Blood pressure 112/71, pulse 82, temperature 36.4 °C (97.5 °F), resp. rate 18, height 1.691 m (5' 6.58\"), weight 82.1 kg (181 lb), SpO2 95%.  Intake/Output last 3 Shifts:  I/O last 3 completed shifts:  In: 801.3 (9.7 mL/kg) [Blood:801.3]  Out: - (0 mL/kg)   Weight: 82.3 kg     Results for orders placed or performed during the hospital encounter of 11/15/24 (from the past 24 hours)   CBC and Auto Differential   Result Value Ref Range    WBC <0.1 (LL) 4.4 - 11.3 x10*3/uL    nRBC 0.0 0.0 - 0.0 /100 WBCs    RBC 2.86 (L) 4.50 - 5.90 x10*6/uL    Hemoglobin 8.8 (L) 13.5 - 17.5 g/dL    Hematocrit 25.3 (L) 41.0 - 52.0 %    MCV 89 80 - 100 fL    MCH 30.8 26.0 - 34.0 pg    MCHC 34.8 32.0 - 36.0 g/dL    RDW 16.9 (H) 11.5 - 14.5 %    Platelets 6 (LL) 150 - 450 x10*3/uL    Neutrophils % 100.0 40.0 - 80.0 %    Immature Granulocytes %, Automated 0.0 0.0 - 0.9 %    Lymphocytes % 0.0 13.0 - 44.0 %    Monocytes % 0.0 2.0 - 10.0 %    Eosinophils % 0.0 0.0 - 6.0 %    Basophils % 0.0 0.0 - 2.0 %    Neutrophils Absolute 0.01 (L) 1.20 - 7.70 x10*3/uL    Immature Granulocytes Absolute, Automated 0.00 0.00 - 0.70 x10*3/uL    Lymphocytes Absolute 0.00 (L) 1.20 - 4.80 x10*3/uL    Monocytes Absolute 0.00 (L) 0.10 - " 1.00 x10*3/uL    Eosinophils Absolute 0.00 0.00 - 0.70 x10*3/uL    Basophils Absolute 0.00 0.00 - 0.10 x10*3/uL   Morphology   Result Value Ref Range    RBC Morphology See Below     Ovalocytes Few    Prepare Platelets: 1 Units, Irradiated, Leukocytes Reduced (CMV reduced risk)   Result Value Ref Range    PRODUCT CODE A1324A68     Unit Number [id number]     Unit ABO O     Unit RH POS     Dispense Status TR     Blood Expiration Date 12/2/2024 11:59:00 PM EST     PRODUCT BLOOD TYPE 5100     UNIT VOLUME 176    CBC and Auto Differential   Result Value Ref Range    WBC <0.1 (LL) 4.4 - 11.3 x10*3/uL    nRBC 0.0 0.0 - 0.0 /100 WBCs    RBC 2.46 (L) 4.50 - 5.90 x10*6/uL    Hemoglobin 7.5 (L) 13.5 - 17.5 g/dL    Hematocrit 21.5 (L) 41.0 - 52.0 %    MCV 87 80 - 100 fL    MCH 30.5 26.0 - 34.0 pg    MCHC 34.9 32.0 - 36.0 g/dL    RDW 16.8 (H) 11.5 - 14.5 %    Platelets 10 (LL) 150 - 450 x10*3/uL    Neutrophils % 100.0 40.0 - 80.0 %    Immature Granulocytes %, Automated 0.0 0.0 - 0.9 %    Lymphocytes % 0.0 13.0 - 44.0 %    Monocytes % 0.0 2.0 - 10.0 %    Eosinophils % 0.0 0.0 - 6.0 %    Basophils % 0.0 0.0 - 2.0 %    Neutrophils Absolute 0.01 (L) 1.20 - 7.70 x10*3/uL    Immature Granulocytes Absolute, Automated 0.00 0.00 - 0.70 x10*3/uL    Lymphocytes Absolute 0.00 (L) 1.20 - 4.80 x10*3/uL    Monocytes Absolute 0.00 (L) 0.10 - 1.00 x10*3/uL    Eosinophils Absolute 0.00 0.00 - 0.70 x10*3/uL    Basophils Absolute 0.00 0.00 - 0.10 x10*3/uL   Coagulation Screen   Result Value Ref Range    Protime 17.5 (H) 9.8 - 12.8 seconds    INR 1.5 (H) 0.9 - 1.1    aPTT 36 27 - 38 seconds   Magnesium   Result Value Ref Range    Magnesium 1.48 (L) 1.60 - 2.40 mg/dL   Renal Function Panel   Result Value Ref Range    Glucose 90 74 - 99 mg/dL    Sodium 136 136 - 145 mmol/L    Potassium 3.4 (L) 3.5 - 5.3 mmol/L    Chloride 105 98 - 107 mmol/L    Bicarbonate 23 21 - 32 mmol/L    Anion Gap 11 10 - 20 mmol/L    Urea Nitrogen 8 6 - 23 mg/dL     Creatinine 0.67 0.50 - 1.30 mg/dL    eGFR >90 >60 mL/min/1.73m*2    Calcium 7.7 (L) 8.6 - 10.6 mg/dL    Phosphorus 2.1 (L) 2.5 - 4.9 mg/dL    Albumin 3.4 3.4 - 5.0 g/dL   Fibrinogen   Result Value Ref Range    Fibrinogen 274 200 - 400 mg/dL   Haptoglobin   Result Value Ref Range    Haptoglobin 91 30 - 200 mg/dL   Tacrolimus level   Result Value Ref Range    Tacrolimus  5.3 <=15.0 ng/mL     *Note: Due to a large number of results and/or encounters for the requested time period, some results have not been displayed. A complete set of results can be found in Results Review.         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     T+11  Allo MRD     Updates 12/02/24  -no plan for transfusion today  -increasing tacro dose to 5mg BID  -continue supportive care    ONC:  # AML with MRD  - AML diagnosed per [facility] on 6/27/24. Positive for RUNX1, CKit, SRSF2 and CBL C404Y  - S/p C1D1 Venetoclax/Azacitidine on 7/24/24. Developed TLS with renal dysfunction  - [facility] (8/28/24): c/w persistent disease and 27% blast  - Admitted 9/5/24 for C2 of Jeremy/Aza with Venetoclx ramp up dosing (D1 20mg, D2 50 mg, D3 onward 100 mg daily).   - S/p 2 cycles of Jeremy/Aza  - [facility] (9/25/24) and (10/23/24): c/w MRD (<1%)  - [facility] (10/23): 0.2% blasts, hypercellular (90%) with systemic mastocytosis  - Admitted 11/15 for Allo MRD SCT (T=0 11/21/24)  # Smoldering Systemiv Mastocytosis diagnosed 8/23/23  - S/p Molecular therapy started 2/13/24  - Continue home Allegra for symptom management  - Tryptase level 11/16 (62.6) normal     Chemo:  - Fludarabine 30 mg/m2 IV D-5, D-4, D-3  - Melphalan 140 mg/m2 IV D-2   - Total Body Irradiation (TBI) 200 cGy on D-1      GVHD Prophy:  - Tacro level: < 2 on 11/27 (after 1 dose, will continue to follow); < 2 on  11/28; 3.4 (11/29); 4.7 (11/30); 4.3 (12/1); 5.3 (12/2)  - Tacro dose: increase to 5 mg BID.   - Post-tx Cytoxan 50 mg/kg on T+3 and T+4  - MMF 1000 mg TID to begin T+5 to T+35     TRANSPLANT:   - HLA matched related donor /12 match  - Donor: O+, CMV positive   - Recipient: O+, CMV negative      Surveillance: beginning 11/25  - Monitor CMV 2x weekly: 11/28 ND  - Monitor EBV weekly  - Monitor Adenovirus weekly: 11/25 ND  - Monitor Hapto/LDH weekly: 11/25 wnl  - Monitor Fibrinogen 2x weekly: 11/29 236  - Monitor UA/protein weekly     HEME:  # Pancytopenia 2/2 to chemotherapy. No acute bleed.  - Monitor counts daily  - Keep Hgb > 7.0, Plts > 10k  - last plt transfusion: 12/1, 11/30     ID:  # Cont prophylaxis meds: Acyclovir, Posaconazole, po Vancomycin, Levofloxacin      FEN/GI:  - Admit wt: 86.1 kg, Daily wt: 83.4 kg (11/26)  # Weight loss w/ poor PO intake  - Nutrition Consult ordered (11/23)   - Cont home PPI, Pepcid and antiemetics  - Low pathogen diet  - Monitor electrolytes, replace prn  # JUSTIN; prn Zofran and Compazine available   - Added Prn Ativan and Carafate  - Added Protonix  # Mucositis, mild  - Oral salt water rinses and biotene ordered  # Diarrhea, chemo induced  - Cdiff (11/25) negative  - Stool path negative  - Loperamide PRN     CARD:  - ECHO (10/23/24): EF 50-55%, RA/LA enlargement  - Cont home Norvasc     RENAL:  # H/o hematuria, resolved  - S/p Cystoscopy (1/15/24): Normal exam  # C/o frequent urination especially at night  - Last PSA 0.92 (9/6/24)     MISC:  - Cont home Allegra and Singular     DISPO:  - Full Code  - Access: DL (non-tunneled) CVC  - Primary On: Dr. Nika Dawson      Patient seen, examined, and discussed with Dr. Rashid Avila MD

## 2024-12-03 LAB
ADENOVIRUS QPCR,PLASMA, VIRC: NOT DETECTED COPIES/ML
ALBUMIN SERPL BCP-MCNC: 3.6 G/DL (ref 3.4–5)
ANION GAP SERPL CALC-SCNC: 11 MMOL/L (ref 10–20)
APPEARANCE UR: CLEAR
APTT PPP: 35 SECONDS (ref 27–38)
BASOPHILS # BLD MANUAL: 0 X10*3/UL (ref 0–0.1)
BASOPHILS NFR BLD MANUAL: 0 %
BILIRUB UR STRIP.AUTO-MCNC: NEGATIVE MG/DL
BLOOD EXPIRATION DATE: NORMAL
BUN SERPL-MCNC: 10 MG/DL (ref 6–23)
CALCIUM SERPL-MCNC: 7.5 MG/DL (ref 8.6–10.6)
CHLORIDE SERPL-SCNC: 104 MMOL/L (ref 98–107)
CMV DNA SERPL NAA+PROBE-LOG IU: NORMAL {LOG_IU}/ML
CO2 SERPL-SCNC: 22 MMOL/L (ref 21–32)
COLOR UR: ABNORMAL
CREAT SERPL-MCNC: 0.67 MG/DL (ref 0.5–1.3)
CREAT UR-MCNC: 91.4 MG/DL (ref 20–370)
DISPENSE STATUS: NORMAL
EBV DNA SPEC NAA+PROBE-LOG#: NORMAL {LOG_COPIES}/ML
EGFRCR SERPLBLD CKD-EPI 2021: >90 ML/MIN/1.73M*2
EOSINOPHIL # BLD MANUAL: 0 X10*3/UL (ref 0–0.7)
EOSINOPHIL NFR BLD MANUAL: 0 %
ERYTHROCYTE [DISTWIDTH] IN BLOOD BY AUTOMATED COUNT: 16.7 % (ref 11.5–14.5)
GLUCOSE SERPL-MCNC: 99 MG/DL (ref 74–99)
GLUCOSE UR STRIP.AUTO-MCNC: NORMAL MG/DL
HCT VFR BLD AUTO: 22.5 % (ref 41–52)
HGB BLD-MCNC: 7.9 G/DL (ref 13.5–17.5)
HYALINE CASTS #/AREA URNS AUTO: ABNORMAL /LPF
IMM GRANULOCYTES # BLD AUTO: 0.01 X10*3/UL (ref 0–0.7)
IMM GRANULOCYTES NFR BLD AUTO: 33.3 % (ref 0–0.9)
INR PPP: 1.6 (ref 0.9–1.1)
KETONES UR STRIP.AUTO-MCNC: ABNORMAL MG/DL
LABORATORY COMMENT REPORT: NOT DETECTED
LABORATORY COMMENT REPORT: NOT DETECTED
LEUKOCYTE ESTERASE UR QL STRIP.AUTO: NEGATIVE
LYMPHOCYTES # BLD MANUAL: 0 X10*3/UL (ref 1.2–4.8)
LYMPHOCYTES NFR BLD MANUAL: 0 %
MAGNESIUM SERPL-MCNC: 1.66 MG/DL (ref 1.6–2.4)
MCH RBC QN AUTO: 30.4 PG (ref 26–34)
MCHC RBC AUTO-ENTMCNC: 35.1 G/DL (ref 32–36)
MCV RBC AUTO: 87 FL (ref 80–100)
MONOCYTES # BLD MANUAL: <0.03 X10*3/UL (ref 0.1–1)
MONOCYTES NFR BLD MANUAL: 25 %
MUCOUS THREADS #/AREA URNS AUTO: ABNORMAL /LPF
NEUTS SEG # BLD MANUAL: <0.05 X10*3/UL (ref 1.2–7)
NEUTS SEG NFR BLD MANUAL: 50 %
NITRITE UR QL STRIP.AUTO: NEGATIVE
NRBC BLD-RTO: 0 /100 WBCS (ref 0–0)
PH UR STRIP.AUTO: 5.5 [PH]
PHOSPHATE SERPL-MCNC: 2.3 MG/DL (ref 2.5–4.9)
PLATELET # BLD AUTO: 8 X10*3/UL (ref 150–450)
POTASSIUM SERPL-SCNC: 3.3 MMOL/L (ref 3.5–5.3)
PRODUCT BLOOD TYPE: 6200
PRODUCT CODE: NORMAL
PROT UR STRIP.AUTO-MCNC: ABNORMAL MG/DL
PROT UR-ACNC: 17 MG/DL (ref 5–25)
PROT/CREAT UR: 0.19 MG/MG CREAT (ref 0–0.17)
PROTHROMBIN TIME: 18 SECONDS (ref 9.8–12.8)
RBC # BLD AUTO: 2.6 X10*6/UL (ref 4.5–5.9)
RBC # UR STRIP.AUTO: ABNORMAL /UL
RBC #/AREA URNS AUTO: ABNORMAL /HPF
RBC MORPH BLD: ABNORMAL
SODIUM SERPL-SCNC: 134 MMOL/L (ref 136–145)
SP GR UR STRIP.AUTO: 1.01
TACROLIMUS BLD-MCNC: 5.7 NG/ML
TACROLIMUS BLD-MCNC: 6 NG/ML
TOTAL CELLS COUNTED BLD: 4
UNIT ABO: NORMAL
UNIT NUMBER: NORMAL
UNIT RH: NORMAL
UNIT VOLUME: 285
UROBILINOGEN UR STRIP.AUTO-MCNC: NORMAL MG/DL
VARIANT LYMPHS # BLD MANUAL: <0.03 X10*3/UL (ref 0–0.5)
VARIANT LYMPHS NFR BLD: 25 %
WBC # BLD AUTO: <0.1 X10*3/UL (ref 4.4–11.3)
WBC #/AREA URNS AUTO: ABNORMAL /HPF

## 2024-12-03 PROCEDURE — 2500000001 HC RX 250 WO HCPCS SELF ADMINISTERED DRUGS (ALT 637 FOR MEDICARE OP): Performed by: HOME HEALTH AIDE

## 2024-12-03 PROCEDURE — 85610 PROTHROMBIN TIME: CPT

## 2024-12-03 PROCEDURE — 36430 TRANSFUSION BLD/BLD COMPNT: CPT

## 2024-12-03 PROCEDURE — 80069 RENAL FUNCTION PANEL: CPT | Performed by: PHYSICIAN ASSISTANT

## 2024-12-03 PROCEDURE — 2500000004 HC RX 250 GENERAL PHARMACY W/ HCPCS (ALT 636 FOR OP/ED): Performed by: INTERNAL MEDICINE

## 2024-12-03 PROCEDURE — 2500000001 HC RX 250 WO HCPCS SELF ADMINISTERED DRUGS (ALT 637 FOR MEDICARE OP)

## 2024-12-03 PROCEDURE — 2500000004 HC RX 250 GENERAL PHARMACY W/ HCPCS (ALT 636 FOR OP/ED): Performed by: STUDENT IN AN ORGANIZED HEALTH CARE EDUCATION/TRAINING PROGRAM

## 2024-12-03 PROCEDURE — 82570 ASSAY OF URINE CREATININE: CPT | Performed by: STUDENT IN AN ORGANIZED HEALTH CARE EDUCATION/TRAINING PROGRAM

## 2024-12-03 PROCEDURE — 1170000001 HC PRIVATE ONCOLOGY ROOM DAILY

## 2024-12-03 PROCEDURE — 80197 ASSAY OF TACROLIMUS: CPT | Performed by: PHYSICIAN ASSISTANT

## 2024-12-03 PROCEDURE — 97110 THERAPEUTIC EXERCISES: CPT | Mod: GP

## 2024-12-03 PROCEDURE — P9073 PLATELETS PHERESIS PATH REDU: HCPCS

## 2024-12-03 PROCEDURE — 99232 SBSQ HOSP IP/OBS MODERATE 35: CPT | Performed by: STUDENT IN AN ORGANIZED HEALTH CARE EDUCATION/TRAINING PROGRAM

## 2024-12-03 PROCEDURE — 2500000001 HC RX 250 WO HCPCS SELF ADMINISTERED DRUGS (ALT 637 FOR MEDICARE OP): Performed by: NURSE PRACTITIONER

## 2024-12-03 PROCEDURE — 2500000002 HC RX 250 W HCPCS SELF ADMINISTERED DRUGS (ALT 637 FOR MEDICARE OP, ALT 636 FOR OP/ED): Performed by: INTERNAL MEDICINE

## 2024-12-03 PROCEDURE — 85027 COMPLETE CBC AUTOMATED: CPT

## 2024-12-03 PROCEDURE — 81001 URINALYSIS AUTO W/SCOPE: CPT | Performed by: STUDENT IN AN ORGANIZED HEALTH CARE EDUCATION/TRAINING PROGRAM

## 2024-12-03 PROCEDURE — 2500000001 HC RX 250 WO HCPCS SELF ADMINISTERED DRUGS (ALT 637 FOR MEDICARE OP): Performed by: PHYSICIAN ASSISTANT

## 2024-12-03 PROCEDURE — 85007 BL SMEAR W/DIFF WBC COUNT: CPT

## 2024-12-03 PROCEDURE — 2500000002 HC RX 250 W HCPCS SELF ADMINISTERED DRUGS (ALT 637 FOR MEDICARE OP, ALT 636 FOR OP/ED)

## 2024-12-03 PROCEDURE — 83735 ASSAY OF MAGNESIUM: CPT

## 2024-12-03 PROCEDURE — 2500000001 HC RX 250 WO HCPCS SELF ADMINISTERED DRUGS (ALT 637 FOR MEDICARE OP): Performed by: INTERNAL MEDICINE

## 2024-12-03 PROCEDURE — 80197 ASSAY OF TACROLIMUS: CPT | Performed by: INTERNAL MEDICINE

## 2024-12-03 PROCEDURE — 2500000004 HC RX 250 GENERAL PHARMACY W/ HCPCS (ALT 636 FOR OP/ED): Performed by: PHYSICIAN ASSISTANT

## 2024-12-03 RX ORDER — HYOSCYAMINE SULFATE 0.125 MG
0.12 TABLET ORAL EVERY 4 HOURS PRN
Status: DISCONTINUED | OUTPATIENT
Start: 2024-12-03 | End: 2024-12-07 | Stop reason: HOSPADM

## 2024-12-03 RX ORDER — MAGNESIUM SULFATE HEPTAHYDRATE 40 MG/ML
4 INJECTION, SOLUTION INTRAVENOUS ONCE
Status: COMPLETED | OUTPATIENT
Start: 2024-12-03 | End: 2024-12-03

## 2024-12-03 RX ORDER — POTASSIUM CHLORIDE 29.8 MG/ML
40 INJECTION INTRAVENOUS 2 TIMES DAILY
Status: COMPLETED | OUTPATIENT
Start: 2024-12-03 | End: 2024-12-04

## 2024-12-03 ASSESSMENT — COGNITIVE AND FUNCTIONAL STATUS - GENERAL
MOBILITY SCORE: 20
STANDING UP FROM CHAIR USING ARMS: A LITTLE
MOBILITY SCORE: 23
WALKING IN HOSPITAL ROOM: A LITTLE
DAILY ACTIVITIY SCORE: 24
CLIMB 3 TO 5 STEPS WITH RAILING: A LITTLE
MOVING TO AND FROM BED TO CHAIR: A LITTLE
CLIMB 3 TO 5 STEPS WITH RAILING: A LITTLE

## 2024-12-03 ASSESSMENT — PAIN - FUNCTIONAL ASSESSMENT
PAIN_FUNCTIONAL_ASSESSMENT: 0-10
PAIN_FUNCTIONAL_ASSESSMENT: 0-10

## 2024-12-03 ASSESSMENT — PAIN SCALES - GENERAL
PAINLEVEL_OUTOF10: 0 - NO PAIN

## 2024-12-03 NOTE — PROGRESS NOTES
Physical Therapy    Physical Therapy Treatment    Patient Name: Jin Reynolds  MRN: [id number]  Department: [facility]  Room: [address] [place] [address]  Today's Date: 12/3/2024  Time Calculation  Start Time: 1446  Stop Time: 1501  Time Calculation (min): 15 min         Assessment/Plan   PT Assessment  Evaluation/Treatment Tolerance: Patient tolerated treatment well  End of Session Communication: Bedside nurse  End of Session Patient Position: Up in chair, Alarm off, not on at start of session     PT Plan  Treatment/Interventions: Bed mobility, Transfer training, Gait training, Stair training, Balance training, Neuromuscular re-education, Strengthening, Endurance training, Range of motion, Therapeutic exercise, Therapeutic activity, Home exercise program, Positioning, Postural re-education  PT Plan: Ongoing PT  PT Frequency: 2 times per week  PT Discharge Recommendations: No PT needed after discharge  PT Recommended Transfer Status: Independent      General Visit Information:   PT  Visit  PT Received On: 12/03/24  General  Family/Caregiver Present: Yes  Caregiver Feedback: Pt's daughter present during session  Prior to Session Communication: Bedside nurse  Patient Position Received: Bed, 3 rail up, Alarm off, not on at start of session  Preferred Learning Style: verbal, visual  General Comment: Pt is pleasant, cooperative, and willing to participate in therapy.    Subjective   Precautions:  Precautions  Medical Precautions: Fall precautions  Precautions Comment: Protective precautions (H/H: 9.9/22.5; Plts: 8; WBC: 3.3; pt recieved platelets prior to session)    Vital Signs (Past 2hrs)        Date/Time Vitals Session Patient Position Pulse Resp SpO2 BP MAP (mmHg)    12/03/24 1446 During PT  --  84  --  100 %  --  --                         Objective   Pain:  Pain Assessment  Pain Assessment: 0-10  0-10 (Numeric) Pain Score: 0 - No pain  Cognition:  Cognition  Overall Cognitive Status: Within Functional Limits  Orientation Level: Oriented  X4  Coordination:  Movements are Fluid and Coordinated: Yes  Postural Control:  Static Sitting Balance  Static Sitting-Balance Support: Feet supported, No upper extremity supported  Static Sitting-Level of Assistance: Distant supervision  Dynamic Sitting Balance  Dynamic Sitting-Balance Support: Feet supported, No upper extremity supported  Dynamic Sitting-Level of Assistance: Distant supervision  Static Standing Balance  Static Standing-Balance Support: No upper extremity supported  Static Standing-Level of Assistance: Close supervision  Dynamic Standing Balance  Dynamic Standing-Balance Support: No upper extremity supported  Dynamic Standing-Level of Assistance: Distant supervision    Activity Tolerance:  Activity Tolerance  Endurance: Tolerates 10 - 20 min exercise with multiple rests  Treatments:  Therapeutic Exercise  Therapeutic Exercise Performed: Yes  Therapeutic Exercise Activity 1: pedaler no resistance 10 minutes, tolerates well, multiple check in at 3 minutes, 5 minutes, 7 minutes, 10 minutes with no symptom reported    Bed Mobility  Bed Mobility: Yes  Bed Mobility 1  Bed Mobility 1: Supine to sitting  Level of Assistance 1: Distant supervision  Bed Mobility Comments 1: VCs, HOB slightly elevated    Ambulation/Gait Training  Ambulation/Gait Training Performed: Yes  Ambulation/Gait Training 1  Surface 1: Level tile  Device 1: No device  Assistance 1: Distant supervision  Quality of Gait 1: Decreased step length (decreased nneka)  Comments/Distance (ft) 1: 5 ft to chair  Transfers  Transfer: Yes  Transfer 1  Transfer From 1: Sit to, Stand to  Transfer to 1: Stand, Sit  Technique 1: Sit to stand, Stand to sit  Transfer Level of Assistance 1: Close supervision  Trials/Comments 1: VCs, increased time to perform    Stairs  Stairs: No    Outcome Measures:  [facility] Basic Mobility  Turning from your back to your side while in a flat bed without using bedrails: None  Moving from lying on your back to sitting on  the side of a flat bed without using bedrails: None  Moving to and from bed to chair (including a wheelchair): A little  Standing up from a chair using your arms (e.g. wheelchair or bedside chair): A little  To walk in hospital room: A little  Climbing 3-5 steps with railing: A little  Basic Mobility - Total Score: 20    Education Documentation  Precautions, taught by Ria Hankins PT at 12/3/2024  3:23 PM.  Learner: Patient  Readiness: Acceptance  Method: Explanation  Response: Verbalizes Understanding  Comment: bed mobility, transfers, gait, ther ex    Mobility Training, taught by Ria Hankins PT at 12/3/2024  3:23 PM.  Learner: Patient  Readiness: Acceptance  Method: Explanation  Response: Verbalizes Understanding  Comment: bed mobility, transfers, gait, ther ex    Education Comments  No comments found.        OP EDUCATION:       Encounter Problems       Encounter Problems (Active)       Balance       Pt will score a 25/28 or greater on the Tinetti balance assessment in order to demonstrate low fall risk.  (Progressing)       Start:  11/22/24    Expected End:  12/13/24               Mobility       STG - Patient will ambulate >1,000 ft independently with no LOB, progress as able and appropriate  (Progressing)       Start:  11/22/24    Expected End:  12/13/24            Pt will ambulate 1,000 ft independently and no signs of fatigue or SOB  (Progressing)       Start:  11/22/24    Expected End:  12/13/24               PT Transfers       STG - Patient will perform bed mobility independently  (Progressing)       Start:  11/22/24    Expected End:  12/13/24            STG - Patient will transfer sit to and from stand independently  (Progressing)       Start:  11/22/24    Expected End:  12/13/24            Pt will be able to perform 12 STS transfers in 30 seconds to demonstrate average LE strength of community dweller  (Progressing)       Start:  11/22/24    Expected End:  12/13/24               Pain - Adult               12/03/24 at 3:25 PM - Ria Hankins, PT

## 2024-12-03 NOTE — PROGRESS NOTES
"Jin Reynolds is a 69 y.o. male on day 18 of admission presenting with AML (acute myeloid leukemia) in remission (Multi).    Subjective   Reports fatigue. Has secretions in the mouth that are causing him to gag. Vomited today. Not currently nauseous. No fever, chills, cough, shortness of breath. No abd pain or swelling. Diminished PO intake.     Objective     Physical Exam:  General: appears fatigued, alert, conversant, in no apparent distress  HEENT: normocephalic, atraumatic, EOMI, no scleral icterus. No mucositis. Increased petechiae present in the posterior oropharynx   CV: RRR, no murmurs  Pulm: CTAB, no wheezes or crackles, no increased work of breathing  Abd: soft, non tender, non distended  : no sheikh   Ext: warm, no lower extremity edema  Skin: no rashes  Neuro: moving all extremities  Psych: normal affect      Last Recorded Vitals  Blood pressure 116/66, pulse 85, temperature 37.2 °C (99 °F), temperature source Temporal, resp. rate 16, height 1.691 m (5' 6.58\"), weight 81.8 kg (180 lb 5.4 oz), SpO2 97%.  Intake/Output last 3 Shifts:  I/O last 3 completed shifts:  In: 1328.1 (16.2 mL/kg) [P.O.:1100; I.V.:52.1 (0.6 mL/kg); Blood:176]  Out: - (0 mL/kg)   Weight: 82.1 kg     Results for orders placed or performed during the hospital encounter of 11/15/24 (from the past 24 hours)   Nick-Becerril PCR, Quant,Plasma   Result Value Ref Range    EBV DNA Result Not Detected Not Detected    EBV PCR Plasma Log     Lactate Dehydrogenase   Result Value Ref Range    LDH 86 84 - 246 U/L   CBC and Auto Differential   Result Value Ref Range    WBC <0.1 (LL) 4.4 - 11.3 x10*3/uL    nRBC 0.0 0.0 - 0.0 /100 WBCs    RBC 2.60 (L) 4.50 - 5.90 x10*6/uL    Hemoglobin 7.9 (L) 13.5 - 17.5 g/dL    Hematocrit 22.5 (L) 41.0 - 52.0 %    MCV 87 80 - 100 fL    MCH 30.4 26.0 - 34.0 pg    MCHC 35.1 32.0 - 36.0 g/dL    RDW 16.7 (H) 11.5 - 14.5 %    Platelets 8 (LL) 150 - 450 x10*3/uL    Immature Granulocytes %, Automated 33.3 (H) 0.0 - 0.9 % "    Immature Granulocytes Absolute, Automated 0.01 0.00 - 0.70 x10*3/uL   Coagulation Screen   Result Value Ref Range    Protime 18.0 (H) 9.8 - 12.8 seconds    INR 1.6 (H) 0.9 - 1.1    aPTT 35 27 - 38 seconds   Magnesium   Result Value Ref Range    Magnesium 1.66 1.60 - 2.40 mg/dL   Tacrolimus level   Result Value Ref Range    Tacrolimus  5.7 <=15.0 ng/mL   Renal Function Panel   Result Value Ref Range    Glucose 99 74 - 99 mg/dL    Sodium 134 (L) 136 - 145 mmol/L    Potassium 3.3 (L) 3.5 - 5.3 mmol/L    Chloride 104 98 - 107 mmol/L    Bicarbonate 22 21 - 32 mmol/L    Anion Gap 11 10 - 20 mmol/L    Urea Nitrogen 10 6 - 23 mg/dL    Creatinine 0.67 0.50 - 1.30 mg/dL    eGFR >90 >60 mL/min/1.73m*2    Calcium 7.5 (L) 8.6 - 10.6 mg/dL    Phosphorus 2.3 (L) 2.5 - 4.9 mg/dL    Albumin 3.6 3.4 - 5.0 g/dL   Tacrolimus level   Result Value Ref Range    Tacrolimus  6.0 <=15.0 ng/mL   Manual Differential   Result Value Ref Range    Neutrophils %, Manual 50.0 40.0 - 80.0 %    Lymphocytes %, Manual 0.0 13.0 - 44.0 %    Monocytes %, Manual 25.0 2.0 - 10.0 %    Eosinophils %, Manual 0.0 0.0 - 6.0 %    Basophils %, Manual 0.0 0.0 - 2.0 %    Atypical Lymphocytes %, Manual 25.0 0.0 - 2.0 %    Seg Neutrophils Absolute, Manual <0.05 (L) 1.20 - 7.00 x10*3/uL    Lymphocytes Absolute, Manual 0.00 (L) 1.20 - 4.80 x10*3/uL    Monocytes Absolute, Manual <0.03 (L) 0.10 - 1.00 x10*3/uL    Eosinophils Absolute, Manual 0.00 0.00 - 0.70 x10*3/uL    Basophils Absolute, Manual 0.00 0.00 - 0.10 x10*3/uL    Atypical Lymphs Absolute, Manual <0.03 0.00 - 0.50 x10*3/uL    Total Cells Counted 4     RBC Morphology No significant RBC morphology present    Prepare Platelets: 1 Units, Irradiated, Leukocytes Reduced (CMV reduced risk)   Result Value Ref Range    PRODUCT CODE C2974P01     Unit Number [id number]     Unit ABO A     Unit RH POS     Dispense Status TR     Blood Expiration Date 12/4/2024 11:59:00 PM EST     PRODUCT BLOOD TYPE 6200     UNIT  VOLUME 285      *Note: Due to a large number of results and/or encounters for the requested time period, some results have not been displayed. A complete set of results can be found in Results Review.         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     T+12  Allo MRD     Updates 12/03/24  -plt transfusion today  -continue tacro dose 5mg BID  -trial of hyoscyamine for secretions   -continue supportive care    ONC:  # AML with MRD  - AML diagnosed per [facility] on 6/27/24. Positive for RUNX1, CKit, SRSF2 and CBL C404Y  - S/p C1D1 Venetoclax/Azacitidine on 7/24/24. Developed TLS with renal dysfunction  - BMBx (8/28/24): c/w persistent disease and 27% blast  - Admitted 9/5/24 for C2 of Jeremy/Aza with Venetoclx ramp up dosing (D1 20mg, D2 50 mg, D3 onward 100 mg daily).   - S/p 2 cycles of Jeremy/Aza  - BMBx (9/25/24) and (10/23/24): c/w MRD (<1%)  - BMBx (10/23): 0.2% blasts, hypercellular (90%) with systemic mastocytosis  - Admitted 11/15 for Allo MRD SCT (T=0 11/21/24)  # Smoldering Systemiv Mastocytosis diagnosed 8/23/23  - S/p Molecular therapy started 2/13/24  - Continue home Allegra for symptom management  - Tryptase level 11/16 (62.6) normal     Chemo:  - Fludarabine 30 mg/m2 IV D-5, D-4, D-3  - Melphalan 140 mg/m2 IV D-2   - Total Body Irradiation (TBI) 200 cGy on D-1      GVHD Prophy:  - Tacro level: < 2 on 11/27 (after 1 dose, will continue to follow); < 2 on 11/28; 3.4 (11/29); 4.7 (11/30); 4.3 (12/1); 5.3 (12/2); 6.0 (12/3)  - Tacro dose: continue 5 mg BID  - Post-tx Cytoxan 50 mg/kg on T+3 and T+4  - MMF 1000 mg TID to begin T+5 to T+35     TRANSPLANT:   - HLA matched related donor /12 match  - Donor: O+, CMV positive   - Recipient: O+, CMV negative      Surveillance: beginning 11/25  - Monitor CMV 2x  weekly: 11/28 ND  - Monitor EBV weekly: 12/2 ND  - Monitor Adenovirus weekly: 11/25 ND  - Monitor Hapto/LDH weekly: 11/25 wnl  - Monitor Fibrinogen 2x weekly: 11/29 236  - Monitor UA/protein weekly     HEME:  # Pancytopenia 2/2 to chemotherapy. No acute bleed.  - Monitor counts daily  - Keep Hgb > 7.0, Plts > 10k  - last plt transfusion: 12/1, 11/30     ID:  # Cont prophylaxis meds: Acyclovir, Posaconazole, po Vancomycin, Levofloxacin      FEN/GI:  - Admit wt: 86.1 kg, Daily wt: 81.8 kg (12/3)  # Weight loss w/ poor PO intake  - Nutrition Consult ordered (11/23)   - Cont home PPI, Pepcid and antiemetics  - Low pathogen diet  - Monitor electrolytes, replace prn  # JUSTIN; prn Zofran and Compazine available   - Added Prn Ativan and Carafate  - Added Protonix  # Mucositis, mild  - Oral salt water rinses and biotene ordered  # Diarrhea, chemo induced  - Cdiff (11/25) negative  - Stool path negative  - Loperamide PRN     CARD:  - ECHO (10/23/24): EF 50-55%, RA/LA enlargement  - Cont home Norvasc     RENAL:  # H/o hematuria, resolved  - S/p Cystoscopy (1/15/24): Normal exam  # C/o frequent urination especially at night  - Last PSA 0.92 (9/6/24)     MISC:  - Cont home Allegra and Singular     DISPO:  - Full Code  - Access: DL (non-tunneled) CVC  - Primary On: Dr. Nika Dawson      Patient seen, examined, and discussed with Dr. Rashid Avila MD

## 2024-12-04 LAB
ALBUMIN SERPL BCP-MCNC: 3.5 G/DL (ref 3.4–5)
ALBUMIN SERPL BCP-MCNC: 3.5 G/DL (ref 3.4–5)
ALP SERPL-CCNC: 53 U/L (ref 33–136)
ALT SERPL W P-5'-P-CCNC: 17 U/L (ref 10–52)
ANION GAP SERPL CALC-SCNC: 10 MMOL/L (ref 10–20)
APTT PPP: 35 SECONDS (ref 27–38)
AST SERPL W P-5'-P-CCNC: 13 U/L (ref 9–39)
BASOPHILS # BLD AUTO: 0 X10*3/UL (ref 0–0.1)
BASOPHILS NFR BLD AUTO: 0 %
BILIRUB DIRECT SERPL-MCNC: 0.3 MG/DL (ref 0–0.3)
BILIRUB SERPL-MCNC: 0.9 MG/DL (ref 0–1.2)
BLOOD EXPIRATION DATE: NORMAL
BUN SERPL-MCNC: 9 MG/DL (ref 6–23)
CALCIUM SERPL-MCNC: 7.5 MG/DL (ref 8.6–10.6)
CHLORIDE SERPL-SCNC: 106 MMOL/L (ref 98–107)
CO2 SERPL-SCNC: 22 MMOL/L (ref 21–32)
CREAT SERPL-MCNC: 0.7 MG/DL (ref 0.5–1.3)
DISPENSE STATUS: NORMAL
EGFRCR SERPLBLD CKD-EPI 2021: >90 ML/MIN/1.73M*2
EOSINOPHIL # BLD AUTO: 0 X10*3/UL (ref 0–0.7)
EOSINOPHIL NFR BLD AUTO: 0 %
ERYTHROCYTE [DISTWIDTH] IN BLOOD BY AUTOMATED COUNT: 16.5 % (ref 11.5–14.5)
FIBRINOGEN PPP-MCNC: 269 MG/DL (ref 200–400)
GLUCOSE SERPL-MCNC: 90 MG/DL (ref 74–99)
HCT VFR BLD AUTO: 19.8 % (ref 41–52)
HGB BLD-MCNC: 7 G/DL (ref 13.5–17.5)
IMM GRANULOCYTES # BLD AUTO: 0.01 X10*3/UL (ref 0–0.7)
IMM GRANULOCYTES NFR BLD AUTO: 9.1 % (ref 0–0.9)
INR PPP: 1.6 (ref 0.9–1.1)
LYMPHOCYTES # BLD AUTO: 0 X10*3/UL (ref 1.2–4.8)
LYMPHOCYTES NFR BLD AUTO: 0 %
MAGNESIUM SERPL-MCNC: 1.89 MG/DL (ref 1.6–2.4)
MCH RBC QN AUTO: 30.7 PG (ref 26–34)
MCHC RBC AUTO-ENTMCNC: 35.4 G/DL (ref 32–36)
MCV RBC AUTO: 87 FL (ref 80–100)
MONOCYTES # BLD AUTO: 0.05 X10*3/UL (ref 0.1–1)
MONOCYTES NFR BLD AUTO: 45.5 %
NEUTROPHILS # BLD AUTO: 0.05 X10*3/UL (ref 1.2–7.7)
NEUTROPHILS NFR BLD AUTO: 45.4 %
NRBC BLD-RTO: 0 /100 WBCS (ref 0–0)
PHOSPHATE SERPL-MCNC: 2 MG/DL (ref 2.5–4.9)
PLATELET # BLD AUTO: 8 X10*3/UL (ref 150–450)
POTASSIUM SERPL-SCNC: 3.6 MMOL/L (ref 3.5–5.3)
PRODUCT BLOOD TYPE: 5100
PRODUCT CODE: NORMAL
PROT SERPL-MCNC: 5.3 G/DL (ref 6.4–8.2)
PROTHROMBIN TIME: 18.2 SECONDS (ref 9.8–12.8)
RBC # BLD AUTO: 2.28 X10*6/UL (ref 4.5–5.9)
SODIUM SERPL-SCNC: 134 MMOL/L (ref 136–145)
TACROLIMUS BLD-MCNC: 6.7 NG/ML
TACROLIMUS BLD-MCNC: 6.8 NG/ML
UNIT ABO: NORMAL
UNIT NUMBER: NORMAL
UNIT RH: NORMAL
UNIT VOLUME: 346
WBC # BLD AUTO: 0.1 X10*3/UL (ref 4.4–11.3)

## 2024-12-04 PROCEDURE — 83735 ASSAY OF MAGNESIUM: CPT

## 2024-12-04 PROCEDURE — 85025 COMPLETE CBC W/AUTO DIFF WBC: CPT

## 2024-12-04 PROCEDURE — 2500000004 HC RX 250 GENERAL PHARMACY W/ HCPCS (ALT 636 FOR OP/ED): Performed by: STUDENT IN AN ORGANIZED HEALTH CARE EDUCATION/TRAINING PROGRAM

## 2024-12-04 PROCEDURE — 80197 ASSAY OF TACROLIMUS: CPT | Performed by: PHYSICIAN ASSISTANT

## 2024-12-04 PROCEDURE — 2500000002 HC RX 250 W HCPCS SELF ADMINISTERED DRUGS (ALT 637 FOR MEDICARE OP, ALT 636 FOR OP/ED)

## 2024-12-04 PROCEDURE — 99232 SBSQ HOSP IP/OBS MODERATE 35: CPT | Performed by: STUDENT IN AN ORGANIZED HEALTH CARE EDUCATION/TRAINING PROGRAM

## 2024-12-04 PROCEDURE — 1170000001 HC PRIVATE ONCOLOGY ROOM DAILY

## 2024-12-04 PROCEDURE — 2500000001 HC RX 250 WO HCPCS SELF ADMINISTERED DRUGS (ALT 637 FOR MEDICARE OP): Performed by: PHYSICIAN ASSISTANT

## 2024-12-04 PROCEDURE — 84100 ASSAY OF PHOSPHORUS: CPT | Performed by: PHYSICIAN ASSISTANT

## 2024-12-04 PROCEDURE — 85610 PROTHROMBIN TIME: CPT

## 2024-12-04 PROCEDURE — 2500000002 HC RX 250 W HCPCS SELF ADMINISTERED DRUGS (ALT 637 FOR MEDICARE OP, ALT 636 FOR OP/ED): Performed by: INTERNAL MEDICINE

## 2024-12-04 PROCEDURE — 2500000001 HC RX 250 WO HCPCS SELF ADMINISTERED DRUGS (ALT 637 FOR MEDICARE OP)

## 2024-12-04 PROCEDURE — 82040 ASSAY OF SERUM ALBUMIN: CPT | Performed by: STUDENT IN AN ORGANIZED HEALTH CARE EDUCATION/TRAINING PROGRAM

## 2024-12-04 PROCEDURE — 36430 TRANSFUSION BLD/BLD COMPNT: CPT

## 2024-12-04 PROCEDURE — 85384 FIBRINOGEN ACTIVITY: CPT | Performed by: PHYSICIAN ASSISTANT

## 2024-12-04 PROCEDURE — 2500000004 HC RX 250 GENERAL PHARMACY W/ HCPCS (ALT 636 FOR OP/ED): Performed by: INTERNAL MEDICINE

## 2024-12-04 PROCEDURE — 2500000001 HC RX 250 WO HCPCS SELF ADMINISTERED DRUGS (ALT 637 FOR MEDICARE OP): Performed by: INTERNAL MEDICINE

## 2024-12-04 PROCEDURE — P9037 PLATE PHERES LEUKOREDU IRRAD: HCPCS

## 2024-12-04 PROCEDURE — 2500000001 HC RX 250 WO HCPCS SELF ADMINISTERED DRUGS (ALT 637 FOR MEDICARE OP): Performed by: HOME HEALTH AIDE

## 2024-12-04 PROCEDURE — 2500000001 HC RX 250 WO HCPCS SELF ADMINISTERED DRUGS (ALT 637 FOR MEDICARE OP): Performed by: NURSE PRACTITIONER

## 2024-12-04 PROCEDURE — 80197 ASSAY OF TACROLIMUS: CPT | Performed by: INTERNAL MEDICINE

## 2024-12-04 ASSESSMENT — COGNITIVE AND FUNCTIONAL STATUS - GENERAL
DAILY ACTIVITIY SCORE: 24
MOBILITY SCORE: 24

## 2024-12-04 ASSESSMENT — PAIN SCALES - GENERAL
PAINLEVEL_OUTOF10: 0 - NO PAIN
PAINLEVEL_OUTOF10: 0 - NO PAIN

## 2024-12-04 ASSESSMENT — PAIN - FUNCTIONAL ASSESSMENT
PAIN_FUNCTIONAL_ASSESSMENT: 0-10
PAIN_FUNCTIONAL_ASSESSMENT: 0-10

## 2024-12-04 NOTE — PROGRESS NOTES
"Jin Reynolds is a 69 y.o. male on day 19 of admission presenting with AML (acute myeloid leukemia) in remission (Multi).    Subjective   Continues to have significant fatigue. Tried the hyoscyamine for secretions which he reports have helped him - no longer feels like he is gagging on the secretions. He denies any easy bleeding or bruising. No skin irritation, itching or pain. No fever or chills.     Objective     Physical Exam:  General: appears fatigued, alert, conversant, in no apparent distress  HEENT: normocephalic, atraumatic, EOMI, no scleral icterus or conjunctivitis. No mucositis. continued petechiae present in the posterior oropharynx   CV: RRR, no murmurs  Pulm: CTAB, no wheezes or crackles, no increased work of breathing  Abd: soft, non tender, non distended  : no sheikh   Ext: warm, no lower extremity edema  Skin: mild swelling and erythema seen below the eyes bilaterally and mild erythema seen on the back of the neck. No other skin rashes seen   Neuro: moving all extremities  Psych: normal affect      Last Recorded Vitals  Blood pressure 108/69, pulse 99, temperature 36.4 °C (97.5 °F), temperature source Temporal, resp. rate 18, height 1.691 m (5' 6.58\"), weight 81.6 kg (179 lb 14.3 oz), SpO2 95%.  Intake/Output last 3 Shifts:  I/O last 3 completed shifts:  In: 544 (6.7 mL/kg) [I.V.:109 (1.3 mL/kg); Blood:343; IV Piggyback:92]  Out: - (0 mL/kg)   Weight: 81.6 kg     Results for orders placed or performed during the hospital encounter of 11/15/24 (from the past 24 hours)   Urinalysis with Reflex Microscopic   Result Value Ref Range    Color, Urine Light-Yellow Light-Yellow, Yellow, Dark-Yellow    Appearance, Urine Clear Clear    Specific Gravity, Urine 1.015 1.005 - 1.035    pH, Urine 5.5 5.0, 5.5, 6.0, 6.5, 7.0, 7.5, 8.0    Protein, Urine 10 (TRACE) NEGATIVE, 10 (TRACE), 20 (TRACE) mg/dL    Glucose, Urine Normal Normal mg/dL    Blood, Urine 0.03 (TRACE) (A) NEGATIVE    Ketones, Urine 20 (1+) (A) " NEGATIVE mg/dL    Bilirubin, Urine NEGATIVE NEGATIVE    Urobilinogen, Urine Normal Normal mg/dL    Nitrite, Urine NEGATIVE NEGATIVE    Leukocyte Esterase, Urine NEGATIVE NEGATIVE   Protein, Urine Random   Result Value Ref Range    Total Protein, Urine Random 17 5 - 25 mg/dL    Creatinine, Urine Random 91.4 20.0 - 370.0 mg/dL    T. Protein/Creatinine Ratio 0.19 (H) 0.00 - 0.17 mg/mg Creat   Microscopic Only, Urine   Result Value Ref Range    WBC, Urine 1-5 1-5, NONE /HPF    RBC, Urine 1-2 NONE, 1-2, 3-5 /HPF    Mucus, Urine FEW Reference range not established. /LPF    Hyaline Casts, Urine OCCASIONAL (A) NONE /LPF   CBC and Auto Differential   Result Value Ref Range    WBC 0.1 (LL) 4.4 - 11.3 x10*3/uL    nRBC 0.0 0.0 - 0.0 /100 WBCs    RBC 2.28 (L) 4.50 - 5.90 x10*6/uL    Hemoglobin 7.0 (L) 13.5 - 17.5 g/dL    Hematocrit 19.8 (L) 41.0 - 52.0 %    MCV 87 80 - 100 fL    MCH 30.7 26.0 - 34.0 pg    MCHC 35.4 32.0 - 36.0 g/dL    RDW 16.5 (H) 11.5 - 14.5 %    Platelets 8 (LL) 150 - 450 x10*3/uL    Neutrophils % 45.4 40.0 - 80.0 %    Immature Granulocytes %, Automated 9.1 (H) 0.0 - 0.9 %    Lymphocytes % 0.0 13.0 - 44.0 %    Monocytes % 45.5 2.0 - 10.0 %    Eosinophils % 0.0 0.0 - 6.0 %    Basophils % 0.0 0.0 - 2.0 %    Neutrophils Absolute 0.05 (L) 1.20 - 7.70 x10*3/uL    Immature Granulocytes Absolute, Automated 0.01 0.00 - 0.70 x10*3/uL    Lymphocytes Absolute 0.00 (L) 1.20 - 4.80 x10*3/uL    Monocytes Absolute 0.05 (L) 0.10 - 1.00 x10*3/uL    Eosinophils Absolute 0.00 0.00 - 0.70 x10*3/uL    Basophils Absolute 0.00 0.00 - 0.10 x10*3/uL   Coagulation Screen   Result Value Ref Range    Protime 18.2 (H) 9.8 - 12.8 seconds    INR 1.6 (H) 0.9 - 1.1    aPTT 35 27 - 38 seconds   Magnesium   Result Value Ref Range    Magnesium 1.89 1.60 - 2.40 mg/dL   Renal Function Panel   Result Value Ref Range    Glucose 90 74 - 99 mg/dL    Sodium 134 (L) 136 - 145 mmol/L    Potassium 3.6 3.5 - 5.3 mmol/L    Chloride 106 98 - 107 mmol/L     Bicarbonate 22 21 - 32 mmol/L    Anion Gap 10 10 - 20 mmol/L    Urea Nitrogen 9 6 - 23 mg/dL    Creatinine 0.70 0.50 - 1.30 mg/dL    eGFR >90 >60 mL/min/1.73m*2    Calcium 7.5 (L) 8.6 - 10.6 mg/dL    Phosphorus 2.0 (L) 2.5 - 4.9 mg/dL    Albumin 3.5 3.4 - 5.0 g/dL   Fibrinogen   Result Value Ref Range    Fibrinogen 269 200 - 400 mg/dL   Tacrolimus level   Result Value Ref Range    Tacrolimus  6.8 <=15.0 ng/mL   Hepatic function panel   Result Value Ref Range    Albumin 3.5 3.4 - 5.0 g/dL    Bilirubin, Total 0.9 0.0 - 1.2 mg/dL    Bilirubin, Direct 0.3 0.0 - 0.3 mg/dL    Alkaline Phosphatase 53 33 - 136 U/L    ALT 17 10 - 52 U/L    AST 13 9 - 39 U/L    Total Protein 5.3 (L) 6.4 - 8.2 g/dL   Tacrolimus level   Result Value Ref Range    Tacrolimus  6.7 <=15.0 ng/mL   Prepare Platelets: 1 Units, Irradiated, Leukocytes Reduced (CMV reduced risk)   Result Value Ref Range    PRODUCT CODE J2947Y62     Unit Number [id number]     Unit ABO O     Unit RH POS     Dispense Status TR     Blood Expiration Date 12/4/2024 11:59:00 PM EST     PRODUCT BLOOD TYPE 5100     UNIT VOLUME 346      *Note: Due to a large number of results and/or encounters for the requested time period, some results have not been displayed. A complete set of results can be found in Results Review.         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     T+13  Allo MRD     Updates 12/04/24  -another plt transfusion today  -continue tacro dose 5mg BID  -will monitor erythema below the eyes closely and monitor for any other rashes   -continue supportive care    ONC:  # AML with MRD  - AML diagnosed per [facility] on 6/27/24. Positive for RUNX1, CKit, SRSF2 and CBL C404Y  - S/p C1D1 Venetoclax/Azacitidine on 7/24/24. Developed TLS with renal  dysfunction  - [facility] (8/28/24): c/w persistent disease and 27% blast  - Admitted 9/5/24 for C2 of Jeremy/Aza with Venetoclx ramp up dosing (D1 20mg, D2 50 mg, D3 onward 100 mg daily).   - S/p 2 cycles of Jeremy/Aza  - [facility] (9/25/24) and (10/23/24): c/w MRD (<1%)  - [facility] (10/23): 0.2% blasts, hypercellular (90%) with systemic mastocytosis  - Admitted 11/15 for Allo MRD SCT (T=0 11/21/24)  # Smoldering Systemiv Mastocytosis diagnosed 8/23/23  - S/p Molecular therapy started 2/13/24  - Continue home Allegra for symptom management  - Tryptase level 11/16 (62.6) normal     Chemo:  - Fludarabine 30 mg/m2 IV D-5, D-4, D-3  - Melphalan 140 mg/m2 IV D-2   - Total Body Irradiation (TBI) 200 cGy on D-1      GVHD Prophy:  - Tacro level: < 2 on 11/27 (after 1 dose, will continue to follow); < 2 on 11/28; 3.4 (11/29); 4.7 (11/30); 4.3 (12/1); 5.3 (12/2); 6.0 (12/3)  - Tacro dose: continue 5 mg BID  - Post-tx Cytoxan 50 mg/kg on T+3 and T+4  - MMF 1000 mg TID to begin T+5 to T+35     TRANSPLANT:   - HLA matched related donor /12 match  - Donor: O+, CMV positive   - Recipient: O+, CMV negative      Surveillance: beginning 11/25  - Monitor CMV 2x weekly: 11/28 ND  - Monitor EBV weekly: 12/2 ND  - Monitor Adenovirus weekly: 11/25 ND  - Monitor Hapto/LDH weekly: 11/25 wnl  - Monitor Fibrinogen 2x weekly: 11/29 236  - Monitor UA/protein weekly: 12/3 neg for blood or protein; TP/Cr 0.19      HEME:  # Pancytopenia 2/2 to chemotherapy. No acute bleed.  - Monitor counts daily  - Keep Hgb > 7.0, Plts > 10k  - last plt transfusion: 12/1, 11/30     ID:  # Cont prophylaxis meds: Acyclovir, Posaconazole, po Vancomycin, Levofloxacin      FEN/GI:  - Admit wt: 86.1 kg, Daily wt: 81.8 kg (12/3)  # Weight loss w/ poor PO intake  - Nutrition Consult ordered (11/23)   - Cont home PPI, Pepcid and antiemetics  - Low pathogen diet  - Monitor electrolytes, replace prn  # JUSTIN; prn Zofran and Compazine available   - Added Prn Ativan and Carafate  - Added  Protonix  # Mucositis, mild  - Oral salt water rinses and biotene ordered  #Thick secretions  -hyocyamine prn ordered   # Diarrhea, chemo induced  - Cdiff (11/25) negative  - Stool path negative  - Loperamide PRN     CARD:  - ECHO (10/23/24): EF 50-55%, RA/LA enlargement  - Cont home Norvasc     RENAL:  # H/o hematuria, resolved  - S/p Cystoscopy (1/15/24): Normal exam  # C/o frequent urination especially at night  - Last PSA 0.92 (9/6/24)     MISC:  - Cont home Allegra and Singular     DISPO:  - Full Code  - Access: DL (non-tunneled) CVC  - Primary On: Dr. Nika Dawson      Patient seen, examined, and discussed with Dr. Rashid Avila MD

## 2024-12-05 LAB
ABO GROUP (TYPE) IN BLOOD: NORMAL
ALBUMIN SERPL BCP-MCNC: 3.4 G/DL (ref 3.4–5)
ANION GAP SERPL CALC-SCNC: 9 MMOL/L (ref 10–20)
ANTIBODY SCREEN: NORMAL
APTT PPP: 34 SECONDS (ref 27–38)
BACTERIA BPU CULT: NORMAL
BASOPHILS # BLD MANUAL: 0 X10*3/UL (ref 0–0.1)
BASOPHILS NFR BLD MANUAL: 0 %
BLOOD EXPIRATION DATE: NORMAL
BUN SERPL-MCNC: 8 MG/DL (ref 6–23)
CALCIUM SERPL-MCNC: 7.8 MG/DL (ref 8.6–10.6)
CHLORIDE SERPL-SCNC: 105 MMOL/L (ref 98–107)
CMV DNA SERPL NAA+PROBE-LOG IU: NORMAL {LOG_IU}/ML
CO2 SERPL-SCNC: 24 MMOL/L (ref 21–32)
CREAT SERPL-MCNC: 0.63 MG/DL (ref 0.5–1.3)
DACRYOCYTES BLD QL SMEAR: ABNORMAL
DISPENSE STATUS: NORMAL
EGFRCR SERPLBLD CKD-EPI 2021: >90 ML/MIN/1.73M*2
EOSINOPHIL # BLD MANUAL: 0 X10*3/UL (ref 0–0.7)
EOSINOPHIL NFR BLD MANUAL: 0 %
ERYTHROCYTE [DISTWIDTH] IN BLOOD BY AUTOMATED COUNT: 15.9 % (ref 11.5–14.5)
GLUCOSE SERPL-MCNC: 113 MG/DL (ref 74–99)
HCT VFR BLD AUTO: 19.6 % (ref 41–52)
HGB BLD-MCNC: 6.7 G/DL (ref 13.5–17.5)
HYPOCHROMIA BLD QL SMEAR: ABNORMAL
IMM GRANULOCYTES # BLD AUTO: 0.01 X10*3/UL (ref 0–0.7)
IMM GRANULOCYTES NFR BLD AUTO: 1.9 % (ref 0–0.9)
INR PPP: 1.6 (ref 0.9–1.1)
LABORATORY COMMENT REPORT: NOT DETECTED
LYMPHOCYTES # BLD MANUAL: 0 X10*3/UL (ref 1.2–4.8)
LYMPHOCYTES NFR BLD MANUAL: 0 %
MAGNESIUM SERPL-MCNC: 1.48 MG/DL (ref 1.6–2.4)
MCH RBC QN AUTO: 30.2 PG (ref 26–34)
MCHC RBC AUTO-ENTMCNC: 34.2 G/DL (ref 32–36)
MCV RBC AUTO: 88 FL (ref 80–100)
MONOCYTES # BLD MANUAL: 0.13 X10*3/UL (ref 0.1–1)
MONOCYTES NFR BLD MANUAL: 26.9 %
NEUTS SEG # BLD MANUAL: 0.33 X10*3/UL (ref 1.2–7)
NEUTS SEG NFR BLD MANUAL: 65.4 %
NRBC BLD-RTO: 0 /100 WBCS (ref 0–0)
OVALOCYTES BLD QL SMEAR: ABNORMAL
PHOSPHATE SERPL-MCNC: 1.7 MG/DL (ref 2.5–4.9)
PLATELET # BLD AUTO: 13 X10*3/UL (ref 150–450)
POTASSIUM SERPL-SCNC: 3.2 MMOL/L (ref 3.5–5.3)
PRODUCT BLOOD TYPE: 5100
PRODUCT CODE: NORMAL
PROTHROMBIN TIME: 18.6 SECONDS (ref 9.8–12.8)
RBC # BLD AUTO: 2.22 X10*6/UL (ref 4.5–5.9)
RBC MORPH BLD: ABNORMAL
RH FACTOR (ANTIGEN D): NORMAL
SODIUM SERPL-SCNC: 135 MMOL/L (ref 136–145)
TACROLIMUS BLD-MCNC: 5.3 NG/ML
TACROLIMUS BLD-MCNC: 5.5 NG/ML
TOTAL CELLS COUNTED BLD: 26
UNIT ABO: NORMAL
UNIT NUMBER: NORMAL
UNIT RH: NORMAL
UNIT VOLUME: 350
VARIANT LYMPHS # BLD MANUAL: 0.04 X10*3/UL (ref 0–0.5)
VARIANT LYMPHS NFR BLD: 7.7 %
WBC # BLD AUTO: 0.5 X10*3/UL (ref 4.4–11.3)
XM INTEP: NORMAL

## 2024-12-05 PROCEDURE — 2500000004 HC RX 250 GENERAL PHARMACY W/ HCPCS (ALT 636 FOR OP/ED): Performed by: STUDENT IN AN ORGANIZED HEALTH CARE EDUCATION/TRAINING PROGRAM

## 2024-12-05 PROCEDURE — 2500000002 HC RX 250 W HCPCS SELF ADMINISTERED DRUGS (ALT 637 FOR MEDICARE OP, ALT 636 FOR OP/ED): Performed by: STUDENT IN AN ORGANIZED HEALTH CARE EDUCATION/TRAINING PROGRAM

## 2024-12-05 PROCEDURE — 2500000004 HC RX 250 GENERAL PHARMACY W/ HCPCS (ALT 636 FOR OP/ED): Performed by: INTERNAL MEDICINE

## 2024-12-05 PROCEDURE — 99232 SBSQ HOSP IP/OBS MODERATE 35: CPT | Performed by: STUDENT IN AN ORGANIZED HEALTH CARE EDUCATION/TRAINING PROGRAM

## 2024-12-05 PROCEDURE — 86870 RBC ANTIBODY IDENTIFICATION: CPT

## 2024-12-05 PROCEDURE — 85027 COMPLETE CBC AUTOMATED: CPT

## 2024-12-05 PROCEDURE — 2500000001 HC RX 250 WO HCPCS SELF ADMINISTERED DRUGS (ALT 637 FOR MEDICARE OP): Performed by: INTERNAL MEDICINE

## 2024-12-05 PROCEDURE — 2500000005 HC RX 250 GENERAL PHARMACY W/O HCPCS: Performed by: STUDENT IN AN ORGANIZED HEALTH CARE EDUCATION/TRAINING PROGRAM

## 2024-12-05 PROCEDURE — P9040 RBC LEUKOREDUCED IRRADIATED: HCPCS

## 2024-12-05 PROCEDURE — 2500000002 HC RX 250 W HCPCS SELF ADMINISTERED DRUGS (ALT 637 FOR MEDICARE OP, ALT 636 FOR OP/ED): Performed by: INTERNAL MEDICINE

## 2024-12-05 PROCEDURE — 2500000002 HC RX 250 W HCPCS SELF ADMINISTERED DRUGS (ALT 637 FOR MEDICARE OP, ALT 636 FOR OP/ED)

## 2024-12-05 PROCEDURE — 84100 ASSAY OF PHOSPHORUS: CPT | Performed by: PHYSICIAN ASSISTANT

## 2024-12-05 PROCEDURE — 80197 ASSAY OF TACROLIMUS: CPT | Performed by: PHYSICIAN ASSISTANT

## 2024-12-05 PROCEDURE — 2500000001 HC RX 250 WO HCPCS SELF ADMINISTERED DRUGS (ALT 637 FOR MEDICARE OP): Performed by: PHYSICIAN ASSISTANT

## 2024-12-05 PROCEDURE — 85007 BL SMEAR W/DIFF WBC COUNT: CPT

## 2024-12-05 PROCEDURE — 86922 COMPATIBILITY TEST ANTIGLOB: CPT

## 2024-12-05 PROCEDURE — 86850 RBC ANTIBODY SCREEN: CPT | Performed by: STUDENT IN AN ORGANIZED HEALTH CARE EDUCATION/TRAINING PROGRAM

## 2024-12-05 PROCEDURE — 36430 TRANSFUSION BLD/BLD COMPNT: CPT

## 2024-12-05 PROCEDURE — 2500000001 HC RX 250 WO HCPCS SELF ADMINISTERED DRUGS (ALT 637 FOR MEDICARE OP)

## 2024-12-05 PROCEDURE — 83735 ASSAY OF MAGNESIUM: CPT

## 2024-12-05 PROCEDURE — 1170000001 HC PRIVATE ONCOLOGY ROOM DAILY

## 2024-12-05 PROCEDURE — 2500000001 HC RX 250 WO HCPCS SELF ADMINISTERED DRUGS (ALT 637 FOR MEDICARE OP): Performed by: HOME HEALTH AIDE

## 2024-12-05 PROCEDURE — 80197 ASSAY OF TACROLIMUS: CPT | Performed by: INTERNAL MEDICINE

## 2024-12-05 PROCEDURE — 2500000001 HC RX 250 WO HCPCS SELF ADMINISTERED DRUGS (ALT 637 FOR MEDICARE OP): Performed by: NURSE PRACTITIONER

## 2024-12-05 PROCEDURE — 85730 THROMBOPLASTIN TIME PARTIAL: CPT

## 2024-12-05 RX ORDER — POTASSIUM CHLORIDE 20 MEQ/1
40 TABLET, EXTENDED RELEASE ORAL ONCE
Status: COMPLETED | OUTPATIENT
Start: 2024-12-05 | End: 2024-12-05

## 2024-12-05 RX ORDER — MAGNESIUM SULFATE HEPTAHYDRATE 40 MG/ML
2 INJECTION, SOLUTION INTRAVENOUS ONCE
Status: COMPLETED | OUTPATIENT
Start: 2024-12-05 | End: 2024-12-05

## 2024-12-05 RX ORDER — TACROLIMUS 1 MG/1
6 CAPSULE ORAL DAILY
Status: DISCONTINUED | OUTPATIENT
Start: 2024-12-05 | End: 2024-12-07 | Stop reason: HOSPADM

## 2024-12-05 RX ORDER — TACROLIMUS 1 MG/1
5 CAPSULE ORAL
Status: DISCONTINUED | OUTPATIENT
Start: 2024-12-06 | End: 2024-12-07 | Stop reason: HOSPADM

## 2024-12-05 ASSESSMENT — COGNITIVE AND FUNCTIONAL STATUS - GENERAL
MOBILITY SCORE: 24
DAILY ACTIVITIY SCORE: 24
MOBILITY SCORE: 24
MOBILITY SCORE: 24
DAILY ACTIVITIY SCORE: 24
DAILY ACTIVITIY SCORE: 24

## 2024-12-05 ASSESSMENT — PAIN SCALES - GENERAL
PAINLEVEL_OUTOF10: 0 - NO PAIN

## 2024-12-05 ASSESSMENT — PAIN - FUNCTIONAL ASSESSMENT
PAIN_FUNCTIONAL_ASSESSMENT: 0-10
PAIN_FUNCTIONAL_ASSESSMENT: 0-10

## 2024-12-05 NOTE — CARE PLAN
Problem: Pain - Adult  Goal: Verbalizes/displays adequate comfort level or baseline comfort level  Outcome: Progressing     Problem: Safety - Adult  Goal: Free from fall injury  Outcome: Progressing     Problem: Discharge Planning  Goal: Discharge to home or other facility with appropriate resources  Outcome: Progressing     Problem: Chronic Conditions and Co-morbidities  Goal: Patient's chronic conditions and co-morbidity symptoms are monitored and maintained or improved  Outcome: Progressing     Problem: Safety - Adult  Goal: Free from fall injury  Outcome: Progressing     Problem: Discharge Planning  Goal: Discharge to home or other facility with appropriate resources  Outcome: Progressing   The patient's goals for the shift include      The clinical goals for the shift include patient A&Ox4, VSS, remains falls free, denies any discomfort at this time

## 2024-12-05 NOTE — PROGRESS NOTES
"Jin Reynolds is a 69 y.o. male on day 20 of admission presenting with AML (acute myeloid leukemia) in remission (Multi).    Subjective   Reports feeling ok today, continued fatigue. He had some gagging on secretions and some nausea today. He denies any new skin itching, pain, swelling or rashes. No abd pain/diarrhea. He denies any easy bleeding or bruising     Objective     Physical Exam:  General: appears fatigued, alert, conversant, in no apparent distress  HEENT: normocephalic, atraumatic, EOMI, no scleral icterus or conjunctivitis. No mucositis. continued petechiae present in the posterior oropharynx and under the tongue  CV: RRR, no murmurs  Pulm: CTAB, no wheezes or crackles, no increased work of breathing  Abd: soft, non tender, non distended  : no sheikh   Ext: warm, no lower extremity edema  Skin: mild erythema seen below the eyes bilaterally and mild erythema seen on the back of the neck, improved compared to 12/4. No other skin rashes seen   Neuro: moving all extremities  Psych: normal affect      Last Recorded Vitals  Blood pressure 102/66, pulse 80, temperature 36 °C (96.8 °F), temperature source Temporal, resp. rate 16, height 1.691 m (5' 6.58\"), weight 81.6 kg (179 lb 14.3 oz), SpO2 100%.  Intake/Output last 3 Shifts:  I/O last 3 completed shifts:  In: 346 (4.2 mL/kg) [Blood:346]  Out: - (0 mL/kg)   Weight: 81.6 kg     Results for orders placed or performed during the hospital encounter of 11/15/24 (from the past 24 hours)   CBC and Auto Differential   Result Value Ref Range    WBC 0.5 (LL) 4.4 - 11.3 x10*3/uL    nRBC 0.0 0.0 - 0.0 /100 WBCs    RBC 2.22 (L) 4.50 - 5.90 x10*6/uL    Hemoglobin 6.7 (L) 13.5 - 17.5 g/dL    Hematocrit 19.6 (L) 41.0 - 52.0 %    MCV 88 80 - 100 fL    MCH 30.2 26.0 - 34.0 pg    MCHC 34.2 32.0 - 36.0 g/dL    RDW 15.9 (H) 11.5 - 14.5 %    Platelets 13 (LL) 150 - 450 x10*3/uL    Immature Granulocytes %, Automated 1.9 (H) 0.0 - 0.9 %    Immature Granulocytes Absolute, Automated " 0.01 0.00 - 0.70 x10*3/uL   Coagulation Screen   Result Value Ref Range    Protime 18.6 (H) 9.8 - 12.8 seconds    INR 1.6 (H) 0.9 - 1.1    aPTT 34 27 - 38 seconds   Magnesium   Result Value Ref Range    Magnesium 1.48 (L) 1.60 - 2.40 mg/dL   Tacrolimus level   Result Value Ref Range    Tacrolimus  5.5 <=15.0 ng/mL   CMV DNA, quantitative, PCR   Result Value Ref Range    Cytomegalovirus DNA, PCR Log IU/ML      CMV DNA Result Not Detected Not Detected   Renal Function Panel   Result Value Ref Range    Glucose 113 (H) 74 - 99 mg/dL    Sodium 135 (L) 136 - 145 mmol/L    Potassium 3.2 (L) 3.5 - 5.3 mmol/L    Chloride 105 98 - 107 mmol/L    Bicarbonate 24 21 - 32 mmol/L    Anion Gap 9 (L) 10 - 20 mmol/L    Urea Nitrogen 8 6 - 23 mg/dL    Creatinine 0.63 0.50 - 1.30 mg/dL    eGFR >90 >60 mL/min/1.73m*2    Calcium 7.8 (L) 8.6 - 10.6 mg/dL    Phosphorus 1.7 (L) 2.5 - 4.9 mg/dL    Albumin 3.4 3.4 - 5.0 g/dL   Tacrolimus level   Result Value Ref Range    Tacrolimus  5.3 <=15.0 ng/mL   Manual Differential   Result Value Ref Range    Neutrophils %, Manual 65.4 40.0 - 80.0 %    Lymphocytes %, Manual 0.0 13.0 - 44.0 %    Monocytes %, Manual 26.9 2.0 - 10.0 %    Eosinophils %, Manual 0.0 0.0 - 6.0 %    Basophils %, Manual 0.0 0.0 - 2.0 %    Atypical Lymphocytes %, Manual 7.7 0.0 - 2.0 %    Seg Neutrophils Absolute, Manual 0.33 (L) 1.20 - 7.00 x10*3/uL    Lymphocytes Absolute, Manual 0.00 (L) 1.20 - 4.80 x10*3/uL    Monocytes Absolute, Manual 0.13 0.10 - 1.00 x10*3/uL    Eosinophils Absolute, Manual 0.00 0.00 - 0.70 x10*3/uL    Basophils Absolute, Manual 0.00 0.00 - 0.10 x10*3/uL    Atypical Lymphs Absolute, Manual 0.04 0.00 - 0.50 x10*3/uL    Total Cells Counted 26     RBC Morphology See Below     Hypochromia Mild     Ovalocytes Few     Teardrop Cells Few    Prepare RBC: 1 Units, Irradiated, Leukocytes Reduced (CMV reduced risk)   Result Value Ref Range    PRODUCT CODE [id number]     Unit Number [id number]     Unit ABO O      Unit RH POS     XM INTEP COMP     Dispense Status IS     Blood Expiration Date 12/25/2024 11:59:00 PM EST     PRODUCT BLOOD TYPE 5100     UNIT VOLUME 350    Type and screen   Result Value Ref Range    ABO TYPE O     Rh TYPE POS     ANTIBODY SCREEN POS      *Note: Due to a large number of results and/or encounters for the requested time period, some results have not been displayed. A complete set of results can be found in Results Review.         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     T+14  Allo MRD     Updates 12/05/24  -RBC transfusion today  -evidence of engraftment with ANC increasing 50 > 330  -increase tacro dose 5mg in AM and 6mg in PM   -continue supportive care    ONC:  # AML with MRD  - AML diagnosed per [facility] on 6/27/24. Positive for RUNX1, CKit, SRSF2 and CBL C404Y  - S/p C1D1 Venetoclax/Azacitidine on 7/24/24. Developed TLS with renal dysfunction  - [facility] (8/28/24): c/w persistent disease and 27% blast  - Admitted 9/5/24 for C2 of Jeremy/Aza with Venetoclx ramp up dosing (D1 20mg, D2 50 mg, D3 onward 100 mg daily).   - S/p 2 cycles of Jeremy/Aza  - [facility] (9/25/24) and (10/23/24): c/w MRD (<1%)  - [facility] (10/23): 0.2% blasts, hypercellular (90%) with systemic mastocytosis  - Admitted 11/15 for Allo MRD SCT (T=0 11/21/24)  # Smoldering Systemiv Mastocytosis diagnosed 8/23/23  - S/p Molecular therapy started 2/13/24  - Continue home Allegra for symptom management  - Tryptase level 11/16 (62.6) normal     Chemo:  - Fludarabine 30 mg/m2 IV D-5, D-4, D-3  - Melphalan 140 mg/m2 IV D-2   - Total Body Irradiation (TBI) 200 cGy on D-1      GVHD Prophy:  - Tacro level: < 2 on 11/27 (after 1 dose, will continue to follow); < 2 on 11/28; 3.4 (11/29); 4.7 (11/30); 4.3 (12/1); 5.3 (12/2); 6.0 (12/3)  - Tacro  dose: continue 5 mg BID  - Post-tx Cytoxan 50 mg/kg on T+3 and T+4  - MMF 1000 mg TID to begin T+5 to T+35     TRANSPLANT:   - HLA matched related donor /12 match  - Donor: O+, CMV positive   - Recipient: O+, CMV negative      Surveillance: beginning 11/25  - Monitor CMV 2x weekly: 11/28 ND  - Monitor EBV weekly: 12/2 ND  - Monitor Adenovirus weekly: 11/25 ND  - Monitor Hapto/LDH weekly: 11/25 wnl  - Monitor Fibrinogen 2x weekly: 11/29 236  - Monitor UA/protein weekly: 12/3 neg for blood or protein; TP/Cr 0.19      HEME:  # Pancytopenia 2/2 to chemotherapy. No acute bleed.  - Monitor counts daily  - Keep Hgb > 7.0, Plts > 10k     ID:  # Cont prophylaxis meds: Acyclovir, Posaconazole, po Vancomycin, Levofloxacin      FEN/GI:  - Admit wt: 86.1 kg, Daily wt: 81.8 kg (12/3)  # Weight loss w/ poor PO intake  - Nutrition Consult ordered (11/23)   - Cont home PPI, Pepcid and antiemetics  - Low pathogen diet  - Monitor electrolytes, replace prn  # JUSTIN; prn Zofran and Compazine available   - Added Prn Ativan and Carafate  - Added Protonix  # Mucositis, mild  - Oral salt water rinses and biotene ordered  #Thick secretions  -hyocyamine prn ordered   # Diarrhea, chemo induced  - Cdiff (11/25) negative  - Stool path negative  - Loperamide PRN     CARD:  - ECHO (10/23/24): EF 50-55%, RA/LA enlargement  - Cont home Norvasc     RENAL:  # H/o hematuria, resolved  - S/p Cystoscopy (1/15/24): Normal exam  # C/o frequent urination especially at night  - Last PSA 0.92 (9/6/24)     MISC:  - Cont home Allegra and Singular     DISPO:  - Full Code  - Access: DL (non-tunneled) CVC  - Primary On: Dr. Nika Dawson  - Dispo: anticipating discharge home possibly Mon or Tues after counts improved      Patient seen, examined, and discussed with Dr. Rashid Avila MD

## 2024-12-06 LAB
ALBUMIN SERPL BCP-MCNC: 3.5 G/DL (ref 3.4–5)
ALP SERPL-CCNC: 55 U/L (ref 33–136)
ALT SERPL W P-5'-P-CCNC: 19 U/L (ref 10–52)
ANION GAP SERPL CALC-SCNC: 10 MMOL/L (ref 10–20)
APTT PPP: 35 SECONDS (ref 27–38)
AST SERPL W P-5'-P-CCNC: 13 U/L (ref 9–39)
BASOPHILS # BLD MANUAL: 0.02 X10*3/UL (ref 0–0.1)
BASOPHILS NFR BLD MANUAL: 0.9 %
BB ANTIBODY IDENTIFICATION: NORMAL
BILIRUB SERPL-MCNC: 1 MG/DL (ref 0–1.2)
BUN SERPL-MCNC: 8 MG/DL (ref 6–23)
BURR CELLS BLD QL SMEAR: ABNORMAL
CALCIUM SERPL-MCNC: 8 MG/DL (ref 8.6–10.6)
CASE #: NORMAL
CHLORIDE SERPL-SCNC: 104 MMOL/L (ref 98–107)
CO2 SERPL-SCNC: 24 MMOL/L (ref 21–32)
CREAT SERPL-MCNC: 0.8 MG/DL (ref 0.5–1.3)
EGFRCR SERPLBLD CKD-EPI 2021: >90 ML/MIN/1.73M*2
EOSINOPHIL # BLD MANUAL: 0 X10*3/UL (ref 0–0.7)
EOSINOPHIL NFR BLD MANUAL: 0 %
ERYTHROCYTE [DISTWIDTH] IN BLOOD BY AUTOMATED COUNT: 16.9 % (ref 11.5–14.5)
FIBRINOGEN PPP-MCNC: 206 MG/DL (ref 200–400)
GLUCOSE SERPL-MCNC: 92 MG/DL (ref 74–99)
HCT VFR BLD AUTO: 22.9 % (ref 41–52)
HGB BLD-MCNC: 7.7 G/DL (ref 13.5–17.5)
IMM GRANULOCYTES # BLD AUTO: 0.19 X10*3/UL (ref 0–0.7)
IMM GRANULOCYTES NFR BLD AUTO: 9.9 % (ref 0–0.9)
INR PPP: 1.6 (ref 0.9–1.1)
LYMPHOCYTES # BLD MANUAL: 0 X10*3/UL (ref 1.2–4.8)
LYMPHOCYTES NFR BLD MANUAL: 0 %
MAGNESIUM SERPL-MCNC: 1.63 MG/DL (ref 1.6–2.4)
MCH RBC QN AUTO: 28.9 PG (ref 26–34)
MCHC RBC AUTO-ENTMCNC: 33.6 G/DL (ref 32–36)
MCV RBC AUTO: 86 FL (ref 80–100)
MONOCYTES # BLD MANUAL: 0.33 X10*3/UL (ref 0.1–1)
MONOCYTES NFR BLD MANUAL: 17.5 %
MYELOCYTES # BLD MANUAL: 0.02 X10*3/UL
MYELOCYTES NFR BLD MANUAL: 0.9 %
NEUTROPHILS # BLD MANUAL: 1.48 X10*3/UL (ref 1.2–7.7)
NEUTS BAND # BLD MANUAL: 0.25 X10*3/UL (ref 0–0.7)
NEUTS BAND NFR BLD MANUAL: 13.2 %
NEUTS SEG # BLD MANUAL: 1.23 X10*3/UL (ref 1.2–7)
NEUTS SEG NFR BLD MANUAL: 64.9 %
NRBC BLD-RTO: 0 /100 WBCS (ref 0–0)
OVALOCYTES BLD QL SMEAR: ABNORMAL
PATH REV-IMMUNOHEMATOLOGY-PR30: NORMAL
PHOSPHATE SERPL-MCNC: 2.2 MG/DL (ref 2.5–4.9)
PLATELET # BLD AUTO: 12 X10*3/UL (ref 150–450)
POTASSIUM SERPL-SCNC: 3.5 MMOL/L (ref 3.5–5.3)
PROTHROMBIN TIME: 18.4 SECONDS (ref 9.8–12.8)
RBC # BLD AUTO: 2.66 X10*6/UL (ref 4.5–5.9)
RBC MORPH BLD: ABNORMAL
SODIUM SERPL-SCNC: 134 MMOL/L (ref 136–145)
TACROLIMUS BLD-MCNC: 8.5 NG/ML
TOTAL CELLS COUNTED BLD: 114
VARIANT LYMPHS # BLD MANUAL: 0.05 X10*3/UL (ref 0–0.5)
VARIANT LYMPHS NFR BLD: 2.6 %
WBC # BLD AUTO: 1.9 X10*3/UL (ref 4.4–11.3)

## 2024-12-06 PROCEDURE — 2500000001 HC RX 250 WO HCPCS SELF ADMINISTERED DRUGS (ALT 637 FOR MEDICARE OP): Performed by: INTERNAL MEDICINE

## 2024-12-06 PROCEDURE — 84460 ALANINE AMINO (ALT) (SGPT): CPT | Performed by: INTERNAL MEDICINE

## 2024-12-06 PROCEDURE — 83735 ASSAY OF MAGNESIUM: CPT

## 2024-12-06 PROCEDURE — 2500000001 HC RX 250 WO HCPCS SELF ADMINISTERED DRUGS (ALT 637 FOR MEDICARE OP): Performed by: HOME HEALTH AIDE

## 2024-12-06 PROCEDURE — 2500000005 HC RX 250 GENERAL PHARMACY W/O HCPCS: Performed by: HOME HEALTH AIDE

## 2024-12-06 PROCEDURE — 2500000004 HC RX 250 GENERAL PHARMACY W/ HCPCS (ALT 636 FOR OP/ED): Performed by: HOME HEALTH AIDE

## 2024-12-06 PROCEDURE — 2500000002 HC RX 250 W HCPCS SELF ADMINISTERED DRUGS (ALT 637 FOR MEDICARE OP, ALT 636 FOR OP/ED)

## 2024-12-06 PROCEDURE — 85384 FIBRINOGEN ACTIVITY: CPT | Performed by: PHYSICIAN ASSISTANT

## 2024-12-06 PROCEDURE — 85007 BL SMEAR W/DIFF WBC COUNT: CPT

## 2024-12-06 PROCEDURE — 2500000002 HC RX 250 W HCPCS SELF ADMINISTERED DRUGS (ALT 637 FOR MEDICARE OP, ALT 636 FOR OP/ED): Performed by: INTERNAL MEDICINE

## 2024-12-06 PROCEDURE — 99232 SBSQ HOSP IP/OBS MODERATE 35: CPT | Performed by: STUDENT IN AN ORGANIZED HEALTH CARE EDUCATION/TRAINING PROGRAM

## 2024-12-06 PROCEDURE — 80197 ASSAY OF TACROLIMUS: CPT | Performed by: INTERNAL MEDICINE

## 2024-12-06 PROCEDURE — 82247 BILIRUBIN TOTAL: CPT | Performed by: INTERNAL MEDICINE

## 2024-12-06 PROCEDURE — 2500000001 HC RX 250 WO HCPCS SELF ADMINISTERED DRUGS (ALT 637 FOR MEDICARE OP): Performed by: NURSE PRACTITIONER

## 2024-12-06 PROCEDURE — 85730 THROMBOPLASTIN TIME PARTIAL: CPT

## 2024-12-06 PROCEDURE — 80069 RENAL FUNCTION PANEL: CPT | Performed by: PHYSICIAN ASSISTANT

## 2024-12-06 PROCEDURE — 2500000004 HC RX 250 GENERAL PHARMACY W/ HCPCS (ALT 636 FOR OP/ED): Performed by: STUDENT IN AN ORGANIZED HEALTH CARE EDUCATION/TRAINING PROGRAM

## 2024-12-06 PROCEDURE — 85027 COMPLETE CBC AUTOMATED: CPT

## 2024-12-06 PROCEDURE — 84450 TRANSFERASE (AST) (SGOT): CPT | Performed by: INTERNAL MEDICINE

## 2024-12-06 PROCEDURE — 1170000001 HC PRIVATE ONCOLOGY ROOM DAILY

## 2024-12-06 PROCEDURE — 2500000004 HC RX 250 GENERAL PHARMACY W/ HCPCS (ALT 636 FOR OP/ED): Performed by: INTERNAL MEDICINE

## 2024-12-06 PROCEDURE — 2500000001 HC RX 250 WO HCPCS SELF ADMINISTERED DRUGS (ALT 637 FOR MEDICARE OP)

## 2024-12-06 PROCEDURE — 84075 ASSAY ALKALINE PHOSPHATASE: CPT | Performed by: INTERNAL MEDICINE

## 2024-12-06 RX ORDER — MAGNESIUM SULFATE HEPTAHYDRATE 40 MG/ML
2 INJECTION, SOLUTION INTRAVENOUS ONCE
Status: COMPLETED | OUTPATIENT
Start: 2024-12-06 | End: 2024-12-06

## 2024-12-06 RX ORDER — TACROLIMUS 1 MG/1
6 CAPSULE ORAL 2 TIMES DAILY
Qty: 360 CAPSULE | Refills: 0 | Status: SHIPPED | OUTPATIENT
Start: 2024-12-06 | End: 2024-12-07

## 2024-12-06 ASSESSMENT — COGNITIVE AND FUNCTIONAL STATUS - GENERAL
DAILY ACTIVITIY SCORE: 24
MOBILITY SCORE: 24

## 2024-12-06 ASSESSMENT — PAIN SCALES - GENERAL
PAINLEVEL_OUTOF10: 0 - NO PAIN

## 2024-12-06 ASSESSMENT — PAIN - FUNCTIONAL ASSESSMENT
PAIN_FUNCTIONAL_ASSESSMENT: 0-10

## 2024-12-06 NOTE — PROGRESS NOTES
"Jin Reynolds is a 69 y.o. male on day 21 of admission presenting with AML (acute myeloid leukemia) in remission (Multi).    Subjective   -Endorsing significant fatigue. He states sleep is poor in the hospital contributing to his fatigue (frequently being woken up for checks/not feeling comfortable)   -Continues to have secretions that are causing him to gag  -denies any skin complaints      Objective     Physical Exam:  General: appears fatigued, alert, conversant, in no apparent distress  HEENT: normocephalic, atraumatic, EOMI, no scleral icterus or conjunctivitis. No mucositis. continued petechiae present in the posterior oropharynx and under the tongue  CV: RRR, no murmurs  Pulm: CTAB, no wheezes or crackles, no increased work of breathing  Abd: soft, non tender, non distended  : no sheikh   Ext: warm, no lower extremity edema  Skin: continued erythema seen around the eyes bilaterally. mild erythema seen on the back of the neck. No other skin rashes seen   Neuro: moving all extremities  Psych: normal affect      Last Recorded Vitals  Blood pressure 111/72, pulse 78, temperature 36.2 °C (97.2 °F), temperature source Temporal, resp. rate 16, height 1.691 m (5' 6.58\"), weight 81.6 kg (179 lb 14.3 oz), SpO2 97%.  Intake/Output last 3 Shifts:  I/O last 3 completed shifts:  In: 628.3 (7.7 mL/kg) [Blood:371.3; IV Piggyback:257]  Out: - (0 mL/kg)   Weight: 81.6 kg     Results for orders placed or performed during the hospital encounter of 11/15/24 (from the past 24 hours)   Fibrinogen   Result Value Ref Range    Fibrinogen 206 200 - 400 mg/dL   CBC and Auto Differential   Result Value Ref Range    WBC 1.9 (L) 4.4 - 11.3 x10*3/uL    nRBC 0.0 0.0 - 0.0 /100 WBCs    RBC 2.66 (L) 4.50 - 5.90 x10*6/uL    Hemoglobin 7.7 (L) 13.5 - 17.5 g/dL    Hematocrit 22.9 (L) 41.0 - 52.0 %    MCV 86 80 - 100 fL    MCH 28.9 26.0 - 34.0 pg    MCHC 33.6 32.0 - 36.0 g/dL    RDW 16.9 (H) 11.5 - 14.5 %    Platelets 12 (LL) 150 - 450 x10*3/uL "    Immature Granulocytes %, Automated 9.9 (H) 0.0 - 0.9 %    Immature Granulocytes Absolute, Automated 0.19 0.00 - 0.70 x10*3/uL   Coagulation Screen   Result Value Ref Range    Protime 18.4 (H) 9.8 - 12.8 seconds    INR 1.6 (H) 0.9 - 1.1    aPTT 35 27 - 38 seconds   Magnesium   Result Value Ref Range    Magnesium 1.63 1.60 - 2.40 mg/dL   Renal Function Panel   Result Value Ref Range    Glucose 92 74 - 99 mg/dL    Sodium 134 (L) 136 - 145 mmol/L    Potassium 3.5 3.5 - 5.3 mmol/L    Chloride 104 98 - 107 mmol/L    Bicarbonate 24 21 - 32 mmol/L    Anion Gap 10 10 - 20 mmol/L    Urea Nitrogen 8 6 - 23 mg/dL    Creatinine 0.80 0.50 - 1.30 mg/dL    eGFR >90 >60 mL/min/1.73m*2    Calcium 8.0 (L) 8.6 - 10.6 mg/dL    Phosphorus 2.2 (L) 2.5 - 4.9 mg/dL    Albumin 3.5 3.4 - 5.0 g/dL   Tacrolimus level   Result Value Ref Range    Tacrolimus  8.5 <=15.0 ng/mL   Manual Differential   Result Value Ref Range    Neutrophils %, Manual 64.9 40.0 - 80.0 %    Bands %, Manual 13.2 0.0 - 5.0 %    Lymphocytes %, Manual 0.0 13.0 - 44.0 %    Monocytes %, Manual 17.5 2.0 - 10.0 %    Eosinophils %, Manual 0.0 0.0 - 6.0 %    Basophils %, Manual 0.9 0.0 - 2.0 %    Atypical Lymphocytes %, Manual 2.6 0.0 - 2.0 %    Myelocytes %, Manual 0.9 0.0 - 0.0 %    Seg Neutrophils Absolute, Manual 1.23 1.20 - 7.00 x10*3/uL    Bands Absolute, Manual 0.25 0.00 - 0.70 x10*3/uL    Lymphocytes Absolute, Manual 0.00 (L) 1.20 - 4.80 x10*3/uL    Monocytes Absolute, Manual 0.33 0.10 - 1.00 x10*3/uL    Eosinophils Absolute, Manual 0.00 0.00 - 0.70 x10*3/uL    Basophils Absolute, Manual 0.02 0.00 - 0.10 x10*3/uL    Atypical Lymphs Absolute, Manual 0.05 0.00 - 0.50 x10*3/uL    Myelocytes Absolute, Manual 0.02 0.00 - 0.00 x10*3/uL    Total Cells Counted 114     Neutrophils Absolute, Manual 1.48 1.20 - 7.70 x10*3/uL    RBC Morphology See Below     Ovalocytes Few     Jordi Cells Few      *Note: Due to a large number of results and/or encounters for the requested time  period, some results have not been displayed. A complete set of results can be found in Results Review.         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     T+15  Allo MRD     Updates 12/06/24  -ANC continuing to improve, 330 > 1230  -stopping filgrastim after today's dose and stopping levofloxacin ppx  -continue tacro dose 5mg in AM and 6mg in PM   -continue supportive care    ONC:  # AML with MRD  - AML diagnosed per [facility] on 6/27/24. Positive for RUNX1, CKit, SRSF2 and CBL C404Y  - S/p C1D1 Venetoclax/Azacitidine on 7/24/24. Developed TLS with renal dysfunction  - [facility] (8/28/24): c/w persistent disease and 27% blast  - Admitted 9/5/24 for C2 of Jeremy/Aza with Venetoclx ramp up dosing (D1 20mg, D2 50 mg, D3 onward 100 mg daily).   - S/p 2 cycles of Jeremy/Aza  - [facility] (9/25/24) and (10/23/24): c/w MRD (<1%)  - [facility] (10/23): 0.2% blasts, hypercellular (90%) with systemic mastocytosis  - Admitted 11/15 for Allo MRD SCT (T=0 11/21/24)  # Smoldering Systemiv Mastocytosis diagnosed 8/23/23  - S/p Molecular therapy started 2/13/24  - Continue home Allegra for symptom management  - Tryptase level 11/16 (62.6) normal     Chemo:  - Fludarabine 30 mg/m2 IV D-5, D-4, D-3  - Melphalan 140 mg/m2 IV D-2   - Total Body Irradiation (TBI) 200 cGy on D-1      GVHD Prophy:  - Tacro level: 5.5 (12/5) > 8.5 (12/6)  - Tacro dose: continue 5 mg AM and 6 mg PM  - Post-tx Cytoxan 50 mg/kg on T+3 and T+4  - MMF 1000 mg TID to begin T+5 to T+35     TRANSPLANT:   - HLA matched related donor /12 match  - Donor: O+, CMV positive   - Recipient: O+, CMV negative      Surveillance: beginning 11/25  - Monitor CMV 2x weekly: 11/28 ND  - Monitor EBV weekly: 12/2 ND  - Monitor Adenovirus weekly: 11/25 ND  - Monitor Hapto/LDH weekly: 11/25  wnl  - Monitor Fibrinogen 2x weekly: 11/29 236  - Monitor UA/protein weekly: 12/3 neg for blood or protein; TP/Cr 0.19      HEME:  # Pancytopenia 2/2 to chemotherapy. No acute bleed.  - Monitor counts daily  - Keep Hgb > 7.0, Plts > 10k     ID:  # Cont prophylaxis meds: Acyclovir, Posaconazole, po Vancomycin  -stopped Levofloxacin ppx due to recovered ANC 12/6     FEN/GI:  - Admit wt: 86.1 kg, Daily wt: 81.8 kg (12/3)  # Weight loss w/ poor PO intake  - Nutrition Consult ordered (11/23)   - Cont home PPI, Pepcid and antiemetics  - Low pathogen diet  - Monitor electrolytes, replace prn  # JUSTIN; prn Zofran and Compazine available   - Added Prn Ativan and Carafate  - Added Protonix  # Mucositis, mild  - Oral salt water rinses and biotene ordered  #Thick secretions  -hyocyamine prn ordered   # Diarrhea, chemo induced  - Cdiff (11/25) negative  - Stool path negative  - Loperamide PRN     CARD:  - ECHO (10/23/24): EF 50-55%, RA/LA enlargement  - Cont home Norvasc     RENAL:  # H/o hematuria, resolved  - S/p Cystoscopy (1/15/24): Normal exam  # C/o frequent urination especially at night  - Last PSA 0.92 (9/6/24)     MISC:  - Cont home Allegra and Singular     DISPO:  - Full Code  - Access: DL (non-tunneled) CVC  - Primary On: Dr. Nika Dawson  - Dispo: anticipating discharge home possibly Mon after counts improved      Patient seen, examined, and discussed with Dr. Rashid Avila MD

## 2024-12-06 NOTE — HOSPITAL COURSE
Jin Reynolds is a 69 y.o. 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     Patient received cells 11/21/24, tolerated well.     Hospital course:  Pancytopenia 2/2 chemotherapy. Managed with transfusion support.   Chemo induced nausea, emesis, and anorexia managed with anti-emetics and nutrition consult for additional guidance.   Mucositis managed with salt water rinses and biotene.   Chemo induced diarrhea, c dif and stool pathogen negative, managed with as needed imodium.     Anti-infectives upon discharge: acyclovir, posaconazole; plan bactrim with count recovery   Ppx: tacro (5mg AM, 6pm), MMF, ursodiol   Access: mediport; CVC removed prior to discharge   Appointments:  [ ] BMT FUV with count checks 12/9, 12/11, 12/13   [ ] Dr. Dawson FUV 1/7  [ ] Kalia FUV within 2 weeks requested and pending

## 2024-12-07 ENCOUNTER — PHARMACY VISIT (OUTPATIENT)
Dept: PHARMACY | Facility: CLINIC | Age: 69
End: 2024-12-07
Payer: COMMERCIAL

## 2024-12-07 VITALS
OXYGEN SATURATION: 96 % | WEIGHT: 179.9 LBS | HEART RATE: 85 BPM | SYSTOLIC BLOOD PRESSURE: 116 MMHG | RESPIRATION RATE: 16 BRPM | BODY MASS INDEX: 28.24 KG/M2 | HEIGHT: 67 IN | DIASTOLIC BLOOD PRESSURE: 72 MMHG | TEMPERATURE: 98.6 F

## 2024-12-07 LAB
ALBUMIN SERPL BCP-MCNC: 3.7 G/DL (ref 3.4–5)
ALP SERPL-CCNC: 58 U/L (ref 33–136)
ALT SERPL W P-5'-P-CCNC: 21 U/L (ref 10–52)
ANION GAP SERPL CALC-SCNC: 11 MMOL/L (ref 10–20)
APTT PPP: 35 SECONDS (ref 27–38)
AST SERPL W P-5'-P-CCNC: 15 U/L (ref 9–39)
BASOPHILS # BLD MANUAL: 0 X10*3/UL (ref 0–0.1)
BASOPHILS NFR BLD MANUAL: 0 %
BILIRUB DIRECT SERPL-MCNC: 0.3 MG/DL (ref 0–0.3)
BILIRUB SERPL-MCNC: 0.9 MG/DL (ref 0–1.2)
BLOOD EXPIRATION DATE: NORMAL
BUN SERPL-MCNC: 7 MG/DL (ref 6–23)
CALCIUM SERPL-MCNC: 8.1 MG/DL (ref 8.6–10.6)
CHLORIDE SERPL-SCNC: 105 MMOL/L (ref 98–107)
CO2 SERPL-SCNC: 23 MMOL/L (ref 21–32)
CREAT SERPL-MCNC: 0.8 MG/DL (ref 0.5–1.3)
DISPENSE STATUS: NORMAL
EGFRCR SERPLBLD CKD-EPI 2021: >90 ML/MIN/1.73M*2
EOSINOPHIL # BLD MANUAL: 0 X10*3/UL (ref 0–0.7)
EOSINOPHIL NFR BLD MANUAL: 0 %
ERYTHROCYTE [DISTWIDTH] IN BLOOD BY AUTOMATED COUNT: 16.9 % (ref 11.5–14.5)
GLUCOSE SERPL-MCNC: 104 MG/DL (ref 74–99)
HCT VFR BLD AUTO: 23.2 % (ref 41–52)
HGB BLD-MCNC: 7.9 G/DL (ref 13.5–17.5)
IMM GRANULOCYTES # BLD AUTO: 0.26 X10*3/UL (ref 0–0.7)
IMM GRANULOCYTES NFR BLD AUTO: 7.4 % (ref 0–0.9)
INR PPP: 1.7 (ref 0.9–1.1)
LYMPHOCYTES # BLD MANUAL: 0 X10*3/UL (ref 1.2–4.8)
LYMPHOCYTES NFR BLD MANUAL: 0 %
MAGNESIUM SERPL-MCNC: 1.51 MG/DL (ref 1.6–2.4)
MCH RBC QN AUTO: 29.6 PG (ref 26–34)
MCHC RBC AUTO-ENTMCNC: 34.1 G/DL (ref 32–36)
MCV RBC AUTO: 87 FL (ref 80–100)
MONOCYTES # BLD MANUAL: 0.36 X10*3/UL (ref 0.1–1)
MONOCYTES NFR BLD MANUAL: 10.3 %
NEUTROPHILS # BLD MANUAL: 3.14 X10*3/UL (ref 1.2–7.7)
NEUTS BAND # BLD MANUAL: 0.03 X10*3/UL (ref 0–0.7)
NEUTS BAND NFR BLD MANUAL: 0.9 %
NEUTS SEG # BLD MANUAL: 3.11 X10*3/UL (ref 1.2–7)
NEUTS SEG NFR BLD MANUAL: 88.8 %
NRBC BLD-RTO: 0 /100 WBCS (ref 0–0)
PHOSPHATE SERPL-MCNC: 2.3 MG/DL (ref 2.5–4.9)
PLATELET # BLD AUTO: 12 X10*3/UL (ref 150–450)
POTASSIUM SERPL-SCNC: 3.2 MMOL/L (ref 3.5–5.3)
PRODUCT BLOOD TYPE: 600
PRODUCT CODE: NORMAL
PROT SERPL-MCNC: 5.4 G/DL (ref 6.4–8.2)
PROTHROMBIN TIME: 19.1 SECONDS (ref 9.8–12.8)
RBC # BLD AUTO: 2.67 X10*6/UL (ref 4.5–5.9)
RBC MORPH BLD: ABNORMAL
SODIUM SERPL-SCNC: 136 MMOL/L (ref 136–145)
TACROLIMUS BLD-MCNC: 8.3 NG/ML
TOTAL CELLS COUNTED BLD: 116
UNIT ABO: NORMAL
UNIT NUMBER: NORMAL
UNIT RH: NORMAL
UNIT VOLUME: 273
WBC # BLD AUTO: 3.5 X10*3/UL (ref 4.4–11.3)

## 2024-12-07 PROCEDURE — 36430 TRANSFUSION BLD/BLD COMPNT: CPT

## 2024-12-07 PROCEDURE — 85027 COMPLETE CBC AUTOMATED: CPT

## 2024-12-07 PROCEDURE — 2500000001 HC RX 250 WO HCPCS SELF ADMINISTERED DRUGS (ALT 637 FOR MEDICARE OP)

## 2024-12-07 PROCEDURE — 2500000005 HC RX 250 GENERAL PHARMACY W/O HCPCS: Performed by: HOME HEALTH AIDE

## 2024-12-07 PROCEDURE — 80053 COMPREHEN METABOLIC PANEL: CPT | Performed by: PHYSICIAN ASSISTANT

## 2024-12-07 PROCEDURE — 80197 ASSAY OF TACROLIMUS: CPT | Performed by: INTERNAL MEDICINE

## 2024-12-07 PROCEDURE — RXMED WILLOW AMBULATORY MEDICATION CHARGE

## 2024-12-07 PROCEDURE — 82248 BILIRUBIN DIRECT: CPT | Performed by: STUDENT IN AN ORGANIZED HEALTH CARE EDUCATION/TRAINING PROGRAM

## 2024-12-07 PROCEDURE — 2500000004 HC RX 250 GENERAL PHARMACY W/ HCPCS (ALT 636 FOR OP/ED): Performed by: INTERNAL MEDICINE

## 2024-12-07 PROCEDURE — 2500000001 HC RX 250 WO HCPCS SELF ADMINISTERED DRUGS (ALT 637 FOR MEDICARE OP): Performed by: NURSE PRACTITIONER

## 2024-12-07 PROCEDURE — 84100 ASSAY OF PHOSPHORUS: CPT | Performed by: PHYSICIAN ASSISTANT

## 2024-12-07 PROCEDURE — 2500000002 HC RX 250 W HCPCS SELF ADMINISTERED DRUGS (ALT 637 FOR MEDICARE OP, ALT 636 FOR OP/ED): Performed by: INTERNAL MEDICINE

## 2024-12-07 PROCEDURE — 99239 HOSP IP/OBS DSCHRG MGMT >30: CPT | Performed by: INTERNAL MEDICINE

## 2024-12-07 PROCEDURE — 37799 UNLISTED PX VASCULAR SURGERY: CPT | Performed by: HOME HEALTH AIDE

## 2024-12-07 PROCEDURE — 2500000004 HC RX 250 GENERAL PHARMACY W/ HCPCS (ALT 636 FOR OP/ED): Performed by: HOME HEALTH AIDE

## 2024-12-07 PROCEDURE — 83735 ASSAY OF MAGNESIUM: CPT

## 2024-12-07 PROCEDURE — 02PYX3Z REMOVAL OF INFUSION DEVICE FROM GREAT VESSEL, EXTERNAL APPROACH: ICD-10-PCS | Performed by: INTERNAL MEDICINE

## 2024-12-07 PROCEDURE — 2500000001 HC RX 250 WO HCPCS SELF ADMINISTERED DRUGS (ALT 637 FOR MEDICARE OP): Performed by: INTERNAL MEDICINE

## 2024-12-07 PROCEDURE — P9073 PLATELETS PHERESIS PATH REDU: HCPCS

## 2024-12-07 PROCEDURE — 85610 PROTHROMBIN TIME: CPT

## 2024-12-07 PROCEDURE — 2500000004 HC RX 250 GENERAL PHARMACY W/ HCPCS (ALT 636 FOR OP/ED): Performed by: STUDENT IN AN ORGANIZED HEALTH CARE EDUCATION/TRAINING PROGRAM

## 2024-12-07 PROCEDURE — 85007 BL SMEAR W/DIFF WBC COUNT: CPT

## 2024-12-07 RX ORDER — PROCHLORPERAZINE MALEATE 10 MG
10 TABLET ORAL EVERY 6 HOURS PRN
Qty: 30 TABLET | Refills: 5 | Status: SHIPPED | OUTPATIENT
Start: 2024-12-07

## 2024-12-07 RX ORDER — POTASSIUM CHLORIDE 29.8 MG/ML
40 INJECTION INTRAVENOUS ONCE
Status: COMPLETED | OUTPATIENT
Start: 2024-12-07 | End: 2024-12-07

## 2024-12-07 RX ORDER — ONDANSETRON HYDROCHLORIDE 8 MG/1
8 TABLET, FILM COATED ORAL EVERY 8 HOURS PRN
Qty: 30 TABLET | Refills: 5 | Status: SHIPPED | OUTPATIENT
Start: 2024-12-07 | End: 2025-01-06

## 2024-12-07 RX ORDER — TACROLIMUS 5 MG/1
5 CAPSULE, GELATIN COATED ORAL
Start: 2024-12-08 | End: 2024-12-09 | Stop reason: DRUGHIGH

## 2024-12-07 RX ORDER — MAGNESIUM SULFATE HEPTAHYDRATE 40 MG/ML
2 INJECTION, SOLUTION INTRAVENOUS ONCE
Status: COMPLETED | OUTPATIENT
Start: 2024-12-07 | End: 2024-12-07

## 2024-12-07 RX ORDER — TACROLIMUS 1 MG/1
6 CAPSULE ORAL NIGHTLY
Start: 2024-12-07 | End: 2024-12-13 | Stop reason: SDUPTHER

## 2024-12-07 ASSESSMENT — PAIN - FUNCTIONAL ASSESSMENT
PAIN_FUNCTIONAL_ASSESSMENT: 0-10

## 2024-12-07 ASSESSMENT — PAIN SCALES - GENERAL
PAINLEVEL_OUTOF10: 0 - NO PAIN

## 2024-12-07 NOTE — CARE PLAN
The patient's goals for the shift include      The clinical goals for the shift include pt will remain HDS throughout this shift          Problem: Pain - Adult  Goal: Verbalizes/displays adequate comfort level or baseline comfort level  Outcome: Progressing     Problem: Safety - Adult  Goal: Free from fall injury  Outcome: Progressing     Problem: Discharge Planning  Goal: Discharge to home or other facility with appropriate resources  Outcome: Progressing     Problem: Chronic Conditions and Co-morbidities  Goal: Patient's chronic conditions and co-morbidity symptoms are monitored and maintained or improved  Outcome: Progressing

## 2024-12-07 NOTE — DISCHARGE SUMMARY
Discharge Diagnosis  AML (acute myeloid leukemia) in remission (Multi)    Issues Requiring Follow-Up  Monitor counts    Test Results Pending At Discharge  Pending Labs       No current pending labs.            Hospital Course  Jin Reynolds is a 69 y.o. 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     Patient received cells 11/21/24, tolerated well.     Hospital course:  Pancytopenia 2/2 chemotherapy. Managed with transfusion support.   Chemo induced nausea, emesis, and anorexia managed with anti-emetics and nutrition consult for additional guidance.   Mucositis managed with salt water rinses and biotene.   Chemo induced diarrhea, c dif and stool pathogen negative, managed with as needed imodium.     Anti-infectives upon discharge: acyclovir, posaconazole; plan bactrim with count recovery   Ppx: tacro (5mg AM, 6pm), MMF, ursodiol   Access: mediport; CVC removed prior to discharge   Appointments:  [ ] BMT FUV with count checks 12/9, 12/11, 12/13   [ ] Dr. Dawson FUV 1/7  [ ] Hinali FUV within 2 weeks requested and pending       Pertinent Physical Exam At Time of Discharge  General: appears fatigued, alert, conversant, in no apparent distress  HEENT: normocephalic, atraumatic, EOMI, no scleral icterus or conjunctivitis. No mucositis. continued petechiae present in the posterior oropharynx and under the tongue  CV: RRR, no MRG  Pulm: CTAB, no wheezes or crackles, no increased work of breathing  Abd: soft, non tender, non distended  : no sheikh   Ext: warm, no lower extremity edema  Skin: no lesions, rashes, bruising, bleeding   Neuro: moving all extremities, neuro exam nonfocal, CN2-12 grossly intact  Psych: normal affect, fluent speech       Home Medications     Medication List      CHANGE how you take these medications     * tacrolimus 1 mg capsule; Commonly known as: Prograf; Take 6 capsules   (6 mg) by mouth once  daily at bedtime.; What changed: medication strength,   how much to take, when to take this, additional instructions   * Prograf 5 mg capsule; Take 1 capsule (5 mg) by mouth early in the   morning..; Start taking on: December 8, 2024; What changed: medication   strength, how much to take, when to take this, additional instructions  * This list has 2 medication(s) that are the same as other medications   prescribed for you. Read the directions carefully, and ask your doctor or   other care provider to review them with you.     CONTINUE taking these medications     acyclovir 400 mg tablet; Commonly known as: Zovirax; Take 1 tablet (400   mg) by mouth every 12 hours.   amLODIPine 10 mg tablet; Commonly known as: Norvasc; TAKE 1 TABLET BY   MOUTH EVERY DAY   cholecalciferol 25 MCG (1000 UT) tablet; Commonly known as: Vitamin D-3   famotidine 20 mg tablet; Commonly known as: Pepcid; Take 1 tablet (20   mg) by mouth once daily.   fexofenadine 180 mg tablet; Commonly known as: Allegra   Mg-Plus-Protein 133 mg tablet; Generic drug: magnesium (amino acid   chelate); Take 2 tablets (266 mg) by mouth 3 times a day. Or as instructed   on your medication list.   mycophenolate 250 mg capsule; Commonly known as: Cellcept; Take 4   capsules (1,000 mg) by mouth 3 times a day.   ondansetron 8 mg tablet; Commonly known as: Zofran; Take 1 tablet (8 mg)   by mouth every 8 hours if needed for nausea or vomiting.   posaconazole 100 mg DR tablet; Commonly known as: Noxafil; Take 3   tablets (300 mg) by mouth once daily. Do not crush, chew, or split. This   is to prevent fungal infections.   prochlorperazine 10 mg tablet; Commonly known as: Compazine; Take 1   tablet (10 mg) by mouth every 6 hours if needed for nausea or vomiting.   sulfamethoxazole-trimethoprim 800-160 mg tablet; Commonly known as:   Bactrim DS; Take 1 tablet by mouth 3 times a week. Take on Mondays,   Wednesdays, and Fridays. Do not start until instructed.   ursodiol 300  mg capsule; Commonly known as: Actigall; Take 1 capsule   (300 mg) by mouth 3 times a day.     STOP taking these medications     allopurinol 300 mg tablet; Commonly known as: Zyloprim       Outpatient Follow-Up  Future Appointments   Date Time Provider Department Center   12/9/2024  8:30 AM Aruna Salinas RN [facility]   12/9/2024  9:00 AM [facility] LB BMT POST TRANSPLANT [facility]   12/11/2024 10:00 AM INF 35 [facility]   12/11/2024 10:00 AM [facility]   12/13/2024  8:00 [facility]   12/13/2024 10:30 AM [facility]   1/7/2025  8:40 AM Nika Dawson MD [id number]       Roxana Villaseñor PA-C

## 2024-12-07 NOTE — PROCEDURES
PROCEDURE: Removal of R nontunneled CVC    DATE: 12/07/24  TIME:     Pre-procedure and verification completed at bedside prior to procedure.      After handwashing was performed and sterile gloves were donned, the adhesive dressing was removed. The area of the line insertion was cleansed with alcohol, and 2 external sutures were removed. The line was then removed without incident. Sterile drain sponge with bacitracin was applied with firm pressure, followed by a pressure dressing was applied. Patient sas instructed to leave pressure dressing on for 24 hours and to monitor for s/s bleeding and infection.     Patient tolerated procedure well, no complications were noted.

## 2024-12-09 ENCOUNTER — OFFICE VISIT (OUTPATIENT)
Dept: HEMATOLOGY/ONCOLOGY | Facility: HOSPITAL | Age: 69
DRG: 014 | End: 2024-12-09
Payer: MEDICARE

## 2024-12-09 ENCOUNTER — NUTRITION (OUTPATIENT)
Dept: HEMATOLOGY/ONCOLOGY | Facility: HOSPITAL | Age: 69
End: 2024-12-09

## 2024-12-09 VITALS
RESPIRATION RATE: 18 BRPM | WEIGHT: 178 LBS | BODY MASS INDEX: 28.24 KG/M2 | OXYGEN SATURATION: 99 % | HEART RATE: 104 BPM | TEMPERATURE: 96.8 F | DIASTOLIC BLOOD PRESSURE: 76 MMHG | SYSTOLIC BLOOD PRESSURE: 119 MMHG

## 2024-12-09 VITALS — WEIGHT: 177.91 LBS | HEIGHT: 67 IN | BODY MASS INDEX: 27.92 KG/M2

## 2024-12-09 DIAGNOSIS — C92.01 ACUTE MYELOID LEUKEMIA IN REMISSION (MULTI): ICD-10-CM

## 2024-12-09 DIAGNOSIS — C92.02 AML (ACUTE MYELOID LEUKEMIA) IN RELAPSE (MULTI): ICD-10-CM

## 2024-12-09 DIAGNOSIS — D47.02 SYSTEMIC MASTOCYTOSIS WITH ASSOCIATED CLONAL HEMATOLOGICAL NON-MAST CELL LINEAGE DISEASE: ICD-10-CM

## 2024-12-09 DIAGNOSIS — C92.01 ACUTE MYELOID LEUKEMIA IN REMISSION (MULTI): Primary | ICD-10-CM

## 2024-12-09 DIAGNOSIS — Z94.81 S/P ALLOGENEIC BONE MARROW TRANSPLANT (MULTI): Primary | ICD-10-CM

## 2024-12-09 DIAGNOSIS — R68.89 OTHER GENERAL SYMPTOMS AND SIGNS: ICD-10-CM

## 2024-12-09 DIAGNOSIS — Z94.81 S/P ALLOGENEIC BONE MARROW TRANSPLANT (MULTI): ICD-10-CM

## 2024-12-09 DIAGNOSIS — C95.00 ACUTE LEUKEMIA NOT HAVING ACHIEVED REMISSION (MULTI): ICD-10-CM

## 2024-12-09 DIAGNOSIS — C92.00 ACUTE MYELOID LEUKEMIA NOT HAVING ACHIEVED REMISSION (MULTI): ICD-10-CM

## 2024-12-09 PROBLEM — Z94.84 HISTORY OF STEM CELL TRANSPLANT (MULTI): Status: ACTIVE | Noted: 2024-12-09

## 2024-12-09 PROBLEM — Z76.82 STEM CELL TRANSPLANT CANDIDATE: Status: RESOLVED | Noted: 2024-10-23 | Resolved: 2024-12-09

## 2024-12-09 LAB
ABO GROUP (TYPE) IN BLOOD: NORMAL
ALBUMIN SERPL BCP-MCNC: 3.9 G/DL (ref 3.4–5)
ALP SERPL-CCNC: 58 U/L (ref 33–136)
ALT SERPL W P-5'-P-CCNC: 29 U/L (ref 10–52)
ANION GAP SERPL CALC-SCNC: 12 MMOL/L (ref 10–20)
ANTIBODY SCREEN: NORMAL
APPEARANCE UR: CLEAR
AST SERPL W P-5'-P-CCNC: 26 U/L (ref 9–39)
BASOPHILS # BLD MANUAL: 0.02 X10*3/UL (ref 0–0.1)
BASOPHILS NFR BLD MANUAL: 1 %
BILIRUB SERPL-MCNC: 0.6 MG/DL (ref 0–1.2)
BILIRUB UR STRIP.AUTO-MCNC: NEGATIVE MG/DL
BUN SERPL-MCNC: 12 MG/DL (ref 6–23)
BURR CELLS BLD QL SMEAR: ABNORMAL
CALCIUM SERPL-MCNC: 8.4 MG/DL (ref 8.6–10.3)
CAOX CRY #/AREA UR COMP ASSIST: ABNORMAL /HPF
CHLORIDE SERPL-SCNC: 104 MMOL/L (ref 98–107)
CO2 SERPL-SCNC: 25 MMOL/L (ref 21–32)
COLOR UR: ABNORMAL
CREAT SERPL-MCNC: 0.91 MG/DL (ref 0.5–1.3)
CREAT UR-MCNC: 130.9 MG/DL (ref 20–370)
DACRYOCYTES BLD QL SMEAR: ABNORMAL
EGFRCR SERPLBLD CKD-EPI 2021: >90 ML/MIN/1.73M*2
EOSINOPHIL # BLD MANUAL: 0 X10*3/UL (ref 0–0.7)
EOSINOPHIL NFR BLD MANUAL: 0 %
ERYTHROCYTE [DISTWIDTH] IN BLOOD BY AUTOMATED COUNT: 16.5 % (ref 11.5–14.5)
FIBRINOGEN PPP-MCNC: 157 MG/DL (ref 200–400)
GLUCOSE SERPL-MCNC: 102 MG/DL (ref 74–99)
GLUCOSE UR STRIP.AUTO-MCNC: NORMAL MG/DL
HAPTOGLOB SERPL NEPH-MCNC: 58 MG/DL (ref 30–200)
HCT VFR BLD AUTO: 21.6 % (ref 41–52)
HGB BLD-MCNC: 7.4 G/DL (ref 13.5–17.5)
IMM GRANULOCYTES # BLD AUTO: 0.08 X10*3/UL (ref 0–0.7)
IMM GRANULOCYTES NFR BLD AUTO: 5 % (ref 0–0.9)
INR PPP: 1.4 (ref 0.9–1.1)
KETONES UR STRIP.AUTO-MCNC: ABNORMAL MG/DL
LDH SERPL L TO P-CCNC: 137 U/L (ref 84–246)
LEUKOCYTE ESTERASE UR QL STRIP.AUTO: NEGATIVE
LYMPHOCYTES # BLD MANUAL: 0 X10*3/UL (ref 1.2–4.8)
LYMPHOCYTES NFR BLD MANUAL: 0 %
MAGNESIUM SERPL-MCNC: 1.34 MG/DL (ref 1.6–2.4)
MCH RBC QN AUTO: 29.6 PG (ref 26–34)
MCHC RBC AUTO-ENTMCNC: 34.3 G/DL (ref 32–36)
MCV RBC AUTO: 86 FL (ref 80–100)
MONOCYTES # BLD MANUAL: 0.24 X10*3/UL (ref 0.1–1)
MONOCYTES NFR BLD MANUAL: 15 %
MUCOUS THREADS #/AREA URNS AUTO: ABNORMAL /LPF
MYELOCYTES # BLD MANUAL: 0.03 X10*3/UL
MYELOCYTES NFR BLD MANUAL: 2 %
NEUTROPHILS # BLD MANUAL: 1.29 X10*3/UL (ref 1.2–7.7)
NEUTS BAND # BLD MANUAL: 0.14 X10*3/UL (ref 0–0.7)
NEUTS BAND NFR BLD MANUAL: 9 %
NEUTS SEG # BLD MANUAL: 1.15 X10*3/UL (ref 1.2–7)
NEUTS SEG NFR BLD MANUAL: 72 %
NITRITE UR QL STRIP.AUTO: NEGATIVE
NRBC BLD-RTO: 0 /100 WBCS (ref 0–0)
OVALOCYTES BLD QL SMEAR: ABNORMAL
PH UR STRIP.AUTO: 5.5 [PH]
PLATELET # BLD AUTO: 14 X10*3/UL (ref 150–450)
POTASSIUM SERPL-SCNC: 3.5 MMOL/L (ref 3.5–5.3)
PROT SERPL-MCNC: 5.8 G/DL (ref 6.4–8.2)
PROT UR STRIP.AUTO-MCNC: NEGATIVE MG/DL
PROT UR-ACNC: 19 MG/DL (ref 5–25)
PROT/CREAT UR: 0.15 MG/MG CREAT (ref 0–0.17)
PROTHROMBIN TIME: 16.1 SECONDS (ref 9.8–12.8)
RBC # BLD AUTO: 2.5 X10*6/UL (ref 4.5–5.9)
RBC # UR STRIP.AUTO: ABNORMAL /UL
RBC #/AREA URNS AUTO: ABNORMAL /HPF
RBC MORPH BLD: ABNORMAL
RH FACTOR (ANTIGEN D): NORMAL
SCHISTOCYTES BLD QL SMEAR: ABNORMAL
SODIUM SERPL-SCNC: 137 MMOL/L (ref 136–145)
SP GR UR STRIP.AUTO: 1.01
TOTAL CELLS COUNTED BLD: 100
URATE SERPL-MCNC: 4.2 MG/DL (ref 4–7.5)
UROBILINOGEN UR STRIP.AUTO-MCNC: NORMAL MG/DL
VARIANT LYMPHS # BLD MANUAL: 0.02 X10*3/UL (ref 0–0.5)
VARIANT LYMPHS NFR BLD: 1 %
WBC # BLD AUTO: 1.6 X10*3/UL (ref 4.4–11.3)
WBC #/AREA URNS AUTO: ABNORMAL /HPF

## 2024-12-09 PROCEDURE — 85027 COMPLETE CBC AUTOMATED: CPT | Performed by: INTERNAL MEDICINE

## 2024-12-09 PROCEDURE — 84156 ASSAY OF PROTEIN URINE: CPT | Performed by: NURSE PRACTITIONER

## 2024-12-09 PROCEDURE — 3078F DIAST BP <80 MM HG: CPT | Performed by: NURSE PRACTITIONER

## 2024-12-09 PROCEDURE — 99215 OFFICE O/P EST HI 40 MIN: CPT | Performed by: NURSE PRACTITIONER

## 2024-12-09 PROCEDURE — 83010 ASSAY OF HAPTOGLOBIN QUANT: CPT | Performed by: NURSE PRACTITIONER

## 2024-12-09 PROCEDURE — 3074F SYST BP LT 130 MM HG: CPT | Performed by: NURSE PRACTITIONER

## 2024-12-09 PROCEDURE — 81001 URINALYSIS AUTO W/SCOPE: CPT | Performed by: NURSE PRACTITIONER

## 2024-12-09 PROCEDURE — 2500000004 HC RX 250 GENERAL PHARMACY W/ HCPCS (ALT 636 FOR OP/ED): Performed by: INTERNAL MEDICINE

## 2024-12-09 PROCEDURE — 1111F DSCHRG MED/CURRENT MED MERGE: CPT | Performed by: NURSE PRACTITIONER

## 2024-12-09 PROCEDURE — 83615 LACTATE (LD) (LDH) ENZYME: CPT | Performed by: NURSE PRACTITIONER

## 2024-12-09 PROCEDURE — 85610 PROTHROMBIN TIME: CPT | Performed by: NURSE PRACTITIONER

## 2024-12-09 PROCEDURE — 85007 BL SMEAR W/DIFF WBC COUNT: CPT | Performed by: INTERNAL MEDICINE

## 2024-12-09 PROCEDURE — 1126F AMNT PAIN NOTED NONE PRSNT: CPT | Performed by: NURSE PRACTITIONER

## 2024-12-09 PROCEDURE — 85384 FIBRINOGEN ACTIVITY: CPT | Performed by: NURSE PRACTITIONER

## 2024-12-09 PROCEDURE — 99215 OFFICE O/P EST HI 40 MIN: CPT | Mod: 25 | Performed by: NURSE PRACTITIONER

## 2024-12-09 PROCEDURE — 96365 THER/PROPH/DIAG IV INF INIT: CPT | Mod: INF

## 2024-12-09 PROCEDURE — 84550 ASSAY OF BLOOD/URIC ACID: CPT | Performed by: NURSE PRACTITIONER

## 2024-12-09 PROCEDURE — 87799 DETECT AGENT NOS DNA QUANT: CPT | Performed by: NURSE PRACTITIONER

## 2024-12-09 PROCEDURE — 86870 RBC ANTIBODY IDENTIFICATION: CPT

## 2024-12-09 PROCEDURE — 86850 RBC ANTIBODY SCREEN: CPT | Performed by: INTERNAL MEDICINE

## 2024-12-09 PROCEDURE — 87799 DETECT AGENT NOS DNA QUANT: CPT

## 2024-12-09 PROCEDURE — 87533 HHV-6 DNA QUANT: CPT

## 2024-12-09 PROCEDURE — 80053 COMPREHEN METABOLIC PANEL: CPT

## 2024-12-09 PROCEDURE — 83735 ASSAY OF MAGNESIUM: CPT

## 2024-12-09 PROCEDURE — 2500000004 HC RX 250 GENERAL PHARMACY W/ HCPCS (ALT 636 FOR OP/ED): Performed by: NURSE PRACTITIONER

## 2024-12-09 RX ORDER — HEPARIN 100 UNIT/ML
500 SYRINGE INTRAVENOUS AS NEEDED
Status: DISCONTINUED | OUTPATIENT
Start: 2024-12-09 | End: 2024-12-09 | Stop reason: HOSPADM

## 2024-12-09 RX ORDER — MAGNESIUM SULFATE HEPTAHYDRATE 40 MG/ML
2 INJECTION, SOLUTION INTRAVENOUS ONCE
Status: CANCELLED | OUTPATIENT
Start: 2024-12-09 | End: 2024-12-09

## 2024-12-09 RX ORDER — HEPARIN SODIUM,PORCINE/PF 10 UNIT/ML
50 SYRINGE (ML) INTRAVENOUS AS NEEDED
Status: CANCELLED | OUTPATIENT
Start: 2024-12-09

## 2024-12-09 RX ORDER — MAGNESIUM SULFATE HEPTAHYDRATE 40 MG/ML
2 INJECTION, SOLUTION INTRAVENOUS ONCE
Status: COMPLETED | OUTPATIENT
Start: 2024-12-09 | End: 2024-12-09

## 2024-12-09 RX ORDER — TACROLIMUS 5 MG/1
5 CAPSULE, GELATIN COATED ORAL EVERY MORNING
Qty: 30 CAPSULE | Refills: 2 | Status: SHIPPED | OUTPATIENT
Start: 2024-12-09 | End: 2024-12-11 | Stop reason: SDUPTHER

## 2024-12-09 RX ORDER — HEPARIN 100 UNIT/ML
500 SYRINGE INTRAVENOUS AS NEEDED
Status: CANCELLED | OUTPATIENT
Start: 2024-12-09

## 2024-12-09 ASSESSMENT — ENCOUNTER SYMPTOMS
LIGHT-HEADEDNESS: 1
SHORTNESS OF BREATH: 0
HEMATOLOGIC/LYMPHATIC NEGATIVE: 1
APPETITE CHANGE: 1
FREQUENCY: 1
EYES NEGATIVE: 1
FATIGUE: 1
ENDOCRINE NEGATIVE: 1
RESPIRATORY NEGATIVE: 1
EXTREMITY WEAKNESS: 1
CARDIOVASCULAR NEGATIVE: 1
SLEEP DISTURBANCE: 1
GASTROINTESTINAL NEGATIVE: 1

## 2024-12-09 ASSESSMENT — PAIN SCALES - GENERAL: PAINLEVEL_OUTOF10: 0-NO PAIN

## 2024-12-09 NOTE — PROGRESS NOTES
"Patient ID:  Jin Reynolds is a 69 y.o. male.  Referring Physician:   BMT Dr Dwason  Primary Care Provider:  ROSA Bone-CNP    Assessment/Plan      12/9/24 T+18. Uncertain taking tacrolimus as prescribed; took am dose prior to clinic. Met with BMT pharmacist. Pill box given. All meds reviewed. Instructed to bring meds to Wed appt.   RTC 12/11.    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   8/23/2023 Initial Diagnosis    DX:  SMOLDERING SYSTEMIC MASTOCYTOSIS  Presented with skin rash and eosinophilia    [facility]  (For SM, Need major and 1 minor OR at least 3 minors)  - Multi-focal, dense infiltrates of MC (>= 15 mast cells in aggregates) in BM and/or other extra-cutaneous organs [major]  - In BM or extracutaneous organs, >25% MC spindle shaped OR have atypical morphology OR of all MC on BM aspirate smears, >25% are immature or atypical [minor]  - Detection of activating mutation KIT D816V in BM, PB, or other extracutaneous organ [minor]  - Serum tryptase persistently exceeds 20 ng/mL (unless associated clonal myeloid disorder, in which this paramenter is not valid) [minor]    \"B\" FINDINGS  - BM biopsy showing >30% infiltration (focal, dense aggregates) and/or serum tryptase > 200 ng/mL  - Signs of dysplasia or myeloproliferation, in non-MC lineages but insufficient criteria for definitive diagnosis of AHNMD with normal or slightly abnormal blood counts    \"C\" FINDINGS  None       8/23/2023 Biopsy    BONE MARROW (8/23/23)  Hypercellular (90-95%) with trilineage hematopoiesis, granulocytic hyperplasia, eosinophilia, and increased atypical mast cells  IP:  CD34+, +, CD7(bright)+, cCD3-, CD33-, CD15-, CD13+ blast population  IHC:  + increased spindle-shaped mast cells (15% cellularity), CD2-  Myeloid NGS:  CBL (31%), cKIT D816V (30%), RUNX1 x 2 (19%, 29%), SRSF2 (47%) [ASXL1 negative]  FISH:  PDGFRb negative    SERUM " TRYPTASE  228 (8/23/23)  268 (9/5/23)     2/13/2024 -  Molecular Therapy    AVAPRITINIB   - Starting dose 25 mg daily (non-Adv SM)     7/16/2024 - 10/21/2024 Chemotherapy    Venetoclax / AzaCITIDine  - C1D1 7/17/24:  complicated by TLS, venetoclax held after D1  - Post C1 BM (8/28/24):  hypercellular with >50% blasts, background systemic mastocytosis  - C2D1 9/5/24:  venetoclax restarted with C2, no TLS  - Post C2 BM (9/25/24):  hypocellular (<5%), atypical myeloblasts by IP/IHC (1-2%), persistent systemic mastocytosis  - C3D1 10/15/24:  delayed 1 week for count recovery  - Post C3 BM (10/23/24):  hypercellular with systemic mastocytosis, no increase blasts, 0.2% atypical blasts by IP, NGS w/ CBL (73%), cKIT (4%), RUNX1 x 2 (4%, 38%), SRSF2 (40%)       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    CONDITIONING Fludarabine, melphalan, and TBI   DONOR Matched sibling   MATCH GRADE 12/12   SEX MATCH Mismatched   ABO DONOR O pos   ABO RECIPIENT O pos   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, Mycophenolate, and Tacrolimus   GRAFT SOURCE Peripheral blood          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)   6/27/2024 Initial Diagnosis    ICC 2022 DX: Acute myeloid leukemia, NOS (>=20% blasts)  [id number] AML Risk Stratification: INTERMEDIATE: Cytogenetic and/or molecular abnormalities not classified as favorable or adverse  Presented with pancytopenia and circulating blasts    BONE MARROW (06/27/2024)  Marrow replaced by blasts, fibrotic foci, some atypical + cells; 7-8% circulating blasts; aspicular  - Unable to perform additional studies due to aspicular specimen    PERIPHERAL BLOOD (6/27/2024)  FISH:  positive for gain RUNX1    PERIPHERAL BLOOD (7/1/24)  IP:  abnl myeloblast population (CD34+, CD7+, CD4+, CD13+, CD38+, CD11+ dim, HLA=DR+, TdT+, CD33-)  Myeloid NGS: CBL C404Y (41%), KIT D816V (8%), RUNX1 x2 (8%, 30%), SRSF2 (37%)     7/16/2024 - 10/21/2024 Chemotherapy    Venetoclax / AzaCITIDine  - C1D1  7/17/24:  complicated by TLS, venetoclax held after D1  - Post C1 BM (8/28/24):  hypercellular with >50% blasts, background systemic mastocytosis  - C2D1 9/5/24:  venetoclax restarted with C2, no TLS  - Post C2 BM (9/25/24):  hypocellular (<5%), atypical myeloblasts by IP/IHC (1-2%), persistent systemic mastocytosis  - C3D1 10/15/24:  delayed 1 week for count recovery  - Post C3 BM (10/23/24):  hypercellular with systemic mastocytosis, no increase blasts, 0.2% atypical blasts by IP, NGS w/ CBL (73%), cKIT (4%), RUNX1 x 2 (4%, 38%), SRSF2 (40%)       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    CONDITIONING Fludarabine, melphalan, and TBI   DONOR Matched sibling   MATCH GRADE 12/12   SEX MATCH Mismatched   ABO DONOR O pos   ABO RECIPIENT O pos   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, Mycophenolate, and Tacrolimus   GRAFT SOURCE Peripheral blood             Past Medical History  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     Surgical History  He has no past surgical history on file.     Social History  He reports that he has never smoked. He has never used smokeless tobacco. He reports current drug use. Drug: Marijuana. He reports that he does not drink alcohol.    ASSESSMENT/PLAN  Systemic mastocytosis  Status post MRD PBSCT.  T= 0 11/21/2024  Prep:  flu, maurilio, TBI   GVHD Proph: PT Cy, MMF, tac.     DATE DAY SOURCE MORPHOLOGY MRD WHOLE CHIMERISM   (% donor) CD3 CHIMERISM   (% donor) CD33 CHIMERISM   (% donor)    D+30 PB       12/21 12/21    D+30 BM  12/26 12/26         D+60 PB               D+100 PB               D+100 BM                Monitor for post-transplant hemolysis.   LDH   Haptoglobin 58 12/9  UPC ratio 0.15 12/9    GVHD:   Tacrolimus level 8.3 on 5mg am and 6mg pm. Not taking tacrolimus as prescribed; took am dose prior to clinic. Met with BMT pharmacist. Pill box given. All meds reviewed. Instructed to bring  meds to Wed appt.  MMF until day 35.    Weights:  Pre-transplant: 87.6kg  Upon SCT discharge: 80.7kg  Today's weight: 80.7kg.    Infectious Disease & Immune Reconstitution  Prophylaxis:  Antiviral prophylaxis: acyclovir  Antifungal: posaconazole  PCP prophylaxis: Not yet started.    IgG level. IVIG for level <400     Active Surveillance:  CMV PCR: ND 12/5  EBV PCR: ND 12/2  Adenovirus: ND 12/2  HHV6: pending 12/9  Toxo NR allo viral panel    Immunizations:  Covid vaccine series  Seasonal influenza vaccine  Will plan to begin immunizations at T+6mths depending on degree of immunosuppression    Infectious Disease follow up evaluation: Requested    Immunodeficiency panel 100 days, 6mos and 1 year.    HTN  Continue medications amlodipine  ECHO and onc-card follow up evaluation upon discharge.    Hypomagnesemia  Mg 2g IV    Patient Education  The patient and their care provider were given written and verbal instructions that if there is a temperature of 100.4 degrees Fahrenheit or higher or have uncontrolled nausea, vomiting, pain, bleeding, or diarrhea they should present to the emergency room and call their doctor's office regardless of the time of day. I also reviewed the signs and symptoms of acute graft versus host disease which include, but are not limited to, rash, liver function abnormalities, and persistent nausea, vomiting, and diarrhea. The patient and their care provider verbalized understanding and all questions were answered during the clinic visit.    Medication reconciliation  Not taking tacrolimus as prescribed; took am dose prior to clinic. Met with BMT pharmacist. Pill box given. All meds reviewed. Instructed to bring meds to Wed appt.    Psychosocial  Caregiver Marry (wife)  [place]    Subjective    History of Present Illness:  Mr Reynolds presents to the clinic today with his wife Marry for routine follow up post transplant evaluation.    Legs feel weak since hospital stay. Light  "walking on treadmill. Slow steps. Wobbly on feet. No falls. No assistive devices.     Drinking well 2 pitchers/day. Trying to eat 2 meals a day-breakfast and dinner. Snacks. \"RD wants me to eat more protein.\" Solid stools.     Energy level 5/10. Napping during the day.          Review of Systems   Constitutional:  Positive for appetite change and fatigue.   HENT:  Negative.     Eyes: Negative.    Respiratory: Negative.  Negative for shortness of breath (FRANCO. Recovers in couple minutes after sitting.).    Cardiovascular: Negative.    Gastrointestinal: Negative.    Endocrine: Negative.    Genitourinary:  Positive for frequency and nocturia.    Skin: Negative.    Neurological:  Positive for extremity weakness (Legs) and light-headedness (If walks to far.).   Hematological: Negative.    Psychiatric/Behavioral:  Positive for sleep disturbance (Trying to sleep better at night since discharge.).             Objective        Physical Exam  Vitals reviewed.   Constitutional:       Appearance: Normal appearance.   HENT:      Head: Normocephalic and atraumatic.      Comments: Scalp alopecia.     Nose: Nose normal.      Mouth/Throat:      Mouth: Mucous membranes are moist.   Eyes:      Pupils: Pupils are equal, round, and reactive to light.   Cardiovascular:      Rate and Rhythm: Normal rate and regular rhythm.      Pulses: Normal pulses.      Heart sounds: Normal heart sounds.   Pulmonary:      Effort: Pulmonary effort is normal.      Breath sounds: Normal breath sounds.   Abdominal:      General: Abdomen is flat. Bowel sounds are normal.      Palpations: Abdomen is soft.   Musculoskeletal:         General: Normal range of motion.      Cervical back: Normal range of motion.   Skin:     General: Skin is warm and dry.      Comments: Mediport site clean.   Neurological:      General: No focal deficit present.      Mental Status: He is alert and oriented to person, place, and time.   Psychiatric:         Mood and Affect: Mood " normal.       RTC  12/11, 12/13    Requested  12/17, 12/19, 12/21 12/24, 12/26 with bmbx, 12/28 12/31, 1/2, 1/4  Onc-ECHO/card  ID

## 2024-12-09 NOTE — PROGRESS NOTES
"NUTRITION Assessment NOTE    Reason for Visit:  Jin Reynolds is a 69 y.o. 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    Transplant c/b by mucositis, CINV and diarrhea     Currently T+18    Pt seen today in infusion.     Lab Results   Component Value Date/Time    GLUCOSE 102 (H) 12/09/2024 0943     12/09/2024 0943    K 3.5 12/09/2024 0943     12/09/2024 0943    CO2 25 12/09/2024 0943    ANIONGAP 12 12/09/2024 0943    BUN 12 12/09/2024 0943    CREATININE 0.91 12/09/2024 0943    EGFR >90 12/09/2024 0943    CALCIUM 8.4 (L) 12/09/2024 0943    ALBUMIN 3.9 12/09/2024 0943    ALKPHOS 58 12/09/2024 0943    PROT 5.8 (L) 12/09/2024 0943    AST 26 12/09/2024 0943    BILITOT 0.6 12/09/2024 0943    ALT 29 12/09/2024 0943     Lab Results   Component Value Date    WBC 1.6 (L) 12/09/2024    HGB 7.4 (L) 12/09/2024    HCT 21.6 (L) 12/09/2024    MCV 86 12/09/2024    PLT 14 (LL) 12/09/2024       Lab Results   Component Value Date/Time    VITD25 27 (L) 02/05/2024 0807       Anthropometrics:  Anthropometrics  Height: 169.1 cm (5' 6.58\")  Weight: 80.7 kg (177 lb 14.6 oz)  BMI (Calculated): 28.22  IBW/kg (Dietitian Calculated): 65.9 kg  Percent of IBW: 122 %    *Wt at transplant admit: (11/15) 86.1 kg  *Wt at first post-transplant visit: (12/9) 80.7 kg     Wt Readings from Last 10 Encounters:   12/09/24 80.7 kg (177 lb 14.6 oz)   12/09/24 80.7 kg (178 lb)   12/04/24 81.6 kg (179 lb 14.3 oz)   11/12/24 87.6 kg (193 lb 2 oz)   11/08/24 86.5 kg (190 lb 12.8 oz)   10/31/24 87.5 kg (193 lb)   10/28/24 88 kg (194 lb)   10/23/24 86.6 kg (190 lb 14.7 oz)   10/20/24 86.2 kg (190 lb)   10/19/24 87.5 kg (193 lb)   09/20/24        86.8 kg  08/29/24        89.2 kg  07/30/24        95.9 kg   06/21/24        87.5 kg         Food And Nutrient Intake:      Pt reports improving appetite; finds that he gets hungry at times.   Eats breakfast, has snack at 2 pm and dinner is main meal.   Denies taste changes.  Did have mild " "mucositis in the hospital which has since resolved.   Drinking well--mainly water but does have dagmar beer a few times a day.   Does not really care for supplements but would drink them if he needed to.   Denies nausea--not using any meds.  No diarrhea; having formed BM's.    Energy levels low but did walk very slowly on treadmill yesterday.       Usual intakes:  Fruit in morning (strawberries and blueberries at 10)  Snack at 2   Dinner is main meal; last night had ribs and sauerkraut    Rec:  Chocolate milk--2 glasses per day                                                                         Nutrition Focused Physical Exam Findings:                          Energy Needs  Calculated Energy Needs Using Equations  Height: 169.1 cm (5' 6.58\")        Nutrition Diagnosis             Nutrition Interventions/Recommendations   Nutrition Prescription   High Protein, High Calorie  Food Safety    Food and Nutrition Delivery   Focus on protein and fluid intakes  Whole chocolate milk at least BID  Add yogurt with fruit in am  Smaller more freq meals and snacks     Nutrition Education       Coordination of Care       There are no Patient Instructions on file for this visit.    Nutrition Monitoring and Evaluation                         " Include Cannula Information In Note?: No

## 2024-12-09 NOTE — PROGRESS NOTES
-Patient presents to the clinic today for count check  -Labs drawn and reviewed with the patient in detail  -Patient's provider today was  Karen MAXWELL  -Based on lab levels and provider recommendations, they needed 2g mag  -Reviewed plan, medications, and schedule with the patient in detail, all questions answered.   -Patient ambulated out of the clinic with wife  *patient took tacro dose prior to arrival, levels will be drawn next visit (patient educated on how to take and hold tacro for infusion visit days).

## 2024-12-09 NOTE — PROGRESS NOTES
POST-CELLULAR THERAPY ONCOLOGY PHARMACY MEDICATION EDUCATION NOTE   Jin Reynolds is a 69 y.o. male currently day +18 following Matched Related Donor (MRD) allogeneic stem cell transplant for a diagnosis of AML. Pharmacist was consulted to provide home medication education.     Lab Results   Component Value Date    WBC 1.6 (L) 12/09/2024    ANC 1.29 12/09/2024    HGB 7.4 (L) 12/09/2024    PLT 14 (LL) 12/09/2024      Lab Results   Component Value Date    GLUCOSE 102 (H) 12/09/2024    K 3.5 12/09/2024    MG 1.34 (L) 12/09/2024    CREATININE 0.91 12/09/2024       Medication Education: Education was provided regarding all home medication changes. In addition, patient was given written daily medication schedule/calendar. The schedule was discussed in detail with the patient, including drug name, use and dose, and the appropriate timing of self-administration.   - Medication changes: none    Of note, the patient has been taking tacrolimus 1 mg every 12 hours. They took their dose this morning prior to lab draw. Education was provided regarding correct tacrolimus dose: 5 mg in the morning and 6 mg in the evening. Patient asked to fill new pill box and bring in all pill bottles to next appointment.     The patient demonstrated Level of Understanding : Fair for their current medication list.    All questions were answered. Patient/family verbalized understanding of the plan of care and information provided. Will follow as necessary. Time spent with patient/family and/or coordinating care: 45 minutes.      Liz Cedeno, PharmD, [facility]  Ambulatory Stem Cell Transplant Transplant Pharmacist    Current Outpatient Medications   Medication Instructions    acyclovir (ZOVIRAX) 400 mg, oral, Every 12 hours    amLODIPine (NORVASC) 10 mg, oral, Daily    cholecalciferol (VITAMIN D-3) 2,000 Units, oral, Daily    famotidine (PEPCID) 20 mg, oral, Daily    fexofenadine (ALLEGRA) 180 mg, Daily    magnesium, amino acid chelate, 133 mg  tablet 266 mg, oral, 3 times daily, Or as instructed on your medication list.    mycophenolate (CELLCEPT) 1,000 mg, oral, 3 times daily    ondansetron (ZOFRAN) 8 mg, oral, Every 8 hours PRN    posaconazole (NOXAFIL) 300 mg, oral, Daily, Do not crush, chew, or split. This is to prevent fungal infections.    prochlorperazine (COMPAZINE) 10 mg, oral, Every 6 hours PRN    Prograf 5 mg, oral, Every morning    sulfamethoxazole-trimethoprim (Bactrim DS) 800-160 mg tablet 1 tablet, oral, 3 times weekly, Take on Mondays, Wednesdays, and Fridays. Do not start until instructed.    tacrolimus (PROGRAF) 6 mg, oral, Nightly    ursodiol (ACTIGALL) 300 mg, oral, 3 times daily

## 2024-12-10 LAB
ADENOVIRUS QPCR,PLASMA, VIRC: NOT DETECTED COPIES/ML
BB ANTIBODY IDENTIFICATION: NORMAL
CASE #: NORMAL
CMV DNA SERPL NAA+PROBE-LOG IU: NORMAL {LOG_IU}/ML
EBV DNA BLD NAA+PROBE-LOG IU: NORMAL {LOG_IU}/ML
HUMAN HERPESVIRUS-6 PCR PLASMA: NOT DETECTED COPIES/ML
LABORATORY COMMENT REPORT: NOT DETECTED
LABORATORY COMMENT REPORT: NOT DETECTED
PATH REV-IMMUNOHEMATOLOGY-PR30: NORMAL

## 2024-12-11 ENCOUNTER — APPOINTMENT (OUTPATIENT)
Dept: HEMATOLOGY/ONCOLOGY | Facility: HOSPITAL | Age: 69
End: 2024-12-11
Payer: MEDICARE

## 2024-12-11 ENCOUNTER — OFFICE VISIT (OUTPATIENT)
Dept: HEMATOLOGY/ONCOLOGY | Facility: HOSPITAL | Age: 69
End: 2024-12-11
Payer: MEDICARE

## 2024-12-11 ENCOUNTER — INFUSION (OUTPATIENT)
Dept: OTHER | Facility: HOSPITAL | Age: 69
End: 2024-12-11
Payer: MEDICARE

## 2024-12-11 VITALS
RESPIRATION RATE: 18 BRPM | WEIGHT: 180.01 LBS | BODY MASS INDEX: 28.55 KG/M2 | HEART RATE: 71 BPM | OXYGEN SATURATION: 100 % | TEMPERATURE: 96.8 F | SYSTOLIC BLOOD PRESSURE: 118 MMHG | DIASTOLIC BLOOD PRESSURE: 64 MMHG

## 2024-12-11 DIAGNOSIS — C92.02 AML (ACUTE MYELOID LEUKEMIA) IN RELAPSE (MULTI): ICD-10-CM

## 2024-12-11 DIAGNOSIS — C92.01 ACUTE MYELOID LEUKEMIA IN REMISSION (MULTI): ICD-10-CM

## 2024-12-11 DIAGNOSIS — Z94.84 HISTORY OF STEM CELL TRANSPLANT (MULTI): ICD-10-CM

## 2024-12-11 DIAGNOSIS — C92.01 ACUTE MYELOID LEUKEMIA IN REMISSION (MULTI): Primary | ICD-10-CM

## 2024-12-11 DIAGNOSIS — D47.02 SYSTEMIC MASTOCYTOSIS WITH ASSOCIATED CLONAL HEMATOLOGICAL NON-MAST CELL LINEAGE DISEASE: ICD-10-CM

## 2024-12-11 DIAGNOSIS — Z94.81 S/P ALLOGENEIC BONE MARROW TRANSPLANT (MULTI): ICD-10-CM

## 2024-12-11 DIAGNOSIS — D84.9 IMMUNOCOMPROMISED STATE: ICD-10-CM

## 2024-12-11 DIAGNOSIS — E83.42 HYPOMAGNESEMIA: ICD-10-CM

## 2024-12-11 DIAGNOSIS — E87.6 HYPOKALEMIA: Primary | ICD-10-CM

## 2024-12-11 LAB
ALBUMIN SERPL BCP-MCNC: 4.1 G/DL (ref 3.4–5)
ALP SERPL-CCNC: 53 U/L (ref 33–136)
ALT SERPL W P-5'-P-CCNC: 24 U/L (ref 10–52)
ANION GAP SERPL CALC-SCNC: 12 MMOL/L (ref 10–20)
AST SERPL W P-5'-P-CCNC: 16 U/L (ref 9–39)
BASOPHILS # BLD MANUAL: 0.01 X10*3/UL (ref 0–0.1)
BASOPHILS NFR BLD MANUAL: 1 %
BILIRUB SERPL-MCNC: 0.6 MG/DL (ref 0–1.2)
BUN SERPL-MCNC: 8 MG/DL (ref 6–23)
BURR CELLS BLD QL SMEAR: ABNORMAL
CALCIUM SERPL-MCNC: 8.6 MG/DL (ref 8.6–10.3)
CHLORIDE SERPL-SCNC: 101 MMOL/L (ref 98–107)
CO2 SERPL-SCNC: 26 MMOL/L (ref 21–32)
CREAT SERPL-MCNC: 0.93 MG/DL (ref 0.5–1.3)
DACRYOCYTES BLD QL SMEAR: ABNORMAL
EGFRCR SERPLBLD CKD-EPI 2021: 89 ML/MIN/1.73M*2
EOSINOPHIL # BLD MANUAL: 0 X10*3/UL (ref 0–0.7)
EOSINOPHIL NFR BLD MANUAL: 0 %
ERYTHROCYTE [DISTWIDTH] IN BLOOD BY AUTOMATED COUNT: 15.9 % (ref 11.5–14.5)
GLUCOSE SERPL-MCNC: 111 MG/DL (ref 74–99)
HCT VFR BLD AUTO: 20.7 % (ref 41–52)
HGB BLD-MCNC: 7.6 G/DL (ref 13.5–17.5)
HYPOCHROMIA BLD QL SMEAR: ABNORMAL
IMM GRANULOCYTES # BLD AUTO: 0.09 X10*3/UL (ref 0–0.7)
IMM GRANULOCYTES NFR BLD AUTO: 7.6 % (ref 0–0.9)
LYMPHOCYTES # BLD MANUAL: 0.12 X10*3/UL (ref 1.2–4.8)
LYMPHOCYTES NFR BLD MANUAL: 10 %
MAGNESIUM SERPL-MCNC: 1.54 MG/DL (ref 1.6–2.4)
MCH RBC QN AUTO: 29.9 PG (ref 26–34)
MCHC RBC AUTO-ENTMCNC: 36.7 G/DL (ref 32–36)
MCV RBC AUTO: 82 FL (ref 80–100)
MONOCYTES # BLD MANUAL: 0.34 X10*3/UL (ref 0.1–1)
MONOCYTES NFR BLD MANUAL: 28 %
MYELOCYTES # BLD MANUAL: 0.01 X10*3/UL
MYELOCYTES NFR BLD MANUAL: 1 %
NEUTROPHILS # BLD MANUAL: 0.72 X10*3/UL (ref 1.2–7.7)
NEUTS BAND # BLD MANUAL: 0.13 X10*3/UL (ref 0–0.7)
NEUTS BAND NFR BLD MANUAL: 11 %
NEUTS SEG # BLD MANUAL: 0.59 X10*3/UL (ref 1.2–7)
NEUTS SEG NFR BLD MANUAL: 49 %
NRBC BLD-RTO: 0 /100 WBCS (ref 0–0)
OVALOCYTES BLD QL SMEAR: ABNORMAL
PLATELET # BLD AUTO: 19 X10*3/UL (ref 150–450)
POTASSIUM SERPL-SCNC: 3.4 MMOL/L (ref 3.5–5.3)
PROT SERPL-MCNC: 6.1 G/DL (ref 6.4–8.2)
RBC # BLD AUTO: 2.54 X10*6/UL (ref 4.5–5.9)
RBC MORPH BLD: ABNORMAL
SCHISTOCYTES BLD QL SMEAR: ABNORMAL
SODIUM SERPL-SCNC: 136 MMOL/L (ref 136–145)
TACROLIMUS BLD-MCNC: 3.6 NG/ML
TOTAL CELLS COUNTED BLD: 100
WBC # BLD AUTO: 1.2 X10*3/UL (ref 4.4–11.3)

## 2024-12-11 PROCEDURE — RXMED WILLOW AMBULATORY MEDICATION CHARGE

## 2024-12-11 PROCEDURE — 84075 ASSAY ALKALINE PHOSPHATASE: CPT

## 2024-12-11 PROCEDURE — 2500000004 HC RX 250 GENERAL PHARMACY W/ HCPCS (ALT 636 FOR OP/ED): Performed by: INTERNAL MEDICINE

## 2024-12-11 PROCEDURE — 1036F TOBACCO NON-USER: CPT | Performed by: STUDENT IN AN ORGANIZED HEALTH CARE EDUCATION/TRAINING PROGRAM

## 2024-12-11 PROCEDURE — 83735 ASSAY OF MAGNESIUM: CPT

## 2024-12-11 PROCEDURE — 1111F DSCHRG MED/CURRENT MED MERGE: CPT | Performed by: STUDENT IN AN ORGANIZED HEALTH CARE EDUCATION/TRAINING PROGRAM

## 2024-12-11 PROCEDURE — 36591 DRAW BLOOD OFF VENOUS DEVICE: CPT

## 2024-12-11 PROCEDURE — 85027 COMPLETE CBC AUTOMATED: CPT

## 2024-12-11 PROCEDURE — 99215 OFFICE O/P EST HI 40 MIN: CPT | Performed by: STUDENT IN AN ORGANIZED HEALTH CARE EDUCATION/TRAINING PROGRAM

## 2024-12-11 PROCEDURE — 85007 BL SMEAR W/DIFF WBC COUNT: CPT

## 2024-12-11 PROCEDURE — 2500000002 HC RX 250 W HCPCS SELF ADMINISTERED DRUGS (ALT 637 FOR MEDICARE OP, ALT 636 FOR OP/ED): Performed by: STUDENT IN AN ORGANIZED HEALTH CARE EDUCATION/TRAINING PROGRAM

## 2024-12-11 PROCEDURE — 80197 ASSAY OF TACROLIMUS: CPT

## 2024-12-11 RX ORDER — TACROLIMUS 5 MG/1
5 CAPSULE, GELATIN COATED ORAL EVERY MORNING
Qty: 30 CAPSULE | Refills: 2 | Status: SHIPPED | OUTPATIENT
Start: 2024-12-11

## 2024-12-11 RX ORDER — EPINEPHRINE 0.3 MG/.3ML
0.3 INJECTION SUBCUTANEOUS EVERY 5 MIN PRN
Status: CANCELLED | OUTPATIENT
Start: 2024-12-13

## 2024-12-11 RX ORDER — HEPARIN 100 UNIT/ML
500 SYRINGE INTRAVENOUS AS NEEDED
Status: CANCELLED | OUTPATIENT
Start: 2024-12-11

## 2024-12-11 RX ORDER — ALBUTEROL SULFATE 0.83 MG/ML
3 SOLUTION RESPIRATORY (INHALATION) AS NEEDED
Status: CANCELLED | OUTPATIENT
Start: 2024-12-13

## 2024-12-11 RX ORDER — HEPARIN SODIUM,PORCINE/PF 10 UNIT/ML
50 SYRINGE (ML) INTRAVENOUS AS NEEDED
Status: CANCELLED | OUTPATIENT
Start: 2024-12-11

## 2024-12-11 RX ORDER — DIPHENHYDRAMINE HYDROCHLORIDE 50 MG/ML
50 INJECTION INTRAMUSCULAR; INTRAVENOUS AS NEEDED
Status: CANCELLED | OUTPATIENT
Start: 2024-12-13

## 2024-12-11 RX ORDER — CHOLECALCIFEROL (VITAMIN D3) 25 MCG
2000 TABLET ORAL DAILY
Qty: 60 TABLET | Refills: 2 | Status: SHIPPED | OUTPATIENT
Start: 2024-12-11

## 2024-12-11 RX ORDER — FAMOTIDINE 10 MG/ML
20 INJECTION INTRAVENOUS ONCE AS NEEDED
Status: CANCELLED | OUTPATIENT
Start: 2024-12-13

## 2024-12-11 RX ORDER — POTASSIUM CHLORIDE 750 MG/1
10 TABLET, FILM COATED, EXTENDED RELEASE ORAL ONCE
Status: COMPLETED | OUTPATIENT
Start: 2024-12-11 | End: 2024-12-11

## 2024-12-11 RX ORDER — HEPARIN 100 UNIT/ML
500 SYRINGE INTRAVENOUS AS NEEDED
Status: DISCONTINUED | OUTPATIENT
Start: 2024-12-11 | End: 2024-12-11 | Stop reason: HOSPADM

## 2024-12-11 NOTE — PROGRESS NOTES
"Patient ID: Jin Reynolds is a 69 y.o. male.  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   8/23/2023 Initial Diagnosis    DX:  SMOLDERING SYSTEMIC MASTOCYTOSIS  Presented with skin rash and eosinophilia    [facility]  (For SM, Need major and 1 minor OR at least 3 minors)  - Multi-focal, dense infiltrates of MC (>= 15 mast cells in aggregates) in BM and/or other extra-cutaneous organs [major]  - In BM or extracutaneous organs, >25% MC spindle shaped OR have atypical morphology OR of all MC on BM aspirate smears, >25% are immature or atypical [minor]  - Detection of activating mutation KIT D816V in BM, PB, or other extracutaneous organ [minor]  - Serum tryptase persistently exceeds 20 ng/mL (unless associated clonal myeloid disorder, in which this paramenter is not valid) [minor]    \"B\" FINDINGS  - BM biopsy showing >30% infiltration (focal, dense aggregates) and/or serum tryptase > 200 ng/mL  - Signs of dysplasia or myeloproliferation, in non-MC lineages but insufficient criteria for definitive diagnosis of AHNMD with normal or slightly abnormal blood counts    \"C\" FINDINGS  None       8/23/2023 Biopsy    BONE MARROW (8/23/23)  Hypercellular (90-95%) with trilineage hematopoiesis, granulocytic hyperplasia, eosinophilia, and increased atypical mast cells  IP:  CD34+, +, CD7(bright)+, cCD3-, CD33-, CD15-, CD13+ blast population  IHC:  + increased spindle-shaped mast cells (15% cellularity), CD2-  Myeloid NGS:  CBL (31%), cKIT D816V (30%), RUNX1 x 2 (19%, 29%), SRSF2 (47%) [ASXL1 negative]  FISH:  PDGFRb negative    SERUM TRYPTASE  228 (8/23/23)  268 (9/5/23)     2/13/2024 -  Molecular Therapy    AVAPRITINIB   - Starting dose 25 mg daily (non-Adv SM)     7/16/2024 - 10/21/2024 Chemotherapy    Venetoclax / AzaCITIDine  - C1D1 7/17/24:  complicated by TLS, venetoclax " held after D1  - Post C1 BM (8/28/24):  hypercellular with >50% blasts, background systemic mastocytosis  - C2D1 9/5/24:  venetoclax restarted with C2, no TLS  - Post C2 BM (9/25/24):  hypocellular (<5%), atypical myeloblasts by IP/IHC (1-2%), persistent systemic mastocytosis  - C3D1 10/15/24:  delayed 1 week for count recovery  - Post C3 BM (10/23/24):  hypercellular with systemic mastocytosis, no increase blasts, 0.2% atypical blasts by IP, NGS w/ CBL (73%), cKIT (4%), RUNX1 x 2 (4%, 38%), SRSF2 (40%)       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    CONDITIONING Fludarabine, melphalan, and TBI   DONOR Matched sibling   MATCH GRADE 12/12   SEX MATCH Mismatched   ABO DONOR O pos   ABO RECIPIENT O pos   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, Mycophenolate, and Tacrolimus   GRAFT SOURCE Peripheral blood          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)   6/27/2024 Initial Diagnosis    ICC 2022 DX: Acute myeloid leukemia, NOS (>=20% blasts)  [id number] AML Risk Stratification: INTERMEDIATE: Cytogenetic and/or molecular abnormalities not classified as favorable or adverse  Presented with pancytopenia and circulating blasts    BONE MARROW (06/27/2024)  Marrow replaced by blasts, fibrotic foci, some atypical + cells; 7-8% circulating blasts; aspicular  - Unable to perform additional studies due to aspicular specimen    PERIPHERAL BLOOD (6/27/2024)  FISH:  positive for gain RUNX1    PERIPHERAL BLOOD (7/1/24)  IP:  abnl myeloblast population (CD34+, CD7+, CD4+, CD13+, CD38+, CD11+ dim, HLA=DR+, TdT+, CD33-)  Myeloid NGS: CBL C404Y (41%), KIT D816V (8%), RUNX1 x2 (8%, 30%), SRSF2 (37%)     7/16/2024 - 10/21/2024 Chemotherapy    Venetoclax / AzaCITIDine  - C1D1 7/17/24:  complicated by TLS, venetoclax held after D1  - Post C1 BM (8/28/24):  hypercellular with >50% blasts, background systemic mastocytosis  - C2D1 9/5/24:  venetoclax restarted with C2, no TLS  - Post C2 BM (9/25/24):  hypocellular (<5%), atypical  myeloblasts by IP/IHC (1-2%), persistent systemic mastocytosis  - C3D1 10/15/24:  delayed 1 week for count recovery  - Post C3 BM (10/23/24):  hypercellular with systemic mastocytosis, no increase blasts, 0.2% atypical blasts by IP, NGS w/ CBL (73%), cKIT (4%), RUNX1 x 2 (4%, 38%), SRSF2 (40%)       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    CONDITIONING Fludarabine, melphalan, and TBI   DONOR Matched sibling   MATCH GRADE 12/12   SEX MATCH Mismatched   ABO DONOR O pos   ABO RECIPIENT O pos   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, Mycophenolate, and Tacrolimus   GRAFT SOURCE Peripheral blood              12/11/24   Assessment & Plan  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 Counts are relatively stable though WBC decreasing, ANC now 590. Likely early graft fluctuations, though need to closely monitor. May require filgrastim if persistent. Scheduled for T+30 BM on 12/26/24.   History of stem cell transplant (Multi)  T+20 s/p allo MRD pBSCT. FK dosing incorrect at this time. Was discharged home on 5 mg AM, 6 mg PM. Has been taking 0.5 mg AM, 6 mg PM. Does not have any evidence of aGVHD at this time. Extensive med education completed by PharmD today, was given correct dose of 5 mg following lab draw. He will  rx for 5 mg tacro from [facility] [employer] today and correct medication pill box at home.   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   Immunocompromised state  At high risk for infections. No current signs of infections. Viral monitoring in place. Continue prophylaxis with acyclovir, posaconazole. Not currently on TMP/SMX, will need to initiate with further count recovery. May also require levofloxacin if ANC trends to <500.   Hypomagnesemia  On oral supplementation, 6 tabs/daily      SUBJECTIVE     Days since transplant: 20     HPI    Jin Reynolds presents today for post transplant follow up, accompanied by his  wife. Feeling pretty fatigued still. Notes that his taste is still not back to normal and therefore has not been eating all that well. Some nausea that resolves with ondansetron. Trying to focus on protein drinks. Moving around a little at home.     Notes that he has been taking 0.5 mg tacro instead of 5 mg tacro in the mornings after reviewing medications with PharmD.     Otherwise doing well without any persistent nausea, vomiting, rashes, diarrhea, fevers, chills, shortness of breath.     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    Esophageal ulcer with bleeding 01/01/2024    Personal history of diseases of the skin and subcutaneous tissue     History of alopecia    Umbilical hernia 05/30/2023     Social History     Socioeconomic History    Marital status:      Spouse name: Not on file    Number of children: Not on file    Years of education: Not on file    Highest education level: Not on file   Occupational History    Not on file   Tobacco Use    Smoking status: Never    Smokeless tobacco: Never   Substance and Sexual Activity    Alcohol use: Never    Drug use: Yes     Types: Marijuana     Comment: daily    Sexual activity: Not on file   Other Topics Concern    Not on file   Social History Narrative    Not on file     Social Drivers of Health     Financial Resource Strain: Medium Risk (11/18/2024)    Overall Financial Resource Strain (CARDIA)     Difficulty of Paying Living Expenses: Somewhat hard   Food Insecurity: No Food Insecurity (11/15/2024)    Hunger Vital Sign     Worried About Running Out of Food in the Last Year: Never true     Ran Out of Food in the Last Year: Never true   Transportation Needs: No Transportation Needs (11/18/2024)    PRAPARE - Transportation     Lack of Transportation (Medical): No     Lack of Transportation (Non-Medical): No   Physical Activity: Not on file   Stress: Not on  file   Social Connections: Not on file   Intimate Partner Violence: Not At Risk (11/15/2024)    Humiliation, Afraid, Rape, and Kick questionnaire     Fear of Current or Ex-Partner: No     Emotionally Abused: No     Physically Abused: No     Sexually Abused: No   Housing Stability: Low Risk  (11/18/2024)    Housing Stability Vital Sign     Unable to Pay for Housing in the Last Year: No     Number of Times Moved in the Last Year: 0     Homeless in the Last Year: No     Social History     Socioeconomic History    Marital status:    Tobacco Use    Smoking status: Never    Smokeless tobacco: Never   Substance and Sexual Activity    Alcohol use: Never    Drug use: Yes     Types: Marijuana     Comment: daily     Social Drivers of Health     Financial Resource Strain: Medium Risk (11/18/2024)    Overall Financial Resource Strain (CARDIA)     Difficulty of Paying Living Expenses: Somewhat hard   Food Insecurity: No Food Insecurity (11/15/2024)    Hunger Vital Sign     Worried About Running Out of Food in the Last Year: Never true     Ran Out of Food in the Last Year: Never true   Transportation Needs: No Transportation Needs (11/18/2024)    PRAPARE - Transportation     Lack of Transportation (Medical): No     Lack of Transportation (Non-Medical): No   Intimate Partner Violence: Not At Risk (11/15/2024)    Humiliation, Afraid, Rape, and Kick questionnaire     Fear of Current or Ex-Partner: No     Emotionally Abused: No     Physically Abused: No     Sexually Abused: No   Housing Stability: Low Risk  (11/18/2024)    Housing Stability Vital Sign     Unable to Pay for Housing in the Last Year: No     Number of Times Moved in the Last Year: 0     Homeless in the Last Year: No     Social History     Socioeconomic History    Marital status:    Tobacco Use    Smoking status: Never    Smokeless tobacco: Never   Substance and Sexual Activity    Alcohol use: Never    Drug use: Yes     Types: Marijuana     Comment: daily      Social Drivers of Health     Financial Resource Strain: Medium Risk (11/18/2024)    Overall Financial Resource Strain (CARDIA)     Difficulty of Paying Living Expenses: Somewhat hard   Food Insecurity: No Food Insecurity (11/15/2024)    Hunger Vital Sign     Worried About Running Out of Food in the Last Year: Never true     Ran Out of Food in the Last Year: Never true   Transportation Needs: No Transportation Needs (11/18/2024)    PRAPARE - Transportation     Lack of Transportation (Medical): No     Lack of Transportation (Non-Medical): No   Intimate Partner Violence: Not At Risk (11/15/2024)    Humiliation, Afraid, Rape, and Kick questionnaire     Fear of Current or Ex-Partner: No     Emotionally Abused: No     Physically Abused: No     Sexually Abused: No   Housing Stability: Low Risk  (11/18/2024)    Housing Stability Vital Sign     Unable to Pay for Housing in the Last Year: No     Number of Times Moved in the Last Year: 0     Homeless in the Last Year: No     No past surgical history on file.  No past surgical history on file.      OBJECTIVE     BSA: There is no height or weight on file to calculate BSA.  There were no vitals taken for this visit.  Weight    No data found in the last 1 encounters.        Physical Exam  Constitutional:       Appearance: Normal appearance.   Eyes:      Conjunctiva/sclera: Conjunctivae normal.      Pupils: Pupils are equal, round, and reactive to light.   Pulmonary:      Effort: Pulmonary effort is normal.   Musculoskeletal:         General: Normal range of motion.   Skin:     Findings: No rash.   Neurological:      Mental Status: He is alert. Mental status is at baseline.   Psychiatric:         Mood and Affect: Mood normal.         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 Total Score:      Tacro levels:   Lab Results  "  Component Value Date    TACROLIMUS 8.3 12/07/2024    TACROLIMUS 8.5 12/06/2024    TACROLIMUS 5.5 12/05/2024    TACROLIMUS 5.3 12/05/2024       Viral Monitoring:   CMV   Lab Results   Component Value Date    CMVDNAPCR Not Detected 12/09/2024    CMVDNAPCR Not Detected 12/05/2024    CMVDNAPCR Not Detected 12/02/2024    CMVDNAPCR Not Detected 11/28/2024    CMVDNAPCR Not Detected 11/25/2024      Adenovirus   Lab Results   Component Value Date    ADEPB Not Detected 12/09/2024    ADEPB Not Detected 12/02/2024    ADEPB Not Detected 11/25/2024    ADEPB Not Detected 10/23/2024       Toxo   No results found for: \"TGONDPCRBL\"       Kalia June PA-C            "

## 2024-12-11 NOTE — ASSESSMENT & PLAN NOTE
Now s/p allo MRD pBSCT. Counts are relatively stable though WBC decreasing, ANC now 590. Likely early graft fluctuations, though need to closely monitor. May require filgrastim if persistent. Scheduled for T+30 BM on 12/26/24.

## 2024-12-11 NOTE — PROGRESS NOTES
Pt arrived to SCT unit without assistance. Vitals obtained, blood drawn and sent to lab. Assessment as charted. No blood products needed today. Kalia June NP came to see patient. Reviewed labs with the patient. Pharmacist reviewed meds with the patient. PO potassium given per order.  Patient tolerated without issue. Mediport deaccessed  prior to leaving. Eileen and tape applied. Pt ambulated off unit without complaints or assistance.

## 2024-12-11 NOTE — ASSESSMENT & PLAN NOTE
At high risk for infections. No current signs of infections. Viral monitoring in place. Continue prophylaxis with acyclovir, posaconazole. Not currently on TMP/SMX, will need to initiate with further count recovery. May also require levofloxacin if ANC trends to <500.

## 2024-12-11 NOTE — ASSESSMENT & PLAN NOTE
T+20 s/p allo MRD pBSCT. FK dosing incorrect at this time. Was discharged home on 5 mg AM, 6 mg PM. Has been taking 0.5 mg AM, 6 mg PM. Does not have any evidence of aGVHD at this time. Extensive med education completed by PharmD today, was given correct dose of 5 mg following lab draw. He will  rx for 5 mg tacro from [facility] today and correct medication pill box at home.

## 2024-12-13 ENCOUNTER — PHARMACY VISIT (OUTPATIENT)
Dept: PHARMACY | Facility: CLINIC | Age: 69
End: 2024-12-13
Payer: COMMERCIAL

## 2024-12-13 ENCOUNTER — INFUSION (OUTPATIENT)
Dept: HEMATOLOGY/ONCOLOGY | Facility: HOSPITAL | Age: 69
End: 2024-12-13
Payer: MEDICARE

## 2024-12-13 ENCOUNTER — NUTRITION (OUTPATIENT)
Dept: HEMATOLOGY/ONCOLOGY | Facility: HOSPITAL | Age: 69
End: 2024-12-13

## 2024-12-13 ENCOUNTER — OFFICE VISIT (OUTPATIENT)
Dept: HEMATOLOGY/ONCOLOGY | Facility: HOSPITAL | Age: 69
End: 2024-12-13
Payer: MEDICARE

## 2024-12-13 VITALS
SYSTOLIC BLOOD PRESSURE: 102 MMHG | RESPIRATION RATE: 18 BRPM | WEIGHT: 178 LBS | DIASTOLIC BLOOD PRESSURE: 67 MMHG | OXYGEN SATURATION: 100 % | BODY MASS INDEX: 28.24 KG/M2 | HEART RATE: 100 BPM | TEMPERATURE: 97 F

## 2024-12-13 VITALS — HEIGHT: 67 IN | WEIGHT: 177.91 LBS | BODY MASS INDEX: 27.92 KG/M2

## 2024-12-13 DIAGNOSIS — D84.9 IMMUNOCOMPROMISED STATE: ICD-10-CM

## 2024-12-13 DIAGNOSIS — C92.02 AML (ACUTE MYELOID LEUKEMIA) IN RELAPSE (MULTI): ICD-10-CM

## 2024-12-13 DIAGNOSIS — C92.01 ACUTE MYELOID LEUKEMIA IN REMISSION (MULTI): ICD-10-CM

## 2024-12-13 DIAGNOSIS — D47.02 SYSTEMIC MASTOCYTOSIS WITH ASSOCIATED CLONAL HEMATOLOGICAL NON-MAST CELL LINEAGE DISEASE: ICD-10-CM

## 2024-12-13 DIAGNOSIS — E83.42 HYPOMAGNESEMIA: ICD-10-CM

## 2024-12-13 DIAGNOSIS — Z94.84 HISTORY OF STEM CELL TRANSPLANT (MULTI): ICD-10-CM

## 2024-12-13 DIAGNOSIS — Z94.81 S/P ALLOGENEIC BONE MARROW TRANSPLANT (MULTI): ICD-10-CM

## 2024-12-13 DIAGNOSIS — E87.6 HYPOKALEMIA: ICD-10-CM

## 2024-12-13 DIAGNOSIS — D47.02 SYSTEMIC MASTOCYTOSIS WITH ASSOCIATED CLONAL HEMATOLOGICAL NON-MAST CELL LINEAGE DISEASE: Primary | ICD-10-CM

## 2024-12-13 DIAGNOSIS — C92.00 ACUTE MYELOID LEUKEMIA NOT HAVING ACHIEVED REMISSION (MULTI): ICD-10-CM

## 2024-12-13 LAB
ABO GROUP (TYPE) IN BLOOD: NORMAL
ALBUMIN SERPL BCP-MCNC: 3.9 G/DL (ref 3.4–5)
ALP SERPL-CCNC: 51 U/L (ref 33–136)
ALT SERPL W P-5'-P-CCNC: 18 U/L (ref 10–52)
ANION GAP SERPL CALC-SCNC: 10 MMOL/L (ref 10–20)
ANTIBODY SCREEN: NORMAL
AST SERPL W P-5'-P-CCNC: 14 U/L (ref 9–39)
BASOPHILS # BLD MANUAL: 0.06 X10*3/UL (ref 0–0.1)
BASOPHILS NFR BLD MANUAL: 4 %
BILIRUB SERPL-MCNC: 0.6 MG/DL (ref 0–1.2)
BUN SERPL-MCNC: 8 MG/DL (ref 6–23)
BURR CELLS BLD QL SMEAR: ABNORMAL
CALCIUM SERPL-MCNC: 8.3 MG/DL (ref 8.6–10.3)
CHLORIDE SERPL-SCNC: 101 MMOL/L (ref 98–107)
CO2 SERPL-SCNC: 27 MMOL/L (ref 21–32)
CREAT SERPL-MCNC: 1.04 MG/DL (ref 0.5–1.3)
DACRYOCYTES BLD QL SMEAR: ABNORMAL
EGFRCR SERPLBLD CKD-EPI 2021: 78 ML/MIN/1.73M*2
EOSINOPHIL # BLD MANUAL: 0.03 X10*3/UL (ref 0–0.7)
EOSINOPHIL NFR BLD MANUAL: 2 %
ERYTHROCYTE [DISTWIDTH] IN BLOOD BY AUTOMATED COUNT: 16.2 % (ref 11.5–14.5)
GLUCOSE SERPL-MCNC: 109 MG/DL (ref 74–99)
HCT VFR BLD AUTO: 20.5 % (ref 41–52)
HGB BLD-MCNC: 7 G/DL (ref 13.5–17.5)
HYPOCHROMIA BLD QL SMEAR: ABNORMAL
IMM GRANULOCYTES # BLD AUTO: 0.07 X10*3/UL (ref 0–0.7)
IMM GRANULOCYTES NFR BLD AUTO: 5 % (ref 0–0.9)
LYMPHOCYTES # BLD MANUAL: 0.06 X10*3/UL (ref 1.2–4.8)
LYMPHOCYTES NFR BLD MANUAL: 4 %
MAGNESIUM SERPL-MCNC: 1.64 MG/DL (ref 1.6–2.4)
MCH RBC QN AUTO: 28.8 PG (ref 26–34)
MCHC RBC AUTO-ENTMCNC: 34.1 G/DL (ref 32–36)
MCV RBC AUTO: 84 FL (ref 80–100)
METAMYELOCYTES # BLD MANUAL: 0.01 X10*3/UL
METAMYELOCYTES NFR BLD MANUAL: 1 %
MONOCYTES # BLD MANUAL: 0.32 X10*3/UL (ref 0.1–1)
MONOCYTES NFR BLD MANUAL: 23 %
NEUTROPHILS # BLD MANUAL: 0.92 X10*3/UL (ref 1.2–7.7)
NEUTS BAND # BLD MANUAL: 0.15 X10*3/UL (ref 0–0.7)
NEUTS BAND NFR BLD MANUAL: 11 %
NEUTS SEG # BLD MANUAL: 0.77 X10*3/UL (ref 1.2–7)
NEUTS SEG NFR BLD MANUAL: 55 %
NRBC BLD-RTO: 0 /100 WBCS (ref 0–0)
OVALOCYTES BLD QL SMEAR: ABNORMAL
PLATELET # BLD AUTO: 23 X10*3/UL (ref 150–450)
POLYCHROMASIA BLD QL SMEAR: ABNORMAL
POTASSIUM SERPL-SCNC: 3.3 MMOL/L (ref 3.5–5.3)
PROT SERPL-MCNC: 6 G/DL (ref 6.4–8.2)
RBC # BLD AUTO: 2.43 X10*6/UL (ref 4.5–5.9)
RBC MORPH BLD: ABNORMAL
RH FACTOR (ANTIGEN D): NORMAL
ROULEAUX BLD QL SMEAR: PRESENT
SCHISTOCYTES BLD QL SMEAR: ABNORMAL
SODIUM SERPL-SCNC: 135 MMOL/L (ref 136–145)
TACROLIMUS BLD-MCNC: 6.5 NG/ML
TOTAL CELLS COUNTED BLD: 100
WBC # BLD AUTO: 1.4 X10*3/UL (ref 4.4–11.3)

## 2024-12-13 PROCEDURE — 36591 DRAW BLOOD OFF VENOUS DEVICE: CPT

## 2024-12-13 PROCEDURE — 86850 RBC ANTIBODY SCREEN: CPT

## 2024-12-13 PROCEDURE — 2500000002 HC RX 250 W HCPCS SELF ADMINISTERED DRUGS (ALT 637 FOR MEDICARE OP, ALT 636 FOR OP/ED): Performed by: NURSE PRACTITIONER

## 2024-12-13 PROCEDURE — 80197 ASSAY OF TACROLIMUS: CPT

## 2024-12-13 PROCEDURE — 85027 COMPLETE CBC AUTOMATED: CPT

## 2024-12-13 PROCEDURE — 96372 THER/PROPH/DIAG INJ SC/IM: CPT

## 2024-12-13 PROCEDURE — 99215 OFFICE O/P EST HI 40 MIN: CPT | Performed by: NURSE PRACTITIONER

## 2024-12-13 PROCEDURE — 85007 BL SMEAR W/DIFF WBC COUNT: CPT

## 2024-12-13 PROCEDURE — 1159F MED LIST DOCD IN RCRD: CPT | Performed by: NURSE PRACTITIONER

## 2024-12-13 PROCEDURE — 1160F RVW MEDS BY RX/DR IN RCRD: CPT | Performed by: NURSE PRACTITIONER

## 2024-12-13 PROCEDURE — 2500000004 HC RX 250 GENERAL PHARMACY W/ HCPCS (ALT 636 FOR OP/ED): Mod: JZ,JG | Performed by: STUDENT IN AN ORGANIZED HEALTH CARE EDUCATION/TRAINING PROGRAM

## 2024-12-13 PROCEDURE — 84075 ASSAY ALKALINE PHOSPHATASE: CPT

## 2024-12-13 PROCEDURE — 2500000004 HC RX 250 GENERAL PHARMACY W/ HCPCS (ALT 636 FOR OP/ED)

## 2024-12-13 PROCEDURE — 1111F DSCHRG MED/CURRENT MED MERGE: CPT | Performed by: NURSE PRACTITIONER

## 2024-12-13 PROCEDURE — 83735 ASSAY OF MAGNESIUM: CPT

## 2024-12-13 PROCEDURE — 1036F TOBACCO NON-USER: CPT | Performed by: NURSE PRACTITIONER

## 2024-12-13 PROCEDURE — G2211 COMPLEX E/M VISIT ADD ON: HCPCS | Performed by: NURSE PRACTITIONER

## 2024-12-13 RX ORDER — HEPARIN 100 UNIT/ML
500 SYRINGE INTRAVENOUS AS NEEDED
Status: CANCELLED | OUTPATIENT
Start: 2024-12-13

## 2024-12-13 RX ORDER — HEPARIN 100 UNIT/ML
500 SYRINGE INTRAVENOUS AS NEEDED
Status: DISCONTINUED | OUTPATIENT
Start: 2024-12-13 | End: 2024-12-13 | Stop reason: HOSPADM

## 2024-12-13 RX ORDER — POTASSIUM CHLORIDE 20 MEQ/1
20 TABLET, EXTENDED RELEASE ORAL DAILY
Qty: 30 TABLET | Refills: 0 | Status: SHIPPED | OUTPATIENT
Start: 2024-12-13 | End: 2025-01-12

## 2024-12-13 RX ORDER — EPINEPHRINE 0.3 MG/.3ML
0.3 INJECTION SUBCUTANEOUS EVERY 5 MIN PRN
Status: CANCELLED | OUTPATIENT
Start: 2024-12-13

## 2024-12-13 RX ORDER — ALBUTEROL SULFATE 0.83 MG/ML
3 SOLUTION RESPIRATORY (INHALATION) AS NEEDED
OUTPATIENT
Start: 2024-12-14

## 2024-12-13 RX ORDER — POTASSIUM CHLORIDE 750 MG/1
20 TABLET, FILM COATED, EXTENDED RELEASE ORAL ONCE
Status: COMPLETED | OUTPATIENT
Start: 2024-12-13 | End: 2024-12-13

## 2024-12-13 RX ORDER — TACROLIMUS 1 MG/1
6 CAPSULE ORAL NIGHTLY
Qty: 180 CAPSULE | Refills: 1 | Status: SHIPPED | OUTPATIENT
Start: 2024-12-13

## 2024-12-13 RX ORDER — DIPHENHYDRAMINE HYDROCHLORIDE 50 MG/ML
50 INJECTION INTRAMUSCULAR; INTRAVENOUS AS NEEDED
OUTPATIENT
Start: 2024-12-14

## 2024-12-13 RX ORDER — FAMOTIDINE 10 MG/ML
20 INJECTION INTRAVENOUS ONCE AS NEEDED
Status: CANCELLED | OUTPATIENT
Start: 2024-12-13

## 2024-12-13 RX ORDER — EPINEPHRINE 0.3 MG/.3ML
0.3 INJECTION SUBCUTANEOUS EVERY 5 MIN PRN
OUTPATIENT
Start: 2024-12-14

## 2024-12-13 RX ORDER — ALBUTEROL SULFATE 0.83 MG/ML
3 SOLUTION RESPIRATORY (INHALATION) AS NEEDED
Status: CANCELLED | OUTPATIENT
Start: 2024-12-13

## 2024-12-13 RX ORDER — DIPHENHYDRAMINE HYDROCHLORIDE 50 MG/ML
50 INJECTION INTRAMUSCULAR; INTRAVENOUS AS NEEDED
Status: CANCELLED | OUTPATIENT
Start: 2024-12-13

## 2024-12-13 RX ORDER — HEPARIN SODIUM,PORCINE/PF 10 UNIT/ML
50 SYRINGE (ML) INTRAVENOUS AS NEEDED
Status: CANCELLED | OUTPATIENT
Start: 2024-12-13

## 2024-12-13 RX ORDER — POTASSIUM CHLORIDE 750 MG/1
20 TABLET, FILM COATED, EXTENDED RELEASE ORAL ONCE
Status: CANCELLED | OUTPATIENT
Start: 2024-12-13 | End: 2024-12-13

## 2024-12-13 RX ORDER — FAMOTIDINE 10 MG/ML
20 INJECTION INTRAVENOUS ONCE AS NEEDED
OUTPATIENT
Start: 2024-12-14

## 2024-12-13 RX ORDER — HEPARIN 100 UNIT/ML
SYRINGE INTRAVENOUS
Status: COMPLETED
Start: 2024-12-13 | End: 2024-12-13

## 2024-12-13 ASSESSMENT — PAIN SCALES - GENERAL: PAINLEVEL_OUTOF10: 0-NO PAIN

## 2024-12-13 NOTE — ASSESSMENT & PLAN NOTE
Now s/p allo MRD pBSCT. Counts are relatively stable though WBC decreasing, ANC now 770. Likely early graft fluctuations, though need to closely monitor. May require filgrastim if persistent. Scheduled for T+30 BM on 12/26/24.

## 2024-12-13 NOTE — PROGRESS NOTES
"NUTRITION Assessment NOTE    Reason for Visit:  Jin Reynolds is a 69 y.o. 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    Transplant c/b by mucositis, CINV and diarrhea     Currently T+22    Pt seen today in infusion.     Lab Results   Component Value Date/Time    GLUCOSE 111 (H) 12/11/2024 1026     12/11/2024 1026    K 3.4 (L) 12/11/2024 1026     12/11/2024 1026    CO2 26 12/11/2024 1026    ANIONGAP 12 12/11/2024 1026    BUN 8 12/11/2024 1026    CREATININE 0.93 12/11/2024 1026    EGFR 89 12/11/2024 1026    CALCIUM 8.6 12/11/2024 1026    ALBUMIN 4.1 12/11/2024 1026    ALKPHOS 53 12/11/2024 1026    PROT 6.1 (L) 12/11/2024 1026    AST 16 12/11/2024 1026    BILITOT 0.6 12/11/2024 1026    ALT 24 12/11/2024 1026     Lab Results   Component Value Date    WBC 1.2 (L) 12/11/2024    HGB 7.6 (L) 12/11/2024    HCT 20.7 (L) 12/11/2024    MCV 82 12/11/2024    PLT 19 (LL) 12/11/2024       Lab Results   Component Value Date/Time    VITD25 27 (L) 02/05/2024 0807       Anthropometrics:  Anthropometrics  Height: 169.1 cm (5' 6.58\")  Weight: 80.7 kg (177 lb 14.6 oz)  BMI (Calculated): 28.22  IBW/kg (Dietitian Calculated): 65.9 kg  Percent of IBW: 122 %    *Wt at transplant admit: (11/15) 86.1 kg  *Wt at first post-transplant visit: (12/9) 80.7 kg     Wt Readings from Last 10 Encounters:   12/13/24 80.7 kg (177 lb 14.6 oz)   12/13/24 80.7 kg (178 lb)   12/11/24 81.7 kg (180 lb 0.1 oz)   12/09/24 80.7 kg (177 lb 14.6 oz)   12/09/24 80.7 kg (178 lb)   12/04/24 81.6 kg (179 lb 14.3 oz)   11/12/24 87.6 kg (193 lb 2 oz)   11/08/24 86.5 kg (190 lb 12.8 oz)   10/31/24 87.5 kg (193 lb)   10/28/24 88 kg (194 lb)   09/20/24        86.8 kg  08/29/24        89.2 kg  07/30/24        95.9 kg   06/21/24        87.5 kg         Food And Nutrient Intake:      Appetite care home there  Not really getting hungry; forces himself to eat   Every thing tastes well; can taste fish   Cheese, crackers, ice cream   Knows things don't " "taste like they should   Liquids tasting good; always has cold water by his side and drinking chocolate milk (whole)   Had slight nausea when woke up  this am but didn't take anything   Yesterday felt more tired than he has been   No diarrhea   Not really sleeping great since he has to spit often during the night;  gags and brings up mucous   Taking naps during the day     Yesterday:  Had 1/2 lopez and eggs   Had 1/2 bratwurst for dinner  Snacked on pimento cheese       ---------------------------  Pt reports improving appetite; finds that he gets hungry at times.   Eats breakfast, has snack at 2 pm and dinner is main meal.   Denies taste changes.  Did have mild mucositis in the hospital which has since resolved.   Drinking well--mainly water but does have dagmar beer a few times a day.   Does not really care for supplements but would drink them if he needed to.   Denies nausea--not using any meds.  No diarrhea; having formed BM's.    Energy levels low but did walk very slowly on treadmill yesterday.       Usual intakes:  Fruit in morning (strawberries and blueberries at 10)  Snack at 2   Dinner is main meal; last night had ribs and sauerkraut    Rec:  Chocolate milk--2 glasses per day                                                                         Nutrition Focused Physical Exam Findings:                          Energy Needs  Calculated Energy Needs Using Equations  Height: 169.1 cm (5' 6.58\")        Nutrition Diagnosis             Nutrition Interventions/Recommendations   Nutrition Prescription   High Protein, High Calorie  Food Safety    Food and Nutrition Delivery   Reviewed alternative sources of protein as pt struggling with meats and certain protein foods at this time.   Reviewed taste changes; provided tips for managing taste changes including using condiments, lemon hard candies, etc  Suggested baking soda/salt rinses before meals/bed  Provided list of High Calorie snack ideas     Nutrition " Education       Coordination of Care       There are no Patient Instructions on file for this visit.    Nutrition Monitoring and Evaluation

## 2024-12-13 NOTE — PROGRESS NOTES
[facility] Infusion Nursing Note  12/13/24    Jin Reynolds is a 69 y.o. year old male patient presenting to outpatient infusion for a count check.     Since the last visit, he reports doing well. Overall, he states that energy level is improved and able to perform all ADLs. Appetite has been increasing. he reports thick mucus in the back of his throat that he is sometimes able to spit out-- has tried multiple medications and mouth rinses with no relief, will try saline nasal spray.     K 3.3, given PO K repletion in infusion. Hgb 7.0-- pt fatigued and BP lower than baseline. 1 unit PRBC ordered for 12/14 due to antibodies, pt aware of appt at 11 am on 12/14.     Line type: R chest mediport  Line removed/maintained prior to discharge: heplocked and deaccessed per pt request    Follow-up Plan: tomorrow 12/14 for blood transfusion    Jaylene Gonzalez RN

## 2024-12-13 NOTE — PROGRESS NOTES
Patient ID: Jin Reynolds is a 69 y.o. male.    Subjective    Patient presents today, accompanied by his wife, for follow up.  Reports that he is feeling well.  He is eating small amounts.  No diarrhea.  No concerns or complaints today.       Objective    HCT Type: Allogeneic  Transplant Date: 11/21/2024   Days since transplant: 22      Physical Exam  Constitutional:       Appearance: Normal appearance.   HENT:      Head: Normocephalic.   Eyes:      Pupils: Pupils are equal, round, and reactive to light.   Cardiovascular:      Rate and Rhythm: Normal rate and regular rhythm.   Pulmonary:      Effort: Pulmonary effort is normal.      Breath sounds: Normal breath sounds.   Abdominal:      General: Bowel sounds are normal.      Palpations: Abdomen is soft.   Musculoskeletal:         General: Normal range of motion.      Cervical back: Normal range of motion and neck supple.   Lymphadenopathy:      Comments: No lymphadenopathy   Skin:     General: Skin is warm and dry.      Findings: No lesion or rash.   Neurological:      General: No focal deficit present.      Mental Status: He is alert and oriented to person, place, and time. Mental status is at baseline.      Comments: No numbness or tingling   Psychiatric:         Mood and Affect: Mood normal.       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 Counts are relatively stable though WBC decreasing, ANC now 770. Likely early graft fluctuations, though need to closely monitor. May require filgrastim if persistent. Scheduled for T+30 BM on 12/26/24.   History of stem cell transplant (Multi)  T+22 s/p allo MRD pBSCT. Tacrolimus level 6.5 on 5 mg in the am and 6 mg in  "the pm  Immunocompromised state  At high risk for infections. No current signs of infections. Viral monitoring in place. Continue prophylaxis with acyclovir, posaconazole. Not currently on TMP/SMX, will need to initiate with further count recovery. May also require levofloxacin if ANC trends to <500.   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   8/23/2023 Initial Diagnosis    DX:  SMOLDERING SYSTEMIC MASTOCYTOSIS  Presented with skin rash and eosinophilia    BRENDON DIAGNOSTIC CRITERIA  (For SM, Need major and 1 minor OR at least 3 minors)  - Multi-focal, dense infiltrates of MC (>= 15 mast cells in aggregates) in BM and/or other extra-cutaneous organs [major]  - In BM or extracutaneous organs, >25% MC spindle shaped OR have atypical morphology OR of all MC on BM aspirate smears, >25% are immature or atypical [minor]  - Detection of activating mutation KIT D816V in BM, PB, or other extracutaneous organ [minor]  - Serum tryptase persistently exceeds 20 ng/mL (unless associated clonal myeloid disorder, in which this paramenter is not valid) [minor]    \"B\" FINDINGS  - BM biopsy showing >30% infiltration (focal, dense aggregates) and/or serum tryptase > 200 ng/mL  - Signs of dysplasia or myeloproliferation, in non-MC lineages but insufficient criteria for definitive diagnosis of AHNMD with normal or slightly abnormal blood counts    \"C\" FINDINGS  None       8/23/2023 Biopsy    BONE MARROW (8/23/23)  Hypercellular (90-95%) with trilineage hematopoiesis, granulocytic hyperplasia, eosinophilia, and increased atypical mast cells  IP:  CD34+, +, CD7(bright)+, cCD3-, CD33-, CD15-, CD13+ blast population  IHC:  + increased spindle-shaped mast cells (15% cellularity), CD2-  Myeloid NGS:  CBL (31%), cKIT D816V (30%), RUNX1 x 2 (19%, " 29%), SRSF2 (47%) [ASXL1 negative]  FISH:  PDGFRb negative    SERUM TRYPTASE  228 (8/23/23)  268 (9/5/23)     2/13/2024 -  Molecular Therapy    AVAPRITINIB   - Starting dose 25 mg daily (non-Adv SM)     7/16/2024 - 10/21/2024 Chemotherapy    Venetoclax / AzaCITIDine  - C1D1 7/17/24:  complicated by TLS, venetoclax held after D1  - Post C1 BM (8/28/24):  hypercellular with >50% blasts, background systemic mastocytosis  - C2D1 9/5/24:  venetoclax restarted with C2, no TLS  - Post C2 BM (9/25/24):  hypocellular (<5%), atypical myeloblasts by IP/IHC (1-2%), persistent systemic mastocytosis  - C3D1 10/15/24:  delayed 1 week for count recovery  - Post C3 BM (10/23/24):  hypercellular with systemic mastocytosis, no increase blasts, 0.2% atypical blasts by IP, NGS w/ CBL (73%), cKIT (4%), RUNX1 x 2 (4%, 38%), SRSF2 (40%)       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    CONDITIONING Fludarabine, melphalan, and TBI   DONOR Matched sibling   MATCH GRADE 12/12   SEX MATCH Mismatched   ABO DONOR O pos   ABO RECIPIENT O pos   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, Mycophenolate, and Tacrolimus   GRAFT SOURCE Peripheral blood          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)   6/27/2024 Initial Diagnosis    ICC 2022 DX: Acute myeloid leukemia, NOS (>=20% blasts)  [id number] AML Risk Stratification: INTERMEDIATE: Cytogenetic and/or molecular abnormalities not classified as favorable or adverse  Presented with pancytopenia and circulating blasts    BONE MARROW (06/27/2024)  Marrow replaced by blasts, fibrotic foci, some atypical + cells; 7-8% circulating blasts; aspicular  - Unable to perform additional studies due to aspicular specimen    PERIPHERAL BLOOD (6/27/2024)  FISH:  positive for gain RUNX1    PERIPHERAL BLOOD (7/1/24)  IP:  abnl myeloblast population (CD34+, CD7+, CD4+, CD13+, CD38+, CD11+ dim, HLA=DR+, TdT+, CD33-)  Myeloid NGS: CBL C404Y (41%), KIT D816V (8%), RUNX1 x2 (8%, 30%), SRSF2 (37%)     7/16/2024 -  10/21/2024 Chemotherapy    Venetoclax / AzaCITIDine  - C1D1 7/17/24:  complicated by TLS, venetoclax held after D1  - Post C1 BM (8/28/24):  hypercellular with >50% blasts, background systemic mastocytosis  - C2D1 9/5/24:  venetoclax restarted with C2, no TLS  - Post C2 BM (9/25/24):  hypocellular (<5%), atypical myeloblasts by IP/IHC (1-2%), persistent systemic mastocytosis  - C3D1 10/15/24:  delayed 1 week for count recovery  - Post C3 BM (10/23/24):  hypercellular with systemic mastocytosis, no increase blasts, 0.2% atypical blasts by IP, NGS w/ CBL (73%), cKIT (4%), RUNX1 x 2 (4%, 38%), SRSF2 (40%)       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    CONDITIONING Fludarabine, melphalan, and TBI   DONOR Matched sibling   MATCH GRADE 12/12   SEX MATCH Mismatched   ABO DONOR O pos   ABO RECIPIENT O pos   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, Mycophenolate, and Tacrolimus   GRAFT SOURCE Peripheral blood            RTC:  Tues/Thurs/Sat count checks and ZORA follow up  12/24 Slade MIRANDA follow up  12/26 Bmbx  1/8 ECHO and cardiology    Marya Jackson, APRN-CNP

## 2024-12-14 ENCOUNTER — APPOINTMENT (OUTPATIENT)
Dept: HEMATOLOGY/ONCOLOGY | Facility: HOSPITAL | Age: 69
End: 2024-12-14
Payer: MEDICARE

## 2024-12-14 ENCOUNTER — INFUSION (OUTPATIENT)
Dept: HEMATOLOGY/ONCOLOGY | Facility: HOSPITAL | Age: 69
End: 2024-12-14
Payer: MEDICARE

## 2024-12-14 VITALS
BODY MASS INDEX: 28.08 KG/M2 | OXYGEN SATURATION: 99 % | HEART RATE: 68 BPM | DIASTOLIC BLOOD PRESSURE: 50 MMHG | RESPIRATION RATE: 18 BRPM | WEIGHT: 177.03 LBS | TEMPERATURE: 97.2 F | SYSTOLIC BLOOD PRESSURE: 107 MMHG

## 2024-12-14 DIAGNOSIS — D47.02 SYSTEMIC MASTOCYTOSIS WITH ASSOCIATED CLONAL HEMATOLOGICAL NON-MAST CELL LINEAGE DISEASE: ICD-10-CM

## 2024-12-14 DIAGNOSIS — R79.89 ELEVATED SERUM CREATININE: ICD-10-CM

## 2024-12-14 DIAGNOSIS — C92.01 ACUTE MYELOID LEUKEMIA IN REMISSION (MULTI): ICD-10-CM

## 2024-12-14 DIAGNOSIS — M81.8 OTHER OSTEOPOROSIS WITHOUT CURRENT PATHOLOGICAL FRACTURE: ICD-10-CM

## 2024-12-14 DIAGNOSIS — C92.00 ACUTE MYELOID LEUKEMIA NOT HAVING ACHIEVED REMISSION (MULTI): ICD-10-CM

## 2024-12-14 DIAGNOSIS — R79.89 ELEVATED SERUM CREATININE: Primary | ICD-10-CM

## 2024-12-14 DIAGNOSIS — Z94.81 S/P ALLOGENEIC BONE MARROW TRANSPLANT (MULTI): ICD-10-CM

## 2024-12-14 LAB
ABO GROUP (TYPE) IN BLOOD: NORMAL
ALBUMIN SERPL BCP-MCNC: 3.7 G/DL (ref 3.4–5)
ALP SERPL-CCNC: 64 U/L (ref 33–136)
ALT SERPL W P-5'-P-CCNC: 13 U/L (ref 10–52)
ANION GAP SERPL CALC-SCNC: 12 MMOL/L (ref 10–20)
ANTIBODY SCREEN: NORMAL
AST SERPL W P-5'-P-CCNC: 13 U/L (ref 9–39)
BASOPHILS # BLD MANUAL: 0 X10*3/UL (ref 0–0.1)
BASOPHILS NFR BLD MANUAL: 0 %
BILIRUB SERPL-MCNC: 0.6 MG/DL (ref 0–1.2)
BLOOD EXPIRATION DATE: NORMAL
BUN SERPL-MCNC: 11 MG/DL (ref 6–23)
BURR CELLS BLD QL SMEAR: ABNORMAL
CALCIUM SERPL-MCNC: 8.2 MG/DL (ref 8.6–10.6)
CHLORIDE SERPL-SCNC: 102 MMOL/L (ref 98–107)
CO2 SERPL-SCNC: 25 MMOL/L (ref 21–32)
CREAT SERPL-MCNC: 1.6 MG/DL (ref 0.5–1.3)
DACRYOCYTES BLD QL SMEAR: ABNORMAL
DISPENSE STATUS: NORMAL
EGFRCR SERPLBLD CKD-EPI 2021: 47 ML/MIN/1.73M*2
EOSINOPHIL # BLD MANUAL: 0 X10*3/UL (ref 0–0.7)
EOSINOPHIL NFR BLD MANUAL: 0 %
ERYTHROCYTE [DISTWIDTH] IN BLOOD BY AUTOMATED COUNT: 17.2 % (ref 11.5–14.5)
GLUCOSE SERPL-MCNC: 115 MG/DL (ref 74–99)
HCT VFR BLD AUTO: 19.4 % (ref 41–52)
HGB BLD-MCNC: 6.7 G/DL (ref 13.5–17.5)
IMM GRANULOCYTES # BLD AUTO: 0.92 X10*3/UL (ref 0–0.7)
IMM GRANULOCYTES NFR BLD AUTO: 6.4 % (ref 0–0.9)
LDH SERPL L TO P-CCNC: 133 U/L (ref 84–246)
LYMPHOCYTES # BLD MANUAL: 0 X10*3/UL (ref 1.2–4.8)
LYMPHOCYTES NFR BLD MANUAL: 0 %
MAGNESIUM SERPL-MCNC: 1.86 MG/DL (ref 1.6–2.4)
MCH RBC QN AUTO: 30 PG (ref 26–34)
MCHC RBC AUTO-ENTMCNC: 34.5 G/DL (ref 32–36)
MCV RBC AUTO: 87 FL (ref 80–100)
MONOCYTES # BLD MANUAL: 0.43 X10*3/UL (ref 0.1–1)
MONOCYTES NFR BLD MANUAL: 3 %
NEUTROPHILS # BLD MANUAL: 13.97 X10*3/UL (ref 1.2–7.7)
NEUTS BAND # BLD MANUAL: 0.29 X10*3/UL (ref 0–0.7)
NEUTS BAND NFR BLD MANUAL: 2 %
NEUTS SEG # BLD MANUAL: 13.68 X10*3/UL (ref 1.2–7)
NEUTS SEG NFR BLD MANUAL: 95 %
NRBC BLD-RTO: 0 /100 WBCS (ref 0–0)
OVALOCYTES BLD QL SMEAR: ABNORMAL
PLATELET # BLD AUTO: 24 X10*3/UL (ref 150–450)
POLYCHROMASIA BLD QL SMEAR: ABNORMAL
POTASSIUM SERPL-SCNC: 3.5 MMOL/L (ref 3.5–5.3)
PRODUCT BLOOD TYPE: 5100
PRODUCT CODE: NORMAL
PROT SERPL-MCNC: 5.5 G/DL (ref 6.4–8.2)
RBC # BLD AUTO: 2.23 X10*6/UL (ref 4.5–5.9)
RBC MORPH BLD: ABNORMAL
RH FACTOR (ANTIGEN D): NORMAL
SCHISTOCYTES BLD QL SMEAR: ABNORMAL
SODIUM SERPL-SCNC: 135 MMOL/L (ref 136–145)
TACROLIMUS BLD-MCNC: 12.1 NG/ML
TOTAL CELLS COUNTED BLD: 100
UNIT ABO: NORMAL
UNIT NUMBER: NORMAL
UNIT RH: NORMAL
UNIT VOLUME: 350
WBC # BLD AUTO: 14.4 X10*3/UL (ref 4.4–11.3)
XM INTEP: NORMAL

## 2024-12-14 PROCEDURE — 86850 RBC ANTIBODY SCREEN: CPT

## 2024-12-14 PROCEDURE — 80197 ASSAY OF TACROLIMUS: CPT

## 2024-12-14 PROCEDURE — 96360 HYDRATION IV INFUSION INIT: CPT | Mod: INF

## 2024-12-14 PROCEDURE — 2500000004 HC RX 250 GENERAL PHARMACY W/ HCPCS (ALT 636 FOR OP/ED): Performed by: INTERNAL MEDICINE

## 2024-12-14 PROCEDURE — 85027 COMPLETE CBC AUTOMATED: CPT

## 2024-12-14 PROCEDURE — 83615 LACTATE (LD) (LDH) ENZYME: CPT

## 2024-12-14 PROCEDURE — 85007 BL SMEAR W/DIFF WBC COUNT: CPT

## 2024-12-14 PROCEDURE — 2500000004 HC RX 250 GENERAL PHARMACY W/ HCPCS (ALT 636 FOR OP/ED)

## 2024-12-14 PROCEDURE — 83735 ASSAY OF MAGNESIUM: CPT

## 2024-12-14 PROCEDURE — 36430 TRANSFUSION BLD/BLD COMPNT: CPT

## 2024-12-14 PROCEDURE — 80053 COMPREHEN METABOLIC PANEL: CPT

## 2024-12-14 RX ORDER — FAMOTIDINE 10 MG/ML
20 INJECTION INTRAVENOUS ONCE AS NEEDED
OUTPATIENT
Start: 2024-12-14

## 2024-12-14 RX ORDER — EPINEPHRINE 0.3 MG/.3ML
0.3 INJECTION SUBCUTANEOUS EVERY 5 MIN PRN
OUTPATIENT
Start: 2024-12-14

## 2024-12-14 RX ORDER — ALBUTEROL SULFATE 0.83 MG/ML
3 SOLUTION RESPIRATORY (INHALATION) AS NEEDED
OUTPATIENT
Start: 2024-12-14

## 2024-12-14 RX ORDER — HEPARIN 100 UNIT/ML
500 SYRINGE INTRAVENOUS AS NEEDED
OUTPATIENT
Start: 2024-12-14

## 2024-12-14 RX ORDER — DIPHENHYDRAMINE HYDROCHLORIDE 50 MG/ML
50 INJECTION INTRAMUSCULAR; INTRAVENOUS AS NEEDED
OUTPATIENT
Start: 2024-12-14

## 2024-12-14 RX ORDER — HEPARIN 100 UNIT/ML
500 SYRINGE INTRAVENOUS AS NEEDED
Status: DISCONTINUED | OUTPATIENT
Start: 2024-12-14 | End: 2024-12-14 | Stop reason: HOSPADM

## 2024-12-14 RX ORDER — HEPARIN SODIUM,PORCINE/PF 10 UNIT/ML
50 SYRINGE (ML) INTRAVENOUS AS NEEDED
OUTPATIENT
Start: 2024-12-14

## 2024-12-14 ASSESSMENT — PAIN SCALES - GENERAL: PAINLEVEL_OUTOF10: 0-NO PAIN

## 2024-12-15 LAB
CMV DNA SERPL NAA+PROBE-LOG IU: NORMAL {LOG_IU}/ML
LABORATORY COMMENT REPORT: NOT DETECTED

## 2024-12-16 ENCOUNTER — TELEPHONE (OUTPATIENT)
Dept: HEMATOLOGY/ONCOLOGY | Facility: HOSPITAL | Age: 69
End: 2024-12-16
Payer: MEDICARE

## 2024-12-16 LAB
BB ANTIBODY IDENTIFICATION: NORMAL
BB ANTIBODY IDENTIFICATION: NORMAL
CASE #: NORMAL
CASE #: NORMAL

## 2024-12-16 NOTE — TELEPHONE ENCOUNTER
TACROLIMUS DOSE RECOMMENDATION NOTE   Jin Reynolds is a 69 y.o. male currently day +25 following Matched Related Donor (MRD) allogeneic stem cell transplant for a diagnosis of AML. Patient on tacrolimus for GVHD prophylaxis with level requiring follow-up. Pharmacist was consulted to provide tacrolimus dose recommendations.     Objective   Creatinine   Date Value Ref Range Status   12/14/2024 1.60 (H) 0.50 - 1.30 mg/dL Final   12/13/2024 1.04 0.50 - 1.30 mg/dL Final   12/11/2024 0.93 0.50 - 1.30 mg/dL Final     Bilirubin, Total   Date Value Ref Range Status   12/14/2024 0.6 0.0 - 1.2 mg/dL Final   12/13/2024 0.6 0.0 - 1.2 mg/dL Final   12/11/2024 0.6 0.0 - 1.2 mg/dL Final     Tacrolimus    Date Value Ref Range Status   12/14/2024 12.1 <=15.0 ng/mL Final   12/13/2024 6.5 <=15.0 ng/mL Final   12/11/2024 3.6 <=15.0 ng/mL Final       Assessment  Current tacrolimus dose: 5 mg in the morning and 6 mg in the evening  Goal tacrolimus level: 5-10 ng/mL    Plan   The patient's tacrolimus level 12/14 was 12.1 which is Supratherapeutic. Scr increased from 1.04 to 1.60 over one day. Patient will skip dose this morning (12/16 AM) and take 4 mg tonight. Will check a trough tomorrow morning in clinic and recommend morning dose based on Scr until tacrolimus level results. I spoke with the patient/caregiver via phone and instructed them to adjust their tacrolimus dose. Recommendation accepted, continue to monitor tacrolimus levels to assess adjustment.     All questions were answered. Patient/family verbalized understanding of the plan of care and information provided. Will follow as necessary. Time spent with patient/family and/or coordinating care: 20 minutes.      Liz Cedeno, PharmD, [facility]  Ambulatory Stem Cell Transplant Transplant Pharmacist    Current Outpatient Medications   Medication Instructions    acyclovir (ZOVIRAX) 400 mg, oral, Every 12 hours    amLODIPine (NORVASC) 10 mg, oral, Daily    cholecalciferol (VITAMIN  D-3) 2,000 Units, oral, Daily    famotidine (PEPCID) 20 mg, oral, Daily    fexofenadine (ALLEGRA) 180 mg, Daily    magnesium, amino acid chelate, 133 mg tablet 266 mg, oral, 3 times daily, Or as instructed on your medication list.    mycophenolate (CELLCEPT) 1,000 mg, oral, 3 times daily    ondansetron (ZOFRAN) 8 mg, oral, Every 8 hours PRN    posaconazole (NOXAFIL) 300 mg, oral, Daily, Do not crush, chew, or split. This is to prevent fungal infections.    potassium chloride CR (Klor-Con M20) 20 mEq ER tablet 20 mEq, oral, Daily, Do not crush or chew.    prochlorperazine (COMPAZINE) 10 mg, oral, Every 6 hours PRN    sulfamethoxazole-trimethoprim (Bactrim DS) 800-160 mg tablet 1 tablet, oral, 3 times weekly, Take on Mondays, Wednesdays, and Fridays. Do not start until instructed.    tacrolimus (PROGRAF) 5 mg, oral, Every morning, Take 5 mg in the morning and 6 mg in the evening or as instructed on your medication list.    tacrolimus (PROGRAF) 6 mg, oral, Nightly, Or as instructed on your medication list.    ursodiol (ACTIGALL) 300 mg, oral, 3 times daily

## 2024-12-17 ENCOUNTER — OFFICE VISIT (OUTPATIENT)
Dept: HEMATOLOGY/ONCOLOGY | Facility: HOSPITAL | Age: 69
End: 2024-12-17
Payer: MEDICARE

## 2024-12-17 ENCOUNTER — NUTRITION (OUTPATIENT)
Dept: HEMATOLOGY/ONCOLOGY | Facility: HOSPITAL | Age: 69
End: 2024-12-17

## 2024-12-17 ENCOUNTER — INFUSION (OUTPATIENT)
Dept: HEMATOLOGY/ONCOLOGY | Facility: HOSPITAL | Age: 69
End: 2024-12-17
Payer: MEDICARE

## 2024-12-17 VITALS
HEART RATE: 83 BPM | OXYGEN SATURATION: 100 % | WEIGHT: 173.72 LBS | DIASTOLIC BLOOD PRESSURE: 67 MMHG | RESPIRATION RATE: 18 BRPM | SYSTOLIC BLOOD PRESSURE: 123 MMHG | TEMPERATURE: 97 F | BODY MASS INDEX: 27.56 KG/M2

## 2024-12-17 VITALS — HEIGHT: 67 IN | BODY MASS INDEX: 27.27 KG/M2 | WEIGHT: 173.72 LBS

## 2024-12-17 DIAGNOSIS — Z94.81 S/P ALLOGENEIC BONE MARROW TRANSPLANT (MULTI): ICD-10-CM

## 2024-12-17 DIAGNOSIS — C92.02 AML (ACUTE MYELOID LEUKEMIA) IN RELAPSE (MULTI): Primary | ICD-10-CM

## 2024-12-17 DIAGNOSIS — C92.01 ACUTE MYELOID LEUKEMIA IN REMISSION (MULTI): ICD-10-CM

## 2024-12-17 DIAGNOSIS — E87.6 HYPOKALEMIA: ICD-10-CM

## 2024-12-17 DIAGNOSIS — E83.42 HYPOMAGNESEMIA: ICD-10-CM

## 2024-12-17 DIAGNOSIS — R11.2 NAUSEA AND VOMITING, UNSPECIFIED VOMITING TYPE: ICD-10-CM

## 2024-12-17 DIAGNOSIS — Z94.84 HISTORY OF STEM CELL TRANSPLANT (MULTI): ICD-10-CM

## 2024-12-17 DIAGNOSIS — D47.02 SYSTEMIC MASTOCYTOSIS WITH ASSOCIATED CLONAL HEMATOLOGICAL NON-MAST CELL LINEAGE DISEASE: ICD-10-CM

## 2024-12-17 DIAGNOSIS — D84.9 IMMUNOCOMPROMISED STATE: ICD-10-CM

## 2024-12-17 DIAGNOSIS — C92.00 ACUTE MYELOID LEUKEMIA NOT HAVING ACHIEVED REMISSION (MULTI): ICD-10-CM

## 2024-12-17 LAB
ABO GROUP (TYPE) IN BLOOD: NORMAL
ALBUMIN SERPL BCP-MCNC: 3.9 G/DL (ref 3.4–5)
ALP SERPL-CCNC: 64 U/L (ref 33–136)
ALT SERPL W P-5'-P-CCNC: 12 U/L (ref 10–52)
ANION GAP SERPL CALC-SCNC: 11 MMOL/L (ref 10–20)
ANTIBODY SCREEN: NORMAL
APPEARANCE UR: CLEAR
AST SERPL W P-5'-P-CCNC: 14 U/L (ref 9–39)
BASOPHILS # BLD AUTO: 0.03 X10*3/UL (ref 0–0.1)
BASOPHILS NFR BLD AUTO: 1.4 %
BILIRUB SERPL-MCNC: 0.8 MG/DL (ref 0–1.2)
BILIRUB UR STRIP.AUTO-MCNC: NEGATIVE MG/DL
BUN SERPL-MCNC: 9 MG/DL (ref 6–23)
CALCIUM SERPL-MCNC: 8.2 MG/DL (ref 8.6–10.3)
CHLORIDE SERPL-SCNC: 103 MMOL/L (ref 98–107)
CO2 SERPL-SCNC: 25 MMOL/L (ref 21–32)
COLOR UR: ABNORMAL
CREAT SERPL-MCNC: 1.04 MG/DL (ref 0.5–1.3)
CREAT UR-MCNC: 212.4 MG/DL (ref 20–370)
EGFRCR SERPLBLD CKD-EPI 2021: 78 ML/MIN/1.73M*2
EOSINOPHIL # BLD AUTO: 0.13 X10*3/UL (ref 0–0.7)
EOSINOPHIL NFR BLD AUTO: 6 %
ERYTHROCYTE [DISTWIDTH] IN BLOOD BY AUTOMATED COUNT: 17.7 % (ref 11.5–14.5)
GLUCOSE SERPL-MCNC: 107 MG/DL (ref 74–99)
GLUCOSE UR STRIP.AUTO-MCNC: NORMAL MG/DL
HAPTOGLOB SERPL NEPH-MCNC: 42 MG/DL (ref 30–200)
HCT VFR BLD AUTO: 24.2 % (ref 41–52)
HGB BLD-MCNC: 8.6 G/DL (ref 13.5–17.5)
IMM GRANULOCYTES # BLD AUTO: 0.05 X10*3/UL (ref 0–0.7)
IMM GRANULOCYTES NFR BLD AUTO: 2.3 % (ref 0–0.9)
KETONES UR STRIP.AUTO-MCNC: NEGATIVE MG/DL
LDH SERPL L TO P-CCNC: 154 U/L (ref 84–246)
LEUKOCYTE ESTERASE UR QL STRIP.AUTO: NEGATIVE
LYMPHOCYTES # BLD AUTO: 0.08 X10*3/UL (ref 1.2–4.8)
LYMPHOCYTES NFR BLD AUTO: 3.7 %
MAGNESIUM SERPL-MCNC: 1.48 MG/DL (ref 1.6–2.4)
MCH RBC QN AUTO: 31 PG (ref 26–34)
MCHC RBC AUTO-ENTMCNC: 35.5 G/DL (ref 32–36)
MCV RBC AUTO: 87 FL (ref 80–100)
MONOCYTES # BLD AUTO: 0.45 X10*3/UL (ref 0.1–1)
MONOCYTES NFR BLD AUTO: 20.6 %
MUCOUS THREADS #/AREA URNS AUTO: ABNORMAL /LPF
NEUTROPHILS # BLD AUTO: 1.44 X10*3/UL (ref 1.2–7.7)
NEUTROPHILS NFR BLD AUTO: 66 %
NITRITE UR QL STRIP.AUTO: NEGATIVE
NRBC BLD-RTO: 0 /100 WBCS (ref 0–0)
PATH REV-IMMUNOHEMATOLOGY-PR30: NORMAL
PATH REV-IMMUNOHEMATOLOGY-PR30: NORMAL
PH UR STRIP.AUTO: 6 [PH]
PLATELET # BLD AUTO: 26 X10*3/UL (ref 150–450)
POTASSIUM SERPL-SCNC: 3.3 MMOL/L (ref 3.5–5.3)
PROT SERPL-MCNC: 5.8 G/DL (ref 6.4–8.2)
PROT UR STRIP.AUTO-MCNC: ABNORMAL MG/DL
PROT UR-ACNC: 52 MG/DL (ref 5–25)
PROT/CREAT UR: 0.24 MG/MG CREAT (ref 0–0.17)
RBC # BLD AUTO: 2.77 X10*6/UL (ref 4.5–5.9)
RBC # UR STRIP.AUTO: ABNORMAL /UL
RBC #/AREA URNS AUTO: >20 /HPF
RH FACTOR (ANTIGEN D): NORMAL
SODIUM SERPL-SCNC: 136 MMOL/L (ref 136–145)
SP GR UR STRIP.AUTO: 1.02
TACROLIMUS BLD-MCNC: 7.8 NG/ML
URATE SERPL-MCNC: 5.5 MG/DL (ref 4–7.5)
UROBILINOGEN UR STRIP.AUTO-MCNC: NORMAL MG/DL
WBC # BLD AUTO: 2.2 X10*3/UL (ref 4.4–11.3)
WBC #/AREA URNS AUTO: ABNORMAL /HPF
YEAST BUDDING #/AREA UR COMP ASSIST: PRESENT /HPF

## 2024-12-17 PROCEDURE — 2500000002 HC RX 250 W HCPCS SELF ADMINISTERED DRUGS (ALT 637 FOR MEDICARE OP, ALT 636 FOR OP/ED): Mod: MUE

## 2024-12-17 PROCEDURE — 86850 RBC ANTIBODY SCREEN: CPT

## 2024-12-17 PROCEDURE — 87799 DETECT AGENT NOS DNA QUANT: CPT

## 2024-12-17 PROCEDURE — 36591 DRAW BLOOD OFF VENOUS DEVICE: CPT

## 2024-12-17 PROCEDURE — 80197 ASSAY OF TACROLIMUS: CPT

## 2024-12-17 PROCEDURE — 87533 HHV-6 DNA QUANT: CPT

## 2024-12-17 PROCEDURE — 86870 RBC ANTIBODY IDENTIFICATION: CPT

## 2024-12-17 PROCEDURE — 84550 ASSAY OF BLOOD/URIC ACID: CPT

## 2024-12-17 PROCEDURE — 82570 ASSAY OF URINE CREATININE: CPT

## 2024-12-17 PROCEDURE — 80053 COMPREHEN METABOLIC PANEL: CPT

## 2024-12-17 PROCEDURE — 83010 ASSAY OF HAPTOGLOBIN QUANT: CPT

## 2024-12-17 PROCEDURE — 99215 OFFICE O/P EST HI 40 MIN: CPT

## 2024-12-17 PROCEDURE — 85025 COMPLETE CBC W/AUTO DIFF WBC: CPT

## 2024-12-17 PROCEDURE — 81001 URINALYSIS AUTO W/SCOPE: CPT

## 2024-12-17 PROCEDURE — 1111F DSCHRG MED/CURRENT MED MERGE: CPT

## 2024-12-17 PROCEDURE — 83735 ASSAY OF MAGNESIUM: CPT

## 2024-12-17 PROCEDURE — 2500000004 HC RX 250 GENERAL PHARMACY W/ HCPCS (ALT 636 FOR OP/ED): Performed by: INTERNAL MEDICINE

## 2024-12-17 PROCEDURE — 83615 LACTATE (LD) (LDH) ENZYME: CPT

## 2024-12-17 RX ORDER — HEPARIN SODIUM,PORCINE/PF 10 UNIT/ML
50 SYRINGE (ML) INTRAVENOUS AS NEEDED
Status: CANCELLED | OUTPATIENT
Start: 2024-12-17

## 2024-12-17 RX ORDER — POTASSIUM CHLORIDE 750 MG/1
20 TABLET, FILM COATED, EXTENDED RELEASE ORAL ONCE
Status: CANCELLED | OUTPATIENT
Start: 2024-12-17 | End: 2024-12-17

## 2024-12-17 RX ORDER — HEPARIN 100 UNIT/ML
500 SYRINGE INTRAVENOUS AS NEEDED
Status: DISCONTINUED | OUTPATIENT
Start: 2024-12-17 | End: 2024-12-17 | Stop reason: HOSPADM

## 2024-12-17 RX ORDER — POTASSIUM CHLORIDE 750 MG/1
20 TABLET, FILM COATED, EXTENDED RELEASE ORAL ONCE
Status: COMPLETED | OUTPATIENT
Start: 2024-12-17 | End: 2024-12-17

## 2024-12-17 RX ORDER — HEPARIN 100 UNIT/ML
500 SYRINGE INTRAVENOUS AS NEEDED
Status: CANCELLED | OUTPATIENT
Start: 2024-12-17

## 2024-12-17 RX ORDER — POTASSIUM CHLORIDE 20 MEQ/1
20 TABLET, EXTENDED RELEASE ORAL DAILY
Qty: 30 TABLET | Refills: 0 | Status: SHIPPED | OUTPATIENT
Start: 2024-12-17 | End: 2024-12-21 | Stop reason: WASHOUT

## 2024-12-17 NOTE — PROGRESS NOTES
Patient ID: Jin Reynolds is a 69 y.o. male.    Subjective    Patient presents today for scheduled follow-up, he is accompanied by his wife. No diarrhea or abd pain/discomfort. No rashes. Reports some fatigue. He does have intermittent nausea with some emesis when eating certain foods and dysgeusia; he is able to tolerate Ensure and broths--but not meats or shrimp. Asking about eating bone marrow soup. Not taking anti-emetics regularly. Asking to meet with dietician today.        Objective    HCT Type: Allogeneic  Transplant Date: 11/21/2024   Days since transplant: 26      Physical Exam  Vitals reviewed.   Constitutional:       Appearance: Normal appearance.   HENT:      Head: Normocephalic.   Eyes:      Pupils: Pupils are equal, round, and reactive to light.   Cardiovascular:      Rate and Rhythm: Normal rate and regular rhythm.   Pulmonary:      Effort: Pulmonary effort is normal.      Breath sounds: Normal breath sounds.   Abdominal:      General: Bowel sounds are normal.      Palpations: Abdomen is soft.   Musculoskeletal:         General: Normal range of motion.      Cervical back: Normal range of motion and neck supple.   Lymphadenopathy:      Comments: No lymphadenopathy   Skin:     General: Skin is warm and dry.   Neurological:      General: No focal deficit present.      Mental Status: He is alert and oriented to person, place, and time. Mental status is at baseline.   Psychiatric:         Mood and Affect: Mood normal.       Performance Status:  Karnofsky Score: 80 - Normal activity with effort; some signs or symptoms of disease     GVHD Assessment  Acute GVHD Overall stGstrstastdstest:st st1st Assessment & Plan  S/P allogeneic bone marrow transplant (Multi)    AML (acute myeloid leukemia) in relapse (Multi)    Immunocompromised state  At high risk for infections. No current signs of infections. Viral monitoring in place. Continue prophylaxis with acyclovir, posaconazole. Not currently on TMP/SMX, will need to initiate  "with further count recovery. May also require levofloxacin if ANC trends to <500.   Hypomagnesemia  On oral supplementation, 6 tabs/daily. No IV replacement given today.   Hypokalemia  Given 20 meq oral K today, has not picked up Kcl Rx that was sent to [facility]--will send new Rx to [facility].  History of stem cell transplant (Multi)  T+26 s/p allo MRD pBSCT. Tacrolimus level today PENDING on 5 mg in the am and 4 mg in the pm.   Nausea and vomiting, unspecified vomiting type  Difficulty eating solid foods without emesis, he has been able to drink Ensure and consume broth. Has not been taking Zofran consistently, instructed to take TID until next appointment to see if there is some improvement. No other GI GVHD symptoms.   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 Counts waxing and waning, likely early graft fluctuations, though need to closely monitor. Last filgrastim 12/13/24, ANC today 1.44.  Scheduled for T+30 BM on 12/26/24.     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   8/23/2023 Initial Diagnosis    DX:  SMOLDERING SYSTEMIC MASTOCYTOSIS  Presented with skin rash and eosinophilia    BRENDON DIAGNOSTIC CRITERIA  (For SM, Need major and 1 minor OR at least 3 minors)  - Multi-focal, dense infiltrates of MC (>= 15 mast cells in aggregates) in BM and/or other extra-cutaneous organs [major]  - In BM or extracutaneous organs, >25% MC spindle shaped OR have atypical morphology OR of all MC on BM aspirate smears, >25% are immature or atypical [minor]  - Detection of activating mutation KIT D816V in BM, PB, or other extracutaneous organ [minor]  - Serum tryptase persistently exceeds 20 ng/mL (unless associated clonal myeloid disorder, in which this paramenter is not valid) [minor]    \"B\" FINDINGS  - BM biopsy showing >30% infiltration (focal, dense aggregates) and/or serum tryptase > 200 ng/mL  - Signs of dysplasia or myeloproliferation, " "in non-MC lineages but insufficient criteria for definitive diagnosis of AHNMD with normal or slightly abnormal blood counts    \"C\" FINDINGS  None       8/23/2023 Biopsy    BONE MARROW (8/23/23)  Hypercellular (90-95%) with trilineage hematopoiesis, granulocytic hyperplasia, eosinophilia, and increased atypical mast cells  IP:  CD34+, +, CD7(bright)+, cCD3-, CD33-, CD15-, CD13+ blast population  IHC:  + increased spindle-shaped mast cells (15% cellularity), CD2-  Myeloid NGS:  CBL (31%), cKIT D816V (30%), RUNX1 x 2 (19%, 29%), SRSF2 (47%) [ASXL1 negative]  FISH:  PDGFRb negative    SERUM TRYPTASE  228 (8/23/23)  268 (9/5/23)     2/13/2024 -  Molecular Therapy    AVAPRITINIB   - Starting dose 25 mg daily (non-Adv SM)     7/16/2024 - 10/21/2024 Chemotherapy    Venetoclax / AzaCITIDine  - C1D1 7/17/24:  complicated by TLS, venetoclax held after D1  - Post C1 BM (8/28/24):  hypercellular with >50% blasts, background systemic mastocytosis  - C2D1 9/5/24:  venetoclax restarted with C2, no TLS  - Post C2 BM (9/25/24):  hypocellular (<5%), atypical myeloblasts by IP/IHC (1-2%), persistent systemic mastocytosis  - C3D1 10/15/24:  delayed 1 week for count recovery  - Post C3 BM (10/23/24):  hypercellular with systemic mastocytosis, no increase blasts, 0.2% atypical blasts by IP, NGS w/ CBL (73%), cKIT (4%), RUNX1 x 2 (4%, 38%), SRSF2 (40%)       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    CONDITIONING Fludarabine, melphalan, and TBI   DONOR Matched sibling   MATCH GRADE 12/12   SEX MATCH Mismatched   ABO DONOR O pos   ABO RECIPIENT O pos   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, Mycophenolate, and Tacrolimus   GRAFT SOURCE Peripheral blood          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)   6/27/2024 Initial Diagnosis    ICC 2022 DX: Acute myeloid leukemia, NOS (>=20% blasts)  [id number] AML Risk Stratification: INTERMEDIATE: Cytogenetic and/or molecular abnormalities not classified as favorable or " adverse  Presented with pancytopenia and circulating blasts    BONE MARROW (06/27/2024)  Marrow replaced by blasts, fibrotic foci, some atypical + cells; 7-8% circulating blasts; aspicular  - Unable to perform additional studies due to aspicular specimen    PERIPHERAL BLOOD (6/27/2024)  FISH:  positive for gain RUNX1    PERIPHERAL BLOOD (7/1/24)  IP:  abnl myeloblast population (CD34+, CD7+, CD4+, CD13+, CD38+, CD11+ dim, HLA=DR+, TdT+, CD33-)  Myeloid NGS: CBL C404Y (41%), KIT D816V (8%), RUNX1 x2 (8%, 30%), SRSF2 (37%)     7/16/2024 - 10/21/2024 Chemotherapy    Venetoclax / AzaCITIDine  - C1D1 7/17/24:  complicated by TLS, venetoclax held after D1  - Post C1 BM (8/28/24):  hypercellular with >50% blasts, background systemic mastocytosis  - C2D1 9/5/24:  venetoclax restarted with C2, no TLS  - Post C2 BM (9/25/24):  hypocellular (<5%), atypical myeloblasts by IP/IHC (1-2%), persistent systemic mastocytosis  - C3D1 10/15/24:  delayed 1 week for count recovery  - Post C3 BM (10/23/24):  hypercellular with systemic mastocytosis, no increase blasts, 0.2% atypical blasts by IP, NGS w/ CBL (73%), cKIT (4%), RUNX1 x 2 (4%, 38%), SRSF2 (40%)       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    CONDITIONING Fludarabine, melphalan, and TBI   DONOR Matched sibling   MATCH GRADE 12/12   SEX MATCH Mismatched   ABO DONOR O pos   ABO RECIPIENT O pos   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, Mycophenolate, and Tacrolimus   GRAFT SOURCE Peripheral blood            RTC:  Tues/Thurs/Sat count checks and ZORA follow up  12/24 Slade MIRANDA follow up  12/26 Bmbx  1/8 ECHO and cardiology    Antonino Freeman, APRN-CNP

## 2024-12-17 NOTE — ASSESSMENT & PLAN NOTE
Given 20 meq oral K today, has not picked up Kcl Rx that was sent to [facility]--will send new Rx to [facility].

## 2024-12-17 NOTE — ASSESSMENT & PLAN NOTE
Difficulty eating solid foods without emesis, he has been able to drink Ensure and consume broth. Has not been taking Zofran consistently, instructed to take TID until next appointment to see if there is some improvement. No other GI GVHD symptoms.

## 2024-12-17 NOTE — PROGRESS NOTES
"NUTRITION FOLLOW UP NOTE    Reason for Visit:  Jin Reynolds is a 69 y.o. 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    Transplant c/b by mucositis, CINV and diarrhea     Currently T+26    Pt seen today in infusion.     Lab Results   Component Value Date/Time    GLUCOSE 107 (H) 12/17/2024 0754     12/17/2024 0754    K 3.3 (L) 12/17/2024 0754     12/17/2024 0754    CO2 25 12/17/2024 0754    ANIONGAP 11 12/17/2024 0754    BUN 9 12/17/2024 0754    CREATININE 1.04 12/17/2024 0754    EGFR 78 12/17/2024 0754    CALCIUM 8.2 (L) 12/17/2024 0754    ALBUMIN 3.9 12/17/2024 0754    ALKPHOS 64 12/17/2024 0754    PROT 5.8 (L) 12/17/2024 0754    AST 14 12/17/2024 0754    BILITOT 0.8 12/17/2024 0754    ALT 12 12/17/2024 0754     Lab Results   Component Value Date    WBC 2.2 (L) 12/17/2024    HGB 8.6 (L) 12/17/2024    HCT 24.2 (L) 12/17/2024    MCV 87 12/17/2024    PLT 26 (LL) 12/17/2024       Lab Results   Component Value Date/Time    VITD25 27 (L) 02/05/2024 0807       Anthropometrics:  Anthropometrics  Height: 169.1 cm (5' 6.58\")  Weight: 78.8 kg (173 lb 11.6 oz)  BMI (Calculated): 27.56  IBW/kg (Dietitian Calculated): 65.9 kg  Percent of IBW: 120 %    *Wt at transplant admit: (11/15) 86.1 kg  *Wt at first post-transplant visit: (12/9) 80.7 kg     Wt Readings from Last 10 Encounters:   12/17/24 78.8 kg (173 lb 11.6 oz)   12/17/24 78.8 kg (173 lb 11.6 oz)   12/14/24 80.3 kg (177 lb 0.5 oz)   12/13/24 80.7 kg (177 lb 14.6 oz)   12/13/24 80.7 kg (178 lb)   12/11/24 81.7 kg (180 lb 0.1 oz)   12/09/24 80.7 kg (177 lb 14.6 oz)   12/09/24 80.7 kg (178 lb)   12/04/24 81.6 kg (179 lb 14.3 oz)   11/12/24 87.6 kg (193 lb 2 oz)   09/20/24        86.8 kg  08/29/24        89.2 kg  07/30/24        95.9 kg   06/21/24        87.5 kg         Food And Nutrient Intake:      Tried lemon hard candies before eating shrimp and then vomited; vomits after any kind of meat   Nothing solid staying down   Bought Bone broth and " "Ensure both stayed down     Nothing to eat since Saturday.   Drinking ok--water and g'cally; liquids stay down.     All day yesterday, all he had was ensure     Everything still tastes off.  In his mind he wants to eat but puts in mouth, chews, and then \"wont go down.\"   Thinks scrambled eggs stayed down last week       Rec:  Zofran TID             Appetite custodial there  Not really getting hungry; forces himself to eat   Every thing tastes well; can taste fish   Cheese, crackers, ice cream   Knows things don't taste like they should   Liquids tasting good; always has cold water by his side and drinking chocolate milk (whole)   Had slight nausea when woke up  this am but didn't take anything   Yesterday felt more tired than he has been   No diarrhea   Not really sleeping great since he has to spit often during the night;  gags and brings up mucous   Taking naps during the day     Yesterday:  Had 1/2 lopez and eggs   Had 1/2 bratwurst for dinner  Snacked on pimento cheese       ---------------------------  Pt reports improving appetite; finds that he gets hungry at times.   Eats breakfast, has snack at 2 pm and dinner is main meal.   Denies taste changes.  Did have mild mucositis in the hospital which has since resolved.   Drinking well--mainly water but does have dagmar beer a few times a day.   Does not really care for supplements but would drink them if he needed to.   Denies nausea--not using any meds.  No diarrhea; having formed BM's.    Energy levels low but did walk very slowly on treadmill yesterday.       Usual intakes:  Fruit in morning (strawberries and blueberries at 10)  Snack at 2   Dinner is main meal; last night had ribs and sauerkraut    Rec:  Chocolate milk--2 glasses per day                                                                         Nutrition Focused Physical Exam Findings:                          Energy Needs  Calculated Energy Needs Using Equations  Height: 169.1 cm (5' " "6.58\")        Nutrition Diagnosis             Nutrition Interventions/Recommendations   Nutrition Prescription   High Protein, High Calorie  Food Safety    Food and Nutrition Delivery   Reviewed alternative sources of protein as pt struggling with meats and certain protein foods at this time.   Reviewed taste changes; provided tips for managing taste changes including using condiments, lemon hard candies, etc  Suggested baking soda/salt rinses before meals/bed  Provided list of High Calorie snack ideas     Nutrition Education       Coordination of Care       There are no Patient Instructions on file for this visit.    Nutrition Monitoring and Evaluation                     "

## 2024-12-17 NOTE — ASSESSMENT & PLAN NOTE
Now s/p allo MRD pBSCT. Counts waxing and waning, likely early graft fluctuations, though need to closely monitor. Last filgrastim 12/13/24, ANC today 1.44.  Scheduled for T+30 BM on 12/26/24.

## 2024-12-18 LAB
BB ANTIBODY IDENTIFICATION: NORMAL
CASE #: NORMAL
CMV DNA SERPL NAA+PROBE-LOG IU: NORMAL {LOG_IU}/ML
EBV DNA BLD NAA+PROBE-LOG IU: NORMAL {LOG_IU}/ML
LABORATORY COMMENT REPORT: NOT DETECTED
LABORATORY COMMENT REPORT: NOT DETECTED
PATH REV-IMMUNOHEMATOLOGY-PR30: NORMAL

## 2024-12-19 ENCOUNTER — OFFICE VISIT (OUTPATIENT)
Dept: HEMATOLOGY/ONCOLOGY | Facility: HOSPITAL | Age: 69
End: 2024-12-19
Payer: MEDICARE

## 2024-12-19 ENCOUNTER — INFUSION (OUTPATIENT)
Dept: HEMATOLOGY/ONCOLOGY | Facility: HOSPITAL | Age: 69
End: 2024-12-19
Payer: MEDICARE

## 2024-12-19 ENCOUNTER — NUTRITION (OUTPATIENT)
Dept: HEMATOLOGY/ONCOLOGY | Facility: HOSPITAL | Age: 69
End: 2024-12-19

## 2024-12-19 VITALS
WEIGHT: 174.38 LBS | DIASTOLIC BLOOD PRESSURE: 76 MMHG | SYSTOLIC BLOOD PRESSURE: 112 MMHG | HEART RATE: 100 BPM | RESPIRATION RATE: 18 BRPM | OXYGEN SATURATION: 100 % | BODY MASS INDEX: 27.66 KG/M2 | TEMPERATURE: 98.1 F

## 2024-12-19 VITALS — HEIGHT: 67 IN | WEIGHT: 174.38 LBS | BODY MASS INDEX: 27.37 KG/M2

## 2024-12-19 DIAGNOSIS — C92.00 ACUTE MYELOID LEUKEMIA NOT HAVING ACHIEVED REMISSION (MULTI): ICD-10-CM

## 2024-12-19 DIAGNOSIS — E83.42 HYPOMAGNESEMIA: ICD-10-CM

## 2024-12-19 DIAGNOSIS — E87.6 HYPOKALEMIA: ICD-10-CM

## 2024-12-19 DIAGNOSIS — Z94.84 HISTORY OF ALLOGENEIC STEM CELL TRANSPLANT (MULTI): ICD-10-CM

## 2024-12-19 DIAGNOSIS — C92.01 ACUTE MYELOID LEUKEMIA IN REMISSION (MULTI): ICD-10-CM

## 2024-12-19 DIAGNOSIS — C92.01 AML (ACUTE MYELOID LEUKEMIA) IN REMISSION (MULTI): Primary | ICD-10-CM

## 2024-12-19 DIAGNOSIS — Z94.81 S/P ALLOGENEIC BONE MARROW TRANSPLANT (MULTI): ICD-10-CM

## 2024-12-19 DIAGNOSIS — D47.02 SYSTEMIC MASTOCYTOSIS WITH ASSOCIATED CLONAL HEMATOLOGICAL NON-MAST CELL LINEAGE DISEASE: ICD-10-CM

## 2024-12-19 DIAGNOSIS — D84.9 IMMUNOCOMPROMISED STATE: ICD-10-CM

## 2024-12-19 DIAGNOSIS — R11.2 CHEMOTHERAPY INDUCED NAUSEA AND VOMITING: ICD-10-CM

## 2024-12-19 DIAGNOSIS — T45.1X5A CHEMOTHERAPY INDUCED NAUSEA AND VOMITING: ICD-10-CM

## 2024-12-19 LAB
ABO GROUP (TYPE) IN BLOOD: NORMAL
ALBUMIN SERPL BCP-MCNC: 4 G/DL (ref 3.4–5)
ALP SERPL-CCNC: 57 U/L (ref 33–136)
ALT SERPL W P-5'-P-CCNC: 11 U/L (ref 10–52)
ANION GAP SERPL CALC-SCNC: 10 MMOL/L (ref 10–20)
ANTIBODY SCREEN: NORMAL
AST SERPL W P-5'-P-CCNC: 14 U/L (ref 9–39)
BASOPHILS # BLD MANUAL: 0.04 X10*3/UL (ref 0–0.1)
BASOPHILS NFR BLD MANUAL: 2 %
BILIRUB SERPL-MCNC: 0.7 MG/DL (ref 0–1.2)
BUN SERPL-MCNC: 10 MG/DL (ref 6–23)
BURR CELLS BLD QL SMEAR: ABNORMAL
CALCIUM SERPL-MCNC: 8.6 MG/DL (ref 8.6–10.3)
CHLORIDE SERPL-SCNC: 102 MMOL/L (ref 98–107)
CO2 SERPL-SCNC: 26 MMOL/L (ref 21–32)
CREAT SERPL-MCNC: 1.11 MG/DL (ref 0.5–1.3)
DACRYOCYTES BLD QL SMEAR: ABNORMAL
EGFRCR SERPLBLD CKD-EPI 2021: 72 ML/MIN/1.73M*2
EOSINOPHIL # BLD MANUAL: 0.36 X10*3/UL (ref 0–0.7)
EOSINOPHIL NFR BLD MANUAL: 18 %
ERYTHROCYTE [DISTWIDTH] IN BLOOD BY AUTOMATED COUNT: 17.9 % (ref 11.5–14.5)
GLUCOSE SERPL-MCNC: 104 MG/DL (ref 74–99)
HCT VFR BLD AUTO: 23.6 % (ref 41–52)
HGB BLD-MCNC: 8.2 G/DL (ref 13.5–17.5)
IMM GRANULOCYTES # BLD AUTO: 0.04 X10*3/UL (ref 0–0.7)
IMM GRANULOCYTES NFR BLD AUTO: 2 % (ref 0–0.9)
LDH SERPL L TO P-CCNC: 145 U/L (ref 84–246)
LYMPHOCYTES # BLD MANUAL: 0.26 X10*3/UL (ref 1.2–4.8)
LYMPHOCYTES NFR BLD MANUAL: 13 %
MAGNESIUM SERPL-MCNC: 1.52 MG/DL (ref 1.6–2.4)
MCH RBC QN AUTO: 29.9 PG (ref 26–34)
MCHC RBC AUTO-ENTMCNC: 34.7 G/DL (ref 32–36)
MCV RBC AUTO: 86 FL (ref 80–100)
MONOCYTES # BLD MANUAL: 0.3 X10*3/UL (ref 0.1–1)
MONOCYTES NFR BLD MANUAL: 15 %
NEUTROPHILS # BLD MANUAL: 1.04 X10*3/UL (ref 1.2–7.7)
NEUTS BAND # BLD MANUAL: 0.12 X10*3/UL (ref 0–0.7)
NEUTS BAND NFR BLD MANUAL: 6 %
NEUTS SEG # BLD MANUAL: 0.92 X10*3/UL (ref 1.2–7)
NEUTS SEG NFR BLD MANUAL: 46 %
NRBC BLD-RTO: 0 /100 WBCS (ref 0–0)
OVALOCYTES BLD QL SMEAR: ABNORMAL
PLATELET # BLD AUTO: 31 X10*3/UL (ref 150–450)
POLYCHROMASIA BLD QL SMEAR: ABNORMAL
POTASSIUM SERPL-SCNC: 3.4 MMOL/L (ref 3.5–5.3)
PROT SERPL-MCNC: 5.9 G/DL (ref 6.4–8.2)
RBC # BLD AUTO: 2.74 X10*6/UL (ref 4.5–5.9)
RBC MORPH BLD: ABNORMAL
RH FACTOR (ANTIGEN D): NORMAL
SCHISTOCYTES BLD QL SMEAR: ABNORMAL
SODIUM SERPL-SCNC: 135 MMOL/L (ref 136–145)
TACROLIMUS BLD-MCNC: 9.6 NG/ML
TOTAL CELLS COUNTED BLD: 100
WBC # BLD AUTO: 2 X10*3/UL (ref 4.4–11.3)

## 2024-12-19 PROCEDURE — 99214 OFFICE O/P EST MOD 30 MIN: CPT

## 2024-12-19 PROCEDURE — 83615 LACTATE (LD) (LDH) ENZYME: CPT

## 2024-12-19 PROCEDURE — 86900 BLOOD TYPING SEROLOGIC ABO: CPT

## 2024-12-19 PROCEDURE — 96365 THER/PROPH/DIAG IV INF INIT: CPT | Mod: INF

## 2024-12-19 PROCEDURE — 1159F MED LIST DOCD IN RCRD: CPT

## 2024-12-19 PROCEDURE — 83735 ASSAY OF MAGNESIUM: CPT

## 2024-12-19 PROCEDURE — 2500000002 HC RX 250 W HCPCS SELF ADMINISTERED DRUGS (ALT 637 FOR MEDICARE OP, ALT 636 FOR OP/ED)

## 2024-12-19 PROCEDURE — 80053 COMPREHEN METABOLIC PANEL: CPT

## 2024-12-19 PROCEDURE — 80197 ASSAY OF TACROLIMUS: CPT

## 2024-12-19 PROCEDURE — 1111F DSCHRG MED/CURRENT MED MERGE: CPT

## 2024-12-19 PROCEDURE — 2500000004 HC RX 250 GENERAL PHARMACY W/ HCPCS (ALT 636 FOR OP/ED)

## 2024-12-19 PROCEDURE — 1160F RVW MEDS BY RX/DR IN RCRD: CPT

## 2024-12-19 PROCEDURE — 85007 BL SMEAR W/DIFF WBC COUNT: CPT

## 2024-12-19 PROCEDURE — 96372 THER/PROPH/DIAG INJ SC/IM: CPT

## 2024-12-19 PROCEDURE — 2500000004 HC RX 250 GENERAL PHARMACY W/ HCPCS (ALT 636 FOR OP/ED): Mod: JZ,JG | Performed by: STUDENT IN AN ORGANIZED HEALTH CARE EDUCATION/TRAINING PROGRAM

## 2024-12-19 PROCEDURE — 85027 COMPLETE CBC AUTOMATED: CPT

## 2024-12-19 RX ORDER — MAGNESIUM SULFATE HEPTAHYDRATE 40 MG/ML
2 INJECTION, SOLUTION INTRAVENOUS ONCE
Status: CANCELLED | OUTPATIENT
Start: 2024-12-19 | End: 2024-12-19

## 2024-12-19 RX ORDER — HEPARIN SODIUM,PORCINE/PF 10 UNIT/ML
50 SYRINGE (ML) INTRAVENOUS AS NEEDED
OUTPATIENT
Start: 2024-12-19

## 2024-12-19 RX ORDER — DIPHENHYDRAMINE HYDROCHLORIDE 50 MG/ML
50 INJECTION INTRAMUSCULAR; INTRAVENOUS AS NEEDED
OUTPATIENT
Start: 2024-12-20

## 2024-12-19 RX ORDER — ALBUTEROL SULFATE 0.83 MG/ML
3 SOLUTION RESPIRATORY (INHALATION) AS NEEDED
OUTPATIENT
Start: 2024-12-20

## 2024-12-19 RX ORDER — HEPARIN 100 UNIT/ML
500 SYRINGE INTRAVENOUS AS NEEDED
Status: CANCELLED | OUTPATIENT
Start: 2024-12-19

## 2024-12-19 RX ORDER — HEPARIN 100 UNIT/ML
500 SYRINGE INTRAVENOUS AS NEEDED
Status: DISCONTINUED | OUTPATIENT
Start: 2024-12-19 | End: 2024-12-19 | Stop reason: HOSPADM

## 2024-12-19 RX ORDER — POTASSIUM CHLORIDE 750 MG/1
40 TABLET, FILM COATED, EXTENDED RELEASE ORAL ONCE
Status: CANCELLED | OUTPATIENT
Start: 2024-12-19 | End: 2024-12-19

## 2024-12-19 RX ORDER — POTASSIUM CHLORIDE 750 MG/1
40 TABLET, FILM COATED, EXTENDED RELEASE ORAL ONCE
Status: COMPLETED | OUTPATIENT
Start: 2024-12-19 | End: 2024-12-19

## 2024-12-19 RX ORDER — MAGNESIUM SULFATE HEPTAHYDRATE 40 MG/ML
2 INJECTION, SOLUTION INTRAVENOUS ONCE
Status: COMPLETED | OUTPATIENT
Start: 2024-12-19 | End: 2024-12-19

## 2024-12-19 RX ORDER — FAMOTIDINE 10 MG/ML
20 INJECTION INTRAVENOUS ONCE AS NEEDED
OUTPATIENT
Start: 2024-12-20

## 2024-12-19 RX ORDER — EPINEPHRINE 0.3 MG/.3ML
0.3 INJECTION SUBCUTANEOUS EVERY 5 MIN PRN
OUTPATIENT
Start: 2024-12-20

## 2024-12-19 ASSESSMENT — ENCOUNTER SYMPTOMS
COUGH: 0
LEG SWELLING: 0
RESPIRATORY NEGATIVE: 1
HEMATOLOGIC/LYMPHATIC NEGATIVE: 1
DIAPHORESIS: 0
NEUROLOGICAL NEGATIVE: 1
CHILLS: 0
NUMBNESS: 0
MUSCULOSKELETAL NEGATIVE: 1
BRUISES/BLEEDS EASILY: 0
DIZZINESS: 0
PSYCHIATRIC NEGATIVE: 1
ADENOPATHY: 0
CARDIOVASCULAR NEGATIVE: 1
HEADACHES: 0
DIARRHEA: 0
EYES NEGATIVE: 1
CHEST TIGHTNESS: 0
UNEXPECTED WEIGHT CHANGE: 0
FEVER: 0
PALPITATIONS: 0
ENDOCRINE NEGATIVE: 1
LIGHT-HEADEDNESS: 0
ABDOMINAL PAIN: 0
SHORTNESS OF BREATH: 0
NAUSEA: 1
FATIGUE: 1
CONSTIPATION: 0

## 2024-12-19 ASSESSMENT — PAIN SCALES - GENERAL: PAINLEVEL_OUTOF10: 0-NO PAIN

## 2024-12-19 NOTE — PROGRESS NOTES
Patient ID: Jin Reynolds is a 69 y.o. male.  Referring Physician: Karen Ricketts, [facility]ROLY  [id number] [place] [address]  Primary Care Provider: DELFINO Bone    Date of Service:  12/19/2024    SUBJECTIVE:  Jin Reynolds presents today for follow up unaccompanied. Overall doing well. Energy level is low requiring naps but able to carry on with daily task. Taking Zofran TID and able to eat without getting nauseous. Still having trouble eating as certain foods do not taste good. Drinking ensure and eating foods as tolerated. No s/s of GVHD : no skin rashes, abd pain or cramping, nausea is under control and BM are normal. Denies B symptoms and current cold symptoms.       Review of Systems   Constitutional:  Positive for fatigue. Negative for chills, diaphoresis, fever and unexpected weight change.   HENT:  Negative.  Negative for nosebleeds.    Eyes: Negative.    Respiratory: Negative.  Negative for chest tightness, cough and shortness of breath.    Cardiovascular: Negative.  Negative for chest pain, leg swelling and palpitations.   Gastrointestinal:  Positive for nausea. Negative for abdominal pain, constipation and diarrhea.   Endocrine: Negative.    Genitourinary: Negative.     Musculoskeletal: Negative.    Skin: Negative.  Negative for rash.   Neurological: Negative.  Negative for dizziness, headaches, light-headedness and numbness.   Hematological: Negative.  Negative for adenopathy. Does not bruise/bleed easily.   Psychiatric/Behavioral: Negative.           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   8/23/2023 Initial Diagnosis    DX:  SMOLDERING SYSTEMIC MASTOCYTOSIS  Presented with skin rash and eosinophilia    [facility]ITERIA  (For SM, Need major and 1 minor OR at least 3 minors)  - Multi-focal, dense infiltrates of MC (>= 15 mast cells in aggregates) in BM and/or other extra-cutaneous organs [major]  - In BM or  "extracutaneous organs, >25% MC spindle shaped OR have atypical morphology OR of all MC on BM aspirate smears, >25% are immature or atypical [minor]  - Detection of activating mutation KIT D816V in BM, PB, or other extracutaneous organ [minor]  - Serum tryptase persistently exceeds 20 ng/mL (unless associated clonal myeloid disorder, in which this paramenter is not valid) [minor]    \"B\" FINDINGS  - BM biopsy showing >30% infiltration (focal, dense aggregates) and/or serum tryptase > 200 ng/mL  - Signs of dysplasia or myeloproliferation, in non-MC lineages but insufficient criteria for definitive diagnosis of AHNMD with normal or slightly abnormal blood counts    \"C\" FINDINGS  None       8/23/2023 Biopsy    BONE MARROW (8/23/23)  Hypercellular (90-95%) with trilineage hematopoiesis, granulocytic hyperplasia, eosinophilia, and increased atypical mast cells  IP:  CD34+, +, CD7(bright)+, cCD3-, CD33-, CD15-, CD13+ blast population  IHC:  + increased spindle-shaped mast cells (15% cellularity), CD2-  Myeloid NGS:  CBL (31%), cKIT D816V (30%), RUNX1 x 2 (19%, 29%), SRSF2 (47%) [ASXL1 negative]  FISH:  PDGFRb negative    SERUM TRYPTASE  228 (8/23/23)  268 (9/5/23)     2/13/2024 -  Molecular Therapy    AVAPRITINIB   - Starting dose 25 mg daily (non-Adv SM)     7/16/2024 - 10/21/2024 Chemotherapy    Venetoclax / AzaCITIDine  - C1D1 7/17/24:  complicated by TLS, venetoclax held after D1  - Post C1 BM (8/28/24):  hypercellular with >50% blasts, background systemic mastocytosis  - C2D1 9/5/24:  venetoclax restarted with C2, no TLS  - Post C2 BM (9/25/24):  hypocellular (<5%), atypical myeloblasts by IP/IHC (1-2%), persistent systemic mastocytosis  - C3D1 10/15/24:  delayed 1 week for count recovery  - Post C3 BM (10/23/24):  hypercellular with systemic mastocytosis, no increase blasts, 0.2% atypical blasts by IP, NGS w/ CBL (73%), cKIT (4%), RUNX1 x 2 (4%, 38%), SRSF2 (40%)       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    " CONDITIONING Fludarabine, melphalan, and TBI   DONOR Matched sibling   MATCH GRADE 12/12   SEX MATCH Mismatched   ABO DONOR O pos   ABO RECIPIENT O pos   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, Mycophenolate, and Tacrolimus   GRAFT SOURCE Peripheral blood          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)   6/27/2024 Initial Diagnosis    ICC 2022 DX: Acute myeloid leukemia, NOS (>=20% blasts)  [id number] AML Risk Stratification: INTERMEDIATE: Cytogenetic and/or molecular abnormalities not classified as favorable or adverse  Presented with pancytopenia and circulating blasts    BONE MARROW (06/27/2024)  Marrow replaced by blasts, fibrotic foci, some atypical + cells; 7-8% circulating blasts; aspicular  - Unable to perform additional studies due to aspicular specimen    PERIPHERAL BLOOD (6/27/2024)  FISH:  positive for gain RUNX1    PERIPHERAL BLOOD (7/1/24)  IP:  abnl myeloblast population (CD34+, CD7+, CD4+, CD13+, CD38+, CD11+ dim, HLA=DR+, TdT+, CD33-)  Myeloid NGS: CBL C404Y (41%), KIT D816V (8%), RUNX1 x2 (8%, 30%), SRSF2 (37%)     7/16/2024 - 10/21/2024 Chemotherapy    Venetoclax / AzaCITIDine  - C1D1 7/17/24:  complicated by TLS, venetoclax held after D1  - Post C1 BM (8/28/24):  hypercellular with >50% blasts, background systemic mastocytosis  - C2D1 9/5/24:  venetoclax restarted with C2, no TLS  - Post C2 BM (9/25/24):  hypocellular (<5%), atypical myeloblasts by IP/IHC (1-2%), persistent systemic mastocytosis  - C3D1 10/15/24:  delayed 1 week for count recovery  - Post C3 BM (10/23/24):  hypercellular with systemic mastocytosis, no increase blasts, 0.2% atypical blasts by IP, NGS w/ CBL (73%), cKIT (4%), RUNX1 x 2 (4%, 38%), SRSF2 (40%)       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    CONDITIONING Fludarabine, melphalan, and TBI   DONOR Matched sibling   MATCH GRADE 12/12   SEX MATCH Mismatched   ABO DONOR O pos   ABO RECIPIENT O pos   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,  Mycophenolate, and Tacrolimus   GRAFT SOURCE Peripheral blood               OBJECTIVE:  KPS: Karnofsky Score: 90 - Able to carry on normal activity; minor signs or symptoms of disease    VS:  There were no vitals taken for this visit.  BSA: There is no height or weight on file to calculate BSA.    Physical Exam  Vitals reviewed.   Constitutional:       Appearance: Normal appearance.   HENT:      Head: Normocephalic and atraumatic.      Mouth/Throat:      Mouth: Mucous membranes are moist.      Pharynx: Oropharynx is clear.   Eyes:      Conjunctiva/sclera: Conjunctivae normal.      Pupils: Pupils are equal, round, and reactive to light.   Cardiovascular:      Rate and Rhythm: Normal rate and regular rhythm.      Pulses: Normal pulses.      Heart sounds: Normal heart sounds.   Pulmonary:      Effort: Pulmonary effort is normal.      Breath sounds: Normal breath sounds.   Abdominal:      General: Bowel sounds are normal.      Palpations: Abdomen is soft.   Musculoskeletal:         General: Normal range of motion.      Cervical back: Normal range of motion.      Right lower leg: No edema.      Left lower leg: No edema.   Skin:     General: Skin is warm and dry.      Capillary Refill: Capillary refill takes less than 2 seconds.   Neurological:      General: No focal deficit present.      Mental Status: He is alert and oriented to person, place, and time.   Psychiatric:         Mood and Affect: Mood normal.         Behavior: Behavior normal.         Thought Content: Thought content normal.         Judgment: Judgment normal.     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 Counts are relatively stable though WBC decreasing, ANC now 1.04 today down from 1.44 on 12/17. Likely early graft fluctuations, though need to closely monitor. May require  filgrastim if persistent. Scheduled for T+30 BM on 12/26/24.   History of allogeneic stem cell transplant (Multi)  T+28 s/p allo MRD pBSCT. Tacrolimus level today PENDING on 5 mg in the am and 4 mg in the pm. Last level on 12/17 was 7.8. Continuing on current dose.  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 No current signs of infections. Viral monitoring in place. Continue prophylaxis with acyclovir, posaconazole. Starting Bactrim Friday 12/20/24. May also require levofloxacin if ANC trends to <500.   Hypokalemia  -K+ level 3.4 today. Gave 40 mEq PO  -Picked up RX yesterday, took 20 mEq last night  -Continue 20 mEq daily  Hypomagnesemia  -Mag level 1.54 today  -Gave 2 g IV  -Continue Mag/Amino Acid Chelate 133 mg tablets  Chemotherapy induced nausea and vomiting  -Taking Zofran before meals (TID)  -Able to eat w/o nausea and vomiting  -Eating still a challenge as most things do not taste good  -Drinking an ensure to assist with nutrition    Current Outpatient Medications   Medication Instructions    acyclovir (ZOVIRAX) 400 mg, oral, Every 12 hours    amLODIPine (NORVASC) 10 mg, oral, Daily    cholecalciferol (VITAMIN D-3) 2,000 Units, oral, Daily    famotidine (PEPCID) 20 mg, oral, Daily    fexofenadine (ALLEGRA) 180 mg, Daily    magnesium, amino acid chelate, 133 mg tablet 266 mg, oral, 3 times daily, Or as instructed on your medication list.    mycophenolate (CELLCEPT) 1,000 mg, oral, 3 times daily    ondansetron (ZOFRAN) 8 mg, oral, Every 8 hours PRN    posaconazole (NOXAFIL) 300 mg, oral, Daily, Do not crush, chew, or split. This is to prevent fungal infections.    potassium chloride CR (Klor-Con M20) 20 mEq ER tablet 20 mEq, oral, Daily, Do not crush or chew.    prochlorperazine (COMPAZINE) 10 mg, oral, Every 6 hours PRN    sulfamethoxazole-trimethoprim (Bactrim DS) 800-160 mg  tablet 1 tablet, oral, 3 times weekly, Take on Mondays, Wednesdays, and Fridays. Do not start until instructed.    tacrolimus (PROGRAF) 5 mg, oral, Every morning, Take 5 mg in the morning and 6 mg in the evening or as instructed on your medication list.    tacrolimus (PROGRAF) 6 mg, oral, Nightly, Or as instructed on your medication list.    ursodiol (ACTIGALL) 300 mg, oral, 3 times daily        Laboratory:  The pertinent laboratory results were reviewed and discussed with the patient.    Lab Results   Component Value Date    WBC 2.0 (L) 12/19/2024    HCT 23.6 (L) 12/19/2024    HGB 8.2 (L) 12/19/2024    PLT 31 (LL) 12/19/2024    K 3.4 (L) 12/19/2024    CALCIUM 8.6 12/19/2024     (L) 12/19/2024    MG 1.52 (L) 12/19/2024    BILITOT 0.7 12/19/2024    ALT 11 12/19/2024    AST 14 12/19/2024    BUN 10 12/19/2024    CREATININE 1.11 12/19/2024    PHOS 2.3 (L) 12/07/2024      Note: for a comprehensive list of the patient's lab results, access the Results Review activity.    RTC: Tues/Thurs/Sat count checks and ZORA follow up  12/24 Slade MIRANDA follow up  12/26 [facility]  1/8 ECHO and cardiology    Appointment requested through 1/18/25    Indira Delong, APRN-CNP

## 2024-12-19 NOTE — PROGRESS NOTES
"NUTRITION FOLLOW UP NOTE    Reason for Visit:  Jin Reynolds is a 69 y.o. 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    Transplant c/b by mucositis, CINV and diarrhea     Currently T+28    Pt and wife seen today in infusion.     Lab Results   Component Value Date/Time    GLUCOSE 104 (H) 12/19/2024 0926     (L) 12/19/2024 0926    K 3.4 (L) 12/19/2024 0926     12/19/2024 0926    CO2 26 12/19/2024 0926    ANIONGAP 10 12/19/2024 0926    BUN 10 12/19/2024 0926    CREATININE 1.11 12/19/2024 0926    EGFR 72 12/19/2024 0926    CALCIUM 8.6 12/19/2024 0926    ALBUMIN 4.0 12/19/2024 0926    ALKPHOS 57 12/19/2024 0926    PROT 5.9 (L) 12/19/2024 0926    AST 14 12/19/2024 0926    BILITOT 0.7 12/19/2024 0926    ALT 11 12/19/2024 0926     Mg 1.52 (L)--just started taking oral Mg yesterday.     Lab Results   Component Value Date    WBC 2.0 (L) 12/19/2024    HGB 8.2 (L) 12/19/2024    HCT 23.6 (L) 12/19/2024    MCV 86 12/19/2024    PLT 31 (LL) 12/19/2024       Lab Results   Component Value Date/Time    VITD25 27 (L) 02/05/2024 0807       Anthropometrics:  Anthropometrics  Height: 169.1 cm (5' 6.58\")  Weight: 79.1 kg (174 lb 6.1 oz)  BMI (Calculated): 27.66  IBW/kg (Dietitian Calculated): 65.9 kg  Percent of IBW: 120 %    *Wt at transplant admit: (11/15) 86.1 kg  *Wt at first post-transplant visit: (12/9) 80.7 kg     *Pt with 7 kg (8%) wt loss x 6 weeks    Wt Readings from Last 10 Encounters:   12/19/24 79.1 kg (174 lb 6.1 oz)   12/19/24 79.1 kg (174 lb 6.1 oz)   12/17/24 78.8 kg (173 lb 11.6 oz)   12/17/24 78.8 kg (173 lb 11.6 oz)   12/14/24 80.3 kg (177 lb 0.5 oz)   12/13/24 80.7 kg (177 lb 14.6 oz)   12/13/24 80.7 kg (178 lb)   12/11/24 81.7 kg (180 lb 0.1 oz)   12/09/24 80.7 kg (177 lb 14.6 oz)   12/09/24 80.7 kg (178 lb)   11/15/24        86.1 kg--at time of transplant  10/31/24        87.5 kg   10/18/24        84.0 kg  09/20/24        86.8 kg  08/29/24        89.2 kg  07/30/24        95.9 kg "   06/21/24        87.5 kg         Food And Nutrient Intake:      Pt seen earlier this week and reported having vomiting with solid foods; had not been taking any anti-emetics.   Told to take Zofran 30 min before meals TID; he was also told he could use Compazine between Zofran if needed.   Since doing so, pt has not had further vomiting; stomach feels a little better and he has been able to keep solid foods down.  Fluid intakes good--has had no issues tolerating.     Has been eating lighter foods in the past few days; still leery of trying meats.   Was able to eat and keep down egg sandwich last night.   Has also been eating bone broth with rice and crax added as well as yogurt.   Now drinking Ensure Original daily.  Tastes remain barrier to eating; did not tolerate trial of lemon hard candies (associated with vomiting).    Still not feeling great, overall. No significant improvements.   Energy remains low; walks slow.     *Pt noted to have been eating better and tolerating PO intakes better after initial hospital discharge.  Intakes have actually digressed in week-10 days.     Ensure Original:  provides 240 kcals and 9 gm protein each                                                           Nutrition Focused Physical Exam Findings:      Subcutaneous Fat Loss  Orbital Fat Pads: Mild-Moderate (slight dark circles and slight hollowing)  Buccal Fat Pads: Mild-Moderate (flat cheeks, minimal bounce)  Triceps: Mild-Moderate (less than ample fat tissue)  Ribs: Defer    Muscle Wasting  Temporalis: Mild-Moderate (slight depression)  Pectoralis (Clavicular Region): Mild-Moderate (some protrusion of clavicle)  Deltoid/Trapezius: Mild-Moderate (slight protrusion of acromion process)  Interosseous: Mild-Moderate (slightly depressed area between thumb and forefinger)  Trapezius/Infraspinatus/Supraspinatus (Scapular Region): Mild-Moderate (slight protrusion of scapula)  Quadriceps: Defer  Gastrocnemius: Defer              Energy  "Needs  Calculated Energy Needs Using Equations  Height: 169.1 cm (5' 6.58\")  Estimated Energy Needs  Total Energy Estimated Needs (kCal):  (8459-8732)  Total Estimated Energy Need per Day (kCal/kg):  (28-30)  Estimated Fluid Needs  Total Fluid Estimated Needs (mL): 2300 mL  Total Fluid Estimated Needs (mL/kg): 30 mL/kg  Estimated Protein Needs  Total Protein Estimated Needs (g):  ()  Total Protein Estimated Needs (g/kg):  (1.1-1.3)        Nutrition Diagnosis   Malnutrition Diagnosis  Patient has Malnutrition Diagnosis: Yes  Diagnosis Status: Ongoing  Malnutrition Diagnosis: Mild malnutrition related to acute disease or injury  As Evidenced by: decreased appetite with decline in oral intakes, persistent dysgeusia, recent N/V and noted signficant wt loss since time of transplant.    Pt remains mildly malnourished as appetite/intakes have declined since initial visit.  N/V has improved with consistent use of antiemetics.  Dysgeusia also contributing to decreased oral intakes.       If n/v does not resolve with antiemetics, may need to consider UGI GVHD and need for Budesonide.     Continues to benefit from use of ONS daily.        Nutrition Interventions/Recommendations   Nutrition Prescription   High Protein, High Calorie  Food Safety          Nutrition Education   Continue with alternating Zofran/Compazine doses as nausea/upset stomach persists (although improved)  Eating smaller, more frequent snacks vs meals at this time.  Suggested using Ensure Plus or HP for additional nutrition  Encourage PO fluid intakes; prefer calorie containing beverages at this time vs water due to decreased intakes   If upper GI symptoms persist, consider start of Budesonide    Coordination of Care   BMT team        Nutrition Monitoring and Evaluation      Will continue to f/up and monitor weight, labs, PO intakes, taste changes and GI symptoms PRN.                      "

## 2024-12-19 NOTE — PROGRESS NOTES
-Patient presents to the clinic today for count check  -Labs drawn and reviewed with the patient in detail  -Patient's provider today was DALE Delong CNP  -Based on lab levels and provider recommendations, they needed 2g mag and 40 meq Potassium  - Seg neutrophils were 0.92. Pt received filgrastim-sndz subcutaneous in right lower abdomen.   -Reviewed plan, medications, and schedule with the patient in detail, all questions answered.

## 2024-12-19 NOTE — ASSESSMENT & PLAN NOTE
-K+ level 3.4 today. Gave 40 mEq PO  -Picked up RX yesterday, took 20 mEq last night  -Continue 20 mEq daily

## 2024-12-19 NOTE — ASSESSMENT & PLAN NOTE
At high risk for infections. No current signs of infections. Viral monitoring in place. Continue prophylaxis with acyclovir, posaconazole. Starting Bactrim Friday 12/20/24. May also require levofloxacin if ANC trends to <500.

## 2024-12-20 LAB
ADENOVIRUS QPCR,PLASMA, VIRC: NOT DETECTED COPIES/ML
BB ANTIBODY IDENTIFICATION: NORMAL
CASE #: NORMAL
CMV DNA SERPL NAA+PROBE-LOG IU: NORMAL {LOG_IU}/ML
HUMAN HERPESVIRUS-6 PCR PLASMA: NOT DETECTED COPIES/ML
LABORATORY COMMENT REPORT: NOT DETECTED
PATH REV-IMMUNOHEMATOLOGY-PR30: NORMAL

## 2024-12-20 ASSESSMENT — ENCOUNTER SYMPTOMS
ENDOCRINE NEGATIVE: 1
CARDIOVASCULAR NEGATIVE: 1
HEMATOLOGIC/LYMPHATIC NEGATIVE: 1
EYES NEGATIVE: 1
PSYCHIATRIC NEGATIVE: 1

## 2024-12-20 NOTE — PROGRESS NOTES
"maPatient ID:  Jin Reynolds is a 69 y.o. male.  Referring Physician:   ONC Dr Dawson  Primary Care Provider:  ROSA Bone-CNP    Assessment/Plan    12/21/24 T+30. Increase potassium 20mEq bid. Decrease tacrolimus 5mg am and 3mg pm. No signs/sx of GVHD/infection.   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   8/23/2023 Initial Diagnosis    DX:  SMOLDERING SYSTEMIC MASTOCYTOSIS  Presented with skin rash and eosinophilia    [facility] DIAGNOSTIC CRITERIA  (For SM, Need major and 1 minor OR at least 3 minors)  - Multi-focal, dense infiltrates of MC (>= 15 mast cells in aggregates) in BM and/or other extra-cutaneous organs [major]  - In BM or extracutaneous organs, >25% MC spindle shaped OR have atypical morphology OR of all MC on BM aspirate smears, >25% are immature or atypical [minor]  - Detection of activating mutation KIT D816V in BM, PB, or other extracutaneous organ [minor]  - Serum tryptase persistently exceeds 20 ng/mL (unless associated clonal myeloid disorder, in which this paramenter is not valid) [minor]    \"B\" FINDINGS  - BM biopsy showing >30% infiltration (focal, dense aggregates) and/or serum tryptase > 200 ng/mL  - Signs of dysplasia or myeloproliferation, in non-MC lineages but insufficient criteria for definitive diagnosis of AHNMD with normal or slightly abnormal blood counts    \"C\" FINDINGS  None       8/23/2023 Biopsy    BONE MARROW (8/23/23)  Hypercellular (90-95%) with trilineage hematopoiesis, granulocytic hyperplasia, eosinophilia, and increased atypical mast cells  IP:  CD34+, +, CD7(bright)+, cCD3-, CD33-, CD15-, CD13+ blast population  IHC:  + increased spindle-shaped mast cells (15% cellularity), CD2-  Myeloid NGS:  CBL (31%), cKIT D816V (30%), RUNX1 x 2 (19%, 29%), SRSF2 (47%) [ASXL1 negative]  FISH:  PDGFRb negative    SERUM TRYPTASE  228 (8/23/23)  268 (9/5/23)     2/13/2024 -  Molecular " Therapy    AVAPRITINIB   - Starting dose 25 mg daily (non-Adv SM)     7/16/2024 - 10/21/2024 Chemotherapy    Venetoclax / AzaCITIDine  - C1D1 7/17/24:  complicated by TLS, venetoclax held after D1  - Post C1 BM (8/28/24):  hypercellular with >50% blasts, background systemic mastocytosis  - C2D1 9/5/24:  venetoclax restarted with C2, no TLS  - Post C2 BM (9/25/24):  hypocellular (<5%), atypical myeloblasts by IP/IHC (1-2%), persistent systemic mastocytosis  - C3D1 10/15/24:  delayed 1 week for count recovery  - Post C3 BM (10/23/24):  hypercellular with systemic mastocytosis, no increase blasts, 0.2% atypical blasts by IP, NGS w/ CBL (73%), cKIT (4%), RUNX1 x 2 (4%, 38%), SRSF2 (40%)       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    CONDITIONING Fludarabine, melphalan, and TBI   DONOR Matched sibling   MATCH GRADE 12/12   SEX MATCH Mismatched   ABO DONOR O pos   ABO RECIPIENT O pos   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, Mycophenolate, and Tacrolimus   GRAFT SOURCE Peripheral blood          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)   6/27/2024 Initial Diagnosis    ICC 2022 DX: Acute myeloid leukemia, NOS (>=20% blasts)  [id number] AML Risk Stratification: INTERMEDIATE: Cytogenetic and/or molecular abnormalities not classified as favorable or adverse  Presented with pancytopenia and circulating blasts    BONE MARROW (06/27/2024)  Marrow replaced by blasts, fibrotic foci, some atypical + cells; 7-8% circulating blasts; aspicular  - Unable to perform additional studies due to aspicular specimen    PERIPHERAL BLOOD (6/27/2024)  FISH:  positive for gain RUNX1    PERIPHERAL BLOOD (7/1/24)  IP:  abnl myeloblast population (CD34+, CD7+, CD4+, CD13+, CD38+, CD11+ dim, HLA=DR+, TdT+, CD33-)  Myeloid NGS: CBL C404Y (41%), KIT D816V (8%), RUNX1 x2 (8%, 30%), SRSF2 (37%)     7/16/2024 - 10/21/2024 Chemotherapy    Venetoclax / AzaCITIDine  - C1D1 7/17/24:  complicated by TLS, venetoclax held after D1  - Post C1 BM  (8/28/24):  hypercellular with >50% blasts, background systemic mastocytosis  - C2D1 9/5/24:  venetoclax restarted with C2, no TLS  - Post C2 BM (9/25/24):  hypocellular (<5%), atypical myeloblasts by IP/IHC (1-2%), persistent systemic mastocytosis  - C3D1 10/15/24:  delayed 1 week for count recovery  - Post C3 BM (10/23/24):  hypercellular with systemic mastocytosis, no increase blasts, 0.2% atypical blasts by IP, NGS w/ CBL (73%), cKIT (4%), RUNX1 x 2 (4%, 38%), SRSF2 (40%)       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    CONDITIONING Fludarabine, melphalan, and TBI   DONOR Matched sibling   MATCH GRADE 12/12   SEX MATCH Mismatched   ABO DONOR O pos   ABO RECIPIENT O pos   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, Mycophenolate, and Tacrolimus   GRAFT SOURCE Peripheral blood             Past Medical History  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     Surgical History  He has no past surgical history on file.     Social History  He reports that he has never smoked. He has never used smokeless tobacco. He reports current drug use. Drug: Marijuana. He reports that he does not drink alcohol.     ASSESSMENT/PLAN  Systemic mastocytosis  Status post MRD PBSCT.  T= 0 11/21/2024  Prep:  flu, maurilio, TBI   GVHD Proph: PT Cy, MMF, tac.     DATE DAY SOURCE MORPHOLOGY MRD WHOLE CHIMERISM   (% donor) CD3 CHIMERISM   (% donor) CD33 CHIMERISM   (% donor)     D+30 PB       12/21 12/21     D+30 BM  12/26 12/26         D+60 PB               D+100 PB               D+100 BM                Monitor for post-transplant hemolysis.    12/21  Haptoglobin 42 12/17  UPC ratio 0.24 12/17    Neutropenia  12/13/24 and 12/19/24 Dose of Neupogen given.      GVHD:   Tacrolimus level 11.3 on 5mg am and 4mg pm. 12/21 Decrease tacro 5mg am and 3mg pm.   MMF until day 35.     Weights:  Pre-transplant: 87.6kg  Upon SCT discharge: 80.7kg  Today's  "weight: 77.9kg.     Infectious Disease & Immune Reconstitution  Prophylaxis:  Antiviral prophylaxis: acyclovir  Antifungal: posaconazole  PCP prophylaxis: 12/21/24 Start Bactrim     Hypogammaglobulinemia  IgG level requested 12/24  IVIG for level <400      Active Surveillance:  CMV PCR: ND 12/19  EBV PCR: ND 12/17  Adenovirus: ND 12/17  HHV6: ND 12/17  Toxo NR allo viral panel     Immunizations:  Covid vaccine series  Seasonal influenza vaccine  Will plan to begin immunizations at T+6mths depending on degree of immunosuppression     Infectious Disease follow up evaluation: Requested     Immunodeficiency panel 100 days, 6mos and 1 year.     HTN  Continue medications amlodipine  ECHO and onc-card follow up evaluation upon discharge.     Hypomagnesemia  Mg 2g IV    Hypokalemia  12/21 Increase oral potassium 20mEq bid      Medication reconciliation  12/21 All meds reviewed. Updated list provided with exception of tacrolimus dose due to level resulted after appt.    IV access  R chest wall Mediport     Psychosocial  Caregiver Marry (wife)  Lodging [place]      Subjective    History of Present Illness:  Mr Reynolds presents to the clinic today for routine follow up post transplant evaluation.    Drove today. Wife not feeling well.    Energy level low. Some naps. Moves around house slowly. Legs feel weak since hospital stay. Light walking on treadmill. Wobbly on feet. No falls. No assistive devices. No falls.     Drinking well 2 pitchers/day. Trying to eat 2 meals a day-breakfast and dinner. Snacks. \"RD wants me to eat more protein.\" Soft stools 2x day.     Stopped allopurinol.     Will start Bactrim on Monday.                  Review of Systems   Constitutional:  Positive for appetite change and fatigue.   HENT:  Negative.     Eyes: Negative.    Respiratory:  Positive for cough (Some coughing when phlegm builds up.).    Cardiovascular: Negative.    Gastrointestinal:  Positive for constipation and vomiting (Spit up " phlegm.).   Endocrine: Negative.    Genitourinary: Negative.     Skin: Negative.    Neurological:  Positive for extremity weakness.   Hematological: Negative.    Psychiatric/Behavioral: Negative.              Objective        Physical Exam  Vitals reviewed.   Constitutional:       Appearance: Normal appearance.   HENT:      Head: Normocephalic and atraumatic.      Comments: Scalp alopecia.      Nose: Nose normal.      Mouth/Throat:      Mouth: Mucous membranes are moist.   Eyes:      Pupils: Pupils are equal, round, and reactive to light.   Cardiovascular:      Rate and Rhythm: Normal rate and regular rhythm.      Pulses: Normal pulses.      Heart sounds: Normal heart sounds.   Pulmonary:      Effort: Pulmonary effort is normal.      Breath sounds: Normal breath sounds.   Abdominal:      General: Abdomen is flat. Bowel sounds are normal.      Palpations: Abdomen is soft.   Musculoskeletal:         General: Normal range of motion.      Cervical back: Normal range of motion.   Skin:     General: Skin is warm and dry.      Comments: Mediport site clean.   Neurological:      General: No focal deficit present.      Mental Status: He is alert and oriented to person, place, and time.   Psychiatric:         Mood and Affect: Mood normal.         RTC   12/24, 12/26 with bmbx, 12/28 12/31, 1/2, 1/4 1//7, 1/8 onc-echo/card

## 2024-12-21 ENCOUNTER — INFUSION (OUTPATIENT)
Dept: HEMATOLOGY/ONCOLOGY | Facility: HOSPITAL | Age: 69
End: 2024-12-21
Payer: MEDICARE

## 2024-12-21 ENCOUNTER — OFFICE VISIT (OUTPATIENT)
Dept: HEMATOLOGY/ONCOLOGY | Facility: HOSPITAL | Age: 69
End: 2024-12-21
Payer: MEDICARE

## 2024-12-21 VITALS
OXYGEN SATURATION: 100 % | WEIGHT: 171.74 LBS | SYSTOLIC BLOOD PRESSURE: 136 MMHG | RESPIRATION RATE: 18 BRPM | DIASTOLIC BLOOD PRESSURE: 76 MMHG | HEART RATE: 86 BPM | TEMPERATURE: 98.6 F | BODY MASS INDEX: 27.24 KG/M2

## 2024-12-21 DIAGNOSIS — D47.02 SYSTEMIC MASTOCYTOSIS WITH ASSOCIATED CLONAL HEMATOLOGICAL NON-MAST CELL LINEAGE DISEASE: ICD-10-CM

## 2024-12-21 DIAGNOSIS — Z94.81 S/P ALLOGENEIC BONE MARROW TRANSPLANT (MULTI): ICD-10-CM

## 2024-12-21 DIAGNOSIS — C92.01 ACUTE MYELOID LEUKEMIA IN REMISSION (MULTI): ICD-10-CM

## 2024-12-21 DIAGNOSIS — C92.00 ACUTE MYELOID LEUKEMIA NOT HAVING ACHIEVED REMISSION (MULTI): ICD-10-CM

## 2024-12-21 LAB
ABO GROUP (TYPE) IN BLOOD: NORMAL
ALBUMIN SERPL BCP-MCNC: 4 G/DL (ref 3.4–5)
ALP SERPL-CCNC: 74 U/L (ref 33–136)
ALT SERPL W P-5'-P-CCNC: 12 U/L (ref 10–52)
ANION GAP SERPL CALC-SCNC: 10 MMOL/L (ref 10–20)
ANTIBODY SCREEN: NORMAL
AST SERPL W P-5'-P-CCNC: 16 U/L (ref 9–39)
BASOPHILS # BLD AUTO: 0.08 X10*3/UL (ref 0–0.1)
BASOPHILS NFR BLD AUTO: 0.5 %
BILIRUB SERPL-MCNC: 0.9 MG/DL (ref 0–1.2)
BUN SERPL-MCNC: 8 MG/DL (ref 6–23)
BURR CELLS BLD QL SMEAR: NORMAL
CALCIUM SERPL-MCNC: 8.3 MG/DL (ref 8.6–10.6)
CHLORIDE SERPL-SCNC: 104 MMOL/L (ref 98–107)
CO2 SERPL-SCNC: 24 MMOL/L (ref 21–32)
CREAT SERPL-MCNC: 1.09 MG/DL (ref 0.5–1.3)
EGFRCR SERPLBLD CKD-EPI 2021: 74 ML/MIN/1.73M*2
EOSINOPHIL # BLD AUTO: 0.67 X10*3/UL (ref 0–0.7)
EOSINOPHIL NFR BLD AUTO: 4.4 %
ERYTHROCYTE [DISTWIDTH] IN BLOOD BY AUTOMATED COUNT: 19 % (ref 11.5–14.5)
GLUCOSE SERPL-MCNC: 102 MG/DL (ref 74–99)
HCT VFR BLD AUTO: 24.4 % (ref 41–52)
HGB BLD-MCNC: 8.4 G/DL (ref 13.5–17.5)
IMM GRANULOCYTES # BLD AUTO: 0.27 X10*3/UL (ref 0–0.7)
IMM GRANULOCYTES NFR BLD AUTO: 1.8 % (ref 0–0.9)
LDH SERPL L TO P-CCNC: 188 U/L (ref 84–246)
LYMPHOCYTES # BLD AUTO: 0.35 X10*3/UL (ref 1.2–4.8)
LYMPHOCYTES NFR BLD AUTO: 2.3 %
MAGNESIUM SERPL-MCNC: 1.63 MG/DL (ref 1.6–2.4)
MCH RBC QN AUTO: 30.1 PG (ref 26–34)
MCHC RBC AUTO-ENTMCNC: 34.4 G/DL (ref 32–36)
MCV RBC AUTO: 88 FL (ref 80–100)
MONOCYTES # BLD AUTO: 0.91 X10*3/UL (ref 0.1–1)
MONOCYTES NFR BLD AUTO: 6 %
NEUTROPHILS # BLD AUTO: 12.85 X10*3/UL (ref 1.2–7.7)
NEUTROPHILS NFR BLD AUTO: 85 %
NRBC BLD-RTO: 0 /100 WBCS (ref 0–0)
OVALOCYTES BLD QL SMEAR: NORMAL
PLATELET # BLD AUTO: 54 X10*3/UL (ref 150–450)
POTASSIUM SERPL-SCNC: 3.4 MMOL/L (ref 3.5–5.3)
PROT SERPL-MCNC: 5.9 G/DL (ref 6.4–8.2)
RBC # BLD AUTO: 2.79 X10*6/UL (ref 4.5–5.9)
RBC MORPH BLD: NORMAL
RH FACTOR (ANTIGEN D): NORMAL
SCHISTOCYTES BLD QL SMEAR: NORMAL
SODIUM SERPL-SCNC: 135 MMOL/L (ref 136–145)
TACROLIMUS BLD-MCNC: 11.3 NG/ML
WBC # BLD AUTO: 15.1 X10*3/UL (ref 4.4–11.3)

## 2024-12-21 PROCEDURE — 96365 THER/PROPH/DIAG IV INF INIT: CPT | Mod: INF

## 2024-12-21 PROCEDURE — 83735 ASSAY OF MAGNESIUM: CPT

## 2024-12-21 PROCEDURE — 80053 COMPREHEN METABOLIC PANEL: CPT

## 2024-12-21 PROCEDURE — 99215 OFFICE O/P EST HI 40 MIN: CPT | Performed by: NURSE PRACTITIONER

## 2024-12-21 PROCEDURE — 80197 ASSAY OF TACROLIMUS: CPT

## 2024-12-21 PROCEDURE — 2500000004 HC RX 250 GENERAL PHARMACY W/ HCPCS (ALT 636 FOR OP/ED): Performed by: NURSE PRACTITIONER

## 2024-12-21 PROCEDURE — 1111F DSCHRG MED/CURRENT MED MERGE: CPT | Performed by: NURSE PRACTITIONER

## 2024-12-21 PROCEDURE — 83615 LACTATE (LD) (LDH) ENZYME: CPT

## 2024-12-21 PROCEDURE — 85025 COMPLETE CBC W/AUTO DIFF WBC: CPT

## 2024-12-21 PROCEDURE — 2500000004 HC RX 250 GENERAL PHARMACY W/ HCPCS (ALT 636 FOR OP/ED): Performed by: INTERNAL MEDICINE

## 2024-12-21 PROCEDURE — 86901 BLOOD TYPING SEROLOGIC RH(D): CPT

## 2024-12-21 RX ORDER — MAGNESIUM SULFATE HEPTAHYDRATE 40 MG/ML
2 INJECTION, SOLUTION INTRAVENOUS ONCE
Status: CANCELLED | OUTPATIENT
Start: 2024-12-21 | End: 2024-12-21

## 2024-12-21 RX ORDER — HEPARIN 100 UNIT/ML
500 SYRINGE INTRAVENOUS AS NEEDED
OUTPATIENT
Start: 2024-12-21

## 2024-12-21 RX ORDER — HEPARIN 100 UNIT/ML
500 SYRINGE INTRAVENOUS AS NEEDED
Status: DISCONTINUED | OUTPATIENT
Start: 2024-12-21 | End: 2024-12-21 | Stop reason: HOSPADM

## 2024-12-21 RX ORDER — HEPARIN SODIUM,PORCINE/PF 10 UNIT/ML
50 SYRINGE (ML) INTRAVENOUS AS NEEDED
OUTPATIENT
Start: 2024-12-21

## 2024-12-21 RX ORDER — POTASSIUM CHLORIDE 1.5 G/1.58G
20 POWDER, FOR SOLUTION ORAL 2 TIMES DAILY
COMMUNITY
End: 2024-12-24 | Stop reason: WASHOUT

## 2024-12-21 RX ORDER — MAGNESIUM SULFATE HEPTAHYDRATE 40 MG/ML
2 INJECTION, SOLUTION INTRAVENOUS ONCE
Status: COMPLETED | OUTPATIENT
Start: 2024-12-21 | End: 2024-12-21

## 2024-12-21 ASSESSMENT — ENCOUNTER SYMPTOMS
CONSTIPATION: 1
APPETITE CHANGE: 1
EXTREMITY WEAKNESS: 1
VOMITING: 1
COUGH: 1
FATIGUE: 1

## 2024-12-23 LAB
BB ANTIBODY IDENTIFICATION: NORMAL
CASE #: NORMAL
PATH REV-IMMUNOHEMATOLOGY-PR30: NORMAL

## 2024-12-24 ENCOUNTER — OFFICE VISIT (OUTPATIENT)
Dept: HEMATOLOGY/ONCOLOGY | Facility: HOSPITAL | Age: 69
End: 2024-12-24
Payer: MEDICARE

## 2024-12-24 ENCOUNTER — APPOINTMENT (OUTPATIENT)
Dept: HEMATOLOGY/ONCOLOGY | Facility: HOSPITAL | Age: 69
End: 2024-12-24
Payer: MEDICARE

## 2024-12-24 ENCOUNTER — LAB (OUTPATIENT)
Dept: HEMATOLOGY/ONCOLOGY | Facility: HOSPITAL | Age: 69
End: 2024-12-24
Payer: MEDICARE

## 2024-12-24 VITALS
RESPIRATION RATE: 16 BRPM | TEMPERATURE: 96.8 F | HEART RATE: 81 BPM | DIASTOLIC BLOOD PRESSURE: 66 MMHG | OXYGEN SATURATION: 100 % | BODY MASS INDEX: 26.82 KG/M2 | WEIGHT: 169.1 LBS | SYSTOLIC BLOOD PRESSURE: 113 MMHG

## 2024-12-24 DIAGNOSIS — C92.02 AML (ACUTE MYELOID LEUKEMIA) IN RELAPSE (MULTI): ICD-10-CM

## 2024-12-24 DIAGNOSIS — D84.9 IMMUNOCOMPROMISED STATE: ICD-10-CM

## 2024-12-24 DIAGNOSIS — Z94.81 S/P ALLOGENEIC BONE MARROW TRANSPLANT (MULTI): ICD-10-CM

## 2024-12-24 DIAGNOSIS — D47.02 SYSTEMIC MASTOCYTOSIS WITH ASSOCIATED CLONAL HEMATOLOGICAL NON-MAST CELL LINEAGE DISEASE: ICD-10-CM

## 2024-12-24 DIAGNOSIS — E83.42 HYPOMAGNESEMIA: ICD-10-CM

## 2024-12-24 DIAGNOSIS — R11.2 NAUSEA AND VOMITING, UNSPECIFIED VOMITING TYPE: ICD-10-CM

## 2024-12-24 DIAGNOSIS — E87.6 HYPOKALEMIA: ICD-10-CM

## 2024-12-24 DIAGNOSIS — C92.00 ACUTE MYELOID LEUKEMIA NOT HAVING ACHIEVED REMISSION (MULTI): ICD-10-CM

## 2024-12-24 DIAGNOSIS — Z94.84 HISTORY OF STEM CELL TRANSPLANT (MULTI): ICD-10-CM

## 2024-12-24 DIAGNOSIS — C92.01 ACUTE MYELOID LEUKEMIA IN REMISSION (MULTI): Primary | ICD-10-CM

## 2024-12-24 DIAGNOSIS — C92.01 ACUTE MYELOID LEUKEMIA IN REMISSION (MULTI): ICD-10-CM

## 2024-12-24 LAB
ABO GROUP (TYPE) IN BLOOD: NORMAL
ACANTHOCYTES BLD QL SMEAR: NORMAL
ALBUMIN SERPL BCP-MCNC: 4.1 G/DL (ref 3.4–5)
ALP SERPL-CCNC: 64 U/L (ref 33–136)
ALT SERPL W P-5'-P-CCNC: 12 U/L (ref 10–52)
ANION GAP SERPL CALC-SCNC: 12 MMOL/L (ref 10–20)
ANTIBODY SCREEN: NORMAL
APPEARANCE UR: CLEAR
AST SERPL W P-5'-P-CCNC: 13 U/L (ref 9–39)
BASOPHILS # BLD AUTO: 0.07 X10*3/UL (ref 0–0.1)
BASOPHILS NFR BLD AUTO: 1.3 %
BILIRUB SERPL-MCNC: 0.8 MG/DL (ref 0–1.2)
BILIRUB UR STRIP.AUTO-MCNC: NEGATIVE MG/DL
BUN SERPL-MCNC: 10 MG/DL (ref 6–23)
BURR CELLS BLD QL SMEAR: NORMAL
CALCIUM SERPL-MCNC: 8.8 MG/DL (ref 8.6–10.3)
CHLORIDE SERPL-SCNC: 105 MMOL/L (ref 98–107)
CO2 SERPL-SCNC: 25 MMOL/L (ref 21–32)
COLOR UR: YELLOW
CREAT SERPL-MCNC: 1.39 MG/DL (ref 0.5–1.3)
CREAT UR-MCNC: 178.8 MG/DL (ref 20–370)
DACRYOCYTES BLD QL SMEAR: NORMAL
EGFRCR SERPLBLD CKD-EPI 2021: 55 ML/MIN/1.73M*2
EOSINOPHIL # BLD AUTO: 0.76 X10*3/UL (ref 0–0.7)
EOSINOPHIL NFR BLD AUTO: 14.6 %
ERYTHROCYTE [DISTWIDTH] IN BLOOD BY AUTOMATED COUNT: 20.3 % (ref 11.5–14.5)
GLUCOSE SERPL-MCNC: 122 MG/DL (ref 74–99)
GLUCOSE UR STRIP.AUTO-MCNC: NORMAL MG/DL
HAPTOGLOB SERPL NEPH-MCNC: 33 MG/DL (ref 30–200)
HCT VFR BLD AUTO: 24.7 % (ref 41–52)
HGB BLD-MCNC: 8.5 G/DL (ref 13.5–17.5)
HYALINE CASTS #/AREA URNS AUTO: ABNORMAL /LPF
IGG SERPL-MCNC: 910 MG/DL (ref 700–1600)
IMM GRANULOCYTES # BLD AUTO: 0.17 X10*3/UL (ref 0–0.7)
IMM GRANULOCYTES NFR BLD AUTO: 3.3 % (ref 0–0.9)
KETONES UR STRIP.AUTO-MCNC: NEGATIVE MG/DL
LDH SERPL L TO P-CCNC: 150 U/L (ref 84–246)
LEUKOCYTE ESTERASE UR QL STRIP.AUTO: NEGATIVE
LYMPHOCYTES # BLD AUTO: 0.24 X10*3/UL (ref 1.2–4.8)
LYMPHOCYTES NFR BLD AUTO: 4.6 %
MAGNESIUM SERPL-MCNC: 1.67 MG/DL (ref 1.6–2.4)
MCH RBC QN AUTO: 30.5 PG (ref 26–34)
MCHC RBC AUTO-ENTMCNC: 34.4 G/DL (ref 32–36)
MCV RBC AUTO: 89 FL (ref 80–100)
MONOCYTES # BLD AUTO: 0.89 X10*3/UL (ref 0.1–1)
MONOCYTES NFR BLD AUTO: 17.1 %
MUCOUS THREADS #/AREA URNS AUTO: ABNORMAL /LPF
NEUTROPHILS # BLD AUTO: 3.06 X10*3/UL (ref 1.2–7.7)
NEUTROPHILS NFR BLD AUTO: 59.1 %
NITRITE UR QL STRIP.AUTO: NEGATIVE
NRBC BLD-RTO: 0 /100 WBCS (ref 0–0)
OVALOCYTES BLD QL SMEAR: NORMAL
PH UR STRIP.AUTO: 6.5 [PH]
PLATELET # BLD AUTO: 90 X10*3/UL (ref 150–450)
POLYCHROMASIA BLD QL SMEAR: NORMAL
POTASSIUM SERPL-SCNC: 3.7 MMOL/L (ref 3.5–5.3)
PROT SERPL-MCNC: 5.8 G/DL (ref 6.4–8.2)
PROT UR STRIP.AUTO-MCNC: ABNORMAL MG/DL
PROT UR-ACNC: 28 MG/DL (ref 5–25)
PROT/CREAT UR: 0.16 MG/MG CREAT (ref 0–0.17)
RBC # BLD AUTO: 2.79 X10*6/UL (ref 4.5–5.9)
RBC # UR STRIP.AUTO: ABNORMAL /UL
RBC #/AREA URNS AUTO: >20 /HPF
RBC MORPH BLD: NORMAL
RH FACTOR (ANTIGEN D): NORMAL
SCHISTOCYTES BLD QL SMEAR: NORMAL
SODIUM SERPL-SCNC: 138 MMOL/L (ref 136–145)
SP GR UR STRIP.AUTO: 1.02
TACROLIMUS BLD-MCNC: 12.4 NG/ML
URATE SERPL-MCNC: 5.7 MG/DL (ref 4–7.5)
UROBILINOGEN UR STRIP.AUTO-MCNC: NORMAL MG/DL
WBC # BLD AUTO: 5.2 X10*3/UL (ref 4.4–11.3)
WBC #/AREA URNS AUTO: ABNORMAL /HPF

## 2024-12-24 PROCEDURE — 3074F SYST BP LT 130 MM HG: CPT | Performed by: STUDENT IN AN ORGANIZED HEALTH CARE EDUCATION/TRAINING PROGRAM

## 2024-12-24 PROCEDURE — 87533 HHV-6 DNA QUANT: CPT

## 2024-12-24 PROCEDURE — 86901 BLOOD TYPING SEROLOGIC RH(D): CPT

## 2024-12-24 PROCEDURE — 3078F DIAST BP <80 MM HG: CPT | Performed by: STUDENT IN AN ORGANIZED HEALTH CARE EDUCATION/TRAINING PROGRAM

## 2024-12-24 PROCEDURE — 86870 RBC ANTIBODY IDENTIFICATION: CPT

## 2024-12-24 PROCEDURE — 85025 COMPLETE CBC W/AUTO DIFF WBC: CPT

## 2024-12-24 PROCEDURE — 1126F AMNT PAIN NOTED NONE PRSNT: CPT | Performed by: STUDENT IN AN ORGANIZED HEALTH CARE EDUCATION/TRAINING PROGRAM

## 2024-12-24 PROCEDURE — 80053 COMPREHEN METABOLIC PANEL: CPT

## 2024-12-24 PROCEDURE — 86885 COOMBS TEST INDIRECT QUAL: CPT

## 2024-12-24 PROCEDURE — 80197 ASSAY OF TACROLIMUS: CPT

## 2024-12-24 PROCEDURE — 83010 ASSAY OF HAPTOGLOBIN QUANT: CPT

## 2024-12-24 PROCEDURE — 83615 LACTATE (LD) (LDH) ENZYME: CPT

## 2024-12-24 PROCEDURE — 1111F DSCHRG MED/CURRENT MED MERGE: CPT | Performed by: STUDENT IN AN ORGANIZED HEALTH CARE EDUCATION/TRAINING PROGRAM

## 2024-12-24 PROCEDURE — 82784 ASSAY IGA/IGD/IGG/IGM EACH: CPT

## 2024-12-24 PROCEDURE — 87799 DETECT AGENT NOS DNA QUANT: CPT

## 2024-12-24 PROCEDURE — 84550 ASSAY OF BLOOD/URIC ACID: CPT

## 2024-12-24 PROCEDURE — 83735 ASSAY OF MAGNESIUM: CPT

## 2024-12-24 PROCEDURE — 36591 DRAW BLOOD OFF VENOUS DEVICE: CPT

## 2024-12-24 PROCEDURE — 99215 OFFICE O/P EST HI 40 MIN: CPT | Performed by: STUDENT IN AN ORGANIZED HEALTH CARE EDUCATION/TRAINING PROGRAM

## 2024-12-24 PROCEDURE — 82570 ASSAY OF URINE CREATININE: CPT

## 2024-12-24 PROCEDURE — 81001 URINALYSIS AUTO W/SCOPE: CPT

## 2024-12-24 RX ORDER — TACROLIMUS 1 MG/1
1 CAPSULE ORAL EVERY EVENING
COMMUNITY
End: 2024-12-26 | Stop reason: SDUPTHER

## 2024-12-24 RX ORDER — HEPARIN SODIUM,PORCINE/PF 10 UNIT/ML
50 SYRINGE (ML) INTRAVENOUS AS NEEDED
Status: CANCELLED | OUTPATIENT
Start: 2024-12-24

## 2024-12-24 RX ORDER — HEPARIN 100 UNIT/ML
500 SYRINGE INTRAVENOUS AS NEEDED
Status: CANCELLED | OUTPATIENT
Start: 2024-12-24

## 2024-12-24 RX ORDER — POTASSIUM CHLORIDE 20 MEQ/1
20 TABLET, EXTENDED RELEASE ORAL 2 TIMES DAILY
COMMUNITY

## 2024-12-24 ASSESSMENT — PAIN SCALES - GENERAL: PAINLEVEL_OUTOF10: 0-NO PAIN

## 2024-12-24 NOTE — PROGRESS NOTES
"    Patient ID: Jin Reynolds is a 69 y.o. male.  Primary Oncologist: Dr. 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   8/23/2023 Initial Diagnosis    DX:  SMOLDERING SYSTEMIC MASTOCYTOSIS  Presented with skin rash and eosinophilia    [facility] DIAGNOSTIC CRITERIA  (For SM, Need major and 1 minor OR at least 3 minors)  - Multi-focal, dense infiltrates of MC (>= 15 mast cells in aggregates) in BM and/or other extra-cutaneous organs [major]  - In BM or extracutaneous organs, >25% MC spindle shaped OR have atypical morphology OR of all MC on BM aspirate smears, >25% are immature or atypical [minor]  - Detection of activating mutation KIT D816V in BM, PB, or other extracutaneous organ [minor]  - Serum tryptase persistently exceeds 20 ng/mL (unless associated clonal myeloid disorder, in which this paramenter is not valid) [minor]    \"B\" FINDINGS  - BM biopsy showing >30% infiltration (focal, dense aggregates) and/or serum tryptase > 200 ng/mL  - Signs of dysplasia or myeloproliferation, in non-MC lineages but insufficient criteria for definitive diagnosis of AHNMD with normal or slightly abnormal blood counts    \"C\" FINDINGS  None       8/23/2023 Biopsy    BONE MARROW (8/23/23)  Hypercellular (90-95%) with trilineage hematopoiesis, granulocytic hyperplasia, eosinophilia, and increased atypical mast cells  IP:  CD34+, +, CD7(bright)+, cCD3-, CD33-, CD15-, CD13+ blast population  IHC:  + increased spindle-shaped mast cells (15% cellularity), CD2-  Myeloid NGS:  CBL (31%), cKIT D816V (30%), RUNX1 x 2 (19%, 29%), SRSF2 (47%) [ASXL1 negative]  FISH:  PDGFRb negative    SERUM TRYPTASE  228 (8/23/23)  268 (9/5/23)     2/13/2024 -  Molecular Therapy    AVAPRITINIB   - Starting dose 25 mg daily (non-Adv SM)     7/16/2024 - 10/21/2024 Chemotherapy    Venetoclax / AzaCITIDine  - C1D1 7/17/24:  complicated by TLS, venetoclax held " after D1  - Post C1 BM (8/28/24):  hypercellular with >50% blasts, background systemic mastocytosis  - C2D1 9/5/24:  venetoclax restarted with C2, no TLS  - Post C2 BM (9/25/24):  hypocellular (<5%), atypical myeloblasts by IP/IHC (1-2%), persistent systemic mastocytosis  - C3D1 10/15/24:  delayed 1 week for count recovery  - Post C3 BM (10/23/24):  hypercellular with systemic mastocytosis, no increase blasts, 0.2% atypical blasts by IP, NGS w/ CBL (73%), cKIT (4%), RUNX1 x 2 (4%, 38%), SRSF2 (40%)       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    CONDITIONING Fludarabine, melphalan, and TBI   DONOR Matched sibling   MATCH GRADE 12/12   SEX MATCH Mismatched   ABO DONOR O pos   ABO RECIPIENT O pos   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, Mycophenolate, and Tacrolimus   GRAFT SOURCE Peripheral blood          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)   6/27/2024 Initial Diagnosis    ICC 2022 DX: Acute myeloid leukemia, NOS (>=20% blasts)  [id number] AML Risk Stratification: INTERMEDIATE: Cytogenetic and/or molecular abnormalities not classified as favorable or adverse  Presented with pancytopenia and circulating blasts    BONE MARROW (06/27/2024)  Marrow replaced by blasts, fibrotic foci, some atypical + cells; 7-8% circulating blasts; aspicular  - Unable to perform additional studies due to aspicular specimen    PERIPHERAL BLOOD (6/27/2024)  FISH:  positive for gain RUNX1    PERIPHERAL BLOOD (7/1/24)  IP:  abnl myeloblast population (CD34+, CD7+, CD4+, CD13+, CD38+, CD11+ dim, HLA=DR+, TdT+, CD33-)  Myeloid NGS: CBL C404Y (41%), KIT D816V (8%), RUNX1 x2 (8%, 30%), SRSF2 (37%)     7/16/2024 - 10/21/2024 Chemotherapy    Venetoclax / AzaCITIDine  - C1D1 7/17/24:  complicated by TLS, venetoclax held after D1  - Post C1 BM (8/28/24):  hypercellular with >50% blasts, background systemic mastocytosis  - C2D1 9/5/24:  venetoclax restarted with C2, no TLS  - Post C2 BM (9/25/24):  hypocellular (<5%), atypical  myeloblasts by IP/IHC (1-2%), persistent systemic mastocytosis  - C3D1 10/15/24:  delayed 1 week for count recovery  - Post C3 BM (10/23/24):  hypercellular with systemic mastocytosis, no increase blasts, 0.2% atypical blasts by IP, NGS w/ CBL (73%), cKIT (4%), RUNX1 x 2 (4%, 38%), SRSF2 (40%)       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    CONDITIONING Fludarabine, melphalan, and TBI   DONOR Matched sibling   MATCH GRADE 12/12   SEX MATCH Mismatched   ABO DONOR O pos   ABO RECIPIENT O pos   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, Mycophenolate, and Tacrolimus   GRAFT SOURCE Peripheral blood              12/24/24 T+ 33 s/p allo SCT and doing well. Counts are recovering and no evidence of aGVHD. Continues to have ongoing taste changes with associated changes in appetite. Will be discontinuing MMF on Day 35. Encouraged increasing oral hydration with today's increase in sCr. Day 30 marrow scheduled later this week. Hoping to decrease to twice weekly starting next week if stable.     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 Counts overall improving, though need to closely monitor ANC. Periodic filgrastim, last given 12/19.  Scheduled for T+30 BM on 12/26/24.   History of stem cell transplant (Multi)  T+35 s/p allo MRD pBSCT. Tacrolimus: on 5 mg in the am and 3 mg PM. Level elevated on 12/24 and slight PERRY. Will need dose decreased at next visit.   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 No current signs of infections. Viral monitoring in place. Continue prophylaxis with acyclovir, posaconazole, Bactrim. May also require levofloxacin if ANC trends to <500.   Nausea and vomiting, unspecified vomiting type  Improving; continue PRN anti-emetics.   Hypokalemia  Improved on oral supplementation.   Hypomagnesemia  Continue Mag/Amino Acid  Chelate 133 mg tablets       SUBJECTIVE     Days since transplant: 33     HPI    Jin Reynolds presents today for post transplant follow up, unaccompanied. Feeling fatigued still, trying to keep up with fluids. Not eating a lot; food still has a weird taste to it. Frustrated because he does get hungry and has an appetite but the altered taste keeps him from eating. No vomiting or nausea. Stools soft. No rashes, fevers, chills, cough, chest pain.     Currently on tacro 5 mg AM, 3 mg PM. Held dose this AM.     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    Esophageal ulcer with bleeding 01/01/2024    Personal history of diseases of the skin and subcutaneous tissue     History of alopecia    Umbilical hernia 05/30/2023     Social History     Socioeconomic History    Marital status:      Spouse name: Not on file    Number of children: Not on file    Years of education: Not on file    Highest education level: Not on file   Occupational History    Not on file   Tobacco Use    Smoking status: Never    Smokeless tobacco: Never   Substance and Sexual Activity    Alcohol use: Never    Drug use: Yes     Types: Marijuana     Comment: daily    Sexual activity: Not on file   Other Topics Concern    Not on file   Social History Narrative    Not on file     Social Drivers of Health     Financial Resource Strain: Medium Risk (11/18/2024)    Overall Financial Resource Strain (CARDIA)     Difficulty of Paying Living Expenses: Somewhat hard   Food Insecurity: No Food Insecurity (11/15/2024)    Hunger Vital Sign     Worried About Running Out of Food in the Last Year: Never true     Ran Out of Food in the Last Year: Never true   Transportation Needs: No Transportation Needs (11/18/2024)    PRAPARE - Transportation     Lack of Transportation (Medical): No     Lack of Transportation (Non-Medical): No   Physical Activity: Not on file    Stress: Not on file   Social Connections: Not on file   Intimate Partner Violence: Not At Risk (11/15/2024)    Humiliation, Afraid, Rape, and Kick questionnaire     Fear of Current or Ex-Partner: No     Emotionally Abused: No     Physically Abused: No     Sexually Abused: No   Housing Stability: Low Risk  (11/18/2024)    Housing Stability Vital Sign     Unable to Pay for Housing in the Last Year: No     Number of Times Moved in the Last Year: 0     Homeless in the Last Year: No     No past surgical history on file.      OBJECTIVE     BSA: There is no height or weight on file to calculate BSA.  There were no vitals taken for this visit.  Weight    No data found in the last 1 encounters.            Physical Exam  Constitutional:       Appearance: Normal appearance.   HENT:      Mouth/Throat:      Comments: Mucous membranes tacky  Eyes:      Conjunctiva/sclera: Conjunctivae normal.      Pupils: Pupils are equal, round, and reactive to light.   Cardiovascular:      Rate and Rhythm: Normal rate and regular rhythm.   Pulmonary:      Effort: Pulmonary effort is normal.      Breath sounds: Normal breath sounds.   Musculoskeletal:         General: Normal range of motion.   Skin:     Findings: No rash.   Neurological:      Mental Status: He is alert. Mental status is at baseline.   Psychiatric:         Mood and Affect: Mood normal.         Performance Status:  Karnofsky Score: 70 - Cares for self; unable to carry on normal activity or do normal work       POST TRANSPLANT MONITORING   GVHD  Acute GVHD Overall Grade:    Chronic GVHD Total Score:      Viral Monitoring:     CMV   Lab Results   Component Value Date    CMVDNAPCR Not Detected 12/19/2024    CMVDNAPCR Not Detected 12/17/2024    CMVDNAPCR Not Detected 12/13/2024      EBV   Lab Results   Component Value Date    EBVDNAPCR Not Detected 12/02/2024    EBVDNAPCR Not Detected 11/25/2024    EBVDNAPCR Not Detected 10/23/2024      Adenovirus   Lab Results   Component Value  "Date    ADEPB Not Detected 12/17/2024    ADEPB Not Detected 12/09/2024    ADEPB Not Detected 12/02/2024       Toxo   No results found for: \"TGONDPCRBL\"      TMA Monitoring:     MARKERS RECENT 3 VALUES   LDH Lab Results   Component Value Date     12/21/2024     12/19/2024     12/17/2024      Haptoglobin Lab Results   Component Value Date    HAPTOGLOBIN 42 12/17/2024    HAPTOGLOBIN 58 12/09/2024    HAPTOGLOBIN 91 12/02/2024      BP BP Readings from Last 3 Encounters:   12/21/24 136/76   12/19/24 112/76   12/17/24 123/67      Serum Creatinine Lab Results   Component Value Date    CREATININE 1.09 12/21/2024    CREATININE 1.11 12/19/2024    CREATININE 1.04 12/17/2024      Urine Protein/Creat Ratio Lab Results   Component Value Date    UTPCR 0.24 (H) 12/17/2024    UTPCR 0.15 12/09/2024    UTPCR 0.19 (H) 12/03/2024      Tacrolimus Level Lab Results   Component Value Date    TACROLIMUS 11.3 12/21/2024    TACROLIMUS 9.6 12/19/2024    TACROLIMUS 7.8 12/17/2024            Kalia June PA-C            "

## 2024-12-25 LAB
CMV DNA SERPL NAA+PROBE-LOG IU: NORMAL {LOG_IU}/ML
EBV DNA BLD NAA+PROBE-LOG IU: NORMAL {LOG_IU}/ML
LABORATORY COMMENT REPORT: NOT DETECTED
LABORATORY COMMENT REPORT: NOT DETECTED

## 2024-12-26 ENCOUNTER — INFUSION (OUTPATIENT)
Dept: HEMATOLOGY/ONCOLOGY | Facility: HOSPITAL | Age: 69
End: 2024-12-26
Payer: MEDICARE

## 2024-12-26 ENCOUNTER — OFFICE VISIT (OUTPATIENT)
Dept: HEMATOLOGY/ONCOLOGY | Facility: HOSPITAL | Age: 69
End: 2024-12-26
Payer: MEDICARE

## 2024-12-26 ENCOUNTER — PROCEDURE VISIT (OUTPATIENT)
Dept: HEMATOLOGY/ONCOLOGY | Facility: HOSPITAL | Age: 69
End: 2024-12-26
Payer: MEDICARE

## 2024-12-26 VITALS
BODY MASS INDEX: 26.19 KG/M2 | WEIGHT: 166.89 LBS | SYSTOLIC BLOOD PRESSURE: 103 MMHG | HEIGHT: 67 IN | HEART RATE: 105 BPM | RESPIRATION RATE: 18 BRPM | TEMPERATURE: 97.3 F | OXYGEN SATURATION: 100 % | DIASTOLIC BLOOD PRESSURE: 75 MMHG

## 2024-12-26 DIAGNOSIS — Z94.81 S/P ALLOGENEIC BONE MARROW TRANSPLANT (MULTI): ICD-10-CM

## 2024-12-26 DIAGNOSIS — Z94.84 HISTORY OF STEM CELL TRANSPLANT (MULTI): Primary | ICD-10-CM

## 2024-12-26 DIAGNOSIS — C92.00 ACUTE MYELOID LEUKEMIA NOT HAVING ACHIEVED REMISSION (MULTI): ICD-10-CM

## 2024-12-26 DIAGNOSIS — C92.01 ACUTE MYELOID LEUKEMIA IN REMISSION (MULTI): Primary | ICD-10-CM

## 2024-12-26 DIAGNOSIS — C92.01 ACUTE MYELOID LEUKEMIA IN REMISSION (MULTI): ICD-10-CM

## 2024-12-26 DIAGNOSIS — J06.9 UPPER RESPIRATORY TRACT INFECTION, UNSPECIFIED TYPE: ICD-10-CM

## 2024-12-26 DIAGNOSIS — D47.02 SYSTEMIC MASTOCYTOSIS WITH ASSOCIATED CLONAL HEMATOLOGICAL NON-MAST CELL LINEAGE DISEASE: ICD-10-CM

## 2024-12-26 DIAGNOSIS — Z94.84 HISTORY OF STEM CELL TRANSPLANT (MULTI): ICD-10-CM

## 2024-12-26 LAB
ABO GROUP (TYPE) IN BLOOD: NORMAL
ACANTHOCYTES BLD QL SMEAR: NORMAL
ALBUMIN SERPL BCP-MCNC: 4.1 G/DL (ref 3.4–5)
ALP SERPL-CCNC: 56 U/L (ref 33–136)
ALT SERPL W P-5'-P-CCNC: 11 U/L (ref 10–52)
ANION GAP SERPL CALC-SCNC: 13 MMOL/L (ref 10–20)
ANTIBODY SCREEN: NORMAL
AST SERPL W P-5'-P-CCNC: 13 U/L (ref 9–39)
BASOPHILS # BLD AUTO: 0.07 X10*3/UL (ref 0–0.1)
BASOPHILS NFR BLD AUTO: 1.3 %
BB ANTIBODY IDENTIFICATION: NORMAL
BILIRUB SERPL-MCNC: 0.8 MG/DL (ref 0–1.2)
BUN SERPL-MCNC: 9 MG/DL (ref 6–23)
BURR CELLS BLD QL SMEAR: NORMAL
CALCIUM SERPL-MCNC: 8.4 MG/DL (ref 8.6–10.3)
CASE #: NORMAL
CHLORIDE SERPL-SCNC: 104 MMOL/L (ref 98–107)
CO2 SERPL-SCNC: 22 MMOL/L (ref 21–32)
CREAT SERPL-MCNC: 1.42 MG/DL (ref 0.5–1.3)
DACRYOCYTES BLD QL SMEAR: NORMAL
EGFRCR SERPLBLD CKD-EPI 2021: 54 ML/MIN/1.73M*2
EOSINOPHIL # BLD AUTO: 0.69 X10*3/UL (ref 0–0.7)
EOSINOPHIL NFR BLD AUTO: 12.9 %
ERYTHROCYTE [DISTWIDTH] IN BLOOD BY AUTOMATED COUNT: 20.9 % (ref 11.5–14.5)
FLUAV RNA RESP QL NAA+PROBE: NOT DETECTED
FLUBV RNA RESP QL NAA+PROBE: NOT DETECTED
GLUCOSE SERPL-MCNC: 128 MG/DL (ref 74–99)
HADV DNA SPEC QL NAA+PROBE: NOT DETECTED
HCT VFR BLD AUTO: 24.2 % (ref 41–52)
HGB BLD-MCNC: 8.3 G/DL (ref 13.5–17.5)
HMPV RNA SPEC QL NAA+PROBE: NOT DETECTED
HPIV1 RNA SPEC QL NAA+PROBE: NOT DETECTED
HPIV2 RNA SPEC QL NAA+PROBE: NOT DETECTED
HPIV3 RNA SPEC QL NAA+PROBE: NOT DETECTED
HPIV4 RNA SPEC QL NAA+PROBE: DETECTED
IMM GRANULOCYTES # BLD AUTO: 0.11 X10*3/UL (ref 0–0.7)
IMM GRANULOCYTES NFR BLD AUTO: 2.1 % (ref 0–0.9)
LDH SERPL L TO P-CCNC: 147 U/L (ref 84–246)
LYMPHOCYTES # BLD AUTO: 0.18 X10*3/UL (ref 1.2–4.8)
LYMPHOCYTES NFR BLD AUTO: 3.4 %
MAGNESIUM SERPL-MCNC: 1.52 MG/DL (ref 1.6–2.4)
MCH RBC QN AUTO: 30.2 PG (ref 26–34)
MCHC RBC AUTO-ENTMCNC: 34.3 G/DL (ref 32–36)
MCV RBC AUTO: 88 FL (ref 80–100)
MONOCYTES # BLD AUTO: 0.92 X10*3/UL (ref 0.1–1)
MONOCYTES NFR BLD AUTO: 17.2 %
NEUTROPHILS # BLD AUTO: 3.37 X10*3/UL (ref 1.2–7.7)
NEUTROPHILS NFR BLD AUTO: 63.1 %
NRBC BLD-RTO: 0 /100 WBCS (ref 0–0)
OVALOCYTES BLD QL SMEAR: NORMAL
PATH REV-IMMUNOHEMATOLOGY-PR30: NORMAL
PLATELET # BLD AUTO: 94 X10*3/UL (ref 150–450)
POTASSIUM SERPL-SCNC: 3.8 MMOL/L (ref 3.5–5.3)
PROT SERPL-MCNC: 5.8 G/DL (ref 6.4–8.2)
RBC # BLD AUTO: 2.75 X10*6/UL (ref 4.5–5.9)
RBC MORPH BLD: NORMAL
RH FACTOR (ANTIGEN D): NORMAL
RHINOVIRUS RNA UPPER RESP QL NAA+PROBE: NOT DETECTED
RSV RNA RESP QL NAA+PROBE: NOT DETECTED
SARS-COV-2 RNA RESP QL NAA+PROBE: NOT DETECTED
SCHISTOCYTES BLD QL SMEAR: NORMAL
SODIUM SERPL-SCNC: 135 MMOL/L (ref 136–145)
TACROLIMUS BLD-MCNC: 11.4 NG/ML
WBC # BLD AUTO: 5.3 X10*3/UL (ref 4.4–11.3)

## 2024-12-26 PROCEDURE — 87631 RESP VIRUS 3-5 TARGETS: CPT

## 2024-12-26 PROCEDURE — 86901 BLOOD TYPING SEROLOGIC RH(D): CPT

## 2024-12-26 PROCEDURE — 2500000004 HC RX 250 GENERAL PHARMACY W/ HCPCS (ALT 636 FOR OP/ED): Performed by: INTERNAL MEDICINE

## 2024-12-26 PROCEDURE — 87637 SARSCOV2&INF A&B&RSV AMP PRB: CPT

## 2024-12-26 PROCEDURE — 88185 FLOWCYTOMETRY/TC ADD-ON: CPT | Mod: TC | Performed by: PHYSICIAN ASSISTANT

## 2024-12-26 PROCEDURE — 36591 DRAW BLOOD OFF VENOUS DEVICE: CPT

## 2024-12-26 PROCEDURE — 85097 BONE MARROW INTERPRETATION: CPT | Performed by: PHYSICIAN ASSISTANT

## 2024-12-26 PROCEDURE — 80053 COMPREHEN METABOLIC PANEL: CPT

## 2024-12-26 PROCEDURE — 85025 COMPLETE CBC W/AUTO DIFF WBC: CPT

## 2024-12-26 PROCEDURE — 87486 CHLMYD PNEUM DNA AMP PROBE: CPT

## 2024-12-26 PROCEDURE — 83735 ASSAY OF MAGNESIUM: CPT

## 2024-12-26 PROCEDURE — 87798 DETECT AGENT NOS DNA AMP: CPT

## 2024-12-26 PROCEDURE — 83615 LACTATE (LD) (LDH) ENZYME: CPT

## 2024-12-26 PROCEDURE — 99215 OFFICE O/P EST HI 40 MIN: CPT | Performed by: NURSE PRACTITIONER

## 2024-12-26 PROCEDURE — 80197 ASSAY OF TACROLIMUS: CPT

## 2024-12-26 PROCEDURE — 1111F DSCHRG MED/CURRENT MED MERGE: CPT | Performed by: NURSE PRACTITIONER

## 2024-12-26 RX ORDER — HEPARIN 100 UNIT/ML
500 SYRINGE INTRAVENOUS AS NEEDED
Status: DISCONTINUED | OUTPATIENT
Start: 2024-12-26 | End: 2024-12-26 | Stop reason: HOSPADM

## 2024-12-26 RX ORDER — TACROLIMUS 1 MG/1
3 CAPSULE ORAL EVERY 12 HOURS
Qty: 180 CAPSULE | Refills: 1 | Status: SHIPPED | OUTPATIENT
Start: 2024-12-26 | End: 2024-12-26 | Stop reason: SDUPTHER

## 2024-12-26 RX ORDER — HEPARIN 100 UNIT/ML
500 SYRINGE INTRAVENOUS AS NEEDED
Status: CANCELLED | OUTPATIENT
Start: 2024-12-26

## 2024-12-26 RX ORDER — TACROLIMUS 1 MG/1
3 CAPSULE ORAL EVERY 12 HOURS
Qty: 180 CAPSULE | Refills: 1 | Status: SHIPPED | OUTPATIENT
Start: 2024-12-26

## 2024-12-26 RX ORDER — HEPARIN SODIUM,PORCINE/PF 10 UNIT/ML
50 SYRINGE (ML) INTRAVENOUS AS NEEDED
Status: CANCELLED | OUTPATIENT
Start: 2024-12-26

## 2024-12-26 ASSESSMENT — ENCOUNTER SYMPTOMS
SLEEP DISTURBANCE: 1
GASTROINTESTINAL NEGATIVE: 1
ENDOCRINE NEGATIVE: 1
HEMATOLOGIC/LYMPHATIC NEGATIVE: 1
COUGH: 1
CARDIOVASCULAR NEGATIVE: 1
MUSCULOSKELETAL NEGATIVE: 1
FATIGUE: 1
EYES NEGATIVE: 1
WHEEZING: 1
LIGHT-HEADEDNESS: 1
APPETITE CHANGE: 1

## 2024-12-26 NOTE — PROGRESS NOTES
"Patient ID:  Jin Reynolds is a 69 y.o. male.  Referring Physician:   BMT Dr Dawson  Primary Care Provider:  ROSA Bone-CNP    Assessment/Plan    12/26/24 T+35.Stop MMF. Decrease tacrolimus 3mg am and 3mg pm. 5kg weight loss since discharge. Monitor closely. Sinus congestion. Denies fever, SOB. Respiratory viral swab pending.    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   8/23/2023 Initial Diagnosis    DX:  SMOLDERING SYSTEMIC MASTOCYTOSIS  Presented with skin rash and eosinophilia    BRENDON DIAGNOSTIC CRITERIA  (For SM, Need major and 1 minor OR at least 3 minors)  - Multi-focal, dense infiltrates of MC (>= 15 mast cells in aggregates) in BM and/or other extra-cutaneous organs [major]  - In BM or extracutaneous organs, >25% MC spindle shaped OR have atypical morphology OR of all MC on BM aspirate smears, >25% are immature or atypical [minor]  - Detection of activating mutation KIT D816V in BM, PB, or other extracutaneous organ [minor]  - Serum tryptase persistently exceeds 20 ng/mL (unless associated clonal myeloid disorder, in which this paramenter is not valid) [minor]    \"B\" FINDINGS  - BM biopsy showing >30% infiltration (focal, dense aggregates) and/or serum tryptase > 200 ng/mL  - Signs of dysplasia or myeloproliferation, in non-MC lineages but insufficient criteria for definitive diagnosis of AHNMD with normal or slightly abnormal blood counts    \"C\" FINDINGS  None       8/23/2023 Biopsy    BONE MARROW (8/23/23)  Hypercellular (90-95%) with trilineage hematopoiesis, granulocytic hyperplasia, eosinophilia, and increased atypical mast cells  IP:  CD34+, +, CD7(bright)+, cCD3-, CD33-, CD15-, CD13+ blast population  IHC:  + increased spindle-shaped mast cells (15% cellularity), CD2-  Myeloid NGS:  CBL (31%), cKIT D816V (30%), RUNX1 x 2 (19%, 29%), SRSF2 (47%) [ASXL1 negative]  FISH:  PDGFRb negative    SERUM TRYPTASE  228 " (8/23/23)  268 (9/5/23)     2/13/2024 -  Molecular Therapy    AVAPRITINIB   - Starting dose 25 mg daily (non-Adv SM)     7/16/2024 - 10/21/2024 Chemotherapy    Venetoclax / AzaCITIDine  - C1D1 7/17/24:  complicated by TLS, venetoclax held after D1  - Post C1 BM (8/28/24):  hypercellular with >50% blasts, background systemic mastocytosis  - C2D1 9/5/24:  venetoclax restarted with C2, no TLS  - Post C2 BM (9/25/24):  hypocellular (<5%), atypical myeloblasts by IP/IHC (1-2%), persistent systemic mastocytosis  - C3D1 10/15/24:  delayed 1 week for count recovery  - Post C3 BM (10/23/24):  hypercellular with systemic mastocytosis, no increase blasts, 0.2% atypical blasts by IP, NGS w/ CBL (73%), cKIT (4%), RUNX1 x 2 (4%, 38%), SRSF2 (40%)       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    CONDITIONING Fludarabine, melphalan, and TBI   DONOR Matched sibling   MATCH GRADE 12/12   SEX MATCH Mismatched   ABO DONOR O pos   ABO RECIPIENT O pos   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, Mycophenolate, and Tacrolimus   GRAFT SOURCE Peripheral blood          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)   6/27/2024 Initial Diagnosis    ICC 2022 DX: Acute myeloid leukemia, NOS (>=20% blasts)  [id number] AML Risk Stratification: INTERMEDIATE: Cytogenetic and/or molecular abnormalities not classified as favorable or adverse  Presented with pancytopenia and circulating blasts    BONE MARROW (06/27/2024)  Marrow replaced by blasts, fibrotic foci, some atypical + cells; 7-8% circulating blasts; aspicular  - Unable to perform additional studies due to aspicular specimen    PERIPHERAL BLOOD (6/27/2024)  FISH:  positive for gain RUNX1    PERIPHERAL BLOOD (7/1/24)  IP:  abnl myeloblast population (CD34+, CD7+, CD4+, CD13+, CD38+, CD11+ dim, HLA=DR+, TdT+, CD33-)  Myeloid NGS: CBL C404Y (41%), KIT D816V (8%), RUNX1 x2 (8%, 30%), SRSF2 (37%)     7/16/2024 - 10/21/2024 Chemotherapy    Venetoclax / AzaCITIDine  - C1D1 7/17/24:  complicated  by TLS, venetoclax held after D1  - Post C1 BM (8/28/24):  hypercellular with >50% blasts, background systemic mastocytosis  - C2D1 9/5/24:  venetoclax restarted with C2, no TLS  - Post C2 BM (9/25/24):  hypocellular (<5%), atypical myeloblasts by IP/IHC (1-2%), persistent systemic mastocytosis  - C3D1 10/15/24:  delayed 1 week for count recovery  - Post C3 BM (10/23/24):  hypercellular with systemic mastocytosis, no increase blasts, 0.2% atypical blasts by IP, NGS w/ CBL (73%), cKIT (4%), RUNX1 x 2 (4%, 38%), SRSF2 (40%)       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    CONDITIONING Fludarabine, melphalan, and TBI   DONOR Matched sibling   MATCH GRADE 12/12   SEX MATCH Mismatched   ABO DONOR O pos   ABO RECIPIENT O pos   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, Mycophenolate, and Tacrolimus   GRAFT SOURCE Peripheral blood             Past Medical History  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     Surgical History  He has no past surgical history on file.     Social History  He reports that he has never smoked. He has never used smokeless tobacco. He reports current drug use. Drug: Marijuana. He reports that he does not drink alcohol.     ASSESSMENT/PLAN  Systemic mastocytosis  Status post MRD PBSCT.  T= 0 11/21/2024  Prep:  flu, maurilio, TBI   GVHD Proph: PT Cy, MMF, tac.     DATE DAY SOURCE MORPHOLOGY MRD WHOLE CHIMERISM   (% donor) CD3 CHIMERISM   (% donor) CD33 CHIMERISM   (% donor)     D+30 PB       12/26 12/26     D+30 BM  12/26 12/26         D+60 PB               D+100 PB               D+100 BM                Monitor for post-transplant hemolysis.    12/24  Haptoglobin 33 12/24  UPC ratio 0.16 12/124     Neutropenia  12/13/24 and 12/19/24 Dose of Neupogen given.      GVHD:   12/24 Tacrolimus level 12.4 on 5mg am and 3mg pm. 12/26 Repeat level pending. Reduce dose 3mg bid as per BMT pharmacist.   MMF until  "day 35. Stop 12/26     Weights:  Pre-transplant: 87.6kg  Upon SCT discharge: 80.7kg  Today's weight: 75.7kg.     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     HTN  Continue medications amlodipine  ECHO and onc-card follow up evaluation upon discharge.     Hypomagnesemia  Cont oral supplement     Hypokalemia  12/21 Increase oral potassium 20mEq bid      Medication reconciliation  All meds reviewed. Updated list provided.     IV access  R chest wall Mediport     Psychosocial  Caregiver Marry (wife)  Lodging [place]         Subjective    History of Present Illness:  Mr Reynolds presents to the clinic today for routine follow up post transplant evaluation.     Drove today.     Sinus congestion started a couple days ago. No fever. No SOB. No known sick contacts.     Energy level low. Some naps. Moves around house slowly. Legs feel weak since hospital stay. Light walking on treadmill. Wobbly on feet. No falls. No assistive devices.      ,Drinking well 2 pitchers/day..Appetite not there yet, Mostly nibbled yesterday Trying to eat 2 meals a day-breakfast and dinner. Snacks. \"RD wants me to eat more protein.\" Soft stools 2x day.               Review of Systems   Constitutional:  Positive for appetite change and fatigue.   HENT:  Negative.     Eyes: Negative.    Respiratory:  Positive for cough (Mild.) and wheezing (Some with sinus drainage.,).  "   Cardiovascular: Negative.    Gastrointestinal: Negative.    Endocrine: Negative.    Genitourinary: Negative.     Musculoskeletal: Negative.    Skin: Negative.    Neurological:  Positive for light-headedness (Positional in am only.).   Hematological: Negative.    Psychiatric/Behavioral:  Positive for sleep disturbance (Off and on).             Objective        Physical Exam  Vitals reviewed.   Constitutional:       Appearance: Normal appearance.   HENT:      Head: Normocephalic and atraumatic.      Comments: Scalp alopecia.     Nose: Nose normal.      Mouth/Throat:      Mouth: Mucous membranes are moist.   Eyes:      Pupils: Pupils are equal, round, and reactive to light.   Cardiovascular:      Rate and Rhythm: Normal rate and regular rhythm.      Pulses: Normal pulses.      Heart sounds: Normal heart sounds.   Pulmonary:      Effort: Pulmonary effort is normal.      Breath sounds: Wheezing (Faint) present.   Abdominal:      General: Abdomen is flat. Bowel sounds are normal.      Palpations: Abdomen is soft.   Musculoskeletal:         General: Normal range of motion.      Cervical back: Normal range of motion.   Skin:     General: Skin is warm and dry.      Comments: Mediport site clean.   Neurological:      General: No focal deficit present.      Mental Status: He is alert and oriented to person, place, and time.   Psychiatric:         Mood and Affect: Mood normal.     RTC   12/24, 12/26 with bmbx, 12/28 12/31, 1/2, 1/4 1/7, 1/8 onc-echo/card

## 2024-12-26 NOTE — ASSESSMENT & PLAN NOTE
At high risk for infections. No current signs of infections. Viral monitoring in place. Continue prophylaxis with acyclovir, posaconazole, Bactrim. May also require levofloxacin if ANC trends to <500.

## 2024-12-26 NOTE — ASSESSMENT & PLAN NOTE
Now s/p allo MRD pBSCT. Counts overall improving, though need to closely monitor ANC. Periodic filgrastim, last given 12/19.  Scheduled for T+30 BM on 12/26/24.

## 2024-12-26 NOTE — PROGRESS NOTES
Patient ID: Jin Reynolds is a 69 y.o. male.    Bone marrow aspirate & biopsy    Date/Time: 12/26/2024 12:15 PM    Performed by: Oscar Miller PA-C  Authorized by: Oscar Miller PA-C    Consent:     Consent obtained:  Verbal and written    Consent given by:  Patient    Risks, benefits, and alternatives were discussed: yes      Risks discussed:  Bleeding, infection and pain  Universal protocol:     Procedure explained and questions answered to patient or proxy's satisfaction: yes      Relevant documents present and verified: yes      Test results available: yes      Imaging studies available: yes      Required blood products, implants, devices, and special equipment available: yes      Site/side marked: yes      Immediately prior to procedure, a time out was called: yes      Patient identity confirmed:  Verbally with patient  Indications:     Indications:  Disease assessment - s/p MRD Allo SCT (11/21/24)  Pre-procedure details:     Skin preparation:  Chlorhexidine    Preparation: Patient was prepped and draped in the usual sterile fashion    Sedation:     Sedation type:  None  Anesthesia:     Anesthesia method:  Local infiltration    Local anesthetic:  Lidocaine 1% w/o epi and lidocaine 2% w/o epi  Procedure specific details:      The procedure was explained & potential complications reviewed with the patient including the risks for bleeding, infection, & discomfort at the bone marrow biopsy site. The patient was given time for questions regarding the procedure. The patient agreed to proceed & electronic signed consent was obtained.  The patient's most recent history & physical exam were reviewed & relevant findings were noted.  The patient's allergy history was reviewed.  Next, a pre-procedure time out was performed to properly identify the patient & the procedure to be performed.  The patient was placed in the prone position & the left posterior iliac crest bone marrow biopsy site was identified & marked.   Next, the skin at the marked bone marrow biopsy site was cleansed with Chlorhexidine solution & sterile drapes were placed.  The skin, subcutaneous tissue, & periosteum below the marked bone marrow biopsy site were anesthetized using 10 ml of 1% & 5 ml of 2% Lidocaine solutions.  Next, a Jamshidi needle was inserted at the marked biopsy site & slowly advanced into the posterior iliac crest.  Marrow aspirate & core biopsy were obtained & sent to Pathology for review & testing.  The needle was removed & sterile dressing was applied to the biopsy site.  The patient tolerated the procedure well without incident.  Prior to discharge, the biopsy site was inspected & the patient was given written post procedure care instructions.   Post-procedure details:     Procedure completion:  Tolerated well, no immediate complications

## 2024-12-26 NOTE — PROGRESS NOTES
Patient seen in infusion today for a count check and a bone marrow biopsy, no required transfusions today. Seen at bedside by Karen MAXWELL CNP. Viral swabs sent for congestion. Labs and schedule reviewed with patient. Port positive for blood return and flushed with NS and heparin per protocol prior to de-accessing. Discharged ambulatory in stable condition.

## 2024-12-26 NOTE — ASSESSMENT & PLAN NOTE
T+35 s/p allo MRD pBSCT. Tacrolimus: on 5 mg in the am and 3 mg PM. Level elevated on 12/24 and slight PERRY. Will need dose decreased at next visit.

## 2024-12-27 ENCOUNTER — TELEPHONE (OUTPATIENT)
Dept: HEMATOLOGY/ONCOLOGY | Facility: HOSPITAL | Age: 69
End: 2024-12-27
Payer: MEDICARE

## 2024-12-27 LAB
ADENOVIRUS QPCR,PLASMA, VIRC: NOT DETECTED COPIES/ML
BB ANTIBODY IDENTIFICATION: NORMAL
CASE #: NORMAL
CELL COUNT (BLOOD): 3.72 X10*3/UL
CELL POPULATIONS: NORMAL
DIAGNOSIS: NORMAL
FLOW DIFFERENTIAL: NORMAL
FLOW TEST ORDERED: NORMAL
HUMAN HERPESVIRUS-6 PCR PLASMA: NOT DETECTED COPIES/ML
LAB TEST METHOD: NORMAL
NUMBER OF CELLS COLLECTED: NORMAL
PATH REPORT.TOTAL CANCER: NORMAL
PATH REV-IMMUNOHEMATOLOGY-PR30: NORMAL
SIGNATURE COMMENT: NORMAL
SPECIMEN VIABILITY: NORMAL

## 2024-12-27 NOTE — TELEPHONE ENCOUNTER
Spoke to Mr Reynolds. Aware RVP +parainfluenza. Ongoing sinus congestion. Cont to deny fever, SOB. Aware to call if worsening sx.   He reports issue with tacrolimus 1mg refill which sorted out by BMT pharmacist. Will be ready for  in 1 hour. Resume 3mg this laila as per med list. Mr Reynolds verbalizes understanding.

## 2024-12-28 ENCOUNTER — OFFICE VISIT (OUTPATIENT)
Dept: HEMATOLOGY/ONCOLOGY | Facility: HOSPITAL | Age: 69
End: 2024-12-28
Payer: MEDICARE

## 2024-12-28 ENCOUNTER — INFUSION (OUTPATIENT)
Dept: HEMATOLOGY/ONCOLOGY | Facility: HOSPITAL | Age: 69
End: 2024-12-28
Payer: MEDICARE

## 2024-12-28 VITALS
RESPIRATION RATE: 20 BRPM | HEART RATE: 91 BPM | OXYGEN SATURATION: 100 % | BODY MASS INDEX: 25.98 KG/M2 | TEMPERATURE: 97.7 F | WEIGHT: 163.8 LBS | DIASTOLIC BLOOD PRESSURE: 75 MMHG | SYSTOLIC BLOOD PRESSURE: 119 MMHG

## 2024-12-28 DIAGNOSIS — D47.02 SYSTEMIC MASTOCYTOSIS WITH ASSOCIATED CLONAL HEMATOLOGICAL NON-MAST CELL LINEAGE DISEASE: ICD-10-CM

## 2024-12-28 DIAGNOSIS — J06.9 UPPER RESPIRATORY TRACT INFECTION, UNSPECIFIED TYPE: ICD-10-CM

## 2024-12-28 DIAGNOSIS — Z94.81 S/P ALLOGENEIC BONE MARROW TRANSPLANT (MULTI): ICD-10-CM

## 2024-12-28 DIAGNOSIS — M81.8 OTHER OSTEOPOROSIS WITHOUT CURRENT PATHOLOGICAL FRACTURE: ICD-10-CM

## 2024-12-28 DIAGNOSIS — E83.42 HYPOMAGNESEMIA: ICD-10-CM

## 2024-12-28 DIAGNOSIS — C92.01 ACUTE MYELOID LEUKEMIA IN REMISSION (MULTI): ICD-10-CM

## 2024-12-28 DIAGNOSIS — C92.00 ACUTE MYELOID LEUKEMIA NOT HAVING ACHIEVED REMISSION (MULTI): Primary | ICD-10-CM

## 2024-12-28 DIAGNOSIS — D84.9 IMMUNOCOMPROMISED STATE: ICD-10-CM

## 2024-12-28 LAB
ABO GROUP (TYPE) IN BLOOD: NORMAL
ACANTHOCYTES BLD QL SMEAR: NORMAL
ALBUMIN SERPL BCP-MCNC: 4.2 G/DL (ref 3.4–5)
ALP SERPL-CCNC: 59 U/L (ref 33–136)
ALT SERPL W P-5'-P-CCNC: 11 U/L (ref 10–52)
ANION GAP SERPL CALC-SCNC: 11 MMOL/L (ref 10–20)
ANTIBODY SCREEN: NORMAL
AST SERPL W P-5'-P-CCNC: 14 U/L (ref 9–39)
BASOPHILS # BLD AUTO: 0.1 X10*3/UL (ref 0–0.1)
BASOPHILS NFR BLD AUTO: 1.9 %
BILIRUB SERPL-MCNC: 0.9 MG/DL (ref 0–1.2)
BUN SERPL-MCNC: 9 MG/DL (ref 6–23)
BURR CELLS BLD QL SMEAR: NORMAL
CALCIUM SERPL-MCNC: 8.7 MG/DL (ref 8.6–10.6)
CHLORIDE SERPL-SCNC: 106 MMOL/L (ref 98–107)
CO2 SERPL-SCNC: 21 MMOL/L (ref 21–32)
CREAT SERPL-MCNC: 1.29 MG/DL (ref 0.5–1.3)
DACRYOCYTES BLD QL SMEAR: NORMAL
EGFRCR SERPLBLD CKD-EPI 2021: 60 ML/MIN/1.73M*2
EOSINOPHIL # BLD AUTO: 1.01 X10*3/UL (ref 0–0.7)
EOSINOPHIL NFR BLD AUTO: 19.2 %
ERYTHROCYTE [DISTWIDTH] IN BLOOD BY AUTOMATED COUNT: 21.8 % (ref 11.5–14.5)
GLUCOSE SERPL-MCNC: 104 MG/DL (ref 74–99)
HCT VFR BLD AUTO: 24 % (ref 41–52)
HGB BLD-MCNC: 8.3 G/DL (ref 13.5–17.5)
IMM GRANULOCYTES # BLD AUTO: 0.05 X10*3/UL (ref 0–0.7)
IMM GRANULOCYTES NFR BLD AUTO: 1 % (ref 0–0.9)
LYMPHOCYTES # BLD AUTO: 0.2 X10*3/UL (ref 1.2–4.8)
LYMPHOCYTES NFR BLD AUTO: 3.8 %
MAGNESIUM SERPL-MCNC: 1.47 MG/DL (ref 1.6–2.4)
MCH RBC QN AUTO: 31 PG (ref 26–34)
MCHC RBC AUTO-ENTMCNC: 34.6 G/DL (ref 32–36)
MCV RBC AUTO: 90 FL (ref 80–100)
MONOCYTES # BLD AUTO: 0.79 X10*3/UL (ref 0.1–1)
MONOCYTES NFR BLD AUTO: 15 %
NEUTROPHILS # BLD AUTO: 3.11 X10*3/UL (ref 1.2–7.7)
NEUTROPHILS NFR BLD AUTO: 59.1 %
NRBC BLD-RTO: 0 /100 WBCS (ref 0–0)
OVALOCYTES BLD QL SMEAR: NORMAL
PLATELET # BLD AUTO: 103 X10*3/UL (ref 150–450)
POLYCHROMASIA BLD QL SMEAR: NORMAL
POTASSIUM SERPL-SCNC: 3.9 MMOL/L (ref 3.5–5.3)
PROT SERPL-MCNC: 6.1 G/DL (ref 6.4–8.2)
RBC # BLD AUTO: 2.68 X10*6/UL (ref 4.5–5.9)
RBC MORPH BLD: NORMAL
RH FACTOR (ANTIGEN D): NORMAL
SCHISTOCYTES BLD QL SMEAR: NORMAL
SODIUM SERPL-SCNC: 134 MMOL/L (ref 136–145)
TACROLIMUS BLD-MCNC: 8.9 NG/ML
WBC # BLD AUTO: 5.3 X10*3/UL (ref 4.4–11.3)

## 2024-12-28 PROCEDURE — 1159F MED LIST DOCD IN RCRD: CPT

## 2024-12-28 PROCEDURE — 86901 BLOOD TYPING SEROLOGIC RH(D): CPT

## 2024-12-28 PROCEDURE — 99215 OFFICE O/P EST HI 40 MIN: CPT

## 2024-12-28 PROCEDURE — G2211 COMPLEX E/M VISIT ADD ON: HCPCS

## 2024-12-28 PROCEDURE — 80197 ASSAY OF TACROLIMUS: CPT

## 2024-12-28 PROCEDURE — 83735 ASSAY OF MAGNESIUM: CPT

## 2024-12-28 PROCEDURE — 3074F SYST BP LT 130 MM HG: CPT

## 2024-12-28 PROCEDURE — 3078F DIAST BP <80 MM HG: CPT

## 2024-12-28 PROCEDURE — 36591 DRAW BLOOD OFF VENOUS DEVICE: CPT

## 2024-12-28 PROCEDURE — 1036F TOBACCO NON-USER: CPT

## 2024-12-28 PROCEDURE — 2500000004 HC RX 250 GENERAL PHARMACY W/ HCPCS (ALT 636 FOR OP/ED): Performed by: INTERNAL MEDICINE

## 2024-12-28 PROCEDURE — 85025 COMPLETE CBC W/AUTO DIFF WBC: CPT

## 2024-12-28 PROCEDURE — 1111F DSCHRG MED/CURRENT MED MERGE: CPT

## 2024-12-28 PROCEDURE — 80053 COMPREHEN METABOLIC PANEL: CPT

## 2024-12-28 PROCEDURE — 1126F AMNT PAIN NOTED NONE PRSNT: CPT

## 2024-12-28 RX ORDER — DIPHENHYDRAMINE HYDROCHLORIDE 50 MG/ML
50 INJECTION INTRAMUSCULAR; INTRAVENOUS AS NEEDED
OUTPATIENT
Start: 2024-12-28

## 2024-12-28 RX ORDER — EPINEPHRINE 0.3 MG/.3ML
0.3 INJECTION SUBCUTANEOUS EVERY 5 MIN PRN
OUTPATIENT
Start: 2024-12-28

## 2024-12-28 RX ORDER — HEPARIN 100 UNIT/ML
500 SYRINGE INTRAVENOUS AS NEEDED
OUTPATIENT
Start: 2024-12-28

## 2024-12-28 RX ORDER — FAMOTIDINE 10 MG/ML
20 INJECTION INTRAVENOUS ONCE AS NEEDED
OUTPATIENT
Start: 2024-12-28

## 2024-12-28 RX ORDER — ALBUTEROL SULFATE 0.83 MG/ML
3 SOLUTION RESPIRATORY (INHALATION) AS NEEDED
OUTPATIENT
Start: 2024-12-28

## 2024-12-28 RX ORDER — HEPARIN 100 UNIT/ML
500 SYRINGE INTRAVENOUS AS NEEDED
Status: DISCONTINUED | OUTPATIENT
Start: 2024-12-28 | End: 2024-12-28 | Stop reason: HOSPADM

## 2024-12-28 RX ORDER — HEPARIN SODIUM,PORCINE/PF 10 UNIT/ML
50 SYRINGE (ML) INTRAVENOUS AS NEEDED
OUTPATIENT
Start: 2024-12-28

## 2024-12-28 ASSESSMENT — ENCOUNTER SYMPTOMS
ENDOCRINE NEGATIVE: 1
HEMATOLOGIC/LYMPHATIC NEGATIVE: 1
FATIGUE: 1
CARDIOVASCULAR NEGATIVE: 1
EYES NEGATIVE: 1
MUSCULOSKELETAL NEGATIVE: 1
GASTROINTESTINAL NEGATIVE: 1
SLEEP DISTURBANCE: 1
APPETITE CHANGE: 1
COUGH: 1

## 2024-12-28 ASSESSMENT — PAIN SCALES - GENERAL: PAINLEVEL_OUTOF10: 0-NO PAIN

## 2024-12-28 NOTE — PROGRESS NOTES
"Patient ID:  Jin Reynolds is a 69 y.o. male.  Referring Physician:   BMT Dr Dawson  Primary Care Provider:  ROSA Bone-CNP    Assessment/Plan    12/28/24 T+37. Ongoing sinus congestion and cough. Worse at night time. Not using any PRNs. Encouraged use of OTC remedies for congestion. Positive for parainfluenza 12/26. Appetite still poor, drinking a lot of fluids.     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   8/23/2023 Initial Diagnosis    DX:  SMOLDERING SYSTEMIC MASTOCYTOSIS  Presented with skin rash and eosinophilia    BRENDON DIAGNOSTIC CRITERIA  (For SM, Need major and 1 minor OR at least 3 minors)  - Multi-focal, dense infiltrates of MC (>= 15 mast cells in aggregates) in BM and/or other extra-cutaneous organs [major]  - In BM or extracutaneous organs, >25% MC spindle shaped OR have atypical morphology OR of all MC on BM aspirate smears, >25% are immature or atypical [minor]  - Detection of activating mutation KIT D816V in BM, PB, or other extracutaneous organ [minor]  - Serum tryptase persistently exceeds 20 ng/mL (unless associated clonal myeloid disorder, in which this paramenter is not valid) [minor]    \"B\" FINDINGS  - BM biopsy showing >30% infiltration (focal, dense aggregates) and/or serum tryptase > 200 ng/mL  - Signs of dysplasia or myeloproliferation, in non-MC lineages but insufficient criteria for definitive diagnosis of AHNMD with normal or slightly abnormal blood counts    \"C\" FINDINGS  None       8/23/2023 Biopsy    BONE MARROW (8/23/23)  Hypercellular (90-95%) with trilineage hematopoiesis, granulocytic hyperplasia, eosinophilia, and increased atypical mast cells  IP:  CD34+, +, CD7(bright)+, cCD3-, CD33-, CD15-, CD13+ blast population  IHC:  + increased spindle-shaped mast cells (15% cellularity), CD2-  Myeloid NGS:  CBL (31%), cKIT D816V (30%), RUNX1 x 2 (19%, 29%), SRSF2 (47%) [ASXL1 negative]  FISH:  PDGFRb " negative    SERUM TRYPTASE  228 (8/23/23)  268 (9/5/23)     2/13/2024 -  Molecular Therapy    AVAPRITINIB   - Starting dose 25 mg daily (non-Adv SM)     7/16/2024 - 10/21/2024 Chemotherapy    Venetoclax / AzaCITIDine  - C1D1 7/17/24:  complicated by TLS, venetoclax held after D1  - Post C1 BM (8/28/24):  hypercellular with >50% blasts, background systemic mastocytosis  - C2D1 9/5/24:  venetoclax restarted with C2, no TLS  - Post C2 BM (9/25/24):  hypocellular (<5%), atypical myeloblasts by IP/IHC (1-2%), persistent systemic mastocytosis  - C3D1 10/15/24:  delayed 1 week for count recovery  - Post C3 BM (10/23/24):  hypercellular with systemic mastocytosis, no increase blasts, 0.2% atypical blasts by IP, NGS w/ CBL (73%), cKIT (4%), RUNX1 x 2 (4%, 38%), SRSF2 (40%)       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    CONDITIONING Fludarabine, melphalan, and TBI   DONOR Matched sibling   MATCH GRADE 12/12   SEX MATCH Mismatched   ABO DONOR O pos   ABO RECIPIENT O pos   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, Mycophenolate, and Tacrolimus   GRAFT SOURCE Peripheral blood          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)   6/27/2024 Initial Diagnosis    ICC 2022 DX: Acute myeloid leukemia, NOS (>=20% blasts)  [id number] AML Risk Stratification: INTERMEDIATE: Cytogenetic and/or molecular abnormalities not classified as favorable or adverse  Presented with pancytopenia and circulating blasts    BONE MARROW (06/27/2024)  Marrow replaced by blasts, fibrotic foci, some atypical + cells; 7-8% circulating blasts; aspicular  - Unable to perform additional studies due to aspicular specimen    PERIPHERAL BLOOD (6/27/2024)  FISH:  positive for gain RUNX1    PERIPHERAL BLOOD (7/1/24)  IP:  abnl myeloblast population (CD34+, CD7+, CD4+, CD13+, CD38+, CD11+ dim, HLA=DR+, TdT+, CD33-)  Myeloid NGS: CBL C404Y (41%), KIT D816V (8%), RUNX1 x2 (8%, 30%), SRSF2 (37%)     7/16/2024 - 10/21/2024 Chemotherapy    Venetoclax /  AzaCITIDine  - C1D1 7/17/24:  complicated by TLS, venetoclax held after D1  - Post C1 BM (8/28/24):  hypercellular with >50% blasts, background systemic mastocytosis  - C2D1 9/5/24:  venetoclax restarted with C2, no TLS  - Post C2 BM (9/25/24):  hypocellular (<5%), atypical myeloblasts by IP/IHC (1-2%), persistent systemic mastocytosis  - C3D1 10/15/24:  delayed 1 week for count recovery  - Post C3 BM (10/23/24):  hypercellular with systemic mastocytosis, no increase blasts, 0.2% atypical blasts by IP, NGS w/ CBL (73%), cKIT (4%), RUNX1 x 2 (4%, 38%), SRSF2 (40%)       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    CONDITIONING Fludarabine, melphalan, and TBI   DONOR Matched sibling   MATCH GRADE 12/12   SEX MATCH Mismatched   ABO DONOR O pos   ABO RECIPIENT O pos   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, Mycophenolate, and Tacrolimus   GRAFT SOURCE Peripheral blood             Past Medical History  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     Surgical History  He has no past surgical history on file.     Social History  He reports that he has never smoked. He has never used smokeless tobacco. He reports current drug use. Drug: Marijuana. He reports that he does not drink alcohol.     ASSESSMENT/PLAN  Systemic mastocytosis  Status post MRD PBSCT.  T= 0 11/21/2024  Prep:  flu, maurilio, TBI   GVHD Proph: PT Cy, MMF, tac.     DATE DAY SOURCE MORPHOLOGY MRD WHOLE CHIMERISM   (% donor) CD3 CHIMERISM   (% donor) CD33 CHIMERISM   (% donor)     D+30 PB       12/26 12/26     D+30 BM  12/26 12/26         D+60 PB               D+100 PB               D+100 BM                Monitor for post-transplant hemolysis.    12/26  Haptoglobin 33 12/24  UPC ratio 0.16 12/24     Neutropenia  12/13/24 and 12/19/24 Dose of Neupogen given.      GVHD:   12/24 Tacrolimus level 12.4 on 5mg am and 3mg pm. 12/26 Repeat level pending. Reduce dose 3mg  bid as per BMT pharmacist.   MMF until day 35. Stop 12/26     Weights:  Pre-transplant: 87.6kg  Upon SCT discharge: 80.7kg  Today's weight: 74.3kg.     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     HTN  Continue medications amlodipine  ECHO and onc-card follow up evaluation upon discharge.     Hypomagnesemia  Cont oral supplement, level of 1.47, encouraged to continue home oral dose     Hypokalemia  12/21 Increase oral potassium 20mEq bid      Medication reconciliation  All meds reviewed. Updated list provided.     IV access  R chest wall Mediport     Psychosocial  Caregiver Marry (wife)  Lodging [place]     Subjective    History of Present Illness:  Mr. Reynolds presents to clinic 12/28/24 for a follow up visit.    Overall he is doing okay.    Notes ongoing sinus congestion and cough. Appear to be worse at night. Encouraged use of OTC remedies to help.     Denies n/v/d.     Appetite remains poor. He is staying hydrated.       Review of Systems   Constitutional:  Positive for appetite change and fatigue.   HENT:  Negative.     Eyes: Negative.    Respiratory:  Positive for cough (Mild.). Wheezing: Some with sinus drainage.,.   Cardiovascular: Negative.    Gastrointestinal: Negative.    Endocrine: Negative.    Genitourinary: Negative.     Musculoskeletal: Negative.    Skin: Negative.    Neurological:  Light-headedness: Positional in am only..    Hematological: Negative.    Psychiatric/Behavioral:  Positive for sleep disturbance (Off and on).        Objective      Physical Exam  Vitals reviewed.   Constitutional:       Appearance: Normal appearance.   HENT:      Head: Normocephalic and atraumatic.      Comments: Scalp alopecia.     Nose: Congestion and rhinorrhea present.      Mouth/Throat:      Mouth: Mucous membranes are moist.   Eyes:      Pupils: Pupils are equal, round, and reactive to light.   Cardiovascular:      Rate and Rhythm: Normal rate and regular rhythm.      Pulses: Normal pulses.      Heart sounds: Normal heart sounds.   Pulmonary:      Effort: Pulmonary effort is normal.      Breath sounds: Wheezes: Faint.   Abdominal:      General: Abdomen is flat. Bowel sounds are normal.      Palpations: Abdomen is soft.   Musculoskeletal:         General: Normal range of motion.      Cervical back: Normal range of motion.   Skin:     General: Skin is warm and dry.      Comments: Mediport site clean.   Neurological:      General: No focal deficit present.      Mental Status: He is alert and oriented to person, place, and time.   Psychiatric:         Mood and Affect: Mood normal.       RTC  12/31, 1/2, 1/4 1/7, 1/8 onc-echo/card     Has appointments for January requested. Need to be scheduled. Sent message to [facility] scheduling.     Giancarlo Matta, APRN-CNP

## 2024-12-28 NOTE — PROGRESS NOTES
Pt arrived to [facility] infusion for scheduled count check. Giancarlo LEE-CNP at chairside. Mag resulted at 1.47. Patient has only been taking his magnesium twice daily. RN educated patient to take two pills, three times daily per order. Patient verbalized understanding and discharged home in safe condition.

## 2024-12-30 PROBLEM — Z94.81 S/P ALLOGENEIC BONE MARROW TRANSPLANT (MULTI): Status: RESOLVED | Noted: 2024-12-28 | Resolved: 2024-12-30

## 2024-12-30 LAB
BB ANTIBODY IDENTIFICATION: NORMAL
CASE #: NORMAL

## 2024-12-30 NOTE — PROGRESS NOTES
"    Patient ID: Jin Reynolds is a 69 y.o. male.  Primary Oncologist: Dr. 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   8/23/2023 Initial Diagnosis    DX:  SMOLDERING SYSTEMIC MASTOCYTOSIS  Presented with skin rash and eosinophilia    [facility]  (For SM, Need major and 1 minor OR at least 3 minors)  - Multi-focal, dense infiltrates of MC (>= 15 mast cells in aggregates) in BM and/or other extra-cutaneous organs [major]  - In BM or extracutaneous organs, >25% MC spindle shaped OR have atypical morphology OR of all MC on BM aspirate smears, >25% are immature or atypical [minor]  - Detection of activating mutation KIT D816V in BM, PB, or other extracutaneous organ [minor]  - Serum tryptase persistently exceeds 20 ng/mL (unless associated clonal myeloid disorder, in which this paramenter is not valid) [minor]    \"B\" FINDINGS  - BM biopsy showing >30% infiltration (focal, dense aggregates) and/or serum tryptase > 200 ng/mL  - Signs of dysplasia or myeloproliferation, in non-MC lineages but insufficient criteria for definitive diagnosis of AHNMD with normal or slightly abnormal blood counts    \"C\" FINDINGS  None       8/23/2023 Biopsy    BONE MARROW (8/23/23)  Hypercellular (90-95%) with trilineage hematopoiesis, granulocytic hyperplasia, eosinophilia, and increased atypical mast cells  IP:  CD34+, +, CD7(bright)+, cCD3-, CD33-, CD15-, CD13+ blast population  IHC:  + increased spindle-shaped mast cells (15% cellularity), CD2-  Myeloid NGS:  CBL (31%), cKIT D816V (30%), RUNX1 x 2 (19%, 29%), SRSF2 (47%) [ASXL1 negative]  FISH:  PDGFRb negative    SERUM TRYPTASE  228 (8/23/23)  268 (9/5/23)     2/13/2024 -  Molecular Therapy    AVAPRITINIB   - Starting dose 25 mg daily (non-Adv SM)     7/16/2024 - 10/21/2024 Chemotherapy    Venetoclax / AzaCITIDine  - C1D1 7/17/24:  complicated by TLS, venetoclax held " after D1  - Post C1 BM (8/28/24):  hypercellular with >50% blasts, background systemic mastocytosis  - C2D1 9/5/24:  venetoclax restarted with C2, no TLS  - Post C2 BM (9/25/24):  hypocellular (<5%), atypical myeloblasts by IP/IHC (1-2%), persistent systemic mastocytosis  - C3D1 10/15/24:  delayed 1 week for count recovery  - Post C3 BM (10/23/24):  hypercellular with systemic mastocytosis, no increase blasts, 0.2% atypical blasts by IP, NGS w/ CBL (73%), cKIT (4%), RUNX1 x 2 (4%, 38%), SRSF2 (40%)       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    CONDITIONING Fludarabine, melphalan, and TBI   DONOR Matched sibling   MATCH GRADE 12/12   SEX MATCH Mismatched   ABO DONOR O pos   ABO RECIPIENT O pos   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, Mycophenolate, and Tacrolimus   GRAFT SOURCE Peripheral blood          Acute myeloid leukemia not having achieved remission (Multi)   6/27/2024 Initial Diagnosis    ICC 2022 DX: Acute myeloid leukemia, NOS (>=20% blasts)  [id number] AML Risk Stratification: INTERMEDIATE: Cytogenetic and/or molecular abnormalities not classified as favorable or adverse  Presented with pancytopenia and circulating blasts    BONE MARROW (06/27/2024)  Marrow replaced by blasts, fibrotic foci, some atypical + cells; 7-8% circulating blasts; aspicular  - Unable to perform additional studies due to aspicular specimen    PERIPHERAL BLOOD (6/27/2024)  FISH:  positive for gain RUNX1    PERIPHERAL BLOOD (7/1/24)  IP:  abnl myeloblast population (CD34+, CD7+, CD4+, CD13+, CD38+, CD11+ dim, HLA=DR+, TdT+, CD33-)  Myeloid NGS: CBL C404Y (41%), KIT D816V (8%), RUNX1 x2 (8%, 30%), SRSF2 (37%)     7/16/2024 - 10/21/2024 Chemotherapy    Venetoclax / AzaCITIDine  - C1D1 7/17/24:  complicated by TLS, venetoclax held after D1  - Post C1 BM (8/28/24):  hypercellular with >50% blasts, background systemic mastocytosis  - C2D1 9/5/24:  venetoclax restarted with C2, no TLS  - Post C2 BM (9/25/24):  hypocellular  (<5%), atypical myeloblasts by IP/IHC (1-2%), persistent systemic mastocytosis  - C3D1 10/15/24:  delayed 1 week for count recovery  - Post C3 BM (10/23/24):  hypercellular with systemic mastocytosis, no increase blasts, 0.2% atypical blasts by IP, NGS w/ CBL (73%), cKIT (4%), RUNX1 x 2 (4%, 38%), SRSF2 (40%)       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    CONDITIONING Fludarabine, melphalan, and TBI   DONOR Matched sibling   MATCH GRADE 12/12   SEX MATCH Mismatched   ABO DONOR O pos   ABO RECIPIENT O pos   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, Mycophenolate, and Tacrolimus   GRAFT SOURCE Peripheral blood              12/31/24 T+ 40 s/p allo SCT and doing well. Counts are recovering and no evidence of aGVHD. Continues to have ongoing taste changes with associated changes in appetite. Recently off MMF. Will trial mirtazapine. Encouraged increasing oral hydration with today's increase in sCr. Day 30 marrow results pending from last week. Hoping to decrease to twice weekly starting next week if stable.     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 Counts overall improving, though need to closely monitor ANC. Periodic filgrastim, last given 12/19. T+30 BM on 12/26/24, still pending. Flow without evidence of disease. Chimerisms pending.   History of stem cell transplant (Multi)  T+40 s/p allo MRD pBSCT. Tacrolimus: on 3 mg BID last reduced 12/26/24. Level 6.3. No evidence of GVHD though continues to struggle with weight loss- related to persistent dysgeusia. Trial mirtazapine. No nausea or vomiting. Some new loose stools since parainfluenza diagnosis. Checking stool samples today. Proteinuria with increase in P/C ratio.   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 No current signs of infections. Viral monitoring in place. Continue  prophylaxis with acyclovir, posaconazole, Bactrim. May also require levofloxacin if ANC trends to <500.   Parainfluenza infection  Ongoing nasal congestion and drainage. No fevers or other significant respiratory symptoms at this time.   Diarrhea of presumed infectious origin  Unclear if new loose stools are related to infection vs diet (mostly liquids) vs GVHD. Stool testing requested. Given 1L NS bolus.       SUBJECTIVE     Days since transplant: 40     HPI    Jin Reynolds presents today for post transplant follow up, unaccompanied. Flu like symptoms in the last week. Diarrhea-- watery; off and on, twice a day. No blood or mucus. Nasal congestion with clear sinus drainage. Cough has been nonproductive. No shortness of breath. Parainfluenza positive last week. Hasn't taken any supportive meds yet.     Not eating a lot; food still has a weird taste to it. Frustrated because he does get hungry and has an appetite but the altered taste keeps him from eating. No vomiting or nausea. No rashes, fevers, chills, cough, chest pain. Dry skin.     Drinking 2 ensures/day, water, electrolytes.     Currently on tacro 3 mg BID. Held dose this AM.     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    Esophageal ulcer with bleeding 01/01/2024    Personal history of diseases of the skin and subcutaneous tissue     History of alopecia    Umbilical hernia 05/30/2023     Social History     Socioeconomic History    Marital status:      Spouse name: Not on file    Number of children: Not on file    Years of education: Not on file    Highest education level: Not on file   Occupational History    Not on file   Tobacco Use    Smoking status: Never    Smokeless tobacco: Never   Substance and Sexual Activity    Alcohol use: Never    Drug use: Yes     Types: Marijuana     Comment: daily    Sexual activity: Not on file   Other Topics Concern     Not on file   Social History Narrative    Not on file     Social Drivers of Health     Financial Resource Strain: Medium Risk (11/18/2024)    Overall Financial Resource Strain (CARDIA)     Difficulty of Paying Living Expenses: Somewhat hard   Food Insecurity: No Food Insecurity (11/15/2024)    Hunger Vital Sign     Worried About Running Out of Food in the Last Year: Never true     Ran Out of Food in the Last Year: Never true   Transportation Needs: No Transportation Needs (11/18/2024)    PRAPARE - Transportation     Lack of Transportation (Medical): No     Lack of Transportation (Non-Medical): No   Physical Activity: Not on file   Stress: Not on file   Social Connections: Not on file   Intimate Partner Violence: Not At Risk (11/15/2024)    Humiliation, Afraid, Rape, and Kick questionnaire     Fear of Current or Ex-Partner: No     Emotionally Abused: No     Physically Abused: No     Sexually Abused: No   Housing Stability: Low Risk  (11/18/2024)    Housing Stability Vital Sign     Unable to Pay for Housing in the Last Year: No     Number of Times Moved in the Last Year: 0     Homeless in the Last Year: No     No past surgical history on file.      OBJECTIVE     BSA: There is no height or weight on file to calculate BSA.  There were no vitals taken for this visit.  Weight    No data found in the last 1 encounters.            Physical Exam  Constitutional:       Appearance: Normal appearance.   HENT:      Mouth/Throat:      Comments: Mucous membranes tacky  Eyes:      Conjunctiva/sclera: Conjunctivae normal.      Pupils: Pupils are equal, round, and reactive to light.   Cardiovascular:      Rate and Rhythm: Normal rate and regular rhythm.   Pulmonary:      Effort: Pulmonary effort is normal.      Breath sounds: Normal breath sounds.   Musculoskeletal:         General: Normal range of motion.   Skin:     Findings: No rash.   Neurological:      Mental Status: He is alert. Mental status is at baseline.   Psychiatric:    "      Mood and Affect: Mood normal.         Performance Status:  Karnofsky Score: 70 - Cares for self; unable to carry on normal activity or do normal work       POST TRANSPLANT MONITORING   GVHD  Acute GVHD Overall Grade:    Chronic GVHD Total Score:      Viral Monitoring:     CMV   Lab Results   Component Value Date    CMVDNAPCR Not Detected 12/31/2024    CMVDNAPCR Not Detected 12/24/2024    CMVDNAPCR Not Detected 12/19/2024      EBV   Lab Results   Component Value Date    EBVDNAPCR Not Detected 12/02/2024    EBVDNAPCR Not Detected 11/25/2024    EBVDNAPCR Not Detected 10/23/2024      Adenovirus   Lab Results   Component Value Date    ADEPB Not Detected 12/24/2024    ADEPB Not Detected 12/17/2024    ADEPB Not Detected 12/09/2024       Toxo   No results found for: \"TGONDPCRBL\"      TMA Monitoring:     MARKERS RECENT 3 VALUES   LDH Lab Results   Component Value Date     12/31/2024     12/26/2024     12/24/2024      Haptoglobin Lab Results   Component Value Date    HAPTOGLOBIN 33 12/24/2024    HAPTOGLOBIN 42 12/17/2024    HAPTOGLOBIN 58 12/09/2024      BP BP Readings from Last 3 Encounters:   12/28/24 119/75   12/26/24 103/75   12/24/24 113/66      Serum Creatinine Lab Results   Component Value Date    CREATININE 1.37 (H) 12/31/2024    CREATININE 1.29 12/28/2024    CREATININE 1.42 (H) 12/26/2024      Urine Protein/Creat Ratio Lab Results   Component Value Date    UTPCR 0.39 (H) 12/31/2024    UTPCR 0.16 12/24/2024    UTPCR 0.24 (H) 12/17/2024      Tacrolimus Level Lab Results   Component Value Date    TACROLIMUS 6.3 12/31/2024    TACROLIMUS 8.9 12/28/2024    TACROLIMUS 11.4 12/26/2024            Kalia June PA-C            "

## 2024-12-30 NOTE — ASSESSMENT & PLAN NOTE
T+40 s/p allo MRD pBSCT. Tacrolimus: on 3 mg BID last reduced 12/26/24. Level 6.3. No evidence of GVHD though continues to struggle with weight loss- related to persistent dysgeusia. Trial mirtazapine. No nausea or vomiting. Some new loose stools since parainfluenza diagnosis. Checking stool samples today. Proteinuria with increase in P/C ratio.

## 2024-12-30 NOTE — ASSESSMENT & PLAN NOTE
Now s/p allo MRD pBSCT. Counts overall improving, though need to closely monitor ANC. Periodic filgrastim, last given 12/19. T+30 BM on 12/26/24, still pending. Flow without evidence of disease. Chimerisms pending.

## 2024-12-31 ENCOUNTER — INFUSION (OUTPATIENT)
Dept: HEMATOLOGY/ONCOLOGY | Facility: HOSPITAL | Age: 69
End: 2024-12-31
Payer: MEDICARE

## 2024-12-31 ENCOUNTER — LAB (OUTPATIENT)
Dept: HEMATOLOGY/ONCOLOGY | Facility: HOSPITAL | Age: 69
End: 2024-12-31
Payer: MEDICARE

## 2024-12-31 ENCOUNTER — OFFICE VISIT (OUTPATIENT)
Dept: HEMATOLOGY/ONCOLOGY | Facility: HOSPITAL | Age: 69
End: 2024-12-31
Payer: MEDICARE

## 2024-12-31 ENCOUNTER — NUTRITION (OUTPATIENT)
Dept: HEMATOLOGY/ONCOLOGY | Facility: HOSPITAL | Age: 69
End: 2024-12-31

## 2024-12-31 VITALS
BODY MASS INDEX: 25.11 KG/M2 | RESPIRATION RATE: 17 BRPM | OXYGEN SATURATION: 100 % | HEART RATE: 89 BPM | SYSTOLIC BLOOD PRESSURE: 104 MMHG | WEIGHT: 158.29 LBS | DIASTOLIC BLOOD PRESSURE: 69 MMHG | TEMPERATURE: 97.5 F

## 2024-12-31 VITALS — BODY MASS INDEX: 24.84 KG/M2 | HEIGHT: 67 IN | WEIGHT: 158.29 LBS

## 2024-12-31 DIAGNOSIS — R11.2 NAUSEA AND VOMITING, UNSPECIFIED VOMITING TYPE: Primary | ICD-10-CM

## 2024-12-31 DIAGNOSIS — C92.01 ACUTE MYELOID LEUKEMIA IN REMISSION (MULTI): ICD-10-CM

## 2024-12-31 DIAGNOSIS — C92.00 ACUTE MYELOID LEUKEMIA NOT HAVING ACHIEVED REMISSION (MULTI): ICD-10-CM

## 2024-12-31 DIAGNOSIS — Z94.84 HISTORY OF STEM CELL TRANSPLANT (MULTI): Primary | ICD-10-CM

## 2024-12-31 DIAGNOSIS — D84.9 IMMUNOCOMPROMISED STATE: ICD-10-CM

## 2024-12-31 DIAGNOSIS — R19.7 DIARRHEA OF PRESUMED INFECTIOUS ORIGIN: ICD-10-CM

## 2024-12-31 DIAGNOSIS — B34.8 PARAINFLUENZA INFECTION: ICD-10-CM

## 2024-12-31 DIAGNOSIS — C92.02 AML (ACUTE MYELOID LEUKEMIA) IN RELAPSE (MULTI): ICD-10-CM

## 2024-12-31 DIAGNOSIS — Z94.81 S/P ALLOGENEIC BONE MARROW TRANSPLANT (MULTI): ICD-10-CM

## 2024-12-31 DIAGNOSIS — D47.02 SYSTEMIC MASTOCYTOSIS WITH ASSOCIATED CLONAL HEMATOLOGICAL NON-MAST CELL LINEAGE DISEASE: ICD-10-CM

## 2024-12-31 LAB
ABO GROUP (TYPE) IN BLOOD: NORMAL
ACANTHOCYTES BLD QL SMEAR: NORMAL
ALBUMIN SERPL BCP-MCNC: 4.3 G/DL (ref 3.4–5)
ALP SERPL-CCNC: 53 U/L (ref 33–136)
ALT SERPL W P-5'-P-CCNC: 11 U/L (ref 10–52)
ANION GAP SERPL CALC-SCNC: 11 MMOL/L (ref 10–20)
ANTIBODY SCREEN: NORMAL
APPEARANCE UR: ABNORMAL
AST SERPL W P-5'-P-CCNC: 13 U/L (ref 9–39)
BASOPHILS # BLD AUTO: 0.07 X10*3/UL (ref 0–0.1)
BASOPHILS NFR BLD AUTO: 1.7 %
BB ANTIBODY IDENTIFICATION: NORMAL
BILIRUB SERPL-MCNC: 0.9 MG/DL (ref 0–1.2)
BILIRUB UR STRIP.AUTO-MCNC: NEGATIVE MG/DL
BUN SERPL-MCNC: 9 MG/DL (ref 6–23)
BURR CELLS BLD QL SMEAR: NORMAL
CALCIUM SERPL-MCNC: 8.8 MG/DL (ref 8.6–10.3)
CASE #: NORMAL
CHLORIDE SERPL-SCNC: 104 MMOL/L (ref 98–107)
CO2 SERPL-SCNC: 23 MMOL/L (ref 21–32)
COLOR UR: YELLOW
CREAT SERPL-MCNC: 1.37 MG/DL (ref 0.5–1.3)
CREAT UR-MCNC: 155 MG/DL (ref 20–370)
DACRYOCYTES BLD QL SMEAR: NORMAL
EGFRCR SERPLBLD CKD-EPI 2021: 56 ML/MIN/1.73M*2
EOSINOPHIL # BLD AUTO: 0.43 X10*3/UL (ref 0–0.7)
EOSINOPHIL NFR BLD AUTO: 10.6 %
ERYTHROCYTE [DISTWIDTH] IN BLOOD BY AUTOMATED COUNT: 22.5 % (ref 11.5–14.5)
GLUCOSE SERPL-MCNC: 131 MG/DL (ref 74–99)
GLUCOSE UR STRIP.AUTO-MCNC: NORMAL MG/DL
GRAN CASTS #/AREA UR COMP ASSIST: ABNORMAL /LPF
HAPTOGLOB SERPL NEPH-MCNC: 38 MG/DL (ref 30–200)
HCT VFR BLD AUTO: 23.7 % (ref 41–52)
HGB BLD-MCNC: 8.2 G/DL (ref 13.5–17.5)
HYALINE CASTS #/AREA URNS AUTO: ABNORMAL /LPF
IMM GRANULOCYTES # BLD AUTO: 0.04 X10*3/UL (ref 0–0.7)
IMM GRANULOCYTES NFR BLD AUTO: 1 % (ref 0–0.9)
KETONES UR STRIP.AUTO-MCNC: ABNORMAL MG/DL
LDH SERPL L TO P-CCNC: 129 U/L (ref 84–246)
LEUKOCYTE ESTERASE UR QL STRIP.AUTO: NEGATIVE
LYMPHOCYTES # BLD AUTO: 0.22 X10*3/UL (ref 1.2–4.8)
LYMPHOCYTES NFR BLD AUTO: 5.4 %
MAGNESIUM SERPL-MCNC: 1.47 MG/DL (ref 1.6–2.4)
MCH RBC QN AUTO: 30.8 PG (ref 26–34)
MCHC RBC AUTO-ENTMCNC: 34.6 G/DL (ref 32–36)
MCV RBC AUTO: 89 FL (ref 80–100)
MONOCYTES # BLD AUTO: 0.55 X10*3/UL (ref 0.1–1)
MONOCYTES NFR BLD AUTO: 13.6 %
MUCOUS THREADS #/AREA URNS AUTO: ABNORMAL /LPF
NEUTROPHILS # BLD AUTO: 2.74 X10*3/UL (ref 1.2–7.7)
NEUTROPHILS NFR BLD AUTO: 67.7 %
NITRITE UR QL STRIP.AUTO: NEGATIVE
NRBC BLD-RTO: 0 /100 WBCS (ref 0–0)
OVALOCYTES BLD QL SMEAR: NORMAL
PATH REV-IMMUNOHEMATOLOGY-PR30: NORMAL
PH UR STRIP.AUTO: 6 [PH]
PLATELET # BLD AUTO: 92 X10*3/UL (ref 150–450)
POTASSIUM SERPL-SCNC: 3.6 MMOL/L (ref 3.5–5.3)
PROT SERPL-MCNC: 6.1 G/DL (ref 6.4–8.2)
PROT UR STRIP.AUTO-MCNC: ABNORMAL MG/DL
PROT UR-ACNC: 60 MG/DL (ref 5–25)
PROT/CREAT UR: 0.39 MG/MG CREAT (ref 0–0.17)
RBC # BLD AUTO: 2.66 X10*6/UL (ref 4.5–5.9)
RBC # UR STRIP.AUTO: ABNORMAL /UL
RBC #/AREA URNS AUTO: >20 /HPF
RBC MORPH BLD: NORMAL
RH FACTOR (ANTIGEN D): NORMAL
SCHISTOCYTES BLD QL SMEAR: NORMAL
SODIUM SERPL-SCNC: 134 MMOL/L (ref 136–145)
SP GR UR STRIP.AUTO: 1.01
SQUAMOUS #/AREA URNS AUTO: ABNORMAL /HPF
TACROLIMUS BLD-MCNC: 6.3 NG/ML
URATE SERPL-MCNC: 5.5 MG/DL (ref 4–7.5)
UROBILINOGEN UR STRIP.AUTO-MCNC: NORMAL MG/DL
WBC # BLD AUTO: 4.1 X10*3/UL (ref 4.4–11.3)
WBC #/AREA URNS AUTO: ABNORMAL /HPF

## 2024-12-31 PROCEDURE — 2500000004 HC RX 250 GENERAL PHARMACY W/ HCPCS (ALT 636 FOR OP/ED): Performed by: INTERNAL MEDICINE

## 2024-12-31 PROCEDURE — 82570 ASSAY OF URINE CREATININE: CPT

## 2024-12-31 PROCEDURE — 2500000004 HC RX 250 GENERAL PHARMACY W/ HCPCS (ALT 636 FOR OP/ED): Performed by: STUDENT IN AN ORGANIZED HEALTH CARE EDUCATION/TRAINING PROGRAM

## 2024-12-31 PROCEDURE — 80197 ASSAY OF TACROLIMUS: CPT

## 2024-12-31 PROCEDURE — 99215 OFFICE O/P EST HI 40 MIN: CPT | Mod: 25 | Performed by: STUDENT IN AN ORGANIZED HEALTH CARE EDUCATION/TRAINING PROGRAM

## 2024-12-31 PROCEDURE — 1111F DSCHRG MED/CURRENT MED MERGE: CPT | Performed by: STUDENT IN AN ORGANIZED HEALTH CARE EDUCATION/TRAINING PROGRAM

## 2024-12-31 PROCEDURE — 1160F RVW MEDS BY RX/DR IN RCRD: CPT | Performed by: STUDENT IN AN ORGANIZED HEALTH CARE EDUCATION/TRAINING PROGRAM

## 2024-12-31 PROCEDURE — 99215 OFFICE O/P EST HI 40 MIN: CPT | Performed by: STUDENT IN AN ORGANIZED HEALTH CARE EDUCATION/TRAINING PROGRAM

## 2024-12-31 PROCEDURE — 3078F DIAST BP <80 MM HG: CPT | Performed by: STUDENT IN AN ORGANIZED HEALTH CARE EDUCATION/TRAINING PROGRAM

## 2024-12-31 PROCEDURE — 80053 COMPREHEN METABOLIC PANEL: CPT

## 2024-12-31 PROCEDURE — 83615 LACTATE (LD) (LDH) ENZYME: CPT

## 2024-12-31 PROCEDURE — 1159F MED LIST DOCD IN RCRD: CPT | Performed by: STUDENT IN AN ORGANIZED HEALTH CARE EDUCATION/TRAINING PROGRAM

## 2024-12-31 PROCEDURE — 86850 RBC ANTIBODY SCREEN: CPT

## 2024-12-31 PROCEDURE — 3074F SYST BP LT 130 MM HG: CPT | Performed by: STUDENT IN AN ORGANIZED HEALTH CARE EDUCATION/TRAINING PROGRAM

## 2024-12-31 PROCEDURE — 96361 HYDRATE IV INFUSION ADD-ON: CPT | Mod: INF

## 2024-12-31 PROCEDURE — 84550 ASSAY OF BLOOD/URIC ACID: CPT

## 2024-12-31 PROCEDURE — 87799 DETECT AGENT NOS DNA QUANT: CPT

## 2024-12-31 PROCEDURE — 86870 RBC ANTIBODY IDENTIFICATION: CPT

## 2024-12-31 PROCEDURE — 1126F AMNT PAIN NOTED NONE PRSNT: CPT | Performed by: STUDENT IN AN ORGANIZED HEALTH CARE EDUCATION/TRAINING PROGRAM

## 2024-12-31 PROCEDURE — 81001 URINALYSIS AUTO W/SCOPE: CPT

## 2024-12-31 PROCEDURE — 83735 ASSAY OF MAGNESIUM: CPT

## 2024-12-31 PROCEDURE — 85025 COMPLETE CBC W/AUTO DIFF WBC: CPT

## 2024-12-31 PROCEDURE — 96360 HYDRATION IV INFUSION INIT: CPT

## 2024-12-31 PROCEDURE — 83010 ASSAY OF HAPTOGLOBIN QUANT: CPT

## 2024-12-31 PROCEDURE — 36591 DRAW BLOOD OFF VENOUS DEVICE: CPT

## 2024-12-31 RX ORDER — HEPARIN SODIUM,PORCINE/PF 10 UNIT/ML
50 SYRINGE (ML) INTRAVENOUS AS NEEDED
Status: CANCELLED | OUTPATIENT
Start: 2024-12-31

## 2024-12-31 RX ORDER — GUAIFENESIN 600 MG/1
600 TABLET, EXTENDED RELEASE ORAL 2 TIMES DAILY
Qty: 20 TABLET | Refills: 0 | Status: SHIPPED | OUTPATIENT
Start: 2024-12-31 | End: 2025-01-10

## 2024-12-31 RX ORDER — MIRTAZAPINE 15 MG/1
15 TABLET, FILM COATED ORAL NIGHTLY
Qty: 90 TABLET | Refills: 0 | Status: SHIPPED | OUTPATIENT
Start: 2024-12-31 | End: 2025-03-31

## 2024-12-31 RX ORDER — HEPARIN 100 UNIT/ML
500 SYRINGE INTRAVENOUS AS NEEDED
Status: CANCELLED | OUTPATIENT
Start: 2024-12-31

## 2024-12-31 RX ORDER — HEPARIN 100 UNIT/ML
500 SYRINGE INTRAVENOUS AS NEEDED
Status: DISCONTINUED | OUTPATIENT
Start: 2024-12-31 | End: 2024-12-31 | Stop reason: HOSPADM

## 2024-12-31 RX ADMIN — HEPARIN 500 UNITS: 100 SYRINGE at 13:17

## 2024-12-31 RX ADMIN — SODIUM CHLORIDE 1000 ML: 9 INJECTION, SOLUTION INTRAVENOUS at 11:49

## 2024-12-31 ASSESSMENT — PAIN SCALES - GENERAL: PAINLEVEL_OUTOF10: 0-NO PAIN

## 2024-12-31 NOTE — PROGRESS NOTES
"NUTRITION FOLLOW UP NOTE    Reason for Visit:  Jin Reynolds is a 69 y.o. 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    Transplant c/b by mucositis, CINV and diarrhea     Currently T+40    Pt seen today in infusion; found to have respiratory parainfluenza on 12/26.    Lab Results   Component Value Date/Time    GLUCOSE 131 (H) 12/31/2024 0900     (L) 12/31/2024 0900    K 3.6 12/31/2024 0900     12/31/2024 0900    CO2 23 12/31/2024 0900    ANIONGAP 11 12/31/2024 0900    BUN 9 12/31/2024 0900    CREATININE 1.37 (H) 12/31/2024 0900    EGFR 56 (L) 12/31/2024 0900    CALCIUM 8.8 12/31/2024 0900    ALBUMIN 4.3 12/31/2024 0900    ALKPHOS 53 12/31/2024 0900    PROT 6.1 (L) 12/31/2024 0900    AST 13 12/31/2024 0900    BILITOT 0.9 12/31/2024 0900    ALT 11 12/31/2024 0900    MG 1.47 (L) 12/31/2024 0900    PHOS 2.3 (L) 12/07/2024 0608     Creat up slightly--for IVF today     Lab Results   Component Value Date/Time    VITD25 27 (L) 02/05/2024 0807   Taking Vit D 2000IU daily    Lab Results   Component Value Date    WBC 4.1 (L) 12/31/2024    HGB 8.2 (L) 12/31/2024    HCT 23.7 (L) 12/31/2024    MCV 89 12/31/2024    PLT 92 (L) 12/31/2024       Anthropometrics:  Anthropometrics  Height: 169.1 cm (5' 6.58\")  Weight: 71.8 kg (158 lb 4.6 oz)  BMI (Calculated): 25.11  IBW/kg (Dietitian Calculated): 65.9 kg  Percent of IBW: 109 %    *Wt at transplant admit: (11/15) 86.1 kg  *Wt at first post-transplant visit: (12/9) 80.7 kg     *Wt down ~5 kg (6%) x 1 week;     Wt Readings from Last 10 Encounters:   12/31/24 71.8 kg (158 lb 4.6 oz)   12/31/24 71.8 kg (158 lb 4.6 oz)   12/28/24 74.3 kg (163 lb 12.8 oz)   12/26/24 75.7 kg (166 lb 14.2 oz)   12/24/24 76.7 kg (169 lb 1.6 oz)   12/21/24 77.9 kg (171 lb 11.8 oz)   12/19/24 79.1 kg (174 lb 6.1 oz)   12/19/24 79.1 kg (174 lb 6.1 oz)   12/17/24 78.8 kg (173 lb 11.6 oz)   12/17/24 78.8 kg (173 lb 11.6 oz)   11/15/24        86.1 kg--at time of transplant  10/31/24     "    87.5 kg   10/18/24        84.0 kg  09/20/24        86.8 kg  08/29/24        89.2 kg  07/30/24        95.9 kg   06/21/24        87.5 kg         Food And Nutrient Intake:      No breakfast yet  Gets hungry--loses interest when eat   Food doesn't taste like he knows it should   Caught respiratory flu last week; to start Nyquil and Mucinex   Feels congested   Still with taste changes   Can do starches in a soup  Vegetable soups   Apple pie didn't taste good   Didn't eat well on Xmas   Still struggling with meat  Does well with fruit  Vit d milkshake   Ensure TID   Take ensure with meds  No nausea  Energy levels still low; worse since got the flu last week   Gagging mucous up; dry heaves  Drinking well--mainly water and Ensure   Stools loose for last few days--2-3 times a day    -------------  Pt seen earlier this week and reported having vomiting with solid foods; had not been taking any anti-emetics.   Told to take Zofran 30 min before meals TID; he was also told he could use Compazine between Zofran if needed.   Since doing so, pt has not had further vomiting; stomach feels a little better and he has been able to keep solid foods down.  Fluid intakes good--has had no issues tolerating.     Has been eating lighter foods in the past few days; still leery of trying meats.   Was able to eat and keep down egg sandwich last night.   Has also been eating bone broth with rice and crax added as well as yogurt.   Now drinking Ensure Original daily.  Tastes remain barrier to eating; did not tolerate trial of lemon hard candies (associated with vomiting).    Still not feeling great, overall. No significant improvements.   Energy remains low; walks slow.     *Pt noted to have been eating better and tolerating PO intakes better after initial hospital discharge.  Intakes have actually digressed in week-10 days.     Ensure Original:  provides 240 kcals and 9 gm protein each                                                      "      Nutrition Focused Physical Exam Findings:                          Energy Needs  Calculated Energy Needs Using Equations  Height: 169.1 cm (5' 6.58\")        Nutrition Diagnosis        Pt remains mildly malnourished as appetite/intakes have declined since initial visit.  N/V has improved with consistent use of antiemetics.  Dysgeusia also contributing to decreased oral intakes.         Continues to benefit from use of ONS daily.        Nutrition Interventions/Recommendations   Nutrition Prescription   High Protein, High Calorie  Food Safety          Nutrition Education   Try Ensure Plus vs Ensure Original for additional kcals; for now, plans to increase Ensure Original to TID.  Also suggested taking meds with Ensure and making Ensure into milkshakes.   Spoke with NP, will try Mirtazapine 15 mg to help with appetite/sleep   Eating smaller, more frequent snacks vs meals at this time.  Encourage PO fluid intakes; prefer calorie containing beverages at this time vs water due to decreased intakes.  Discussed increased fluid intakes to help decrease mucous production.   If upper GI symptoms persist, consider start of Budesonide    Coordination of Care   BMT team        Nutrition Monitoring and Evaluation      Will continue to f/up and monitor weight, labs, PO intakes, taste changes and GI symptoms PRN.                      "

## 2025-01-02 ENCOUNTER — OFFICE VISIT (OUTPATIENT)
Dept: HEMATOLOGY/ONCOLOGY | Facility: HOSPITAL | Age: 70
End: 2025-01-02
Payer: MEDICARE

## 2025-01-02 ENCOUNTER — INFUSION (OUTPATIENT)
Dept: HEMATOLOGY/ONCOLOGY | Facility: HOSPITAL | Age: 70
End: 2025-01-02
Payer: MEDICARE

## 2025-01-02 VITALS
WEIGHT: 158.8 LBS | SYSTOLIC BLOOD PRESSURE: 115 MMHG | HEART RATE: 99 BPM | OXYGEN SATURATION: 99 % | RESPIRATION RATE: 18 BRPM | BODY MASS INDEX: 25.19 KG/M2 | TEMPERATURE: 97.9 F | DIASTOLIC BLOOD PRESSURE: 81 MMHG

## 2025-01-02 DIAGNOSIS — Z94.84 HISTORY OF STEM CELL TRANSPLANT (MULTI): ICD-10-CM

## 2025-01-02 DIAGNOSIS — C92.02 AML (ACUTE MYELOID LEUKEMIA) IN RELAPSE (MULTI): ICD-10-CM

## 2025-01-02 DIAGNOSIS — Z94.81 S/P ALLOGENEIC BONE MARROW TRANSPLANT (MULTI): ICD-10-CM

## 2025-01-02 DIAGNOSIS — D47.02 SYSTEMIC MASTOCYTOSIS WITH ASSOCIATED CLONAL HEMATOLOGICAL NON-MAST CELL LINEAGE DISEASE: Primary | ICD-10-CM

## 2025-01-02 DIAGNOSIS — D47.02 SYSTEMIC MASTOCYTOSIS WITH ASSOCIATED CLONAL HEMATOLOGICAL NON-MAST CELL LINEAGE DISEASE: ICD-10-CM

## 2025-01-02 DIAGNOSIS — C92.01 ACUTE MYELOID LEUKEMIA IN REMISSION (MULTI): ICD-10-CM

## 2025-01-02 DIAGNOSIS — D84.9 IMMUNOCOMPROMISED STATE: ICD-10-CM

## 2025-01-02 DIAGNOSIS — R19.7 DIARRHEA OF PRESUMED INFECTIOUS ORIGIN: ICD-10-CM

## 2025-01-02 LAB
ACANTHOCYTES BLD QL SMEAR: NORMAL
ADENOVIRUS QPCR,PLASMA, VIRC: NOT DETECTED COPIES/ML
ALBUMIN SERPL BCP-MCNC: 4.2 G/DL (ref 3.4–5)
ALP SERPL-CCNC: 52 U/L (ref 33–136)
ALT SERPL W P-5'-P-CCNC: 11 U/L (ref 10–52)
ANION GAP SERPL CALC-SCNC: 12 MMOL/L (ref 10–20)
AST SERPL W P-5'-P-CCNC: 14 U/L (ref 9–39)
BASOPHILS # BLD AUTO: 0.04 X10*3/UL (ref 0–0.1)
BASOPHILS NFR BLD AUTO: 1.4 %
BILIRUB SERPL-MCNC: 0.8 MG/DL (ref 0–1.2)
BUN SERPL-MCNC: 11 MG/DL (ref 6–23)
BURR CELLS BLD QL SMEAR: NORMAL
C COLI+JEJ+UPSA DNA STL QL NAA+PROBE: NOT DETECTED
C DIF TOX TCDA+TCDB STL QL NAA+PROBE: NOT DETECTED
CALCIUM SERPL-MCNC: 8.7 MG/DL (ref 8.6–10.3)
CHLORIDE SERPL-SCNC: 103 MMOL/L (ref 98–107)
CO2 SERPL-SCNC: 23 MMOL/L (ref 21–32)
CREAT SERPL-MCNC: 1.36 MG/DL (ref 0.5–1.3)
EC STX1 GENE STL QL NAA+PROBE: NOT DETECTED
EC STX2 GENE STL QL NAA+PROBE: NOT DETECTED
EGFRCR SERPLBLD CKD-EPI 2021: 57 ML/MIN/1.73M*2
EOSINOPHIL # BLD AUTO: 0.47 X10*3/UL (ref 0–0.7)
EOSINOPHIL NFR BLD AUTO: 16.3 %
ERYTHROCYTE [DISTWIDTH] IN BLOOD BY AUTOMATED COUNT: 22.9 % (ref 11.5–14.5)
GLUCOSE SERPL-MCNC: 128 MG/DL (ref 74–99)
HCT VFR BLD AUTO: 23.1 % (ref 41–52)
HGB BLD-MCNC: 8 G/DL (ref 13.5–17.5)
HYPOCHROMIA BLD QL SMEAR: NORMAL
IMM GRANULOCYTES # BLD AUTO: 0.01 X10*3/UL (ref 0–0.7)
IMM GRANULOCYTES NFR BLD AUTO: 0.3 % (ref 0–0.9)
LYMPHOCYTES # BLD AUTO: 0.28 X10*3/UL (ref 1.2–4.8)
LYMPHOCYTES NFR BLD AUTO: 9.7 %
MAGNESIUM SERPL-MCNC: 1.63 MG/DL (ref 1.6–2.4)
MCH RBC QN AUTO: 31 PG (ref 26–34)
MCHC RBC AUTO-ENTMCNC: 34.6 G/DL (ref 32–36)
MCV RBC AUTO: 90 FL (ref 80–100)
MONOCYTES # BLD AUTO: 0.4 X10*3/UL (ref 0.1–1)
MONOCYTES NFR BLD AUTO: 13.8 %
NEUTROPHILS # BLD AUTO: 1.69 X10*3/UL (ref 1.2–7.7)
NEUTROPHILS NFR BLD AUTO: 58.5 %
NOROVIRUS GI + GII RNA STL NAA+PROBE: NOT DETECTED
NRBC BLD-RTO: 0 /100 WBCS (ref 0–0)
OVALOCYTES BLD QL SMEAR: NORMAL
PLATELET # BLD AUTO: 75 X10*3/UL (ref 150–450)
POTASSIUM SERPL-SCNC: 3.6 MMOL/L (ref 3.5–5.3)
PROT SERPL-MCNC: 5.6 G/DL (ref 6.4–8.2)
RBC # BLD AUTO: 2.58 X10*6/UL (ref 4.5–5.9)
RBC MORPH BLD: NORMAL
RV RNA STL NAA+PROBE: NOT DETECTED
SALMONELLA DNA STL QL NAA+PROBE: NOT DETECTED
SCHISTOCYTES BLD QL SMEAR: NORMAL
SHIGELLA DNA SPEC QL NAA+PROBE: NOT DETECTED
SODIUM SERPL-SCNC: 134 MMOL/L (ref 136–145)
TACROLIMUS BLD-MCNC: 6.7 NG/ML
V CHOLERAE DNA STL QL NAA+PROBE: NOT DETECTED
WBC # BLD AUTO: 2.9 X10*3/UL (ref 4.4–11.3)
Y ENTEROCOL DNA STL QL NAA+PROBE: NOT DETECTED

## 2025-01-02 PROCEDURE — 3079F DIAST BP 80-89 MM HG: CPT

## 2025-01-02 PROCEDURE — 99215 OFFICE O/P EST HI 40 MIN: CPT

## 2025-01-02 PROCEDURE — 36591 DRAW BLOOD OFF VENOUS DEVICE: CPT

## 2025-01-02 PROCEDURE — 1036F TOBACCO NON-USER: CPT

## 2025-01-02 PROCEDURE — 1111F DSCHRG MED/CURRENT MED MERGE: CPT

## 2025-01-02 PROCEDURE — 3074F SYST BP LT 130 MM HG: CPT

## 2025-01-02 PROCEDURE — 80197 ASSAY OF TACROLIMUS: CPT

## 2025-01-02 PROCEDURE — 82247 BILIRUBIN TOTAL: CPT

## 2025-01-02 PROCEDURE — 85025 COMPLETE CBC W/AUTO DIFF WBC: CPT

## 2025-01-02 PROCEDURE — 87493 C DIFF AMPLIFIED PROBE: CPT

## 2025-01-02 PROCEDURE — 87506 IADNA-DNA/RNA PROBE TQ 6-11: CPT

## 2025-01-02 PROCEDURE — 83735 ASSAY OF MAGNESIUM: CPT

## 2025-01-02 PROCEDURE — 1126F AMNT PAIN NOTED NONE PRSNT: CPT

## 2025-01-02 RX ORDER — HEPARIN SODIUM,PORCINE/PF 10 UNIT/ML
50 SYRINGE (ML) INTRAVENOUS AS NEEDED
Status: CANCELLED | OUTPATIENT
Start: 2025-01-02

## 2025-01-02 RX ORDER — HEPARIN 100 UNIT/ML
500 SYRINGE INTRAVENOUS AS NEEDED
Status: DISCONTINUED | OUTPATIENT
Start: 2025-01-02 | End: 2025-01-02 | Stop reason: HOSPADM

## 2025-01-02 RX ORDER — HEPARIN 100 UNIT/ML
500 SYRINGE INTRAVENOUS AS NEEDED
Status: CANCELLED | OUTPATIENT
Start: 2025-01-02

## 2025-01-02 ASSESSMENT — PAIN SCALES - GENERAL: PAINLEVEL_OUTOF10: 0-NO PAIN

## 2025-01-02 NOTE — ASSESSMENT & PLAN NOTE
At high risk for infections. No current signs of infections. Viral monitoring in place. Continue prophylaxis with acyclovir, posaconazole, Bactrim. May also require levofloxacin if ANC trends to <500.   History of stem cell transplant (Multi)  T+42 s/p allo MRD pBSCT. Tacrolimus: on 3 mg BID last reduced 12/26/24. Level 6.3. No evidence of GVHD though continues to struggle with weight loss- related to persistent dysgeusia. Trial mirtazapine. No nausea or vomiting. Some new loose stools since parainfluenza diagnosis. Checking stool samples today. Proteinuria with increase in P/C ratio.   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 No current signs of infections. Viral monitoring in place. Continue prophylaxis with acyclovir, posaconazole, Bactrim. May also require levofloxacin if ANC trends to <500.   Parainfluenza infection  Ongoing nasal congestion and drainage. No fevers or other significant respiratory symptoms at this time.   Diarrhea of presumed infectious origin  Unclear if new loose stools are related to infection vs diet (mostly liquids) vs GVHD. Stool testing pending. Pt. Declines IVF today.

## 2025-01-02 NOTE — PROGRESS NOTES
[facility] Infusion Nursing Note  01/02/25    Jin Reynolds is a 69 y.o. year old male patient presenting to outpatient infusion for post BMT count check.     Since the last visit, he reports doing poorly . Overall, he states that energy level is decreased. Appetite has been unchanged and poor. he reports cough, dehydration, and diarrhea. Tierney pre-ordered 1 L NS to be given 1/4 since patient did not want to stay for hydration today. Pt will drink more water until then.     Line type: R chest mediport  Line removed/maintained prior to discharge: heplocked and deaccessed      Follow-up Plan: 1/4 count check    Jaylene Gonzalez RN

## 2025-01-02 NOTE — ASSESSMENT & PLAN NOTE
T+42 s/p allo MRD pBSCT. Tacrolimus: on 3 mg BID last reduced 12/26/24. Level 6.3. No evidence of GVHD though continues to struggle with weight loss- related to persistent dysgeusia. Trial mirtazapine. No nausea or vomiting. Some new loose stools since parainfluenza diagnosis. Checking stool samples today. Proteinuria with increase in P/C ratio.

## 2025-01-02 NOTE — PROGRESS NOTES
"    Patient ID: Jin Reynolds is a 69 y.o. male.  Primary Oncologist: Dr. 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   8/23/2023 Initial Diagnosis    DX:  SMOLDERING SYSTEMIC MASTOCYTOSIS  Presented with skin rash and eosinophilia    [facility] CRITERIA  (For SM, Need major and 1 minor OR at least 3 minors)  - Multi-focal, dense infiltrates of MC (>= 15 mast cells in aggregates) in BM and/or other extra-cutaneous organs [major]  - In BM or extracutaneous organs, >25% MC spindle shaped OR have atypical morphology OR of all MC on BM aspirate smears, >25% are immature or atypical [minor]  - Detection of activating mutation KIT D816V in BM, PB, or other extracutaneous organ [minor]  - Serum tryptase persistently exceeds 20 ng/mL (unless associated clonal myeloid disorder, in which this paramenter is not valid) [minor]    \"B\" FINDINGS  - BM biopsy showing >30% infiltration (focal, dense aggregates) and/or serum tryptase > 200 ng/mL  - Signs of dysplasia or myeloproliferation, in non-MC lineages but insufficient criteria for definitive diagnosis of AHNMD with normal or slightly abnormal blood counts    \"C\" FINDINGS  None       8/23/2023 Biopsy    BONE MARROW (8/23/23)  Hypercellular (90-95%) with trilineage hematopoiesis, granulocytic hyperplasia, eosinophilia, and increased atypical mast cells  IP:  CD34+, +, CD7(bright)+, cCD3-, CD33-, CD15-, CD13+ blast population  IHC:  + increased spindle-shaped mast cells (15% cellularity), CD2-  Myeloid NGS:  CBL (31%), cKIT D816V (30%), RUNX1 x 2 (19%, 29%), SRSF2 (47%) [ASXL1 negative]  FISH:  PDGFRb negative    SERUM TRYPTASE  228 (8/23/23)  268 (9/5/23)     2/13/2024 -  Molecular Therapy    AVAPRITINIB   - Starting dose 25 mg daily (non-Adv SM)     7/16/2024 - 10/21/2024 Chemotherapy    Venetoclax / AzaCITIDine  - C1D1 7/17/24:  complicated by TLS, venetoclax held " after D1  - Post C1 BM (8/28/24):  hypercellular with >50% blasts, background systemic mastocytosis  - C2D1 9/5/24:  venetoclax restarted with C2, no TLS  - Post C2 BM (9/25/24):  hypocellular (<5%), atypical myeloblasts by IP/IHC (1-2%), persistent systemic mastocytosis  - C3D1 10/15/24:  delayed 1 week for count recovery  - Post C3 BM (10/23/24):  hypercellular with systemic mastocytosis, no increase blasts, 0.2% atypical blasts by IP, NGS w/ CBL (73%), cKIT (4%), RUNX1 x 2 (4%, 38%), SRSF2 (40%)       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    CONDITIONING Fludarabine, melphalan, and TBI   DONOR Matched sibling   MATCH GRADE 12/12   SEX MATCH Mismatched   ABO DONOR O pos   ABO RECIPIENT O pos   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, Mycophenolate, and Tacrolimus   GRAFT SOURCE Peripheral blood          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)   6/27/2024 Initial Diagnosis    ICC 2022 DX: Acute myeloid leukemia, NOS (>=20% blasts)  [id number] AML Risk Stratification: INTERMEDIATE: Cytogenetic and/or molecular abnormalities not classified as favorable or adverse  Presented with pancytopenia and circulating blasts    BONE MARROW (06/27/2024)  Marrow replaced by blasts, fibrotic foci, some atypical + cells; 7-8% circulating blasts; aspicular  - Unable to perform additional studies due to aspicular specimen    PERIPHERAL BLOOD (6/27/2024)  FISH:  positive for gain RUNX1    PERIPHERAL BLOOD (7/1/24)  IP:  abnl myeloblast population (CD34+, CD7+, CD4+, CD13+, CD38+, CD11+ dim, HLA=DR+, TdT+, CD33-)  Myeloid NGS: CBL C404Y (41%), KIT D816V (8%), RUNX1 x2 (8%, 30%), SRSF2 (37%)     7/16/2024 - 10/21/2024 Chemotherapy    Venetoclax / AzaCITIDine  - C1D1 7/17/24:  complicated by TLS, venetoclax held after D1  - Post C1 BM (8/28/24):  hypercellular with >50% blasts, background systemic mastocytosis  - C2D1 9/5/24:  venetoclax restarted with C2, no TLS  - Post C2 BM (9/25/24):  hypocellular (<5%), atypical  myeloblasts by IP/IHC (1-2%), persistent systemic mastocytosis  - C3D1 10/15/24:  delayed 1 week for count recovery  - Post C3 BM (10/23/24):  hypercellular with systemic mastocytosis, no increase blasts, 0.2% atypical blasts by IP, NGS w/ CBL (73%), cKIT (4%), RUNX1 x 2 (4%, 38%), SRSF2 (40%)       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    CONDITIONING Fludarabine, melphalan, and TBI   DONOR Matched sibling   MATCH GRADE 12/12   SEX MATCH Mismatched   ABO DONOR O pos   ABO RECIPIENT O pos   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, Mycophenolate, and Tacrolimus   GRAFT SOURCE Peripheral blood              01/02/25 T+ 42 s/p allo SCT and doing well. Counts are recovering and no evidence of aGVHD. Continues to have ongoing taste changes with associated changes in appetite. Eating fruits and vegetables, taking 2 protein drinks daily. Staying well hydrated. Ongoing diarrhea, stool pathogen panel pending.   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 Counts overall improving, though need to closely monitor ANC. Periodic filgrastim, last given 12/19. T+30 BM on 12/26/24, still pending. Flow without evidence of disease. Chimerisms pending.   Immunocompromised state  At high risk for infections. No current signs of infections. Viral monitoring in place. Continue prophylaxis with acyclovir, posaconazole, Bactrim. May also require levofloxacin if ANC trends to <500.   History of stem cell transplant (Multi)  T+42 s/p allo MRD pBSCT. Tacrolimus: on 3 mg BID last reduced 12/26/24. Level 6.3. No evidence of GVHD though continues to struggle with weight loss- related to persistent dysgeusia. Trial mirtazapine. No nausea or vomiting. Some new loose stools since parainfluenza diagnosis. Checking stool samples today. Proteinuria with increase in P/C ratio.    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 No current signs of infections. Viral monitoring in place. Continue prophylaxis with acyclovir, posaconazole, Bactrim. May also require levofloxacin if ANC trends to <500.   Parainfluenza infection  Ongoing nasal congestion and drainage. No fevers or other significant respiratory symptoms at this time.   Diarrhea of presumed infectious origin  Unclear if new loose stools are related to infection vs diet (mostly liquids) vs GVHD. Stool testing pending. Pt. Declines IVF today.       SUBJECTIVE     Days since transplant: 42     HPI    Jin Reynolds presents today for post transplant follow up, unaccompanied. Flu like symptoms in the last week. Diarrhea-- watery; off and on, twice a day. No blood or mucus. Nasal congestion with clear sinus drainage. Cough has been nonproductive and is improving. No shortness of breath. Parainfluenza positive last week. Hasn't taken any supportive meds yet.     Not eating a lot; food still has a weird taste to it. Frustrated because he does get hungry and has an appetite but the altered taste keeps him from eating. No vomiting or nausea. No rashes, fevers, chills, cough, chest pain. Dry skin.     Drinking 2 ensures/day, water, electrolytes.     Currently on tacro 3 mg BID. Held dose this AM (1/2/25).     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    Esophageal ulcer with bleeding 01/01/2024    Personal history of diseases of the skin and subcutaneous tissue     History of alopecia    Umbilical hernia 05/30/2023     Social History     Socioeconomic History    Marital status:      Spouse name: Not on file    Number of children: Not on file    Years of education: Not on file    Highest education level: Not on  file   Occupational History    Not on file   Tobacco Use    Smoking status: Never    Smokeless tobacco: Never   Substance and Sexual Activity    Alcohol use: Never    Drug use: Yes     Types: Marijuana     Comment: daily    Sexual activity: Not on file   Other Topics Concern    Not on file   Social History Narrative    Not on file     Social Drivers of Health     Financial Resource Strain: Medium Risk (11/18/2024)    Overall Financial Resource Strain (CARDIA)     Difficulty of Paying Living Expenses: Somewhat hard   Food Insecurity: No Food Insecurity (11/15/2024)    Hunger Vital Sign     Worried About Running Out of Food in the Last Year: Never true     Ran Out of Food in the Last Year: Never true   Transportation Needs: No Transportation Needs (11/18/2024)    PRAPARE - Transportation     Lack of Transportation (Medical): No     Lack of Transportation (Non-Medical): No   Physical Activity: Not on file   Stress: Not on file   Social Connections: Not on file   Intimate Partner Violence: Not At Risk (11/15/2024)    Humiliation, Afraid, Rape, and Kick questionnaire     Fear of Current or Ex-Partner: No     Emotionally Abused: No     Physically Abused: No     Sexually Abused: No   Housing Stability: Low Risk  (11/18/2024)    Housing Stability Vital Sign     Unable to Pay for Housing in the Last Year: No     Number of Times Moved in the Last Year: 0     Homeless in the Last Year: No     No past surgical history on file.      OBJECTIVE   VS reviewed in flowsheets     Physical Exam  Constitutional:       Appearance: Normal appearance.   HENT:      Mouth/Throat:      Comments: Mucous membranes tacky  Eyes:      Conjunctiva/sclera: Conjunctivae normal.      Pupils: Pupils are equal, round, and reactive to light.   Cardiovascular:      Rate and Rhythm: Normal rate and regular rhythm.   Pulmonary:      Effort: Pulmonary effort is normal.      Breath sounds: Normal breath sounds.   Musculoskeletal:         General: Normal range  "of motion.   Skin:     Findings: No rash.   Neurological:      Mental Status: He is alert. Mental status is at baseline.   Psychiatric:         Mood and Affect: Mood normal.         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 Total Score:      Viral Monitoring:     CMV   Lab Results   Component Value Date    CMVDNAPCR Not Detected 12/31/2024    CMVDNAPCR Not Detected 12/24/2024    CMVDNAPCR Not Detected 12/19/2024      EBV   Lab Results   Component Value Date    EBVDNAPCR Not Detected 12/02/2024    EBVDNAPCR Not Detected 11/25/2024    EBVDNAPCR Not Detected 10/23/2024      Adenovirus   Lab Results   Component Value Date    ADEPB Not Detected 12/24/2024    ADEPB Not Detected 12/17/2024    ADEPB Not Detected 12/09/2024       Toxo   No results found for: \"TGONDPCRBL\"      TMA Monitoring:     MARKERS RECENT 3 VALUES   LDH Lab Results   Component Value Date     12/31/2024     12/26/2024     12/24/2024      Haptoglobin Lab Results   Component Value Date    HAPTOGLOBIN 38 12/31/2024    HAPTOGLOBIN 33 12/24/2024    HAPTOGLOBIN 42 12/17/2024      BP BP Readings from Last 3 Encounters:   01/02/25 115/81   12/31/24 104/69   12/28/24 119/75      Serum Creatinine Lab Results   Component Value Date    CREATININE 1.36 (H) 01/02/2025    CREATININE 1.37 (H) 12/31/2024    CREATININE 1.29 12/28/2024      Urine Protein/Creat Ratio Lab Results   Component Value Date    UTPCR 0.39 (H) 12/31/2024    UTPCR 0.16 12/24/2024    UTPCR 0.24 (H) 12/17/2024      Tacrolimus Level Lab Results   Component Value Date    TACROLIMUS 6.3 12/31/2024    TACROLIMUS 8.9 12/28/2024    TACROLIMUS 11.4 12/26/2024            ROSA Joyce-CNP            "

## 2025-01-03 LAB
CHIMERISM INTERPRETATION: NORMAL
ELECTRONICALLY SIGNED BY: NORMAL
PATH REPORT.COMMENTS IMP SPEC: NORMAL
PATH REPORT.FINAL DX SPEC: NORMAL
PATH REPORT.GROSS SPEC: NORMAL
PATH REPORT.MICROSCOPIC SPEC OTHER STN: NORMAL
PATH REPORT.RELEVANT HX SPEC: NORMAL
PATH REPORT.TOTAL CANCER: NORMAL
PATH REV-IMMUNOHEMATOLOGY-PR30: NORMAL

## 2025-01-04 ENCOUNTER — OFFICE VISIT (OUTPATIENT)
Dept: HEMATOLOGY/ONCOLOGY | Facility: HOSPITAL | Age: 70
End: 2025-01-04
Payer: MEDICARE

## 2025-01-04 ENCOUNTER — INFUSION (OUTPATIENT)
Dept: HEMATOLOGY/ONCOLOGY | Facility: HOSPITAL | Age: 70
End: 2025-01-04
Payer: MEDICARE

## 2025-01-04 VITALS
TEMPERATURE: 96.8 F | DIASTOLIC BLOOD PRESSURE: 67 MMHG | BODY MASS INDEX: 24.83 KG/M2 | WEIGHT: 156.53 LBS | HEART RATE: 79 BPM | SYSTOLIC BLOOD PRESSURE: 115 MMHG | RESPIRATION RATE: 18 BRPM | OXYGEN SATURATION: 100 %

## 2025-01-04 DIAGNOSIS — C92.01 ACUTE MYELOID LEUKEMIA IN REMISSION (MULTI): ICD-10-CM

## 2025-01-04 DIAGNOSIS — D84.9 IMMUNOCOMPROMISED STATE: ICD-10-CM

## 2025-01-04 DIAGNOSIS — Z94.81 S/P ALLOGENEIC BONE MARROW TRANSPLANT (MULTI): ICD-10-CM

## 2025-01-04 DIAGNOSIS — Z94.84 HISTORY OF STEM CELL TRANSPLANT (MULTI): ICD-10-CM

## 2025-01-04 DIAGNOSIS — C92.01 ACUTE MYELOID LEUKEMIA IN REMISSION (MULTI): Primary | ICD-10-CM

## 2025-01-04 DIAGNOSIS — C92.00 ACUTE MYELOID LEUKEMIA NOT HAVING ACHIEVED REMISSION (MULTI): ICD-10-CM

## 2025-01-04 DIAGNOSIS — D47.02 SYSTEMIC MASTOCYTOSIS WITH ASSOCIATED CLONAL HEMATOLOGICAL NON-MAST CELL LINEAGE DISEASE: ICD-10-CM

## 2025-01-04 DIAGNOSIS — B34.8 PARAINFLUENZA: ICD-10-CM

## 2025-01-04 DIAGNOSIS — R11.2 NAUSEA AND VOMITING, UNSPECIFIED VOMITING TYPE: ICD-10-CM

## 2025-01-04 DIAGNOSIS — E83.42 HYPOMAGNESEMIA: ICD-10-CM

## 2025-01-04 LAB
ABO GROUP (TYPE) IN BLOOD: NORMAL
ALBUMIN SERPL BCP-MCNC: 4 G/DL (ref 3.4–5)
ALP SERPL-CCNC: 50 U/L (ref 33–136)
ALT SERPL W P-5'-P-CCNC: 13 U/L (ref 10–52)
ANION GAP SERPL CALC-SCNC: 10 MMOL/L (ref 10–20)
ANTIBODY SCREEN: NORMAL
AST SERPL W P-5'-P-CCNC: 13 U/L (ref 9–39)
BASOPHILS # BLD AUTO: 0.04 X10*3/UL (ref 0–0.1)
BASOPHILS NFR BLD AUTO: 1.3 %
BILIRUB SERPL-MCNC: 0.7 MG/DL (ref 0–1.2)
BUN SERPL-MCNC: 12 MG/DL (ref 6–23)
BURR CELLS BLD QL SMEAR: NORMAL
CALCIUM SERPL-MCNC: 8.8 MG/DL (ref 8.6–10.6)
CHLORIDE SERPL-SCNC: 103 MMOL/L (ref 98–107)
CO2 SERPL-SCNC: 24 MMOL/L (ref 21–32)
CREAT SERPL-MCNC: 1.37 MG/DL (ref 0.5–1.3)
EGFRCR SERPLBLD CKD-EPI 2021: 56 ML/MIN/1.73M*2
EOSINOPHIL # BLD AUTO: 0.69 X10*3/UL (ref 0–0.7)
EOSINOPHIL NFR BLD AUTO: 22.8 %
ERYTHROCYTE [DISTWIDTH] IN BLOOD BY AUTOMATED COUNT: 22.6 % (ref 11.5–14.5)
GLUCOSE SERPL-MCNC: 100 MG/DL (ref 74–99)
HCT VFR BLD AUTO: 22.3 % (ref 41–52)
HGB BLD-MCNC: 7.6 G/DL (ref 13.5–17.5)
HYPOCHROMIA BLD QL SMEAR: NORMAL
IMM GRANULOCYTES # BLD AUTO: 0.01 X10*3/UL (ref 0–0.7)
IMM GRANULOCYTES NFR BLD AUTO: 0.3 % (ref 0–0.9)
LDH SERPL L TO P-CCNC: 119 U/L (ref 84–246)
LYMPHOCYTES # BLD AUTO: 0.26 X10*3/UL (ref 1.2–4.8)
LYMPHOCYTES NFR BLD AUTO: 8.6 %
MAGNESIUM SERPL-MCNC: 1.87 MG/DL (ref 1.6–2.4)
MCH RBC QN AUTO: 30.5 PG (ref 26–34)
MCHC RBC AUTO-ENTMCNC: 34.1 G/DL (ref 32–36)
MCV RBC AUTO: 90 FL (ref 80–100)
MONOCYTES # BLD AUTO: 0.45 X10*3/UL (ref 0.1–1)
MONOCYTES NFR BLD AUTO: 14.9 %
NEUTROPHILS # BLD AUTO: 1.58 X10*3/UL (ref 1.2–7.7)
NEUTROPHILS NFR BLD AUTO: 52.1 %
NRBC BLD-RTO: 0 /100 WBCS (ref 0–0)
OVALOCYTES BLD QL SMEAR: NORMAL
PLATELET # BLD AUTO: 70 X10*3/UL (ref 150–450)
POLYCHROMASIA BLD QL SMEAR: NORMAL
POTASSIUM SERPL-SCNC: 4 MMOL/L (ref 3.5–5.3)
PROT SERPL-MCNC: 5.7 G/DL (ref 6.4–8.2)
RBC # BLD AUTO: 2.49 X10*6/UL (ref 4.5–5.9)
RBC MORPH BLD: NORMAL
RH FACTOR (ANTIGEN D): NORMAL
SCHISTOCYTES BLD QL SMEAR: NORMAL
SODIUM SERPL-SCNC: 133 MMOL/L (ref 136–145)
TACROLIMUS BLD-MCNC: 7.3 NG/ML
WBC # BLD AUTO: 3 X10*3/UL (ref 4.4–11.3)

## 2025-01-04 PROCEDURE — 2500000004 HC RX 250 GENERAL PHARMACY W/ HCPCS (ALT 636 FOR OP/ED): Performed by: INTERNAL MEDICINE

## 2025-01-04 PROCEDURE — 99215 OFFICE O/P EST HI 40 MIN: CPT | Performed by: STUDENT IN AN ORGANIZED HEALTH CARE EDUCATION/TRAINING PROGRAM

## 2025-01-04 PROCEDURE — 80197 ASSAY OF TACROLIMUS: CPT

## 2025-01-04 PROCEDURE — 2500000004 HC RX 250 GENERAL PHARMACY W/ HCPCS (ALT 636 FOR OP/ED)

## 2025-01-04 PROCEDURE — 83735 ASSAY OF MAGNESIUM: CPT

## 2025-01-04 PROCEDURE — 86901 BLOOD TYPING SEROLOGIC RH(D): CPT

## 2025-01-04 PROCEDURE — 36591 DRAW BLOOD OFF VENOUS DEVICE: CPT

## 2025-01-04 PROCEDURE — 1111F DSCHRG MED/CURRENT MED MERGE: CPT | Performed by: STUDENT IN AN ORGANIZED HEALTH CARE EDUCATION/TRAINING PROGRAM

## 2025-01-04 PROCEDURE — 1036F TOBACCO NON-USER: CPT | Performed by: STUDENT IN AN ORGANIZED HEALTH CARE EDUCATION/TRAINING PROGRAM

## 2025-01-04 PROCEDURE — 83615 LACTATE (LD) (LDH) ENZYME: CPT

## 2025-01-04 PROCEDURE — 80053 COMPREHEN METABOLIC PANEL: CPT

## 2025-01-04 PROCEDURE — 85025 COMPLETE CBC W/AUTO DIFF WBC: CPT

## 2025-01-04 RX ORDER — HEPARIN SODIUM,PORCINE/PF 10 UNIT/ML
50 SYRINGE (ML) INTRAVENOUS AS NEEDED
OUTPATIENT
Start: 2025-01-04

## 2025-01-04 RX ORDER — HEPARIN 100 UNIT/ML
500 SYRINGE INTRAVENOUS AS NEEDED
OUTPATIENT
Start: 2025-01-04

## 2025-01-04 RX ORDER — HEPARIN 100 UNIT/ML
500 SYRINGE INTRAVENOUS AS NEEDED
Status: DISCONTINUED | OUTPATIENT
Start: 2025-01-04 | End: 2025-01-04 | Stop reason: HOSPADM

## 2025-01-04 RX ADMIN — SODIUM CHLORIDE 1000 ML: 9 INJECTION, SOLUTION INTRAVENOUS at 10:00

## 2025-01-04 RX ADMIN — HEPARIN 500 UNITS: 100 SYRINGE at 11:04

## 2025-01-04 ASSESSMENT — PAIN SCALES - GENERAL: PAINLEVEL_OUTOF10: 0-NO PAIN

## 2025-01-04 NOTE — ASSESSMENT & PLAN NOTE
T+44 s/p allo MRD pBSCT. Tacrolimus: on 3 mg BID last reduced 12/26/24. Level 6.3. No evidence of GVHD though continues to struggle with weight loss- related to persistent dysgeusia slightly improved in last few days (started on mirtazapine, off MMF). Some new loose stools since parainfluenza diagnosis, though stool testing negative. Proteinuria with increase in P/C ratio.   - onco-echo and onco-cards on 1/8.

## 2025-01-04 NOTE — PROGRESS NOTES
Patient seen in infusion today for Count check and IVF, tolerated without complication. No other transfusions required. Labs and schedule reviewed with patient. Port positive for blood return and flushed with NS and heparin per protocol prior to de-accessing. Discharged ambulatory in stable condition.

## 2025-01-04 NOTE — ASSESSMENT & PLAN NOTE
Now s/p allo MRD pBSCT. Counts overall improved though Hgb and plts slightly decreased today. No hemolysis. T+30 BM on 12/26/24 without evidence of AML. Chimerisms 100% donor. PB lineage specific still pending.

## 2025-01-04 NOTE — ASSESSMENT & PLAN NOTE
Respiratory symptoms noted last week with positive parainfluenza type 4 (12/26). Symptoms improving.

## 2025-01-04 NOTE — PROGRESS NOTES
"    Patient ID: Jin Reynolds is a 69 y.o. male.  Primary Oncologist: Dr. 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   8/23/2023 Initial Diagnosis    DX:  SMOLDERING SYSTEMIC MASTOCYTOSIS  Presented with skin rash and eosinophilia    [facility] CRITERIA  (For SM, Need major and 1 minor OR at least 3 minors)  - Multi-focal, dense infiltrates of MC (>= 15 mast cells in aggregates) in BM and/or other extra-cutaneous organs [major]  - In BM or extracutaneous organs, >25% MC spindle shaped OR have atypical morphology OR of all MC on BM aspirate smears, >25% are immature or atypical [minor]  - Detection of activating mutation KIT D816V in BM, PB, or other extracutaneous organ [minor]  - Serum tryptase persistently exceeds 20 ng/mL (unless associated clonal myeloid disorder, in which this paramenter is not valid) [minor]    \"B\" FINDINGS  - BM biopsy showing >30% infiltration (focal, dense aggregates) and/or serum tryptase > 200 ng/mL  - Signs of dysplasia or myeloproliferation, in non-MC lineages but insufficient criteria for definitive diagnosis of AHNMD with normal or slightly abnormal blood counts    \"C\" FINDINGS  None       8/23/2023 Biopsy    BONE MARROW (8/23/23)  Hypercellular (90-95%) with trilineage hematopoiesis, granulocytic hyperplasia, eosinophilia, and increased atypical mast cells  IP:  CD34+, +, CD7(bright)+, cCD3-, CD33-, CD15-, CD13+ blast population  IHC:  + increased spindle-shaped mast cells (15% cellularity), CD2-  Myeloid NGS:  CBL (31%), cKIT D816V (30%), RUNX1 x 2 (19%, 29%), SRSF2 (47%) [ASXL1 negative]  FISH:  PDGFRb negative    SERUM TRYPTASE  228 (8/23/23)  268 (9/5/23)     2/13/2024 -  Molecular Therapy    AVAPRITINIB   - Starting dose 25 mg daily (non-Adv SM)     7/16/2024 - 10/21/2024 Chemotherapy    Venetoclax / AzaCITIDine  - C1D1 7/17/24:  complicated by TLS, venetoclax held " after D1  - Post C1 BM (8/28/24):  hypercellular with >50% blasts, background systemic mastocytosis  - C2D1 9/5/24:  venetoclax restarted with C2, no TLS  - Post C2 BM (9/25/24):  hypocellular (<5%), atypical myeloblasts by IP/IHC (1-2%), persistent systemic mastocytosis  - C3D1 10/15/24:  delayed 1 week for count recovery  - Post C3 BM (10/23/24):  hypercellular with systemic mastocytosis, no increase blasts, 0.2% atypical blasts by IP, NGS w/ CBL (73%), cKIT (4%), RUNX1 x 2 (4%, 38%), SRSF2 (40%)       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    CONDITIONING Fludarabine, melphalan, and TBI   DONOR Matched sibling   MATCH GRADE 12/12   SEX MATCH Mismatched   ABO DONOR O pos   ABO RECIPIENT O pos   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, Mycophenolate, and Tacrolimus   GRAFT SOURCE Peripheral blood          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)   6/27/2024 Initial Diagnosis    ICC 2022 DX: Acute myeloid leukemia, NOS (>=20% blasts)  [id number] AML Risk Stratification: INTERMEDIATE: Cytogenetic and/or molecular abnormalities not classified as favorable or adverse  Presented with pancytopenia and circulating blasts    BONE MARROW (06/27/2024)  Marrow replaced by blasts, fibrotic foci, some atypical + cells; 7-8% circulating blasts; aspicular  - Unable to perform additional studies due to aspicular specimen    PERIPHERAL BLOOD (6/27/2024)  FISH:  positive for gain RUNX1    PERIPHERAL BLOOD (7/1/24)  IP:  abnl myeloblast population (CD34+, CD7+, CD4+, CD13+, CD38+, CD11+ dim, HLA=DR+, TdT+, CD33-)  Myeloid NGS: CBL C404Y (41%), KIT D816V (8%), RUNX1 x2 (8%, 30%), SRSF2 (37%)     7/16/2024 - 10/21/2024 Chemotherapy    Venetoclax / AzaCITIDine  - C1D1 7/17/24:  complicated by TLS, venetoclax held after D1  - Post C1 BM (8/28/24):  hypercellular with >50% blasts, background systemic mastocytosis  - C2D1 9/5/24:  venetoclax restarted with C2, no TLS  - Post C2 BM (9/25/24):  hypocellular (<5%), atypical  myeloblasts by IP/IHC (1-2%), persistent systemic mastocytosis  - C3D1 10/15/24:  delayed 1 week for count recovery  - Post C3 BM (10/23/24):  hypercellular with systemic mastocytosis, no increase blasts, 0.2% atypical blasts by IP, NGS w/ CBL (73%), cKIT (4%), RUNX1 x 2 (4%, 38%), SRSF2 (40%)       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    CONDITIONING Fludarabine, melphalan, and TBI   DONOR Matched sibling   MATCH GRADE 12/12   SEX MATCH Mismatched   ABO DONOR O pos   ABO RECIPIENT O pos   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, Mycophenolate, and Tacrolimus   GRAFT SOURCE Peripheral blood              01/04/25 T+ 44 s/p allo SCT and doing well. Counts are overall stable but slight downtrend of plts in last two checks. No evidence of aGVHD. Taste changes and appetite slightly better, eating a bit more. Day 30 marrow results pending from last week. Decrease to twice weekly starting next week.     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 Counts overall improved though Hgb and plts slightly decreased today. No hemolysis. T+30 BM on 12/26/24 without evidence of AML. Chimerisms 100% donor. PB lineage specific still pending.   History of stem cell transplant (Multi)  T+44 s/p allo MRD pBSCT. Tacrolimus: on 3 mg BID last reduced 12/26/24. Level 6.3. No evidence of GVHD though continues to struggle with weight loss- related to persistent dysgeusia slightly improved in last few days (started on mirtazapine, off MMF). Some new loose stools since parainfluenza diagnosis, though stool testing negative. Proteinuria with increase in P/C ratio.   - onco-echo and onco-cards on 1/8.   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 No current signs of infections. Viral monitoring in place. Continue prophylaxis with  "acyclovir, posaconazole, Bactrim. May also require levofloxacin if ANC trends to <500.   Nausea and vomiting, unspecified vomiting type  Improving; continue PRN anti-emetics.   Parainfluenza  Respiratory symptoms noted last week with positive parainfluenza type 4 (12/26). Symptoms improving.       SUBJECTIVE     Days since transplant: 44     HPI    Jin Reynolds presents today for post transplant follow up, unaccompanied. Feels like he's starting to \"turn the corner.\" Has been getting more hungry recently and wants to try other things in terms of food. Taste changes not so bad. Still down on weight. Drinking 3 ensures/day, water, electrolytes.     Respiratory symptoms improving slowly. No fevers, chills, cough, shortness of breath.     Stools still loose but only 1-2 times per day. Not using imodium.     Currently on tacro 3 mg BID. Held dose this AM.     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      OBJECTIVE     BSA: There is no height or weight on file to calculate BSA.  There were no vitals taken for this visit.  Weight    No data found in the last 1 encounters.            Physical Exam  Constitutional:       Appearance: Normal appearance.   HENT:      Mouth/Throat:      Mouth: Mucous membranes are moist.      Pharynx: Oropharynx is clear.   Eyes:      Conjunctiva/sclera: Conjunctivae normal.      Pupils: Pupils are equal, round, and reactive to light.   Cardiovascular:      Rate and Rhythm: Normal rate and regular " "rhythm.   Pulmonary:      Effort: Pulmonary effort is normal.      Breath sounds: Normal breath sounds.   Musculoskeletal:         General: Normal range of motion.   Skin:     Findings: No rash.   Neurological:      Mental Status: He is alert. Mental status is at baseline.   Psychiatric:         Mood and Affect: Mood normal.       Performance Status:  Karnofsky Score: 70 - Cares for self; unable to carry on normal activity or do normal work       POST TRANSPLANT MONITORING   GVHD  Acute GVHD Overall Grade:    Chronic GVHD Total Score:      Viral Monitoring:     CMV   Lab Results   Component Value Date    CMVDNAPCR Not Detected 12/31/2024    CMVDNAPCR Not Detected 12/24/2024    CMVDNAPCR Not Detected 12/19/2024      EBV   Lab Results   Component Value Date    EBVDNAPCR Not Detected 12/02/2024    EBVDNAPCR Not Detected 11/25/2024    EBVDNAPCR Not Detected 10/23/2024      Adenovirus   Lab Results   Component Value Date    ADEPB Not Detected 12/31/2024    ADEPB Not Detected 12/24/2024    ADEPB Not Detected 12/17/2024       Toxo   No results found for: \"TGONDPCRBL\"      TMA Monitoring:     MARKERS RECENT 3 VALUES   LDH Lab Results   Component Value Date     01/04/2025     12/31/2024     12/26/2024      Haptoglobin Lab Results   Component Value Date    HAPTOGLOBIN 38 12/31/2024    HAPTOGLOBIN 33 12/24/2024    HAPTOGLOBIN 42 12/17/2024      BP BP Readings from Last 3 Encounters:   01/04/25 115/67   01/02/25 115/81   12/31/24 104/69      Serum Creatinine Lab Results   Component Value Date    CREATININE 1.37 (H) 01/04/2025    CREATININE 1.36 (H) 01/02/2025    CREATININE 1.37 (H) 12/31/2024      Urine Protein/Creat Ratio Lab Results   Component Value Date    UTPCR 0.39 (H) 12/31/2024    UTPCR 0.16 12/24/2024    UTPCR 0.24 (H) 12/17/2024      Tacrolimus Level Lab Results   Component Value Date    TACROLIMUS 6.7 01/02/2025    TACROLIMUS 6.3 12/31/2024    TACROLIMUS 8.9 12/28/2024            Kalia June, " RUBEN

## 2025-01-05 LAB
CMV DNA SERPL NAA+PROBE-LOG IU: NORMAL {LOG_IU}/ML
LABORATORY COMMENT REPORT: NOT DETECTED

## 2025-01-06 LAB
BB ANTIBODY IDENTIFICATION: NORMAL
CASE #: NORMAL
ELECTRONICALLY SIGNED BY: ABNORMAL
KIT D816V INTERPRETATION: ABNORMAL
KIT D816V MUTATION RESULTS: DETECTED

## 2025-01-07 ENCOUNTER — LAB (OUTPATIENT)
Dept: HEMATOLOGY/ONCOLOGY | Facility: HOSPITAL | Age: 70
End: 2025-01-07
Payer: MEDICARE

## 2025-01-07 ENCOUNTER — INFUSION (OUTPATIENT)
Dept: OTHER | Facility: HOSPITAL | Age: 70
End: 2025-01-07
Payer: MEDICARE

## 2025-01-07 ENCOUNTER — NUTRITION (OUTPATIENT)
Dept: HEMATOLOGY/ONCOLOGY | Facility: HOSPITAL | Age: 70
End: 2025-01-07

## 2025-01-07 ENCOUNTER — OFFICE VISIT (OUTPATIENT)
Dept: HEMATOLOGY/ONCOLOGY | Facility: HOSPITAL | Age: 70
End: 2025-01-07
Payer: MEDICARE

## 2025-01-07 VITALS
RESPIRATION RATE: 16 BRPM | OXYGEN SATURATION: 100 % | DIASTOLIC BLOOD PRESSURE: 68 MMHG | HEART RATE: 78 BPM | SYSTOLIC BLOOD PRESSURE: 107 MMHG | TEMPERATURE: 97 F | WEIGHT: 159.83 LBS | BODY MASS INDEX: 25.35 KG/M2

## 2025-01-07 VITALS — HEIGHT: 67 IN | BODY MASS INDEX: 25.09 KG/M2 | WEIGHT: 159.83 LBS

## 2025-01-07 DIAGNOSIS — I10 BENIGN HYPERTENSION: ICD-10-CM

## 2025-01-07 DIAGNOSIS — D84.9 IMMUNOCOMPROMISED STATE: ICD-10-CM

## 2025-01-07 DIAGNOSIS — D47.02 SYSTEMIC MASTOCYTOSIS WITH ASSOCIATED CLONAL HEMATOLOGICAL NON-MAST CELL LINEAGE DISEASE: ICD-10-CM

## 2025-01-07 DIAGNOSIS — Z76.82 STEM CELL TRANSPLANT CANDIDATE: ICD-10-CM

## 2025-01-07 DIAGNOSIS — C92.01 ACUTE MYELOID LEUKEMIA IN REMISSION (MULTI): Primary | ICD-10-CM

## 2025-01-07 DIAGNOSIS — Z94.84 HISTORY OF STEM CELL TRANSPLANT (MULTI): ICD-10-CM

## 2025-01-07 DIAGNOSIS — Z94.81 S/P ALLOGENEIC BONE MARROW TRANSPLANT (MULTI): ICD-10-CM

## 2025-01-07 DIAGNOSIS — C92.01 ACUTE MYELOID LEUKEMIA IN REMISSION (MULTI): ICD-10-CM

## 2025-01-07 DIAGNOSIS — C92.02 AML (ACUTE MYELOID LEUKEMIA) IN RELAPSE (MULTI): ICD-10-CM

## 2025-01-07 DIAGNOSIS — C92.01 AML (ACUTE MYELOID LEUKEMIA) IN REMISSION (MULTI): ICD-10-CM

## 2025-01-07 DIAGNOSIS — D47.02 SMOLDERING SYSTEMIC MASTOCYTOSIS: ICD-10-CM

## 2025-01-07 DIAGNOSIS — C92.00 ACUTE MYELOID LEUKEMIA NOT HAVING ACHIEVED REMISSION (MULTI): ICD-10-CM

## 2025-01-07 DIAGNOSIS — Z94.84 HISTORY OF ALLOGENEIC STEM CELL TRANSPLANT (MULTI): ICD-10-CM

## 2025-01-07 LAB
ACANTHOCYTES BLD QL SMEAR: NORMAL
ALBUMIN SERPL BCP-MCNC: 4.1 G/DL (ref 3.4–5)
ALP SERPL-CCNC: 48 U/L (ref 33–136)
ALT SERPL W P-5'-P-CCNC: 15 U/L (ref 10–52)
ANION GAP SERPL CALC-SCNC: 9 MMOL/L (ref 10–20)
APPEARANCE UR: CLEAR
AST SERPL W P-5'-P-CCNC: 15 U/L (ref 9–39)
BACTERIA #/AREA URNS AUTO: ABNORMAL /HPF
BASOPHILS # BLD AUTO: 0.07 X10*3/UL (ref 0–0.1)
BASOPHILS NFR BLD AUTO: 1.7 %
BILIRUB SERPL-MCNC: 0.8 MG/DL (ref 0–1.2)
BILIRUB UR STRIP.AUTO-MCNC: NEGATIVE MG/DL
BUN SERPL-MCNC: 14 MG/DL (ref 6–23)
BURR CELLS BLD QL SMEAR: NORMAL
CALCIUM SERPL-MCNC: 8.7 MG/DL (ref 8.6–10.3)
CHLORIDE SERPL-SCNC: 105 MMOL/L (ref 98–107)
CO2 SERPL-SCNC: 26 MMOL/L (ref 21–32)
COLOR UR: YELLOW
CREAT SERPL-MCNC: 1.55 MG/DL (ref 0.5–1.3)
CREAT UR-MCNC: 147.8 MG/DL (ref 20–370)
EGFRCR SERPLBLD CKD-EPI 2021: 48 ML/MIN/1.73M*2
EOSINOPHIL # BLD AUTO: 1.02 X10*3/UL (ref 0–0.7)
EOSINOPHIL NFR BLD AUTO: 24.7 %
ERYTHROCYTE [DISTWIDTH] IN BLOOD BY AUTOMATED COUNT: 22.8 % (ref 11.5–14.5)
GLUCOSE SERPL-MCNC: 91 MG/DL (ref 74–99)
GLUCOSE UR STRIP.AUTO-MCNC: NORMAL MG/DL
HAPTOGLOB SERPL NEPH-MCNC: <30 MG/DL (ref 30–200)
HCT VFR BLD AUTO: 22.2 % (ref 41–52)
HGB BLD-MCNC: 8 G/DL (ref 13.5–17.5)
HYALINE CASTS #/AREA URNS AUTO: ABNORMAL /LPF
IMM GRANULOCYTES # BLD AUTO: 0.02 X10*3/UL (ref 0–0.7)
IMM GRANULOCYTES NFR BLD AUTO: 0.5 % (ref 0–0.9)
KETONES UR STRIP.AUTO-MCNC: NEGATIVE MG/DL
LDH SERPL L TO P-CCNC: 115 U/L (ref 84–246)
LEUKOCYTE ESTERASE UR QL STRIP.AUTO: NEGATIVE
LYMPHOCYTES # BLD AUTO: 0.43 X10*3/UL (ref 1.2–4.8)
LYMPHOCYTES NFR BLD AUTO: 10.4 %
MAGNESIUM SERPL-MCNC: 2.02 MG/DL (ref 1.6–2.4)
MCH RBC QN AUTO: 31.5 PG (ref 26–34)
MCHC RBC AUTO-ENTMCNC: 36 G/DL (ref 32–36)
MCV RBC AUTO: 87 FL (ref 80–100)
MONOCYTES # BLD AUTO: 0.56 X10*3/UL (ref 0.1–1)
MONOCYTES NFR BLD AUTO: 13.6 %
MUCOUS THREADS #/AREA URNS AUTO: ABNORMAL /LPF
NEUTROPHILS # BLD AUTO: 2.03 X10*3/UL (ref 1.2–7.7)
NEUTROPHILS NFR BLD AUTO: 49.1 %
NITRITE UR QL STRIP.AUTO: NEGATIVE
NRBC BLD-RTO: 0 /100 WBCS (ref 0–0)
OVALOCYTES BLD QL SMEAR: NORMAL
PH UR STRIP.AUTO: 7 [PH]
PLATELET # BLD AUTO: 90 X10*3/UL (ref 150–450)
POLYCHROMASIA BLD QL SMEAR: NORMAL
POTASSIUM SERPL-SCNC: 4.3 MMOL/L (ref 3.5–5.3)
PROT SERPL-MCNC: 5.7 G/DL (ref 6.4–8.2)
PROT UR STRIP.AUTO-MCNC: ABNORMAL MG/DL
PROT UR-ACNC: 20 MG/DL (ref 5–25)
PROT/CREAT UR: 0.14 MG/MG CREAT (ref 0–0.17)
RBC # BLD AUTO: 2.54 X10*6/UL (ref 4.5–5.9)
RBC # UR STRIP.AUTO: ABNORMAL /UL
RBC #/AREA URNS AUTO: ABNORMAL /HPF
RBC MORPH BLD: NORMAL
SCHISTOCYTES BLD QL SMEAR: NORMAL
SODIUM SERPL-SCNC: 136 MMOL/L (ref 136–145)
SP GR UR STRIP.AUTO: 1.01
TACROLIMUS BLD-MCNC: 8.7 NG/ML
UROBILINOGEN UR STRIP.AUTO-MCNC: NORMAL MG/DL
WBC # BLD AUTO: 4.1 X10*3/UL (ref 4.4–11.3)
WBC #/AREA URNS AUTO: ABNORMAL /HPF

## 2025-01-07 PROCEDURE — 85025 COMPLETE CBC W/AUTO DIFF WBC: CPT

## 2025-01-07 PROCEDURE — 82570 ASSAY OF URINE CREATININE: CPT

## 2025-01-07 PROCEDURE — 2500000004 HC RX 250 GENERAL PHARMACY W/ HCPCS (ALT 636 FOR OP/ED): Performed by: INTERNAL MEDICINE

## 2025-01-07 PROCEDURE — 83010 ASSAY OF HAPTOGLOBIN QUANT: CPT

## 2025-01-07 PROCEDURE — RXMED WILLOW AMBULATORY MEDICATION CHARGE

## 2025-01-07 PROCEDURE — 87799 DETECT AGENT NOS DNA QUANT: CPT

## 2025-01-07 PROCEDURE — 36591 DRAW BLOOD OFF VENOUS DEVICE: CPT

## 2025-01-07 PROCEDURE — 1159F MED LIST DOCD IN RCRD: CPT | Performed by: INTERNAL MEDICINE

## 2025-01-07 PROCEDURE — G2211 COMPLEX E/M VISIT ADD ON: HCPCS | Performed by: INTERNAL MEDICINE

## 2025-01-07 PROCEDURE — 81001 URINALYSIS AUTO W/SCOPE: CPT

## 2025-01-07 PROCEDURE — 80053 COMPREHEN METABOLIC PANEL: CPT

## 2025-01-07 PROCEDURE — 83615 LACTATE (LD) (LDH) ENZYME: CPT

## 2025-01-07 PROCEDURE — 99215 OFFICE O/P EST HI 40 MIN: CPT | Performed by: INTERNAL MEDICINE

## 2025-01-07 PROCEDURE — 3074F SYST BP LT 130 MM HG: CPT | Performed by: INTERNAL MEDICINE

## 2025-01-07 PROCEDURE — 83735 ASSAY OF MAGNESIUM: CPT

## 2025-01-07 PROCEDURE — 83520 IMMUNOASSAY QUANT NOS NONAB: CPT

## 2025-01-07 PROCEDURE — 1036F TOBACCO NON-USER: CPT | Performed by: INTERNAL MEDICINE

## 2025-01-07 PROCEDURE — 99215 OFFICE O/P EST HI 40 MIN: CPT | Mod: 25 | Performed by: INTERNAL MEDICINE

## 2025-01-07 PROCEDURE — 1126F AMNT PAIN NOTED NONE PRSNT: CPT | Performed by: INTERNAL MEDICINE

## 2025-01-07 PROCEDURE — 3078F DIAST BP <80 MM HG: CPT | Performed by: INTERNAL MEDICINE

## 2025-01-07 PROCEDURE — 80197 ASSAY OF TACROLIMUS: CPT

## 2025-01-07 PROCEDURE — 96360 HYDRATION IV INFUSION INIT: CPT

## 2025-01-07 RX ORDER — AMLODIPINE BESYLATE 10 MG/1
10 TABLET ORAL DAILY
Qty: 90 TABLET | Refills: 3 | Status: SHIPPED | OUTPATIENT
Start: 2025-01-07 | End: 2026-01-02

## 2025-01-07 RX ORDER — HEPARIN 100 UNIT/ML
500 SYRINGE INTRAVENOUS AS NEEDED
Status: DISCONTINUED | OUTPATIENT
Start: 2025-01-07 | End: 2025-01-07 | Stop reason: HOSPADM

## 2025-01-07 RX ORDER — HEPARIN 100 UNIT/ML
500 SYRINGE INTRAVENOUS AS NEEDED
Status: CANCELLED | OUTPATIENT
Start: 2025-01-07

## 2025-01-07 RX ORDER — HEPARIN SODIUM,PORCINE/PF 10 UNIT/ML
50 SYRINGE (ML) INTRAVENOUS AS NEEDED
Status: CANCELLED | OUTPATIENT
Start: 2025-01-07

## 2025-01-07 RX ORDER — PROCHLORPERAZINE MALEATE 10 MG
10 TABLET ORAL EVERY 6 HOURS PRN
Qty: 30 TABLET | Refills: 5 | Status: SHIPPED | OUTPATIENT
Start: 2025-01-07

## 2025-01-07 RX ORDER — ONDANSETRON HYDROCHLORIDE 8 MG/1
8 TABLET, FILM COATED ORAL EVERY 8 HOURS PRN
Qty: 90 TABLET | Refills: 2 | Status: SHIPPED | OUTPATIENT
Start: 2025-01-07 | End: 2025-04-07

## 2025-01-07 RX ADMIN — HEPARIN 500 UNITS: 100 SYRINGE at 11:59

## 2025-01-07 RX ADMIN — SODIUM CHLORIDE 500 ML: 9 INJECTION, SOLUTION INTRAVENOUS at 10:43

## 2025-01-07 ASSESSMENT — PAIN SCALES - GENERAL: PAINLEVEL_OUTOF10: 0-NO PAIN

## 2025-01-07 NOTE — ASSESSMENT & PLAN NOTE
Remains at high risk for infections.  No current active signs or symptoms of infection.    Continues on prophylaxis with acyclovir, posaconazole, and trimethoprim-sulfa.    Ongoing viral monitoring (as below)   Immune reconstitution: Assess at D+100.  Post Tx Immunizations are planned for D+180.  Will need scheduled.

## 2025-01-07 NOTE — PROGRESS NOTES
"NUTRITION FOLLOW UP NOTE    Reason for Visit:  Jin Reynolds is a 69 y.o. 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    Transplant c/b by mucositis, CINV and diarrhea     Currently T+47    Pt seen today in infusion; found to have respiratory parainfluenza on 12/26.    Lab Results   Component Value Date/Time    GLUCOSE 91 01/07/2025 0840     01/07/2025 0840    K 4.3 01/07/2025 0840     01/07/2025 0840    CO2 26 01/07/2025 0840    ANIONGAP 9 (L) 01/07/2025 0840    BUN 14 01/07/2025 0840    CREATININE 1.55 (H) 01/07/2025 0840    EGFR 48 (L) 01/07/2025 0840    CALCIUM 8.7 01/07/2025 0840    ALBUMIN 4.1 01/07/2025 0840    ALKPHOS 48 01/07/2025 0840    PROT 5.7 (L) 01/07/2025 0840    AST 15 01/07/2025 0840    BILITOT 0.8 01/07/2025 0840    ALT 15 01/07/2025 0840    MG 2.02 01/07/2025 0840    PHOS 2.3 (L) 12/07/2024 0608     Creat up slightly--for IVF today     Lab Results   Component Value Date/Time    VITD25 27 (L) 02/05/2024 0807   Taking Vit D 2000IU daily    Lab Results   Component Value Date    WBC 4.1 (L) 01/07/2025    HGB 8.0 (L) 01/07/2025    HCT 22.2 (L) 01/07/2025    MCV 87 01/07/2025    PLT 90 (L) 01/07/2025       Anthropometrics:  Anthropometrics  Height: 169.1 cm (5' 6.58\")  Weight: 72.5 kg (159 lb 13.3 oz)  BMI (Calculated): 25.35  IBW/kg (Dietitian Calculated): 65.9 kg  Percent of IBW: 110 %    *Wt at transplant admit: (11/15) 86.1 kg  *Wt at first post-transplant visit: (12/9) 80.7 kg     *Wt up slightly over the past week but still with wt loss of 4# since having influenza     Wt Readings from Last 10 Encounters:   01/07/25 72.5 kg (159 lb 13.3 oz)   01/07/25 72.5 kg (159 lb 13.3 oz)   01/04/25 71 kg (156 lb 8.4 oz)   01/02/25 72 kg (158 lb 12.8 oz)   12/31/24 71.8 kg (158 lb 4.6 oz)   12/31/24 71.8 kg (158 lb 4.6 oz)   12/28/24 74.3 kg (163 lb 12.8 oz)   12/26/24 75.7 kg (166 lb 14.2 oz)   12/24/24 76.7 kg (169 lb 1.6 oz)   12/21/24 77.9 kg (171 lb 11.8 oz)   11/15/24       "  86.1 kg--at time of transplant  10/31/24        87.5 kg   10/18/24        84.0 kg  09/20/24        86.8 kg  08/29/24        89.2 kg  07/30/24        95.9 kg   06/21/24        87.5 kg         Food And Nutrient Intake:      Feels weak today  Started eating solid foods--wings and potato wedges, fruit   Tastes are a little better; still < half   Eating smaller portions; early satiety   Getting hungry in past few days; craves everything.  No nausea; foods staying down  Still coughing a little bit   Still eating soup everyday  Drinking Ensure 3x/day  Otherwise drinking water and grape pop   Takes meds with water--rec Ensure   Stools still watery; didn't go in a day   Energy levels come and go; doesn't think he can walk to [facility] to get his prescriptions today  Still napping during the day     Hasn't eaten anything yet today       Rec 3 bottles of water as goal       ---------------  No breakfast yet  Gets hungry--loses interest when eat   Food doesn't taste like he knows it should   Caught respiratory flu last week; to start Nyquil and Mucinex   Feels congested   Still with taste changes   Can do starches in a soup  Vegetable soups   Apple pie didn't taste good   Didn't eat well on Xmas   Still struggling with meat  Does well with fruit  Vit d milkshake   Ensure TID   Take ensure with meds  No nausea  Energy levels still low; worse since got the flu last week   Gagging mucous up; dry heaves  Drinking well--mainly water and Ensure   Stools loose for last few days--2-3 times a day    -------------  Pt seen earlier this week and reported having vomiting with solid foods; had not been taking any anti-emetics.   Told to take Zofran 30 min before meals TID; he was also told he could use Compazine between Zofran if needed.   Since doing so, pt has not had further vomiting; stomach feels a little better and he has been able to keep solid foods down.  Fluid intakes good--has had no issues tolerating.     Has been eating lighter  "foods in the past few days; still leery of trying meats.   Was able to eat and keep down egg sandwich last night.   Has also been eating bone broth with rice and crax added as well as yogurt.   Now drinking Ensure Original daily.  Tastes remain barrier to eating; did not tolerate trial of lemon hard candies (associated with vomiting).    Still not feeling great, overall. No significant improvements.   Energy remains low; walks slow.     *Pt noted to have been eating better and tolerating PO intakes better after initial hospital discharge.  Intakes have actually digressed in week-10 days.     Ensure Original:  provides 240 kcals and 9 gm protein each                                                           Nutrition Focused Physical Exam Findings:                          Energy Needs  Calculated Energy Needs Using Equations  Height: 169.1 cm (5' 6.58\")        Nutrition Diagnosis        Pt remains mildly malnourished as appetite/intakes have declined since initial visit.  N/V has improved with consistent use of antiemetics.  Dysgeusia also contributing to decreased oral intakes.         Continues to benefit from use of ONS daily.        Nutrition Interventions/Recommendations   Nutrition Prescription   High Protein, High Calorie  Food Safety          Nutrition Education   Try Ensure Plus vs Ensure Original for additional kcals; for now, plans to increase Ensure Original to TID.  Also suggested taking meds with Ensure and making Ensure into milkshakes.   Spoke with NP, will try Mirtazapine 15 mg to help with appetite/sleep   Eating smaller, more frequent snacks vs meals at this time.  Encourage PO fluid intakes; prefer calorie containing beverages at this time vs water due to decreased intakes.  Discussed increased fluid intakes to help decrease mucous production.   If upper GI symptoms persist, consider start of Budesonide    Coordination of Care   BMT team        Nutrition Monitoring and Evaluation      Will " continue to f/up and monitor weight, labs, PO intakes, taste changes and GI symptoms PRN.

## 2025-01-07 NOTE — PROGRESS NOTES
Pt ambulated to SCT unit without assistance. Pt denies complaints or pain today. Vitals obtained, blood drawn prior to arrival to [facility] and sent to lab. Pt saw Dr. Dawson in clinic prior to [facility] appointment. Pt accessed upon arrival. Pt tolerated 500 ml of NS well. Pt de-accessed without incident. Heparin dwelling in port per protocol. Gauze and tape applied to site. Pt declined a copy of lab results. Pt ambulated off unit without complaints or assistance.

## 2025-01-07 NOTE — ASSESSMENT & PLAN NOTE
Recent BM demonstrates persistent mast cells which may be expected at this point.  Rechecking serum tryptase.  Will give consideration of adding avapritinib after D+100 if counts stable.

## 2025-01-07 NOTE — ASSESSMENT & PLAN NOTE
Counts are stable.  No evidence relapse.  D+30 BM without evidence of AML.  Current evidence of disease: No  Maintenance Therapy: No  MRD Monitoring Plan: AML MFC

## 2025-01-07 NOTE — PROGRESS NOTES
"     Patient ID: Jin Reynolds is a 69 y.o. male.    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   8/23/2023 Initial Diagnosis    DX:  SMOLDERING SYSTEMIC MASTOCYTOSIS  Presented with skin rash and eosinophilia    BRENDON DIAGNOSTIC CRITERIA  (For SM, Need major and 1 minor OR at least 3 minors)  - Multi-focal, dense infiltrates of MC (>= 15 mast cells in aggregates) in BM and/or other extra-cutaneous organs [major]  - In BM or extracutaneous organs, >25% MC spindle shaped OR have atypical morphology OR of all MC on BM aspirate smears, >25% are immature or atypical [minor]  - Detection of activating mutation KIT D816V in BM, PB, or other extracutaneous organ [minor]  - Serum tryptase persistently exceeds 20 ng/mL (unless associated clonal myeloid disorder, in which this paramenter is not valid) [minor]    \"B\" FINDINGS  - BM biopsy showing >30% infiltration (focal, dense aggregates) and/or serum tryptase > 200 ng/mL  - Signs of dysplasia or myeloproliferation, in non-MC lineages but insufficient criteria for definitive diagnosis of AHNMD with normal or slightly abnormal blood counts    \"C\" FINDINGS  None       8/23/2023 Biopsy    BONE MARROW (8/23/23)  Hypercellular (90-95%) with trilineage hematopoiesis, granulocytic hyperplasia, eosinophilia, and increased atypical mast cells  IP:  CD34+, +, CD7(bright)+, cCD3-, CD33-, CD15-, CD13+ blast population  IHC:  + increased spindle-shaped mast cells (15% cellularity), CD2-  Myeloid NGS:  CBL (31%), cKIT D816V (30%), RUNX1 x 2 (19%, 29%), SRSF2 (47%) [ASXL1 negative]  FISH:  PDGFRb negative    SERUM TRYPTASE  228 (8/23/23)  268 (9/5/23)     2/13/2024 -  Molecular Therapy    AVAPRITINIB   - Starting dose 25 mg daily (non-Adv SM)     7/16/2024 - 10/21/2024 Chemotherapy    Venetoclax / AzaCITIDine  - C1D1 7/17/24:  complicated by TLS, venetoclax held after D1  - Post C1 BM " (8/28/24):  hypercellular with >50% blasts, background systemic mastocytosis  - C2D1 9/5/24:  venetoclax restarted with C2, no TLS  - Post C2 BM (9/25/24):  hypocellular (<5%), atypical myeloblasts by IP/IHC (1-2%), persistent systemic mastocytosis  - C3D1 10/15/24:  delayed 1 week for count recovery  - Post C3 BM (10/23/24):  hypercellular with systemic mastocytosis, no increase blasts, 0.2% atypical blasts by IP, NGS w/ CBL (73%), cKIT (4%), RUNX1 x 2 (4%, 38%), SRSF2 (40%)       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    CONDITIONING Fludarabine, melphalan, and TBI   DONOR Matched sibling   MATCH GRADE 12/12   SEX MATCH Mismatched   ABO DONOR O pos   ABO RECIPIENT O pos   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, Mycophenolate, and Tacrolimus   GRAFT SOURCE Peripheral blood          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)   6/27/2024 Initial Diagnosis    ICC 2022 DX: Acute myeloid leukemia, NOS (>=20% blasts)  [id number] AML Risk Stratification: INTERMEDIATE: Cytogenetic and/or molecular abnormalities not classified as favorable or adverse  Presented with pancytopenia and circulating blasts    BONE MARROW (06/27/2024)  Marrow replaced by blasts, fibrotic foci, some atypical + cells; 7-8% circulating blasts; aspicular  - Unable to perform additional studies due to aspicular specimen    PERIPHERAL BLOOD (6/27/2024)  FISH:  positive for gain RUNX1    PERIPHERAL BLOOD (7/1/24)  IP:  abnl myeloblast population (CD34+, CD7+, CD4+, CD13+, CD38+, CD11+ dim, HLA=DR+, TdT+, CD33-)  Myeloid NGS: CBL C404Y (41%), KIT D816V (8%), RUNX1 x2 (8%, 30%), SRSF2 (37%)     7/16/2024 - 10/21/2024 Chemotherapy    Venetoclax / AzaCITIDine  - C1D1 7/17/24:  complicated by TLS, venetoclax held after D1  - Post C1 BM (8/28/24):  hypercellular with >50% blasts, background systemic mastocytosis  - C2D1 9/5/24:  venetoclax restarted with C2, no TLS  - Post C2 BM (9/25/24):  hypocellular (<5%), atypical myeloblasts by IP/IHC  (1-2%), persistent systemic mastocytosis  - C3D1 10/15/24:  delayed 1 week for count recovery  - Post C3 BM (10/23/24):  hypercellular with systemic mastocytosis, no increase blasts, 0.2% atypical blasts by IP, NGS w/ CBL (73%), cKIT (4%), RUNX1 x 2 (4%, 38%), SRSF2 (40%)       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    CONDITIONING Fludarabine, melphalan, and TBI   DONOR Matched sibling   MATCH GRADE 12/12   SEX MATCH Mismatched   ABO DONOR O pos   ABO RECIPIENT O pos   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, Mycophenolate, and Tacrolimus   GRAFT SOURCE Peripheral blood          12/26/2024 -  Infection    Parainfluenza URI     12/26/2024 -  Disease Reevaluation      DATE DAY SOURCE MORPHOLOGY MRD WHOLE CHIMERISM   (% donor) CD3 CHIMERISM   (% donor) CD33 CHIMERISM   (% donor)   12/26/2024 D+30 PB        12/26/2024 D+30 BM No AML, persist BRENDON Neg       D+60 PB         D+100 PB         D+100 BM                   Assessment & Plan  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)  Counts are stable.  No evidence relapse.  D+30 BM without evidence of AML.  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  Rechecking serum tryptase.  Will give consideration of adding avapritinib after D+100 if counts stable.  History of stem cell transplant (Multi)  Day +47  following allogeneic stem cell transplant.  He does not have evidence of GVHD (last overall grade NA ).  Continue current immunosuppressive therapy.  Awaiting D+30 PB chimerisms.  Immunocompromised state  Remains at high risk for infections.  No current active signs or symptoms of infection.    Continues on prophylaxis with acyclovir, posaconazole, and trimethoprim-sulfa.    Ongoing viral monitoring (as below)   Immune reconstitution: Assess at  D+100.  Post Tx Immunizations are planned for D+180.  Will need scheduled.       ______________________________________________________________________________________________    SUBJECTIVE     Here today for planned follow up following allogeneic stem cell transplant (Day +47).  He is having nausea/vomiting.  Using prn anti-emetics  2-3 times weekly. He is not having diarrhea.  Not using prn anti-motility agents. He denies any rash. Feels like his eating and drinking is on the path to getting better. No new health issues.  Denies fevers, chills, nausea, vomiting, diarrhea, dyspnea, rash, and pain.    OBJECTIVE     /68   Pulse 78   Temp 36.1 °C (97 °F)   Resp 16   Wt 72.5 kg (159 lb 13.3 oz)   SpO2 100%   BMI 25.35 kg/m²      Physical Exam  Vitals reviewed.   Constitutional:       Appearance: Normal appearance.   Eyes:      Conjunctiva/sclera: Conjunctivae normal.      Pupils: Pupils are equal, round, and reactive to light.   Skin:     General: Skin is warm and dry.      Findings: No rash.   Psychiatric:         Mood and Affect: Mood normal.              Nika Dawson MD

## 2025-01-07 NOTE — ASSESSMENT & PLAN NOTE
Day +47  following allogeneic stem cell transplant.  He does not have evidence of GVHD (last overall grade NA ).  Continue current immunosuppressive therapy.  Awaiting D+30 PB chimerisms.

## 2025-01-08 ENCOUNTER — HOSPITAL ENCOUNTER (OUTPATIENT)
Dept: CARDIOLOGY | Facility: HOSPITAL | Age: 70
Discharge: HOME | End: 2025-01-08
Payer: MEDICARE

## 2025-01-08 ENCOUNTER — OFFICE VISIT (OUTPATIENT)
Dept: CARDIOLOGY | Facility: HOSPITAL | Age: 70
End: 2025-01-08
Payer: MEDICARE

## 2025-01-08 VITALS
TEMPERATURE: 97.2 F | RESPIRATION RATE: 22 BRPM | DIASTOLIC BLOOD PRESSURE: 68 MMHG | BODY MASS INDEX: 25.35 KG/M2 | HEART RATE: 70 BPM | OXYGEN SATURATION: 100 % | SYSTOLIC BLOOD PRESSURE: 113 MMHG | WEIGHT: 159.83 LBS

## 2025-01-08 DIAGNOSIS — Z94.84 STEM CELLS TRANSPLANT STATUS (MULTI): ICD-10-CM

## 2025-01-08 DIAGNOSIS — Z94.81 S/P ALLOGENEIC BONE MARROW TRANSPLANT (MULTI): ICD-10-CM

## 2025-01-08 DIAGNOSIS — I10 ESSENTIAL (PRIMARY) HYPERTENSION: ICD-10-CM

## 2025-01-08 DIAGNOSIS — D47.02 SYSTEMIC MASTOCYTOSIS WITH ASSOCIATED CLONAL HEMATOLOGICAL NON-MAST CELL LINEAGE DISEASE: ICD-10-CM

## 2025-01-08 DIAGNOSIS — R68.89 OTHER GENERAL SYMPTOMS AND SIGNS: ICD-10-CM

## 2025-01-08 DIAGNOSIS — Z92.21 PERSONAL HISTORY OF ANTINEOPLASTIC CHEMOTHERAPY: ICD-10-CM

## 2025-01-08 DIAGNOSIS — C92.01 ACUTE MYELOID LEUKEMIA IN REMISSION (MULTI): ICD-10-CM

## 2025-01-08 DIAGNOSIS — I35.9 AORTIC VALVE DISEASE: Primary | ICD-10-CM

## 2025-01-08 DIAGNOSIS — I10 BENIGN HYPERTENSION: ICD-10-CM

## 2025-01-08 DIAGNOSIS — I49.3 PVC (PREMATURE VENTRICULAR CONTRACTION): ICD-10-CM

## 2025-01-08 DIAGNOSIS — Z94.84 HISTORY OF STEM CELL TRANSPLANT (MULTI): ICD-10-CM

## 2025-01-08 LAB
CHIMERISM INTERPRETATION: NORMAL
CHIMERISM INTERPRETATION: NORMAL
CHROM ANALY OVERALL INTERP-IMP: NORMAL
CMV DNA SERPL NAA+PROBE-LOG IU: NORMAL {LOG_IU}/ML
EBV DNA BLD NAA+PROBE-LOG IU: NORMAL {LOG_IU}/ML
EJECTION FRACTION APICAL 4 CHAMBER: 56.5
EJECTION FRACTION: 59 %
ELECTRONICALLY SIGNED BY CYTOGENETICS: NORMAL
ELECTRONICALLY SIGNED BY: NORMAL
ELECTRONICALLY SIGNED BY: NORMAL
LABORATORY COMMENT REPORT: NOT DETECTED
LABORATORY COMMENT REPORT: NOT DETECTED
LEFT ATRIUM VOLUME AREA LENGTH INDEX BSA: 45.8 ML/M2
LEFT VENTRICLE INTERNAL DIMENSION DIASTOLE: 5.23 CM (ref 3.5–6)
LEFT VENTRICULAR OUTFLOW TRACT DIAMETER: 2.26 CM
MITRAL VALVE E/A RATIO: 0.55
RIGHT VENTRICLE FREE WALL PEAK S': 17 CM/S
TRICUSPID ANNULAR PLANE SYSTOLIC EXCURSION: 2 CM

## 2025-01-08 PROCEDURE — 93321 DOPPLER ECHO F-UP/LMTD STD: CPT | Performed by: INTERNAL MEDICINE

## 2025-01-08 PROCEDURE — 3078F DIAST BP <80 MM HG: CPT | Performed by: INTERNAL MEDICINE

## 2025-01-08 PROCEDURE — 93325 DOPPLER ECHO COLOR FLOW MAPG: CPT | Performed by: INTERNAL MEDICINE

## 2025-01-08 PROCEDURE — 1159F MED LIST DOCD IN RCRD: CPT | Performed by: INTERNAL MEDICINE

## 2025-01-08 PROCEDURE — 1126F AMNT PAIN NOTED NONE PRSNT: CPT | Performed by: INTERNAL MEDICINE

## 2025-01-08 PROCEDURE — 76376 3D RENDER W/INTRP POSTPROCES: CPT | Performed by: INTERNAL MEDICINE

## 2025-01-08 PROCEDURE — 1036F TOBACCO NON-USER: CPT | Performed by: INTERNAL MEDICINE

## 2025-01-08 PROCEDURE — 99214 OFFICE O/P EST MOD 30 MIN: CPT | Performed by: INTERNAL MEDICINE

## 2025-01-08 PROCEDURE — 93356 MYOCRD STRAIN IMG SPCKL TRCK: CPT | Performed by: INTERNAL MEDICINE

## 2025-01-08 PROCEDURE — 93308 TTE F-UP OR LMTD: CPT | Performed by: INTERNAL MEDICINE

## 2025-01-08 PROCEDURE — 76376 3D RENDER W/INTRP POSTPROCES: CPT

## 2025-01-08 PROCEDURE — 3074F SYST BP LT 130 MM HG: CPT | Performed by: INTERNAL MEDICINE

## 2025-01-08 PROCEDURE — 99214 OFFICE O/P EST MOD 30 MIN: CPT | Mod: 25 | Performed by: INTERNAL MEDICINE

## 2025-01-08 ASSESSMENT — ENCOUNTER SYMPTOMS
NAUSEA: 1
DIZZINESS: 0
DYSURIA: 0
WEAKNESS: 1
CARDIOVASCULAR NEGATIVE: 1
ARTHRALGIAS: 1
HEMATURIA: 0
SLEEP DISTURBANCE: 1
ACTIVITY CHANGE: 1
SHORTNESS OF BREATH: 0
DIFFICULTY URINATING: 0
LIGHT-HEADEDNESS: 0

## 2025-01-08 ASSESSMENT — PAIN SCALES - GENERAL: PAINLEVEL_OUTOF10: 0-NO PAIN

## 2025-01-08 NOTE — PATIENT INSTRUCTIONS
Continue medications as prescribed  Lipid panel to be drawn when fasting at your earliest convenience  Follow up in one year with Ann Martinez NP

## 2025-01-08 NOTE — PROGRESS NOTES
Patient:  Jin Reynolds  YOB: 1955  MRN: [id number]       Chief Complaint/Active Symptoms:       Jin Reynolds is a 69 y.o. male who returns today for cardiac follow-up.    Patient here for follow-up after SCT. Is slowly recovering and still dealing with some fatigue. Told by his Oncologists that he is right on course.     He has no chest pain or discomfort, no shortness of breath, orthopnea or PND. No palpitations, dizziness or lightheadedness. Still dealing with fatigue. No edema, no claudication, no stroke or TIA symptoms.     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      Testing:  Echo 6/2023 EF 55-60%  Echo today normal  Stress test 7/2023 - normal / low risk for MI   Monitor: low volume PVC, several asymptomatic triplets.   LDL 44 in 2023 on statin Rx.   CT 10/2024 - no aortic or cardiac calcifications noted.     Review of Systems   Constitutional:  Positive for activity change.   HENT:  Positive for congestion.    Respiratory:  Negative for shortness of breath.    Cardiovascular: Negative.    Gastrointestinal:  Positive for nausea.   Genitourinary:  Negative for difficulty urinating, dysuria and hematuria.   Musculoskeletal:  Positive for arthralgias.   Neurological:  Positive for weakness. Negative for dizziness and light-headedness.   Psychiatric/Behavioral:  Positive for sleep disturbance.    All other systems reviewed and are negative.    Objective:     Visit Vitals  /68 (BP Location: Right arm, Patient Position: Sitting, BP Cuff Size: Adult)   Pulse 70   Temp 36.2 °C (97.2 °F) (Temporal)   Resp 22   Wt 72.5 kg (159 lb 13.3 oz)   SpO2 100%   BMI 25.35 kg/m²   Smoking Status Never   BSA 1.85 m²     Allergies:     Allergies   Allergen Reactions    Zyrtec [Cetirizine] Headache     Headaches do not occur with fexofenadine (Allegra)      Medications:      Current Outpatient Medications   Medication Instructions    acyclovir (ZOVIRAX) 400 mg, oral, Every 12 hours    amLODIPine (NORVASC) 10 mg, oral, Daily    cholecalciferol (VITAMIN D-3) 2,000 Units, oral, Daily    famotidine (PEPCID) 20 mg, oral, Daily    fexofenadine (ALLEGRA) 180 mg, Daily    magnesium, amino acid chelate, 133 mg tablet 266 mg, oral, 3 times daily, Or as instructed on your medication list.    mirtazapine (REMERON) 15 mg, oral, Nightly    ondansetron (ZOFRAN) 8 mg, oral, Every 8 hours PRN    posaconazole (NOXAFIL) 300 mg, oral, Daily, Do not crush, chew, or split. This is to prevent fungal infections.    potassium chloride CR 20 mEq ER tablet 20 mEq, 2 times daily    prochlorperazine (COMPAZINE) 10 mg, oral, Every 6 hours PRN    sulfamethoxazole-trimethoprim (Bactrim DS) 800-160 mg tablet 1 tablet, oral, 3 times weekly, Take on Mondays, Wednesdays, and Fridays. Do not start until instructed.    tacrolimus (PROGRAF) 3 mg, oral, Every 12 hours, Or as instructed on your medication list.    ursodiol (ACTIGALL) 300 mg, oral, 3 times daily       Physical Examination:   GENERAL:  Well developed, well nourished, in no acute distress.  HEENT: NC AT, EOMI with anicteric sclera  NECK:  Supple, no JVD, no bruit.  CHEST:  Symmetric and nontender.  LUNGS:  Clear to auscultation bilaterally, normal respiratory effort.  HEART:  PMI is nondisplaced. RRR with normal S1 and S2, no S3, no mumur or rub. No carotid or abdominal bruits  EXTREMITIES:  Warm with good color, no clubbing or cyanosis.  There is no edema noted.  PERIPHERAL VASCULAR:  Pulses present and equally palpable; 2+ throughout.  MUSCULOSKELETAL: mild sarcopenia  NEURO/PSYCH:  Alert and oriented times three with approppriate behavior and responses. Nonfocal motor examination with normal gait and ambulation    Lab:     CBC:   Lab Results   Component Value Date    WBC 4.1 (L) 01/07/2025    RBC 2.54 (L) 01/07/2025    HGB 8.0 (L) 01/07/2025    HCT 22.2  "(L) 01/07/2025    PLT 90 (L) 01/07/2025        CMP:    Lab Results   Component Value Date     01/07/2025    K 4.3 01/07/2025     01/07/2025    CO2 26 01/07/2025    BUN 14 01/07/2025    CREATININE 1.55 (H) 01/07/2025    GLUCOSE 91 01/07/2025    CALCIUM 8.7 01/07/2025       Magnesium:    Lab Results   Component Value Date    MG 2.02 01/07/2025       Lipid Profile:    Lab Results   Component Value Date    TRIG 68 06/09/2023    HDL 35.8 (A) 05/31/2023       TSH:    Lab Results   Component Value Date    TSH 2.51 08/23/2023       BNP:   Lab Results   Component Value Date    BNP 52 10/07/2024        PT/INR:    Lab Results   Component Value Date    PROTIME 16.1 (H) 12/09/2024    INR 1.4 (H) 12/09/2024       HgBA1c:    No results found for: \"HGBA1C\"    BMP:  Lab Results   Component Value Date     01/07/2025     (L) 01/04/2025     (L) 01/02/2025    K 4.3 01/07/2025    K 4.0 01/04/2025    K 3.6 01/02/2025     01/07/2025     01/04/2025     01/02/2025    CO2 26 01/07/2025    CO2 24 01/04/2025    CO2 23 01/02/2025    BUN 14 01/07/2025    BUN 12 01/04/2025    BUN 11 01/02/2025    CREATININE 1.55 (H) 01/07/2025    CREATININE 1.37 (H) 01/04/2025    CREATININE 1.36 (H) 01/02/2025       Cardiac Enzymes:    Lab Results   Component Value Date    TROPHS 4 10/07/2024    TROPHS 16 06/06/2023       Hepatic Function Panel:    Lab Results   Component Value Date    ALKPHOS 48 01/07/2025    ALT 15 01/07/2025    AST 15 01/07/2025    PROT 5.7 (L) 01/07/2025    BILITOT 0.8 01/07/2025    BILIDIR 0.3 12/07/2024     Labs reviewed.     Diagnostic Studies:     Transthoracic Echo (TTE) Complete    Result Date: 10/25/2024   [facility], [address] [place] [address]                Tel [phone] and Fax [phone] TRANSTHORACIC ECHOCARDIOGRAM REPORT  Patient Name:      ANGELO Whiteside Physician:    [id number] Baron"        Tami ELIZONDO Study Date:        10/23/2024           Ordering Provider:    [id number] ALISSON CUMMINGS MRN/PID:           [id number]             Fellow: Accession#:        [id number]         Nurse: Date of Birth/Age: 1955 / 69 years  Sonographer:          CIARA Bourne RDCS Gender:            M                    Additional Staff: Height:            167.64 cm            Admit Date: Weight:            86.18 kg             Admission Status:     Outpatient BSA / BMI:         1.96 m2 / 30.67      Encounter#:           [id number]                    kg/m2 Blood Pressure:    135/81 mmHg          Department Location:  [facility] Echo Lab Study Type:    TRANSTHORACIC ECHO (TTE) COMPLETE Diagnosis/ICD: 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 anemia, AML not in remission. Study Detail: The following Echo studies were performed: 2D, M-Mode, Doppler and               color flow.  PHYSICIAN INTERPRETATION: Left Ventricle: Left ventricular ejection fraction is low normal, by visual estimate at 50-55%. There are no regional left ventricular wall motion abnormalities. The left ventricular cavity size is normal. The left ventricular septal wall thickness is mildly increased. There is mildly increased left ventricular posterior wall thickness. There is a false tendon visualized in the left ventricle. There is moderate concentric left ventricular hypertrophy. Spectral Doppler shows a pseudonormal pattern of left ventricular diastolic filling. There is an elevated mean left atrial pressure. Left Atrium: The left atrium is severely dilated. Right Ventricle: The right ventricle is normal in size. There is normal right ventricular global systolic function.  Right Atrium: The right atrium is moderately dilated. Aortic Valve: The aortic valve is trileaflet. There is no evidence of aortic valve regurgitation. The peak instantaneous gradient of the aortic valve is 9.5 mmHg. Mitral Valve: The mitral valve is normal in structure. There is trace mitral valve regurgitation. Tricuspid Valve: The tricuspid valve is structurally normal. There is mild tricuspid regurgitation. The Doppler estimated RVSP is mildly elevated at 38.7 mmHg. Pulmonic Valve: The pulmonic valve is structurally normal. There is physiologic pulmonic valve regurgitation. Pericardium: Trivial pericardial effusion. Aorta: The aortic root is normal. In comparison to the previous echocardiogram(s): Compared with study dated 6/14/2023, no significant change . RVSP is again mildly elevated in context of grade II diastolic dysfunction.  CONCLUSIONS:  1. Left ventricular ejection fraction is low normal, by visual estimate at 50-55%.  2. Spectral Doppler shows a pseudonormal pattern of left ventricular diastolic filling.  3. There is an elevated mean left atrial pressure.  4. There is moderate concentric left ventricular hypertrophy.  5. There is normal right ventricular global systolic function.  6. Mildly elevated right ventricular systolic pressure.  7. The left atrium is severely dilated.  8. The right atrium is moderately dilated.  9. Compared with study dated 6/14/2023, no significant change . RVSP is again mildly elevated in context of grade II diastolic dysfunction. QUANTITATIVE DATA SUMMARY:  2D MEASUREMENTS:          Normal Ranges: Ao Root d:       3.30 cm  (2.0-3.7cm) LAs:             5.25 cm  (2.7-4.0cm) RVIDd:           3.60 cm  (0.9-3.6cm) IVSd:            1.09 cm  (0.6-1.1cm) LVPWd:           1.19 cm  (0.6-1.1cm) LVIDd:           5.63 cm  (3.9-5.9cm) LVIDs:           3.76 cm LV Mass Index:   135 g/m2 LVEDV Index:     67 ml/m2 LV % FS          33.1 %  LA VOLUME:                    Normal Ranges: LA Vol  A4C:        91.2 ml    (22+/-6mL/m2) LA Vol A2C:        130.1 ml LA Vol BP:         109.8 ml LA Vol Index A4C:  46.6ml/m2 LA Vol Index A2C:  66.5 ml/m2 LA Vol Index BP:   56.1 ml/m2 LA Area A4C:       26.0 cm2 LA Area A2C:       30.8 cm2 LA Major Axis A4C: 6.3 cm LA Major Axis A2C: 6.2 cm LA Volume Index:   56.0 ml/m2  RA VOLUME BY A/L METHOD:          Normal Ranges: RA Area A4C:             24.8 cm2  M-MODE MEASUREMENTS:         Normal Ranges: Ao Root:             3.70 cm (2.0-3.7cm) LAs:                 5.15 cm (2.7-4.0cm)  AORTA MEASUREMENTS:         Normal Ranges: Asc Ao, d:          3.30 cm (2.1-3.4cm)  LV SYSTOLIC FUNCTION BY 2D PLANIMETRY (MOD):                      Normal Ranges: EF-A4C View:    68 % (>=55%) EF-A2C View:    57 % EF-Biplane:     62 % EF-Visual:      53 % LV EF Reported: 53 %  LV DIASTOLIC FUNCTION:             Normal Ranges: MV Peak E:             0.50 m/s    (0.7-1.2 m/s) MV Peak A:             0.77 m/s    (0.42-0.7 m/s) E/A Ratio:             0.65        (1.0-2.2) MV e'                  0.055 m/s   (>8.0) MV lateral e'          0.07 m/s MV medial e'           0.04 m/s MV A Dur:              110.73 msec E/e' Ratio:            9.15        (<8.0) a'                     0.15 m/s PulmV Sys Earle:         105.56 cm/s PulmV Patrick Earle:        54.20 cm/s PulmV S/D Earle:         1.95 PulmV A Revs Earle:      36.27 cm/s PulmV A Revs Dur:      87.66 msec  MITRAL VALVE:          Normal Ranges: MV DT:        255 msec (150-240msec)  AORTIC VALVE:           Normal Ranges: AoV Vmax:      1.54 m/s (<=1.7m/s) AoV Peak P.5 mmHg (<20mmHg) LVOT Max Earle:  1.14 m/s (<=1.1m/s) LVOT VTI:      25.62 cm LVOT Diameter: 2.34 cm  (1.8-2.4cm) AoV Area,Vmax: 3.19 cm2 (2.5-4.5cm2)  RIGHT VENTRICLE: RV Basal 3.90 cm RV Mid   3.60 cm RV Major 6.4 cm TAPSE:   25.0 mm RV s'    0.13 m/s  TRICUSPID VALVE/RVSP:          Normal Ranges: Peak TR Velocity:     2.99 m/s Est. RA Pressure:     3 mmHg RV Syst Pressure:     39 mmHg  (<  30mmHg) IVC Diam:             1.90 cm  PULMONIC VALVE:          Normal Ranges: PV Accel Time:  71 msec  (>120ms) PV Max Earle:     1.0 m/s  (0.6-0.9m/s) PV Max P.4 mmHg  Pulmonary Veins: PulmV A Revs Dur: 87.66 msec PulmV A Revs Earle: 36.27 cm/s PulmV Patrick Earle:   54.20 cm/s PulmV S/D Earle:    1.95 PulmV Sys Earle:    105.56 cm/s  AORTA: Asc Ao Diam 3.26 cm  [id number] Tod Garrett MD Electronically signed on 10/25/2024 at 6:00:02 PM  ** Final **      Echo reviewed, echo images from today reviewed - EF 60-65%.     Radiology:     No orders to display   CT from 2024 reviewed, no coronary calcifications.     ASSESSMENT   1.  Acute myeloid leukemia in remission.  History of stem cell transplant.  Patient is doing well status post stem cell transplant and slowly recovering.  He had transformed from systemic mastocytosis but is following on course.  We continue to follow with oncology.  Cardiotoxic chemotherapy administered and LV function is normal by echo post transplant.  Will continue with conservative medical management.  2.  Benign essential hypertension.  Patient's blood pressures are well-controlled on his current medical regimen would continue the same.  3.  PVC.  The patient has some PVCs but there asymptomatic.  Would make sure potassium and magnesium are repleted and as long as he does not have a high PVC burden I can hold off on any beta-blocker therapy.  4.  Cardiovascular risk factors.  Will check his lipid panel.  Of note the there was no coronary calcifications on a CT scan done last .  Would have a low threshold for checking his CT cardiac score.    Will see the patient in follow-up in a years time.  If he is doing well at that time could transfer his care to his primary care physician or general cardiology.  He can be referred back to cardio oncology yearly at any time if there is any other questions or concerns.      PLAN     They call up and asked for an appointment with aortic valve given  aortic valve disease you told me

## 2025-01-09 LAB
ADENOVIRUS QPCR,PLASMA, VIRC: NOT DETECTED COPIES/ML
TRYPTASE SERPL-MCNC: 71.5 UG/L

## 2025-01-10 ENCOUNTER — INFUSION (OUTPATIENT)
Dept: HEMATOLOGY/ONCOLOGY | Facility: HOSPITAL | Age: 70
End: 2025-01-10
Payer: MEDICARE

## 2025-01-10 ENCOUNTER — OFFICE VISIT (OUTPATIENT)
Dept: HEMATOLOGY/ONCOLOGY | Facility: HOSPITAL | Age: 70
End: 2025-01-10
Payer: MEDICARE

## 2025-01-10 ENCOUNTER — PHARMACY VISIT (OUTPATIENT)
Dept: PHARMACY | Facility: CLINIC | Age: 70
End: 2025-01-10
Payer: COMMERCIAL

## 2025-01-10 VITALS
HEIGHT: 67 IN | DIASTOLIC BLOOD PRESSURE: 71 MMHG | TEMPERATURE: 97.5 F | WEIGHT: 159.17 LBS | RESPIRATION RATE: 18 BRPM | HEART RATE: 70 BPM | OXYGEN SATURATION: 100 % | BODY MASS INDEX: 24.98 KG/M2 | SYSTOLIC BLOOD PRESSURE: 109 MMHG

## 2025-01-10 DIAGNOSIS — D84.9 IMMUNOCOMPROMISED STATE: ICD-10-CM

## 2025-01-10 DIAGNOSIS — E87.6 HYPOKALEMIA: ICD-10-CM

## 2025-01-10 DIAGNOSIS — Z94.84 HISTORY OF STEM CELL TRANSPLANT (MULTI): ICD-10-CM

## 2025-01-10 DIAGNOSIS — C92.01 ACUTE MYELOID LEUKEMIA IN REMISSION (MULTI): ICD-10-CM

## 2025-01-10 DIAGNOSIS — Z94.81 S/P ALLOGENEIC BONE MARROW TRANSPLANT (MULTI): ICD-10-CM

## 2025-01-10 DIAGNOSIS — D47.02 SYSTEMIC MASTOCYTOSIS WITH ASSOCIATED CLONAL HEMATOLOGICAL NON-MAST CELL LINEAGE DISEASE: ICD-10-CM

## 2025-01-10 DIAGNOSIS — C92.01 ACUTE MYELOID LEUKEMIA IN REMISSION (MULTI): Primary | ICD-10-CM

## 2025-01-10 LAB
ACANTHOCYTES BLD QL SMEAR: NORMAL
ALBUMIN SERPL BCP-MCNC: 3.9 G/DL (ref 3.4–5)
ALP SERPL-CCNC: 48 U/L (ref 33–136)
ALT SERPL W P-5'-P-CCNC: 13 U/L (ref 10–52)
ANION GAP SERPL CALC-SCNC: 13 MMOL/L (ref 10–20)
AST SERPL W P-5'-P-CCNC: 14 U/L (ref 9–39)
BASOPHILS # BLD AUTO: 0.06 X10*3/UL (ref 0–0.1)
BASOPHILS NFR BLD AUTO: 1.3 %
BILIRUB SERPL-MCNC: 0.7 MG/DL (ref 0–1.2)
BUN SERPL-MCNC: 16 MG/DL (ref 6–23)
BURR CELLS BLD QL SMEAR: NORMAL
CALCIUM SERPL-MCNC: 8.8 MG/DL (ref 8.6–10.3)
CHLORIDE SERPL-SCNC: 111 MMOL/L (ref 98–107)
CO2 SERPL-SCNC: 24 MMOL/L (ref 21–32)
CREAT SERPL-MCNC: 1.69 MG/DL (ref 0.5–1.3)
DACRYOCYTES BLD QL SMEAR: NORMAL
EGFRCR SERPLBLD CKD-EPI 2021: 44 ML/MIN/1.73M*2
EOSINOPHIL # BLD AUTO: 0.88 X10*3/UL (ref 0–0.7)
EOSINOPHIL NFR BLD AUTO: 19.3 %
ERYTHROCYTE [DISTWIDTH] IN BLOOD BY AUTOMATED COUNT: 23.5 % (ref 11.5–14.5)
GLUCOSE SERPL-MCNC: 102 MG/DL (ref 74–99)
HCT VFR BLD AUTO: 23.2 % (ref 41–52)
HGB BLD-MCNC: 8 G/DL (ref 13.5–17.5)
IMM GRANULOCYTES # BLD AUTO: 0.04 X10*3/UL (ref 0–0.7)
IMM GRANULOCYTES NFR BLD AUTO: 0.9 % (ref 0–0.9)
LDH SERPL L TO P-CCNC: 118 U/L (ref 84–246)
LYMPHOCYTES # BLD AUTO: 0.52 X10*3/UL (ref 1.2–4.8)
LYMPHOCYTES NFR BLD AUTO: 11.4 %
MAGNESIUM SERPL-MCNC: 2.09 MG/DL (ref 1.6–2.4)
MCH RBC QN AUTO: 32.4 PG (ref 26–34)
MCHC RBC AUTO-ENTMCNC: 34.5 G/DL (ref 32–36)
MCV RBC AUTO: 94 FL (ref 80–100)
MONOCYTES # BLD AUTO: 0.66 X10*3/UL (ref 0.1–1)
MONOCYTES NFR BLD AUTO: 14.5 %
NEUTROPHILS # BLD AUTO: 2.4 X10*3/UL (ref 1.2–7.7)
NEUTROPHILS NFR BLD AUTO: 52.6 %
NRBC BLD-RTO: 0 /100 WBCS (ref 0–0)
OVALOCYTES BLD QL SMEAR: NORMAL
PATH REV-IMMUNOHEMATOLOGY-PR30: NORMAL
PLATELET # BLD AUTO: 91 X10*3/UL (ref 150–450)
POLYCHROMASIA BLD QL SMEAR: NORMAL
POTASSIUM SERPL-SCNC: 4.9 MMOL/L (ref 3.5–5.3)
PROT SERPL-MCNC: 5.6 G/DL (ref 6.4–8.2)
RBC # BLD AUTO: 2.47 X10*6/UL (ref 4.5–5.9)
RBC MORPH BLD: NORMAL
SCHISTOCYTES BLD QL SMEAR: NORMAL
SODIUM SERPL-SCNC: 143 MMOL/L (ref 136–145)
TACROLIMUS BLD-MCNC: 7.4 NG/ML
WBC # BLD AUTO: 4.6 X10*3/UL (ref 4.4–11.3)

## 2025-01-10 PROCEDURE — 80197 ASSAY OF TACROLIMUS: CPT

## 2025-01-10 PROCEDURE — 83615 LACTATE (LD) (LDH) ENZYME: CPT

## 2025-01-10 PROCEDURE — 99215 OFFICE O/P EST HI 40 MIN: CPT | Performed by: STUDENT IN AN ORGANIZED HEALTH CARE EDUCATION/TRAINING PROGRAM

## 2025-01-10 PROCEDURE — 2500000004 HC RX 250 GENERAL PHARMACY W/ HCPCS (ALT 636 FOR OP/ED): Performed by: STUDENT IN AN ORGANIZED HEALTH CARE EDUCATION/TRAINING PROGRAM

## 2025-01-10 PROCEDURE — 85025 COMPLETE CBC W/AUTO DIFF WBC: CPT

## 2025-01-10 PROCEDURE — 2500000004 HC RX 250 GENERAL PHARMACY W/ HCPCS (ALT 636 FOR OP/ED): Performed by: INTERNAL MEDICINE

## 2025-01-10 PROCEDURE — 83735 ASSAY OF MAGNESIUM: CPT

## 2025-01-10 PROCEDURE — 99215 OFFICE O/P EST HI 40 MIN: CPT | Mod: 25 | Performed by: STUDENT IN AN ORGANIZED HEALTH CARE EDUCATION/TRAINING PROGRAM

## 2025-01-10 PROCEDURE — 96360 HYDRATION IV INFUSION INIT: CPT | Mod: INF

## 2025-01-10 PROCEDURE — 80053 COMPREHEN METABOLIC PANEL: CPT

## 2025-01-10 RX ORDER — HEPARIN 100 UNIT/ML
500 SYRINGE INTRAVENOUS AS NEEDED
Status: DISCONTINUED | OUTPATIENT
Start: 2025-01-10 | End: 2025-01-10 | Stop reason: HOSPADM

## 2025-01-10 RX ORDER — HEPARIN 100 UNIT/ML
500 SYRINGE INTRAVENOUS AS NEEDED
OUTPATIENT
Start: 2025-01-10

## 2025-01-10 RX ORDER — HEPARIN SODIUM,PORCINE/PF 10 UNIT/ML
50 SYRINGE (ML) INTRAVENOUS AS NEEDED
OUTPATIENT
Start: 2025-01-10

## 2025-01-10 RX ADMIN — SODIUM CHLORIDE 500 ML: 9 INJECTION, SOLUTION INTRAVENOUS at 15:37

## 2025-01-10 RX ADMIN — HEPARIN 500 UNITS: 100 SYRINGE at 16:40

## 2025-01-10 NOTE — PROGRESS NOTES
"    Patient ID: Jin Reynolds is a 69 y.o. male.  Primary Oncologist: Dr. 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   8/23/2023 Initial Diagnosis    DX:  SMOLDERING SYSTEMIC MASTOCYTOSIS  Presented with skin rash and eosinophilia    BRENDON DIAGNOSTIC CRITERIA  (For SM, Need major and 1 minor OR at least 3 minors)  - Multi-focal, dense infiltrates of MC (>= 15 mast cells in aggregates) in BM and/or other extra-cutaneous organs [major]  - In BM or extracutaneous organs, >25% MC spindle shaped OR have atypical morphology OR of all MC on BM aspirate smears, >25% are immature or atypical [minor]  - Detection of activating mutation KIT D816V in BM, PB, or other extracutaneous organ [minor]  - Serum tryptase persistently exceeds 20 ng/mL (unless associated clonal myeloid disorder, in which this paramenter is not valid) [minor]    \"B\" FINDINGS  - BM biopsy showing >30% infiltration (focal, dense aggregates) and/or serum tryptase > 200 ng/mL  - Signs of dysplasia or myeloproliferation, in non-MC lineages but insufficient criteria for definitive diagnosis of AHNMD with normal or slightly abnormal blood counts    \"C\" FINDINGS  None       8/23/2023 Biopsy    BONE MARROW (8/23/23)  Hypercellular (90-95%) with trilineage hematopoiesis, granulocytic hyperplasia, eosinophilia, and increased atypical mast cells  IP:  CD34+, +, CD7(bright)+, cCD3-, CD33-, CD15-, CD13+ blast population  IHC:  + increased spindle-shaped mast cells (15% cellularity), CD2-  Myeloid NGS:  CBL (31%), cKIT D816V (30%), RUNX1 x 2 (19%, 29%), SRSF2 (47%) [ASXL1 negative]  FISH:  PDGFRb negative    SERUM TRYPTASE  228 (8/23/23)  268 (9/5/23)     2/13/2024 -  Molecular Therapy    AVAPRITINIB   - Starting dose 25 mg daily (non-Adv SM)     7/16/2024 - 10/21/2024 Chemotherapy    Venetoclax / AzaCITIDine  - C1D1 7/17/24:  complicated by TLS, venetoclax held " after D1  - Post C1 BM (8/28/24):  hypercellular with >50% blasts, background systemic mastocytosis  - C2D1 9/5/24:  venetoclax restarted with C2, no TLS  - Post C2 BM (9/25/24):  hypocellular (<5%), atypical myeloblasts by IP/IHC (1-2%), persistent systemic mastocytosis  - C3D1 10/15/24:  delayed 1 week for count recovery  - Post C3 BM (10/23/24):  hypercellular with systemic mastocytosis, no increase blasts, 0.2% atypical blasts by IP, NGS w/ CBL (73%), cKIT (4%), RUNX1 x 2 (4%, 38%), SRSF2 (40%)       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    CONDITIONING Fludarabine, melphalan, and TBI   DONOR Matched sibling   MATCH GRADE 12/12   SEX MATCH Mismatched   ABO DONOR O pos   ABO RECIPIENT O pos   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, Mycophenolate, and Tacrolimus   GRAFT SOURCE Peripheral blood          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)   6/27/2024 Initial Diagnosis    ICC 2022 DX: Acute myeloid leukemia, NOS (>=20% blasts)  [id number] AML Risk Stratification: INTERMEDIATE: Cytogenetic and/or molecular abnormalities not classified as favorable or adverse  Presented with pancytopenia and circulating blasts    BONE MARROW (06/27/2024)  Marrow replaced by blasts, fibrotic foci, some atypical + cells; 7-8% circulating blasts; aspicular  - Unable to perform additional studies due to aspicular specimen    PERIPHERAL BLOOD (6/27/2024)  FISH:  positive for gain RUNX1    PERIPHERAL BLOOD (7/1/24)  IP:  abnl myeloblast population (CD34+, CD7+, CD4+, CD13+, CD38+, CD11+ dim, HLA=DR+, TdT+, CD33-)  Myeloid NGS: CBL C404Y (41%), KIT D816V (8%), RUNX1 x2 (8%, 30%), SRSF2 (37%)     7/16/2024 - 10/21/2024 Chemotherapy    Venetoclax / AzaCITIDine  - C1D1 7/17/24:  complicated by TLS, venetoclax held after D1  - Post C1 BM (8/28/24):  hypercellular with >50% blasts, background systemic mastocytosis  - C2D1 9/5/24:  venetoclax restarted with C2, no TLS  - Post C2 BM (9/25/24):  hypocellular (<5%), atypical  myeloblasts by IP/IHC (1-2%), persistent systemic mastocytosis  - C3D1 10/15/24:  delayed 1 week for count recovery  - Post C3 BM (10/23/24):  hypercellular with systemic mastocytosis, no increase blasts, 0.2% atypical blasts by IP, NGS w/ CBL (73%), cKIT (4%), RUNX1 x 2 (4%, 38%), SRSF2 (40%)       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    CONDITIONING Fludarabine, melphalan, and TBI   DONOR Matched sibling   MATCH GRADE 12/12   SEX MATCH Mismatched   ABO DONOR O pos   ABO RECIPIENT O pos   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, Mycophenolate, and Tacrolimus   GRAFT SOURCE Peripheral blood          12/26/2024 -  Infection    Parainfluenza URI     12/26/2024 -  Disease Reevaluation      DATE DAY SOURCE MORPHOLOGY MRD WHOLE CHIMERISM   (% donor) CD3 CHIMERISM   (% donor) CD33 CHIMERISM   (% donor)   12/26/2024 D+30 PB        12/26/2024 D+30 BM No AML, persist BRENDON Neg       D+60 PB         D+100 PB         D+100 BM                  01/10/25 T+ 50 s/p allo SCT and doing well.     Assessment & Plan  History of stem cell transplant (Multi)  Day +50  following allogeneic stem cell transplant.  He does not have evidence of GVHD (last overall grade NAOverall Grade (Przepiorka): 0).  Continue current immunosuppressive therapy.  Awaiting D+30 PB chimerisms.  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)  Counts are stable.  No evidence relapse.  D+30 BM without evidence of AML.  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 Serum tryptase elevated.  Will give consideration of adding avapritinib after D+100 if counts stable.  Immunocompromised state  Remains at high risk for infections.  No current active signs or symptoms of infection.    Continues on prophylaxis with acyclovir and posaconazole.   TMP/SMX held starting 1/10 given hyperkalemia and renal insufficiency.   Ongoing viral monitoring (as below)   Immune reconstitution: Assess at D+100.  Post Tx Immunizations are planned for D+180. Will need scheduled.   Hypokalemia  Now bordering hyperkalemia; discontinue oral potassium replacement.       SUBJECTIVE     Days since transplant: 50     HPI    Jin Reynolds presents today for post transplant follow up, unaccompanied. Doing a little bit better every day. Went over BM results with Dr. Dawson earlier this week. Saw Onco-cards yesterday and will follow up in 1 year now. Excited about the good news. Drinking fluids a little better, eating solid foods now but still small in quantity.     Respiratory symptoms continues improving slowly. No fevers, chills, cough, shortness of breath.     Stools still loose but only 1-2 times per day. Not using imodium.     Currently on tacro 3 mg BID. Held dose this AM. Unfortunately he ran out of posaconazole, high copay so had not taken on Wednesday or Thursday. Last posaconazole dose Tuesday (1/7/25). Will be picking up posaconazole today from pharmacy.     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      OBJECTIVE     BSA: There is no height or weight on file to calculate BSA.  There were no vitals taken for this visit.  Weight    No data found in the last 1 encounters.            Physical Exam  Constitutional:       Appearance: Normal appearance.   HENT:      Mouth/Throat:       "Mouth: Mucous membranes are moist.      Pharynx: Oropharynx is clear.   Eyes:      Conjunctiva/sclera: Conjunctivae normal.      Pupils: Pupils are equal, round, and reactive to light.   Cardiovascular:      Rate and Rhythm: Normal rate and regular rhythm.   Pulmonary:      Effort: Pulmonary effort is normal.      Breath sounds: Normal breath sounds.   Musculoskeletal:         General: Normal range of motion.   Skin:     Findings: No rash.   Neurological:      Mental Status: He is alert. Mental status is at baseline.   Psychiatric:         Mood and Affect: Mood normal.       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 Total Score:      Viral Monitoring:     CMV   Lab Results   Component Value Date    CMVDNAPCR Not Detected 01/07/2025    CMVDNAPCR Not Detected 01/04/2025    CMVDNAPCR Not Detected 12/31/2024      EBV   Lab Results   Component Value Date    EBVDNAPCR Not Detected 12/02/2024    EBVDNAPCR Not Detected 11/25/2024    EBVDNAPCR Not Detected 10/23/2024      Adenovirus   Lab Results   Component Value Date    ADEPB Not Detected 01/07/2025    ADEPB Not Detected 12/31/2024    ADEPB Not Detected 12/24/2024       Toxo   No results found for: \"TGONDPCRBL\"      TMA Monitoring:     MARKERS RECENT 3 VALUES   LDH Lab Results   Component Value Date     01/10/2025     01/07/2025     01/04/2025      Haptoglobin Lab Results   Component Value Date    HAPTOGLOBIN <30 (L) 01/07/2025    HAPTOGLOBIN 38 12/31/2024    HAPTOGLOBIN 33 12/24/2024      BP BP Readings from Last 3 Encounters:   01/10/25 109/71   01/08/25 113/68   01/07/25 107/68      Serum Creatinine Lab Results   Component Value Date    CREATININE 1.69 (H) 01/10/2025    CREATININE 1.55 (H) 01/07/2025    CREATININE 1.37 (H) 01/04/2025      Urine Protein/Creat Ratio Lab Results   Component Value Date "    UTPCR 0.14 01/07/2025    UTPCR 0.39 (H) 12/31/2024    UTPCR 0.16 12/24/2024      Tacrolimus Level Lab Results   Component Value Date    TACROLIMUS 8.7 01/07/2025    TACROLIMUS 7.3 01/04/2025    TACROLIMUS 6.7 01/02/2025            Kalia June PA-C

## 2025-01-10 NOTE — ASSESSMENT & PLAN NOTE
Day +50  following allogeneic stem cell transplant.  He does not have evidence of GVHD (last overall grade NAOverall Grade (Przepiorka): 0).  Continue current immunosuppressive therapy.  Awaiting D+30 PB chimerisms.

## 2025-01-10 NOTE — PROGRESS NOTES
Patient arrives for count check.  Labs above parameter and does not need any blood products today. Dr. Motta ordered fluids which is running as ordered.  Patient tolerated fluids well and discharged in stable condition.

## 2025-01-10 NOTE — ASSESSMENT & PLAN NOTE
Remains at high risk for infections.  No current active signs or symptoms of infection.    Continues on prophylaxis with acyclovir and posaconazole.  TMP/SMX held starting 1/10 given hyperkalemia and renal insufficiency.   Ongoing viral monitoring (as below)   Immune reconstitution: Assess at D+100.  Post Tx Immunizations are planned for D+180. Will need scheduled.

## 2025-01-10 NOTE — ASSESSMENT & PLAN NOTE
Recent BM demonstrates persistent mast cells which may be expected at this point. Serum tryptase elevated.  Will give consideration of adding avapritinib after D+100 if counts stable.

## 2025-01-12 LAB
CMV DNA SERPL NAA+PROBE-LOG IU: NORMAL {LOG_IU}/ML
LABORATORY COMMENT REPORT: NOT DETECTED

## 2025-01-13 ENCOUNTER — OFFICE VISIT (OUTPATIENT)
Dept: HEMATOLOGY/ONCOLOGY | Facility: HOSPITAL | Age: 70
End: 2025-01-13
Payer: MEDICARE

## 2025-01-13 ENCOUNTER — INFUSION (OUTPATIENT)
Dept: HEMATOLOGY/ONCOLOGY | Facility: HOSPITAL | Age: 70
End: 2025-01-13
Payer: MEDICARE

## 2025-01-13 ENCOUNTER — APPOINTMENT (OUTPATIENT)
Dept: HEMATOLOGY/ONCOLOGY | Facility: HOSPITAL | Age: 70
End: 2025-01-13
Payer: MEDICARE

## 2025-01-13 ENCOUNTER — NUTRITION (OUTPATIENT)
Dept: HEMATOLOGY/ONCOLOGY | Facility: HOSPITAL | Age: 70
End: 2025-01-13

## 2025-01-13 VITALS
OXYGEN SATURATION: 100 % | SYSTOLIC BLOOD PRESSURE: 113 MMHG | HEIGHT: 67 IN | BODY MASS INDEX: 24.98 KG/M2 | DIASTOLIC BLOOD PRESSURE: 70 MMHG | WEIGHT: 159.17 LBS | RESPIRATION RATE: 18 BRPM | HEART RATE: 71 BPM | TEMPERATURE: 99 F

## 2025-01-13 VITALS — BODY MASS INDEX: 25.28 KG/M2 | HEIGHT: 67 IN

## 2025-01-13 DIAGNOSIS — C92.01 ACUTE MYELOID LEUKEMIA IN REMISSION (MULTI): ICD-10-CM

## 2025-01-13 DIAGNOSIS — N17.9 AKI (ACUTE KIDNEY INJURY) (CMS-HCC): ICD-10-CM

## 2025-01-13 DIAGNOSIS — Z94.84 HISTORY OF STEM CELL TRANSPLANT (MULTI): ICD-10-CM

## 2025-01-13 DIAGNOSIS — D47.02 SYSTEMIC MASTOCYTOSIS WITH ASSOCIATED CLONAL HEMATOLOGICAL NON-MAST CELL LINEAGE DISEASE: ICD-10-CM

## 2025-01-13 DIAGNOSIS — C92.01 AML (ACUTE MYELOID LEUKEMIA) IN REMISSION (MULTI): ICD-10-CM

## 2025-01-13 DIAGNOSIS — Z94.84 HISTORY OF ALLOGENEIC STEM CELL TRANSPLANT (MULTI): ICD-10-CM

## 2025-01-13 DIAGNOSIS — D84.9 IMMUNOCOMPROMISED STATE: ICD-10-CM

## 2025-01-13 DIAGNOSIS — C92.01 ACUTE MYELOID LEUKEMIA IN REMISSION (MULTI): Primary | ICD-10-CM

## 2025-01-13 DIAGNOSIS — Z94.81 S/P ALLOGENEIC BONE MARROW TRANSPLANT (MULTI): ICD-10-CM

## 2025-01-13 LAB
ACANTHOCYTES BLD QL SMEAR: NORMAL
ALBUMIN SERPL BCP-MCNC: 3.9 G/DL (ref 3.4–5)
ALP SERPL-CCNC: 49 U/L (ref 33–136)
ALT SERPL W P-5'-P-CCNC: 10 U/L (ref 10–52)
ANION GAP SERPL CALC-SCNC: 11 MMOL/L (ref 10–20)
APPEARANCE UR: CLEAR
AST SERPL W P-5'-P-CCNC: 12 U/L (ref 9–39)
BASOPHILS # BLD AUTO: 0.06 X10*3/UL (ref 0–0.1)
BASOPHILS NFR BLD AUTO: 1.3 %
BILIRUB SERPL-MCNC: 0.7 MG/DL (ref 0–1.2)
BILIRUB UR STRIP.AUTO-MCNC: NEGATIVE MG/DL
BUN SERPL-MCNC: 18 MG/DL (ref 6–23)
BURR CELLS BLD QL SMEAR: NORMAL
CALCIUM SERPL-MCNC: 8.3 MG/DL (ref 8.6–10.3)
CHLORIDE SERPL-SCNC: 105 MMOL/L (ref 98–107)
CO2 SERPL-SCNC: 23 MMOL/L (ref 21–32)
COLOR UR: ABNORMAL
CREAT SERPL-MCNC: 1.66 MG/DL (ref 0.5–1.3)
CREAT UR-MCNC: 79.2 MG/DL (ref 20–370)
DACRYOCYTES BLD QL SMEAR: NORMAL
EGFRCR SERPLBLD CKD-EPI 2021: 45 ML/MIN/1.73M*2
EOSINOPHIL # BLD AUTO: 0.58 X10*3/UL (ref 0–0.7)
EOSINOPHIL NFR BLD AUTO: 12.2 %
ERYTHROCYTE [DISTWIDTH] IN BLOOD BY AUTOMATED COUNT: ABNORMAL %
GLUCOSE SERPL-MCNC: 101 MG/DL (ref 74–99)
GLUCOSE UR STRIP.AUTO-MCNC: NORMAL MG/DL
HAPTOGLOB SERPL NEPH-MCNC: 35 MG/DL (ref 30–200)
HCT VFR BLD AUTO: 23.3 % (ref 41–52)
HGB BLD-MCNC: 7.9 G/DL (ref 13.5–17.5)
HYALINE CASTS #/AREA URNS AUTO: ABNORMAL /LPF
HYPOCHROMIA BLD QL SMEAR: NORMAL
IMM GRANULOCYTES # BLD AUTO: 0.06 X10*3/UL (ref 0–0.7)
IMM GRANULOCYTES NFR BLD AUTO: 1.3 % (ref 0–0.9)
KETONES UR STRIP.AUTO-MCNC: NEGATIVE MG/DL
LDH SERPL L TO P-CCNC: 135 U/L (ref 84–246)
LEUKOCYTE ESTERASE UR QL STRIP.AUTO: NEGATIVE
LYMPHOCYTES # BLD AUTO: 0.42 X10*3/UL (ref 1.2–4.8)
LYMPHOCYTES NFR BLD AUTO: 8.8 %
MAGNESIUM SERPL-MCNC: 1.72 MG/DL (ref 1.6–2.4)
MCH RBC QN AUTO: 32.4 PG (ref 26–34)
MCHC RBC AUTO-ENTMCNC: 33.9 G/DL (ref 32–36)
MCV RBC AUTO: 96 FL (ref 80–100)
MONOCYTES # BLD AUTO: 0.71 X10*3/UL (ref 0.1–1)
MONOCYTES NFR BLD AUTO: 14.9 %
MUCOUS THREADS #/AREA URNS AUTO: ABNORMAL /LPF
NEUTROPHILS # BLD AUTO: 2.94 X10*3/UL (ref 1.2–7.7)
NEUTROPHILS NFR BLD AUTO: 61.5 %
NITRITE UR QL STRIP.AUTO: NEGATIVE
NRBC BLD-RTO: 0 /100 WBCS (ref 0–0)
OVALOCYTES BLD QL SMEAR: NORMAL
PH UR STRIP.AUTO: 7 [PH]
PLATELET # BLD AUTO: 59 X10*3/UL (ref 150–450)
POTASSIUM SERPL-SCNC: 4.1 MMOL/L (ref 3.5–5.3)
PROT SERPL-MCNC: 5.4 G/DL (ref 6.4–8.2)
PROT UR STRIP.AUTO-MCNC: NEGATIVE MG/DL
PROT UR-ACNC: 11 MG/DL (ref 5–25)
PROT/CREAT UR: 0.14 MG/MG CREAT (ref 0–0.17)
RBC # BLD AUTO: 2.44 X10*6/UL (ref 4.5–5.9)
RBC # UR STRIP.AUTO: ABNORMAL /UL
RBC #/AREA URNS AUTO: ABNORMAL /HPF
RBC MORPH BLD: NORMAL
SCHISTOCYTES BLD QL SMEAR: NORMAL
SODIUM SERPL-SCNC: 135 MMOL/L (ref 136–145)
SP GR UR STRIP.AUTO: 1.01
UROBILINOGEN UR STRIP.AUTO-MCNC: NORMAL MG/DL
WBC # BLD AUTO: 4.8 X10*3/UL (ref 4.4–11.3)
WBC #/AREA URNS AUTO: ABNORMAL /HPF

## 2025-01-13 PROCEDURE — 83010 ASSAY OF HAPTOGLOBIN QUANT: CPT

## 2025-01-13 PROCEDURE — 2500000004 HC RX 250 GENERAL PHARMACY W/ HCPCS (ALT 636 FOR OP/ED): Performed by: STUDENT IN AN ORGANIZED HEALTH CARE EDUCATION/TRAINING PROGRAM

## 2025-01-13 PROCEDURE — 85025 COMPLETE CBC W/AUTO DIFF WBC: CPT

## 2025-01-13 PROCEDURE — 99215 OFFICE O/P EST HI 40 MIN: CPT | Performed by: STUDENT IN AN ORGANIZED HEALTH CARE EDUCATION/TRAINING PROGRAM

## 2025-01-13 PROCEDURE — 80197 ASSAY OF TACROLIMUS: CPT

## 2025-01-13 PROCEDURE — 80053 COMPREHEN METABOLIC PANEL: CPT

## 2025-01-13 PROCEDURE — 96360 HYDRATION IV INFUSION INIT: CPT | Mod: INF

## 2025-01-13 PROCEDURE — 82570 ASSAY OF URINE CREATININE: CPT

## 2025-01-13 PROCEDURE — 83615 LACTATE (LD) (LDH) ENZYME: CPT

## 2025-01-13 PROCEDURE — 81001 URINALYSIS AUTO W/SCOPE: CPT

## 2025-01-13 PROCEDURE — 87799 DETECT AGENT NOS DNA QUANT: CPT

## 2025-01-13 PROCEDURE — 83735 ASSAY OF MAGNESIUM: CPT

## 2025-01-13 PROCEDURE — 2500000004 HC RX 250 GENERAL PHARMACY W/ HCPCS (ALT 636 FOR OP/ED): Performed by: INTERNAL MEDICINE

## 2025-01-13 RX ORDER — HEPARIN 100 UNIT/ML
500 SYRINGE INTRAVENOUS AS NEEDED
Status: DISCONTINUED | OUTPATIENT
Start: 2025-01-13 | End: 2025-01-13 | Stop reason: HOSPADM

## 2025-01-13 RX ORDER — HEPARIN SODIUM,PORCINE/PF 10 UNIT/ML
50 SYRINGE (ML) INTRAVENOUS AS NEEDED
Status: CANCELLED | OUTPATIENT
Start: 2025-01-13

## 2025-01-13 RX ORDER — HEPARIN 100 UNIT/ML
500 SYRINGE INTRAVENOUS AS NEEDED
Status: CANCELLED | OUTPATIENT
Start: 2025-01-13

## 2025-01-13 RX ADMIN — SODIUM CHLORIDE 1000 ML: 9 INJECTION, SOLUTION INTRAVENOUS at 16:39

## 2025-01-13 RX ADMIN — HEPARIN 500 UNITS: 100 SYRINGE at 17:42

## 2025-01-13 NOTE — PROGRESS NOTES
NUTRITION FOLLOW UP NOTE    Reason for Visit:  Jin Reynolds is a 69 y.o. 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    Transplant c/b by mucositis, CINV and diarrhea     Currently T+53    Pt seen today in infusion; found to have respiratory parainfluenza on 12/26.    Lab Results   Component Value Date/Time    GLUCOSE 102 (H) 01/10/2025 1445     01/10/2025 1445    K 4.9 01/10/2025 1445     (H) 01/10/2025 1445    CO2 24 01/10/2025 1445    ANIONGAP 13 01/10/2025 1445    BUN 16 01/10/2025 1445    CREATININE 1.69 (H) 01/10/2025 1445    EGFR 44 (L) 01/10/2025 1445    CALCIUM 8.8 01/10/2025 1445    ALBUMIN 3.9 01/10/2025 1445    ALKPHOS 48 01/10/2025 1445    PROT 5.6 (L) 01/10/2025 1445    AST 14 01/10/2025 1445    BILITOT 0.7 01/10/2025 1445    ALT 13 01/10/2025 1445    MG 2.09 01/10/2025 1445    PHOS 2.3 (L) 12/07/2024 0608         Lab Results   Component Value Date/Time    VITD25 27 (L) 02/05/2024 0807   Taking Vit D 2000IU daily    Lab Results   Component Value Date    WBC 4.6 01/10/2025    HGB 8.0 (L) 01/10/2025    HCT 23.2 (L) 01/10/2025    MCV 94 01/10/2025    PLT 91 (L) 01/10/2025       Anthropometrics:       *Wt at transplant admit: (11/15) 86.1 kg  *Wt at first post-transplant visit: (12/9) 80.7 kg     *Wt up slightly over the past week but still with wt loss of 4# since having influenza     Wt Readings from Last 10 Encounters:   01/13/25 72.2 kg (159 lb 2.8 oz)   01/10/25 72.2 kg (159 lb 2.8 oz)   01/08/25 72.5 kg (159 lb 13.3 oz)   01/07/25 72.5 kg (159 lb 13.3 oz)   01/07/25 72.5 kg (159 lb 13.3 oz)   01/04/25 71 kg (156 lb 8.4 oz)   01/02/25 72 kg (158 lb 12.8 oz)   12/31/24 71.8 kg (158 lb 4.6 oz)   12/31/24 71.8 kg (158 lb 4.6 oz)   12/28/24 74.3 kg (163 lb 12.8 oz)   11/15/24        86.1 kg--at time of transplant  10/31/24        87.5 kg   10/18/24        84.0 kg  09/20/24        86.8 kg  08/29/24        89.2 kg  07/30/24        95.9 kg   06/21/24        87.5 kg         Food  And Nutrient Intake:      Eating coming around a little bit   Nausea this am but didn't take anything  Had normal BM (Formed stools)  3 protein shakes and fruit, soup work best (chunky veg soup tastes almost normal)  Trying to eat more solid foods; has to be juicy or have a sauce   Wants to try potato soup next since potatoes in veg soup have been tasting good to him.   Likes Ensure--drinking 3 per day   Takes meds with various drinks  Drinking water well--2-3 bottles per day   Portions still ~1/2 of baseline  Tastes are a little better  Food doesn't taste like he knows it should  Nasal congestion--using NyQuil   Has been holding Bactrim due to kidney function  Slight improvement in energy levels     Pt hasn't had anything to eat as of 4:30pm.      Can make smoothies     Last night had 1/2 chicken (marinated in italian dressing), rice       -----------------------  Feels weak today  Started eating solid foods--wings and potato wedges, fruit   Tastes are a little better; still < half   Eating smaller portions; early satiety   Getting hungry in past few days; craves everything.  No nausea; foods staying down  Still coughing a little bit   Still eating soup everyday  Drinking Ensure 3x/day  Otherwise drinking water and grape pop   Takes meds with water--rec Ensure   Stools still watery; didn't go in a day   Energy levels come and go; doesn't think he can walk to [facility] to get his prescriptions today  Still napping during the day     Hasn't eaten anything yet today       Rec 3 bottles of water as goal       ---------------  No breakfast yet  Gets hungry--loses interest when eat   Food doesn't taste like he knows it should   Caught respiratory flu last week; to start Nyquil and Mucinex   Feels congested   Still with taste changes   Can do starches in a soup  Vegetable soups   Apple pie didn't taste good   Didn't eat well on Xmas   Still struggling with meat  Does well with fruit  Vit d milkshake   Ensure TID   Take  ensure with meds  No nausea  Energy levels still low; worse since got the flu last week   Gagging mucous up; dry heaves  Drinking well--mainly water and Ensure   Stools loose for last few days--2-3 times a day    -------------  Pt seen earlier this week and reported having vomiting with solid foods; had not been taking any anti-emetics.   Told to take Zofran 30 min before meals TID; he was also told he could use Compazine between Zofran if needed.   Since doing so, pt has not had further vomiting; stomach feels a little better and he has been able to keep solid foods down.  Fluid intakes good--has had no issues tolerating.     Has been eating lighter foods in the past few days; still leery of trying meats.   Was able to eat and keep down egg sandwich last night.   Has also been eating bone broth with rice and crax added as well as yogurt.   Now drinking Ensure Original daily.  Tastes remain barrier to eating; did not tolerate trial of lemon hard candies (associated with vomiting).    Still not feeling great, overall. No significant improvements.   Energy remains low; walks slow.     *Pt noted to have been eating better and tolerating PO intakes better after initial hospital discharge.  Intakes have actually digressed in week-10 days.     Ensure Original:  provides 240 kcals and 9 gm protein each                                                           Nutrition Focused Physical Exam Findings:                          Energy Needs           Nutrition Diagnosis        Pt remains mildly malnourished as appetite/intakes have declined since initial visit.  N/V has improved with consistent use of antiemetics.  Dysgeusia also contributing to decreased oral intakes.         Continues to benefit from use of ONS daily.        Nutrition Interventions/Recommendations   Nutrition Prescription   High Protein, High Calorie  Food Safety          Nutrition Education   Try Ensure Plus vs Ensure Original for additional kcals; for  now, plans to increase Ensure Original to TID.  Also suggested taking meds with Ensure and making Ensure into milkshakes.   Spoke with NP, will try Mirtazapine 15 mg to help with appetite/sleep   Eating smaller, more frequent snacks vs meals at this time.  Encourage PO fluid intakes; prefer calorie containing beverages at this time vs water due to decreased intakes.  Discussed increased fluid intakes to help decrease mucous production.   If upper GI symptoms persist, consider start of Budesonide    Coordination of Care   BMT team        Nutrition Monitoring and Evaluation      Will continue to f/up and monitor weight, labs, PO intakes, taste changes and GI symptoms PRN.

## 2025-01-13 NOTE — PROGRESS NOTES
"    Patient ID: Jin Reynolds is a 69 y.o. male.  Primary Oncologist: Dr. 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   8/23/2023 Initial Diagnosis    DX:  SMOLDERING SYSTEMIC MASTOCYTOSIS  Presented with skin rash and eosinophilia    BRENDON DIAGNOSTIC CRITERIA  (For SM, Need major and 1 minor OR at least 3 minors)  - Multi-focal, dense infiltrates of MC (>= 15 mast cells in aggregates) in BM and/or other extra-cutaneous organs [major]  - In BM or extracutaneous organs, >25% MC spindle shaped OR have atypical morphology OR of all MC on BM aspirate smears, >25% are immature or atypical [minor]  - Detection of activating mutation KIT D816V in BM, PB, or other extracutaneous organ [minor]  - Serum tryptase persistently exceeds 20 ng/mL (unless associated clonal myeloid disorder, in which this paramenter is not valid) [minor]    \"B\" FINDINGS  - BM biopsy showing >30% infiltration (focal, dense aggregates) and/or serum tryptase > 200 ng/mL  - Signs of dysplasia or myeloproliferation, in non-MC lineages but insufficient criteria for definitive diagnosis of AHNMD with normal or slightly abnormal blood counts    \"C\" FINDINGS  None       8/23/2023 Biopsy    BONE MARROW (8/23/23)  Hypercellular (90-95%) with trilineage hematopoiesis, granulocytic hyperplasia, eosinophilia, and increased atypical mast cells  IP:  CD34+, +, CD7(bright)+, cCD3-, CD33-, CD15-, CD13+ blast population  IHC:  + increased spindle-shaped mast cells (15% cellularity), CD2-  Myeloid NGS:  CBL (31%), cKIT D816V (30%), RUNX1 x 2 (19%, 29%), SRSF2 (47%) [ASXL1 negative]  FISH:  PDGFRb negative    SERUM TRYPTASE  228 (8/23/23)  268 (9/5/23)     2/13/2024 -  Molecular Therapy    AVAPRITINIB   - Starting dose 25 mg daily (non-Adv SM)     7/16/2024 - 10/21/2024 Chemotherapy    Venetoclax / AzaCITIDine  - C1D1 7/17/24:  complicated by TLS, venetoclax held " after D1  - Post C1 BM (8/28/24):  hypercellular with >50% blasts, background systemic mastocytosis  - C2D1 9/5/24:  venetoclax restarted with C2, no TLS  - Post C2 BM (9/25/24):  hypocellular (<5%), atypical myeloblasts by IP/IHC (1-2%), persistent systemic mastocytosis  - C3D1 10/15/24:  delayed 1 week for count recovery  - Post C3 BM (10/23/24):  hypercellular with systemic mastocytosis, no increase blasts, 0.2% atypical blasts by IP, NGS w/ CBL (73%), cKIT (4%), RUNX1 x 2 (4%, 38%), SRSF2 (40%)       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    CONDITIONING Fludarabine, melphalan, and TBI   DONOR Matched sibling   MATCH GRADE 12/12   SEX MATCH Mismatched   ABO DONOR O pos   ABO RECIPIENT O pos   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, Mycophenolate, and Tacrolimus   GRAFT SOURCE Peripheral blood          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)   6/27/2024 Initial Diagnosis    ICC 2022 DX: Acute myeloid leukemia, NOS (>=20% blasts)  [id number] AML Risk Stratification: INTERMEDIATE: Cytogenetic and/or molecular abnormalities not classified as favorable or adverse  Presented with pancytopenia and circulating blasts    BONE MARROW (06/27/2024)  Marrow replaced by blasts, fibrotic foci, some atypical + cells; 7-8% circulating blasts; aspicular  - Unable to perform additional studies due to aspicular specimen    PERIPHERAL BLOOD (6/27/2024)  FISH:  positive for gain RUNX1    PERIPHERAL BLOOD (7/1/24)  IP:  abnl myeloblast population (CD34+, CD7+, CD4+, CD13+, CD38+, CD11+ dim, HLA=DR+, TdT+, CD33-)  Myeloid NGS: CBL C404Y (41%), KIT D816V (8%), RUNX1 x2 (8%, 30%), SRSF2 (37%)     7/16/2024 - 10/21/2024 Chemotherapy    Venetoclax / AzaCITIDine  - C1D1 7/17/24:  complicated by TLS, venetoclax held after D1  - Post C1 BM (8/28/24):  hypercellular with >50% blasts, background systemic mastocytosis  - C2D1 9/5/24:  venetoclax restarted with C2, no TLS  - Post C2 BM (9/25/24):  hypocellular (<5%), atypical  myeloblasts by IP/IHC (1-2%), persistent systemic mastocytosis  - C3D1 10/15/24:  delayed 1 week for count recovery  - Post C3 BM (10/23/24):  hypercellular with systemic mastocytosis, no increase blasts, 0.2% atypical blasts by IP, NGS w/ CBL (73%), cKIT (4%), RUNX1 x 2 (4%, 38%), SRSF2 (40%)       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    CONDITIONING Fludarabine, melphalan, and TBI   DONOR Matched sibling   MATCH GRADE 12/12   SEX MATCH Mismatched   ABO DONOR O pos   ABO RECIPIENT O pos   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, Mycophenolate, and Tacrolimus   GRAFT SOURCE Peripheral blood          12/26/2024 -  Infection    Parainfluenza URI     12/26/2024 -  Disease Reevaluation      DATE DAY SOURCE MORPHOLOGY MRD WHOLE CHIMERISM   (% donor) CD3 CHIMERISM   (% donor) CD33 CHIMERISM   (% donor)   12/26/2024 D+30 PB        12/26/2024 D+30 BM No AML, persist BRENDON Neg       D+60 PB         D+100 PB         D+100 BM                  01/13/25 T+ 53 s/p allo SCT and doing well.     Assessment & Plan  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)  Counts are stable.  No evidence relapse.  D+30 BM without evidence of AML.  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 Doing a little bit better every day. Went over BM results with Dr. Dawson earlier this week. Saw Onco-cards yesterday and will follow up in 1 year now. Excited about the good news. Drinking fluids a little better, eating solid foods now but still small in quantity.     Respiratory symptoms continues improving slowly. No fevers, chills, cough, shortness of breath.     Stools  still loose but only 1-2 times per day. Not using imodium.     Currently on tacro 3 mg BID. Held dose this AM. Unfortunately he ran out of posaconazole, high copay so had not taken on Wednesday or Thursday. Last posaconazole dose Tuesday (1/7/25). Will be picking up posaconazole today from pharmacy.     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      OBJECTIVE     BSA: There is no height or weight on file to calculate BSA.  There were no vitals taken for this visit.  Weight    No data found in the last 1 encounters.            Physical Exam  Constitutional:       Appearance: Normal appearance.   HENT:      Mouth/Throat:      Mouth: Mucous membranes are moist.      Pharynx: Oropharynx is clear.   Eyes:      Conjunctiva/sclera: Conjunctivae normal.      Pupils: Pupils are equal, round, and reactive to light.   Cardiovascular:      Rate and Rhythm: Normal rate and regular rhythm.   Pulmonary:      Effort: Pulmonary effort is normal.      Breath sounds: Normal breath sounds.   Musculoskeletal:         General: Normal range of motion.   Skin:     Findings: No rash.   Neurological:      Mental Status: He is alert. Mental status is at baseline.   Psychiatric:         Mood and Affect: Mood normal.       Performance Status:  Karnofsky Score: 70 - Cares for self; unable to carry on normal activity or do normal work       POST TRANSPLANT MONITORING   GVHD  Acute GVHD Overall Grade:    Chronic GVHD Total Score:      Viral Monitoring:  "    CMV   Lab Results   Component Value Date    CMVDNAPCR Not Detected 01/10/2025    CMVDNAPCR Not Detected 01/07/2025    CMVDNAPCR Not Detected 01/04/2025      EBV   Lab Results   Component Value Date    EBVDNAPCR Not Detected 12/02/2024    EBVDNAPCR Not Detected 11/25/2024    EBVDNAPCR Not Detected 10/23/2024      Adenovirus   Lab Results   Component Value Date    ADEPB Not Detected 01/07/2025    ADEPB Not Detected 12/31/2024    ADEPB Not Detected 12/24/2024       Toxo   No results found for: \"TGONDPCRBL\"      TMA Monitoring:     MARKERS RECENT 3 VALUES   LDH Lab Results   Component Value Date     01/10/2025     01/07/2025     01/04/2025      Haptoglobin Lab Results   Component Value Date    HAPTOGLOBIN <30 (L) 01/07/2025    HAPTOGLOBIN 38 12/31/2024    HAPTOGLOBIN 33 12/24/2024      BP BP Readings from Last 3 Encounters:   01/13/25 113/70   01/10/25 109/71   01/08/25 113/68      Serum Creatinine Lab Results   Component Value Date    CREATININE 1.69 (H) 01/10/2025    CREATININE 1.55 (H) 01/07/2025    CREATININE 1.37 (H) 01/04/2025      Urine Protein/Creat Ratio Lab Results   Component Value Date    UTPCR 0.14 01/07/2025    UTPCR 0.39 (H) 12/31/2024    UTPCR 0.16 12/24/2024      Tacrolimus Level Lab Results   Component Value Date    TACROLIMUS 7.4 01/10/2025    TACROLIMUS 8.7 01/07/2025    TACROLIMUS 7.3 01/04/2025            Kalia June PA-C            "

## 2025-01-13 NOTE — PROGRESS NOTES
Pt arrived to [facility] infusion for count check and NP visit. Kalia June NP at bedside. Creatinine 1.66, 1 L NS ordered. No other infusions needed. Pt dc home in safe condition and aware of future appointments.

## 2025-01-13 NOTE — PROGRESS NOTES
"    Patient ID: Jin Reynolds is a 69 y.o. male.  Primary Oncologist: Dr. 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   8/23/2023 Initial Diagnosis    DX:  SMOLDERING SYSTEMIC MASTOCYTOSIS  Presented with skin rash and eosinophilia    BRENDON DIAGNOSTIC CRITERIA  (For SM, Need major and 1 minor OR at least 3 minors)  - Multi-focal, dense infiltrates of MC (>= 15 mast cells in aggregates) in BM and/or other extra-cutaneous organs [major]  - In BM or extracutaneous organs, >25% MC spindle shaped OR have atypical morphology OR of all MC on BM aspirate smears, >25% are immature or atypical [minor]  - Detection of activating mutation KIT D816V in BM, PB, or other extracutaneous organ [minor]  - Serum tryptase persistently exceeds 20 ng/mL (unless associated clonal myeloid disorder, in which this paramenter is not valid) [minor]    \"B\" FINDINGS  - BM biopsy showing >30% infiltration (focal, dense aggregates) and/or serum tryptase > 200 ng/mL  - Signs of dysplasia or myeloproliferation, in non-MC lineages but insufficient criteria for definitive diagnosis of AHNMD with normal or slightly abnormal blood counts    \"C\" FINDINGS  None       8/23/2023 Biopsy    BONE MARROW (8/23/23)  Hypercellular (90-95%) with trilineage hematopoiesis, granulocytic hyperplasia, eosinophilia, and increased atypical mast cells  IP:  CD34+, +, CD7(bright)+, cCD3-, CD33-, CD15-, CD13+ blast population  IHC:  + increased spindle-shaped mast cells (15% cellularity), CD2-  Myeloid NGS:  CBL (31%), cKIT D816V (30%), RUNX1 x 2 (19%, 29%), SRSF2 (47%) [ASXL1 negative]  FISH:  PDGFRb negative    SERUM TRYPTASE  228 (8/23/23)  268 (9/5/23)     2/13/2024 -  Molecular Therapy    AVAPRITINIB   - Starting dose 25 mg daily (non-Adv SM)     7/16/2024 - 10/21/2024 Chemotherapy    Venetoclax / AzaCITIDine  - C1D1 7/17/24:  complicated by TLS, venetoclax held " after D1  - Post C1 BM (8/28/24):  hypercellular with >50% blasts, background systemic mastocytosis  - C2D1 9/5/24:  venetoclax restarted with C2, no TLS  - Post C2 BM (9/25/24):  hypocellular (<5%), atypical myeloblasts by IP/IHC (1-2%), persistent systemic mastocytosis  - C3D1 10/15/24:  delayed 1 week for count recovery  - Post C3 BM (10/23/24):  hypercellular with systemic mastocytosis, no increase blasts, 0.2% atypical blasts by IP, NGS w/ CBL (73%), cKIT (4%), RUNX1 x 2 (4%, 38%), SRSF2 (40%)       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    CONDITIONING Fludarabine, melphalan, and TBI   DONOR Matched sibling   MATCH GRADE 12/12   SEX MATCH Mismatched   ABO DONOR O pos   ABO RECIPIENT O pos   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, Mycophenolate, and Tacrolimus   GRAFT SOURCE Peripheral blood          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)   6/27/2024 Initial Diagnosis    ICC 2022 DX: Acute myeloid leukemia, NOS (>=20% blasts)  [id number] AML Risk Stratification: INTERMEDIATE: Cytogenetic and/or molecular abnormalities not classified as favorable or adverse  Presented with pancytopenia and circulating blasts    BONE MARROW (06/27/2024)  Marrow replaced by blasts, fibrotic foci, some atypical + cells; 7-8% circulating blasts; aspicular  - Unable to perform additional studies due to aspicular specimen    PERIPHERAL BLOOD (6/27/2024)  FISH:  positive for gain RUNX1    PERIPHERAL BLOOD (7/1/24)  IP:  abnl myeloblast population (CD34+, CD7+, CD4+, CD13+, CD38+, CD11+ dim, HLA=DR+, TdT+, CD33-)  Myeloid NGS: CBL C404Y (41%), KIT D816V (8%), RUNX1 x2 (8%, 30%), SRSF2 (37%)     7/16/2024 - 10/21/2024 Chemotherapy    Venetoclax / AzaCITIDine  - C1D1 7/17/24:  complicated by TLS, venetoclax held after D1  - Post C1 BM (8/28/24):  hypercellular with >50% blasts, background systemic mastocytosis  - C2D1 9/5/24:  venetoclax restarted with C2, no TLS  - Post C2 BM (9/25/24):  hypocellular (<5%), atypical  myeloblasts by IP/IHC (1-2%), persistent systemic mastocytosis  - C3D1 10/15/24:  delayed 1 week for count recovery  - Post C3 BM (10/23/24):  hypercellular with systemic mastocytosis, no increase blasts, 0.2% atypical blasts by IP, NGS w/ CBL (73%), cKIT (4%), RUNX1 x 2 (4%, 38%), SRSF2 (40%)       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    CONDITIONING Fludarabine, melphalan, and TBI   DONOR Matched sibling   MATCH GRADE 12/12   SEX MATCH Mismatched   ABO DONOR O pos   ABO RECIPIENT O pos   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, Mycophenolate, and Tacrolimus   GRAFT SOURCE Peripheral blood          12/26/2024 -  Infection    Parainfluenza URI     12/26/2024 -  Disease Reevaluation      DATE DAY SOURCE MORPHOLOGY MRD WHOLE CHIMERISM   (% donor) CD3 CHIMERISM   (% donor) CD33 CHIMERISM   (% donor)   12/26/2024 D+30 PB        12/26/2024 D+30 BM No AML, persist BRENDON Neg       D+60 PB         D+100 PB         D+100 BM                  01/13/25 T+ 53 s/p allo SCT and doing well.     Assessment & Plan  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)  Counts are stable.  No evidence relapse.  D+30 BM without evidence of AML.  Current evidence of disease: No  Maintenance Therapy: No  MRD Monitoring Plan: AML MFC    History of stem cell transplant (Multi)  Day +53  following allogeneic stem cell transplant.  He does not have evidence of GVHD (last overall grade NA ).  Continue current immunosuppressive therapy.  Awaiting D+30 PB chimerisms.  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 Serum tryptase elevated.  Will give consideration of adding avapritinib after D+100 if counts stable.  Immunocompromised state  Remains at high risk for infections.  No current active signs or symptoms of infection.    Continues on prophylaxis with acyclovir and posaconazole.  TMP/SMX held starting 1/10  given hyperkalemia and renal insufficiency.   Ongoing viral monitoring (as below)   Immune reconstitution: Assess at D+100.  Post Tx Immunizations are planned for D+180. Will need scheduled.   PERRY (acute kidney injury) (CMS-HCC)  Remains stable.       SUBJECTIVE     Days since transplant: 53     HPI    Jin Reynolds presents today for post transplant follow up, unaccompanied. Doing a little bit better every day. Went over BM results with Dr. Dawson earlier this week. Saw Onco-cards yesterday and will follow up in 1 year now. Excited about the good news. Drinking fluids a little better, eating solid foods now but still small in quantity.     Respiratory symptoms continues improving slowly. No fevers, chills, cough, shortness of breath.     Stools still loose but only 1-2 times per day. Not using imodium.     Currently on tacro 3 mg BID. Held dose this AM. Unfortunately he ran out of posaconazole, high copay so had not taken on Wednesday or Thursday. Last posaconazole dose Tuesday (1/7/25). Will be picking up posaconazole today from pharmacy.     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      OBJECTIVE     BSA: There is no height or weight on file to calculate BSA.  There were no vitals taken for this visit.  Weight    No data found in the last 1 encounters.            Physical Exam  Constitutional:       Appearance: Normal appearance.   HENT:      Mouth/Throat:      Mouth: Mucous membranes are moist.      Pharynx: Oropharynx  "is clear.   Eyes:      Conjunctiva/sclera: Conjunctivae normal.      Pupils: Pupils are equal, round, and reactive to light.   Cardiovascular:      Rate and Rhythm: Normal rate and regular rhythm.   Pulmonary:      Effort: Pulmonary effort is normal.      Breath sounds: Normal breath sounds.   Musculoskeletal:         General: Normal range of motion.   Skin:     Findings: No rash.   Neurological:      Mental Status: He is alert. Mental status is at baseline.   Psychiatric:         Mood and Affect: Mood normal.     Performance Status:  Karnofsky Score: 70 - Cares for self; unable to carry on normal activity or do normal work       POST TRANSPLANT MONITORING   GVHD  Acute GVHD Overall Grade:    Chronic GVHD Total Score:      Viral Monitoring:     CMV   Lab Results   Component Value Date    CMVDNAPCR Not Detected 01/10/2025    CMVDNAPCR Not Detected 01/07/2025    CMVDNAPCR Not Detected 01/04/2025      EBV   Lab Results   Component Value Date    EBVDNAPCR Not Detected 12/02/2024    EBVDNAPCR Not Detected 11/25/2024    EBVDNAPCR Not Detected 10/23/2024      Adenovirus   Lab Results   Component Value Date    ADEPB Not Detected 01/07/2025    ADEPB Not Detected 12/31/2024    ADEPB Not Detected 12/24/2024       Toxo   No results found for: \"TGONDPCRBL\"      TMA Monitoring:     MARKERS RECENT 3 VALUES   LDH Lab Results   Component Value Date     01/13/2025     01/10/2025     01/07/2025      Haptoglobin Lab Results   Component Value Date    HAPTOGLOBIN <30 (L) 01/07/2025    HAPTOGLOBIN 38 12/31/2024    HAPTOGLOBIN 33 12/24/2024      BP BP Readings from Last 3 Encounters:   01/13/25 113/70   01/10/25 109/71   01/08/25 113/68      Serum Creatinine Lab Results   Component Value Date    CREATININE 1.66 (H) 01/13/2025    CREATININE 1.69 (H) 01/10/2025    CREATININE 1.55 (H) 01/07/2025      Urine Protein/Creat Ratio Lab Results   Component Value Date    UTPCR 0.14 01/07/2025    UTPCR 0.39 (H) 12/31/2024    UTPCR " 0.16 12/24/2024      Tacrolimus Level Lab Results   Component Value Date    TACROLIMUS 7.4 01/10/2025    TACROLIMUS 8.7 01/07/2025    TACROLIMUS 7.3 01/04/2025            Kalia June PA-C

## 2025-01-13 NOTE — ASSESSMENT & PLAN NOTE
Day +53  following allogeneic stem cell transplant.  He does not have evidence of GVHD (last overall grade NA ).  Continue current immunosuppressive therapy.  Awaiting D+30 PB chimerisms.

## 2025-01-14 ENCOUNTER — APPOINTMENT (OUTPATIENT)
Dept: HEMATOLOGY/ONCOLOGY | Facility: HOSPITAL | Age: 70
End: 2025-01-14
Payer: MEDICARE

## 2025-01-14 ENCOUNTER — TELEPHONE (OUTPATIENT)
Dept: HEMATOLOGY/ONCOLOGY | Facility: HOSPITAL | Age: 70
End: 2025-01-14
Payer: MEDICARE

## 2025-01-14 DIAGNOSIS — Z94.84 HISTORY OF STEM CELL TRANSPLANT (MULTI): ICD-10-CM

## 2025-01-14 DIAGNOSIS — C92.01 ACUTE MYELOID LEUKEMIA IN REMISSION (MULTI): Primary | ICD-10-CM

## 2025-01-14 DIAGNOSIS — D84.9 IMMUNOCOMPROMISED STATE: ICD-10-CM

## 2025-01-14 DIAGNOSIS — D47.02 SYSTEMIC MASTOCYTOSIS WITH ASSOCIATED CLONAL HEMATOLOGICAL NON-MAST CELL LINEAGE DISEASE: ICD-10-CM

## 2025-01-14 DIAGNOSIS — N17.9 AKI (ACUTE KIDNEY INJURY) (CMS-HCC): ICD-10-CM

## 2025-01-14 LAB
CMV DNA SERPL NAA+PROBE-LOG IU: NORMAL {LOG_IU}/ML
EBV DNA BLD NAA+PROBE-LOG IU: NORMAL {LOG_IU}/ML
LABORATORY COMMENT REPORT: NOT DETECTED
LABORATORY COMMENT REPORT: NOT DETECTED
TACROLIMUS BLD-MCNC: 8.5 NG/ML

## 2025-01-14 RX ORDER — TACROLIMUS 1 MG/1
2 CAPSULE ORAL EVERY 12 HOURS
Qty: 180 CAPSULE | Refills: 1 | Status: SHIPPED | OUTPATIENT
Start: 2025-01-14

## 2025-01-14 NOTE — TELEPHONE ENCOUNTER
TACROLIMUS DOSE RECOMMENDATION NOTE   Jin Reynolds is a 69 y.o. male currently day +54 following Matched Related Donor (MRD) allogeneic stem cell transplant for a diagnosis of AML. Patient on tacrolimus for GVHD prophylaxis with level resulting today. Pharmacist was consulted to provide tacrolimus dose recommendations.     Objective   Creatinine   Date Value Ref Range Status   01/13/2025 1.66 (H) 0.50 - 1.30 mg/dL Final   01/10/2025 1.69 (H) 0.50 - 1.30 mg/dL Final   01/07/2025 1.55 (H) 0.50 - 1.30 mg/dL Final     Bilirubin, Total   Date Value Ref Range Status   01/13/2025 0.7 0.0 - 1.2 mg/dL Final   01/10/2025 0.7 0.0 - 1.2 mg/dL Final   01/07/2025 0.8 0.0 - 1.2 mg/dL Final     Tacrolimus    Date Value Ref Range Status   01/13/2025 8.5 <=15.0 ng/mL Final   01/10/2025 7.4 <=15.0 ng/mL Final   01/07/2025 8.7 <=15.0 ng/mL Final       Assessment  Current tacrolimus dose: 3 mg every 12 hours   Goal tacrolimus level: 5-10 ng/mL    Plan   The patient's tacrolimus level today was 8.5 @ 1601 with an elevated Scr which is Supratherapeutic. I recommended a 33% dose Decrease: tacrolimus 2 mg every 12 hours starting 1/14 AM. I spoke with the patient/caregiver via phone and instructed them to adjust their tacrolimus dose. Recommendation accepted, continue to monitor tacrolimus levels to assess adjustment. Patient will go to local lab on 1/17 AM for tacrolimus trough level.     All questions were answered. Patient/family verbalized understanding of the plan of care and information provided. Will follow as necessary. Time spent with patient/family and/or coordinating care: 20 minutes.      Liz Cedeno, PharmD, BCOP  Ambulatory Stem Cell Transplant Transplant Pharmacist    Current Outpatient Medications   Medication Instructions    acyclovir (ZOVIRAX) 400 mg, oral, Every 12 hours    amLODIPine (NORVASC) 10 mg, oral, Daily    cholecalciferol (VITAMIN D-3) 2,000 Units, oral, Daily    famotidine (PEPCID) 20 mg, oral, Daily     fexofenadine (ALLEGRA) 180 mg, Daily    magnesium, amino acid chelate, 133 mg tablet 266 mg, oral, 3 times daily, Or as instructed on your medication list.    mirtazapine (REMERON) 15 mg, oral, Nightly    ondansetron (ZOFRAN) 8 mg, oral, Every 8 hours PRN    posaconazole (NOXAFIL) 300 mg, oral, Daily, Do not crush, chew, or split. This is to prevent fungal infections.    prochlorperazine (COMPAZINE) 10 mg, oral, Every 6 hours PRN    sulfamethoxazole-trimethoprim (Bactrim DS) 800-160 mg tablet 1 tablet, oral, 3 times weekly, Take on Mondays, Wednesdays, and Fridays. Do not start until instructed.    tacrolimus (PROGRAF) 2 mg, oral, Every 12 hours, Or as instructed on your medication list.    ursodiol (ACTIGALL) 300 mg, oral, 3 times daily

## 2025-01-15 LAB — ADENOVIRUS QPCR,PLASMA, VIRC: NOT DETECTED COPIES/ML

## 2025-01-17 ENCOUNTER — INFUSION (OUTPATIENT)
Dept: HEMATOLOGY/ONCOLOGY | Facility: HOSPITAL | Age: 70
End: 2025-01-17
Payer: MEDICARE

## 2025-01-17 ENCOUNTER — OFFICE VISIT (OUTPATIENT)
Dept: HEMATOLOGY/ONCOLOGY | Facility: HOSPITAL | Age: 70
End: 2025-01-17
Payer: MEDICARE

## 2025-01-17 VITALS
RESPIRATION RATE: 18 BRPM | SYSTOLIC BLOOD PRESSURE: 114 MMHG | TEMPERATURE: 97.5 F | DIASTOLIC BLOOD PRESSURE: 69 MMHG | HEIGHT: 67 IN | OXYGEN SATURATION: 100 % | HEART RATE: 59 BPM | BODY MASS INDEX: 26.06 KG/M2 | WEIGHT: 166.01 LBS

## 2025-01-17 DIAGNOSIS — D84.9 IMMUNOCOMPROMISED STATE: ICD-10-CM

## 2025-01-17 DIAGNOSIS — E83.42 HYPOMAGNESEMIA: ICD-10-CM

## 2025-01-17 DIAGNOSIS — Z94.84 HISTORY OF STEM CELL TRANSPLANT (MULTI): ICD-10-CM

## 2025-01-17 DIAGNOSIS — D47.02 SYSTEMIC MASTOCYTOSIS WITH ASSOCIATED CLONAL HEMATOLOGICAL NON-MAST CELL LINEAGE DISEASE: ICD-10-CM

## 2025-01-17 DIAGNOSIS — C92.01 ACUTE MYELOID LEUKEMIA IN REMISSION (MULTI): ICD-10-CM

## 2025-01-17 DIAGNOSIS — Z94.81 S/P ALLOGENEIC BONE MARROW TRANSPLANT (MULTI): ICD-10-CM

## 2025-01-17 DIAGNOSIS — N17.9 AKI (ACUTE KIDNEY INJURY) (CMS-HCC): ICD-10-CM

## 2025-01-17 DIAGNOSIS — C92.01 ACUTE MYELOID LEUKEMIA IN REMISSION (MULTI): Primary | ICD-10-CM

## 2025-01-17 LAB
ACANTHOCYTES BLD QL SMEAR: NORMAL
ALBUMIN SERPL BCP-MCNC: 3.8 G/DL (ref 3.4–5)
ALP SERPL-CCNC: 49 U/L (ref 33–136)
ALT SERPL W P-5'-P-CCNC: 9 U/L (ref 10–52)
ANION GAP SERPL CALC-SCNC: 11 MMOL/L (ref 10–20)
AST SERPL W P-5'-P-CCNC: 12 U/L (ref 9–39)
BASOPHILS # BLD AUTO: 0.04 X10*3/UL (ref 0–0.1)
BASOPHILS NFR BLD AUTO: 1.2 %
BILIRUB SERPL-MCNC: 0.6 MG/DL (ref 0–1.2)
BUN SERPL-MCNC: 22 MG/DL (ref 6–23)
BURR CELLS BLD QL SMEAR: NORMAL
CALCIUM SERPL-MCNC: 8.5 MG/DL (ref 8.6–10.3)
CHLORIDE SERPL-SCNC: 104 MMOL/L (ref 98–107)
CO2 SERPL-SCNC: 24 MMOL/L (ref 21–32)
CREAT SERPL-MCNC: 1.43 MG/DL (ref 0.5–1.3)
DACRYOCYTES BLD QL SMEAR: NORMAL
EGFRCR SERPLBLD CKD-EPI 2021: 53 ML/MIN/1.73M*2
EOSINOPHIL # BLD AUTO: 0.41 X10*3/UL (ref 0–0.7)
EOSINOPHIL NFR BLD AUTO: 12.6 %
ERYTHROCYTE [DISTWIDTH] IN BLOOD BY AUTOMATED COUNT: 22.3 % (ref 11.5–14.5)
GLUCOSE SERPL-MCNC: 89 MG/DL (ref 74–99)
HCT VFR BLD AUTO: 22.9 % (ref 41–52)
HGB BLD-MCNC: 7.7 G/DL (ref 13.5–17.5)
HYPOCHROMIA BLD QL SMEAR: NORMAL
IMM GRANULOCYTES # BLD AUTO: 0.03 X10*3/UL (ref 0–0.7)
IMM GRANULOCYTES NFR BLD AUTO: 0.9 % (ref 0–0.9)
LDH SERPL L TO P-CCNC: 124 U/L (ref 84–246)
LYMPHOCYTES # BLD AUTO: 0.43 X10*3/UL (ref 1.2–4.8)
LYMPHOCYTES NFR BLD AUTO: 13.2 %
MAGNESIUM SERPL-MCNC: 1.74 MG/DL (ref 1.6–2.4)
MCH RBC QN AUTO: 33.3 PG (ref 26–34)
MCHC RBC AUTO-ENTMCNC: 33.6 G/DL (ref 32–36)
MCV RBC AUTO: 99 FL (ref 80–100)
MONOCYTES # BLD AUTO: 0.46 X10*3/UL (ref 0.1–1)
MONOCYTES NFR BLD AUTO: 14.1 %
NEUTROPHILS # BLD AUTO: 1.89 X10*3/UL (ref 1.2–7.7)
NEUTROPHILS NFR BLD AUTO: 58 %
NRBC BLD-RTO: 0 /100 WBCS (ref 0–0)
OVALOCYTES BLD QL SMEAR: NORMAL
PLATELET # BLD AUTO: 57 X10*3/UL (ref 150–450)
POTASSIUM SERPL-SCNC: 4.2 MMOL/L (ref 3.5–5.3)
PROT SERPL-MCNC: 5.5 G/DL (ref 6.4–8.2)
RBC # BLD AUTO: 2.31 X10*6/UL (ref 4.5–5.9)
RBC MORPH BLD: NORMAL
SCHISTOCYTES BLD QL SMEAR: NORMAL
SODIUM SERPL-SCNC: 135 MMOL/L (ref 136–145)
TACROLIMUS BLD-MCNC: 4.3 NG/ML
WBC # BLD AUTO: 3.3 X10*3/UL (ref 4.4–11.3)

## 2025-01-17 PROCEDURE — 99215 OFFICE O/P EST HI 40 MIN: CPT | Performed by: STUDENT IN AN ORGANIZED HEALTH CARE EDUCATION/TRAINING PROGRAM

## 2025-01-17 PROCEDURE — 83735 ASSAY OF MAGNESIUM: CPT

## 2025-01-17 PROCEDURE — 80197 ASSAY OF TACROLIMUS: CPT

## 2025-01-17 PROCEDURE — 36591 DRAW BLOOD OFF VENOUS DEVICE: CPT

## 2025-01-17 PROCEDURE — 2500000004 HC RX 250 GENERAL PHARMACY W/ HCPCS (ALT 636 FOR OP/ED): Performed by: INTERNAL MEDICINE

## 2025-01-17 PROCEDURE — 85025 COMPLETE CBC W/AUTO DIFF WBC: CPT

## 2025-01-17 PROCEDURE — 83615 LACTATE (LD) (LDH) ENZYME: CPT

## 2025-01-17 PROCEDURE — 84075 ASSAY ALKALINE PHOSPHATASE: CPT

## 2025-01-17 RX ORDER — HEPARIN 100 UNIT/ML
500 SYRINGE INTRAVENOUS AS NEEDED
Status: DISCONTINUED | OUTPATIENT
Start: 2025-01-17 | End: 2025-01-17 | Stop reason: HOSPADM

## 2025-01-17 RX ORDER — HEPARIN 100 UNIT/ML
500 SYRINGE INTRAVENOUS AS NEEDED
OUTPATIENT
Start: 2025-01-17

## 2025-01-17 RX ORDER — HEPARIN SODIUM,PORCINE/PF 10 UNIT/ML
50 SYRINGE (ML) INTRAVENOUS AS NEEDED
OUTPATIENT
Start: 2025-01-17

## 2025-01-17 RX ADMIN — HEPARIN 500 UNITS: 100 SYRINGE at 15:26

## 2025-01-17 NOTE — PROGRESS NOTES
Patient arrived ambulatory to infusion for scheduled tx of count check. Saw Kalia SALEH at bedside. Denies any new or worsening sx. Patient did not need any replacements today. Discharged in stable condition.

## 2025-01-17 NOTE — PROGRESS NOTES
"    Patient ID: Jin Reynolds is a 69 y.o. male.  Primary Oncologist: Dr. 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   8/23/2023 Initial Diagnosis    DX:  SMOLDERING SYSTEMIC MASTOCYTOSIS  Presented with skin rash and eosinophilia    BRENDON DIAGNOSTIC CRITERIA  (For SM, Need major and 1 minor OR at least 3 minors)  - Multi-focal, dense infiltrates of MC (>= 15 mast cells in aggregates) in BM and/or other extra-cutaneous organs [major]  - In BM or extracutaneous organs, >25% MC spindle shaped OR have atypical morphology OR of all MC on BM aspirate smears, >25% are immature or atypical [minor]  - Detection of activating mutation KIT D816V in BM, PB, or other extracutaneous organ [minor]  - Serum tryptase persistently exceeds 20 ng/mL (unless associated clonal myeloid disorder, in which this paramenter is not valid) [minor]    \"B\" FINDINGS  - BM biopsy showing >30% infiltration (focal, dense aggregates) and/or serum tryptase > 200 ng/mL  - Signs of dysplasia or myeloproliferation, in non-MC lineages but insufficient criteria for definitive diagnosis of AHNMD with normal or slightly abnormal blood counts    \"C\" FINDINGS  None       8/23/2023 Biopsy    BONE MARROW (8/23/23)  Hypercellular (90-95%) with trilineage hematopoiesis, granulocytic hyperplasia, eosinophilia, and increased atypical mast cells  IP:  CD34+, +, CD7(bright)+, cCD3-, CD33-, CD15-, CD13+ blast population  IHC:  + increased spindle-shaped mast cells (15% cellularity), CD2-  Myeloid NGS:  CBL (31%), cKIT D816V (30%), RUNX1 x 2 (19%, 29%), SRSF2 (47%) [ASXL1 negative]  FISH:  PDGFRb negative    SERUM TRYPTASE  228 (8/23/23)  268 (9/5/23)     2/13/2024 -  Molecular Therapy    AVAPRITINIB   - Starting dose 25 mg daily (non-Adv SM)     7/16/2024 - 10/21/2024 Chemotherapy    Venetoclax / AzaCITIDine  - C1D1 7/17/24:  complicated by TLS, venetoclax held " after D1  - Post C1 BM (8/28/24):  hypercellular with >50% blasts, background systemic mastocytosis  - C2D1 9/5/24:  venetoclax restarted with C2, no TLS  - Post C2 BM (9/25/24):  hypocellular (<5%), atypical myeloblasts by IP/IHC (1-2%), persistent systemic mastocytosis  - C3D1 10/15/24:  delayed 1 week for count recovery  - Post C3 BM (10/23/24):  hypercellular with systemic mastocytosis, no increase blasts, 0.2% atypical blasts by IP, NGS w/ CBL (73%), cKIT (4%), RUNX1 x 2 (4%, 38%), SRSF2 (40%)       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    CONDITIONING Fludarabine, melphalan, and TBI   DONOR Matched sibling   MATCH GRADE 12/12   SEX MATCH Mismatched   ABO DONOR O pos   ABO RECIPIENT O pos   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, Mycophenolate, and Tacrolimus   GRAFT SOURCE Peripheral blood          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)   6/27/2024 Initial Diagnosis    ICC 2022 DX: Acute myeloid leukemia, NOS (>=20% blasts)  [id number] AML Risk Stratification: INTERMEDIATE: Cytogenetic and/or molecular abnormalities not classified as favorable or adverse  Presented with pancytopenia and circulating blasts    BONE MARROW (06/27/2024)  Marrow replaced by blasts, fibrotic foci, some atypical + cells; 7-8% circulating blasts; aspicular  - Unable to perform additional studies due to aspicular specimen    PERIPHERAL BLOOD (6/27/2024)  FISH:  positive for gain RUNX1    PERIPHERAL BLOOD (7/1/24)  IP:  abnl myeloblast population (CD34+, CD7+, CD4+, CD13+, CD38+, CD11+ dim, HLA=DR+, TdT+, CD33-)  Myeloid NGS: CBL C404Y (41%), KIT D816V (8%), RUNX1 x2 (8%, 30%), SRSF2 (37%)     7/16/2024 - 10/21/2024 Chemotherapy    Venetoclax / AzaCITIDine  - C1D1 7/17/24:  complicated by TLS, venetoclax held after D1  - Post C1 BM (8/28/24):  hypercellular with >50% blasts, background systemic mastocytosis  - C2D1 9/5/24:  venetoclax restarted with C2, no TLS  - Post C2 BM (9/25/24):  hypocellular (<5%), atypical  myeloblasts by IP/IHC (1-2%), persistent systemic mastocytosis  - C3D1 10/15/24:  delayed 1 week for count recovery  - Post C3 BM (10/23/24):  hypercellular with systemic mastocytosis, no increase blasts, 0.2% atypical blasts by IP, NGS w/ CBL (73%), cKIT (4%), RUNX1 x 2 (4%, 38%), SRSF2 (40%)       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    CONDITIONING Fludarabine, melphalan, and TBI   DONOR Matched sibling   MATCH GRADE 12/12   SEX MATCH Mismatched   ABO DONOR O pos   ABO RECIPIENT O pos   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, Mycophenolate, and Tacrolimus   GRAFT SOURCE Peripheral blood          12/26/2024 -  Infection    Parainfluenza URI     12/26/2024 -  Disease Reevaluation      DATE DAY SOURCE MORPHOLOGY MRD WHOLE CHIMERISM   (% donor) CD3 CHIMERISM   (% donor) CD33 CHIMERISM   (% donor)   12/26/2024 D+30 PB        12/26/2024 D+30 BM No AML, persist BRENDON Neg       D+60 PB         D+100 PB         D+100 BM                  01/17/25 T+ 57 s/p allo SCT.     Assessment & Plan  History of stem cell transplant (Multi)  Day +57  following allogeneic stem cell transplant.  He does not have evidence of GVHD (last overall grade NA ).  Continue current immunosuppressive therapy.  Awaiting D+30 PB chimerisms.  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)  Counts are declining.  No evidence relapse.  D+30 BM without evidence of AML.  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  No current active signs or symptoms of infection.    Continues on prophylaxis with acyclovir and posaconazole.  TMP/SMX held starting 1/10 given hyperkalemia and renal insufficiency.   Ongoing viral monitoring (as below)   Immune reconstitution: Assess at D+100.  Post Tx Immunizations are planned for D+180. Will need scheduled.    Hypomagnesemia  Continue Mag/Amino Acid Chelate 133 mg tablets       SUBJECTIVE     Days since transplant: 57     HPI    Jin Reynolds presents today for post transplant follow up, unaccompanied. Doing a little bit better every day. Went over BM results with Dr. Dawson earlier this week. Saw Onco-cards yesterday and will follow up in 1 year now. Excited about the good news. Drinking fluids a little better, eating solid foods now but still small in quantity.     Respiratory symptoms continues improving slowly. No fevers, chills, cough, shortness of breath.     Stools still loose but only 1-2 times per day. Not using imodium.     Currently on tacro 3 mg BID. Held dose this AM. Unfortunately he ran out of posaconazole, high copay so had not taken on Wednesday or Thursday. Last posaconazole dose Tuesday (1/7/25). Will be picking up posaconazole today from pharmacy.     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      OBJECTIVE     BSA: There is no height or weight on file to calculate BSA.  There were no vitals taken for this visit.  Weight    No data found in the last 1 encounters.            Physical Exam  Constitutional:       Appearance: Normal appearance.   HENT:      Mouth/Throat:      Mouth: Mucous membranes are moist.      Pharynx: Oropharynx is clear.   Eyes:      Conjunctiva/sclera: Conjunctivae normal.      Pupils: Pupils are equal, round, and reactive to light.   Cardiovascular:      Rate and Rhythm: Normal  "rate and regular rhythm.   Pulmonary:      Effort: Pulmonary effort is normal.      Breath sounds: Normal breath sounds.   Musculoskeletal:         General: Normal range of motion.   Skin:     Findings: No rash.   Neurological:      Mental Status: He is alert. Mental status is at baseline.   Psychiatric:         Mood and Affect: Mood normal.     Performance Status:  Karnofsky Score: 70 - Cares for self; unable to carry on normal activity or do normal work       POST TRANSPLANT MONITORING   GVHD  Acute GVHD Overall Grade:    Chronic GVHD Total Score:      Viral Monitoring:     CMV   Lab Results   Component Value Date    CMVDNAPCR Not Detected 01/13/2025    CMVDNAPCR Not Detected 01/10/2025    CMVDNAPCR Not Detected 01/07/2025      EBV   Lab Results   Component Value Date    EBVDNAPCR Not Detected 12/02/2024    EBVDNAPCR Not Detected 11/25/2024    EBVDNAPCR Not Detected 10/23/2024      Adenovirus   Lab Results   Component Value Date    ADEPB Not Detected 01/13/2025    ADEPB Not Detected 01/07/2025    ADEPB Not Detected 12/31/2024       Toxo   No results found for: \"TGONDPCRBL\"      TMA Monitoring:     MARKERS RECENT 3 VALUES   LDH Lab Results   Component Value Date     01/13/2025     01/10/2025     01/07/2025      Haptoglobin Lab Results   Component Value Date    HAPTOGLOBIN 35 01/13/2025    HAPTOGLOBIN <30 (L) 01/07/2025    HAPTOGLOBIN 38 12/31/2024      BP BP Readings from Last 3 Encounters:   01/13/25 113/70   01/10/25 109/71   01/08/25 113/68      Serum Creatinine Lab Results   Component Value Date    CREATININE 1.66 (H) 01/13/2025    CREATININE 1.69 (H) 01/10/2025    CREATININE 1.55 (H) 01/07/2025      Urine Protein/Creat Ratio Lab Results   Component Value Date    UTPCR 0.14 01/13/2025    UTPCR 0.14 01/07/2025    UTPCR 0.39 (H) 12/31/2024      Tacrolimus Level Lab Results   Component Value Date    TACROLIMUS 8.5 01/13/2025    TACROLIMUS 7.4 01/10/2025    TACROLIMUS 8.7 01/07/2025    "         Kalia June PA-C

## 2025-01-17 NOTE — ASSESSMENT & PLAN NOTE
Day +57  following allogeneic stem cell transplant.  He does not have evidence of GVHD (last overall grade NA ).  Continue current immunosuppressive therapy.  Awaiting D+30 PB chimerisms.

## 2025-01-17 NOTE — ASSESSMENT & PLAN NOTE
Counts are declining.  No evidence relapse.  D+30 BM without evidence of AML.  Current evidence of disease: No  Maintenance Therapy: No  MRD Monitoring Plan: AML MFC

## 2025-01-19 LAB
CMV DNA SERPL NAA+PROBE-LOG IU: NORMAL {LOG_IU}/ML
LABORATORY COMMENT REPORT: NOT DETECTED

## 2025-01-20 ENCOUNTER — NUTRITION (OUTPATIENT)
Dept: HEMATOLOGY/ONCOLOGY | Facility: HOSPITAL | Age: 70
End: 2025-01-20

## 2025-01-20 ENCOUNTER — OFFICE VISIT (OUTPATIENT)
Dept: HEMATOLOGY/ONCOLOGY | Facility: HOSPITAL | Age: 70
End: 2025-01-20
Payer: MEDICARE

## 2025-01-20 ENCOUNTER — APPOINTMENT (OUTPATIENT)
Dept: HEMATOLOGY/ONCOLOGY | Facility: HOSPITAL | Age: 70
End: 2025-01-20
Payer: MEDICARE

## 2025-01-20 ENCOUNTER — INFUSION (OUTPATIENT)
Dept: HEMATOLOGY/ONCOLOGY | Facility: HOSPITAL | Age: 70
End: 2025-01-20
Payer: MEDICARE

## 2025-01-20 VITALS — HEIGHT: 67 IN | WEIGHT: 167.55 LBS | BODY MASS INDEX: 26.3 KG/M2

## 2025-01-20 VITALS
WEIGHT: 167.5 LBS | RESPIRATION RATE: 17 BRPM | DIASTOLIC BLOOD PRESSURE: 78 MMHG | SYSTOLIC BLOOD PRESSURE: 109 MMHG | BODY MASS INDEX: 26.57 KG/M2 | TEMPERATURE: 96.8 F | HEART RATE: 65 BPM | OXYGEN SATURATION: 93 %

## 2025-01-20 DIAGNOSIS — Z94.81 S/P ALLOGENEIC BONE MARROW TRANSPLANT (MULTI): ICD-10-CM

## 2025-01-20 DIAGNOSIS — D47.02 SYSTEMIC MASTOCYTOSIS WITH ASSOCIATED CLONAL HEMATOLOGICAL NON-MAST CELL LINEAGE DISEASE: ICD-10-CM

## 2025-01-20 DIAGNOSIS — C92.01 ACUTE MYELOID LEUKEMIA IN REMISSION (MULTI): Primary | ICD-10-CM

## 2025-01-20 DIAGNOSIS — C92.01 ACUTE MYELOID LEUKEMIA IN REMISSION (MULTI): ICD-10-CM

## 2025-01-20 DIAGNOSIS — Z94.84 HISTORY OF STEM CELL TRANSPLANT (MULTI): ICD-10-CM

## 2025-01-20 DIAGNOSIS — D84.9 IMMUNOCOMPROMISED STATE: ICD-10-CM

## 2025-01-20 DIAGNOSIS — D69.3 IDIOPATHIC THROMBOCYTOPENIC PURPURA (MULTI): ICD-10-CM

## 2025-01-20 DIAGNOSIS — Z94.84 HISTORY OF ALLOGENEIC STEM CELL TRANSPLANT (MULTI): ICD-10-CM

## 2025-01-20 LAB
ACANTHOCYTES BLD QL SMEAR: NORMAL
ALBUMIN SERPL BCP-MCNC: 3.7 G/DL (ref 3.4–5)
ALP SERPL-CCNC: 47 U/L (ref 33–136)
ALT SERPL W P-5'-P-CCNC: 10 U/L (ref 10–52)
ANION GAP SERPL CALC-SCNC: 10 MMOL/L (ref 10–20)
APPEARANCE UR: CLEAR
AST SERPL W P-5'-P-CCNC: 12 U/L (ref 9–39)
BASOPHILS # BLD AUTO: 0.02 X10*3/UL (ref 0–0.1)
BASOPHILS NFR BLD AUTO: 1.1 %
BILIRUB SERPL-MCNC: 0.6 MG/DL (ref 0–1.2)
BILIRUB UR STRIP.AUTO-MCNC: NEGATIVE MG/DL
BUN SERPL-MCNC: 17 MG/DL (ref 6–23)
BURR CELLS BLD QL SMEAR: NORMAL
CALCIUM SERPL-MCNC: 8.2 MG/DL (ref 8.6–10.3)
CHLORIDE SERPL-SCNC: 106 MMOL/L (ref 98–107)
CO2 SERPL-SCNC: 24 MMOL/L (ref 21–32)
COLOR UR: NORMAL
CREAT SERPL-MCNC: 1.27 MG/DL (ref 0.5–1.3)
CREAT UR-MCNC: 93 MG/DL (ref 20–370)
DACRYOCYTES BLD QL SMEAR: NORMAL
EGFRCR SERPLBLD CKD-EPI 2021: 62 ML/MIN/1.73M*2
EOSINOPHIL # BLD AUTO: 0.23 X10*3/UL (ref 0–0.7)
EOSINOPHIL NFR BLD AUTO: 13.1 %
ERYTHROCYTE [DISTWIDTH] IN BLOOD BY AUTOMATED COUNT: 21.1 % (ref 11.5–14.5)
GLUCOSE SERPL-MCNC: 95 MG/DL (ref 74–99)
GLUCOSE UR STRIP.AUTO-MCNC: NORMAL MG/DL
HAPTOGLOB SERPL NEPH-MCNC: 48 MG/DL (ref 30–200)
HCT VFR BLD AUTO: 23.2 % (ref 41–52)
HGB BLD-MCNC: 7.9 G/DL (ref 13.5–17.5)
IGG SERPL-MCNC: 719 MG/DL (ref 700–1600)
IMM GRANULOCYTES # BLD AUTO: 0.05 X10*3/UL (ref 0–0.7)
IMM GRANULOCYTES NFR BLD AUTO: 2.8 % (ref 0–0.9)
KETONES UR STRIP.AUTO-MCNC: NEGATIVE MG/DL
LDH SERPL L TO P-CCNC: 125 U/L (ref 84–246)
LEUKOCYTE ESTERASE UR QL STRIP.AUTO: NEGATIVE
LYMPHOCYTES # BLD AUTO: 0.3 X10*3/UL (ref 1.2–4.8)
LYMPHOCYTES NFR BLD AUTO: 17 %
MAGNESIUM SERPL-MCNC: 1.78 MG/DL (ref 1.6–2.4)
MCH RBC QN AUTO: 33.9 PG (ref 26–34)
MCHC RBC AUTO-ENTMCNC: 34.1 G/DL (ref 32–36)
MCV RBC AUTO: 100 FL (ref 80–100)
MONOCYTES # BLD AUTO: 0.27 X10*3/UL (ref 0.1–1)
MONOCYTES NFR BLD AUTO: 15.3 %
NEUTROPHILS # BLD AUTO: 0.89 X10*3/UL (ref 1.2–7.7)
NEUTROPHILS NFR BLD AUTO: 50.7 %
NITRITE UR QL STRIP.AUTO: NEGATIVE
NRBC BLD-RTO: 0 /100 WBCS (ref 0–0)
OVALOCYTES BLD QL SMEAR: NORMAL
PH UR STRIP.AUTO: 6.5 [PH]
PLATELET # BLD AUTO: 50 X10*3/UL (ref 150–450)
POTASSIUM SERPL-SCNC: 4.1 MMOL/L (ref 3.5–5.3)
PROT SERPL-MCNC: 5.4 G/DL (ref 6.4–8.2)
PROT UR STRIP.AUTO-MCNC: NEGATIVE MG/DL
PROT UR-ACNC: 12 MG/DL (ref 5–25)
PROT/CREAT UR: 0.13 MG/MG CREAT (ref 0–0.17)
RBC # BLD AUTO: 2.33 X10*6/UL (ref 4.5–5.9)
RBC # UR STRIP.AUTO: NEGATIVE /UL
RBC MORPH BLD: NORMAL
SCHISTOCYTES BLD QL SMEAR: NORMAL
SODIUM SERPL-SCNC: 136 MMOL/L (ref 136–145)
SP GR UR STRIP.AUTO: 1.01
TACROLIMUS BLD-MCNC: 4.1 NG/ML
UROBILINOGEN UR STRIP.AUTO-MCNC: NORMAL MG/DL
WBC # BLD AUTO: 1.8 X10*3/UL (ref 4.4–11.3)

## 2025-01-20 PROCEDURE — 81003 URINALYSIS AUTO W/O SCOPE: CPT

## 2025-01-20 PROCEDURE — 36591 DRAW BLOOD OFF VENOUS DEVICE: CPT

## 2025-01-20 PROCEDURE — 80053 COMPREHEN METABOLIC PANEL: CPT

## 2025-01-20 PROCEDURE — 99215 OFFICE O/P EST HI 40 MIN: CPT

## 2025-01-20 PROCEDURE — 83010 ASSAY OF HAPTOGLOBIN QUANT: CPT

## 2025-01-20 PROCEDURE — 82784 ASSAY IGA/IGD/IGG/IGM EACH: CPT

## 2025-01-20 PROCEDURE — 1160F RVW MEDS BY RX/DR IN RCRD: CPT

## 2025-01-20 PROCEDURE — 84156 ASSAY OF PROTEIN URINE: CPT

## 2025-01-20 PROCEDURE — 83735 ASSAY OF MAGNESIUM: CPT

## 2025-01-20 PROCEDURE — 3078F DIAST BP <80 MM HG: CPT

## 2025-01-20 PROCEDURE — 1126F AMNT PAIN NOTED NONE PRSNT: CPT

## 2025-01-20 PROCEDURE — 87799 DETECT AGENT NOS DNA QUANT: CPT

## 2025-01-20 PROCEDURE — 80197 ASSAY OF TACROLIMUS: CPT

## 2025-01-20 PROCEDURE — 2500000004 HC RX 250 GENERAL PHARMACY W/ HCPCS (ALT 636 FOR OP/ED): Performed by: INTERNAL MEDICINE

## 2025-01-20 PROCEDURE — 1159F MED LIST DOCD IN RCRD: CPT

## 2025-01-20 PROCEDURE — 85025 COMPLETE CBC W/AUTO DIFF WBC: CPT

## 2025-01-20 PROCEDURE — 3074F SYST BP LT 130 MM HG: CPT

## 2025-01-20 PROCEDURE — 83615 LACTATE (LD) (LDH) ENZYME: CPT

## 2025-01-20 RX ORDER — HEPARIN SODIUM,PORCINE/PF 10 UNIT/ML
50 SYRINGE (ML) INTRAVENOUS AS NEEDED
Status: CANCELLED | OUTPATIENT
Start: 2025-01-20

## 2025-01-20 RX ORDER — DIPHENHYDRAMINE HYDROCHLORIDE 50 MG/ML
50 INJECTION INTRAMUSCULAR; INTRAVENOUS AS NEEDED
OUTPATIENT
Start: 2025-01-21

## 2025-01-20 RX ORDER — ALBUTEROL SULFATE 0.83 MG/ML
3 SOLUTION RESPIRATORY (INHALATION) AS NEEDED
OUTPATIENT
Start: 2025-01-21

## 2025-01-20 RX ORDER — EPINEPHRINE 0.3 MG/.3ML
0.3 INJECTION SUBCUTANEOUS EVERY 5 MIN PRN
OUTPATIENT
Start: 2025-01-21

## 2025-01-20 RX ORDER — HEPARIN 100 UNIT/ML
500 SYRINGE INTRAVENOUS AS NEEDED
Status: DISCONTINUED | OUTPATIENT
Start: 2025-01-20 | End: 2025-01-20 | Stop reason: HOSPADM

## 2025-01-20 RX ORDER — FAMOTIDINE 10 MG/ML
20 INJECTION INTRAVENOUS ONCE AS NEEDED
OUTPATIENT
Start: 2025-01-21

## 2025-01-20 RX ORDER — HEPARIN 100 UNIT/ML
500 SYRINGE INTRAVENOUS AS NEEDED
Status: CANCELLED | OUTPATIENT
Start: 2025-01-20

## 2025-01-20 RX ADMIN — HEPARIN 500 UNITS: 100 SYRINGE at 15:53

## 2025-01-20 ASSESSMENT — PAIN SCALES - GENERAL: PAINLEVEL_OUTOF10: 0-NO PAIN

## 2025-01-20 NOTE — PROGRESS NOTES
Patient arrives to infusion for his count check. Labs were good, no treatment needed. He was seen by ERICK Delong. His port was de accessed without any issue. All questions were answered.

## 2025-01-20 NOTE — PROGRESS NOTES
"    Patient ID: Jin Reynolds is a 69 y.o. male.  Primary Oncologist: Dr. 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   8/23/2023 Initial Diagnosis    DX:  SMOLDERING SYSTEMIC MASTOCYTOSIS  Presented with skin rash and eosinophilia    BRENDON DIAGNOSTIC CRITERIA  (For SM, Need major and 1 minor OR at least 3 minors)  - Multi-focal, dense infiltrates of MC (>= 15 mast cells in aggregates) in BM and/or other extra-cutaneous organs [major]  - In BM or extracutaneous organs, >25% MC spindle shaped OR have atypical morphology OR of all MC on BM aspirate smears, >25% are immature or atypical [minor]  - Detection of activating mutation KIT D816V in BM, PB, or other extracutaneous organ [minor]  - Serum tryptase persistently exceeds 20 ng/mL (unless associated clonal myeloid disorder, in which this paramenter is not valid) [minor]    \"B\" FINDINGS  - BM biopsy showing >30% infiltration (focal, dense aggregates) and/or serum tryptase > 200 ng/mL  - Signs of dysplasia or myeloproliferation, in non-MC lineages but insufficient criteria for definitive diagnosis of AHNMD with normal or slightly abnormal blood counts    \"C\" FINDINGS  None       8/23/2023 Biopsy    BONE MARROW (8/23/23)  Hypercellular (90-95%) with trilineage hematopoiesis, granulocytic hyperplasia, eosinophilia, and increased atypical mast cells  IP:  CD34+, +, CD7(bright)+, cCD3-, CD33-, CD15-, CD13+ blast population  IHC:  + increased spindle-shaped mast cells (15% cellularity), CD2-  Myeloid NGS:  CBL (31%), cKIT D816V (30%), RUNX1 x 2 (19%, 29%), SRSF2 (47%) [ASXL1 negative]  FISH:  PDGFRb negative    SERUM TRYPTASE  228 (8/23/23)  268 (9/5/23)     2/13/2024 -  Molecular Therapy    AVAPRITINIB   - Starting dose 25 mg daily (non-Adv SM)     7/16/2024 - 10/21/2024 Chemotherapy    Venetoclax / AzaCITIDine  - C1D1 7/17/24:  complicated by TLS, venetoclax held " after D1  - Post C1 BM (8/28/24):  hypercellular with >50% blasts, background systemic mastocytosis  - C2D1 9/5/24:  venetoclax restarted with C2, no TLS  - Post C2 BM (9/25/24):  hypocellular (<5%), atypical myeloblasts by IP/IHC (1-2%), persistent systemic mastocytosis  - C3D1 10/15/24:  delayed 1 week for count recovery  - Post C3 BM (10/23/24):  hypercellular with systemic mastocytosis, no increase blasts, 0.2% atypical blasts by IP, NGS w/ CBL (73%), cKIT (4%), RUNX1 x 2 (4%, 38%), SRSF2 (40%)       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    CONDITIONING Fludarabine, melphalan, and TBI   DONOR Matched sibling   MATCH GRADE 12/12   SEX MATCH Mismatched   ABO DONOR O pos   ABO RECIPIENT O pos   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, Mycophenolate, and Tacrolimus   GRAFT SOURCE Peripheral blood          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)   6/27/2024 Initial Diagnosis    ICC 2022 DX: Acute myeloid leukemia, NOS (>=20% blasts)  [id number] AML Risk Stratification: INTERMEDIATE: Cytogenetic and/or molecular abnormalities not classified as favorable or adverse  Presented with pancytopenia and circulating blasts    BONE MARROW (06/27/2024)  Marrow replaced by blasts, fibrotic foci, some atypical + cells; 7-8% circulating blasts; aspicular  - Unable to perform additional studies due to aspicular specimen    PERIPHERAL BLOOD (6/27/2024)  FISH:  positive for gain RUNX1    PERIPHERAL BLOOD (7/1/24)  IP:  abnl myeloblast population (CD34+, CD7+, CD4+, CD13+, CD38+, CD11+ dim, HLA=DR+, TdT+, CD33-)  Myeloid NGS: CBL C404Y (41%), KIT D816V (8%), RUNX1 x2 (8%, 30%), SRSF2 (37%)     7/16/2024 - 10/21/2024 Chemotherapy    Venetoclax / AzaCITIDine  - C1D1 7/17/24:  complicated by TLS, venetoclax held after D1  - Post C1 BM (8/28/24):  hypercellular with >50% blasts, background systemic mastocytosis  - C2D1 9/5/24:  venetoclax restarted with C2, no TLS  - Post C2 BM (9/25/24):  hypocellular (<5%), atypical  myeloblasts by IP/IHC (1-2%), persistent systemic mastocytosis  - C3D1 10/15/24:  delayed 1 week for count recovery  - Post C3 BM (10/23/24):  hypercellular with systemic mastocytosis, no increase blasts, 0.2% atypical blasts by IP, NGS w/ CBL (73%), cKIT (4%), RUNX1 x 2 (4%, 38%), SRSF2 (40%)       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    CONDITIONING Fludarabine, melphalan, and TBI   DONOR Matched sibling   MATCH GRADE 12/12   SEX MATCH Mismatched   ABO DONOR O pos   ABO RECIPIENT O pos   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, Mycophenolate, and Tacrolimus   GRAFT SOURCE Peripheral blood          12/26/2024 -  Infection    Parainfluenza URI     12/26/2024 -  Disease Reevaluation      DATE DAY SOURCE MORPHOLOGY MRD WHOLE CHIMERISM   (% donor) CD3 CHIMERISM   (% donor) CD33 CHIMERISM   (% donor)   12/26/2024 D+30 PB        12/26/2024 D+30 BM No AML, persist BRENDON Neg       D+60 PB         D+100 PB         D+100 BM                  01/21/25 T+ 60 s/p allo SCT. Counts are continuing to trend downward. ANC today 0.89 down from 1.89 on 1/17/2025. Added Filgrastim treatment plan for ANC<500.  Platelets also continuing to trend downward. Will work with PharmD to start getting Eltrombopag on board. Most recent BM and chimerisms reassuring. T+60 chimerisms drawn today and pending.       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  No evidence relapse.  D+30 BM without evidence of AML.  Current evidence of disease: No  Maintenance Therapy: No  MRD Monitoring Plan: AML MFC    History of allogeneic stem cell transplant (Multi)  Day +60  following allogeneic stem cell transplant.  He does not have evidence of GVHD (last overall grade NA ).  Continue current immunosuppressive therapy.  D+30 PB chimerisms 100% (CD33) and 99% (CD3). Awaiting pending Day 60 chimerisms drawn today.   Immunocompromised state  Remains at high risk for infections.  No current active signs or  symptoms of infection.    Continues on prophylaxis with acyclovir and posaconazole.  TMP/SMX briefly held given hyperkalemia and renal insufficiency. Re-started TMP/SMX 1/17. If renal function worsens, consider switching to dapsone (G6PD normal).   Ongoing viral monitoring (as below)   Immune reconstitution: Assess at D+100.  Post Tx Immunizations are planned for D+180. Will need scheduled.   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 Overall feeling well. Energy level increasing, not feeling as tired. Eating has improved by incorporating more solids. Staying hydrated. Denies rashes, easy bleeding and bruising. No GI issues and bowel movements are normal. No complaints of respiratory symptoms and denies B symptoms.     Review of Systems   Constitutional: Negative.    HENT:  Negative.     Eyes: Negative.    Respiratory: Negative.  Negative for chest tightness, cough and shortness of breath.    Cardiovascular: Negative.  Negative for chest pain, leg swelling and palpitations.   Gastrointestinal: Negative.  Negative for abdominal pain, constipation, diarrhea, nausea and vomiting.   Genitourinary: Negative.     Musculoskeletal: Negative.    Skin: Negative.    Neurological: Negative.  Negative for dizziness, headaches, light-headedness and numbness.   Hematological: Negative.  Negative for adenopathy. Does not bruise/bleed easily.   Psychiatric/Behavioral: Negative.         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      OBJECTIVE     BSA: 1.89 meters squared  /78 (BP Location: Right arm)   Pulse 65   Temp 36 °C (96.8 °F) (Temporal)   Resp 17   Wt 76 kg (167 lb 8 oz)   SpO2 93%   BMI 26.57 kg/m²   Weight         1/20/2025  1431             Weight: 76 kg (167 lb 8 oz)               Physical Exam  Vitals reviewed.   Constitutional:       Appearance: Normal appearance.   HENT:      Head: Normocephalic and atraumatic.      Mouth/Throat:      Mouth: Mucous membranes are moist.      Pharynx: Oropharynx is clear.   Eyes:      Conjunctiva/sclera: Conjunctivae normal.      Pupils: Pupils are equal, round, and reactive to light.   Cardiovascular:      Rate and Rhythm: Normal rate and regular rhythm.      Pulses: Normal pulses.      Heart sounds: Normal heart sounds.   Pulmonary:      Effort: Pulmonary effort is normal.      Breath sounds: Normal breath sounds.   Abdominal:      General: Bowel sounds are normal.      Palpations: Abdomen is soft.   Musculoskeletal:         General: Normal range of motion.      Cervical back: Normal range of motion and neck supple.      Right lower leg: No edema.      Left lower leg: No edema.   Skin:     General: Skin is warm and dry.      Capillary Refill: Capillary refill takes less than 2 seconds.   Neurological:      General: No focal deficit present.      Mental Status: He is alert and oriented to person, place, and time. Mental status is at baseline.   Psychiatric:         Mood and Affect: Mood normal.         Behavior: Behavior normal.         Thought Content: Thought content normal.         Judgment: Judgment normal.         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 Total Score:  0    Viral  "Monitoring:     CMV   Lab Results   Component Value Date    CMVDNAPCR Not Detected 01/17/2025    CMVDNAPCR Not Detected 01/13/2025    CMVDNAPCR Not Detected 01/10/2025      EBV   Lab Results   Component Value Date    EBVDNAPCR Not Detected 12/02/2024    EBVDNAPCR Not Detected 11/25/2024    EBVDNAPCR Not Detected 10/23/2024      Adenovirus   Lab Results   Component Value Date    ADEPB Not Detected 01/13/2025    ADEPB Not Detected 01/07/2025    ADEPB Not Detected 12/31/2024       Toxo   No results found for: \"TGONDPCRBL\"      TMA Monitoring:     MARKERS RECENT 3 VALUES   LDH Lab Results   Component Value Date     01/20/2025     01/17/2025     01/13/2025      Haptoglobin Lab Results   Component Value Date    HAPTOGLOBIN 48 01/20/2025    HAPTOGLOBIN 35 01/13/2025    HAPTOGLOBIN <30 (L) 01/07/2025      BP BP Readings from Last 3 Encounters:   01/20/25 109/78   01/17/25 114/69   01/13/25 113/70      Serum Creatinine Lab Results   Component Value Date    CREATININE 1.27 01/20/2025    CREATININE 1.43 (H) 01/17/2025    CREATININE 1.66 (H) 01/13/2025      Urine Protein/Creat Ratio Lab Results   Component Value Date    UTPCR 0.13 01/20/2025    UTPCR 0.14 01/13/2025    UTPCR 0.14 01/07/2025      Tacrolimus Level Lab Results   Component Value Date    TACROLIMUS 4.1 01/20/2025    TACROLIMUS 4.3 01/17/2025    TACROLIMUS 8.5 01/13/2025        RTC: Tuesday/Friday through 2/4/2025  Requested 2/7, 2/11 and 2/14    Indira Delong APRN-CNP    "

## 2025-01-20 NOTE — PROGRESS NOTES
"NUTRITION FOLLOW UP NOTE    Reason for Visit:  Jin Reynolds is a 69 y.o. 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    Transplant c/b by mucositis, CINV and diarrhea     Currently T+60    Pt seen today in infusion      Lab Results   Component Value Date/Time    GLUCOSE 95 01/20/2025 1444     01/20/2025 1444    K 4.1 01/20/2025 1444     01/20/2025 1444    CO2 24 01/20/2025 1444    ANIONGAP 10 01/20/2025 1444    BUN 17 01/20/2025 1444    CREATININE 1.27 01/20/2025 1444    EGFR 62 01/20/2025 1444    CALCIUM 8.2 (L) 01/20/2025 1444    ALBUMIN 3.7 01/20/2025 1444    ALKPHOS 47 01/20/2025 1444    PROT 5.4 (L) 01/20/2025 1444    AST 12 01/20/2025 1444    BILITOT 0.6 01/20/2025 1444    ALT 10 01/20/2025 1444    MG 1.78 01/20/2025 1444    PHOS 2.3 (L) 12/07/2024 0608         Lab Results   Component Value Date/Time    VITD25 27 (L) 02/05/2024 0807   Taking Vit D 2000IU daily    Lab Results   Component Value Date    WBC 1.8 (L) 01/20/2025    HGB 7.9 (L) 01/20/2025    HCT 23.2 (L) 01/20/2025     01/20/2025    PLT 50 (L) 01/20/2025   *WBC/Platelets trending down     Anthropometrics:  Anthropometrics  Height: 169.1 cm (5' 6.58\")  Weight: 76 kg (167 lb 8.8 oz)  BMI (Calculated): 26.58  IBW/kg (Dietitian Calculated): 65.9 kg  Percent of IBW: 115 %    *Wt at transplant admit: (11/15) 86.1 kg  *Wt at first post-transplant visit: (12/9) 80.7 kg     *Wt up ~4 kg x 1 week     Wt Readings from Last 10 Encounters:   01/20/25 76 kg (167 lb 8.8 oz)   01/20/25 76 kg (167 lb 8 oz)   01/17/25 75.3 kg (166 lb 0.1 oz)   01/13/25 72.2 kg (159 lb 2.8 oz)   01/10/25 72.2 kg (159 lb 2.8 oz)   01/08/25 72.5 kg (159 lb 13.3 oz)   01/07/25 72.5 kg (159 lb 13.3 oz)   01/07/25 72.5 kg (159 lb 13.3 oz)   01/04/25 71 kg (156 lb 8.4 oz)   01/02/25 72 kg (158 lb 12.8 oz)   12/19/24        79.1 kg  12/09/24        80.7 kg--first post transplant visit  11/15/24        86.1 kg--at time of transplant  10/31/24        87.5 " "kg   10/18/24        84.0 kg  09/20/24        86.8 kg  08/29/24        89.2 kg  07/30/24        95.9 kg   06/21/24        87.5 kg         Food And Nutrient Intake:      Appetite much better  Tastes improving but still not 100%  Wanting more meat, valdo beef rather than just soups  Feeling hungry at times; even in middle of night   Still getting full faster but eating a little more than 50% meals  Drinking \"lots\" of liquids, Venus beer, juices and gatorade  Now getting hooked on Ensure strawberry; drinking TID   No nausea  No diarrhea; formed today   Energy improving slightly   WBC and platelets down slightly         ------------------  Eating coming around a little bit   Nausea this am but didn't take anything  Had normal BM (Formed stools)  3 protein shakes and fruit, soup work best (chunky veg soup tastes almost normal)  Trying to eat more solid foods; has to be juicy or have a sauce   Wants to try potato soup next since potatoes in veg soup have been tasting good to him.   Likes Ensure--drinking 3 per day   Takes meds with various drinks  Drinking water well--2-3 bottles per day   Portions still ~1/2 of baseline  Tastes are a little better  Food doesn't taste like he knows it should  Nasal congestion--using NyQuil   Has been holding Bactrim due to kidney function  Slight improvement in energy levels     Pt hasn't had anything to eat as of 4:30pm.      Can make smoothies     Last night had 1/2 chicken (marinated in italian dressing), rice       -----------------------  Feels weak today  Started eating solid foods--wings and potato wedges, fruit   Tastes are a little better; still < half   Eating smaller portions; early satiety   Getting hungry in past few days; craves everything.  No nausea; foods staying down  Still coughing a little bit   Still eating soup everyday  Drinking Ensure 3x/day  Otherwise drinking water and grape pop   Takes meds with water--rec Ensure   Stools still watery; didn't go in a day   Energy " levels come and go; doesn't think he can walk to [facility] to get his prescriptions today  Still napping during the day     Hasn't eaten anything yet today       Rec 3 bottles of water as goal       ---------------  No breakfast yet  Gets hungry--loses interest when eat   Food doesn't taste like he knows it should   Caught respiratory flu last week; to start Nyquil and Mucinex   Feels congested   Still with taste changes   Can do starches in a soup  Vegetable soups   Apple pie didn't taste good   Didn't eat well on Xmas   Still struggling with meat  Does well with fruit  Vit d milkshake   Ensure TID   Take ensure with meds  No nausea  Energy levels still low; worse since got the flu last week   Gagging mucous up; dry heaves  Drinking well--mainly water and Ensure   Stools loose for last few days--2-3 times a day    -------------  Pt seen earlier this week and reported having vomiting with solid foods; had not been taking any anti-emetics.   Told to take Zofran 30 min before meals TID; he was also told he could use Compazine between Zofran if needed.   Since doing so, pt has not had further vomiting; stomach feels a little better and he has been able to keep solid foods down.  Fluid intakes good--has had no issues tolerating.     Has been eating lighter foods in the past few days; still leery of trying meats.   Was able to eat and keep down egg sandwich last night.   Has also been eating bone broth with rice and crax added as well as yogurt.   Now drinking Ensure Original daily.  Tastes remain barrier to eating; did not tolerate trial of lemon hard candies (associated with vomiting).    Still not feeling great, overall. No significant improvements.   Energy remains low; walks slow.     *Pt noted to have been eating better and tolerating PO intakes better after initial hospital discharge.  Intakes have actually digressed in week-10 days.     Ensure Original:  provides 240 kcals and 9 gm protein each                        "                                    Nutrition Focused Physical Exam Findings:                          Energy Needs  Calculated Energy Needs Using Equations  Height: 169.1 cm (5' 6.58\")        Nutrition Diagnosis        Pt remains mildly malnourished as appetite/intakes have declined since initial visit.  N/V has improved with consistent use of antiemetics.  Dysgeusia also contributing to decreased oral intakes.         Continues to benefit from use of ONS daily.        Nutrition Interventions/Recommendations   Nutrition Prescription   High Protein, High Calorie  Food Safety          Nutrition Education   Try Ensure Plus vs Ensure Original for additional kcals; for now, plans to increase Ensure Original to TID.  Also suggested taking meds with Ensure and making Ensure into milkshakes.   Spoke with NP, will try Mirtazapine 15 mg to help with appetite/sleep   Eating smaller, more frequent snacks vs meals at this time.  Encourage PO fluid intakes; prefer calorie containing beverages at this time vs water due to decreased intakes.  Discussed increased fluid intakes to help decrease mucous production.   If upper GI symptoms persist, consider start of Budesonide    Coordination of Care   BMT team        Nutrition Monitoring and Evaluation      Will continue to f/up and monitor weight, labs, PO intakes, taste changes and GI symptoms PRN.                      "

## 2025-01-21 ENCOUNTER — APPOINTMENT (OUTPATIENT)
Dept: HEMATOLOGY/ONCOLOGY | Facility: HOSPITAL | Age: 70
End: 2025-01-21
Payer: MEDICARE

## 2025-01-21 PROBLEM — D69.6 THROMBOCYTOPENIA (CMS-HCC): Status: ACTIVE | Noted: 2025-01-21

## 2025-01-21 ASSESSMENT — ENCOUNTER SYMPTOMS
HEADACHES: 0
NUMBNESS: 0
NEUROLOGICAL NEGATIVE: 1
COUGH: 0
ADENOPATHY: 0
LEG SWELLING: 0
VOMITING: 0
PSYCHIATRIC NEGATIVE: 1
DIARRHEA: 0
DIZZINESS: 0
CONSTITUTIONAL NEGATIVE: 1
NAUSEA: 0
EYES NEGATIVE: 1
CONSTIPATION: 0
MUSCULOSKELETAL NEGATIVE: 1
CHEST TIGHTNESS: 0
RESPIRATORY NEGATIVE: 1
ABDOMINAL PAIN: 0
CARDIOVASCULAR NEGATIVE: 1
LIGHT-HEADEDNESS: 0
GASTROINTESTINAL NEGATIVE: 1
BRUISES/BLEEDS EASILY: 0
HEMATOLOGIC/LYMPHATIC NEGATIVE: 1
PALPITATIONS: 0
SHORTNESS OF BREATH: 0

## 2025-01-21 NOTE — ASSESSMENT & PLAN NOTE
Remains at high risk for infections.  No current active signs or symptoms of infection.    Continues on prophylaxis with acyclovir and posaconazole.  TMP/SMX briefly held given hyperkalemia and renal insufficiency. Re-started TMP/SMX 1/17. If renal function worsens, consider switching to dapsone (G6PD normal).   Ongoing viral monitoring (as below)   Immune reconstitution: Assess at D+100.  Post Tx Immunizations are planned for D+180. Will need scheduled.

## 2025-01-22 LAB — ADENOVIRUS QPCR,PLASMA, VIRC: NOT DETECTED COPIES/ML

## 2025-01-23 NOTE — ASSESSMENT & PLAN NOTE
Counts are declining, though unclear why.  No evidence relapse.  D+30 BM without evidence of AML.  Current evidence of disease: No  Maintenance Therapy: No  MRD Monitoring Plan: AML MFC

## 2025-01-23 NOTE — PROGRESS NOTES
"    Patient ID: Jin Reynolds is a 69 y.o. male.  Primary Oncologist: Dr. 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   8/23/2023 Initial Diagnosis    DX:  SMOLDERING SYSTEMIC MASTOCYTOSIS  Presented with skin rash and eosinophilia    BRENDON DIAGNOSTIC CRITERIA  (For SM, Need major and 1 minor OR at least 3 minors)  - Multi-focal, dense infiltrates of MC (>= 15 mast cells in aggregates) in BM and/or other extra-cutaneous organs [major]  - In BM or extracutaneous organs, >25% MC spindle shaped OR have atypical morphology OR of all MC on BM aspirate smears, >25% are immature or atypical [minor]  - Detection of activating mutation KIT D816V in BM, PB, or other extracutaneous organ [minor]  - Serum tryptase persistently exceeds 20 ng/mL (unless associated clonal myeloid disorder, in which this paramenter is not valid) [minor]    \"B\" FINDINGS  - BM biopsy showing >30% infiltration (focal, dense aggregates) and/or serum tryptase > 200 ng/mL  - Signs of dysplasia or myeloproliferation, in non-MC lineages but insufficient criteria for definitive diagnosis of AHNMD with normal or slightly abnormal blood counts    \"C\" FINDINGS  None       8/23/2023 Biopsy    BONE MARROW (8/23/23)  Hypercellular (90-95%) with trilineage hematopoiesis, granulocytic hyperplasia, eosinophilia, and increased atypical mast cells  IP:  CD34+, +, CD7(bright)+, cCD3-, CD33-, CD15-, CD13+ blast population  IHC:  + increased spindle-shaped mast cells (15% cellularity), CD2-  Myeloid NGS:  CBL (31%), cKIT D816V (30%), RUNX1 x 2 (19%, 29%), SRSF2 (47%) [ASXL1 negative]  FISH:  PDGFRb negative    SERUM TRYPTASE  228 (8/23/23)  268 (9/5/23)     2/13/2024 -  Molecular Therapy    AVAPRITINIB   - Starting dose 25 mg daily (non-Adv SM)     7/16/2024 - 10/21/2024 Chemotherapy    Venetoclax / AzaCITIDine  - C1D1 7/17/24:  complicated by TLS, " venetoclax held after D1  - Post C1 BM (8/28/24):  hypercellular with >50% blasts, background systemic mastocytosis  - C2D1 9/5/24:  venetoclax restarted with C2, no TLS  - Post C2 BM (9/25/24):  hypocellular (<5%), atypical myeloblasts by IP/IHC (1-2%), persistent systemic mastocytosis  - C3D1 10/15/24:  delayed 1 week for count recovery  - Post C3 BM (10/23/24):  hypercellular with systemic mastocytosis, no increase blasts, 0.2% atypical blasts by IP, NGS w/ CBL (73%), cKIT (4%), RUNX1 x 2 (4%, 38%), SRSF2 (40%)       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    CONDITIONING Fludarabine, melphalan, and TBI   DONOR Matched sibling   MATCH GRADE 12/12   SEX MATCH Mismatched   ABO DONOR O pos   ABO RECIPIENT O pos   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, Mycophenolate, and Tacrolimus   GRAFT SOURCE Peripheral blood          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)   6/27/2024 Initial Diagnosis    ICC 2022 DX: Acute myeloid leukemia, NOS (>=20% blasts)  [id number] AML Risk Stratification: INTERMEDIATE: Cytogenetic and/or molecular abnormalities not classified as favorable or adverse  Presented with pancytopenia and circulating blasts    BONE MARROW (06/27/2024)  Marrow replaced by blasts, fibrotic foci, some atypical + cells; 7-8% circulating blasts; aspicular  - Unable to perform additional studies due to aspicular specimen    PERIPHERAL BLOOD (6/27/2024)  FISH:  positive for gain RUNX1    PERIPHERAL BLOOD (7/1/24)  IP:  abnl myeloblast population (CD34+, CD7+, CD4+, CD13+, CD38+, CD11+ dim, HLA=DR+, TdT+, CD33-)  Myeloid NGS: CBL C404Y (41%), KIT D816V (8%), RUNX1 x2 (8%, 30%), SRSF2 (37%)     7/16/2024 - 10/21/2024 Chemotherapy    Venetoclax / AzaCITIDine  - C1D1 7/17/24:  complicated by TLS, venetoclax held after D1  - Post C1 BM (8/28/24):  hypercellular with >50% blasts, background systemic mastocytosis  - C2D1 9/5/24:  venetoclax restarted with C2, no TLS  - Post C2 BM (9/25/24):  hypocellular  (<5%), atypical myeloblasts by IP/IHC (1-2%), persistent systemic mastocytosis  - C3D1 10/15/24:  delayed 1 week for count recovery  - Post C3 BM (10/23/24):  hypercellular with systemic mastocytosis, no increase blasts, 0.2% atypical blasts by IP, NGS w/ CBL (73%), cKIT (4%), RUNX1 x 2 (4%, 38%), SRSF2 (40%)       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    CONDITIONING Fludarabine, melphalan, and TBI   DONOR Matched sibling   MATCH GRADE 12/12   SEX MATCH Mismatched   ABO DONOR O pos   ABO RECIPIENT O pos   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, Mycophenolate, and Tacrolimus   GRAFT SOURCE Peripheral blood          12/26/2024 -  Infection    Parainfluenza URI     12/26/2024 -  Disease Reevaluation      DATE DAY SOURCE MORPHOLOGY MRD WHOLE CHIMERISM   (% donor) CD3 CHIMERISM   (% donor) CD33 CHIMERISM   (% donor)   12/26/2024 D+30 PB        12/26/2024 D+30 BM No AML, persist BRENDON Neg       D+60 PB         D+100 PB         D+100 BM                  01/23/25 ***     Assessment & Plan  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)  Counts are declining, though unclear why.  No evidence relapse.  D+30 BM without evidence of AML.  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 Serum tryptase elevated.  Will give consideration of adding avapritinib after D+100 if counts stable.  Immunocompromised state  Remains at high risk for infections.  No current active signs or symptoms of infection.    Continues on prophylaxis with acyclovir and posaconazole.  TMP/SMX briefly held given hyperkalemia and renal insufficiency. Re-started TMP/SMX 1/17. If renal function worsens, consider switching to dapsone (G6PD normal).   Ongoing viral monitoring (as below)   Immune reconstitution: Assess at D+100.  Post Tx  "Immunizations are planned for D+180. Will need scheduled.   Hypomagnesemia  Continue Mag/Amino Acid Chelate 133 mg tablets   Thrombocytopenia (CMS-HCC)        SUBJECTIVE     Days since transplant: 63     HPI    Jin Reynolds presents today for post transplant follow up, accompanied by ***.     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      OBJECTIVE     BSA: There is no height or weight on file to calculate BSA.  There were no vitals taken for this visit.  Weight    No data found in the last 1 encounters.            Physical Exam    Performance Status:  {Karnofsky Score:78937:::1}      POST TRANSPLANT MONITORING   GVHD  Acute GVHD Overall Grade:    Chronic GVHD Total Score:      Viral Monitoring:     CMV   Lab Results   Component Value Date    CMVDNAPCR Not Detected 01/20/2025    CMVDNAPCR Not Detected 01/17/2025    CMVDNAPCR Not Detected 01/13/2025      EBV   Lab Results   Component Value Date    EBVDNAPCR Not Detected 12/02/2024    EBVDNAPCR Not Detected 11/25/2024    EBVDNAPCR Not Detected 10/23/2024      Adenovirus   Lab Results   Component Value Date    ADEPB Not Detected 01/20/2025    ADEPB Not Detected 01/13/2025    ADEPB Not Detected 01/07/2025       Toxo   No results found for: \"TGONDPCRBL\"      TMA Monitoring:     MARKERS RECENT 3 VALUES   LDH Lab Results   Component Value Date     01/20/2025     01/17/2025     01/13/2025      Haptoglobin Lab Results   Component Value " Date    HAPTOGLOBIN 48 01/20/2025    HAPTOGLOBIN 35 01/13/2025    HAPTOGLOBIN <30 (L) 01/07/2025      BP BP Readings from Last 3 Encounters:   01/20/25 109/78   01/17/25 114/69   01/13/25 113/70      Serum Creatinine Lab Results   Component Value Date    CREATININE 1.27 01/20/2025    CREATININE 1.43 (H) 01/17/2025    CREATININE 1.66 (H) 01/13/2025      Urine Protein/Creat Ratio Lab Results   Component Value Date    UTPCR 0.13 01/20/2025    UTPCR 0.14 01/13/2025    UTPCR 0.14 01/07/2025      Tacrolimus Level Lab Results   Component Value Date    TACROLIMUS 4.1 01/20/2025    TACROLIMUS 4.3 01/17/2025    TACROLIMUS 8.5 01/13/2025            Kalia June PA-C

## 2025-01-24 ENCOUNTER — INFUSION (OUTPATIENT)
Dept: HEMATOLOGY/ONCOLOGY | Facility: HOSPITAL | Age: 70
End: 2025-01-24
Payer: MEDICARE

## 2025-01-24 ENCOUNTER — OFFICE VISIT (OUTPATIENT)
Dept: HEMATOLOGY/ONCOLOGY | Facility: HOSPITAL | Age: 70
End: 2025-01-24
Payer: MEDICARE

## 2025-01-24 VITALS
DIASTOLIC BLOOD PRESSURE: 72 MMHG | HEART RATE: 56 BPM | WEIGHT: 167.99 LBS | BODY MASS INDEX: 26.37 KG/M2 | HEIGHT: 67 IN | SYSTOLIC BLOOD PRESSURE: 118 MMHG | TEMPERATURE: 97.3 F | OXYGEN SATURATION: 100 % | RESPIRATION RATE: 18 BRPM

## 2025-01-24 DIAGNOSIS — D69.6 THROMBOCYTOPENIA (CMS-HCC): ICD-10-CM

## 2025-01-24 DIAGNOSIS — Z94.84 HISTORY OF STEM CELL TRANSPLANT (MULTI): ICD-10-CM

## 2025-01-24 DIAGNOSIS — E83.42 HYPOMAGNESEMIA: ICD-10-CM

## 2025-01-24 DIAGNOSIS — D47.02 SYSTEMIC MASTOCYTOSIS WITH ASSOCIATED CLONAL HEMATOLOGICAL NON-MAST CELL LINEAGE DISEASE: ICD-10-CM

## 2025-01-24 DIAGNOSIS — C92.01 ACUTE MYELOID LEUKEMIA IN REMISSION (MULTI): Primary | ICD-10-CM

## 2025-01-24 DIAGNOSIS — Z94.81 S/P ALLOGENEIC BONE MARROW TRANSPLANT (MULTI): ICD-10-CM

## 2025-01-24 DIAGNOSIS — D84.9 IMMUNOCOMPROMISED STATE: ICD-10-CM

## 2025-01-24 DIAGNOSIS — Z76.82 STEM CELL TRANSPLANT CANDIDATE: ICD-10-CM

## 2025-01-24 DIAGNOSIS — C92.01 ACUTE MYELOID LEUKEMIA IN REMISSION (MULTI): ICD-10-CM

## 2025-01-24 DIAGNOSIS — C92.00 ACUTE MYELOID LEUKEMIA NOT HAVING ACHIEVED REMISSION (MULTI): ICD-10-CM

## 2025-01-24 LAB
ACANTHOCYTES BLD QL SMEAR: NORMAL
ALBUMIN SERPL BCP-MCNC: 3.8 G/DL (ref 3.4–5)
ALP SERPL-CCNC: 48 U/L (ref 33–136)
ALT SERPL W P-5'-P-CCNC: 13 U/L (ref 10–52)
ANION GAP SERPL CALC-SCNC: 10 MMOL/L (ref 10–20)
AST SERPL W P-5'-P-CCNC: 16 U/L (ref 9–39)
BASOPHILS # BLD AUTO: 0.02 X10*3/UL (ref 0–0.1)
BASOPHILS NFR BLD AUTO: 1 %
BILIRUB SERPL-MCNC: 0.6 MG/DL (ref 0–1.2)
BUN SERPL-MCNC: 15 MG/DL (ref 6–23)
BURR CELLS BLD QL SMEAR: NORMAL
CALCIUM SERPL-MCNC: 8.3 MG/DL (ref 8.6–10.3)
CHLORIDE SERPL-SCNC: 105 MMOL/L (ref 98–107)
CO2 SERPL-SCNC: 25 MMOL/L (ref 21–32)
CREAT SERPL-MCNC: 1.19 MG/DL (ref 0.5–1.3)
DACRYOCYTES BLD QL SMEAR: NORMAL
EGFRCR SERPLBLD CKD-EPI 2021: 67 ML/MIN/1.73M*2
EOSINOPHIL # BLD AUTO: 0.22 X10*3/UL (ref 0–0.7)
EOSINOPHIL NFR BLD AUTO: 10.6 %
ERYTHROCYTE [DISTWIDTH] IN BLOOD BY AUTOMATED COUNT: 20.1 % (ref 11.5–14.5)
GLUCOSE SERPL-MCNC: 88 MG/DL (ref 74–99)
HCT VFR BLD AUTO: 24 % (ref 41–52)
HGB BLD-MCNC: 8.2 G/DL (ref 13.5–17.5)
IMM GRANULOCYTES # BLD AUTO: 0.09 X10*3/UL (ref 0–0.7)
IMM GRANULOCYTES NFR BLD AUTO: 4.3 % (ref 0–0.9)
LDH SERPL L TO P-CCNC: 138 U/L (ref 84–246)
LYMPHOCYTES # BLD AUTO: 0.41 X10*3/UL (ref 1.2–4.8)
LYMPHOCYTES NFR BLD AUTO: 19.7 %
MAGNESIUM SERPL-MCNC: 1.74 MG/DL (ref 1.6–2.4)
MCH RBC QN AUTO: 34.2 PG (ref 26–34)
MCHC RBC AUTO-ENTMCNC: 34.2 G/DL (ref 32–36)
MCV RBC AUTO: 100 FL (ref 80–100)
MONOCYTES # BLD AUTO: 0.29 X10*3/UL (ref 0.1–1)
MONOCYTES NFR BLD AUTO: 13.9 %
NEUTROPHILS # BLD AUTO: 1.05 X10*3/UL (ref 1.2–7.7)
NEUTROPHILS NFR BLD AUTO: 50.5 %
NRBC BLD-RTO: 0 /100 WBCS (ref 0–0)
OVALOCYTES BLD QL SMEAR: NORMAL
PLATELET # BLD AUTO: 50 X10*3/UL (ref 150–450)
POTASSIUM SERPL-SCNC: 4.1 MMOL/L (ref 3.5–5.3)
PROT SERPL-MCNC: 5.5 G/DL (ref 6.4–8.2)
RBC # BLD AUTO: 2.4 X10*6/UL (ref 4.5–5.9)
RBC MORPH BLD: NORMAL
SCHISTOCYTES BLD QL SMEAR: NORMAL
SODIUM SERPL-SCNC: 136 MMOL/L (ref 136–145)
TACROLIMUS BLD-MCNC: 4.1 NG/ML
WBC # BLD AUTO: 2.1 X10*3/UL (ref 4.4–11.3)

## 2025-01-24 PROCEDURE — 2500000004 HC RX 250 GENERAL PHARMACY W/ HCPCS (ALT 636 FOR OP/ED): Performed by: INTERNAL MEDICINE

## 2025-01-24 PROCEDURE — 83615 LACTATE (LD) (LDH) ENZYME: CPT

## 2025-01-24 PROCEDURE — 36591 DRAW BLOOD OFF VENOUS DEVICE: CPT

## 2025-01-24 PROCEDURE — 99214 OFFICE O/P EST MOD 30 MIN: CPT | Performed by: STUDENT IN AN ORGANIZED HEALTH CARE EDUCATION/TRAINING PROGRAM

## 2025-01-24 PROCEDURE — 83735 ASSAY OF MAGNESIUM: CPT

## 2025-01-24 PROCEDURE — 85025 COMPLETE CBC W/AUTO DIFF WBC: CPT

## 2025-01-24 PROCEDURE — 80053 COMPREHEN METABOLIC PANEL: CPT

## 2025-01-24 PROCEDURE — 80197 ASSAY OF TACROLIMUS: CPT

## 2025-01-24 RX ORDER — HEPARIN SODIUM,PORCINE/PF 10 UNIT/ML
50 SYRINGE (ML) INTRAVENOUS AS NEEDED
Status: CANCELLED | OUTPATIENT
Start: 2025-01-24

## 2025-01-24 RX ORDER — HEPARIN 100 UNIT/ML
500 SYRINGE INTRAVENOUS AS NEEDED
Status: DISCONTINUED | OUTPATIENT
Start: 2025-01-24 | End: 2025-01-27 | Stop reason: HOSPADM

## 2025-01-24 RX ORDER — HEPARIN 100 UNIT/ML
500 SYRINGE INTRAVENOUS AS NEEDED
Status: CANCELLED | OUTPATIENT
Start: 2025-01-24

## 2025-01-24 RX ADMIN — HEPARIN 500 UNITS: 100 SYRINGE at 15:34

## 2025-01-24 NOTE — PROGRESS NOTES
Pt here for count check.  Labs reviewed and no transfusion or electrolyte replacement needs identified.  Discharged in stable condition.

## 2025-01-26 LAB
CMV DNA SERPL NAA+PROBE-LOG IU: NORMAL {LOG_IU}/ML
LABORATORY COMMENT REPORT: NOT DETECTED

## 2025-01-28 ENCOUNTER — OFFICE VISIT (OUTPATIENT)
Dept: HEMATOLOGY/ONCOLOGY | Facility: HOSPITAL | Age: 70
End: 2025-01-28
Payer: MEDICARE

## 2025-01-28 ENCOUNTER — LAB (OUTPATIENT)
Dept: HEMATOLOGY/ONCOLOGY | Facility: HOSPITAL | Age: 70
End: 2025-01-28
Payer: MEDICARE

## 2025-01-28 VITALS
BODY MASS INDEX: 27.1 KG/M2 | TEMPERATURE: 97.9 F | SYSTOLIC BLOOD PRESSURE: 105 MMHG | WEIGHT: 170.86 LBS | RESPIRATION RATE: 16 BRPM | HEART RATE: 62 BPM | DIASTOLIC BLOOD PRESSURE: 65 MMHG | OXYGEN SATURATION: 100 %

## 2025-01-28 DIAGNOSIS — D47.02 SYSTEMIC MASTOCYTOSIS WITH ASSOCIATED CLONAL HEMATOLOGICAL NON-MAST CELL LINEAGE DISEASE: ICD-10-CM

## 2025-01-28 DIAGNOSIS — C92.01 ACUTE MYELOID LEUKEMIA IN REMISSION (MULTI): ICD-10-CM

## 2025-01-28 DIAGNOSIS — E83.42 HYPOMAGNESEMIA: Primary | ICD-10-CM

## 2025-01-28 DIAGNOSIS — Z76.82 STEM CELL TRANSPLANT CANDIDATE: ICD-10-CM

## 2025-01-28 DIAGNOSIS — D84.9 IMMUNOCOMPROMISED STATE: ICD-10-CM

## 2025-01-28 DIAGNOSIS — Z94.81 S/P ALLOGENEIC BONE MARROW TRANSPLANT (MULTI): ICD-10-CM

## 2025-01-28 DIAGNOSIS — Z94.84 HISTORY OF STEM CELL TRANSPLANT (MULTI): ICD-10-CM

## 2025-01-28 DIAGNOSIS — I10 ESSENTIAL (PRIMARY) HYPERTENSION: ICD-10-CM

## 2025-01-28 DIAGNOSIS — I35.9 AORTIC VALVE DISEASE: ICD-10-CM

## 2025-01-28 DIAGNOSIS — C92.00 ACUTE MYELOID LEUKEMIA NOT HAVING ACHIEVED REMISSION (MULTI): ICD-10-CM

## 2025-01-28 LAB
ACANTHOCYTES BLD QL SMEAR: NORMAL
ALBUMIN SERPL BCP-MCNC: 3.9 G/DL (ref 3.4–5)
ALP SERPL-CCNC: 49 U/L (ref 33–136)
ALT SERPL W P-5'-P-CCNC: 14 U/L (ref 10–52)
ANION GAP SERPL CALC-SCNC: 10 MMOL/L (ref 10–20)
APPEARANCE UR: CLEAR
AST SERPL W P-5'-P-CCNC: 15 U/L (ref 9–39)
BASOPHILS # BLD AUTO: 0.02 X10*3/UL (ref 0–0.1)
BASOPHILS NFR BLD AUTO: 1.1 %
BILIRUB SERPL-MCNC: 0.6 MG/DL (ref 0–1.2)
BILIRUB UR STRIP.AUTO-MCNC: NEGATIVE MG/DL
BUN SERPL-MCNC: 15 MG/DL (ref 6–23)
BURR CELLS BLD QL SMEAR: NORMAL
CALCIUM SERPL-MCNC: 8.6 MG/DL (ref 8.6–10.6)
CHLORIDE SERPL-SCNC: 105 MMOL/L (ref 98–107)
CHOLEST SERPL-MCNC: 109 MG/DL (ref 0–199)
CHOLESTEROL/HDL RATIO: 2.8
CO2 SERPL-SCNC: 25 MMOL/L (ref 21–32)
COLOR UR: YELLOW
CREAT SERPL-MCNC: 1.36 MG/DL (ref 0.5–1.3)
CREAT UR-MCNC: 137.9 MG/DL (ref 20–370)
DACRYOCYTES BLD QL SMEAR: NORMAL
EGFRCR SERPLBLD CKD-EPI 2021: 57 ML/MIN/1.73M*2
EOSINOPHIL # BLD AUTO: 0.18 X10*3/UL (ref 0–0.7)
EOSINOPHIL NFR BLD AUTO: 9.8 %
ERYTHROCYTE [DISTWIDTH] IN BLOOD BY AUTOMATED COUNT: 19 % (ref 11.5–14.5)
GLUCOSE SERPL-MCNC: 112 MG/DL (ref 74–99)
GLUCOSE UR STRIP.AUTO-MCNC: NORMAL MG/DL
HAPTOGLOB SERPL NEPH-MCNC: 46 MG/DL (ref 30–200)
HCT VFR BLD AUTO: 23.8 % (ref 41–52)
HDLC SERPL-MCNC: 39.6 MG/DL
HGB BLD-MCNC: 8.2 G/DL (ref 13.5–17.5)
IGG SERPL-MCNC: 731 MG/DL (ref 700–1600)
IMM GRANULOCYTES # BLD AUTO: 0.01 X10*3/UL (ref 0–0.7)
IMM GRANULOCYTES NFR BLD AUTO: 0.5 % (ref 0–0.9)
KETONES UR STRIP.AUTO-MCNC: NEGATIVE MG/DL
LDH SERPL L TO P-CCNC: 125 U/L (ref 84–246)
LDLC SERPL CALC-MCNC: 53 MG/DL
LEUKOCYTE ESTERASE UR QL STRIP.AUTO: NEGATIVE
LYMPHOCYTES # BLD AUTO: 0.42 X10*3/UL (ref 1.2–4.8)
LYMPHOCYTES NFR BLD AUTO: 22.8 %
MAGNESIUM SERPL-MCNC: 1.74 MG/DL (ref 1.6–2.4)
MCH RBC QN AUTO: 34.7 PG (ref 26–34)
MCHC RBC AUTO-ENTMCNC: 34.5 G/DL (ref 32–36)
MCV RBC AUTO: 101 FL (ref 80–100)
MONOCYTES # BLD AUTO: 0.24 X10*3/UL (ref 0.1–1)
MONOCYTES NFR BLD AUTO: 13 %
NEUTROPHILS # BLD AUTO: 0.97 X10*3/UL (ref 1.2–7.7)
NEUTROPHILS NFR BLD AUTO: 52.8 %
NITRITE UR QL STRIP.AUTO: NEGATIVE
NON HDL CHOLESTEROL: 69 MG/DL (ref 0–149)
NRBC BLD-RTO: 0 /100 WBCS (ref 0–0)
OVALOCYTES BLD QL SMEAR: NORMAL
PH UR STRIP.AUTO: 6.5 [PH]
PLATELET # BLD AUTO: 56 X10*3/UL (ref 150–450)
POTASSIUM SERPL-SCNC: 4.1 MMOL/L (ref 3.5–5.3)
PROT SERPL-MCNC: 5.6 G/DL (ref 6.4–8.2)
PROT UR STRIP.AUTO-MCNC: NEGATIVE MG/DL
PROT UR-ACNC: 15 MG/DL (ref 5–25)
PROT/CREAT UR: 0.11 MG/MG CREAT (ref 0–0.17)
RBC # BLD AUTO: 2.36 X10*6/UL (ref 4.5–5.9)
RBC # UR STRIP.AUTO: NEGATIVE /UL
RBC MORPH BLD: NORMAL
SCHISTOCYTES BLD QL SMEAR: NORMAL
SODIUM SERPL-SCNC: 136 MMOL/L (ref 136–145)
SP GR UR STRIP.AUTO: 1.02
TACROLIMUS BLD-MCNC: 5.4 NG/ML
TRIGL SERPL-MCNC: 82 MG/DL (ref 0–149)
UROBILINOGEN UR STRIP.AUTO-MCNC: NORMAL MG/DL
VLDL: 16 MG/DL (ref 0–40)
WBC # BLD AUTO: 1.8 X10*3/UL (ref 4.4–11.3)

## 2025-01-28 PROCEDURE — 81003 URINALYSIS AUTO W/O SCOPE: CPT

## 2025-01-28 PROCEDURE — 99215 OFFICE O/P EST HI 40 MIN: CPT | Mod: GC | Performed by: INTERNAL MEDICINE

## 2025-01-28 PROCEDURE — 99215 OFFICE O/P EST HI 40 MIN: CPT | Performed by: INTERNAL MEDICINE

## 2025-01-28 PROCEDURE — 82784 ASSAY IGA/IGD/IGG/IGM EACH: CPT

## 2025-01-28 PROCEDURE — 85025 COMPLETE CBC W/AUTO DIFF WBC: CPT

## 2025-01-28 PROCEDURE — 3074F SYST BP LT 130 MM HG: CPT | Performed by: INTERNAL MEDICINE

## 2025-01-28 PROCEDURE — 83615 LACTATE (LD) (LDH) ENZYME: CPT

## 2025-01-28 PROCEDURE — G2211 COMPLEX E/M VISIT ADD ON: HCPCS | Performed by: INTERNAL MEDICINE

## 2025-01-28 PROCEDURE — 3078F DIAST BP <80 MM HG: CPT | Performed by: INTERNAL MEDICINE

## 2025-01-28 PROCEDURE — 82570 ASSAY OF URINE CREATININE: CPT

## 2025-01-28 PROCEDURE — 1159F MED LIST DOCD IN RCRD: CPT | Performed by: INTERNAL MEDICINE

## 2025-01-28 PROCEDURE — 2500000004 HC RX 250 GENERAL PHARMACY W/ HCPCS (ALT 636 FOR OP/ED): Performed by: INTERNAL MEDICINE

## 2025-01-28 PROCEDURE — 80197 ASSAY OF TACROLIMUS: CPT

## 2025-01-28 PROCEDURE — 1126F AMNT PAIN NOTED NONE PRSNT: CPT | Performed by: INTERNAL MEDICINE

## 2025-01-28 PROCEDURE — 83010 ASSAY OF HAPTOGLOBIN QUANT: CPT

## 2025-01-28 PROCEDURE — 36591 DRAW BLOOD OFF VENOUS DEVICE: CPT

## 2025-01-28 PROCEDURE — 87799 DETECT AGENT NOS DNA QUANT: CPT

## 2025-01-28 PROCEDURE — 80061 LIPID PANEL: CPT

## 2025-01-28 PROCEDURE — 80053 COMPREHEN METABOLIC PANEL: CPT

## 2025-01-28 PROCEDURE — 83735 ASSAY OF MAGNESIUM: CPT

## 2025-01-28 RX ORDER — HEPARIN 100 UNIT/ML
500 SYRINGE INTRAVENOUS AS NEEDED
Status: DISCONTINUED | OUTPATIENT
Start: 2025-01-28 | End: 2025-01-31 | Stop reason: HOSPADM

## 2025-01-28 RX ORDER — HEPARIN 100 UNIT/ML
500 SYRINGE INTRAVENOUS AS NEEDED
Status: CANCELLED | OUTPATIENT
Start: 2025-01-28

## 2025-01-28 RX ORDER — HEPARIN SODIUM,PORCINE/PF 10 UNIT/ML
50 SYRINGE (ML) INTRAVENOUS AS NEEDED
Status: CANCELLED | OUTPATIENT
Start: 2025-01-28

## 2025-01-28 RX ORDER — SULFAMETHOXAZOLE AND TRIMETHOPRIM 800; 160 MG/1; MG/1
1 TABLET ORAL 3 TIMES WEEKLY
Qty: 36 TABLET | Refills: 3 | Status: SHIPPED | OUTPATIENT
Start: 2025-01-29 | End: 2026-01-24

## 2025-01-28 RX ADMIN — HEPARIN 500 UNITS: 100 SYRINGE at 09:40

## 2025-01-28 ASSESSMENT — PAIN SCALES - GENERAL: PAINLEVEL_OUTOF10: 0-NO PAIN

## 2025-01-28 NOTE — PROGRESS NOTES
"    Patient ID: Jin Reynolds is a 69 y.o. male.  Primary Oncologist: Dr. 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   8/23/2023 Initial Diagnosis    DX:  SMOLDERING SYSTEMIC MASTOCYTOSIS  Presented with skin rash and eosinophilia    BRENDON DIAGNOSTIC CRITERIA  (For SM, Need major and 1 minor OR at least 3 minors)  - Multi-focal, dense infiltrates of MC (>= 15 mast cells in aggregates) in BM and/or other extra-cutaneous organs [major]  - In BM or extracutaneous organs, >25% MC spindle shaped OR have atypical morphology OR of all MC on BM aspirate smears, >25% are immature or atypical [minor]  - Detection of activating mutation KIT D816V in BM, PB, or other extracutaneous organ [minor]  - Serum tryptase persistently exceeds 20 ng/mL (unless associated clonal myeloid disorder, in which this paramenter is not valid) [minor]    \"B\" FINDINGS  - BM biopsy showing >30% infiltration (focal, dense aggregates) and/or serum tryptase > 200 ng/mL  - Signs of dysplasia or myeloproliferation, in non-MC lineages but insufficient criteria for definitive diagnosis of AHNMD with normal or slightly abnormal blood counts    \"C\" FINDINGS  None       8/23/2023 Biopsy    BONE MARROW (8/23/23)  Hypercellular (90-95%) with trilineage hematopoiesis, granulocytic hyperplasia, eosinophilia, and increased atypical mast cells  IP:  CD34+, +, CD7(bright)+, cCD3-, CD33-, CD15-, CD13+ blast population  IHC:  + increased spindle-shaped mast cells (15% cellularity), CD2-  Myeloid NGS:  CBL (31%), cKIT D816V (30%), RUNX1 x 2 (19%, 29%), SRSF2 (47%) [ASXL1 negative]  FISH:  PDGFRb negative    SERUM TRYPTASE  228 (8/23/23)  268 (9/5/23)     2/13/2024 -  Molecular Therapy    AVAPRITINIB   - Starting dose 25 mg daily (non-Adv SM)     7/16/2024 - 10/21/2024 Chemotherapy    Venetoclax / AzaCITIDine  - C1D1 7/17/24:  complicated by TLS, " venetoclax held after D1  - Post C1 BM (8/28/24):  hypercellular with >50% blasts, background systemic mastocytosis  - C2D1 9/5/24:  venetoclax restarted with C2, no TLS  - Post C2 BM (9/25/24):  hypocellular (<5%), atypical myeloblasts by IP/IHC (1-2%), persistent systemic mastocytosis  - C3D1 10/15/24:  delayed 1 week for count recovery  - Post C3 BM (10/23/24):  hypercellular with systemic mastocytosis, no increase blasts, 0.2% atypical blasts by IP, NGS w/ CBL (73%), cKIT (4%), RUNX1 x 2 (4%, 38%), SRSF2 (40%)       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    CONDITIONING Fludarabine, melphalan, and TBI   DONOR Matched sibling   MATCH GRADE 12/12   SEX MATCH Mismatched   ABO DONOR O pos   ABO RECIPIENT O pos   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, Mycophenolate, and Tacrolimus   GRAFT SOURCE Peripheral blood          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)   6/27/2024 Initial Diagnosis    ICC 2022 DX: Acute myeloid leukemia, NOS (>=20% blasts)  [id number] AML Risk Stratification: INTERMEDIATE: Cytogenetic and/or molecular abnormalities not classified as favorable or adverse  Presented with pancytopenia and circulating blasts    BONE MARROW (06/27/2024)  Marrow replaced by blasts, fibrotic foci, some atypical + cells; 7-8% circulating blasts; aspicular  - Unable to perform additional studies due to aspicular specimen    PERIPHERAL BLOOD (6/27/2024)  FISH:  positive for gain RUNX1    PERIPHERAL BLOOD (7/1/24)  IP:  abnl myeloblast population (CD34+, CD7+, CD4+, CD13+, CD38+, CD11+ dim, HLA=DR+, TdT+, CD33-)  Myeloid NGS: CBL C404Y (41%), KIT D816V (8%), RUNX1 x2 (8%, 30%), SRSF2 (37%)     7/16/2024 - 10/21/2024 Chemotherapy    Venetoclax / AzaCITIDine  - C1D1 7/17/24:  complicated by TLS, venetoclax held after D1  - Post C1 BM (8/28/24):  hypercellular with >50% blasts, background systemic mastocytosis  - C2D1 9/5/24:  venetoclax restarted with C2, no TLS  - Post C2 BM (9/25/24):  hypocellular  (<5%), atypical myeloblasts by IP/IHC (1-2%), persistent systemic mastocytosis  - C3D1 10/15/24:  delayed 1 week for count recovery  - Post C3 BM (10/23/24):  hypercellular with systemic mastocytosis, no increase blasts, 0.2% atypical blasts by IP, NGS w/ CBL (73%), cKIT (4%), RUNX1 x 2 (4%, 38%), SRSF2 (40%)       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    CONDITIONING Fludarabine, melphalan, and TBI   DONOR Matched sibling   MATCH GRADE 12/12   SEX MATCH Mismatched   ABO DONOR O pos   ABO RECIPIENT O pos   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, Mycophenolate, and Tacrolimus   GRAFT SOURCE Peripheral blood          12/26/2024 -  Infection    Parainfluenza URI     12/26/2024 -  Disease Reevaluation      DATE DAY SOURCE MORPHOLOGY MRD WHOLE CHIMERISM   (% donor) CD3 CHIMERISM   (% donor) CD33 CHIMERISM   (% donor)   12/26/2024 D+30 PB        12/26/2024 D+30 BM No AML, persist BRENDON Neg       D+60 PB         D+100 PB         D+100 BM                  01/28/25 T+68 s/p MRD allo SCT. Counts remain low but stable. Wt up and eating well, will stop mirtazapine. No evidence of infection or GVHD, mild dry skin on forehead only. Tacro levels pending today, on tacro 2 mg BID. Continue twice weekly count checks. Attempted to obtain eltrombopag for thrombocytopenia but not approved, will continue to monitor counts for now. Most recent bmbx no evidence of AML, 100% donor chimerism. Mild inc in Cr today, will continue to monitor, cont bactrim for now.     Assessment & Plan  Acute myeloid leukemia not having achieved remission (Multi)  Counts remain low, though unclear why.  No evidence relapse.  D+30 BM without evidence of AML, 100% donor chimerism. Cont to monitor closely.  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 Serum tryptase elevated.  Will give consideration of adding avapritinib after D+100 if counts stable.  Hypomagnesemia  Continue Mag/Amino Acid Chelate 133 mg tablets   Immunocompromised state  Remains at high risk for infections.  No current active signs or symptoms of infection.    Continues on prophylaxis with acyclovir and posaconazole.  TMP/SMX briefly held given hyperkalemia and renal insufficiency. Re-started TMP/SMX 1/17. If renal function worsens, consider switching to dapsone (G6PD normal).   Ongoing viral monitoring (as below)   Immune reconstitution: Assess at D+100.  Post Tx Immunizations are planned for D+180. Will need scheduled.       SUBJECTIVE     Days since transplant: 68     HPI    Jin Reynolds presents today for post transplant follow up, unaccompanied today. He is feeling overall well, staying active around the house working, no formal exercise plan at this time. Continues to eat well and gain weight, had ribs the other day although cannot eat as much, occasional nausea taking zofran premeals. No fevers or chills, no diarrhea or constipation, no SOB, no bruising or bleeding. Having dry skin on forehead and face but not red or itchy, improves with lotion.     Review of Systems   All other systems reviewed and are negative.      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      OBJECTIVE     BSA: 1.91 meters squared  /65   Pulse 62   Temp 36.6 °C (97.9 °F)   Resp 16   Wt 77.5 kg (170 lb 13.7 oz)   SpO2 100%   " BMI 27.10 kg/m²   Weight         1/28/2025  0802             Weight: 77.5 kg (170 lb 13.7 oz)             Physical Exam  Constitutional:       Appearance: Normal appearance.   Eyes:      Conjunctiva/sclera: Conjunctivae normal.      Pupils: Pupils are equal, round, and reactive to light.   Cardiovascular:      Rate and Rhythm: Normal rate and regular rhythm.   Pulmonary:      Effort: Pulmonary effort is normal.      Breath sounds: Normal breath sounds.   Musculoskeletal:         General: Normal range of motion.   Skin:     Findings: No rash.   Neurological:      Mental Status: He is alert. Mental status is at baseline.   Psychiatric:         Mood and Affect: Mood normal.       Performance Status:  Karnofsky Score: 80 - Normal activity with effort; some signs or symptoms of disease      POST TRANSPLANT MONITORING   GVHD  Acute GVHD Overall stGstrstastdstest:st st1st Chronic GVHD Total Score:  0    Viral Monitoring:     CMV   Lab Results   Component Value Date    CMVDNAPCR Not Detected 01/24/2025    CMVDNAPCR Not Detected 01/20/2025    CMVDNAPCR Not Detected 01/17/2025      EBV   Lab Results   Component Value Date    EBVDNAPCR Not Detected 12/02/2024    EBVDNAPCR Not Detected 11/25/2024    EBVDNAPCR Not Detected 10/23/2024      Adenovirus   Lab Results   Component Value Date    ADEPB Not Detected 01/20/2025    ADEPB Not Detected 01/13/2025    ADEPB Not Detected 01/07/2025       Toxo   No results found for: \"TGONDPCRBL\"      TMA Monitoring:     MARKERS RECENT 3 VALUES   LDH Lab Results   Component Value Date     01/28/2025     01/24/2025     01/20/2025      Haptoglobin Lab Results   Component Value Date    HAPTOGLOBIN 46 01/28/2025    HAPTOGLOBIN 48 01/20/2025    HAPTOGLOBIN 35 01/13/2025      BP BP Readings from Last 3 Encounters:   01/28/25 105/65   01/24/25 118/72   01/20/25 109/78      Serum Creatinine Lab Results   Component Value Date    CREATININE 1.36 (H) 01/28/2025    CREATININE 1.19 01/24/2025    " CREATININE 1.27 01/20/2025      Urine Protein/Creat Ratio Lab Results   Component Value Date    UTPCR 0.11 01/28/2025    UTPCR 0.13 01/20/2025    UTPCR 0.14 01/13/2025      Tacrolimus Level Lab Results   Component Value Date    TACROLIMUS 4.1 01/24/2025    TACROLIMUS 4.1 01/20/2025    TACROLIMUS 4.3 01/17/2025          Pt seen and discussed with Dr. Dawson.    Oscar Baumann DO  Hem/Onc Fellow, PGY7

## 2025-01-28 NOTE — ASSESSMENT & PLAN NOTE
Recent BM at D30 demonstrates persistent mast cells which may be expected at this point. Serum tryptase elevated.  Will give consideration of adding avapritinib after D+100 if counts stable.

## 2025-01-29 LAB
ADENOVIRUS QPCR,PLASMA, VIRC: NOT DETECTED COPIES/ML
CMV DNA SERPL NAA+PROBE-LOG IU: NORMAL {LOG_IU}/ML
EBV DNA BLD NAA+PROBE-LOG IU: ABNORMAL {LOG_IU}/ML
LABORATORY COMMENT REPORT: ABNORMAL
LABORATORY COMMENT REPORT: NOT DETECTED

## 2025-01-31 ENCOUNTER — INFUSION (OUTPATIENT)
Dept: HEMATOLOGY/ONCOLOGY | Facility: HOSPITAL | Age: 70
End: 2025-01-31
Payer: MEDICARE

## 2025-01-31 ENCOUNTER — NUTRITION (OUTPATIENT)
Dept: HEMATOLOGY/ONCOLOGY | Facility: HOSPITAL | Age: 70
End: 2025-01-31

## 2025-01-31 ENCOUNTER — OFFICE VISIT (OUTPATIENT)
Dept: HEMATOLOGY/ONCOLOGY | Facility: HOSPITAL | Age: 70
End: 2025-01-31
Payer: MEDICARE

## 2025-01-31 VITALS
BODY MASS INDEX: 26.76 KG/M2 | RESPIRATION RATE: 18 BRPM | HEART RATE: 75 BPM | WEIGHT: 168.7 LBS | TEMPERATURE: 97 F | OXYGEN SATURATION: 100 % | DIASTOLIC BLOOD PRESSURE: 68 MMHG | SYSTOLIC BLOOD PRESSURE: 118 MMHG

## 2025-01-31 VITALS — HEIGHT: 67 IN | WEIGHT: 168.65 LBS | BODY MASS INDEX: 26.47 KG/M2

## 2025-01-31 DIAGNOSIS — D47.02 SYSTEMIC MASTOCYTOSIS WITH ASSOCIATED CLONAL HEMATOLOGICAL NON-MAST CELL LINEAGE DISEASE: ICD-10-CM

## 2025-01-31 DIAGNOSIS — Z94.84 HISTORY OF STEM CELL TRANSPLANT (MULTI): ICD-10-CM

## 2025-01-31 DIAGNOSIS — D84.9 IMMUNOCOMPROMISED STATE: ICD-10-CM

## 2025-01-31 DIAGNOSIS — N17.9 AKI (ACUTE KIDNEY INJURY) (CMS-HCC): ICD-10-CM

## 2025-01-31 DIAGNOSIS — C92.02 AML (ACUTE MYELOID LEUKEMIA) IN RELAPSE (MULTI): ICD-10-CM

## 2025-01-31 DIAGNOSIS — E83.42 HYPOMAGNESEMIA: ICD-10-CM

## 2025-01-31 DIAGNOSIS — C92.01 ACUTE MYELOID LEUKEMIA IN REMISSION (MULTI): ICD-10-CM

## 2025-01-31 DIAGNOSIS — Z94.81 S/P ALLOGENEIC BONE MARROW TRANSPLANT (MULTI): ICD-10-CM

## 2025-01-31 DIAGNOSIS — C92.01 ACUTE MYELOID LEUKEMIA IN REMISSION (MULTI): Primary | ICD-10-CM

## 2025-01-31 PROBLEM — B34.8 PARAINFLUENZA: Status: RESOLVED | Noted: 2025-01-04 | Resolved: 2025-01-31

## 2025-01-31 LAB
ALBUMIN SERPL BCP-MCNC: 3.8 G/DL (ref 3.4–5)
ALP SERPL-CCNC: 46 U/L (ref 33–136)
ALT SERPL W P-5'-P-CCNC: 15 U/L (ref 10–52)
ANION GAP SERPL CALC-SCNC: 11 MMOL/L (ref 10–20)
AST SERPL W P-5'-P-CCNC: 16 U/L (ref 9–39)
BASOPHILS # BLD AUTO: 0.02 X10*3/UL (ref 0–0.1)
BASOPHILS NFR BLD AUTO: 0.9 %
BILIRUB SERPL-MCNC: 0.8 MG/DL (ref 0–1.2)
BUN SERPL-MCNC: 10 MG/DL (ref 6–23)
CALCIUM SERPL-MCNC: 8.1 MG/DL (ref 8.6–10.3)
CHIMERISM INTERPRETATION: NORMAL
CHIMERISM INTERPRETATION: NORMAL
CHLORIDE SERPL-SCNC: 106 MMOL/L (ref 98–107)
CO2 SERPL-SCNC: 23 MMOL/L (ref 21–32)
CREAT SERPL-MCNC: 1.04 MG/DL (ref 0.5–1.3)
EGFRCR SERPLBLD CKD-EPI 2021: 78 ML/MIN/1.73M*2
ELECTRONICALLY SIGNED BY: NORMAL
ELECTRONICALLY SIGNED BY: NORMAL
EOSINOPHIL # BLD AUTO: 0.15 X10*3/UL (ref 0–0.7)
EOSINOPHIL NFR BLD AUTO: 6.4 %
ERYTHROCYTE [DISTWIDTH] IN BLOOD BY AUTOMATED COUNT: 17.6 % (ref 11.5–14.5)
GLUCOSE SERPL-MCNC: 91 MG/DL (ref 74–99)
HCT VFR BLD AUTO: 23.8 % (ref 41–52)
HGB BLD-MCNC: 8.7 G/DL (ref 13.5–17.5)
IMM GRANULOCYTES # BLD AUTO: 0.01 X10*3/UL (ref 0–0.7)
IMM GRANULOCYTES NFR BLD AUTO: 0.4 % (ref 0–0.9)
LYMPHOCYTES # BLD AUTO: 0.37 X10*3/UL (ref 1.2–4.8)
LYMPHOCYTES NFR BLD AUTO: 15.8 %
MAGNESIUM SERPL-MCNC: 1.61 MG/DL (ref 1.6–2.4)
MCH RBC QN AUTO: 34.8 PG (ref 26–34)
MCHC RBC AUTO-ENTMCNC: 36.6 G/DL (ref 32–36)
MCV RBC AUTO: 95 FL (ref 80–100)
MONOCYTES # BLD AUTO: 0.27 X10*3/UL (ref 0.1–1)
MONOCYTES NFR BLD AUTO: 11.5 %
NEUTROPHILS # BLD AUTO: 1.52 X10*3/UL (ref 1.2–7.7)
NEUTROPHILS NFR BLD AUTO: 65 %
NRBC BLD-RTO: 0 /100 WBCS (ref 0–0)
PLATELET # BLD AUTO: 53 X10*3/UL (ref 150–450)
POTASSIUM SERPL-SCNC: 3.8 MMOL/L (ref 3.5–5.3)
PROT SERPL-MCNC: 5.7 G/DL (ref 6.4–8.2)
RBC # BLD AUTO: 2.5 X10*6/UL (ref 4.5–5.9)
SODIUM SERPL-SCNC: 136 MMOL/L (ref 136–145)
TACROLIMUS BLD-MCNC: 2.4 NG/ML
WBC # BLD AUTO: 2.3 X10*3/UL (ref 4.4–11.3)

## 2025-01-31 PROCEDURE — 99214 OFFICE O/P EST MOD 30 MIN: CPT | Performed by: STUDENT IN AN ORGANIZED HEALTH CARE EDUCATION/TRAINING PROGRAM

## 2025-01-31 PROCEDURE — 80053 COMPREHEN METABOLIC PANEL: CPT

## 2025-01-31 PROCEDURE — 83735 ASSAY OF MAGNESIUM: CPT

## 2025-01-31 PROCEDURE — 3078F DIAST BP <80 MM HG: CPT | Performed by: STUDENT IN AN ORGANIZED HEALTH CARE EDUCATION/TRAINING PROGRAM

## 2025-01-31 PROCEDURE — 3074F SYST BP LT 130 MM HG: CPT | Performed by: STUDENT IN AN ORGANIZED HEALTH CARE EDUCATION/TRAINING PROGRAM

## 2025-01-31 PROCEDURE — 2500000004 HC RX 250 GENERAL PHARMACY W/ HCPCS (ALT 636 FOR OP/ED): Performed by: INTERNAL MEDICINE

## 2025-01-31 PROCEDURE — 85025 COMPLETE CBC W/AUTO DIFF WBC: CPT

## 2025-01-31 PROCEDURE — 80197 ASSAY OF TACROLIMUS: CPT

## 2025-01-31 PROCEDURE — 36591 DRAW BLOOD OFF VENOUS DEVICE: CPT

## 2025-01-31 RX ORDER — HEPARIN 100 UNIT/ML
500 SYRINGE INTRAVENOUS AS NEEDED
Status: DISCONTINUED | OUTPATIENT
Start: 2025-01-31 | End: 2025-01-31 | Stop reason: HOSPADM

## 2025-01-31 RX ORDER — HEPARIN 100 UNIT/ML
500 SYRINGE INTRAVENOUS AS NEEDED
OUTPATIENT
Start: 2025-01-31

## 2025-01-31 RX ORDER — HEPARIN SODIUM,PORCINE/PF 10 UNIT/ML
50 SYRINGE (ML) INTRAVENOUS AS NEEDED
OUTPATIENT
Start: 2025-01-31

## 2025-01-31 RX ADMIN — HEPARIN 500 UNITS: 100 SYRINGE at 15:07

## 2025-01-31 NOTE — ASSESSMENT & PLAN NOTE
Continue Mag/Amino Acid Chelate 133 mg tablets; ran out of prescription, will be picking up from [facility] later today.

## 2025-01-31 NOTE — ASSESSMENT & PLAN NOTE
Remains at high risk for infections.  No current active signs or symptoms of infection.    Continues on prophylaxis with acyclovir and posaconazole.  TMP/SMX briefly held given hyperkalemia and renal insufficiency 1/10-1/17. Monitoring sCr for now.    Ongoing viral monitoring (as below)   Immune reconstitution: Assess at D+100.  Post Tx Immunizations are planned for D+180. Will need scheduled.

## 2025-01-31 NOTE — PROGRESS NOTES
Patient arrived ambulatory to infusion for scheduled tx of count check. Saw Kalia MIRANDA at bedside. Denies any new or worsening sx. Patient did not need any replacements. Patient made aware of up coming appointments. Discharged in stable condition.

## 2025-01-31 NOTE — PROGRESS NOTES
"    Patient ID: Jin Reynolds is a 69 y.o. male.  Primary Oncologist: Dr. 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   8/23/2023 Initial Diagnosis    DX:  SMOLDERING SYSTEMIC MASTOCYTOSIS  Presented with skin rash and eosinophilia    BRENDON DIAGNOSTIC CRITERIA  (For SM, Need major and 1 minor OR at least 3 minors)  - Multi-focal, dense infiltrates of MC (>= 15 mast cells in aggregates) in BM and/or other extra-cutaneous organs [major]  - In BM or extracutaneous organs, >25% MC spindle shaped OR have atypical morphology OR of all MC on BM aspirate smears, >25% are immature or atypical [minor]  - Detection of activating mutation KIT D816V in BM, PB, or other extracutaneous organ [minor]  - Serum tryptase persistently exceeds 20 ng/mL (unless associated clonal myeloid disorder, in which this paramenter is not valid) [minor]    \"B\" FINDINGS  - BM biopsy showing >30% infiltration (focal, dense aggregates) and/or serum tryptase > 200 ng/mL  - Signs of dysplasia or myeloproliferation, in non-MC lineages but insufficient criteria for definitive diagnosis of AHNMD with normal or slightly abnormal blood counts    \"C\" FINDINGS  None       8/23/2023 Biopsy    BONE MARROW (8/23/23)  Hypercellular (90-95%) with trilineage hematopoiesis, granulocytic hyperplasia, eosinophilia, and increased atypical mast cells  IP:  CD34+, +, CD7(bright)+, cCD3-, CD33-, CD15-, CD13+ blast population  IHC:  + increased spindle-shaped mast cells (15% cellularity), CD2-  Myeloid NGS:  CBL (31%), cKIT D816V (30%), RUNX1 x 2 (19%, 29%), SRSF2 (47%) [ASXL1 negative]  FISH:  PDGFRb negative    SERUM TRYPTASE  228 (8/23/23)  268 (9/5/23)     2/13/2024 -  Molecular Therapy    AVAPRITINIB   - Starting dose 25 mg daily (non-Adv SM)     7/16/2024 - 10/21/2024 Chemotherapy    Venetoclax / AzaCITIDine  - C1D1 7/17/24:  complicated by TLS, " venetoclax held after D1  - Post C1 BM (8/28/24):  hypercellular with >50% blasts, background systemic mastocytosis  - C2D1 9/5/24:  venetoclax restarted with C2, no TLS  - Post C2 BM (9/25/24):  hypocellular (<5%), atypical myeloblasts by IP/IHC (1-2%), persistent systemic mastocytosis  - C3D1 10/15/24:  delayed 1 week for count recovery  - Post C3 BM (10/23/24):  hypercellular with systemic mastocytosis, no increase blasts, 0.2% atypical blasts by IP, NGS w/ CBL (73%), cKIT (4%), RUNX1 x 2 (4%, 38%), SRSF2 (40%)       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    CONDITIONING Fludarabine, melphalan, and TBI   DONOR Matched sibling   MATCH GRADE 12/12   SEX MATCH Mismatched   ABO DONOR O pos   ABO RECIPIENT O pos   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, Mycophenolate, and Tacrolimus   GRAFT SOURCE Peripheral blood          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)   6/27/2024 Initial Diagnosis    ICC 2022 DX: Acute myeloid leukemia, NOS (>=20% blasts)  [id number] AML Risk Stratification: INTERMEDIATE: Cytogenetic and/or molecular abnormalities not classified as favorable or adverse  Presented with pancytopenia and circulating blasts    BONE MARROW (06/27/2024)  Marrow replaced by blasts, fibrotic foci, some atypical + cells; 7-8% circulating blasts; aspicular  - Unable to perform additional studies due to aspicular specimen    PERIPHERAL BLOOD (6/27/2024)  FISH:  positive for gain RUNX1    PERIPHERAL BLOOD (7/1/24)  IP:  abnl myeloblast population (CD34+, CD7+, CD4+, CD13+, CD38+, CD11+ dim, HLA=DR+, TdT+, CD33-)  Myeloid NGS: CBL C404Y (41%), KIT D816V (8%), RUNX1 x2 (8%, 30%), SRSF2 (37%)     7/16/2024 - 10/21/2024 Chemotherapy    Venetoclax / AzaCITIDine  - C1D1 7/17/24:  complicated by TLS, venetoclax held after D1  - Post C1 BM (8/28/24):  hypercellular with >50% blasts, background systemic mastocytosis  - C2D1 9/5/24:  venetoclax restarted with C2, no TLS  - Post C2 BM (9/25/24):  hypocellular  (<5%), atypical myeloblasts by IP/IHC (1-2%), persistent systemic mastocytosis  - C3D1 10/15/24:  delayed 1 week for count recovery  - Post C3 BM (10/23/24):  hypercellular with systemic mastocytosis, no increase blasts, 0.2% atypical blasts by IP, NGS w/ CBL (73%), cKIT (4%), RUNX1 x 2 (4%, 38%), SRSF2 (40%)       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    CONDITIONING Fludarabine, melphalan, and TBI   DONOR Matched sibling   MATCH GRADE 12/12   SEX MATCH Mismatched   ABO DONOR O pos   ABO RECIPIENT O pos   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, Mycophenolate, and Tacrolimus   GRAFT SOURCE Peripheral blood          12/26/2024 -  Infection    Parainfluenza URI     12/26/2024 -  Disease Reevaluation      DATE DAY SOURCE MORPHOLOGY MRD WHOLE CHIMERISM   (% donor) CD3 CHIMERISM   (% donor) CD33 CHIMERISM   (% donor)   12/26/2024 D+30 PB        12/26/2024 D+30 BM No AML, persist BRENDON Neg       D+60 PB         D+100 PB         D+100 BM                  01/31/25 T+71 s/p MRD allo SCT. Counts remain low but stable. Wt up and eating well. No evidence of infection or GVHD, mild dry skin on forehead only. Tacro levels pending today, on tacro 2 mg BID. Continue twice weekly count checks. Attempted to obtain eltrombopag for thrombocytopenia but not approved, will continue to monitor counts for now. Most recent bmbx no evidence of AML, 100% donor chimerism. Mild increase in sCr earlier this week, continue to monitor for now. Improved today.     Assessment & Plan  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)  Counts are declining, though unclear why.  No evidence relapse.  D+30 BM without evidence of AML.  Current evidence of disease: No  Maintenance Therapy: No  MRD Monitoring Plan: AML MFC; Day 100 BMBx requested for 3/3/25  History of stem cell transplant (Multi)  Day +71  following allogeneic stem cell transplant.  He does not have evidence of GVHD (last overall grade NAOverall Grade (Przepiorka): 0).  Continue  current immunosuppressive therapy. D+30 PB chimerisms great. Day 60 PB chimerisms pending.   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 Serum tryptase elevated.  Will give consideration of adding avapritinib after D+100 if counts stable.  Immunocompromised state  Remains at high risk for infections.  No current active signs or symptoms of infection.    Continues on prophylaxis with acyclovir and posaconazole.  TMP/SMX briefly held given hyperkalemia and renal insufficiency 1/10-1/17. Monitoring sCr for now.    Ongoing viral monitoring (as below)   Immune reconstitution: Assess at D+100.  Post Tx Immunizations are planned for D+180. Will need scheduled.   PERRY (acute kidney injury) (CMS-HCC)  sCr improved today.   Hypomagnesemia  Continue Mag/Amino Acid Chelate 133 mg tablets; ran out of prescription, will be picking up from [facility] later today.       SUBJECTIVE     Days since transplant: 71     HPI    Jin Reynolds presents today for post transplant follow up, unaccompanied today. He is feeling overall well, staying active around the house working, no formal exercise plan at this time. Continues to eat well and gain weight, had ribs the other day although cannot eat as much, occasional nausea taking zofran premeals. No fevers or chills, no diarrhea or constipation, no SOB, no bruising or bleeding. Having dry skin on forehead and face but not red or itchy, improves with lotion.     Review of Systems   All other systems reviewed and are negative.      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      OBJECTIVE     BSA: 1.9 meters squared  /68 (BP Location: Right arm, Patient Position: Sitting, BP Cuff Size: Adult)   Pulse 75   Temp 36.1 °C (97 °F) (Temporal)   Resp 18   Wt 76.5 kg (168 lb 11.2 oz)   SpO2 100%   BMI 26.76 kg/m²   Weight         1/31/2025  1400             Weight: 76.5 kg (168 lb 11.2 oz)             Physical Exam  Constitutional:       Appearance: Normal appearance.   Eyes:      Conjunctiva/sclera: Conjunctivae normal.      Pupils: Pupils are equal, round, and reactive to light.   Cardiovascular:      Rate and Rhythm: Normal rate and regular rhythm.   Pulmonary:      Effort: Pulmonary effort is normal.      Breath sounds: Normal breath sounds.   Musculoskeletal:         General: Normal range of motion.   Skin:     Findings: No rash.   Neurological:      Mental Status: He is alert. Mental status is at baseline.   Psychiatric:         Mood and Affect: Mood normal.         Performance Status:  Karnofsky Score: 80 - Normal activity with effort; some signs or symptoms of disease      POST TRANSPLANT MONITORING   GVHD  Acute GVHD Overall stGstrstastdstest:st st1st Chronic GVHD Total Score:      Viral Monitoring:     CMV   Lab Results   Component Value Date    CMVDNAPCR Not Detected 01/28/2025    CMVDNAPCR Not Detected 01/24/2025    CMVDNAPCR Not Detected 01/20/2025      EBV   Lab Results   Component Value Date    EBVDNAPCR Not Detected 12/02/2024    EBVDNAPCR Not Detected 11/25/2024    EBVDNAPCR Not Detected 10/23/2024      Adenovirus   Lab Results   Component Value Date    ADEPB Not Detected 01/28/2025    ADEPB Not Detected 01/20/2025    ADEPB Not Detected 01/13/2025       Toxo   No results found for: \"TGONDPCRBL\"      TMA Monitoring:     MARKERS RECENT 3 VALUES   LDH Lab Results   Component Value Date     01/28/2025     " 01/24/2025     01/20/2025      Haptoglobin Lab Results   Component Value Date    HAPTOGLOBIN 46 01/28/2025    HAPTOGLOBIN 48 01/20/2025    HAPTOGLOBIN 35 01/13/2025      BP BP Readings from Last 3 Encounters:   01/31/25 118/68   01/28/25 105/65   01/24/25 118/72      Serum Creatinine Lab Results   Component Value Date    CREATININE 1.04 01/31/2025    CREATININE 1.36 (H) 01/28/2025    CREATININE 1.19 01/24/2025      Urine Protein/Creat Ratio Lab Results   Component Value Date    UTPCR 0.11 01/28/2025    UTPCR 0.13 01/20/2025    UTPCR 0.14 01/13/2025      Tacrolimus Level Lab Results   Component Value Date    TACROLIMUS 5.4 01/28/2025    TACROLIMUS 4.1 01/24/2025    TACROLIMUS 4.1 01/20/2025

## 2025-01-31 NOTE — ASSESSMENT & PLAN NOTE
Day +71  following allogeneic stem cell transplant.  He does not have evidence of GVHD (last overall grade NAOverall Grade (Przepiorka): 0).  Continue current immunosuppressive therapy. D+30 PB chimerisms great. Day 60 PB chimerisms pending.

## 2025-01-31 NOTE — PROGRESS NOTES
"NUTRITION FOLLOW UP NOTE    Reason for Visit:  Jin Reynolds is a 69 y.o. 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    Transplant c/b by mucositis, CINV and diarrhea     Currently T+71    Pt seen today in infusion      Lab Results   Component Value Date/Time    GLUCOSE 91 01/31/2025 1417     01/31/2025 1417    K 3.8 01/31/2025 1417     01/31/2025 1417    CO2 23 01/31/2025 1417    ANIONGAP 11 01/31/2025 1417    BUN 10 01/31/2025 1417    CREATININE 1.04 01/31/2025 1417    EGFR 78 01/31/2025 1417    CALCIUM 8.1 (L) 01/31/2025 1417    ALBUMIN 3.8 01/31/2025 1417    ALKPHOS 46 01/31/2025 1417    PROT 5.7 (L) 01/31/2025 1417    AST 16 01/31/2025 1417    BILITOT 0.8 01/31/2025 1417    ALT 15 01/31/2025 1417    MG 1.61 01/31/2025 1417    PHOS 2.3 (L) 12/07/2024 0608         Lab Results   Component Value Date/Time    VITD25 27 (L) 02/05/2024 0807   Taking Vit D 2000IU daily    Lab Results   Component Value Date    WBC 2.3 (L) 01/31/2025    HGB 8.7 (L) 01/31/2025    HCT 23.8 (L) 01/31/2025    MCV 95 01/31/2025    PLT 53 (L) 01/31/2025       Anthropometrics:  Anthropometrics  Height: 169.1 cm (5' 6.58\")  Weight: 76.5 kg (168 lb 10.4 oz)  BMI (Calculated): 26.75  IBW/kg (Dietitian Calculated): 65.9 kg  Percent of IBW: 116 %    *Wt at transplant admit: (11/15) 86.1 kg  *Wt at first post-transplant visit: (12/9) 80.7 kg     *Wt stable x 1 week    Wt Readings from Last 10 Encounters:   01/31/25 76.5 kg (168 lb 10.4 oz)   01/31/25 76.5 kg (168 lb 11.2 oz)   01/28/25 77.5 kg (170 lb 13.7 oz)   01/24/25 76.2 kg (167 lb 15.9 oz)   01/20/25 76 kg (167 lb 8.8 oz)   01/20/25 76 kg (167 lb 8 oz)   01/17/25 75.3 kg (166 lb 0.1 oz)   01/13/25 72.2 kg (159 lb 2.8 oz)   01/10/25 72.2 kg (159 lb 2.8 oz)   01/08/25 72.5 kg (159 lb 13.3 oz)   12/31/24        71.9 kg  12/24/24        76.7 kg   12/19/24        79.1 kg  12/09/24        80.7 kg--first post transplant visit  11/15/24        86.1 kg--at time of " "transplant  10/31/24        87.5 kg   10/18/24        84.0 kg  09/20/24        86.8 kg  08/29/24        89.2 kg  07/30/24        95.9 kg   06/21/24        87.5 kg         Food And Nutrient Intake:      Feeling better overall.   Eating well  Still drinking 3 Ensure per day  Appetite better--still with early satiety; portions improving   Tastes getting there; ~90% baselines   Gets feeling of being hungry; sometimes at night   Eating bigger variety of foods   Still drinking liquids--trying to drink more water, root beer, venus beer  Slight nausea x 2 episodes--to meds and resolved   Having regular BM's   Energy levels better day by day; went on treadmill for 12 minutes yesterday; does a lot of things around the house.         ---------------------  Appetite much better  Tastes improving but still not 100%  Wanting more meat, valdo beef rather than just soups  Feeling hungry at times; even in middle of night   Still getting full faster but eating a little more than 50% meals  Drinking \"lots\" of liquids, Venus beer, juices and gatorade  Now getting hooked on Ensure strawberry; drinking TID   No nausea  No diarrhea; formed today   Energy improving slightly   WBC and platelets down slightly         ------------------  Eating coming around a little bit   Nausea this am but didn't take anything  Had normal BM (Formed stools)  3 protein shakes and fruit, soup work best (chunky veg soup tastes almost normal)  Trying to eat more solid foods; has to be juicy or have a sauce   Wants to try potato soup next since potatoes in veg soup have been tasting good to him.   Likes Ensure--drinking 3 per day   Takes meds with various drinks  Drinking water well--2-3 bottles per day   Portions still ~1/2 of baseline  Tastes are a little better  Food doesn't taste like he knows it should  Nasal congestion--using NyQuil   Has been holding Bactrim due to kidney function  Slight improvement in energy levels     Pt hasn't had anything to eat as " of 4:30pm.      Can make smoothies     Last night had 1/2 chicken (marinated in italian dressing), rice       -----------------------  Feels weak today  Started eating solid foods--wings and potato wedges, fruit   Tastes are a little better; still < half   Eating smaller portions; early satiety   Getting hungry in past few days; craves everything.  No nausea; foods staying down  Still coughing a little bit   Still eating soup everyday  Drinking Ensure 3x/day  Otherwise drinking water and grape pop   Takes meds with water--rec Ensure   Stools still watery; didn't go in a day   Energy levels come and go; doesn't think he can [facility] to [facility] to get his prescriptions today  Still napping during the day     Hasn't eaten anything yet today       Rec 3 bottles of water as goal       ---------------  No breakfast yet  Gets hungry--loses interest when eat   Food doesn't taste like he knows it should   Caught respiratory flu last week; to start Nyquil and Mucinex   Feels congested   Still with taste changes   Can do starches in a soup  Vegetable soups   Apple pie didn't taste good   Didn't eat well on Xmas   Still struggling with meat  Does well with fruit  Vit d milkshake   Ensure TID   Take ensure with meds  No nausea  Energy levels still low; worse since got the flu last week   Gagging mucous up; dry heaves  Drinking well--mainly water and Ensure   Stools loose for last few days--2-3 times a day    -------------  Pt seen earlier this week and reported having vomiting with solid foods; had not been taking any anti-emetics.   Told to take Zofran 30 min before meals TID; he was also told he could use Compazine between Zofran if needed.   Since doing so, pt has not had further vomiting; stomach feels a little better and he has been able to keep solid foods down.  Fluid intakes good--has had no issues tolerating.     Has been eating lighter foods in the past few days; still leery of trying meats.   Was able to eat and keep down  "egg sandwich last night.   Has also been eating bone broth with rice and crax added as well as yogurt.   Now drinking Ensure Original daily.  Tastes remain barrier to eating; did not tolerate trial of lemon hard candies (associated with vomiting).    Still not feeling great, overall. No significant improvements.   Energy remains low; walks slow.     *Pt noted to have been eating better and tolerating PO intakes better after initial hospital discharge.  Intakes have actually digressed in week-10 days.     Ensure Original:  provides 240 kcals and 9 gm protein each                                                           Nutrition Focused Physical Exam Findings:                          Energy Needs  Calculated Energy Needs Using Equations  Height: 169.1 cm (5' 6.58\")        Nutrition Diagnosis        Pt remains mildly malnourished as appetite/intakes have declined since initial visit.  N/V has improved with consistent use of antiemetics.  Dysgeusia also contributing to decreased oral intakes.         Continues to benefit from use of ONS daily.        Nutrition Interventions/Recommendations   Nutrition Prescription   High Protein, High Calorie  Food Safety          Nutrition Education   Try Ensure Plus vs Ensure Original for additional kcals; for now, plans to increase Ensure Original to TID.  Also suggested taking meds with Ensure and making Ensure into milkshakes.   Spoke with NP, will try Mirtazapine 15 mg to help with appetite/sleep   Eating smaller, more frequent snacks vs meals at this time.  Encourage PO fluid intakes; prefer calorie containing beverages at this time vs water due to decreased intakes.  Discussed increased fluid intakes to help decrease mucous production.   If upper GI symptoms persist, consider start of Budesonide    Coordination of Care   BMT team        Nutrition Monitoring and Evaluation      Will continue to f/up and monitor weight, labs, PO intakes, taste changes and GI symptoms PRN. "

## 2025-01-31 NOTE — ASSESSMENT & PLAN NOTE
Counts are declining, though unclear why.  No evidence relapse.  D+30 BM without evidence of AML.  Current evidence of disease: No  Maintenance Therapy: No  MRD Monitoring Plan: AML MFC; Day 100 BMBx requested for 3/3/25

## 2025-02-04 ENCOUNTER — OFFICE VISIT (OUTPATIENT)
Dept: HEMATOLOGY/ONCOLOGY | Facility: HOSPITAL | Age: 70
End: 2025-02-04
Payer: MEDICARE

## 2025-02-04 ENCOUNTER — LAB (OUTPATIENT)
Dept: HEMATOLOGY/ONCOLOGY | Facility: HOSPITAL | Age: 70
End: 2025-02-04
Payer: MEDICARE

## 2025-02-04 VITALS
BODY MASS INDEX: 25.74 KG/M2 | HEART RATE: 62 BPM | WEIGHT: 162.26 LBS | OXYGEN SATURATION: 100 % | RESPIRATION RATE: 16 BRPM | DIASTOLIC BLOOD PRESSURE: 67 MMHG | SYSTOLIC BLOOD PRESSURE: 123 MMHG | TEMPERATURE: 97.5 F

## 2025-02-04 DIAGNOSIS — Z94.84 HISTORY OF STEM CELL TRANSPLANT (MULTI): ICD-10-CM

## 2025-02-04 DIAGNOSIS — C92.01 ACUTE MYELOID LEUKEMIA IN REMISSION (MULTI): ICD-10-CM

## 2025-02-04 DIAGNOSIS — Z94.81 S/P ALLOGENEIC BONE MARROW TRANSPLANT (MULTI): ICD-10-CM

## 2025-02-04 DIAGNOSIS — D47.02 SYSTEMIC MASTOCYTOSIS WITH ASSOCIATED CLONAL HEMATOLOGICAL NON-MAST CELL LINEAGE DISEASE: ICD-10-CM

## 2025-02-04 DIAGNOSIS — D84.9 IMMUNOCOMPROMISED STATE: ICD-10-CM

## 2025-02-04 DIAGNOSIS — E83.42 HYPOMAGNESEMIA: ICD-10-CM

## 2025-02-04 DIAGNOSIS — D47.02 SYSTEMIC MASTOCYTOSIS WITH ASSOCIATED CLONAL HEMATOLOGICAL NON-MAST CELL LINEAGE DISEASE: Primary | ICD-10-CM

## 2025-02-04 LAB
ALBUMIN SERPL BCP-MCNC: 3.9 G/DL (ref 3.4–5)
ALP SERPL-CCNC: 47 U/L (ref 33–136)
ALT SERPL W P-5'-P-CCNC: 17 U/L (ref 10–52)
ANION GAP SERPL CALC-SCNC: 11 MMOL/L (ref 10–20)
AST SERPL W P-5'-P-CCNC: 16 U/L (ref 9–39)
BASOPHILS # BLD AUTO: 0.02 X10*3/UL (ref 0–0.1)
BASOPHILS NFR BLD AUTO: 1 %
BILIRUB SERPL-MCNC: 0.8 MG/DL (ref 0–1.2)
BUN SERPL-MCNC: 13 MG/DL (ref 6–23)
CALCIUM SERPL-MCNC: 8.4 MG/DL (ref 8.6–10.3)
CHLORIDE SERPL-SCNC: 106 MMOL/L (ref 98–107)
CO2 SERPL-SCNC: 24 MMOL/L (ref 21–32)
CREAT SERPL-MCNC: 1.15 MG/DL (ref 0.5–1.3)
EGFRCR SERPLBLD CKD-EPI 2021: 69 ML/MIN/1.73M*2
EOSINOPHIL # BLD AUTO: 0.12 X10*3/UL (ref 0–0.7)
EOSINOPHIL NFR BLD AUTO: 5.8 %
ERYTHROCYTE [DISTWIDTH] IN BLOOD BY AUTOMATED COUNT: 16.9 % (ref 11.5–14.5)
GLUCOSE SERPL-MCNC: 92 MG/DL (ref 74–99)
HAPTOGLOB SERPL NEPH-MCNC: 39 MG/DL (ref 30–200)
HCT VFR BLD AUTO: 25.1 % (ref 41–52)
HGB BLD-MCNC: 8.7 G/DL (ref 13.5–17.5)
IMM GRANULOCYTES # BLD AUTO: 0.01 X10*3/UL (ref 0–0.7)
IMM GRANULOCYTES NFR BLD AUTO: 0.5 % (ref 0–0.9)
LDH SERPL L TO P-CCNC: 127 U/L (ref 84–246)
LYMPHOCYTES # BLD AUTO: 0.31 X10*3/UL (ref 1.2–4.8)
LYMPHOCYTES NFR BLD AUTO: 15 %
MAGNESIUM SERPL-MCNC: 1.58 MG/DL (ref 1.6–2.4)
MCH RBC QN AUTO: 34.7 PG (ref 26–34)
MCHC RBC AUTO-ENTMCNC: 34.7 G/DL (ref 32–36)
MCV RBC AUTO: 100 FL (ref 80–100)
MONOCYTES # BLD AUTO: 0.26 X10*3/UL (ref 0.1–1)
MONOCYTES NFR BLD AUTO: 12.6 %
NEUTROPHILS # BLD AUTO: 1.34 X10*3/UL (ref 1.2–7.7)
NEUTROPHILS NFR BLD AUTO: 65.1 %
NRBC BLD-RTO: 0 /100 WBCS (ref 0–0)
PLATELET # BLD AUTO: 50 X10*3/UL (ref 150–450)
POTASSIUM SERPL-SCNC: 3.7 MMOL/L (ref 3.5–5.3)
PROT SERPL-MCNC: 5.9 G/DL (ref 6.4–8.2)
RBC # BLD AUTO: 2.51 X10*6/UL (ref 4.5–5.9)
SODIUM SERPL-SCNC: 137 MMOL/L (ref 136–145)
WBC # BLD AUTO: 2.1 X10*3/UL (ref 4.4–11.3)

## 2025-02-04 PROCEDURE — 3074F SYST BP LT 130 MM HG: CPT | Performed by: INTERNAL MEDICINE

## 2025-02-04 PROCEDURE — 80053 COMPREHEN METABOLIC PANEL: CPT

## 2025-02-04 PROCEDURE — 36591 DRAW BLOOD OFF VENOUS DEVICE: CPT

## 2025-02-04 PROCEDURE — 85025 COMPLETE CBC W/AUTO DIFF WBC: CPT

## 2025-02-04 PROCEDURE — 87799 DETECT AGENT NOS DNA QUANT: CPT

## 2025-02-04 PROCEDURE — 2500000004 HC RX 250 GENERAL PHARMACY W/ HCPCS (ALT 636 FOR OP/ED): Performed by: INTERNAL MEDICINE

## 2025-02-04 PROCEDURE — 83615 LACTATE (LD) (LDH) ENZYME: CPT

## 2025-02-04 PROCEDURE — 80197 ASSAY OF TACROLIMUS: CPT

## 2025-02-04 PROCEDURE — 83010 ASSAY OF HAPTOGLOBIN QUANT: CPT

## 2025-02-04 PROCEDURE — 1126F AMNT PAIN NOTED NONE PRSNT: CPT | Performed by: INTERNAL MEDICINE

## 2025-02-04 PROCEDURE — 3078F DIAST BP <80 MM HG: CPT | Performed by: INTERNAL MEDICINE

## 2025-02-04 PROCEDURE — 83735 ASSAY OF MAGNESIUM: CPT

## 2025-02-04 PROCEDURE — G2211 COMPLEX E/M VISIT ADD ON: HCPCS | Performed by: INTERNAL MEDICINE

## 2025-02-04 PROCEDURE — 99215 OFFICE O/P EST HI 40 MIN: CPT | Performed by: INTERNAL MEDICINE

## 2025-02-04 PROCEDURE — 1159F MED LIST DOCD IN RCRD: CPT | Performed by: INTERNAL MEDICINE

## 2025-02-04 PROCEDURE — 99215 OFFICE O/P EST HI 40 MIN: CPT | Mod: GC | Performed by: INTERNAL MEDICINE

## 2025-02-04 RX ORDER — HEPARIN 100 UNIT/ML
500 SYRINGE INTRAVENOUS AS NEEDED
Status: DISCONTINUED | OUTPATIENT
Start: 2025-02-04 | End: 2025-02-04 | Stop reason: HOSPADM

## 2025-02-04 RX ORDER — HEPARIN SODIUM,PORCINE/PF 10 UNIT/ML
50 SYRINGE (ML) INTRAVENOUS AS NEEDED
Status: CANCELLED | OUTPATIENT
Start: 2025-02-04

## 2025-02-04 RX ORDER — HEPARIN 100 UNIT/ML
500 SYRINGE INTRAVENOUS AS NEEDED
Status: CANCELLED | OUTPATIENT
Start: 2025-02-04

## 2025-02-04 RX ADMIN — HEPARIN 500 UNITS: 100 SYRINGE at 13:56

## 2025-02-04 ASSESSMENT — PAIN SCALES - GENERAL: PAINLEVEL_OUTOF10: 0-NO PAIN

## 2025-02-04 NOTE — PROGRESS NOTES
"    Patient ID: Jin Reynolds is a 70 y.o. male.  Primary Oncologist: Dr. 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   8/23/2023 Initial Diagnosis    DX:  SMOLDERING SYSTEMIC MASTOCYTOSIS  Presented with skin rash and eosinophilia    BRENDON DIAGNOSTIC CRITERIA  (For SM, Need major and 1 minor OR at least 3 minors)  - Multi-focal, dense infiltrates of MC (>= 15 mast cells in aggregates) in BM and/or other extra-cutaneous organs [major]  - In BM or extracutaneous organs, >25% MC spindle shaped OR have atypical morphology OR of all MC on BM aspirate smears, >25% are immature or atypical [minor]  - Detection of activating mutation KIT D816V in BM, PB, or other extracutaneous organ [minor]  - Serum tryptase persistently exceeds 20 ng/mL (unless associated clonal myeloid disorder, in which this paramenter is not valid) [minor]    \"B\" FINDINGS  - BM biopsy showing >30% infiltration (focal, dense aggregates) and/or serum tryptase > 200 ng/mL  - Signs of dysplasia or myeloproliferation, in non-MC lineages but insufficient criteria for definitive diagnosis of AHNMD with normal or slightly abnormal blood counts    \"C\" FINDINGS  None       8/23/2023 Biopsy    BONE MARROW (8/23/23)  Hypercellular (90-95%) with trilineage hematopoiesis, granulocytic hyperplasia, eosinophilia, and increased atypical mast cells  IP:  CD34+, +, CD7(bright)+, cCD3-, CD33-, CD15-, CD13+ blast population  IHC:  + increased spindle-shaped mast cells (15% cellularity), CD2-  Myeloid NGS:  CBL (31%), cKIT D816V (30%), RUNX1 x 2 (19%, 29%), SRSF2 (47%) [ASXL1 negative]  FISH:  PDGFRb negative    SERUM TRYPTASE  228 (8/23/23)  268 (9/5/23)     2/13/2024 -  Molecular Therapy    AVAPRITINIB   - Starting dose 25 mg daily (non-Adv SM)     7/16/2024 - 10/21/2024 Chemotherapy    Venetoclax / AzaCITIDine  - C1D1 7/17/24:  complicated by TLS, " venetoclax held after D1  - Post C1 BM (8/28/24):  hypercellular with >50% blasts, background systemic mastocytosis  - C2D1 9/5/24:  venetoclax restarted with C2, no TLS  - Post C2 BM (9/25/24):  hypocellular (<5%), atypical myeloblasts by IP/IHC (1-2%), persistent systemic mastocytosis  - C3D1 10/15/24:  delayed 1 week for count recovery  - Post C3 BM (10/23/24):  hypercellular with systemic mastocytosis, no increase blasts, 0.2% atypical blasts by IP, NGS w/ CBL (73%), cKIT (4%), RUNX1 x 2 (4%, 38%), SRSF2 (40%)       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    CONDITIONING Fludarabine, melphalan, and TBI   DONOR Matched sibling   MATCH GRADE 12/12   SEX MATCH Mismatched   ABO DONOR O pos   ABO RECIPIENT O pos   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, Mycophenolate, and Tacrolimus   GRAFT SOURCE Peripheral blood          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)   6/27/2024 Initial Diagnosis    ICC 2022 DX: Acute myeloid leukemia, NOS (>=20% blasts)  [id number] AML Risk Stratification: INTERMEDIATE: Cytogenetic and/or molecular abnormalities not classified as favorable or adverse  Presented with pancytopenia and circulating blasts    BONE MARROW (06/27/2024)  Marrow replaced by blasts, fibrotic foci, some atypical + cells; 7-8% circulating blasts; aspicular  - Unable to perform additional studies due to aspicular specimen    PERIPHERAL BLOOD (6/27/2024)  FISH:  positive for gain RUNX1    PERIPHERAL BLOOD (7/1/24)  IP:  abnl myeloblast population (CD34+, CD7+, CD4+, CD13+, CD38+, CD11+ dim, HLA=DR+, TdT+, CD33-)  Myeloid NGS: CBL C404Y (41%), KIT D816V (8%), RUNX1 x2 (8%, 30%), SRSF2 (37%)     7/16/2024 - 10/21/2024 Chemotherapy    Venetoclax / AzaCITIDine  - C1D1 7/17/24:  complicated by TLS, venetoclax held after D1  - Post C1 BM (8/28/24):  hypercellular with >50% blasts, background systemic mastocytosis  - C2D1 9/5/24:  venetoclax restarted with C2, no TLS  - Post C2 BM (9/25/24):  hypocellular  (<5%), atypical myeloblasts by IP/IHC (1-2%), persistent systemic mastocytosis  - C3D1 10/15/24:  delayed 1 week for count recovery  - Post C3 BM (10/23/24):  hypercellular with systemic mastocytosis, no increase blasts, 0.2% atypical blasts by IP, NGS w/ CBL (73%), cKIT (4%), RUNX1 x 2 (4%, 38%), SRSF2 (40%)       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    CONDITIONING Fludarabine, melphalan, and TBI   DONOR Matched sibling   MATCH GRADE 12/12   SEX MATCH Mismatched   ABO DONOR O pos   ABO RECIPIENT O pos   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, Mycophenolate, and Tacrolimus   GRAFT SOURCE Peripheral blood          12/26/2024 -  Infection    Parainfluenza URI     12/26/2024 -  Disease Reevaluation      DATE DAY SOURCE MORPHOLOGY MRD WHOLE CHIMERISM   (% donor) CD3 CHIMERISM   (% donor) CD33 CHIMERISM   (% donor)   12/26/2024 D+30 PB        12/26/2024 D+30 BM No AML, persist BRENDON Neg       D+60 PB         D+100 PB         D+100 BM                  02/04/25 T+75 s/p MRD allo SCT. Counts remain low but stable, plts at  50 today. 100% donor chimerism on D30 bmbx, next time point D100. Unable to get approval for TPO agent, will work on getting avapritinib for persistent mast cell disease seen post transplant. Will need to switch antifungal to isavuconazole d/t interactions. No evidence of infection or GVHD, mild dry skin on forehead only. Tacro levels pending today, on tacro 2 mg BID. Continue twice weekly count checks for the week but than can move to once weekly. EBV positive at last check, will recheck weekly to monitor.  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 Serum tryptase elevated. Will submit for avapritinib today, plan to initiate when obtained.   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)  Counts remain low without evidence of relapse. Possibly  related to mast cell disease which was present on D30 bmbx, no evidence of AML.  Current evidence of disease: No  Maintenance Therapy: No  MRD Monitoring Plan: AML MFC; Day 100 BMBx requested for 3/3/25  History of stem cell transplant (Multi)  Day +75 following allogeneic stem cell transplant.  He does not have evidence of GVHD (last overall grade NAOverall Grade (Przepiorka): 0).  Continue current immunosuppressive therapy. D+30 PB chimerisms great. Day 60 PB chimerisms 100% donor.   Hypomagnesemia  Continue Mag/Amino Acid Chelate 133 mg tablets  Immunocompromised state  Remains at high risk for infections.  No current active signs or symptoms of infection.    Continues on prophylaxis with acyclovir and posaconazole.  TMP/SMX briefly held given hyperkalemia and renal insufficiency 1/10-1/17. Monitoring sCr for now.    Will switch posa to isavu d/t interaction with avapritinib  Ongoing viral monitoring (as below): EBV positive at last check, recheck weekly to monitor  Immune reconstitution: Assess at D+100.  Post Tx Immunizations are planned for D+180. Will need scheduled.       SUBJECTIVE     Days since transplant: 75     HPI    Jin Reynolds presents today for post transplant follow up, unaccompanied today. He is feeling overall well, continues to try and stay active walking on treadmill every other day. Diet has been good except yesterday with nothing tasted good. Occasional nausea taking zofran premeals. No fevers or chills, no diarrhea or constipation, no SOB, no bruising or bleeding. Continues with dry skin on forehead and face but not red or itchy, improves with lotion.     Review of Systems   All other systems reviewed and are negative.      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      OBJECTIVE     BSA: 1.86 meters squared  /67   Pulse 62   Temp 36.4 °C (97.5 °F)   Resp 16   Wt 73.6 kg (162 lb 4.1 oz)   SpO2 100%   BMI 25.74 kg/m²   Weight         2/4/2025  1436             Weight: 73.6 kg (162 lb 4.1 oz)             Physical Exam  Constitutional:       Appearance: Normal appearance.   Eyes:      Conjunctiva/sclera: Conjunctivae normal.      Pupils: Pupils are equal, round, and reactive to light.   Cardiovascular:      Rate and Rhythm: Normal rate and regular rhythm.   Pulmonary:      Effort: Pulmonary effort is normal.      Breath sounds: Normal breath sounds.   Musculoskeletal:         General: Normal range of motion.   Skin:     Findings: No rash.   Neurological:      Mental Status: He is alert. Mental status is at baseline.   Psychiatric:         Mood and Affect: Mood normal.       Performance Status:  Karnofsky Score: 80 - Normal activity with effort; some signs or symptoms of disease      POST TRANSPLANT MONITORING   GVHD  Acute GVHD Overall stGstrstastdstest:st st1st Chronic GVHD Total Score:  0    Viral Monitoring:     CMV   Lab Results   Component Value Date    CMVDNAPCR Not Detected 01/28/2025    CMVDNAPCR Not Detected 01/24/2025    CMVDNAPCR Not Detected 01/20/2025      EBV   Lab Results   Component Value Date    EBVDNAPCR Not Detected 12/02/2024    EBVDNAPCR Not Detected 11/25/2024    EBVDNAPCR Not Detected 10/23/2024      Adenovirus   Lab Results   Component Value Date    ADEPB Not Detected 01/28/2025    ADEPB Not Detected 01/20/2025    ADEPB Not Detected 01/13/2025       Toxo   No results found for: \"TGONDPCRBL\"      TMA Monitoring:     MARKERS RECENT 3 VALUES   LDH Lab Results   Component Value Date     02/04/2025     01/28/2025    "  01/24/2025      Haptoglobin Lab Results   Component Value Date    HAPTOGLOBIN 39 02/04/2025    HAPTOGLOBIN 46 01/28/2025    HAPTOGLOBIN 48 01/20/2025      BP BP Readings from Last 3 Encounters:   02/04/25 123/67   01/31/25 118/68   01/28/25 105/65      Serum Creatinine Lab Results   Component Value Date    CREATININE 1.15 02/04/2025    CREATININE 1.04 01/31/2025    CREATININE 1.36 (H) 01/28/2025      Urine Protein/Creat Ratio Lab Results   Component Value Date    UTPCR 0.11 01/28/2025    UTPCR 0.13 01/20/2025    UTPCR 0.14 01/13/2025      Tacrolimus Level Lab Results   Component Value Date    TACROLIMUS 2.4 01/31/2025    TACROLIMUS 5.4 01/28/2025    TACROLIMUS 4.1 01/24/2025

## 2025-02-05 LAB
CMV DNA SERPL NAA+PROBE-LOG IU: NORMAL {LOG_IU}/ML
EBV DNA BLD NAA+PROBE-LOG IU: NORMAL {LOG_IU}/ML
LABORATORY COMMENT REPORT: NOT DETECTED
LABORATORY COMMENT REPORT: NOT DETECTED
TACROLIMUS BLD-MCNC: 4.3 NG/ML

## 2025-02-05 NOTE — ASSESSMENT & PLAN NOTE
Remains at high risk for infections.  No current active signs or symptoms of infection.    Continues on prophylaxis with acyclovir and posaconazole.  TMP/SMX briefly held given hyperkalemia and renal insufficiency 1/10-1/17. Monitoring sCr for now.    Will switch posa to isavu d/t interaction with avapritinib  Ongoing viral monitoring (as below): EBV positive at last check, recheck weekly to monitor  Immune reconstitution: Assess at D+100.  Post Tx Immunizations are planned for D+180. Will need scheduled.

## 2025-02-05 NOTE — ASSESSMENT & PLAN NOTE
Counts remain low without evidence of relapse. Possibly related to mast cell disease which was present on D30 bmbx, no evidence of AML.  Current evidence of disease: No  Maintenance Therapy: No  MRD Monitoring Plan: AML MFC; Day 100 BMBx requested for 3/3/25

## 2025-02-05 NOTE — ASSESSMENT & PLAN NOTE
Day +75 following allogeneic stem cell transplant.  He does not have evidence of GVHD (last overall grade NAOverall Grade (Przepiorka): 0).  Continue current immunosuppressive therapy. D+30 PB chimerisms great. Day 60 PB chimerisms 100% donor.

## 2025-02-05 NOTE — ASSESSMENT & PLAN NOTE
BM at D30 demonstrates persistent mast cells which may be expected at this point. Serum tryptase elevated. Will submit for avapritinib today, plan to initiate when obtained.

## 2025-02-06 LAB — ADENOVIRUS QPCR,PLASMA, VIRC: NOT DETECTED COPIES/ML

## 2025-02-06 RX ORDER — ISAVUCONAZONIUM SULFATE 186 MG/1
372 CAPSULE ORAL DAILY
Qty: 60 CAPSULE | Refills: 11 | Status: SHIPPED | OUTPATIENT
Start: 2025-02-06

## 2025-02-07 ENCOUNTER — INFUSION (OUTPATIENT)
Dept: HEMATOLOGY/ONCOLOGY | Facility: HOSPITAL | Age: 70
End: 2025-02-07
Payer: MEDICARE

## 2025-02-07 ENCOUNTER — OFFICE VISIT (OUTPATIENT)
Dept: HEMATOLOGY/ONCOLOGY | Facility: HOSPITAL | Age: 70
End: 2025-02-07
Payer: MEDICARE

## 2025-02-07 ENCOUNTER — NUTRITION (OUTPATIENT)
Dept: HEMATOLOGY/ONCOLOGY | Facility: HOSPITAL | Age: 70
End: 2025-02-07

## 2025-02-07 ENCOUNTER — LAB (OUTPATIENT)
Dept: HEMATOLOGY/ONCOLOGY | Facility: HOSPITAL | Age: 70
End: 2025-02-07
Payer: MEDICARE

## 2025-02-07 VITALS — WEIGHT: 164.9 LBS | BODY MASS INDEX: 25.88 KG/M2 | HEIGHT: 67 IN

## 2025-02-07 VITALS
DIASTOLIC BLOOD PRESSURE: 69 MMHG | WEIGHT: 165 LBS | SYSTOLIC BLOOD PRESSURE: 119 MMHG | TEMPERATURE: 98.1 F | HEART RATE: 61 BPM | RESPIRATION RATE: 19 BRPM | BODY MASS INDEX: 26.17 KG/M2 | OXYGEN SATURATION: 100 %

## 2025-02-07 DIAGNOSIS — R11.2 NAUSEA AND VOMITING, UNSPECIFIED VOMITING TYPE: ICD-10-CM

## 2025-02-07 DIAGNOSIS — Z94.84 HISTORY OF STEM CELL TRANSPLANT (MULTI): ICD-10-CM

## 2025-02-07 DIAGNOSIS — D47.02 SYSTEMIC MASTOCYTOSIS WITH ASSOCIATED CLONAL HEMATOLOGICAL NON-MAST CELL LINEAGE DISEASE: ICD-10-CM

## 2025-02-07 DIAGNOSIS — Z94.84 HISTORY OF ALLOGENEIC STEM CELL TRANSPLANT (MULTI): ICD-10-CM

## 2025-02-07 DIAGNOSIS — C92.01 ACUTE MYELOID LEUKEMIA IN REMISSION (MULTI): ICD-10-CM

## 2025-02-07 DIAGNOSIS — D47.02 SYSTEMIC MASTOCYTOSIS WITH ASSOCIATED CLONAL HEMATOLOGICAL NON-MAST CELL LINEAGE DISEASE: Primary | ICD-10-CM

## 2025-02-07 DIAGNOSIS — E83.42 HYPOMAGNESEMIA: ICD-10-CM

## 2025-02-07 DIAGNOSIS — Z94.81 S/P ALLOGENEIC BONE MARROW TRANSPLANT (MULTI): ICD-10-CM

## 2025-02-07 DIAGNOSIS — N17.9 AKI (ACUTE KIDNEY INJURY) (CMS-HCC): ICD-10-CM

## 2025-02-07 DIAGNOSIS — E87.6 HYPOKALEMIA: ICD-10-CM

## 2025-02-07 DIAGNOSIS — D84.9 IMMUNOCOMPROMISED STATE: ICD-10-CM

## 2025-02-07 PROBLEM — D69.6 THROMBOCYTOPENIA (CMS-HCC): Status: RESOLVED | Noted: 2025-01-21 | Resolved: 2025-02-07

## 2025-02-07 PROBLEM — Z87.19 HISTORY OF UPPER GASTROINTESTINAL HEMORRHAGE: Status: RESOLVED | Noted: 2024-01-01 | Resolved: 2025-02-07

## 2025-02-07 LAB
ALBUMIN SERPL BCP-MCNC: 3.8 G/DL (ref 3.4–5)
ALP SERPL-CCNC: 49 U/L (ref 33–136)
ALT SERPL W P-5'-P-CCNC: 15 U/L (ref 10–52)
ANION GAP SERPL CALC-SCNC: 11 MMOL/L (ref 10–20)
APPEARANCE UR: CLEAR
AST SERPL W P-5'-P-CCNC: 13 U/L (ref 9–39)
BASOPHILS # BLD AUTO: 0.01 X10*3/UL (ref 0–0.1)
BASOPHILS NFR BLD AUTO: 0.4 %
BILIRUB SERPL-MCNC: 0.7 MG/DL (ref 0–1.2)
BILIRUB UR STRIP.AUTO-MCNC: NEGATIVE MG/DL
BUN SERPL-MCNC: 15 MG/DL (ref 6–23)
CALCIUM SERPL-MCNC: 8.2 MG/DL (ref 8.6–10.3)
CHLORIDE SERPL-SCNC: 106 MMOL/L (ref 98–107)
CO2 SERPL-SCNC: 24 MMOL/L (ref 21–32)
COLOR UR: NORMAL
CREAT SERPL-MCNC: 1.03 MG/DL (ref 0.5–1.3)
CREAT UR-MCNC: 53.5 MG/DL (ref 20–370)
EGFRCR SERPLBLD CKD-EPI 2021: 79 ML/MIN/1.73M*2
EOSINOPHIL # BLD AUTO: 0.1 X10*3/UL (ref 0–0.7)
EOSINOPHIL NFR BLD AUTO: 4.1 %
ERYTHROCYTE [DISTWIDTH] IN BLOOD BY AUTOMATED COUNT: 16.7 % (ref 11.5–14.5)
GLUCOSE SERPL-MCNC: 132 MG/DL (ref 74–99)
GLUCOSE UR STRIP.AUTO-MCNC: NORMAL MG/DL
HCT VFR BLD AUTO: 25.8 % (ref 41–52)
HGB BLD-MCNC: 8.9 G/DL (ref 13.5–17.5)
IMM GRANULOCYTES # BLD AUTO: 0.01 X10*3/UL (ref 0–0.7)
IMM GRANULOCYTES NFR BLD AUTO: 0.4 % (ref 0–0.9)
KETONES UR STRIP.AUTO-MCNC: NEGATIVE MG/DL
LDH SERPL L TO P-CCNC: 117 U/L (ref 84–246)
LEUKOCYTE ESTERASE UR QL STRIP.AUTO: NEGATIVE
LYMPHOCYTES # BLD AUTO: 0.25 X10*3/UL (ref 1.2–4.8)
LYMPHOCYTES NFR BLD AUTO: 10.3 %
MAGNESIUM SERPL-MCNC: 1.69 MG/DL (ref 1.6–2.4)
MCH RBC QN AUTO: 35.2 PG (ref 26–34)
MCHC RBC AUTO-ENTMCNC: 34.5 G/DL (ref 32–36)
MCV RBC AUTO: 102 FL (ref 80–100)
MONOCYTES # BLD AUTO: 0.28 X10*3/UL (ref 0.1–1)
MONOCYTES NFR BLD AUTO: 11.5 %
NEUTROPHILS # BLD AUTO: 1.78 X10*3/UL (ref 1.2–7.7)
NEUTROPHILS NFR BLD AUTO: 73.3 %
NITRITE UR QL STRIP.AUTO: NEGATIVE
NRBC BLD-RTO: 0 /100 WBCS (ref 0–0)
PH UR STRIP.AUTO: 6.5 [PH]
PLATELET # BLD AUTO: 49 X10*3/UL (ref 150–450)
POTASSIUM SERPL-SCNC: 3.7 MMOL/L (ref 3.5–5.3)
PROT SERPL-MCNC: 5.9 G/DL (ref 6.4–8.2)
PROT UR STRIP.AUTO-MCNC: NEGATIVE MG/DL
PROT UR-ACNC: 7 MG/DL (ref 5–25)
PROT/CREAT UR: 0.13 MG/MG CREAT (ref 0–0.17)
RBC # BLD AUTO: 2.53 X10*6/UL (ref 4.5–5.9)
RBC # UR STRIP.AUTO: NEGATIVE MG/DL
SODIUM SERPL-SCNC: 137 MMOL/L (ref 136–145)
SP GR UR STRIP.AUTO: 1.01
TACROLIMUS BLD-MCNC: 2.5 NG/ML
UROBILINOGEN UR STRIP.AUTO-MCNC: NORMAL MG/DL
WBC # BLD AUTO: 2.4 X10*3/UL (ref 4.4–11.3)

## 2025-02-07 PROCEDURE — 36591 DRAW BLOOD OFF VENOUS DEVICE: CPT

## 2025-02-07 PROCEDURE — 1036F TOBACCO NON-USER: CPT | Performed by: NURSE PRACTITIONER

## 2025-02-07 PROCEDURE — 81003 URINALYSIS AUTO W/O SCOPE: CPT

## 2025-02-07 PROCEDURE — 80197 ASSAY OF TACROLIMUS: CPT

## 2025-02-07 PROCEDURE — 83735 ASSAY OF MAGNESIUM: CPT

## 2025-02-07 PROCEDURE — 3078F DIAST BP <80 MM HG: CPT | Performed by: NURSE PRACTITIONER

## 2025-02-07 PROCEDURE — 85025 COMPLETE CBC W/AUTO DIFF WBC: CPT

## 2025-02-07 PROCEDURE — 96523 IRRIG DRUG DELIVERY DEVICE: CPT

## 2025-02-07 PROCEDURE — 1126F AMNT PAIN NOTED NONE PRSNT: CPT | Performed by: NURSE PRACTITIONER

## 2025-02-07 PROCEDURE — 99215 OFFICE O/P EST HI 40 MIN: CPT | Performed by: NURSE PRACTITIONER

## 2025-02-07 PROCEDURE — 2500000004 HC RX 250 GENERAL PHARMACY W/ HCPCS (ALT 636 FOR OP/ED): Performed by: INTERNAL MEDICINE

## 2025-02-07 PROCEDURE — 1159F MED LIST DOCD IN RCRD: CPT | Performed by: NURSE PRACTITIONER

## 2025-02-07 PROCEDURE — 84156 ASSAY OF PROTEIN URINE: CPT

## 2025-02-07 PROCEDURE — 1160F RVW MEDS BY RX/DR IN RCRD: CPT | Performed by: NURSE PRACTITIONER

## 2025-02-07 PROCEDURE — G2211 COMPLEX E/M VISIT ADD ON: HCPCS | Performed by: NURSE PRACTITIONER

## 2025-02-07 PROCEDURE — 80053 COMPREHEN METABOLIC PANEL: CPT

## 2025-02-07 PROCEDURE — 83615 LACTATE (LD) (LDH) ENZYME: CPT

## 2025-02-07 PROCEDURE — 3074F SYST BP LT 130 MM HG: CPT | Performed by: NURSE PRACTITIONER

## 2025-02-07 RX ORDER — HEPARIN 100 UNIT/ML
500 SYRINGE INTRAVENOUS AS NEEDED
Status: CANCELLED | OUTPATIENT
Start: 2025-02-07

## 2025-02-07 RX ORDER — HEPARIN 100 UNIT/ML
500 SYRINGE INTRAVENOUS AS NEEDED
OUTPATIENT
Start: 2025-02-07

## 2025-02-07 RX ORDER — HEPARIN SODIUM,PORCINE/PF 10 UNIT/ML
50 SYRINGE (ML) INTRAVENOUS AS NEEDED
Status: CANCELLED | OUTPATIENT
Start: 2025-02-07

## 2025-02-07 RX ORDER — HEPARIN SODIUM,PORCINE/PF 10 UNIT/ML
50 SYRINGE (ML) INTRAVENOUS AS NEEDED
OUTPATIENT
Start: 2025-02-07

## 2025-02-07 RX ORDER — HEPARIN 100 UNIT/ML
500 SYRINGE INTRAVENOUS AS NEEDED
Status: DISCONTINUED | OUTPATIENT
Start: 2025-02-07 | End: 2025-02-07 | Stop reason: HOSPADM

## 2025-02-07 RX ADMIN — HEPARIN 500 UNITS: 100 SYRINGE at 15:04

## 2025-02-07 ASSESSMENT — ENCOUNTER SYMPTOMS
CHILLS: 0
NAUSEA: 0
COUGH: 0
SHORTNESS OF BREATH: 0
NUMBNESS: 0
MYALGIAS: 0
TROUBLE SWALLOWING: 0
FEVER: 0
HEMATURIA: 0
PALPITATIONS: 0
BLOOD IN STOOL: 0
HEMOPTYSIS: 0
UNEXPECTED WEIGHT CHANGE: 0
EYE PROBLEMS: 0
LEG SWELLING: 0
HEADACHES: 0
VOMITING: 0
DYSURIA: 0
DIARRHEA: 0

## 2025-02-07 ASSESSMENT — PAIN SCALES - GENERAL: PAINLEVEL_OUTOF10: 0-NO PAIN

## 2025-02-07 NOTE — ASSESSMENT & PLAN NOTE
Counts remain low without evidence of relapse. Possibly related to mast cell disease which was present on D30 bmbx, no evidence of AML.  PLT now < 50.  eltrombopag denied by insurance.  Will resubmit after PLT < 50 x2  Current evidence of disease: No  Maintenance Therapy: No  MRD Monitoring Plan: AML MFC; Day 100 BMBx scheduled for 3/3/25

## 2025-02-07 NOTE — ASSESSMENT & PLAN NOTE
BM at D30 demonstrates persistent mast cells which may be expected at this point. Serum tryptase elevated. Avapritinib pending PA.  Asked patient to start once received and notify us of start

## 2025-02-07 NOTE — ASSESSMENT & PLAN NOTE
Day +78 following allogeneic stem cell transplant.  He does not have evidence of GVHD (last overall grade NA Overall Grade (Przepiorka): 0).  Continue current immunosuppressive therapy; tacro 2 mg BID. D+30 PB chimerisms great. Day 60 PB chimerisms 100% donor.

## 2025-02-07 NOTE — PROGRESS NOTES
"    Patient ID: Jin Reynolds is a 70 y.o. male.  Primary Oncologist: Dr. 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   8/23/2023 Initial Diagnosis    DX:  SMOLDERING SYSTEMIC MASTOCYTOSIS  Presented with skin rash and eosinophilia    BRENDON DIAGNOSTIC CRITERIA  (For SM, Need major and 1 minor OR at least 3 minors)  - Multi-focal, dense infiltrates of MC (>= 15 mast cells in aggregates) in BM and/or other extra-cutaneous organs [major]  - In BM or extracutaneous organs, >25% MC spindle shaped OR have atypical morphology OR of all MC on BM aspirate smears, >25% are immature or atypical [minor]  - Detection of activating mutation KIT D816V in BM, PB, or other extracutaneous organ [minor]  - Serum tryptase persistently exceeds 20 ng/mL (unless associated clonal myeloid disorder, in which this paramenter is not valid) [minor]    \"B\" FINDINGS  - BM biopsy showing >30% infiltration (focal, dense aggregates) and/or serum tryptase > 200 ng/mL  - Signs of dysplasia or myeloproliferation, in non-MC lineages but insufficient criteria for definitive diagnosis of AHNMD with normal or slightly abnormal blood counts    \"C\" FINDINGS  None       8/23/2023 Biopsy    BONE MARROW (8/23/23)  Hypercellular (90-95%) with trilineage hematopoiesis, granulocytic hyperplasia, eosinophilia, and increased atypical mast cells  IP:  CD34+, +, CD7(bright)+, cCD3-, CD33-, CD15-, CD13+ blast population  IHC:  + increased spindle-shaped mast cells (15% cellularity), CD2-  Myeloid NGS:  CBL (31%), cKIT D816V (30%), RUNX1 x 2 (19%, 29%), SRSF2 (47%) [ASXL1 negative]  FISH:  PDGFRb negative    SERUM TRYPTASE  228 (8/23/23)  268 (9/5/23)     2/13/2024 -  Molecular Therapy    AVAPRITINIB   - Starting dose 25 mg daily (non-Adv SM)     7/16/2024 - 10/21/2024 Chemotherapy    Venetoclax / AzaCITIDine  - C1D1 7/17/24:  complicated by TLS, venetoclax held " after D1  - Post C1 BM (8/28/24):  hypercellular with >50% blasts, background systemic mastocytosis  - C2D1 9/5/24:  venetoclax restarted with C2, no TLS  - Post C2 BM (9/25/24):  hypocellular (<5%), atypical myeloblasts by IP/IHC (1-2%), persistent systemic mastocytosis  - C3D1 10/15/24:  delayed 1 week for count recovery  - Post C3 BM (10/23/24):  hypercellular with systemic mastocytosis, no increase blasts, 0.2% atypical blasts by IP, NGS w/ CBL (73%), cKIT (4%), RUNX1 x 2 (4%, 38%), SRSF2 (40%)       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    CONDITIONING Fludarabine, melphalan, and TBI   DONOR Matched sibling   MATCH GRADE 12/12   SEX MATCH Mismatched   ABO DONOR O pos   ABO RECIPIENT O pos   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, Mycophenolate, and Tacrolimus   GRAFT SOURCE Peripheral blood          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)   6/27/2024 Initial Diagnosis    ICC 2022 DX: Acute myeloid leukemia, NOS (>=20% blasts)  [id number] AML Risk Stratification: INTERMEDIATE: Cytogenetic and/or molecular abnormalities not classified as favorable or adverse  Presented with pancytopenia and circulating blasts    BONE MARROW (06/27/2024)  Marrow replaced by blasts, fibrotic foci, some atypical + cells; 7-8% circulating blasts; aspicular  - Unable to perform additional studies due to aspicular specimen    PERIPHERAL BLOOD (6/27/2024)  FISH:  positive for gain RUNX1    PERIPHERAL BLOOD (7/1/24)  IP:  abnl myeloblast population (CD34+, CD7+, CD4+, CD13+, CD38+, CD11+ dim, HLA=DR+, TdT+, CD33-)  Myeloid NGS: CBL C404Y (41%), KIT D816V (8%), RUNX1 x2 (8%, 30%), SRSF2 (37%)     7/16/2024 - 10/21/2024 Chemotherapy    Venetoclax / AzaCITIDine  - C1D1 7/17/24:  complicated by TLS, venetoclax held after D1  - Post C1 BM (8/28/24):  hypercellular with >50% blasts, background systemic mastocytosis  - C2D1 9/5/24:  venetoclax restarted with C2, no TLS  - Post C2 BM (9/25/24):  hypocellular (<5%), atypical  myeloblasts by IP/IHC (1-2%), persistent systemic mastocytosis  - C3D1 10/15/24:  delayed 1 week for count recovery  - Post C3 BM (10/23/24):  hypercellular with systemic mastocytosis, no increase blasts, 0.2% atypical blasts by IP, NGS w/ CBL (73%), cKIT (4%), RUNX1 x 2 (4%, 38%), SRSF2 (40%)       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    CONDITIONING Fludarabine, melphalan, and TBI   DONOR Matched sibling   MATCH GRADE 12/12   SEX MATCH Mismatched   ABO DONOR O pos   ABO RECIPIENT O pos   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, Mycophenolate, and Tacrolimus   GRAFT SOURCE Peripheral blood          12/26/2024 -  Infection    Parainfluenza URI     12/26/2024 -  Disease Reevaluation      DATE DAY SOURCE MORPHOLOGY MRD WHOLE CHIMERISM   (% donor) CD3 CHIMERISM   (% donor) CD33 CHIMERISM   (% donor)   12/26/2024 D+30 PB        12/26/2024 D+30 BM No AML, persist BRENDON Neg       D+60 PB         D+100 PB         D+100 BM                02/07/25 Feeling well.  No concerns.  He has not yet received the avapritinib or isavuconazomium.  Instructed to notify us once received. 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 Serum tryptase elevated. Avapritinib pending PA.  Asked patient to start once received and notify us of start  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)  Counts remain low without evidence of relapse. Possibly related to mast cell disease which was present on D30 bmbx, no evidence of AML.  PLT now < 50.  eltrombopag denied by insurance.  Will resubmit after PLT < 50 x2  Current evidence of disease: No  Maintenance Therapy: No  MRD Monitoring Plan: AML MFC; Day 100 BMBx scheduled for 3/3/25  History of stem cell transplant (Multi)  Day +78 following allogeneic stem cell transplant.  He does not have evidence of GVHD (last overall grade NA  Overall Grade (Przepiorka): 0).  Continue current immunosuppressive therapy; tacro 2 mg BID. D+30 PB chimerisms great. Day 60 PB chimerisms 100% donor.   Immunocompromised state  Remains at high risk for infections.  No current active signs or symptoms of infection.    Continues on prophylaxis with acyclovir and posaconazole.  TMP/SMX briefly held given hyperkalemia and renal insufficiency 1/10-1/17. Monitoring sCr for now.    Will switch posa to isavu d/t interaction with avapritinib. Cresemba script pending delivery  Ongoing viral monitoring (as below): EBV positive on 1/28/25, recheck weekly to monitor  Immune reconstitution: Assess at D+100.  Post Tx Immunizations are planned for D+180. Will need scheduled.   Nausea and vomiting, unspecified vomiting type  Resolved.  Anti-emetics PRN  Hypomagnesemia  Continue Mag/Amino Acid Chelate 133 mg tablets    SUBJECTIVE     Days since transplant: 78     Patient presents today, unaccompanied, for follow up.  Reports feeling well.  He is eating and drinking well with no N/V.  No concerns or complaints today.      Review of Systems   Constitutional:  Negative for chills, fever and unexpected weight change.   HENT:   Negative for mouth sores, nosebleeds and trouble swallowing.    Eyes:  Negative for eye problems.   Respiratory:  Negative for cough, hemoptysis and shortness of breath.    Cardiovascular:  Negative for chest pain, leg swelling and palpitations.   Gastrointestinal:  Negative for blood in stool, diarrhea, nausea and vomiting.   Genitourinary:  Negative for dysuria and hematuria.    Musculoskeletal:  Negative for myalgias.   Skin:  Negative for itching and rash.   Neurological:  Negative for headaches and numbness.     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      OBJECTIVE     Physical Exam  Constitutional:       Appearance: Normal appearance.   HENT:      Head: Normocephalic.   Eyes:      Pupils: Pupils are equal, round, and reactive to light.   Cardiovascular:      Rate and Rhythm: Normal rate and regular rhythm.   Pulmonary:      Effort: Pulmonary effort is normal.      Breath sounds: Normal breath sounds.   Abdominal:      General: Bowel sounds are normal.      Palpations: Abdomen is soft.   Musculoskeletal:         General: Normal range of motion.      Cervical back: Normal range of motion and neck supple.   Lymphadenopathy:      Comments: No lymphadenopathy   Skin:     General: Skin is warm and dry.      Findings: No lesion or rash.   Neurological:      General: No focal deficit present.      Mental Status: He is alert and oriented to person, place, and time. Mental status is at baseline.      Comments: No numbness or tingling   Psychiatric:         Mood and Affect: Mood normal.       Performance Status:  Karnofsky Score: 80 - Normal activity with effort; some signs or symptoms of disease    POST TRANSPLANT MONITORING   GVHD  Acute GVHD Overall stGstrstastdstest:st st1st Viral Monitoring:     CMV   No results found for: \"CMVPCRIU\"  2/4/2025 ND   EBV   No results found for: \"EBVPCRQNTWB\"  2/4/2025 ND   Adenovirus   Lab Results   Component Value Date    ADEPB Not Detected 02/04/2025    ADEPB Not Detected 01/28/2025    ADEPB Not Detected 01/20/2025       Toxo   No results found for: \"TGONDPCRBL\"       TMA Monitoring:     MARKERS RECENT 3 VALUES   LDH Lab Results   Component Value Date     02/04/2025     01/28/2025     01/24/2025      Haptoglobin Lab Results   Component Value Date    HAPTOGLOBIN 39 02/04/2025 "    HAPTOGLOBIN 46 01/28/2025    HAPTOGLOBIN 48 01/20/2025      BP BP Readings from Last 3 Encounters:   02/07/25 119/69   02/04/25 123/67   01/31/25 118/68      Serum Creatinine Lab Results   Component Value Date    CREATININE 1.15 02/04/2025    CREATININE 1.04 01/31/2025    CREATININE 1.36 (H) 01/28/2025      Urine Protein/Creat Ratio Lab Results   Component Value Date    UTPCR 0.11 01/28/2025    UTPCR 0.13 01/20/2025    UTPCR 0.14 01/13/2025      Tacrolimus Level Lab Results   Component Value Date    TACROLIMUS 4.3 02/04/2025    TACROLIMUS 2.4 01/31/2025    TACROLIMUS 5.4 01/28/2025        RTC:  Weekly count checks and MD/ZORA follow up    Marya Jackson, APRN-CNP

## 2025-02-07 NOTE — PROGRESS NOTES
Patient seen in infusion for count check, no required transfusions today. Seen at bedside by Marya NIETO CNP. Labs and schedule printed and reviewed with patient. Discharged ambulatory in stable condition.

## 2025-02-07 NOTE — ASSESSMENT & PLAN NOTE
Remains at high risk for infections.  No current active signs or symptoms of infection.    Continues on prophylaxis with acyclovir and posaconazole.  TMP/SMX briefly held given hyperkalemia and renal insufficiency 1/10-1/17. Monitoring sCr for now.    Will switch posa to isavu d/t interaction with avapritinib. Cresemba script pending delivery  Ongoing viral monitoring (as below): EBV positive on 1/28/25, recheck weekly to monitor  Immune reconstitution: Assess at D+100.  Post Tx Immunizations are planned for D+180. Will need scheduled.

## 2025-02-07 NOTE — PROGRESS NOTES
"NUTRITION FOLLOW UP NOTE    Reason for Visit:  Jin Reynolds is a 70 y.o. 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    Transplant c/b by mucositis, CINV and diarrhea     Currently T+78    Pt seen today in infusion      Lab Results   Component Value Date/Time    GLUCOSE 92 02/04/2025 1355     02/04/2025 1355    K 3.7 02/04/2025 1355     02/04/2025 1355    CO2 24 02/04/2025 1355    ANIONGAP 11 02/04/2025 1355    BUN 13 02/04/2025 1355    CREATININE 1.15 02/04/2025 1355    EGFR 69 02/04/2025 1355    CALCIUM 8.4 (L) 02/04/2025 1355    ALBUMIN 3.9 02/04/2025 1355    ALKPHOS 47 02/04/2025 1355    PROT 5.9 (L) 02/04/2025 1355    AST 16 02/04/2025 1355    BILITOT 0.8 02/04/2025 1355    ALT 17 02/04/2025 1355    MG 1.58 (L) 02/04/2025 1355    PHOS 2.3 (L) 12/07/2024 0608     Lab Results   Component Value Date/Time    VITD25 27 (L) 02/05/2024 0807   Taking Vit D 2000IU daily    Lab Results   Component Value Date    WBC 2.4 (L) 02/07/2025    HGB 8.9 (L) 02/07/2025    HCT 25.8 (L) 02/07/2025     (H) 02/07/2025    PLT 49 (L) 02/07/2025       Anthropometrics:  Anthropometrics  Height: 169.1 cm (5' 6.58\")  Weight: 74.8 kg (164 lb 14.5 oz)  BMI (Calculated): 26.16  IBW/kg (Dietitian Calculated): 65.9 kg  Percent of IBW: 114 %    *Wt at transplant admit: (11/15) 86.1 kg  *Wt at first post-transplant visit: (12/9) 80.7 kg     *Wt down ~4# x 1 week     Wt Readings from Last 10 Encounters:   02/07/25 74.8 kg (165 lb)   02/07/25 74.8 kg (164 lb 14.5 oz)   02/04/25 73.6 kg (162 lb 4.1 oz)   01/31/25 76.5 kg (168 lb 10.4 oz)   01/31/25 76.5 kg (168 lb 11.2 oz)   01/28/25 77.5 kg (170 lb 13.7 oz)   01/24/25 76.2 kg (167 lb 15.9 oz)   01/20/25 76 kg (167 lb 8.8 oz)   01/20/25 76 kg (167 lb 8 oz)   01/17/25 75.3 kg (166 lb 0.1 oz)   12/31/24        71.9 kg  12/24/24        76.7 kg   12/19/24        79.1 kg  12/09/24        80.7 kg--first post transplant visit  11/15/24        86.1 kg--at time of " "transplant  10/31/24        87.5 kg   10/18/24        84.0 kg  09/20/24        86.8 kg  08/29/24        89.2 kg  07/30/24        95.9 kg   06/21/24        87.5 kg         Food And Nutrient Intake:      Doing well   Family came over and had a pot luck and strawberry cake for his birthday   Didn't have much of an appetite for 1-2 days but it is back now  Only has fruit in am to take meds; never been a big breakfast eater.   Drinking 3 Ensure Original per day   Fluid intakes good--drinking mainly juices, 3-16 oz bottles of water  No diarrhea or nausea  Has BM every day   Foods tasting better~75%  Energy better; on treadmill walking for 30 minutes every other day   Hasn't taken Mg x 2 days (ran out and didn't order more)    So far today:  Had 2 Ensure's and pretzels in infusion     Noon Normally has soup and sandwich for lunch  Dinner around 7 pm  Ensure drinks around 8-9 am, 2-3 pm, 8 pm        ------------------------------------  Feeling better overall.   Eating well  Still drinking 3 Ensure per day  Appetite better--still with early satiety; portions improving   Tastes getting there; ~90% baselines   Gets feeling of being hungry; sometimes at night   Eating bigger variety of foods   Still drinking liquids--trying to drink more water, root beer, venus beer  Slight nausea x 2 episodes--to meds and resolved   Having regular BM's   Energy levels better day by day; went on treadmill for 12 minutes yesterday; does a lot of things around the house.         ---------------------  Appetite much better  Tastes improving but still not 100%  Wanting more meat, valdo beef rather than just soups  Feeling hungry at times; even in middle of night   Still getting full faster but eating a little more than 50% meals  Drinking \"lots\" of liquids, Venus beer, juices and gatorade  Now getting hooked on Ensure strawberry; drinking TID   No nausea  No diarrhea; formed today   Energy improving slightly   WBC and platelets down slightly "         ------------------  Eating coming around a little bit   Nausea this am but didn't take anything  Had normal BM (Formed stools)  3 protein shakes and fruit, soup work best (chunky veg soup tastes almost normal)  Trying to eat more solid foods; has to be juicy or have a sauce   Wants to try potato soup next since potatoes in veg soup have been tasting good to him.   Likes Ensure--drinking 3 per day   Takes meds with various drinks  Drinking water well--2-3 bottles per day   Portions still ~1/2 of baseline  Tastes are a little better  Food doesn't taste like he knows it should  Nasal congestion--using NyQuil   Has been holding Bactrim due to kidney function  Slight improvement in energy levels     Pt hasn't had anything to eat as of 4:30pm.      Can make smoothies     Last night had 1/2 chicken (marinated in italian dressing), rice       -----------------------  Feels weak today  Started eating solid foods--wings and potato wedges, fruit   Tastes are a little better; still < half   Eating smaller portions; early satiety   Getting hungry in past few days; craves everything.  No nausea; foods staying down  Still coughing a little bit   Still eating soup everyday  Drinking Ensure 3x/day  Otherwise drinking water and grape pop   Takes meds with water--rec Ensure   Stools still watery; didn't go in a day   Energy levels come and go; doesn't think he can walk to [facility] to get his prescriptions today  Still napping during the day     Hasn't eaten anything yet today       Rec 3 bottles of water as goal       ---------------  No breakfast yet  Gets hungry--loses interest when eat   Food doesn't taste like he knows it should   Caught respiratory flu last week; to start Nyquil and Mucinex   Feels congested   Still with taste changes   Can do starches in a soup  Vegetable soups   Apple pie didn't taste good   Didn't eat well on Xmas   Still struggling with meat  Does well with fruit  Vit d milkshake   Ensure TID   Take  "ensure with meds  No nausea  Energy levels still low; worse since got the flu last week   Gagging mucous up; dry heaves  Drinking well--mainly water and Ensure   Stools loose for last few days--2-3 times a day    -------------  Pt seen earlier this week and reported having vomiting with solid foods; had not been taking any anti-emetics.   Told to take Zofran 30 min before meals TID; he was also told he could use Compazine between Zofran if needed.   Since doing so, pt has not had further vomiting; stomach feels a little better and he has been able to keep solid foods down.  Fluid intakes good--has had no issues tolerating.     Has been eating lighter foods in the past few days; still leery of trying meats.   Was able to eat and keep down egg sandwich last night.   Has also been eating bone broth with rice and crax added as well as yogurt.   Now drinking Ensure Original daily.  Tastes remain barrier to eating; did not tolerate trial of lemon hard candies (associated with vomiting).    Still not feeling great, overall. No significant improvements.   Energy remains low; walks slow.     *Pt noted to have been eating better and tolerating PO intakes better after initial hospital discharge.  Intakes have actually digressed in week-10 days.     Ensure Original:  provides 240 kcals and 9 gm protein each                                                           Nutrition Focused Physical Exam Findings:                          Energy Needs  Calculated Energy Needs Using Equations  Height: 169.1 cm (5' 6.58\")        Nutrition Diagnosis        Pt remains mildly malnourished as appetite/intakes have declined since initial visit.  N/V has improved with consistent use of antiemetics.  Dysgeusia also contributing to decreased oral intakes.         Continues to benefit from use of ONS daily.        Nutrition Interventions/Recommendations   Nutrition Prescription   High Protein, High Calorie  Food Safety          Nutrition " Education   Try Ensure Plus vs Ensure Original for additional kcals; for now, plans to increase Ensure Original to TID.  Also suggested taking meds with Ensure and making Ensure into milkshakes.   Spoke with NP, will try Mirtazapine 15 mg to help with appetite/sleep   Eating smaller, more frequent snacks vs meals at this time.  Encourage PO fluid intakes; prefer calorie containing beverages at this time vs water due to decreased intakes.  Discussed increased fluid intakes to help decrease mucous production.   If upper GI symptoms persist, consider start of Budesonide    Coordination of Care   BMT team        Nutrition Monitoring and Evaluation      Will continue to f/up and monitor weight, labs, PO intakes, taste changes and GI symptoms PRN.

## 2025-02-09 LAB
CMV DNA SERPL NAA+PROBE-LOG IU: NORMAL {LOG_IU}/ML
LABORATORY COMMENT REPORT: NOT DETECTED

## 2025-02-10 ENCOUNTER — SPECIALTY PHARMACY (OUTPATIENT)
Dept: PHARMACY | Facility: CLINIC | Age: 70
End: 2025-02-10

## 2025-02-10 PROCEDURE — RXMED WILLOW AMBULATORY MEDICATION CHARGE

## 2025-02-11 ENCOUNTER — INFUSION (OUTPATIENT)
Dept: HEMATOLOGY/ONCOLOGY | Facility: HOSPITAL | Age: 70
End: 2025-02-11
Payer: MEDICARE

## 2025-02-11 ENCOUNTER — TELEPHONE (OUTPATIENT)
Dept: HEMATOLOGY/ONCOLOGY | Facility: HOSPITAL | Age: 70
End: 2025-02-11

## 2025-02-11 ENCOUNTER — PHARMACY VISIT (OUTPATIENT)
Dept: PHARMACY | Facility: CLINIC | Age: 70
End: 2025-02-11
Payer: COMMERCIAL

## 2025-02-11 ENCOUNTER — OFFICE VISIT (OUTPATIENT)
Dept: HEMATOLOGY/ONCOLOGY | Facility: HOSPITAL | Age: 70
End: 2025-02-11
Payer: MEDICARE

## 2025-02-11 VITALS
HEIGHT: 67 IN | HEART RATE: 66 BPM | SYSTOLIC BLOOD PRESSURE: 113 MMHG | DIASTOLIC BLOOD PRESSURE: 68 MMHG | RESPIRATION RATE: 20 BRPM | OXYGEN SATURATION: 100 % | BODY MASS INDEX: 26.61 KG/M2 | WEIGHT: 169.53 LBS | TEMPERATURE: 98.1 F

## 2025-02-11 DIAGNOSIS — D47.02 SYSTEMIC MASTOCYTOSIS WITH ASSOCIATED CLONAL HEMATOLOGICAL NON-MAST CELL LINEAGE DISEASE: ICD-10-CM

## 2025-02-11 DIAGNOSIS — E83.42 HYPOMAGNESEMIA: ICD-10-CM

## 2025-02-11 DIAGNOSIS — I10 BENIGN HYPERTENSION: Primary | ICD-10-CM

## 2025-02-11 DIAGNOSIS — Z94.84 HISTORY OF STEM CELL TRANSPLANT (MULTI): ICD-10-CM

## 2025-02-11 DIAGNOSIS — D84.9 IMMUNOCOMPROMISED STATE: ICD-10-CM

## 2025-02-11 DIAGNOSIS — Z94.84 HISTORY OF ALLOGENEIC STEM CELL TRANSPLANT (MULTI): ICD-10-CM

## 2025-02-11 DIAGNOSIS — C92.01 ACUTE MYELOID LEUKEMIA IN REMISSION (MULTI): ICD-10-CM

## 2025-02-11 DIAGNOSIS — Z94.81 S/P ALLOGENEIC BONE MARROW TRANSPLANT (MULTI): ICD-10-CM

## 2025-02-11 LAB
ALBUMIN SERPL BCP-MCNC: 3.7 G/DL (ref 3.4–5)
ALP SERPL-CCNC: 46 U/L (ref 33–136)
ALT SERPL W P-5'-P-CCNC: 12 U/L (ref 10–52)
ANION GAP SERPL CALC-SCNC: 11 MMOL/L (ref 10–20)
AST SERPL W P-5'-P-CCNC: 12 U/L (ref 9–39)
BASOPHILS # BLD AUTO: 0.01 X10*3/UL (ref 0–0.1)
BASOPHILS NFR BLD AUTO: 0.4 %
BILIRUB SERPL-MCNC: 0.6 MG/DL (ref 0–1.2)
BUN SERPL-MCNC: 15 MG/DL (ref 6–23)
CALCIUM SERPL-MCNC: 8.2 MG/DL (ref 8.6–10.3)
CHLORIDE SERPL-SCNC: 105 MMOL/L (ref 98–107)
CO2 SERPL-SCNC: 25 MMOL/L (ref 21–32)
CREAT SERPL-MCNC: 1.03 MG/DL (ref 0.5–1.3)
EGFRCR SERPLBLD CKD-EPI 2021: 79 ML/MIN/1.73M*2
EOSINOPHIL # BLD AUTO: 0.12 X10*3/UL (ref 0–0.7)
EOSINOPHIL NFR BLD AUTO: 4.6 %
ERYTHROCYTE [DISTWIDTH] IN BLOOD BY AUTOMATED COUNT: 16.4 % (ref 11.5–14.5)
GLUCOSE SERPL-MCNC: 126 MG/DL (ref 74–99)
HAPTOGLOB SERPL NEPH-MCNC: 38 MG/DL (ref 30–200)
HCT VFR BLD AUTO: 23.5 % (ref 41–52)
HGB BLD-MCNC: 8.3 G/DL (ref 13.5–17.5)
IMM GRANULOCYTES # BLD AUTO: 0.01 X10*3/UL (ref 0–0.7)
IMM GRANULOCYTES NFR BLD AUTO: 0.4 % (ref 0–0.9)
LDH SERPL L TO P-CCNC: 111 U/L (ref 84–246)
LYMPHOCYTES # BLD AUTO: 0.27 X10*3/UL (ref 1.2–4.8)
LYMPHOCYTES NFR BLD AUTO: 10.4 %
MAGNESIUM SERPL-MCNC: 1.76 MG/DL (ref 1.6–2.4)
MCH RBC QN AUTO: 36.1 PG (ref 26–34)
MCHC RBC AUTO-ENTMCNC: 35.3 G/DL (ref 32–36)
MCV RBC AUTO: 102 FL (ref 80–100)
MONOCYTES # BLD AUTO: 0.24 X10*3/UL (ref 0.1–1)
MONOCYTES NFR BLD AUTO: 9.2 %
NEUTROPHILS # BLD AUTO: 1.95 X10*3/UL (ref 1.2–7.7)
NEUTROPHILS NFR BLD AUTO: 75 %
NRBC BLD-RTO: 0 /100 WBCS (ref 0–0)
PLATELET # BLD AUTO: 47 X10*3/UL (ref 150–450)
POTASSIUM SERPL-SCNC: 3.7 MMOL/L (ref 3.5–5.3)
PROT SERPL-MCNC: 5.6 G/DL (ref 6.4–8.2)
RBC # BLD AUTO: 2.3 X10*6/UL (ref 4.5–5.9)
SODIUM SERPL-SCNC: 137 MMOL/L (ref 136–145)
TACROLIMUS BLD-MCNC: <2 NG/ML
WBC # BLD AUTO: 2.6 X10*3/UL (ref 4.4–11.3)

## 2025-02-11 PROCEDURE — 85025 COMPLETE CBC W/AUTO DIFF WBC: CPT

## 2025-02-11 PROCEDURE — 36591 DRAW BLOOD OFF VENOUS DEVICE: CPT

## 2025-02-11 PROCEDURE — 2500000004 HC RX 250 GENERAL PHARMACY W/ HCPCS (ALT 636 FOR OP/ED): Performed by: INTERNAL MEDICINE

## 2025-02-11 PROCEDURE — 83735 ASSAY OF MAGNESIUM: CPT

## 2025-02-11 PROCEDURE — 83615 LACTATE (LD) (LDH) ENZYME: CPT

## 2025-02-11 PROCEDURE — 87799 DETECT AGENT NOS DNA QUANT: CPT

## 2025-02-11 PROCEDURE — 83010 ASSAY OF HAPTOGLOBIN QUANT: CPT

## 2025-02-11 PROCEDURE — 80053 COMPREHEN METABOLIC PANEL: CPT

## 2025-02-11 PROCEDURE — 99214 OFFICE O/P EST MOD 30 MIN: CPT | Performed by: NURSE PRACTITIONER

## 2025-02-11 PROCEDURE — 1036F TOBACCO NON-USER: CPT | Performed by: NURSE PRACTITIONER

## 2025-02-11 PROCEDURE — 80197 ASSAY OF TACROLIMUS: CPT

## 2025-02-11 RX ORDER — HEPARIN 100 UNIT/ML
500 SYRINGE INTRAVENOUS AS NEEDED
Status: DISCONTINUED | OUTPATIENT
Start: 2025-02-11 | End: 2025-02-11 | Stop reason: HOSPADM

## 2025-02-11 RX ORDER — TACROLIMUS 1 MG/1
3 CAPSULE ORAL EVERY 12 HOURS
Qty: 180 CAPSULE | Refills: 2 | Status: SHIPPED | OUTPATIENT
Start: 2025-02-11

## 2025-02-11 RX ORDER — HEPARIN SODIUM,PORCINE/PF 10 UNIT/ML
50 SYRINGE (ML) INTRAVENOUS AS NEEDED
OUTPATIENT
Start: 2025-02-11

## 2025-02-11 RX ORDER — CHOLECALCIFEROL (VITAMIN D3) 25 MCG
2000 TABLET ORAL DAILY
Qty: 180 UNSPECIFIED | Refills: 2 | Status: SHIPPED | OUTPATIENT
Start: 2025-02-11

## 2025-02-11 RX ORDER — HEPARIN 100 UNIT/ML
500 SYRINGE INTRAVENOUS AS NEEDED
OUTPATIENT
Start: 2025-02-11

## 2025-02-11 RX ADMIN — HEPARIN 500 UNITS: 100 SYRINGE at 09:12

## 2025-02-11 ASSESSMENT — ENCOUNTER SYMPTOMS
MYALGIAS: 0
UNEXPECTED WEIGHT CHANGE: 0
COUGH: 0
EYE PROBLEMS: 0
DYSURIA: 0
NUMBNESS: 0
HEADACHES: 0
FEVER: 0
LEG SWELLING: 0
NAUSEA: 0
VOMITING: 0
HEMOPTYSIS: 0
TROUBLE SWALLOWING: 0
CHILLS: 0
DIARRHEA: 0
HEMATURIA: 0
PALPITATIONS: 0
BLOOD IN STOOL: 0
SHORTNESS OF BREATH: 0

## 2025-02-11 NOTE — PROGRESS NOTES
"    Patient ID: Jin Reynolds is a 70 y.o. male.  Primary Oncologist: Dr. 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   8/23/2023 Initial Diagnosis    DX:  SMOLDERING SYSTEMIC MASTOCYTOSIS  Presented with skin rash and eosinophilia    BRENDON DIAGNOSTIC CRITERIA  (For SM, Need major and 1 minor OR at least 3 minors)  - Multi-focal, dense infiltrates of MC (>= 15 mast cells in aggregates) in BM and/or other extra-cutaneous organs [major]  - In BM or extracutaneous organs, >25% MC spindle shaped OR have atypical morphology OR of all MC on BM aspirate smears, >25% are immature or atypical [minor]  - Detection of activating mutation KIT D816V in BM, PB, or other extracutaneous organ [minor]  - Serum tryptase persistently exceeds 20 ng/mL (unless associated clonal myeloid disorder, in which this paramenter is not valid) [minor]    \"B\" FINDINGS  - BM biopsy showing >30% infiltration (focal, dense aggregates) and/or serum tryptase > 200 ng/mL  - Signs of dysplasia or myeloproliferation, in non-MC lineages but insufficient criteria for definitive diagnosis of AHNMD with normal or slightly abnormal blood counts    \"C\" FINDINGS  None       8/23/2023 Biopsy    BONE MARROW (8/23/23)  Hypercellular (90-95%) with trilineage hematopoiesis, granulocytic hyperplasia, eosinophilia, and increased atypical mast cells  IP:  CD34+, +, CD7(bright)+, cCD3-, CD33-, CD15-, CD13+ blast population  IHC:  + increased spindle-shaped mast cells (15% cellularity), CD2-  Myeloid NGS:  CBL (31%), cKIT D816V (30%), RUNX1 x 2 (19%, 29%), SRSF2 (47%) [ASXL1 negative]  FISH:  PDGFRb negative    SERUM TRYPTASE  228 (8/23/23)  268 (9/5/23)     2/13/2024 -  Molecular Therapy    AVAPRITINIB   - Starting dose 25 mg daily (non-Adv SM)     7/16/2024 - 10/21/2024 Chemotherapy    Venetoclax / AzaCITIDine  - C1D1 7/17/24:  complicated by TLS, venetoclax held " after D1  - Post C1 BM (8/28/24):  hypercellular with >50% blasts, background systemic mastocytosis  - C2D1 9/5/24:  venetoclax restarted with C2, no TLS  - Post C2 BM (9/25/24):  hypocellular (<5%), atypical myeloblasts by IP/IHC (1-2%), persistent systemic mastocytosis  - C3D1 10/15/24:  delayed 1 week for count recovery  - Post C3 BM (10/23/24):  hypercellular with systemic mastocytosis, no increase blasts, 0.2% atypical blasts by IP, NGS w/ CBL (73%), cKIT (4%), RUNX1 x 2 (4%, 38%), SRSF2 (40%)       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    CONDITIONING Fludarabine, melphalan, and TBI   DONOR Matched sibling   MATCH GRADE 12/12   SEX MATCH Mismatched   ABO DONOR O pos   ABO RECIPIENT O pos   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, Mycophenolate, and Tacrolimus   GRAFT SOURCE Peripheral blood          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)   6/27/2024 Initial Diagnosis    ICC 2022 DX: Acute myeloid leukemia, NOS (>=20% blasts)  [id number] AML Risk Stratification: INTERMEDIATE: Cytogenetic and/or molecular abnormalities not classified as favorable or adverse  Presented with pancytopenia and circulating blasts    BONE MARROW (06/27/2024)  Marrow replaced by blasts, fibrotic foci, some atypical + cells; 7-8% circulating blasts; aspicular  - Unable to perform additional studies due to aspicular specimen    PERIPHERAL BLOOD (6/27/2024)  FISH:  positive for gain RUNX1    PERIPHERAL BLOOD (7/1/24)  IP:  abnl myeloblast population (CD34+, CD7+, CD4+, CD13+, CD38+, CD11+ dim, HLA=DR+, TdT+, CD33-)  Myeloid NGS: CBL C404Y (41%), KIT D816V (8%), RUNX1 x2 (8%, 30%), SRSF2 (37%)     7/16/2024 - 10/21/2024 Chemotherapy    Venetoclax / AzaCITIDine  - C1D1 7/17/24:  complicated by TLS, venetoclax held after D1  - Post C1 BM (8/28/24):  hypercellular with >50% blasts, background systemic mastocytosis  - C2D1 9/5/24:  venetoclax restarted with C2, no TLS  - Post C2 BM (9/25/24):  hypocellular (<5%), atypical  myeloblasts by IP/IHC (1-2%), persistent systemic mastocytosis  - C3D1 10/15/24:  delayed 1 week for count recovery  - Post C3 BM (10/23/24):  hypercellular with systemic mastocytosis, no increase blasts, 0.2% atypical blasts by IP, NGS w/ CBL (73%), cKIT (4%), RUNX1 x 2 (4%, 38%), SRSF2 (40%)       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    CONDITIONING Fludarabine, melphalan, and TBI   DONOR Matched sibling   MATCH GRADE 12/12   SEX MATCH Mismatched   ABO DONOR O pos   ABO RECIPIENT O pos   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, Mycophenolate, and Tacrolimus   GRAFT SOURCE Peripheral blood          12/26/2024 -  Infection    Parainfluenza URI     12/26/2024 -  Disease Reevaluation      DATE DAY SOURCE MORPHOLOGY MRD WHOLE CHIMERISM   (% donor) CD3 CHIMERISM   (% donor) CD33 CHIMERISM   (% donor)   12/26/2024 D+30 PB        12/26/2024 D+30 BM No AML, persist BRENDON Neg       D+60 PB         D+100 PB         D+100 BM                2/11/25 Feeling well.  No concerns.  He anticipates receiving the isavuconazomium tomorrow and financial assistance is pending for the Avapritinib. Instructed to notify us once received. PharmD submitted appeal for approval for eltrombopag given platelets < 50 x 2.    Assessment & Plan  History of stem cell transplant (Multi)  Day +82 following allogeneic stem cell transplant.  He does not have evidence of GVHD (last overall grade NA Overall Grade (Przepiorka): 0).  Continue current immunosuppressive therapy; tacro 2 mg BID, increased dose to 2.5mg BID given low level and anticipated transition to Cresemba. D+30 and D+60 PB chimerisms 100% donor.   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 Serum tryptase elevated. Avapritinib pending financial assistance approval.  Patient to notify us once received and will start.  Acute myeloid leukemia in  remission (Multi)  Counts remain low without evidence of relapse. Possibly related to mast cell disease which was present on D30 bmbx, no evidence of AML.  PLT now < 50.  eltrombopag denied by insurance, appeal submitted 2/11/25.  Current evidence of disease: No  Maintenance Therapy: No  MRD Monitoring Plan: AML MFC; Day 100 BMBx scheduled for 3/3/25  Immunocompromised state  Remains at high risk for infections.  No current active signs or symptoms of infection.    Continues on prophylaxis with acyclovir and posaconazole.  TMP/SMX briefly held given hyperkalemia and renal insufficiency 1/10-1/17. Monitoring sCr for now.    Will switch posa to isavu d/t interaction with avapritinib. Cresemba to be delivered 2/12.  Ongoing viral monitoring (as below): EBV low-level PCR positive 2/11/25. Continue to monitor weekly.  Immune reconstitution: Assess at D+100.  Post Tx Immunizations are planned for D+180. Will need scheduled.   Hypomagnesemia  Continue oral supplements.    SUBJECTIVE     Days since transplant: 82    Patient presents today, unaccompanied, for follow up.  Reports feeling well.  He is eating and drinking well. Denies N/V/D and rash. Has been walking on his treadmill every other day and hopes to increase to daily soon.       Review of Systems   Constitutional:  Negative for chills, fever and unexpected weight change.   HENT:   Negative for mouth sores, nosebleeds and trouble swallowing.    Eyes:  Negative for eye problems.   Respiratory:  Negative for cough, hemoptysis and shortness of breath.    Cardiovascular:  Negative for chest pain, leg swelling and palpitations.   Gastrointestinal:  Negative for blood in stool, diarrhea, nausea and vomiting.   Genitourinary:  Negative for dysuria and hematuria.    Musculoskeletal:  Negative for myalgias.   Skin:  Negative for itching and rash.   Neurological:  Negative for headaches and numbness.     Past Medical History:   Diagnosis Date    Cancer (Multi)     Delayed  "hemolytic transfusion reaction 07/18/2024    Esophageal ulcer with bleeding 01/01/2024    Personal history of diseases of the skin and subcutaneous tissue     History of alopecia    Umbilical hernia 05/30/2023     Social History     Tobacco Use    Smoking status: Never    Smokeless tobacco: Never   Substance Use Topics    Alcohol use: Never    Drug use: Yes     Types: Marijuana     Comment: daily      No past surgical history on file.      OBJECTIVE     Physical Exam  Constitutional:       Appearance: Normal appearance.   HENT:      Head: Normocephalic.   Eyes:      Pupils: Pupils are equal, round, and reactive to light.   Cardiovascular:      Rate and Rhythm: Normal rate and regular rhythm.   Pulmonary:      Effort: Pulmonary effort is normal.      Breath sounds: Normal breath sounds.   Abdominal:      General: Bowel sounds are normal.      Palpations: Abdomen is soft.   Musculoskeletal:         General: Normal range of motion.      Cervical back: Normal range of motion and neck supple.   Skin:     General: Skin is warm and dry.      Findings: No lesion or rash.   Neurological:      General: No focal deficit present.      Mental Status: He is alert and oriented to person, place, and time. Mental status is at baseline.      Comments: No numbness or tingling   Psychiatric:         Mood and Affect: Mood normal.       Performance Status:  Karnofsky Score: 80 - Normal activity with effort; some signs or symptoms of disease    POST TRANSPLANT MONITORING   GVHD  Acute GVHD Overall stGstrstastdstest:st st1st Viral Monitoring:     CMV   No results found for: \"CMVPCRIU\"  2/7/2025 ND   EBV   Lab Results   Component Value Date    EBVPCRQNTWB 653 (H) 02/11/2025 2/4/2025 ND   Adenovirus   Lab Results   Component Value Date    ADEPB Not Detected 02/11/2025    ADEPB Not Detected 02/04/2025    ADEPB Not Detected 01/28/2025       Toxo   No results found for: \"TGONDPCRBL\"       TMA Monitoring:     MARKERS RECENT 3 VALUES   LDH Lab Results "   Component Value Date     02/11/2025     02/07/2025     02/04/2025      Haptoglobin Lab Results   Component Value Date    HAPTOGLOBIN 38 02/11/2025    HAPTOGLOBIN 39 02/04/2025    HAPTOGLOBIN 46 01/28/2025      BP BP Readings from Last 3 Encounters:   02/11/25 113/68   02/07/25 119/69   02/04/25 123/67      Serum Creatinine Lab Results   Component Value Date    CREATININE 1.03 02/11/2025    CREATININE 1.03 02/07/2025    CREATININE 1.15 02/04/2025      Urine Protein/Creat Ratio Lab Results   Component Value Date    UTPCR 0.13 02/07/2025    UTPCR 0.11 01/28/2025    UTPCR 0.13 01/20/2025      Tacrolimus Level Lab Results   Component Value Date    TACROLIMUS <2.0 02/11/2025    TACROLIMUS 2.5 02/07/2025    TACROLIMUS 4.3 02/04/2025        RTC:  Weekly count checks and MD/ZORA follow up    Kasia Muñoz APRN-CNP

## 2025-02-11 NOTE — PROGRESS NOTES
Pt arrived ambulatory for count check and mal-heme visit.  Denies new or worsening symptoms.  Labs within parameters. No treatment needed today.  Discharged in stable condition.

## 2025-02-11 NOTE — TELEPHONE ENCOUNTER
"TACROLIMUS DOSE RECOMMENDATION NOTE   Jin Reynolds is a 70 y.o. male currently day +82 following Matched Related Donor (MRD) allogeneic stem cell transplant for a diagnosis of AML. Patient on tacrolimus for GVHD prophylaxis with level resulting today. Pharmacist was consulted to provide tacrolimus dose recommendations.     Objective   Creatinine   Date Value Ref Range Status   02/11/2025 1.03 0.50 - 1.30 mg/dL Final   02/07/2025 1.03 0.50 - 1.30 mg/dL Final   02/04/2025 1.15 0.50 - 1.30 mg/dL Final     Bilirubin, Total   Date Value Ref Range Status   02/11/2025 0.6 0.0 - 1.2 mg/dL Final   02/07/2025 0.7 0.0 - 1.2 mg/dL Final   02/04/2025 0.8 0.0 - 1.2 mg/dL Final     Tacrolimus    Date Value Ref Range Status   02/11/2025 <2.0 <=15.0 ng/mL Final   02/07/2025 2.5 <=15.0 ng/mL Final   02/04/2025 4.3 <=15.0 ng/mL Final       Assessment  Current tacrolimus dose: 2 mg every 12 hours (pt has not skipped any doses in the past week)  Goal tacrolimus level: 5-10 ng/mL    Patient stopped taking posaconazole \"a couple days ago, maybe on Sunday (2/9)\" but will start taking isavuconazonium tomorrow once delivered from the pharmacy    Plan   The patient's tacrolimus level today was < 2.0 which is Subtherapeutic. This is most likely because the patient stopped taking posaconazole which increases tacrolimus levels by ~50%. Isavuconazonium will only increase tacrolimus levels by ~25%. I recommended a 50% dose Increase: tacrolimus 3 mg every hours since the patient was already undetectable despite the long half life of posaconazole. I spoke with the patient/caregiver via phone and instructed them to adjust their tacrolimus dose. Recommendation accepted, continue to monitor tacrolimus levels to assess adjustment.    All questions were answered. Patient/family verbalized understanding of the plan of care and information provided. Will follow as necessary. Time spent with patient/family and/or coordinating care: 15 minutes.    "   Liz Cedeno, PharmD, [facility]  Ambulatory Stem Cell Transplant Transplant Pharmacist    Current Outpatient Medications   Medication Instructions    acyclovir (ZOVIRAX) 400 mg, oral, Every 12 hours    amLODIPine (NORVASC) 10 mg, oral, Daily    avapritinib (AYVAKIT) 50 mg, oral, Daily, Take at least 1 hour before a meal or 2 hours after a meal. Do not chew, crush, or split.    cholecalciferol (VITAMIN D-3) 2,000 Units, oral, Daily    Cresemba 372 mg, oral, Daily    eltrombopag olamine (PROMACTA) 50 mg, oral, Daily before breakfast, Take on an empty stomach, 1 hour before or 2 hours afer a meal.    famotidine (PEPCID) 20 mg, oral, Daily    fexofenadine (ALLEGRA) 180 mg, Daily    magnesium, amino acid chelate, 133 mg tablet 266 mg, oral, 3 times daily, Or as instructed on your medication list.    ondansetron (ZOFRAN) 8 mg, oral, Every 8 hours PRN    prochlorperazine (COMPAZINE) 10 mg, oral, Every 6 hours PRN    sulfamethoxazole-trimethoprim (Bactrim DS) 800-160 mg tablet 1 tablet, oral, 3 times weekly, Take on Mondays, Wednesdays, and Fridays.    tacrolimus (PROGRAF) 3 mg, oral, Every 12 hours, Or as instructed on your medication list.    ursodiol (ACTIGALL) 300 mg, oral, 3 times daily

## 2025-02-12 LAB
CMV DNA SERPL NAA+PROBE-LOG IU: NORMAL {LOG_IU}/ML
EBV DNA BLD NAA+PROBE-ACNC: 653 IU/ML
EBV DNA BLD NAA+PROBE-LOG IU: 2.81 LOG IU/ML
LABORATORY COMMENT REPORT: DETECTED
LABORATORY COMMENT REPORT: NOT DETECTED

## 2025-02-13 LAB — ADENOVIRUS QPCR,PLASMA, VIRC: NOT DETECTED COPIES/ML

## 2025-02-14 ENCOUNTER — APPOINTMENT (OUTPATIENT)
Dept: HEMATOLOGY/ONCOLOGY | Facility: HOSPITAL | Age: 70
End: 2025-02-14
Payer: MEDICARE

## 2025-02-14 ENCOUNTER — SPECIALTY PHARMACY (OUTPATIENT)
Dept: PHARMACY | Facility: CLINIC | Age: 70
End: 2025-02-14

## 2025-02-14 PROCEDURE — RXMED WILLOW AMBULATORY MEDICATION CHARGE

## 2025-02-15 ENCOUNTER — PHARMACY VISIT (OUTPATIENT)
Dept: PHARMACY | Facility: CLINIC | Age: 70
End: 2025-02-15
Payer: COMMERCIAL

## 2025-02-16 DIAGNOSIS — C92.01 AML (ACUTE MYELOID LEUKEMIA) IN REMISSION (MULTI): ICD-10-CM

## 2025-02-16 DIAGNOSIS — Z94.84 HISTORY OF ALLOGENEIC STEM CELL TRANSPLANT (MULTI): Primary | ICD-10-CM

## 2025-02-16 PROBLEM — R11.2 NAUSEA & VOMITING: Status: RESOLVED | Noted: 2024-12-17 | Resolved: 2025-02-16

## 2025-02-16 PROBLEM — E87.6 HYPOKALEMIA: Status: RESOLVED | Noted: 2024-12-13 | Resolved: 2025-02-16

## 2025-02-16 PROBLEM — N17.9 AKI (ACUTE KIDNEY INJURY) (CMS-HCC): Status: RESOLVED | Noted: 2024-07-18 | Resolved: 2025-02-16

## 2025-02-16 NOTE — ASSESSMENT & PLAN NOTE
BM at D30 demonstrated persistent mast cells which may be expected at this point. Serum tryptase elevated. Avapritinib pending financial assistance approval.  Patient to notify us once received and will start.

## 2025-02-16 NOTE — ASSESSMENT & PLAN NOTE
Day +82 following allogeneic stem cell transplant.  He does not have evidence of GVHD (last overall grade NA Overall Grade (Przepiorka): 0).  Continue current immunosuppressive therapy; tacro 2 mg BID, increased dose to 2.5mg BID given low level and anticipated transition to Cresemba. D+30 and D+60 PB chimerisms 100% donor.

## 2025-02-16 NOTE — ASSESSMENT & PLAN NOTE
Remains at high risk for infections.  No current active signs or symptoms of infection.    Continues on prophylaxis with acyclovir and posaconazole.  TMP/SMX briefly held given hyperkalemia and renal insufficiency 1/10-1/17. Monitoring sCr for now.    Will switch posa to isavu d/t interaction with avapritinib. Cresemba to be delivered 2/12.  Ongoing viral monitoring (as below): EBV low-level PCR positive 2/11/25. Continue to monitor weekly.  Immune reconstitution: Assess at D+100.  Post Tx Immunizations are planned for D+180. Will need scheduled.

## 2025-02-16 NOTE — ASSESSMENT & PLAN NOTE
Counts remain low without evidence of relapse. Possibly related to mast cell disease which was present on D30 bmbx, no evidence of AML.  PLT now < 50.  eltrombopag denied by insurance, appeal submitted 2/11/25.  Current evidence of disease: No  Maintenance Therapy: No  MRD Monitoring Plan: AML MFC; Day 100 BMBx scheduled for 3/3/25

## 2025-02-18 ENCOUNTER — OFFICE VISIT (OUTPATIENT)
Dept: HEMATOLOGY/ONCOLOGY | Facility: HOSPITAL | Age: 70
End: 2025-02-18
Payer: MEDICARE

## 2025-02-18 ENCOUNTER — LAB (OUTPATIENT)
Dept: HEMATOLOGY/ONCOLOGY | Facility: HOSPITAL | Age: 70
End: 2025-02-18
Payer: MEDICARE

## 2025-02-18 VITALS
RESPIRATION RATE: 17 BRPM | HEART RATE: 68 BPM | WEIGHT: 163.58 LBS | DIASTOLIC BLOOD PRESSURE: 68 MMHG | BODY MASS INDEX: 25.95 KG/M2 | SYSTOLIC BLOOD PRESSURE: 121 MMHG | OXYGEN SATURATION: 100 % | TEMPERATURE: 97.5 F

## 2025-02-18 DIAGNOSIS — D47.02 SYSTEMIC MASTOCYTOSIS WITH ASSOCIATED CLONAL HEMATOLOGICAL NON-MAST CELL LINEAGE DISEASE: ICD-10-CM

## 2025-02-18 DIAGNOSIS — C92.01 ACUTE MYELOID LEUKEMIA IN REMISSION (MULTI): Primary | ICD-10-CM

## 2025-02-18 DIAGNOSIS — Z94.84 HISTORY OF STEM CELL TRANSPLANT (MULTI): ICD-10-CM

## 2025-02-18 DIAGNOSIS — D84.9 IMMUNOCOMPROMISED STATE: ICD-10-CM

## 2025-02-18 DIAGNOSIS — Z94.81 S/P ALLOGENEIC BONE MARROW TRANSPLANT (MULTI): ICD-10-CM

## 2025-02-18 DIAGNOSIS — C92.01 ACUTE MYELOID LEUKEMIA IN REMISSION (MULTI): ICD-10-CM

## 2025-02-18 LAB
ALBUMIN SERPL BCP-MCNC: 4.1 G/DL (ref 3.4–5)
ALP SERPL-CCNC: 49 U/L (ref 33–136)
ALT SERPL W P-5'-P-CCNC: 12 U/L (ref 10–52)
ANION GAP SERPL CALC-SCNC: 11 MMOL/L (ref 10–20)
APPEARANCE UR: ABNORMAL
AST SERPL W P-5'-P-CCNC: 15 U/L (ref 9–39)
BASOPHILS # BLD AUTO: 0.01 X10*3/UL (ref 0–0.1)
BASOPHILS NFR BLD AUTO: 0.4 %
BILIRUB SERPL-MCNC: 0.6 MG/DL (ref 0–1.2)
BILIRUB UR STRIP.AUTO-MCNC: NEGATIVE MG/DL
BUN SERPL-MCNC: 16 MG/DL (ref 6–23)
CALCIUM SERPL-MCNC: 8.7 MG/DL (ref 8.6–10.3)
CHLORIDE SERPL-SCNC: 103 MMOL/L (ref 98–107)
CO2 SERPL-SCNC: 25 MMOL/L (ref 21–32)
COLOR UR: YELLOW
CREAT SERPL-MCNC: 1.16 MG/DL (ref 0.5–1.3)
CREAT UR-MCNC: 129.5 MG/DL (ref 20–370)
EGFRCR SERPLBLD CKD-EPI 2021: 68 ML/MIN/1.73M*2
EOSINOPHIL # BLD AUTO: 0.07 X10*3/UL (ref 0–0.7)
EOSINOPHIL NFR BLD AUTO: 3 %
ERYTHROCYTE [DISTWIDTH] IN BLOOD BY AUTOMATED COUNT: 16.2 % (ref 11.5–14.5)
GLUCOSE SERPL-MCNC: 126 MG/DL (ref 74–99)
GLUCOSE UR STRIP.AUTO-MCNC: NORMAL MG/DL
HAPTOGLOB SERPL NEPH-MCNC: 39 MG/DL (ref 30–200)
HCT VFR BLD AUTO: 26.7 % (ref 41–52)
HGB BLD-MCNC: 9.5 G/DL (ref 13.5–17.5)
IMM GRANULOCYTES # BLD AUTO: 0.01 X10*3/UL (ref 0–0.7)
IMM GRANULOCYTES NFR BLD AUTO: 0.4 % (ref 0–0.9)
KETONES UR STRIP.AUTO-MCNC: NEGATIVE MG/DL
LDH SERPL L TO P-CCNC: 123 U/L (ref 84–246)
LEUKOCYTE ESTERASE UR QL STRIP.AUTO: NEGATIVE
LYMPHOCYTES # BLD AUTO: 0.34 X10*3/UL (ref 1.2–4.8)
LYMPHOCYTES NFR BLD AUTO: 14.5 %
MAGNESIUM SERPL-MCNC: 2.03 MG/DL (ref 1.6–2.4)
MCH RBC QN AUTO: 36.1 PG (ref 26–34)
MCHC RBC AUTO-ENTMCNC: 35.6 G/DL (ref 32–36)
MCV RBC AUTO: 102 FL (ref 80–100)
MONOCYTES # BLD AUTO: 0.25 X10*3/UL (ref 0.1–1)
MONOCYTES NFR BLD AUTO: 10.7 %
NEUTROPHILS # BLD AUTO: 1.66 X10*3/UL (ref 1.2–7.7)
NEUTROPHILS NFR BLD AUTO: 71 %
NITRITE UR QL STRIP.AUTO: NEGATIVE
NRBC BLD-RTO: 0 /100 WBCS (ref 0–0)
PH UR STRIP.AUTO: 7 [PH]
PLATELET # BLD AUTO: 45 X10*3/UL (ref 150–450)
POTASSIUM SERPL-SCNC: 4.1 MMOL/L (ref 3.5–5.3)
PROT SERPL-MCNC: 6.1 G/DL (ref 6.4–8.2)
PROT UR STRIP.AUTO-MCNC: NEGATIVE MG/DL
RBC # BLD AUTO: 2.63 X10*6/UL (ref 4.5–5.9)
RBC # UR STRIP.AUTO: NEGATIVE MG/DL
SODIUM SERPL-SCNC: 135 MMOL/L (ref 136–145)
SP GR UR STRIP.AUTO: 1.02
TACROLIMUS BLD-MCNC: <2 NG/ML
UROBILINOGEN UR STRIP.AUTO-MCNC: NORMAL MG/DL
WBC # BLD AUTO: 2.3 X10*3/UL (ref 4.4–11.3)

## 2025-02-18 PROCEDURE — 87799 DETECT AGENT NOS DNA QUANT: CPT

## 2025-02-18 PROCEDURE — 83615 LACTATE (LD) (LDH) ENZYME: CPT | Performed by: NURSE PRACTITIONER

## 2025-02-18 PROCEDURE — 3074F SYST BP LT 130 MM HG: CPT | Performed by: STUDENT IN AN ORGANIZED HEALTH CARE EDUCATION/TRAINING PROGRAM

## 2025-02-18 PROCEDURE — 82570 ASSAY OF URINE CREATININE: CPT

## 2025-02-18 PROCEDURE — 36591 DRAW BLOOD OFF VENOUS DEVICE: CPT

## 2025-02-18 PROCEDURE — 2500000004 HC RX 250 GENERAL PHARMACY W/ HCPCS (ALT 636 FOR OP/ED): Performed by: INTERNAL MEDICINE

## 2025-02-18 PROCEDURE — 80197 ASSAY OF TACROLIMUS: CPT | Performed by: NURSE PRACTITIONER

## 2025-02-18 PROCEDURE — 83010 ASSAY OF HAPTOGLOBIN QUANT: CPT

## 2025-02-18 PROCEDURE — 3078F DIAST BP <80 MM HG: CPT | Performed by: STUDENT IN AN ORGANIZED HEALTH CARE EDUCATION/TRAINING PROGRAM

## 2025-02-18 PROCEDURE — 85025 COMPLETE CBC W/AUTO DIFF WBC: CPT | Performed by: NURSE PRACTITIONER

## 2025-02-18 PROCEDURE — 83735 ASSAY OF MAGNESIUM: CPT | Performed by: NURSE PRACTITIONER

## 2025-02-18 PROCEDURE — 1159F MED LIST DOCD IN RCRD: CPT | Performed by: STUDENT IN AN ORGANIZED HEALTH CARE EDUCATION/TRAINING PROGRAM

## 2025-02-18 PROCEDURE — 99214 OFFICE O/P EST MOD 30 MIN: CPT | Performed by: STUDENT IN AN ORGANIZED HEALTH CARE EDUCATION/TRAINING PROGRAM

## 2025-02-18 PROCEDURE — 1126F AMNT PAIN NOTED NONE PRSNT: CPT | Performed by: STUDENT IN AN ORGANIZED HEALTH CARE EDUCATION/TRAINING PROGRAM

## 2025-02-18 PROCEDURE — 36415 COLL VENOUS BLD VENIPUNCTURE: CPT

## 2025-02-18 PROCEDURE — 81003 URINALYSIS AUTO W/O SCOPE: CPT | Performed by: NURSE PRACTITIONER

## 2025-02-18 PROCEDURE — 80053 COMPREHEN METABOLIC PANEL: CPT | Performed by: NURSE PRACTITIONER

## 2025-02-18 RX ORDER — TACROLIMUS 1 MG/1
3 CAPSULE ORAL EVERY 12 HOURS
Qty: 180 CAPSULE | Refills: 1 | OUTPATIENT
Start: 2025-02-18

## 2025-02-18 RX ORDER — HEPARIN 100 UNIT/ML
500 SYRINGE INTRAVENOUS AS NEEDED
Status: DISCONTINUED | OUTPATIENT
Start: 2025-02-18 | End: 2025-02-18 | Stop reason: HOSPADM

## 2025-02-18 RX ORDER — HEPARIN SODIUM,PORCINE/PF 10 UNIT/ML
50 SYRINGE (ML) INTRAVENOUS AS NEEDED
OUTPATIENT
Start: 2025-02-18

## 2025-02-18 RX ORDER — HEPARIN SODIUM,PORCINE/PF 10 UNIT/ML
50 SYRINGE (ML) INTRAVENOUS AS NEEDED
Status: DISCONTINUED | OUTPATIENT
Start: 2025-02-18 | End: 2025-02-18 | Stop reason: HOSPADM

## 2025-02-18 RX ORDER — HEPARIN 100 UNIT/ML
500 SYRINGE INTRAVENOUS AS NEEDED
OUTPATIENT
Start: 2025-02-18

## 2025-02-18 RX ADMIN — HEPARIN 500 UNITS: 100 SYRINGE at 14:12

## 2025-02-18 ASSESSMENT — PAIN SCALES - GENERAL: PAINLEVEL_OUTOF10: 0-NO PAIN

## 2025-02-18 NOTE — PROGRESS NOTES
"    Patient ID: Jin Reynolds is a 70 y.o. male.  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   8/23/2023 Initial Diagnosis    DX:  SMOLDERING SYSTEMIC MASTOCYTOSIS  Presented with skin rash and eosinophilia    BRENDON DIAGNOSTIC CRITERIA  (For SM, Need major and 1 minor OR at least 3 minors)  - Multi-focal, dense infiltrates of MC (>= 15 mast cells in aggregates) in BM and/or other extra-cutaneous organs [major]  - In BM or extracutaneous organs, >25% MC spindle shaped OR have atypical morphology OR of all MC on BM aspirate smears, >25% are immature or atypical [minor]  - Detection of activating mutation KIT D816V in BM, PB, or other extracutaneous organ [minor]  - Serum tryptase persistently exceeds 20 ng/mL (unless associated clonal myeloid disorder, in which this paramenter is not valid) [minor]    \"B\" FINDINGS  - BM biopsy showing >30% infiltration (focal, dense aggregates) and/or serum tryptase > 200 ng/mL  - Signs of dysplasia or myeloproliferation, in non-MC lineages but insufficient criteria for definitive diagnosis of AHNMD with normal or slightly abnormal blood counts    \"C\" FINDINGS  None       8/23/2023 Biopsy    BONE MARROW (8/23/23)  Hypercellular (90-95%) with trilineage hematopoiesis, granulocytic hyperplasia, eosinophilia, and increased atypical mast cells  IP:  CD34+, +, CD7(bright)+, cCD3-, CD33-, CD15-, CD13+ blast population  IHC:  + increased spindle-shaped mast cells (15% cellularity), CD2-  Myeloid NGS:  CBL (31%), cKIT D816V (30%), RUNX1 x 2 (19%, 29%), SRSF2 (47%) [ASXL1 negative]  FISH:  PDGFRb negative    SERUM TRYPTASE  228 (8/23/23)  268 (9/5/23)     2/13/2024 -  Molecular Therapy    AVAPRITINIB   - Starting dose 25 mg daily (non-Adv SM)     7/16/2024 - 10/21/2024 Chemotherapy    Venetoclax / AzaCITIDine  - C1D1 7/17/24:  complicated by TLS, venetoclax held after " D1  - Post C1 BM (8/28/24):  hypercellular with >50% blasts, background systemic mastocytosis  - C2D1 9/5/24:  venetoclax restarted with C2, no TLS  - Post C2 BM (9/25/24):  hypocellular (<5%), atypical myeloblasts by IP/IHC (1-2%), persistent systemic mastocytosis  - C3D1 10/15/24:  delayed 1 week for count recovery  - Post C3 BM (10/23/24):  hypercellular with systemic mastocytosis, no increase blasts, 0.2% atypical blasts by IP, NGS w/ CBL (73%), cKIT (4%), RUNX1 x 2 (4%, 38%), SRSF2 (40%)       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    CONDITIONING Fludarabine, melphalan, and TBI   DONOR Matched sibling   MATCH GRADE 12/12   SEX MATCH Mismatched   ABO DONOR O pos   ABO RECIPIENT O pos   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, Mycophenolate, and Tacrolimus   GRAFT SOURCE Peripheral blood          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)   6/27/2024 Initial Diagnosis    ICC 2022 DX: Acute myeloid leukemia, NOS (>=20% blasts)  [id number] AML Risk Stratification: INTERMEDIATE: Cytogenetic and/or molecular abnormalities not classified as favorable or adverse  Presented with pancytopenia and circulating blasts    BONE MARROW (06/27/2024)  Marrow replaced by blasts, fibrotic foci, some atypical + cells; 7-8% circulating blasts; aspicular  - Unable to perform additional studies due to aspicular specimen    PERIPHERAL BLOOD (6/27/2024)  FISH:  positive for gain RUNX1    PERIPHERAL BLOOD (7/1/24)  IP:  abnl myeloblast population (CD34+, CD7+, CD4+, CD13+, CD38+, CD11+ dim, HLA=DR+, TdT+, CD33-)  Myeloid NGS: CBL C404Y (41%), KIT D816V (8%), RUNX1 x2 (8%, 30%), SRSF2 (37%)     7/16/2024 - 10/21/2024 Chemotherapy    Venetoclax / AzaCITIDine  - C1D1 7/17/24:  complicated by TLS, venetoclax held after D1  - Post C1 BM (8/28/24):  hypercellular with >50% blasts, background systemic mastocytosis  - C2D1 9/5/24:  venetoclax restarted with C2, no TLS  - Post C2 BM (9/25/24):  hypocellular (<5%), atypical myeloblasts  by IP/IHC (1-2%), persistent systemic mastocytosis  - C3D1 10/15/24:  delayed 1 week for count recovery  - Post C3 BM (10/23/24):  hypercellular with systemic mastocytosis, no increase blasts, 0.2% atypical blasts by IP, NGS w/ CBL (73%), cKIT (4%), RUNX1 x 2 (4%, 38%), SRSF2 (40%)       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    CONDITIONING Fludarabine, melphalan, and TBI   DONOR Matched sibling   MATCH GRADE 12/12   SEX MATCH Mismatched   ABO DONOR O pos   ABO RECIPIENT O pos   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, Mycophenolate, and Tacrolimus   GRAFT SOURCE Peripheral blood          12/26/2024 -  Infection    Parainfluenza URI     12/26/2024 -  Disease Reevaluation      DATE DAY SOURCE MORPHOLOGY MRD WHOLE CHIMERISM   (% donor) CD3 CHIMERISM   (% donor) CD33 CHIMERISM   (% donor)   12/26/2024 D+30 PB        12/26/2024 D+30 BM No AML, persist BRENDON Neg       D+60 PB         D+100 PB         D+100 BM                  02/18/25 ***     Assessment & Plan  History of stem cell transplant (Multi)        SUBJECTIVE     Days since transplant: 89     HPI    Jin Reynolds presents today for post transplant follow up, accompanied by ***.     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      OBJECTIVE     BSA: 1.87 meters squared  /68   Pulse 68    "Temp 36.4 °C (97.5 °F)   Resp 17   Wt 74.2 kg (163 lb 9.3 oz)   SpO2 100%   BMI 25.95 kg/m²   Weight         2/18/2025  1323             Weight: 74.2 kg (163 lb 9.3 oz)               Physical Exam    Performance Status:  {Karnofsky Score:20261:::1}      POST TRANSPLANT MONITORING   GVHD  Acute GVHD Overall Grade:    Chronic GVHD Total Score:      Viral Monitoring:     CMV   No results found for: \"CMVPCRIU\"   EBV   Lab Results   Component Value Date    EBVPCRQNTWB 653 (H) 02/11/2025      Adenovirus   Lab Results   Component Value Date    ADEPB Not Detected 02/11/2025    ADEPB Not Detected 02/04/2025    ADEPB Not Detected 01/28/2025       Toxo   No results found for: \"TGONDPCRBL\"       TMA Monitoring:     MARKERS RECENT 3 VALUES   LDH Lab Results   Component Value Date     02/11/2025     02/07/2025     02/04/2025      Haptoglobin Lab Results   Component Value Date    HAPTOGLOBIN 38 02/11/2025    HAPTOGLOBIN 39 02/04/2025    HAPTOGLOBIN 46 01/28/2025      BP BP Readings from Last 3 Encounters:   02/18/25 121/68   02/11/25 113/68   02/07/25 119/69      Serum Creatinine Lab Results   Component Value Date    CREATININE 1.03 02/11/2025    CREATININE 1.03 02/07/2025    CREATININE 1.15 02/04/2025      Urine Protein/Creat Ratio Lab Results   Component Value Date    UTPCR 0.13 02/07/2025    UTPCR 0.11 01/28/2025    UTPCR 0.13 01/20/2025      Tacrolimus Level Lab Results   Component Value Date    TACROLIMUS <2.0 02/11/2025    TACROLIMUS 2.5 02/07/2025    TACROLIMUS 4.3 02/04/2025            Kalia June PA-C          " "(163 lb 9.3 oz)               Physical Exam  Constitutional:       Appearance: Normal appearance.   Eyes:      Conjunctiva/sclera: Conjunctivae normal.      Pupils: Pupils are equal, round, and reactive to light.   Cardiovascular:      Rate and Rhythm: Normal rate and regular rhythm.   Pulmonary:      Effort: Pulmonary effort is normal.   Musculoskeletal:         General: Normal range of motion.   Skin:     Findings: No rash.   Neurological:      Mental Status: He is alert. Mental status is at baseline.   Psychiatric:         Mood and Affect: Mood normal.         Performance Status:  Karnofsky Score: 90 - Able to carry on normal activity; minor signs or symptoms of disease       POST TRANSPLANT MONITORING   GVHD  Acute GVHD Overall Grade:    Chronic GVHD Total Score:      Viral Monitoring:     CMV   No results found for: \"CMVPCRIU\"   EBV   Lab Results   Component Value Date    EBVPCRQNTWB 653 (H) 02/11/2025      Adenovirus   Lab Results   Component Value Date    ADEPB Not Detected 02/11/2025    ADEPB Not Detected 02/04/2025    ADEPB Not Detected 01/28/2025       Toxo   No results found for: \"TGONDPCRBL\"       TMA Monitoring:     MARKERS RECENT 3 VALUES   LDH Lab Results   Component Value Date     02/11/2025     02/07/2025     02/04/2025      Haptoglobin Lab Results   Component Value Date    HAPTOGLOBIN 38 02/11/2025    HAPTOGLOBIN 39 02/04/2025    HAPTOGLOBIN 46 01/28/2025      BP BP Readings from Last 3 Encounters:   02/18/25 121/68   02/11/25 113/68   02/07/25 119/69      Serum Creatinine Lab Results   Component Value Date    CREATININE 1.03 02/11/2025    CREATININE 1.03 02/07/2025    CREATININE 1.15 02/04/2025      Urine Protein/Creat Ratio Lab Results   Component Value Date    UTPCR 0.13 02/07/2025    UTPCR 0.11 01/28/2025    UTPCR 0.13 01/20/2025      Tacrolimus Level Lab Results   Component Value Date    TACROLIMUS <2.0 02/11/2025    TACROLIMUS 2.5 02/07/2025    TACROLIMUS 4.3 02/04/2025 "            Kalia June PA-C

## 2025-02-18 NOTE — PATIENT INSTRUCTIONS
25    To whom it may concern,    I am writing on behalf of our patient, Jin Reynolds (: 1955), who received a hematopoietic stem cell transplant (HSCT) or CAR T-cell Therapy on 2024. Per the CDC recommendations, “Recipients of HSCT or CAR-T-cell therapy who received 1 or more doses of COVID-19 vaccine prior to or during treatment should be revaccinated. Revaccination should start at least 3 months (12 weeks) after transplant or CAR-T-cell therapy and should follow the currently recommended schedule for people who are unvaccinated.”                                                           Following the CDC recommendations, Jin Reynolds is due for 2-3 doses of the 2978-2248 Formula COVID-19 vaccines, and they would like to get them at your facility. I have included the dosing schedule below:   Moderna Product   - Administer first dose at 3 months post HSCT & CAR T-cell   - Administer second dose 4 weeks following the first dose   - Administer third dose >= 4 weeks following the second dose   Novavax Product   - Administer first dose at 3 months post HSCT & CAR T-cell   - Administer second dose 3 weeks following the first dose   Pfizer-BioNTech Product   - Administer first dose at 3 months post HSCT & CAR T-cell   - Administer second dose 3 weeks following the first dose   - Administer third dose >= 4 weeks following the second dose     If you would like to review the CDC recommendations, they can be found using the link below:  https://www.cdc.gov/vaccines/covid-19/clinical-considerations/ocoshoj-jerudwxlkebcbt-ks.html#immunocompromised    Please reach out with any questions or concerns. Thank you for your help caring for our patient.     Sincerely,  Liz Cedeno, PharmD, BCOP         Ambulatory Stem Cell Transplant Pharmacist  [facility] Department of Pharmacy Services  [address] [place] [address]  Phone: [phone]  Fax: [phone]

## 2025-02-19 ENCOUNTER — TELEPHONE (OUTPATIENT)
Dept: HEMATOLOGY/ONCOLOGY | Facility: HOSPITAL | Age: 70
End: 2025-02-19
Payer: MEDICARE

## 2025-02-19 ENCOUNTER — SPECIALTY PHARMACY (OUTPATIENT)
Dept: HEMATOLOGY/ONCOLOGY | Facility: HOSPITAL | Age: 70
End: 2025-02-19
Payer: MEDICARE

## 2025-02-19 DIAGNOSIS — Z94.84 HISTORY OF STEM CELL TRANSPLANT (MULTI): ICD-10-CM

## 2025-02-19 RX ORDER — TACROLIMUS 1 MG/1
4 CAPSULE ORAL EVERY 12 HOURS
Qty: 240 CAPSULE | Refills: 1 | Status: SHIPPED | OUTPATIENT
Start: 2025-02-19

## 2025-02-19 NOTE — TELEPHONE ENCOUNTER
"TACROLIMUS DOSE RECOMMENDATION NOTE   Jin Reynolds is a 70 y.o. 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 Patient on tacrolimus for GVHD prophylaxis with level resulting yesterday. Pharmacist was consulted to provide tacrolimus dose recommendations.     Objective   Creatinine   Date Value Ref Range Status   02/18/2025 1.16 0.50 - 1.30 mg/dL Final   02/11/2025 1.03 0.50 - 1.30 mg/dL Final   02/07/2025 1.03 0.50 - 1.30 mg/dL Final     Bilirubin, Total   Date Value Ref Range Status   02/18/2025 0.6 0.0 - 1.2 mg/dL Final   02/11/2025 0.6 0.0 - 1.2 mg/dL Final   02/07/2025 0.7 0.0 - 1.2 mg/dL Final     Tacrolimus    Date Value Ref Range Status   02/18/2025 <2.0 <=15.0 ng/mL Final   02/11/2025 <2.0 <=15.0 ng/mL Final   02/07/2025 2.5 <=15.0 ng/mL Final       Assessment  Current tacrolimus dose: 3 mg every 12 hours (pt has not missed any doses in the past week)  Goal tacrolimus level: 5-10 ng/mL  Patient believes they started taking isavuconazonium ~Sunday (2/16), but it was delivered to his house on 2/12/2025. He stopped posaconazole \"a couple days ago\" but during our last conversation, he claimed to have stopped ~2/9.     Plan   The patient's tacrolimus level yesterday was < 2.0 which is Subtherapeutic. This low level could be due to re-starting anti-fungal therapy or non-adherence. I recommended the patient increase his dose to 4 mg every 12 hours starting 2/19 AM. I spoke with the patient/caregiver via phone and instructed them to adjust their tacrolimus dose. Recommendation accepted, continue to monitor tacrolimus levels to assess adjustment.    All questions were answered. Patient/family verbalized understanding of the plan of care and information provided. Will follow as necessary. Time spent with patient/family and/or coordinating care: 15 minutes.      Liz Cedeno, PharmD, [facility]  Ambulatory Stem Cell Transplant Transplant " Pharmacist    Current Outpatient Medications   Medication Instructions    acyclovir (ZOVIRAX) 400 mg, oral, Every 12 hours    amLODIPine (NORVASC) 10 mg, oral, Daily    avapritinib (AYVAKIT) 50 mg, oral, Daily, Take at least 1 hour before a meal or 2 hours after a meal. Do not chew, crush, or split.    cholecalciferol (VITAMIN D-3) 2,000 Units, oral, Daily    Cresemba 372 mg, oral, Daily    famotidine (PEPCID) 20 mg, oral, Daily    fexofenadine (ALLEGRA) 180 mg, Daily    magnesium, amino acid chelate, 133 mg tablet 266 mg, oral, 3 times daily, Or as instructed on your medication list.    ondansetron (ZOFRAN) 8 mg, oral, Every 8 hours PRN    prochlorperazine (COMPAZINE) 10 mg, oral, Every 6 hours PRN    Promacta 50 mg, oral, Daily before breakfast, Take on an empty stomach, 1 hour before or 2 hours afer a meal.    sulfamethoxazole-trimethoprim (Bactrim DS) 800-160 mg tablet 1 tablet, oral, 3 times weekly, Take on Mondays, Wednesdays, and Fridays.    tacrolimus (PROGRAF) 3 mg, oral, Every 12 hours, Or as instructed on your medication list.

## 2025-02-19 NOTE — PROGRESS NOTES
[facility] Clinical Note  Initial Patient Education     Introduction  Jin Reynolds is a 70 y.o. male who is on the specialty pharmacy service for management of: Oncology Core.    Jin Reynolds is initiating the following therapy:   Cresemba (Isavuconazonium): Take 2 capsules (372 mg) by mouth once daily.   Ayvakit (Avapritinib): Take 1 tablet (50 mg total) by mouth once daily.  Take at least 1 hour before a meal or 2 hours after a meal. Do not chew, crush, or split.   Promacta (Eltrombopag): Take 1 tablet (50 mg) by mouth once daily in the morning. Take before meals. Take on an empty stomach, 1 hour before or 2 hours afer a meal.     Medication receipt date: ~2/15/25  Duration of therapy: Patient/Prescriber Specific    The most recent encounter visit with the referring prescriber was reviewed.  Pharmacy will continue to collaborate in the care of this patient with the referring prescriber.    Clinical Background  An initial assessment was conducted prior to first fill of the medication to determine the appropriateness of therapy given the patient's diagnosis, medication list, comorbidities, allergies, medical history, patient's ability to self administer medication, and therapeutic goals based on possible outcomes of therapy. Refer to initial assessment task completed on 2/14/25.    Labs/Procedures for clinical appropriateness that were reviewed include:   Oncology - CBC-diff:   Lab Results   Component Value Date    WBC 2.3 (L) 02/18/2025    RBC 2.63 (L) 02/18/2025    HGB 9.5 (L) 02/18/2025    HCT 26.7 (L) 02/18/2025     (H) 02/18/2025    MCHC 35.6 02/18/2025    PLT 45 (L) 02/18/2025    RDW 16.2 (H) 02/18/2025    NEUTOPHILPCT 71.0 02/18/2025    IGPCT 0.4 02/18/2025    LYMPHOPCT 14.5 02/18/2025    MONOPCT 10.7 02/18/2025    EOSPCT 3.0 02/18/2025    BASOPCT 0.4 02/18/2025    NEUTROABS 1.66 02/18/2025    LYMPHSABS 0.34 (L) 02/18/2025    MONOSABS 0.25 02/18/2025    EOSABS 0.07  02/18/2025    BASOSABS 0.01 02/18/2025    and CMP:   Lab Results   Component Value Date    GLUCOSE 126 (H) 02/18/2025     (L) 02/18/2025    K 4.1 02/18/2025     02/18/2025    CO2 25 02/18/2025    ANIONGAP 11 02/18/2025    BUN 16 02/18/2025    CREATININE 1.16 02/18/2025    GFRF CANCELED 06/15/2023    CALCIUM 8.7 02/18/2025    ALBUMIN 4.1 02/18/2025    ALKPHOS 49 02/18/2025    PROT 6.1 (L) 02/18/2025    AST 15 02/18/2025    BILITOT 0.6 02/18/2025    ALT 12 02/18/2025       Education/Discussion  Jin was contacted on 2/19/2025 at 11:22 AM for a pharmacy visit with encounter number [id number] from:   [facility]  [address]  [address] [facility] [address]  Dept: [phone]  Loc: [phone]  Jin consented to a/an Telephone visit, which was performed.    Medication Start Date (planned or actual): ~2/15/25  Education was conducted prior to start of therapy? No     Education discussed includes the following:  Patient Education  Counseled the Patient on the Following : Adherence and missed doses, Possible side effects and management, Possible drug interactions, Lab monitoring and follow-up, Safe handling, storage, and disposal  Learner: Patient  Education Method: Explanation, Handout  Education Response: Verbalizes understanding  Additional details of the medication specific counseling are found within the linked patient education flowsheet.     The follow up timeline was discussed. Every person responds to and reacts to therapy differently. Patient should be assessed for efficacy and tolerability in approximately: 10-14 days and 8-12 weeks    Provided education on goals and possible outcomes of therapy:  Adherence with therapy  Timely completion of appropriate labs  Timely and appropriate follow up with provider  Identify and address medication interactions with presciption medications, OTC medications and supplements  Optimize or maintain quality of  life  Oncology: Manage side effects (ex: nausea/vomiting, constipation, fatigue) in conjunction with care team    The importance of adherence was discussed and they were advised to take the medication as prescribed by their provider.     Impression/Plan  Review and Assessment   Reviewed During This Encounter: Medications  Medications Assessed for Appropriate Use, Dose, Route, Frequency, and Duration: Yes  Medication Reconciliation Completed: Yes  Drug Interactions Evaluated: Yes  Clinically Relevant Drug Interactions Identified: Yes  List Interactions and Management Plan: Avapritinib - Isavuconazonium, Category D, may increase serum concentration of Avapritinib (dose of Avapritinib empirically dose-reduced appropriately); Isavuconazonium - Amlodipine/Tacrolimus, Category C, may increase serum concentration of Amlodipine/Tacrolimus (Tacrolimus being monitored by BMT team)    This patient has been identified as high risk due to Geriatric (over 65 years of age).  The following action was taken: N/A.         The  Specialty Pharmacy Welcome packet may be viewed here:   Specialty Pharmacy Welcome Packet     Or by scanning QR code:      Provided contact information ([phone]) for [facility] and reviewed dispensing process, refill timeline and patient management follow up. Advised to contact the pharmacy if there are any adverse effects and/or changes to medication list, including prescriptions, OTC medications, herbal products, or supplements. Confirmed understanding of education conducted during assessment. All questions and concerns were addressed and patient was encouraged to reach out for additional questions or concerns.      Angelo Johnson, TeraD

## 2025-02-21 LAB — ADENOVIRUS QPCR,PLASMA, VIRC: NOT DETECTED COPIES/ML

## 2025-02-25 ENCOUNTER — OFFICE VISIT (OUTPATIENT)
Dept: HEMATOLOGY/ONCOLOGY | Facility: HOSPITAL | Age: 70
End: 2025-02-25
Payer: MEDICARE

## 2025-02-25 ENCOUNTER — LAB (OUTPATIENT)
Dept: HEMATOLOGY/ONCOLOGY | Facility: HOSPITAL | Age: 70
End: 2025-02-25
Payer: MEDICARE

## 2025-02-25 ENCOUNTER — APPOINTMENT (OUTPATIENT)
Dept: HEMATOLOGY/ONCOLOGY | Facility: HOSPITAL | Age: 70
End: 2025-02-25
Payer: MEDICARE

## 2025-02-25 VITALS
HEART RATE: 62 BPM | OXYGEN SATURATION: 100 % | TEMPERATURE: 97.2 F | RESPIRATION RATE: 18 BRPM | WEIGHT: 166.01 LBS | BODY MASS INDEX: 26.33 KG/M2

## 2025-02-25 DIAGNOSIS — D69.3 IDIOPATHIC THROMBOCYTOPENIC PURPURA (MULTI): ICD-10-CM

## 2025-02-25 DIAGNOSIS — C92.01 ACUTE MYELOID LEUKEMIA IN REMISSION (MULTI): ICD-10-CM

## 2025-02-25 DIAGNOSIS — Z94.81 S/P ALLOGENEIC BONE MARROW TRANSPLANT (MULTI): ICD-10-CM

## 2025-02-25 DIAGNOSIS — D47.02 SYSTEMIC MASTOCYTOSIS WITH ASSOCIATED CLONAL HEMATOLOGICAL NON-MAST CELL LINEAGE DISEASE: ICD-10-CM

## 2025-02-25 DIAGNOSIS — Z94.84 HISTORY OF STEM CELL TRANSPLANT (MULTI): ICD-10-CM

## 2025-02-25 DIAGNOSIS — D84.9 IMMUNOCOMPROMISED STATE: ICD-10-CM

## 2025-02-25 DIAGNOSIS — C92.01 ACUTE MYELOID LEUKEMIA IN REMISSION (MULTI): Primary | ICD-10-CM

## 2025-02-25 LAB
ALBUMIN SERPL BCP-MCNC: 4 G/DL (ref 3.4–5)
ALP SERPL-CCNC: 53 U/L (ref 33–136)
ALT SERPL W P-5'-P-CCNC: 11 U/L (ref 10–52)
ANION GAP SERPL CALC-SCNC: 10 MMOL/L (ref 10–20)
APPEARANCE UR: CLEAR
AST SERPL W P-5'-P-CCNC: 14 U/L (ref 9–39)
BASOPHILS # BLD AUTO: 0.02 X10*3/UL (ref 0–0.1)
BASOPHILS NFR BLD AUTO: 0.9 %
BILIRUB SERPL-MCNC: 0.6 MG/DL (ref 0–1.2)
BILIRUB UR STRIP.AUTO-MCNC: NEGATIVE MG/DL
BUN SERPL-MCNC: 22 MG/DL (ref 6–23)
CALCIUM SERPL-MCNC: 8.4 MG/DL (ref 8.6–10.3)
CHLORIDE SERPL-SCNC: 106 MMOL/L (ref 98–107)
CO2 SERPL-SCNC: 24 MMOL/L (ref 21–32)
COLOR UR: NORMAL
CREAT SERPL-MCNC: 1.3 MG/DL (ref 0.5–1.3)
CREAT UR-MCNC: 137.2 MG/DL (ref 20–370)
EGFRCR SERPLBLD CKD-EPI 2021: 59 ML/MIN/1.73M*2
EOSINOPHIL # BLD AUTO: 0.05 X10*3/UL (ref 0–0.7)
EOSINOPHIL NFR BLD AUTO: 2.2 %
ERYTHROCYTE [DISTWIDTH] IN BLOOD BY AUTOMATED COUNT: 16.4 % (ref 11.5–14.5)
GLUCOSE SERPL-MCNC: 91 MG/DL (ref 74–99)
GLUCOSE UR STRIP.AUTO-MCNC: NORMAL MG/DL
HAPTOGLOB SERPL NEPH-MCNC: 36 MG/DL (ref 30–200)
HCT VFR BLD AUTO: 27 % (ref 41–52)
HGB BLD-MCNC: 9.6 G/DL (ref 13.5–17.5)
IGG SERPL-MCNC: 1110 MG/DL (ref 700–1600)
IMM GRANULOCYTES # BLD AUTO: 0.01 X10*3/UL (ref 0–0.7)
IMM GRANULOCYTES NFR BLD AUTO: 0.4 % (ref 0–0.9)
KETONES UR STRIP.AUTO-MCNC: NEGATIVE MG/DL
LDH SERPL L TO P-CCNC: 125 U/L (ref 84–246)
LEUKOCYTE ESTERASE UR QL STRIP.AUTO: NEGATIVE
LYMPHOCYTES # BLD AUTO: 0.37 X10*3/UL (ref 1.2–4.8)
LYMPHOCYTES NFR BLD AUTO: 16 %
MAGNESIUM SERPL-MCNC: 1.9 MG/DL (ref 1.6–2.4)
MCH RBC QN AUTO: 36 PG (ref 26–34)
MCHC RBC AUTO-ENTMCNC: 35.6 G/DL (ref 32–36)
MCV RBC AUTO: 101 FL (ref 80–100)
MONOCYTES # BLD AUTO: 0.23 X10*3/UL (ref 0.1–1)
MONOCYTES NFR BLD AUTO: 10 %
NEUTROPHILS # BLD AUTO: 1.63 X10*3/UL (ref 1.2–7.7)
NEUTROPHILS NFR BLD AUTO: 70.5 %
NITRITE UR QL STRIP.AUTO: NEGATIVE
NRBC BLD-RTO: 0 /100 WBCS (ref 0–0)
PH UR STRIP.AUTO: 6.5 [PH]
PLATELET # BLD AUTO: 56 X10*3/UL (ref 150–450)
POTASSIUM SERPL-SCNC: 4.3 MMOL/L (ref 3.5–5.3)
PROT SERPL-MCNC: 6.3 G/DL (ref 6.4–8.2)
PROT UR STRIP.AUTO-MCNC: NEGATIVE MG/DL
PROT UR-ACNC: 12 MG/DL (ref 5–25)
PROT/CREAT UR: 0.09 MG/MG CREAT (ref 0–0.17)
RBC # BLD AUTO: 2.67 X10*6/UL (ref 4.5–5.9)
RBC # UR STRIP.AUTO: NEGATIVE MG/DL
SODIUM SERPL-SCNC: 136 MMOL/L (ref 136–145)
SP GR UR STRIP.AUTO: 1.02
TACROLIMUS BLD-MCNC: 3.1 NG/ML
UROBILINOGEN UR STRIP.AUTO-MCNC: NORMAL MG/DL
WBC # BLD AUTO: 2.3 X10*3/UL (ref 4.4–11.3)

## 2025-02-25 PROCEDURE — 85025 COMPLETE CBC W/AUTO DIFF WBC: CPT

## 2025-02-25 PROCEDURE — 82784 ASSAY IGA/IGD/IGG/IGM EACH: CPT

## 2025-02-25 PROCEDURE — 87799 DETECT AGENT NOS DNA QUANT: CPT

## 2025-02-25 PROCEDURE — 36591 DRAW BLOOD OFF VENOUS DEVICE: CPT

## 2025-02-25 PROCEDURE — 99215 OFFICE O/P EST HI 40 MIN: CPT | Performed by: STUDENT IN AN ORGANIZED HEALTH CARE EDUCATION/TRAINING PROGRAM

## 2025-02-25 PROCEDURE — 2500000004 HC RX 250 GENERAL PHARMACY W/ HCPCS (ALT 636 FOR OP/ED): Performed by: INTERNAL MEDICINE

## 2025-02-25 PROCEDURE — 1159F MED LIST DOCD IN RCRD: CPT | Performed by: STUDENT IN AN ORGANIZED HEALTH CARE EDUCATION/TRAINING PROGRAM

## 2025-02-25 PROCEDURE — 83010 ASSAY OF HAPTOGLOBIN QUANT: CPT

## 2025-02-25 PROCEDURE — 83615 LACTATE (LD) (LDH) ENZYME: CPT

## 2025-02-25 PROCEDURE — 81003 URINALYSIS AUTO W/O SCOPE: CPT

## 2025-02-25 PROCEDURE — 80053 COMPREHEN METABOLIC PANEL: CPT

## 2025-02-25 PROCEDURE — 80197 ASSAY OF TACROLIMUS: CPT

## 2025-02-25 PROCEDURE — 82570 ASSAY OF URINE CREATININE: CPT

## 2025-02-25 PROCEDURE — 1126F AMNT PAIN NOTED NONE PRSNT: CPT | Performed by: STUDENT IN AN ORGANIZED HEALTH CARE EDUCATION/TRAINING PROGRAM

## 2025-02-25 PROCEDURE — 83735 ASSAY OF MAGNESIUM: CPT

## 2025-02-25 RX ORDER — HEPARIN 100 UNIT/ML
500 SYRINGE INTRAVENOUS AS NEEDED
Status: ACTIVE | OUTPATIENT
Start: 2025-02-25

## 2025-02-25 RX ORDER — HEPARIN 100 UNIT/ML
500 SYRINGE INTRAVENOUS AS NEEDED
OUTPATIENT
Start: 2025-02-25

## 2025-02-25 RX ORDER — HEPARIN SODIUM,PORCINE/PF 10 UNIT/ML
50 SYRINGE (ML) INTRAVENOUS AS NEEDED
OUTPATIENT
Start: 2025-02-25

## 2025-02-25 RX ADMIN — HEPARIN 500 UNITS: 100 SYRINGE at 13:45

## 2025-02-25 ASSESSMENT — PAIN SCALES - GENERAL: PAINLEVEL_OUTOF10: 0-NO PAIN

## 2025-02-25 NOTE — ASSESSMENT & PLAN NOTE
Day + 96 following allogeneic stem cell transplant.  He does not have evidence of GVHD (last overall grade NA Overall Grade (Przepiorka): 0).  Continue current immunosuppressive therapy; tacro 2 mg BID, increased dose to 4 mg BID given undetectable level last week, level today pending. D+30 and D+60 PB chimerisms 100% donor.

## 2025-02-25 NOTE — PROGRESS NOTES
"    Patient ID: Jin Reynolsd is a 70 y.o. male.  Primary Oncologist: Dr. 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   8/23/2023 Initial Diagnosis    DX:  SMOLDERING SYSTEMIC MASTOCYTOSIS  Presented with skin rash and eosinophilia    BRENDON DIAGNOSTIC CRITERIA  (For SM, Need major and 1 minor OR at least 3 minors)  - Multi-focal, dense infiltrates of MC (>= 15 mast cells in aggregates) in BM and/or other extra-cutaneous organs [major]  - In BM or extracutaneous organs, >25% MC spindle shaped OR have atypical morphology OR of all MC on BM aspirate smears, >25% are immature or atypical [minor]  - Detection of activating mutation KIT D816V in BM, PB, or other extracutaneous organ [minor]  - Serum tryptase persistently exceeds 20 ng/mL (unless associated clonal myeloid disorder, in which this paramenter is not valid) [minor]    \"B\" FINDINGS  - BM biopsy showing >30% infiltration (focal, dense aggregates) and/or serum tryptase > 200 ng/mL  - Signs of dysplasia or myeloproliferation, in non-MC lineages but insufficient criteria for definitive diagnosis of AHNMD with normal or slightly abnormal blood counts    \"C\" FINDINGS  None       8/23/2023 Biopsy    BONE MARROW (8/23/23)  Hypercellular (90-95%) with trilineage hematopoiesis, granulocytic hyperplasia, eosinophilia, and increased atypical mast cells  IP:  CD34+, +, CD7(bright)+, cCD3-, CD33-, CD15-, CD13+ blast population  IHC:  + increased spindle-shaped mast cells (15% cellularity), CD2-  Myeloid NGS:  CBL (31%), cKIT D816V (30%), RUNX1 x 2 (19%, 29%), SRSF2 (47%) [ASXL1 negative]  FISH:  PDGFRb negative    SERUM TRYPTASE  228 (8/23/23)  268 (9/5/23)     2/13/2024 -  Molecular Therapy    AVAPRITINIB   - Starting dose 25 mg daily (non-Adv SM)     7/16/2024 - 10/21/2024 Chemotherapy    Venetoclax / AzaCITIDine  - C1D1 7/17/24:  complicated by TLS, venetoclax held " after D1  - Post C1 BM (8/28/24):  hypercellular with >50% blasts, background systemic mastocytosis  - C2D1 9/5/24:  venetoclax restarted with C2, no TLS  - Post C2 BM (9/25/24):  hypocellular (<5%), atypical myeloblasts by IP/IHC (1-2%), persistent systemic mastocytosis  - C3D1 10/15/24:  delayed 1 week for count recovery  - Post C3 BM (10/23/24):  hypercellular with systemic mastocytosis, no increase blasts, 0.2% atypical blasts by IP, NGS w/ CBL (73%), cKIT (4%), RUNX1 x 2 (4%, 38%), SRSF2 (40%)       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    CONDITIONING Fludarabine, melphalan, and TBI   DONOR Matched sibling   MATCH GRADE 12/12   SEX MATCH Mismatched   ABO DONOR O pos   ABO RECIPIENT O pos   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, Mycophenolate, and Tacrolimus   GRAFT SOURCE Peripheral blood          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)   6/27/2024 Initial Diagnosis    ICC 2022 DX: Acute myeloid leukemia, NOS (>=20% blasts)  [id number] AML Risk Stratification: INTERMEDIATE: Cytogenetic and/or molecular abnormalities not classified as favorable or adverse  Presented with pancytopenia and circulating blasts    BONE MARROW (06/27/2024)  Marrow replaced by blasts, fibrotic foci, some atypical + cells; 7-8% circulating blasts; aspicular  - Unable to perform additional studies due to aspicular specimen    PERIPHERAL BLOOD (6/27/2024)  FISH:  positive for gain RUNX1    PERIPHERAL BLOOD (7/1/24)  IP:  abnl myeloblast population (CD34+, CD7+, CD4+, CD13+, CD38+, CD11+ dim, HLA=DR+, TdT+, CD33-)  Myeloid NGS: CBL C404Y (41%), KIT D816V (8%), RUNX1 x2 (8%, 30%), SRSF2 (37%)     7/16/2024 - 10/21/2024 Chemotherapy    Venetoclax / AzaCITIDine  - C1D1 7/17/24:  complicated by TLS, venetoclax held after D1  - Post C1 BM (8/28/24):  hypercellular with >50% blasts, background systemic mastocytosis  - C2D1 9/5/24:  venetoclax restarted with C2, no TLS  - Post C2 BM (9/25/24):  hypocellular (<5%), atypical  myeloblasts by IP/IHC (1-2%), persistent systemic mastocytosis  - C3D1 10/15/24:  delayed 1 week for count recovery  - Post C3 BM (10/23/24):  hypercellular with systemic mastocytosis, no increase blasts, 0.2% atypical blasts by IP, NGS w/ CBL (73%), cKIT (4%), RUNX1 x 2 (4%, 38%), SRSF2 (40%)       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    CONDITIONING Fludarabine, melphalan, and TBI   DONOR Matched sibling   MATCH GRADE 12/12   SEX MATCH Mismatched   ABO DONOR O pos   ABO RECIPIENT O pos   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, Mycophenolate, and Tacrolimus   GRAFT SOURCE Peripheral blood          12/26/2024 -  Infection    Parainfluenza URI     12/26/2024 -  Disease Reevaluation      DATE DAY SOURCE MORPHOLOGY MRD WHOLE CHIMERISM   (% donor) CD3 CHIMERISM   (% donor) CD33 CHIMERISM   (% donor)   12/26/2024 D+30 PB        12/26/2024 D+30 BM No AML, persist BRENDON Neg       D+60 PB         D+100 PB         D+100 BM                  02/25/25 T+ 96 s/p allo MRD SCT. Counts stable, thrombocytopenia persists though slightly better with start of eltrombopag this past week. Will have T+100 BM next week. Tacro levels undetectable, question of levels related to possible start of isavuconazonium. Dose was increased to 4 mg BID as of last week, level today pending. Viral testing negative. Continue with weekly count checks.      Assessment & Plan  History of stem cell transplant (Multi)  Day + 96 following allogeneic stem cell transplant.  He does not have evidence of GVHD (last overall grade NA Overall Grade (Przepiorka): 0).  Continue current immunosuppressive therapy; tacro 2 mg BID, increased dose to 4 mg BID given undetectable level last week, level today pending. D+30 and D+60 PB chimerisms 100% donor.   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)  Counts remain low without evidence of relapse. Possibly related to mast cell disease which was present on D30 bmbx, no evidence of AML.   PLT still low,  "eltrombopag initiated 2/18/2025.   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      OBJECTIVE     BSA: 1.88 meters squared  Pulse 62   Temp 36.2 °C (97.2 °F) (Temporal)   Resp 18   Wt 75.3 kg (166 lb 0.1 oz)   SpO2 100%   BMI 26.33 kg/m²   Weight         2/25/2025  1302             Weight: 75.3 kg (166 lb 0.1 oz)               Physical Exam    Performance Status:  {Karnofsky Score:45760:::1}      POST TRANSPLANT MONITORING   GVHD  Acute GVHD Overall Grade:    Chronic GVHD Total Score:      Viral Monitoring:     CMV   No results found for: \"CMVPCRIU\"   EBV   Lab Results   Component Value Date    EBVPCRQNTWB 653 (H) 02/11/2025      Adenovirus   Lab Results   Component Value Date    ADEPB Not Detected 02/18/2025    ADEPB Not Detected 02/11/2025    ADEPB Not Detected 02/04/2025       Toxo   No results found for: \"TGONDPCRBL\"       TMA Monitoring:     MARKERS RECENT 3 VALUES   LDH Lab Results   Component Value Date "     02/18/2025     02/11/2025     02/07/2025      Haptoglobin Lab Results   Component Value Date    HAPTOGLOBIN 39 02/18/2025    HAPTOGLOBIN 38 02/11/2025    HAPTOGLOBIN 39 02/04/2025      BP BP Readings from Last 3 Encounters:   02/18/25 121/68   02/11/25 113/68   02/07/25 119/69      Serum Creatinine Lab Results   Component Value Date    CREATININE 1.16 02/18/2025    CREATININE 1.03 02/11/2025    CREATININE 1.03 02/07/2025      Urine Protein/Creat Ratio Lab Results   Component Value Date    UTPCR 0.13 02/07/2025    UTPCR 0.11 01/28/2025    UTPCR 0.13 01/20/2025      Tacrolimus Level Lab Results   Component Value Date    TACROLIMUS <2.0 02/18/2025    TACROLIMUS <2.0 02/11/2025    TACROLIMUS 2.5 02/07/2025            Kalia June PA-C           HAPTOGLOBIN 38 02/11/2025    HAPTOGLOBIN 39 02/04/2025      BP BP Readings from Last 3 Encounters:   02/18/25 121/68   02/11/25 113/68   02/07/25 119/69      Serum Creatinine Lab Results   Component Value Date    CREATININE 1.16 02/18/2025    CREATININE 1.03 02/11/2025    CREATININE 1.03 02/07/2025      Urine Protein/Creat Ratio Lab Results   Component Value Date    UTPCR 0.13 02/07/2025    UTPCR 0.11 01/28/2025    UTPCR 0.13 01/20/2025      Tacrolimus Level Lab Results   Component Value Date    TACROLIMUS <2.0 02/18/2025    TACROLIMUS <2.0 02/11/2025    TACROLIMUS 2.5 02/07/2025            Kalia June PA-C

## 2025-02-25 NOTE — ASSESSMENT & PLAN NOTE
Counts remain low without evidence of relapse. Possibly related to mast cell disease which was present on D30 bmbx, no evidence of AML.   PLT still low, eltrombopag initiated 2/18/2025.   Current evidence of disease: No  Maintenance Therapy: No  MRD Monitoring Plan: AML MFC; Day 100 BMBx scheduled for 3/3/25

## 2025-02-26 ENCOUNTER — SPECIALTY PHARMACY (OUTPATIENT)
Dept: HEMATOLOGY/ONCOLOGY | Facility: HOSPITAL | Age: 70
End: 2025-02-26
Payer: MEDICARE

## 2025-03-03 ENCOUNTER — APPOINTMENT (OUTPATIENT)
Dept: HEMATOLOGY/ONCOLOGY | Facility: HOSPITAL | Age: 70
End: 2025-03-03
Payer: MEDICARE

## 2025-03-03 ENCOUNTER — OFFICE VISIT (OUTPATIENT)
Dept: HEMATOLOGY/ONCOLOGY | Facility: HOSPITAL | Age: 70
End: 2025-03-03
Payer: MEDICARE

## 2025-03-03 ENCOUNTER — TELEPHONE (OUTPATIENT)
Dept: HEMATOLOGY/ONCOLOGY | Facility: HOSPITAL | Age: 70
End: 2025-03-03

## 2025-03-03 ENCOUNTER — PROCEDURE VISIT (OUTPATIENT)
Dept: HEMATOLOGY/ONCOLOGY | Facility: HOSPITAL | Age: 70
End: 2025-03-03
Payer: MEDICARE

## 2025-03-03 ENCOUNTER — NUTRITION (OUTPATIENT)
Dept: HEMATOLOGY/ONCOLOGY | Facility: HOSPITAL | Age: 70
End: 2025-03-03

## 2025-03-03 VITALS — BODY MASS INDEX: 26.02 KG/M2 | HEIGHT: 67 IN | WEIGHT: 165.79 LBS

## 2025-03-03 VITALS
SYSTOLIC BLOOD PRESSURE: 122 MMHG | HEART RATE: 79 BPM | RESPIRATION RATE: 18 BRPM | TEMPERATURE: 96.8 F | DIASTOLIC BLOOD PRESSURE: 72 MMHG | BODY MASS INDEX: 26.3 KG/M2 | WEIGHT: 165.8 LBS

## 2025-03-03 VITALS — WEIGHT: 165.79 LBS | BODY MASS INDEX: 26.02 KG/M2 | HEIGHT: 67 IN

## 2025-03-03 DIAGNOSIS — C92.00 ACUTE MYELOID LEUKEMIA NOT HAVING ACHIEVED REMISSION (MULTI): ICD-10-CM

## 2025-03-03 DIAGNOSIS — D47.02 SYSTEMIC MASTOCYTOSIS WITH ASSOCIATED CLONAL HEMATOLOGICAL NON-MAST CELL LINEAGE DISEASE: ICD-10-CM

## 2025-03-03 DIAGNOSIS — Z94.84 HISTORY OF STEM CELL TRANSPLANT (MULTI): ICD-10-CM

## 2025-03-03 DIAGNOSIS — C92.01 AML (ACUTE MYELOID LEUKEMIA) IN REMISSION (MULTI): ICD-10-CM

## 2025-03-03 DIAGNOSIS — Z94.81 S/P ALLOGENEIC BONE MARROW TRANSPLANT (MULTI): ICD-10-CM

## 2025-03-03 DIAGNOSIS — Z94.84 HISTORY OF ALLOGENEIC STEM CELL TRANSPLANT (MULTI): ICD-10-CM

## 2025-03-03 DIAGNOSIS — C92.01 ACUTE MYELOID LEUKEMIA IN REMISSION (MULTI): ICD-10-CM

## 2025-03-03 DIAGNOSIS — Z76.82 STEM CELL TRANSPLANT CANDIDATE: ICD-10-CM

## 2025-03-03 LAB
ALBUMIN SERPL BCP-MCNC: 4.2 G/DL (ref 3.4–5)
ALP SERPL-CCNC: 55 U/L (ref 33–136)
ALT SERPL W P-5'-P-CCNC: 10 U/L (ref 10–52)
ANION GAP SERPL CALC-SCNC: 10 MMOL/L (ref 10–20)
APPEARANCE UR: ABNORMAL
AST SERPL W P-5'-P-CCNC: 14 U/L (ref 9–39)
BASOPHILS # BLD AUTO: 0.02 X10*3/UL (ref 0–0.1)
BASOPHILS NFR BLD AUTO: 0.5 %
BILIRUB SERPL-MCNC: 0.6 MG/DL (ref 0–1.2)
BILIRUB UR STRIP.AUTO-MCNC: NEGATIVE MG/DL
BUN SERPL-MCNC: 18 MG/DL (ref 6–23)
CALCIUM SERPL-MCNC: 8.6 MG/DL (ref 8.6–10.6)
CHLORIDE SERPL-SCNC: 106 MMOL/L (ref 98–107)
CO2 SERPL-SCNC: 24 MMOL/L (ref 21–32)
COLOR UR: ABNORMAL
CREAT SERPL-MCNC: 1.19 MG/DL (ref 0.5–1.3)
CREAT UR-MCNC: 119.5 MG/DL (ref 20–370)
EGFRCR SERPLBLD CKD-EPI 2021: 66 ML/MIN/1.73M*2
EOSINOPHIL # BLD AUTO: 0.05 X10*3/UL (ref 0–0.7)
EOSINOPHIL NFR BLD AUTO: 1.3 %
ERYTHROCYTE [DISTWIDTH] IN BLOOD BY AUTOMATED COUNT: 16.3 % (ref 11.5–14.5)
GLUCOSE SERPL-MCNC: 123 MG/DL (ref 74–99)
GLUCOSE UR STRIP.AUTO-MCNC: NORMAL MG/DL
HAPTOGLOB SERPL NEPH-MCNC: 47 MG/DL (ref 30–200)
HCT VFR BLD AUTO: 26.9 % (ref 41–52)
HGB BLD-MCNC: 9.4 G/DL (ref 13.5–17.5)
IGG SERPL-MCNC: 1070 MG/DL (ref 700–1600)
IMM GRANULOCYTES # BLD AUTO: 0.01 X10*3/UL (ref 0–0.7)
IMM GRANULOCYTES NFR BLD AUTO: 0.3 % (ref 0–0.9)
KETONES UR STRIP.AUTO-MCNC: NEGATIVE MG/DL
LDH SERPL L TO P-CCNC: 130 U/L (ref 84–246)
LEUKOCYTE ESTERASE UR QL STRIP.AUTO: NEGATIVE
LYMPHOCYTES # BLD AUTO: 0.4 X10*3/UL (ref 1.2–4.8)
LYMPHOCYTES NFR BLD AUTO: 10.5 %
MAGNESIUM SERPL-MCNC: 1.92 MG/DL (ref 1.6–2.4)
MCH RBC QN AUTO: 35.6 PG (ref 26–34)
MCHC RBC AUTO-ENTMCNC: 34.9 G/DL (ref 32–36)
MCV RBC AUTO: 102 FL (ref 80–100)
MONOCYTES # BLD AUTO: 0.35 X10*3/UL (ref 0.1–1)
MONOCYTES NFR BLD AUTO: 9.2 %
NEUTROPHILS # BLD AUTO: 2.99 X10*3/UL (ref 1.2–7.7)
NEUTROPHILS NFR BLD AUTO: 78.2 %
NITRITE UR QL STRIP.AUTO: NEGATIVE
NRBC BLD-RTO: 0 /100 WBCS (ref 0–0)
PH UR STRIP.AUTO: 7 [PH]
PLATELET # BLD AUTO: 67 X10*3/UL (ref 150–450)
POTASSIUM SERPL-SCNC: 3.9 MMOL/L (ref 3.5–5.3)
PROT SERPL-MCNC: 6.3 G/DL (ref 6.4–8.2)
PROT UR STRIP.AUTO-MCNC: NEGATIVE MG/DL
PROT UR-ACNC: 12 MG/DL (ref 5–25)
PROT/CREAT UR: 0.1 MG/MG CREAT (ref 0–0.17)
RBC # BLD AUTO: 2.64 X10*6/UL (ref 4.5–5.9)
RBC # UR STRIP.AUTO: NEGATIVE MG/DL
SODIUM SERPL-SCNC: 136 MMOL/L (ref 136–145)
SP GR UR STRIP.AUTO: 1.02
TACROLIMUS BLD-MCNC: 3.1 NG/ML
UROBILINOGEN UR STRIP.AUTO-MCNC: NORMAL MG/DL
WBC # BLD AUTO: 3.8 X10*3/UL (ref 4.4–11.3)

## 2025-03-03 PROCEDURE — 38222 DX BONE MARROW BX & ASPIR: CPT | Performed by: NURSE PRACTITIONER

## 2025-03-03 PROCEDURE — 88185 FLOWCYTOMETRY/TC ADD-ON: CPT | Mod: TC | Performed by: NURSE PRACTITIONER

## 2025-03-03 PROCEDURE — 99214 OFFICE O/P EST MOD 30 MIN: CPT | Mod: 25 | Performed by: NURSE PRACTITIONER

## 2025-03-03 PROCEDURE — 80197 ASSAY OF TACROLIMUS: CPT | Performed by: NURSE PRACTITIONER

## 2025-03-03 PROCEDURE — 83010 ASSAY OF HAPTOGLOBIN QUANT: CPT | Performed by: NURSE PRACTITIONER

## 2025-03-03 PROCEDURE — 99214 OFFICE O/P EST MOD 30 MIN: CPT | Performed by: NURSE PRACTITIONER

## 2025-03-03 PROCEDURE — 3008F BODY MASS INDEX DOCD: CPT | Performed by: NURSE PRACTITIONER

## 2025-03-03 PROCEDURE — 87799 DETECT AGENT NOS DNA QUANT: CPT | Performed by: NURSE PRACTITIONER

## 2025-03-03 PROCEDURE — 82784 ASSAY IGA/IGD/IGG/IGM EACH: CPT | Performed by: NURSE PRACTITIONER

## 2025-03-03 PROCEDURE — 2500000004 HC RX 250 GENERAL PHARMACY W/ HCPCS (ALT 636 FOR OP/ED): Performed by: INTERNAL MEDICINE

## 2025-03-03 PROCEDURE — 83615 LACTATE (LD) (LDH) ENZYME: CPT | Performed by: NURSE PRACTITIONER

## 2025-03-03 PROCEDURE — 82570 ASSAY OF URINE CREATININE: CPT | Performed by: NURSE PRACTITIONER

## 2025-03-03 PROCEDURE — 81003 URINALYSIS AUTO W/O SCOPE: CPT | Performed by: NURSE PRACTITIONER

## 2025-03-03 PROCEDURE — 38222 DX BONE MARROW BX & ASPIR: CPT | Mod: LT | Performed by: NURSE PRACTITIONER

## 2025-03-03 PROCEDURE — 85025 COMPLETE CBC W/AUTO DIFF WBC: CPT | Performed by: NURSE PRACTITIONER

## 2025-03-03 PROCEDURE — 83735 ASSAY OF MAGNESIUM: CPT | Performed by: NURSE PRACTITIONER

## 2025-03-03 PROCEDURE — 80053 COMPREHEN METABOLIC PANEL: CPT | Performed by: NURSE PRACTITIONER

## 2025-03-03 RX ORDER — ACYCLOVIR 400 MG/1
400 TABLET ORAL EVERY 12 HOURS
Qty: 180 TABLET | Refills: 2 | Status: SHIPPED | OUTPATIENT
Start: 2025-03-03

## 2025-03-03 RX ORDER — HEPARIN SODIUM,PORCINE/PF 10 UNIT/ML
50 SYRINGE (ML) INTRAVENOUS AS NEEDED
OUTPATIENT
Start: 2025-03-03

## 2025-03-03 RX ORDER — TACROLIMUS 1 MG/1
4 CAPSULE ORAL EVERY 12 HOURS
Qty: 240 CAPSULE | Refills: 1 | Status: SHIPPED | OUTPATIENT
Start: 2025-03-03

## 2025-03-03 RX ORDER — HEPARIN 100 UNIT/ML
500 SYRINGE INTRAVENOUS AS NEEDED
OUTPATIENT
Start: 2025-03-03

## 2025-03-03 RX ORDER — HEPARIN 100 UNIT/ML
500 SYRINGE INTRAVENOUS AS NEEDED
Status: DISCONTINUED | OUTPATIENT
Start: 2025-03-03 | End: 2025-03-03 | Stop reason: HOSPADM

## 2025-03-03 RX ADMIN — HEPARIN 500 UNITS: 100 SYRINGE at 11:14

## 2025-03-03 ASSESSMENT — ENCOUNTER SYMPTOMS
CARDIOVASCULAR NEGATIVE: 1
GASTROINTESTINAL NEGATIVE: 1
MUSCULOSKELETAL NEGATIVE: 1
NEUROLOGICAL NEGATIVE: 1
PSYCHIATRIC NEGATIVE: 1
ROS SKIN COMMENTS: DRY SKIN.
CONSTITUTIONAL NEGATIVE: 1
ENDOCRINE NEGATIVE: 1
EYES NEGATIVE: 1
RESPIRATORY NEGATIVE: 1
HEMATOLOGIC/LYMPHATIC NEGATIVE: 1

## 2025-03-03 NOTE — TELEPHONE ENCOUNTER
TACROLIMUS DOSE RECOMMENDATION NOTE   Jin Reynolds is a 70 y.o. male currently day +102 following Matched Related Donor (MRD) allogeneic stem cell transplant for a diagnosis of AML. Patient on tacrolimus for GVHD prophylaxis with level resulting today. Pharmacist was consulted to provide tacrolimus dose recommendations.     Objective   Creatinine   Date Value Ref Range Status   03/03/2025 1.19 0.50 - 1.30 mg/dL Final   02/25/2025 1.30 0.50 - 1.30 mg/dL Final   02/18/2025 1.16 0.50 - 1.30 mg/dL Final     Bilirubin, Total   Date Value Ref Range Status   03/03/2025 0.6 0.0 - 1.2 mg/dL Final   02/25/2025 0.6 0.0 - 1.2 mg/dL Final   02/18/2025 0.6 0.0 - 1.2 mg/dL Final     Tacrolimus    Date Value Ref Range Status   03/03/2025 3.1 <=15.0 ng/mL Final   02/25/2025 3.1 <=15.0 ng/mL Final   02/18/2025 <2.0 <=15.0 ng/mL Final       Assessment  Current tacrolimus dose: 4 mg every 12 hours  - Patient missed morning and night tacrolimus doses on 3/1/2025  Goal tacrolimus level: 5-10 ng/mL    Plan   The patient's tacrolimus level today was 3.1 which is Subtherapeutic. Since the patient did not take any tacrolimus on 3/1/2025, no change, continue current dose of 4 mg every 12 hours.     All questions were answered. Patient/family verbalized understanding of the plan of care and information provided. Will follow as necessary. Time spent with patient/family and/or coordinating care: 10 minutes.      Liz Cedeno, PharmD, [facility]  Ambulatory Stem Cell Transplant Transplant Pharmacist    Current Outpatient Medications   Medication Instructions    acyclovir (ZOVIRAX) 400 mg, oral, Every 12 hours    amLODIPine (NORVASC) 10 mg, oral, Daily    avapritinib (AYVAKIT) 50 mg, oral, Daily, Take at least 1 hour before a meal or 2 hours after a meal. Do not chew, crush, or split.    cholecalciferol (VITAMIN D-3) 2,000 Units, oral, Daily    Cresemba 372 mg, oral, Daily    eltrombopag olamine (PROMACTA) 50 mg, oral, Daily before breakfast,  Take on an empty stomach, 1 hour before or 2 hours afer a meal.    famotidine (PEPCID) 20 mg, oral, Daily    fexofenadine (ALLEGRA) 180 mg, Daily    magnesium, amino acid chelate, 133 mg tablet 266 mg, oral, 3 times daily, Or as instructed on your medication list.    ondansetron (ZOFRAN) 8 mg, oral, Every 8 hours PRN    prochlorperazine (COMPAZINE) 10 mg, oral, Every 6 hours PRN    sulfamethoxazole-trimethoprim (Bactrim DS) 800-160 mg tablet 1 tablet, oral, 3 times weekly, Take on Mondays, Wednesdays, and Fridays.    tacrolimus (PROGRAF) 4 mg, oral, Every 12 hours, Or as instructed on your medication list.

## 2025-03-03 NOTE — ASSESSMENT & PLAN NOTE
Remains at high risk for infections.  No current active signs or symptoms of infection.    Continues on prophylaxis with acyclovir and posaconazole.  TMP/SMX briefly held given hyperkalemia and renal insufficiency 1/10-1/17. Monitoring sCr for now.    Switched posa to isavuconazonium d/t interaction with avapritinib.   Ongoing viral monitoring (as below): EBV low-level PCR positive 2/11/25. Continue to monitor weekly.  Immune reconstitution: Assess at D+100.  Post Tx Immunizations are planned for D+180. Will need scheduled.

## 2025-03-03 NOTE — ASSESSMENT & PLAN NOTE
BM at D30 demonstrated persistent mast cells which may be expected at this point. Serum tryptase elevated. Avapritinib started around 2/14/25.

## 2025-03-03 NOTE — PROGRESS NOTES
"Patient ID:  Jin Reynolds is a 70 y.o. male.  Referring Physician:   BMT Dr Dawson  Primary Care Provider:  Clive Hernandez MD    Assessment/Plan    3/3/25 T+ 102 s/p allo MRD SCT. Restaging bmbx today. Cytopenia slowly improving. No signs/sx of GVHD.     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   8/23/2023 Initial Diagnosis    DX:  SMOLDERING SYSTEMIC MASTOCYTOSIS  Presented with skin rash and eosinophilia    BRENDON DIAGNOSTIC CRITERIA  (For SM, Need major and 1 minor OR at least 3 minors)  - Multi-focal, dense infiltrates of MC (>= 15 mast cells in aggregates) in BM and/or other extra-cutaneous organs [major]  - In BM or extracutaneous organs, >25% MC spindle shaped OR have atypical morphology OR of all MC on BM aspirate smears, >25% are immature or atypical [minor]  - Detection of activating mutation KIT D816V in BM, PB, or other extracutaneous organ [minor]  - Serum tryptase persistently exceeds 20 ng/mL (unless associated clonal myeloid disorder, in which this paramenter is not valid) [minor]    \"B\" FINDINGS  - BM biopsy showing >30% infiltration (focal, dense aggregates) and/or serum tryptase > 200 ng/mL  - Signs of dysplasia or myeloproliferation, in non-MC lineages but insufficient criteria for definitive diagnosis of AHNMD with normal or slightly abnormal blood counts    \"C\" FINDINGS  None       8/23/2023 Biopsy    BONE MARROW (8/23/23)  Hypercellular (90-95%) with trilineage hematopoiesis, granulocytic hyperplasia, eosinophilia, and increased atypical mast cells  IP:  CD34+, +, CD7(bright)+, cCD3-, CD33-, CD15-, CD13+ blast population  IHC:  + increased spindle-shaped mast cells (15% cellularity), CD2-  Myeloid NGS:  CBL (31%), cKIT D816V (30%), RUNX1 x 2 (19%, 29%), SRSF2 (47%) [ASXL1 negative]  FISH:  PDGFRb negative    SERUM TRYPTASE  228 (8/23/23)  268 (9/5/23)     2/13/2024 -  Molecular Therapy "    AVAPRITINIB   - Starting dose 25 mg daily (non-Adv SM)    DATE Serum Tryptase Notes                            7/16/2024 - 10/21/2024 Chemotherapy    Venetoclax / AzaCITIDine  - C1D1 7/17/24:  complicated by TLS, venetoclax held after D1  - Post C1 BM (8/28/24):  hypercellular with >50% blasts, background systemic mastocytosis  - C2D1 9/5/24:  venetoclax restarted with C2, no TLS  - Post C2 BM (9/25/24):  hypocellular (<5%), atypical myeloblasts by IP/IHC (1-2%), persistent systemic mastocytosis  - C3D1 10/15/24:  delayed 1 week for count recovery  - Post C3 BM (10/23/24):  hypercellular with systemic mastocytosis, no increase blasts, 0.2% atypical blasts by IP, NGS w/ CBL (73%), cKIT (4%), RUNX1 x 2 (4%, 38%), SRSF2 (40%)       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    CONDITIONING Fludarabine, melphalan, and TBI   DONOR Matched sibling   MATCH GRADE 12/12   SEX MATCH Mismatched   ABO DONOR O pos   ABO RECIPIENT O pos   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, Mycophenolate, and Tacrolimus   GRAFT SOURCE Peripheral blood          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)   6/27/2024 Initial Diagnosis    ICC 2022 DX: Acute myeloid leukemia, NOS (>=20% blasts)  [id number] AML Risk Stratification: INTERMEDIATE: Cytogenetic and/or molecular abnormalities not classified as favorable or adverse  Presented with pancytopenia and circulating blasts    BONE MARROW (06/27/2024)  Marrow replaced by blasts, fibrotic foci, some atypical + cells; 7-8% circulating blasts; aspicular  - Unable to perform additional studies due to aspicular specimen    PERIPHERAL BLOOD (6/27/2024)  FISH:  positive for gain RUNX1    PERIPHERAL BLOOD (7/1/24)  IP:  abnl myeloblast population (CD34+, CD7+, CD4+, CD13+, CD38+, CD11+ dim, HLA=DR+, TdT+, CD33-)  Myeloid NGS: CBL C404Y (41%), KIT D816V (8%), RUNX1 x2 (8%, 30%), SRSF2 (37%)     7/16/2024 - 10/21/2024 Chemotherapy    Venetoclax / AzaCITIDine  - C1D1 7/17/24:  complicated by  TLS, venetoclax held after D1  - Post C1 BM (8/28/24):  hypercellular with >50% blasts, background systemic mastocytosis  - C2D1 9/5/24:  venetoclax restarted with C2, no TLS  - Post C2 BM (9/25/24):  hypocellular (<5%), atypical myeloblasts by IP/IHC (1-2%), persistent systemic mastocytosis  - C3D1 10/15/24:  delayed 1 week for count recovery  - Post C3 BM (10/23/24):  hypercellular with systemic mastocytosis, no increase blasts, 0.2% atypical blasts by IP, NGS w/ CBL (73%), cKIT (4%), RUNX1 x 2 (4%, 38%), SRSF2 (40%)       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    CONDITIONING Fludarabine, melphalan, and TBI   DONOR Matched sibling   MATCH GRADE 12/12   SEX MATCH Mismatched   ABO DONOR O pos   ABO RECIPIENT O pos   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, Mycophenolate, and Tacrolimus   GRAFT SOURCE Peripheral blood          12/26/2024 -  Infection    Parainfluenza URI     12/26/2024 -  Disease Reevaluation      DATE DAY SOURCE MORPHOLOGY MRD WHOLE CHIMERISM   (% donor) CD3 CHIMERISM   (% donor) CD33 CHIMERISM   (% donor)   12/26/2024 D+30 PB        12/26/2024 D+30 BM No AML, persist BRENDON Neg      1/20/25  D+60 PB    96% 100%    D+100 PB         D+100 BM                 History of stem cell transplant (Multi)  Day + 102 following allogeneic stem cell transplant.  He does not have evidence of GVHD (last overall grade NA Overall Grade (Przepiorka): 0).      Continue current immunosuppressive therapy; tacro 4 mg BID; 3/3 level 3.1. Keep same dose (missed 3/1/25 doses)    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)  Counts remain low without evidence of relapse. Possibly related to mast cell disease which was present on D30 bmbx, no evidence of AML.   PLT still low, eltrombopag initiated 2/18/2025.   Current evidence of disease: No  Maintenance Therapy: No  MRD Monitoring Plan: AML MFC; Day 100 BMBx scheduled for 3/3/25    Systemic mastocytosis with associated clonal hematological non-mast cell lineage  "disease  BM at D30 demonstrated persistent mast cells which may be expected at this point. Serum tryptase elevated 71.5 on 1/7/25. Avapritinib started around 2/14/25.     TMA monitoring  3/3 UPC ratio 0.1  3/3 Haptoglobin 47  3/3     Idiopathic thrombocytopenic purpura (Multi)     Immunocompromised state  Remains at high risk for infections.  No current active signs or symptoms of infection.    Continues on prophylaxis with acyclovir and posaconazole.  TMP/SMX briefly held given hyperkalemia and renal insufficiency 1/10-1/17. Monitoring sCr for now.    Switched posa to isavuconazonium d/t interaction with avapritinib.   Ongoing viral monitoring (as below): EBV low-level PCR positive 2/11/25. Continue to monitor weekly.  Immune reconstitution: Assess at D+100.  Post Tx Immunizations are planned for D+180. Will need scheduled.     Viral monitoring  2/25 EBV <500  2/25 Adeno ND  2/25 CMV ND    2/25 IgG 1110    Access  R chest wall Mediport      Subjective    History of Present Illness:  Mr Reynolds presents to the clinic today, unaccompanied.    Appetite better. Still smaller portions. Taste still a little off. Solid stools.     Energy level \"8/10\". Steady. No falls. Up and around; out and about. Treadmill every other day for about 30 minutes.            Review of Systems   Constitutional: Negative.    HENT:  Negative.     Eyes: Negative.    Respiratory: Negative.     Cardiovascular: Negative.    Gastrointestinal: Negative.    Endocrine: Negative.    Genitourinary: Negative.     Musculoskeletal: Negative.    Skin: Negative.         Dry skin.   Neurological: Negative.    Hematological: Negative.    Psychiatric/Behavioral: Negative.              Objective        Physical Exam  Vitals reviewed.   Constitutional:       Appearance: Normal appearance.   HENT:      Head: Normocephalic and atraumatic.      Comments: Scalp alopecia.     Nose: Nose normal.      Mouth/Throat:      Mouth: Mucous membranes are moist.   Eyes: "      Pupils: Pupils are equal, round, and reactive to light.   Cardiovascular:      Rate and Rhythm: Normal rate and regular rhythm.      Pulses: Normal pulses.      Heart sounds: Normal heart sounds.   Pulmonary:      Effort: Pulmonary effort is normal.      Breath sounds: Normal breath sounds.   Abdominal:      General: Abdomen is flat. Bowel sounds are normal.      Palpations: Abdomen is soft.   Musculoskeletal:         General: Normal range of motion.      Cervical back: Normal range of motion.   Skin:     General: Skin is warm and dry.   Neurological:      General: No focal deficit present.      Mental Status: He is alert and oriented to person, place, and time.   Psychiatric:         Mood and Affect: Mood normal.

## 2025-03-03 NOTE — ASSESSMENT & PLAN NOTE
Day + 88 following allogeneic stem cell transplant.  He does not have evidence of GVHD (last overall grade NA Overall Grade (Przepiorka): 0).  Continue current immunosuppressive therapy; tacro 4 mg BID, level today pending. D+30 and D+60 PB chimerisms 100% donor.

## 2025-03-03 NOTE — PROGRESS NOTES
"NUTRITION FOLLOW UP NOTE    Reason for Visit:  Jin Reynolds is a 70 y.o. 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    Transplant c/b by mucositis, CINV and diarrhea     Currently T+102    Pt seen today in infusion      Lab Results   Component Value Date/Time    GLUCOSE 123 (H) 03/03/2025 0909     03/03/2025 0909    K 3.9 03/03/2025 0909     03/03/2025 0909    CO2 24 03/03/2025 0909    ANIONGAP 10 03/03/2025 0909    BUN 18 03/03/2025 0909    CREATININE 1.19 03/03/2025 0909    EGFR 66 03/03/2025 0909    CALCIUM 8.6 03/03/2025 0909    ALBUMIN 4.2 03/03/2025 0909    ALKPHOS 55 03/03/2025 0909    PROT 6.3 (L) 03/03/2025 0909    AST 14 03/03/2025 0909    BILITOT 0.6 03/03/2025 0909    ALT 10 03/03/2025 0909    MG 1.92 03/03/2025 0909    PHOS 2.3 (L) 12/07/2024 0608     Lab Results   Component Value Date/Time    VITD25 27 (L) 02/05/2024 0807   Taking Vit D 2000IU daily    Lab Results   Component Value Date    WBC 3.8 (L) 03/03/2025    HGB 9.4 (L) 03/03/2025    HCT 26.9 (L) 03/03/2025     (H) 03/03/2025    PLT 67 (L) 03/03/2025       Anthropometrics:  Anthropometrics  Height: 169.1 cm (5' 6.58\")  Weight: 75.2 kg (165 lb 12.6 oz)  BMI (Calculated): 26.3  IBW/kg (Dietitian Calculated): 65.9 kg  Percent of IBW: 114 %    *Wt at transplant admit: (11/15) 86.1 kg  *Wt at first post-transplant visit: (12/9) 80.7 kg     *Wt fluctuating b/t 73-75 kg for the past month.     Wt Readings from Last 10 Encounters:   03/03/25 75.2 kg (165 lb 12.6 oz)   03/03/25 75.2 kg (165 lb 12.6 oz)   03/03/25 75.2 kg (165 lb 12.8 oz)   02/25/25 75.3 kg (166 lb 0.1 oz)   02/18/25 74.2 kg (163 lb 9.3 oz)   02/11/25 76.9 kg (169 lb 8.5 oz)   02/07/25 74.8 kg (165 lb)   02/07/25 74.8 kg (164 lb 14.5 oz)   02/04/25 73.6 kg (162 lb 4.1 oz)   01/31/25 76.5 kg (168 lb 10.4 oz)   12/31/24        71.9 kg  12/24/24        76.7 kg   12/19/24        79.1 kg  12/09/24        80.7 kg--first post transplant visit  11/15/24      " "  86.1 kg--at time of transplant  10/31/24        87.5 kg   10/18/24        84.0 kg  09/20/24        86.8 kg  08/29/24        89.2 kg  07/30/24        95.9 kg   06/21/24        87.5 kg         Food And Nutrient Intake:      Appetite present; eating ~2 meals a day.   Portions still 50% baseline; still with early satiety   Still with residual dysgeusia, however, now ~75% of foods taste like they should.  No specific foods taste off; really doesn't know how it will taste until he tries it.  Still drinking Ensure but switched to High Protein version  so drinking 2 per day vs the 3 Ensure Originals he was drinking.    Drinking ~32-40 oz of water daily.  Does have \"flashes\" of nausea that occurs intermittently.  Occasionally will take something, other times he just lets in pass.  Normal BM's q 1-2 days.   Energy levels \"getting there;\" goes on treadmill every other day for 30 min.  Does take naps most days; more fatigued since starting the Cresemba and Promacta.     Overall feels he is ~80% baseline  Appetite 8 out of 10.     Yesterday:  B--Cereal with milk; usually fruit  Ensure HP   3 pm chili   Ensure HP  Didn't feel like eating dinner  Had candy for snack in evening     This am had Ensure HP     Ensure HP provides: 160 kcals and 16 gm protein each     Nutrition Focused Physical Exam    Pt still with mild-moderate muscle wasting in clavicular region, trapezius area and calves.     Energy Needs  Calculated Energy Needs Using Equations  Height: 169.1 cm (5' 6.58\")  Estimated Energy Needs  Total Energy Estimated Needs in 24 hours (kCal):  (1012-7876)  Energy Estimated Needs per kg Body Weight in 24 hours (kCal/kg):  (28-30)  Estimated Fluid Needs  Total Fluid Estimated Needs in 24 Hours (mL):  (2200)  Total Fluid Estimated Needs in 24 hours (mL/kg):  (30)  Estimated Protein Needs  Total Protein Estimated Needs in 24 Hours (g):  (75-90)  Protein Estimated Needs per kg Body Weight in 24 Hours (g/kg):  " (1.0-1.2)        Nutrition Diagnosis   Malnutrition Diagnosis  Diagnosis Status: Resolved  Malnutrition Diagnosis: Mild malnutrition related to acute disease or injury    Nutrition Diagnosis  Diagnosis Status (1): New  Nutrition Diagnosis 1: Increased nutrient needs  Related to (1): stem cell transplant  As Evidenced by (1): continued need for increased protein to promote healing, repletion and maximize strength    Pt no longer malnourished at this time.  Appears to be at new baseline weight and intakes.  Weight and appetite fluctuating--pt continues to require ONS to maintain nutritional status.     Nutrition Interventions/Recommendations   Nutrition Prescription   High Protein, High Calorie  Food Safety          Nutrition Education   Pt aware that he can eat salads as long as made at home.  Discussed that pre-packaged salad kits need to be avoided.  Reviewed importance of food safety and basic guidelines.   Continue with Ensure HP supplements BID  Avoid skipping meals  Aim for goal of 70 oz fluid daily     Coordination of Care   BMT team        Nutrition Monitoring and Evaluation      Will continue to f/up and monitor weight, labs, PO intakes, taste changes and GI symptoms PRN.

## 2025-03-03 NOTE — PROGRESS NOTES
Patient ID: Jin Reynolds is a 70 y.o. male.    Biopsy bone marrow    Date/Time: 3/3/2025 10:53 AM    Performed by: DELFINO Meehan  Authorized by: DELFINO Meehan    Consent:     Consent obtained:  Verbal and written    Consent given by:  Patient    Risks, benefits, and alternatives were discussed: yes      Risks discussed:  Bleeding, infection and pain  Universal protocol:     Procedure explained and questions answered to patient or proxy's satisfaction: yes      Relevant documents present and verified: yes      Test results available: yes      Site/side marked: yes      Immediately prior to procedure, a time out was called: yes      Patient identity confirmed:  Verbally with patient and provided demographic data  Indications:     Indications:  Disease evaluation  Pre-procedure details:     Skin preparation:  Chlorhexidine    Preparation: Patient was prepped and draped in the usual sterile fashion    Sedation:     Sedation type:  None  Anesthesia:     Anesthesia method:  Local infiltration    Local anesthetic:  Lidocaine 1% w/o epi  Procedure specific details:      Patient lay in the prone position and the left iliac crest was accessed. A total of 10 ml of lidocaine was utilized for the procedure. Aspirate samples and core biopsy were obtained without difficulty utilizing the BD Trek powered bone system. Pressure dressing applied and no s/sx bleeding noted.     Post-procedure details:     Procedure completion:  Tolerated well, no immediate complications

## 2025-03-06 LAB
CELL COUNT (BLOOD): 6.27 X10*3/UL
CELL POPULATIONS: NORMAL
DIAGNOSIS: NORMAL
FLOW DIFFERENTIAL: NORMAL
FLOW TEST ORDERED: NORMAL
LAB TEST METHOD: NORMAL
NUMBER OF CELLS COLLECTED: NORMAL
PATH REPORT.TOTAL CANCER: NORMAL
SIGNATURE COMMENT: NORMAL
SPECIMEN VIABILITY: NORMAL

## 2025-03-10 ENCOUNTER — SPECIALTY PHARMACY (OUTPATIENT)
Dept: PHARMACY | Facility: CLINIC | Age: 70
End: 2025-03-10

## 2025-03-10 LAB
ELECTRONICALLY SIGNED BY: NORMAL
KIT D816V INTERPRETATION: NORMAL
KIT D816V MUTATION RESULTS: NOT DETECTED

## 2025-03-10 PROCEDURE — RXMED WILLOW AMBULATORY MEDICATION CHARGE

## 2025-03-11 ENCOUNTER — OFFICE VISIT (OUTPATIENT)
Dept: HEMATOLOGY/ONCOLOGY | Facility: HOSPITAL | Age: 70
End: 2025-03-11
Payer: MEDICARE

## 2025-03-11 ENCOUNTER — LAB (OUTPATIENT)
Dept: HEMATOLOGY/ONCOLOGY | Facility: HOSPITAL | Age: 70
End: 2025-03-11
Payer: MEDICARE

## 2025-03-11 VITALS
OXYGEN SATURATION: 100 % | RESPIRATION RATE: 16 BRPM | BODY MASS INDEX: 25.14 KG/M2 | WEIGHT: 158.51 LBS | TEMPERATURE: 97.5 F | DIASTOLIC BLOOD PRESSURE: 72 MMHG | HEART RATE: 66 BPM | SYSTOLIC BLOOD PRESSURE: 118 MMHG

## 2025-03-11 DIAGNOSIS — D47.02 SYSTEMIC MASTOCYTOSIS WITH ASSOCIATED CLONAL HEMATOLOGICAL NON-MAST CELL LINEAGE DISEASE: ICD-10-CM

## 2025-03-11 DIAGNOSIS — C92.01 ACUTE MYELOID LEUKEMIA IN REMISSION (MULTI): ICD-10-CM

## 2025-03-11 DIAGNOSIS — D84.9 IMMUNOCOMPROMISED STATE: ICD-10-CM

## 2025-03-11 DIAGNOSIS — Z94.84 HISTORY OF STEM CELL TRANSPLANT (MULTI): ICD-10-CM

## 2025-03-11 DIAGNOSIS — Z94.81 S/P ALLOGENEIC BONE MARROW TRANSPLANT (MULTI): ICD-10-CM

## 2025-03-11 DIAGNOSIS — C92.01 ACUTE MYELOID LEUKEMIA IN REMISSION (MULTI): Primary | ICD-10-CM

## 2025-03-11 LAB
ALBUMIN SERPL BCP-MCNC: 4.4 G/DL (ref 3.4–5)
ALP SERPL-CCNC: 57 U/L (ref 33–136)
ALT SERPL W P-5'-P-CCNC: 9 U/L (ref 10–52)
ANION GAP SERPL CALC-SCNC: 12 MMOL/L (ref 10–20)
AST SERPL W P-5'-P-CCNC: 13 U/L (ref 9–39)
BASOPHILS # BLD AUTO: 0.01 X10*3/UL (ref 0–0.1)
BASOPHILS NFR BLD AUTO: 0.4 %
BILIRUB SERPL-MCNC: 0.6 MG/DL (ref 0–1.2)
BUN SERPL-MCNC: 21 MG/DL (ref 6–23)
CALCIUM SERPL-MCNC: 8.8 MG/DL (ref 8.6–10.3)
CHLORIDE SERPL-SCNC: 105 MMOL/L (ref 98–107)
CO2 SERPL-SCNC: 24 MMOL/L (ref 21–32)
CREAT SERPL-MCNC: 1.52 MG/DL (ref 0.5–1.3)
EGFRCR SERPLBLD CKD-EPI 2021: 49 ML/MIN/1.73M*2
EOSINOPHIL # BLD AUTO: 0.06 X10*3/UL (ref 0–0.7)
EOSINOPHIL NFR BLD AUTO: 2.2 %
ERYTHROCYTE [DISTWIDTH] IN BLOOD BY AUTOMATED COUNT: 16.1 % (ref 11.5–14.5)
GLUCOSE SERPL-MCNC: 159 MG/DL (ref 74–99)
HAPTOGLOB SERPL NEPH-MCNC: 84 MG/DL (ref 30–200)
HCT VFR BLD AUTO: 26.8 % (ref 41–52)
HGB BLD-MCNC: 9.7 G/DL (ref 13.5–17.5)
IGG SERPL-MCNC: 1080 MG/DL (ref 700–1600)
IMM GRANULOCYTES # BLD AUTO: 0.02 X10*3/UL (ref 0–0.7)
IMM GRANULOCYTES NFR BLD AUTO: 0.7 % (ref 0–0.9)
LDH SERPL L TO P-CCNC: 126 U/L (ref 84–246)
LYMPHOCYTES # BLD AUTO: 0.45 X10*3/UL (ref 1.2–4.8)
LYMPHOCYTES NFR BLD AUTO: 16.7 %
MAGNESIUM SERPL-MCNC: 2.2 MG/DL (ref 1.6–2.4)
MCH RBC QN AUTO: 35.9 PG (ref 26–34)
MCHC RBC AUTO-ENTMCNC: 36.2 G/DL (ref 32–36)
MCV RBC AUTO: 99 FL (ref 80–100)
MONOCYTES # BLD AUTO: 0.27 X10*3/UL (ref 0.1–1)
MONOCYTES NFR BLD AUTO: 10 %
NEUTROPHILS # BLD AUTO: 1.88 X10*3/UL (ref 1.2–7.7)
NEUTROPHILS NFR BLD AUTO: 70 %
NRBC BLD-RTO: 0 /100 WBCS (ref 0–0)
PATH REPORT.COMMENTS IMP SPEC: NORMAL
PATH REPORT.FINAL DX SPEC: NORMAL
PATH REPORT.GROSS SPEC: NORMAL
PATH REPORT.MICROSCOPIC SPEC OTHER STN: NORMAL
PATH REPORT.RELEVANT HX SPEC: NORMAL
PATH REPORT.TOTAL CANCER: NORMAL
PLATELET # BLD AUTO: 67 X10*3/UL (ref 150–450)
POTASSIUM SERPL-SCNC: 4.2 MMOL/L (ref 3.5–5.3)
PROT SERPL-MCNC: 6.8 G/DL (ref 6.4–8.2)
RBC # BLD AUTO: 2.7 X10*6/UL (ref 4.5–5.9)
SODIUM SERPL-SCNC: 137 MMOL/L (ref 136–145)
TACROLIMUS BLD-MCNC: 4.1 NG/ML
WBC # BLD AUTO: 2.7 X10*3/UL (ref 4.4–11.3)

## 2025-03-11 PROCEDURE — 87799 DETECT AGENT NOS DNA QUANT: CPT

## 2025-03-11 PROCEDURE — 83010 ASSAY OF HAPTOGLOBIN QUANT: CPT

## 2025-03-11 PROCEDURE — 83615 LACTATE (LD) (LDH) ENZYME: CPT

## 2025-03-11 PROCEDURE — 36591 DRAW BLOOD OFF VENOUS DEVICE: CPT

## 2025-03-11 PROCEDURE — 1036F TOBACCO NON-USER: CPT | Performed by: INTERNAL MEDICINE

## 2025-03-11 PROCEDURE — 99215 OFFICE O/P EST HI 40 MIN: CPT | Performed by: INTERNAL MEDICINE

## 2025-03-11 PROCEDURE — 83735 ASSAY OF MAGNESIUM: CPT

## 2025-03-11 PROCEDURE — 80197 ASSAY OF TACROLIMUS: CPT

## 2025-03-11 PROCEDURE — 80053 COMPREHEN METABOLIC PANEL: CPT

## 2025-03-11 PROCEDURE — 82784 ASSAY IGA/IGD/IGG/IGM EACH: CPT

## 2025-03-11 PROCEDURE — 85025 COMPLETE CBC W/AUTO DIFF WBC: CPT

## 2025-03-11 PROCEDURE — 2500000004 HC RX 250 GENERAL PHARMACY W/ HCPCS (ALT 636 FOR OP/ED): Performed by: INTERNAL MEDICINE

## 2025-03-11 PROCEDURE — G2211 COMPLEX E/M VISIT ADD ON: HCPCS | Performed by: INTERNAL MEDICINE

## 2025-03-11 PROCEDURE — 99215 OFFICE O/P EST HI 40 MIN: CPT | Mod: GC | Performed by: INTERNAL MEDICINE

## 2025-03-11 PROCEDURE — 1126F AMNT PAIN NOTED NONE PRSNT: CPT | Performed by: INTERNAL MEDICINE

## 2025-03-11 PROCEDURE — 3078F DIAST BP <80 MM HG: CPT | Performed by: INTERNAL MEDICINE

## 2025-03-11 PROCEDURE — 83520 IMMUNOASSAY QUANT NOS NONAB: CPT

## 2025-03-11 PROCEDURE — 1159F MED LIST DOCD IN RCRD: CPT | Performed by: INTERNAL MEDICINE

## 2025-03-11 PROCEDURE — 3074F SYST BP LT 130 MM HG: CPT | Performed by: INTERNAL MEDICINE

## 2025-03-11 RX ORDER — HEPARIN 100 UNIT/ML
500 SYRINGE INTRAVENOUS AS NEEDED
OUTPATIENT
Start: 2025-03-11

## 2025-03-11 RX ORDER — HEPARIN SODIUM,PORCINE/PF 10 UNIT/ML
50 SYRINGE (ML) INTRAVENOUS AS NEEDED
OUTPATIENT
Start: 2025-03-11

## 2025-03-11 RX ORDER — HEPARIN 100 UNIT/ML
500 SYRINGE INTRAVENOUS AS NEEDED
Status: DISCONTINUED | OUTPATIENT
Start: 2025-03-11 | End: 2025-03-11 | Stop reason: HOSPADM

## 2025-03-11 RX ORDER — BUDESONIDE 3 MG/1
3 CAPSULE, COATED PELLETS ORAL 3 TIMES DAILY
Qty: 90 CAPSULE | Refills: 1 | Status: SHIPPED | OUTPATIENT
Start: 2025-03-11 | End: 2025-05-10

## 2025-03-11 RX ORDER — BUDESONIDE 3 MG/1
3 CAPSULE, COATED PELLETS ORAL 3 TIMES DAILY
Qty: 90 CAPSULE | Refills: 11 | Status: SHIPPED | OUTPATIENT
Start: 2025-03-11 | End: 2025-03-11 | Stop reason: WASHOUT

## 2025-03-11 RX ADMIN — HEPARIN 500 UNITS: 100 SYRINGE at 08:56

## 2025-03-11 ASSESSMENT — ENCOUNTER SYMPTOMS
HEMATOLOGIC/LYMPHATIC NEGATIVE: 1
RESPIRATORY NEGATIVE: 1
EYES NEGATIVE: 1
PSYCHIATRIC NEGATIVE: 1
MUSCULOSKELETAL NEGATIVE: 1
CARDIOVASCULAR NEGATIVE: 1
NEUROLOGICAL NEGATIVE: 1
GASTROINTESTINAL NEGATIVE: 1
ENDOCRINE NEGATIVE: 1
CONSTITUTIONAL NEGATIVE: 1
ROS SKIN COMMENTS: DRY SKIN.

## 2025-03-11 ASSESSMENT — PAIN SCALES - GENERAL: PAINLEVEL_OUTOF10: 0-NO PAIN

## 2025-03-11 NOTE — ASSESSMENT & PLAN NOTE
Remains at high risk for infections.  No current active signs or symptoms of infection.    Continues on prophylaxis with acyclovir and posaconazole.  TMP/SMX briefly held given hyperkalemia and renal insufficiency 1/10-1/17. Monitoring sCr for now.    Switched posa to isavuconazonium d/t interaction with avapritinib.   Ongoing viral monitoring (as below): EBV low-level PCR positive 2/11/25. Continue to monitor weekly.  Immune reconstitution: pending  Post Tx Immunizations are planned for D+180. Will need scheduled.     Viral monitoring  3/3 EBV <500  3/3 Adeno ND  3/3 CMV ND    3/3 IgG 1070

## 2025-03-11 NOTE — ASSESSMENT & PLAN NOTE
Counts remain low but plts slowly starting rise on eltrombopag, D100 bmbx still pending but no evidence of relapse thus far. Possibly related to mast cell disease which was present on D30 bmbx although D100 KIT mutation negative, no evidence of AML. PLT still low, eltrombopag initiated 2/18/2025, cont at current dose.  Current evidence of disease: No  Maintenance Therapy: No  MRD Monitoring Plan: AML MFC; Day 100 BMBx 3/3/25 pending full results

## 2025-03-11 NOTE — ASSESSMENT & PLAN NOTE
BM at D30 demonstrated persistent mast cells which may be expected at this point. Serum tryptase elevated 71.5 on 1/7/25. Avapritinib started around 2/14/25. D+100 bmbx without KIT mutation detected, final read from marrow pending, pending  typtase level today    TMA monitoring  3/3 UPC ratio 0.1  3/3 Haptoglobin 47  3/3

## 2025-03-11 NOTE — PROGRESS NOTES
"Patient ID:  Jin Reynolds is a 70 y.o. male.  Referring Physician:   BMT Dr Dawson  Primary Care Provider:  Clive Hernandez MD    Assessment/Plan    3/3/25 T+110 s/p allo MRD SCT. D+100 bmbx partially back without evidence of disease, KIT mutation negative. Cytopenia slowly improving on eltrombopag and avapritinib. Now with persistent nausea and UGI symptoms, will start budesonide and arrange EGD.    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   8/23/2023 Initial Diagnosis    DX:  SMOLDERING SYSTEMIC MASTOCYTOSIS  Presented with skin rash and eosinophilia    BRENDON DIAGNOSTIC CRITERIA  (For SM, Need major and 1 minor OR at least 3 minors)  - Multi-focal, dense infiltrates of MC (>= 15 mast cells in aggregates) in BM and/or other extra-cutaneous organs [major]  - In BM or extracutaneous organs, >25% MC spindle shaped OR have atypical morphology OR of all MC on BM aspirate smears, >25% are immature or atypical [minor]  - Detection of activating mutation KIT D816V in BM, PB, or other extracutaneous organ [minor]  - Serum tryptase persistently exceeds 20 ng/mL (unless associated clonal myeloid disorder, in which this paramenter is not valid) [minor]    \"B\" FINDINGS  - BM biopsy showing >30% infiltration (focal, dense aggregates) and/or serum tryptase > 200 ng/mL  - Signs of dysplasia or myeloproliferation, in non-MC lineages but insufficient criteria for definitive diagnosis of AHNMD with normal or slightly abnormal blood counts    \"C\" FINDINGS  None       8/23/2023 Biopsy    BONE MARROW (8/23/23)  Hypercellular (90-95%) with trilineage hematopoiesis, granulocytic hyperplasia, eosinophilia, and increased atypical mast cells  IP:  CD34+, +, CD7(bright)+, cCD3-, CD33-, CD15-, CD13+ blast population  IHC:  + increased spindle-shaped mast cells (15% cellularity), CD2-  Myeloid NGS:  CBL (31%), cKIT D816V (30%), RUNX1 x 2 (19%, " 29%), SRSF2 (47%) [ASXL1 negative]  FISH:  PDGFRb negative    SERUM TRYPTASE  228 (8/23/23)  268 (9/5/23)     2/13/2024 -  Molecular Therapy    AVAPRITINIB   - Starting dose 25 mg daily (non-Adv SM)    DATE Serum Tryptase Notes                            7/16/2024 - 10/21/2024 Chemotherapy    Venetoclax / AzaCITIDine  - C1D1 7/17/24:  complicated by TLS, venetoclax held after D1  - Post C1 BM (8/28/24):  hypercellular with >50% blasts, background systemic mastocytosis  - C2D1 9/5/24:  venetoclax restarted with C2, no TLS  - Post C2 BM (9/25/24):  hypocellular (<5%), atypical myeloblasts by IP/IHC (1-2%), persistent systemic mastocytosis  - C3D1 10/15/24:  delayed 1 week for count recovery  - Post C3 BM (10/23/24):  hypercellular with systemic mastocytosis, no increase blasts, 0.2% atypical blasts by IP, NGS w/ CBL (73%), cKIT (4%), RUNX1 x 2 (4%, 38%), SRSF2 (40%)       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    CONDITIONING Fludarabine, melphalan, and TBI   DONOR Matched sibling   MATCH GRADE 12/12   SEX MATCH Mismatched   ABO DONOR O pos   ABO RECIPIENT O pos   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, Mycophenolate, and Tacrolimus   GRAFT SOURCE Peripheral blood          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)   6/27/2024 Initial Diagnosis    ICC 2022 DX: Acute myeloid leukemia, NOS (>=20% blasts)  [id number] AML Risk Stratification: INTERMEDIATE: Cytogenetic and/or molecular abnormalities not classified as favorable or adverse  Presented with pancytopenia and circulating blasts    BONE MARROW (06/27/2024)  Marrow replaced by blasts, fibrotic foci, some atypical + cells; 7-8% circulating blasts; aspicular  - Unable to perform additional studies due to aspicular specimen    PERIPHERAL BLOOD (6/27/2024)  FISH:  positive for gain RUNX1    PERIPHERAL BLOOD (7/1/24)  IP:  abnl myeloblast population (CD34+, CD7+, CD4+, CD13+, CD38+, CD11+ dim, HLA=DR+, TdT+, CD33-)  Myeloid NGS: CBL C404Y (41%), KIT D816V  (8%), RUNX1 x2 (8%, 30%), SRSF2 (37%)     7/16/2024 - 10/21/2024 Chemotherapy    Venetoclax / AzaCITIDine  - C1D1 7/17/24:  complicated by TLS, venetoclax held after D1  - Post C1 BM (8/28/24):  hypercellular with >50% blasts, background systemic mastocytosis  - C2D1 9/5/24:  venetoclax restarted with C2, no TLS  - Post C2 BM (9/25/24):  hypocellular (<5%), atypical myeloblasts by IP/IHC (1-2%), persistent systemic mastocytosis  - C3D1 10/15/24:  delayed 1 week for count recovery  - Post C3 BM (10/23/24):  hypercellular with systemic mastocytosis, no increase blasts, 0.2% atypical blasts by IP, NGS w/ CBL (73%), cKIT (4%), RUNX1 x 2 (4%, 38%), SRSF2 (40%)       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    CONDITIONING Fludarabine, melphalan, and TBI   DONOR Matched sibling   MATCH GRADE 12/12   SEX MATCH Mismatched   ABO DONOR O pos   ABO RECIPIENT O pos   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, Mycophenolate, and Tacrolimus   GRAFT SOURCE Peripheral blood          12/26/2024 -  Infection    Parainfluenza URI     12/26/2024 -  Disease Reevaluation      DATE DAY SOURCE MORPHOLOGY MRD WHOLE CHIMERISM   (% donor) CD3 CHIMERISM   (% donor) CD33 CHIMERISM   (% donor)   12/26/2024 D+30 PB        12/26/2024 D+30 BM No AML, persist BRENDON Neg      1/20/25  D+60 PB    96% 100%    D+100 PB         D+100 BM                 Assessment & Plan  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 Possibly related to mast cell disease which was present on D30 bmbx although D100 KIT mutation negative, no evidence of AML. PLT still low, eltrombopag initiated 2/18/2025, cont at current dose.  Current evidence of disease: No  Maintenance Therapy: No  MRD Monitoring Plan: AML MFC; Day 100 BMBx 3/3/25 pending full results  History of stem cell transplant (Multi)  Day +110 following allogeneic stem cell  transplant.  He has suspicion for GVHD with ongoing UGI symptopms of nausea and poor PO intake, will start on budesonide today and arrange for EGD  -budesonide EC 3mg TID  -EGD requested    Continue current immunosuppressive therapy; tacro 4 mg BID; 3/11 level pending.   Immunocompromised state  Remains at high risk for infections.  No current active signs or symptoms of infection.    Continues on prophylaxis with acyclovir and posaconazole.  TMP/SMX briefly held given hyperkalemia and renal insufficiency 1/10-1/17. Monitoring sCr for now.    Switched posa to isavuconazonium d/t interaction with avapritinib.   Ongoing viral monitoring (as below): EBV low-level PCR positive 2/11/25. Continue to monitor weekly.  Immune reconstitution: pending  Post Tx Immunizations are planned for D+180. Will need scheduled.     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 Serum tryptase elevated 71.5 on 1/7/25. Avapritinib started around 2/14/25. D+100 bmbx without KIT mutation detected, final read from marrow pending, pending  typtase level today    TMA monitoring  3/3 UPC ratio 0.1  3/3 Haptoglobin 47  3/3       Access  R chest wall Mediport      Subjective    History of Present Illness:  Mr Reynolds presents to the clinic today, unaccompanied. Reports that Sunday he tried to eat steak and eggs but was gagging and could not swallow food, ended up not being able to eat food the entire day and the next. Yesterday had some soup but otherwise minimal intake. Wt is down a bit. No diarrhea or constipation. Energy still moderate, has not walked on treadmill this week due to worse fatigue. Thinks he may have had some congestion for a few days.      Review of Systems   Constitutional: Negative.    HENT:  Negative.     Eyes: Negative.     Respiratory: Negative.     Cardiovascular: Negative.    Gastrointestinal: Negative.    Endocrine: Negative.    Genitourinary: Negative.     Musculoskeletal: Negative.    Skin: Negative.         Dry skin.   Neurological: Negative.    Hematological: Negative.    Psychiatric/Behavioral: Negative.              Objective        Physical Exam  Vitals reviewed.   Constitutional:       Appearance: Normal appearance.   HENT:      Head: Normocephalic and atraumatic.      Comments: Scalp alopecia.     Nose: Nose normal.      Mouth/Throat:      Mouth: Mucous membranes are moist.   Eyes:      Pupils: Pupils are equal, round, and reactive to light.   Cardiovascular:      Rate and Rhythm: Normal rate and regular rhythm.      Pulses: Normal pulses.      Heart sounds: Normal heart sounds.   Pulmonary:      Effort: Pulmonary effort is normal.      Breath sounds: Normal breath sounds.   Abdominal:      General: Abdomen is flat. Bowel sounds are normal.      Palpations: Abdomen is soft.   Musculoskeletal:         General: Normal range of motion.      Cervical back: Normal range of motion.   Skin:     General: Skin is warm and dry.   Neurological:      General: No focal deficit present.      Mental Status: He is alert and oriented to person, place, and time.   Psychiatric:         Mood and Affect: Mood normal.           Pt seen and discussed with Dr. Dawson.    Oscar Baumann DO  Hem/Onc Fellow, PGY7

## 2025-03-11 NOTE — ASSESSMENT & PLAN NOTE
Day +110 following allogeneic stem cell transplant.  He has suspicion for GVHD with ongoing UGI symptopms of nausea and poor PO intake, will start on budesonide today and arrange for EGD  -budesonide EC 3mg TID  -EGD requested    Continue current immunosuppressive therapy; tacro 4 mg BID; 3/11 level pending.

## 2025-03-12 ENCOUNTER — PHARMACY VISIT (OUTPATIENT)
Dept: PHARMACY | Facility: CLINIC | Age: 70
End: 2025-03-12
Payer: COMMERCIAL

## 2025-03-12 LAB
CHIMERISM INTERPRETATION: NORMAL
CMV DNA SERPL NAA+PROBE-LOG IU: NORMAL {LOG_IU}/ML
EBV DNA BLD NAA+PROBE-LOG IU: NORMAL {LOG_IU}/ML
ELECTRONICALLY SIGNED BY: NORMAL
LABORATORY COMMENT REPORT: NOT DETECTED
LABORATORY COMMENT REPORT: NOT DETECTED

## 2025-03-13 LAB
ADENOVIRUS QPCR,PLASMA, VIRC: NOT DETECTED COPIES/ML
TRYPTASE SERPL-MCNC: 14.1 UG/L

## 2025-03-18 ENCOUNTER — LAB (OUTPATIENT)
Dept: HEMATOLOGY/ONCOLOGY | Facility: HOSPITAL | Age: 70
End: 2025-03-18
Payer: MEDICARE

## 2025-03-18 ENCOUNTER — INFUSION (OUTPATIENT)
Dept: OTHER | Facility: HOSPITAL | Age: 70
End: 2025-03-18
Payer: MEDICARE

## 2025-03-18 ENCOUNTER — OFFICE VISIT (OUTPATIENT)
Dept: HEMATOLOGY/ONCOLOGY | Facility: HOSPITAL | Age: 70
End: 2025-03-18
Payer: MEDICARE

## 2025-03-18 VITALS
OXYGEN SATURATION: 100 % | SYSTOLIC BLOOD PRESSURE: 119 MMHG | WEIGHT: 156.31 LBS | BODY MASS INDEX: 24.79 KG/M2 | RESPIRATION RATE: 16 BRPM | HEART RATE: 77 BPM | DIASTOLIC BLOOD PRESSURE: 76 MMHG | TEMPERATURE: 97 F

## 2025-03-18 DIAGNOSIS — Z94.81 S/P ALLOGENEIC BONE MARROW TRANSPLANT (MULTI): ICD-10-CM

## 2025-03-18 DIAGNOSIS — C92.01 ACUTE MYELOID LEUKEMIA IN REMISSION (MULTI): Primary | ICD-10-CM

## 2025-03-18 DIAGNOSIS — Z94.84 HISTORY OF STEM CELL TRANSPLANT (MULTI): ICD-10-CM

## 2025-03-18 DIAGNOSIS — D84.9 IMMUNOCOMPROMISED STATE: ICD-10-CM

## 2025-03-18 DIAGNOSIS — Z94.84 HISTORY OF ALLOGENEIC STEM CELL TRANSPLANT (MULTI): ICD-10-CM

## 2025-03-18 DIAGNOSIS — D47.02 SYSTEMIC MASTOCYTOSIS WITH ASSOCIATED CLONAL HEMATOLOGICAL NON-MAST CELL LINEAGE DISEASE: ICD-10-CM

## 2025-03-18 DIAGNOSIS — C92.01 AML (ACUTE MYELOID LEUKEMIA) IN REMISSION (MULTI): ICD-10-CM

## 2025-03-18 DIAGNOSIS — C92.01 ACUTE MYELOID LEUKEMIA IN REMISSION (MULTI): ICD-10-CM

## 2025-03-18 LAB
ALBUMIN SERPL BCP-MCNC: 4.5 G/DL (ref 3.4–5)
ALP SERPL-CCNC: 56 U/L (ref 33–136)
ALT SERPL W P-5'-P-CCNC: 9 U/L (ref 10–52)
ANION GAP SERPL CALC-SCNC: 11 MMOL/L (ref 10–20)
APPEARANCE UR: CLEAR
AST SERPL W P-5'-P-CCNC: 14 U/L (ref 9–39)
BASOPHILS # BLD AUTO: 0.02 X10*3/UL (ref 0–0.1)
BASOPHILS NFR BLD AUTO: 0.6 %
BILIRUB SERPL-MCNC: 0.6 MG/DL (ref 0–1.2)
BILIRUB UR STRIP.AUTO-MCNC: NEGATIVE MG/DL
BUN SERPL-MCNC: 17 MG/DL (ref 6–23)
CALCIUM SERPL-MCNC: 8.9 MG/DL (ref 8.6–10.3)
CHLORIDE SERPL-SCNC: 106 MMOL/L (ref 98–107)
CO2 SERPL-SCNC: 24 MMOL/L (ref 21–32)
COLOR UR: YELLOW
CREAT SERPL-MCNC: 1.69 MG/DL (ref 0.5–1.3)
CREAT UR-MCNC: 210.5 MG/DL (ref 20–370)
EGFRCR SERPLBLD CKD-EPI 2021: 43 ML/MIN/1.73M*2
EOSINOPHIL # BLD AUTO: 0.03 X10*3/UL (ref 0–0.7)
EOSINOPHIL NFR BLD AUTO: 1 %
ERYTHROCYTE [DISTWIDTH] IN BLOOD BY AUTOMATED COUNT: 16.1 % (ref 11.5–14.5)
GLUCOSE SERPL-MCNC: 100 MG/DL (ref 74–99)
GLUCOSE UR STRIP.AUTO-MCNC: NORMAL MG/DL
HAPTOGLOB SERPL NEPH-MCNC: 58 MG/DL (ref 30–200)
HCT VFR BLD AUTO: 27 % (ref 41–52)
HGB BLD-MCNC: 10 G/DL (ref 13.5–17.5)
HYALINE CASTS #/AREA URNS AUTO: ABNORMAL /LPF
IMM GRANULOCYTES # BLD AUTO: 0.01 X10*3/UL (ref 0–0.7)
IMM GRANULOCYTES NFR BLD AUTO: 0.3 % (ref 0–0.9)
KETONES UR STRIP.AUTO-MCNC: NEGATIVE MG/DL
LDH SERPL L TO P-CCNC: 127 U/L (ref 84–246)
LEUKOCYTE ESTERASE UR QL STRIP.AUTO: NEGATIVE
LYMPHOCYTES # BLD AUTO: 0.46 X10*3/UL (ref 1.2–4.8)
LYMPHOCYTES NFR BLD AUTO: 14.7 %
MAGNESIUM SERPL-MCNC: 2.23 MG/DL (ref 1.6–2.4)
MCH RBC QN AUTO: 36.9 PG (ref 26–34)
MCHC RBC AUTO-ENTMCNC: 37 G/DL (ref 32–36)
MCV RBC AUTO: 100 FL (ref 80–100)
MONOCYTES # BLD AUTO: 0.26 X10*3/UL (ref 0.1–1)
MONOCYTES NFR BLD AUTO: 8.3 %
MUCOUS THREADS #/AREA URNS AUTO: ABNORMAL /LPF
NEUTROPHILS # BLD AUTO: 2.34 X10*3/UL (ref 1.2–7.7)
NEUTROPHILS NFR BLD AUTO: 75.1 %
NITRITE UR QL STRIP.AUTO: NEGATIVE
NRBC BLD-RTO: 0 /100 WBCS (ref 0–0)
PH UR STRIP.AUTO: 5.5 [PH]
PLATELET # BLD AUTO: 65 X10*3/UL (ref 150–450)
POTASSIUM SERPL-SCNC: 4.1 MMOL/L (ref 3.5–5.3)
PROT SERPL-MCNC: 6.7 G/DL (ref 6.4–8.2)
PROT UR STRIP.AUTO-MCNC: NORMAL MG/DL
PROT UR-ACNC: 19 MG/DL (ref 5–25)
PROT/CREAT UR: 0.09 MG/MG CREAT (ref 0–0.17)
RBC # BLD AUTO: 2.71 X10*6/UL (ref 4.5–5.9)
RBC # UR STRIP.AUTO: NEGATIVE MG/DL
RBC #/AREA URNS AUTO: ABNORMAL /HPF
SODIUM SERPL-SCNC: 137 MMOL/L (ref 136–145)
SP GR UR STRIP.AUTO: 1.02
TACROLIMUS BLD-MCNC: 4.9 NG/ML
UROBILINOGEN UR STRIP.AUTO-MCNC: NORMAL MG/DL
WBC # BLD AUTO: 3.1 X10*3/UL (ref 4.4–11.3)
WBC #/AREA URNS AUTO: ABNORMAL /HPF

## 2025-03-18 PROCEDURE — 85025 COMPLETE CBC W/AUTO DIFF WBC: CPT

## 2025-03-18 PROCEDURE — 99215 OFFICE O/P EST HI 40 MIN: CPT | Performed by: STUDENT IN AN ORGANIZED HEALTH CARE EDUCATION/TRAINING PROGRAM

## 2025-03-18 PROCEDURE — 83735 ASSAY OF MAGNESIUM: CPT

## 2025-03-18 PROCEDURE — 99215 OFFICE O/P EST HI 40 MIN: CPT | Mod: 25 | Performed by: STUDENT IN AN ORGANIZED HEALTH CARE EDUCATION/TRAINING PROGRAM

## 2025-03-18 PROCEDURE — 2500000004 HC RX 250 GENERAL PHARMACY W/ HCPCS (ALT 636 FOR OP/ED): Performed by: INTERNAL MEDICINE

## 2025-03-18 PROCEDURE — 83010 ASSAY OF HAPTOGLOBIN QUANT: CPT

## 2025-03-18 PROCEDURE — 1159F MED LIST DOCD IN RCRD: CPT | Performed by: STUDENT IN AN ORGANIZED HEALTH CARE EDUCATION/TRAINING PROGRAM

## 2025-03-18 PROCEDURE — 81001 URINALYSIS AUTO W/SCOPE: CPT

## 2025-03-18 PROCEDURE — 87799 DETECT AGENT NOS DNA QUANT: CPT

## 2025-03-18 PROCEDURE — 1126F AMNT PAIN NOTED NONE PRSNT: CPT | Performed by: STUDENT IN AN ORGANIZED HEALTH CARE EDUCATION/TRAINING PROGRAM

## 2025-03-18 PROCEDURE — 96374 THER/PROPH/DIAG INJ IV PUSH: CPT

## 2025-03-18 PROCEDURE — 2500000004 HC RX 250 GENERAL PHARMACY W/ HCPCS (ALT 636 FOR OP/ED): Mod: JW,TB | Performed by: STUDENT IN AN ORGANIZED HEALTH CARE EDUCATION/TRAINING PROGRAM

## 2025-03-18 PROCEDURE — 3074F SYST BP LT 130 MM HG: CPT | Performed by: STUDENT IN AN ORGANIZED HEALTH CARE EDUCATION/TRAINING PROGRAM

## 2025-03-18 PROCEDURE — 80197 ASSAY OF TACROLIMUS: CPT

## 2025-03-18 PROCEDURE — 36591 DRAW BLOOD OFF VENOUS DEVICE: CPT

## 2025-03-18 PROCEDURE — 82570 ASSAY OF URINE CREATININE: CPT

## 2025-03-18 PROCEDURE — 2500000004 HC RX 250 GENERAL PHARMACY W/ HCPCS (ALT 636 FOR OP/ED): Performed by: STUDENT IN AN ORGANIZED HEALTH CARE EDUCATION/TRAINING PROGRAM

## 2025-03-18 PROCEDURE — 88185 FLOWCYTOMETRY/TC ADD-ON: CPT | Performed by: STUDENT IN AN ORGANIZED HEALTH CARE EDUCATION/TRAINING PROGRAM

## 2025-03-18 PROCEDURE — 96361 HYDRATE IV INFUSION ADD-ON: CPT

## 2025-03-18 PROCEDURE — 84075 ASSAY ALKALINE PHOSPHATASE: CPT

## 2025-03-18 PROCEDURE — 3078F DIAST BP <80 MM HG: CPT | Performed by: STUDENT IN AN ORGANIZED HEALTH CARE EDUCATION/TRAINING PROGRAM

## 2025-03-18 PROCEDURE — 83615 LACTATE (LD) (LDH) ENZYME: CPT

## 2025-03-18 RX ORDER — BUDESONIDE 3 MG/1
3 CAPSULE, COATED PELLETS ORAL 3 TIMES DAILY
Qty: 90 CAPSULE | Refills: 1 | Status: SHIPPED | OUTPATIENT
Start: 2025-03-18 | End: 2025-05-17

## 2025-03-18 RX ORDER — HEPARIN 100 UNIT/ML
500 SYRINGE INTRAVENOUS AS NEEDED
Status: ACTIVE | OUTPATIENT
Start: 2025-03-18

## 2025-03-18 RX ORDER — HEPARIN 100 UNIT/ML
500 SYRINGE INTRAVENOUS AS NEEDED
OUTPATIENT
Start: 2025-03-18

## 2025-03-18 RX ORDER — HEPARIN 100 UNIT/ML
500 SYRINGE INTRAVENOUS AS NEEDED
Status: CANCELLED | OUTPATIENT
Start: 2025-03-18

## 2025-03-18 RX ORDER — HEPARIN SODIUM,PORCINE/PF 10 UNIT/ML
50 SYRINGE (ML) INTRAVENOUS AS NEEDED
OUTPATIENT
Start: 2025-03-18

## 2025-03-18 RX ORDER — BUDESONIDE 3 MG/1
3 CAPSULE, COATED PELLETS ORAL 3 TIMES DAILY
Qty: 90 CAPSULE | Refills: 1 | OUTPATIENT
Start: 2025-03-18

## 2025-03-18 RX ORDER — HEPARIN 100 UNIT/ML
500 SYRINGE INTRAVENOUS AS NEEDED
Status: DISCONTINUED | OUTPATIENT
Start: 2025-03-18 | End: 2025-03-18 | Stop reason: HOSPADM

## 2025-03-18 RX ADMIN — HEPARIN 500 UNITS: 100 SYRINGE at 11:44

## 2025-03-18 RX ADMIN — HEPARIN 500 UNITS: 100 SYRINGE at 09:06

## 2025-03-18 RX ADMIN — METHYLPREDNISOLONE SODIUM SUCCINATE 68.75 MG: 125 INJECTION, POWDER, FOR SOLUTION INTRAMUSCULAR; INTRAVENOUS at 11:38

## 2025-03-18 RX ADMIN — SODIUM CHLORIDE 1000 ML: 9 INJECTION, SOLUTION INTRAVENOUS at 10:35

## 2025-03-18 ASSESSMENT — PAIN SCALES - GENERAL: PAINLEVEL_OUTOF10: 0-NO PAIN

## 2025-03-18 NOTE — ASSESSMENT & PLAN NOTE
Remains at high risk for infections.  No current active signs or symptoms of infection.    Continues on prophylaxis with acyclovir and posaconazole.  TMP/SMX briefly held given hyperkalemia and renal insufficiency 1/10-1/17. Recent sCr has been elevated, but feel more related to GI issues at this time.   Switched posa to isavuconazonium d/t interaction with avapritinib.   Ongoing viral monitoring (as below).   Immune reconstitution: pending from 3/18.   Post Tx Immunizations are planned for D+180. Will need scheduled.

## 2025-03-18 NOTE — PROGRESS NOTES
Pt ambulated to SCT unit without assistance. Pt denies complaints or pain today. Pt right chest port accessed without incident. Dwelling heparin removed per protocol. Pt seen by YUDELKA Lal prior to arrival to [facility]. Pt tolerated 1 L of NS and IV Solu-medrol well. Pt de-accessed by Brianne Lowe RN. Pt ambulated off unit without complaints or assistance.

## 2025-03-18 NOTE — PROGRESS NOTES
"    ALLOGENEIC BMT PROGRESS NOTE    Patient ID: Jin Reynolds is a 70 y.o. male.  Primary Oncologist: Dr. 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   8/23/2023 Initial Diagnosis    DX:  SMOLDERING SYSTEMIC MASTOCYTOSIS  Presented with skin rash and eosinophilia    [facility]ENDON DIAGNOSTIC CRITERIA  (For SM, Need major and 1 minor OR at least 3 minors)  - Multi-focal, dense infiltrates of MC (>= 15 mast cells in aggregates) in BM and/or other extra-cutaneous organs [major]  - In BM or extracutaneous organs, >25% MC spindle shaped OR have atypical morphology OR of all MC on BM aspirate smears, >25% are immature or atypical [minor]  - Detection of activating mutation KIT D816V in BM, PB, or other extracutaneous organ [minor]  - Serum tryptase persistently exceeds 20 ng/mL (unless associated clonal myeloid disorder, in which this paramenter is not valid) [minor]    \"B\" FINDINGS  - BM biopsy showing >30% infiltration (focal, dense aggregates) and/or serum tryptase > 200 ng/mL  - Signs of dysplasia or myeloproliferation, in non-MC lineages but insufficient criteria for definitive diagnosis of AHNMD with normal or slightly abnormal blood counts    \"C\" FINDINGS  None       8/23/2023 Biopsy    BONE MARROW (8/23/23)  Hypercellular (90-95%) with trilineage hematopoiesis, granulocytic hyperplasia, eosinophilia, and increased atypical mast cells  IP:  CD34+, +, CD7(bright)+, cCD3-, CD33-, CD15-, CD13+ blast population  IHC:  + increased spindle-shaped mast cells (15% cellularity), CD2-  Myeloid NGS:  CBL (31%), cKIT D816V (30%), RUNX1 x 2 (19%, 29%), SRSF2 (47%) [ASXL1 negative]  FISH:  PDGFRb negative    SERUM TRYPTASE  228 (8/23/23)  268 (9/5/23)     2/13/2024 -  Molecular Therapy    AVAPRITINIB   - Starting dose 25 mg daily (non-Adv SM)  - Not started due to improvement in symptoms.        7/16/2024 - 10/21/2024 Chemotherapy "    Venetoclax / AzaCITIDine  - C1D1 7/17/24:  complicated by TLS, venetoclax held after D1  - Post C1 BM (8/28/24):  hypercellular with >50% blasts, background systemic mastocytosis  - C2D1 9/5/24:  venetoclax restarted with C2, no TLS  - Post C2 BM (9/25/24):  hypocellular (<5%), atypical myeloblasts by IP/IHC (1-2%), persistent systemic mastocytosis  - C3D1 10/15/24:  delayed 1 week for count recovery  - Post C3 BM (10/23/24):  hypercellular with systemic mastocytosis, no increase blasts, 0.2% atypical blasts by IP, NGS w/ CBL (73%), cKIT (4%), RUNX1 x 2 (4%, 38%), SRSF2 (40%)       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    CONDITIONING Fludarabine, melphalan, and TBI   DONOR Matched sibling   MATCH GRADE 12/12   SEX MATCH Mismatched   ABO DONOR O pos   ABO RECIPIENT O pos   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, Mycophenolate, and Tacrolimus   GRAFT SOURCE Peripheral blood          2/14/2025 -  Molecular Therapy    AVAPRITINIB   - Persistent mast cells noted on T+30 and T+100 Post Tx Bms. Elevated levels of serum tryptase.   - Initiated Avapritinib 50 mg daily on 2/14/2025.     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)   6/27/2024 Initial Diagnosis    ICC 2022 DX: Acute myeloid leukemia, NOS (>=20% blasts)  [id number] AML Risk Stratification: INTERMEDIATE: Cytogenetic and/or molecular abnormalities not classified as favorable or adverse  Presented with pancytopenia and circulating blasts    BONE MARROW (06/27/2024)  Marrow replaced by blasts, fibrotic foci, some atypical + cells; 7-8% circulating blasts; aspicular  - Unable to perform additional studies due to aspicular specimen    PERIPHERAL BLOOD (6/27/2024)  FISH:  positive for gain RUNX1    PERIPHERAL BLOOD (7/1/24)  IP:  abnl myeloblast population (CD34+, CD7+, CD4+, CD13+, CD38+, CD11+ dim, HLA=DR+, TdT+, CD33-)  Myeloid NGS: CBL C404Y  (41%), KIT D816V (8%), RUNX1 x2 (8%, 30%), SRSF2 (37%)     7/16/2024 - 10/21/2024 Chemotherapy    Venetoclax / AzaCITIDine  - C1D1 7/17/24:  complicated by TLS, venetoclax held after D1  - Post C1 BM (8/28/24):  hypercellular with >50% blasts, background systemic mastocytosis  - C2D1 9/5/24:  venetoclax restarted with C2, no TLS  - Post C2 BM (9/25/24):  hypocellular (<5%), atypical myeloblasts by IP/IHC (1-2%), persistent systemic mastocytosis  - C3D1 10/15/24:  delayed 1 week for count recovery  - Post C3 BM (10/23/24):  hypercellular with systemic mastocytosis, no increase blasts, 0.2% atypical blasts by IP, NGS w/ CBL (73%), cKIT (4%), RUNX1 x 2 (4%, 38%), SRSF2 (40%)       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    CONDITIONING Fludarabine, melphalan, and TBI   DONOR Matched sibling   MATCH GRADE 12/12   SEX MATCH Mismatched   ABO DONOR O pos   ABO RECIPIENT O pos   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, Mycophenolate, and Tacrolimus   GRAFT SOURCE Peripheral blood          12/26/2024 -  Infection    Parainfluenza URI     12/26/2024 -  Disease Reevaluation      DATE DAY SOURCE MORPHOLOGY MRD WHOLE CHIMERISM   (% donor) CD3 CHIMERISM   (% donor) CD33 CHIMERISM   (% donor)   12/26/2024 D+30 PB        12/26/2024 D+30 BM No AML, persist BRENDON Neg      1/20/25  D+60 PB    96% 100%    D+100 PB         D+100 BM                  03/18/25 T+117 s/p allo MRD SCT. Ongoing GI symptoms with decreased appetite mild nausea. EGD scheduled for 3/28. Given 1L NS for PERRY and 70 mg of solumedrol (1 mg/kg). Will plan for close follow up of symptoms and consider starting prednisone. Having trouble with getting budesonide covered through insurance.   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 Possibly related to mast cell disease which was present on D30 bmbx although D100 KIT  mutation negative, no evidence of AML. PLT still low, eltrombopag initiated 2/18/2025, cont at current dose.  Current evidence of disease: No  Maintenance Therapy: No  MRD Monitoring Plan: AML MFC; Day 100 BMBx 3/3/25 no evidence of leukemia; there is residual focal mast cell atypia.   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  He has suspicion for GVHD with ongoing UGI symptopms of nausea and poor PO intake. Budesonide sent last week, not approved by insurance. Giving 70 mg solumedrol IV today and will consider additional oral prednisone if he has benefit. In the interim, budesonide re-sent to [facility].   -EGD scheduled for 3/28/2025  Continue current immunosuppressive therapy; tacro 4 mg BID; level today pending.   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 Avapritinib started around 2/14/25. D+100 bmbx without KIT mutation detected. Serum tryptase level improved-- now down to 14.1.   Immunocompromised state  Remains at high risk for infections.  No current active signs or symptoms of infection.    Continues on prophylaxis with acyclovir and posaconazole.  TMP/SMX briefly held given hyperkalemia and renal insufficiency 1/10-1/17. Recent sCr has been elevated, but feel more related to GI issues at this time.   Switched posa to isavuconazonium d/t interaction with avapritinib.   Ongoing viral monitoring (as below).   Immune reconstitution: pending from 3/18.   Post Tx Immunizations are planned for D+180. Will need scheduled.       SUBJECTIVE     Days since transplant: 117     HPI    Jin Reynolds presents today for post transplant follow up, unaccompanied. Continues to have upper GI symptoms, some foods are feeling like they're not being swallowed properly but he has noted some improvement if he eats these solids with soup or other thicker liquids. No  diarrhea or constipation. Energy levels are still holding, he's motivating himself to walk on the treadmill for at least 30 min daily if he can.       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  Social History     Tobacco Use    Smoking status: Never    Smokeless tobacco: Never   Substance Use Topics    Alcohol use: Never    Drug use: Yes     Types: Marijuana     Comment: daily         OBJECTIVE     BSA: There is no height or weight on file to calculate BSA.  There were no vitals taken for this visit.  Weight    No data found in the last 1 encounters.            Physical Exam  Constitutional:       Appearance: Normal appearance.   Eyes:      Conjunctiva/sclera: Conjunctivae normal.      Pupils: Pupils are equal, round, and reactive to light.   Cardiovascular:      Rate and Rhythm: Normal rate and regular rhythm.   Pulmonary:      Effort: Pulmonary effort is normal.      Breath sounds: Normal breath sounds.   Abdominal:      General: Bowel sounds are normal.   Musculoskeletal:         General: Normal range of motion.   Skin:     Findings: No rash.   Neurological:      Mental Status: He is alert. Mental status is at baseline.   Psychiatric:         Mood and Affect: Mood normal.         Performance Status:  Karnofsky Score: 80 - Normal activity with effort; some signs or symptoms of disease      POST TRANSPLANT MONITORING   GVHD:  Acute GVHD Overall Grade: II  Chronic GVHD Total Score:      Viral Monitoring:     CMV   Lab Results   Component Value Date    CMVDNAPCR Not Detected 03/18/2025    CMVDNAPCR Not  "Detected 03/11/2025    CMVDNAPCR Not Detected 03/03/2025      EBV   Lab Results   Component Value Date    EBVPCRQNTWB 653 (H) 02/11/2025      Adenovirus   Lab Results   Component Value Date    ADEPB Not Detected 03/11/2025    ADEPB Not Detected 03/03/2025    ADEPB Not Detected 02/25/2025       Toxo   No results found for: \"TGONDPCRBL\"       TMA Monitoring:     MARKERS RECENT 3 VALUES   LDH Lab Results   Component Value Date     03/11/2025     03/03/2025     02/25/2025      Haptoglobin Lab Results   Component Value Date    HAPTOGLOBIN 84 03/11/2025    HAPTOGLOBIN 47 03/03/2025    HAPTOGLOBIN 36 02/25/2025      BP BP Readings from Last 3 Encounters:   03/11/25 118/72   03/03/25 122/72   02/18/25 121/68      Serum Creatinine Lab Results   Component Value Date    CREATININE 1.52 (H) 03/11/2025    CREATININE 1.19 03/03/2025    CREATININE 1.30 02/25/2025      Urine Protein/Creat Ratio Lab Results   Component Value Date    UTPCR 0.10 03/03/2025    UTPCR 0.09 02/25/2025    UTPCR 0.13 02/07/2025      Tacrolimus Level Lab Results   Component Value Date    TACROLIMUS 4.1 03/11/2025    TACROLIMUS 3.1 03/03/2025    TACROLIMUS 3.1 02/25/2025            Kalia June PA-C            "

## 2025-03-18 NOTE — ASSESSMENT & PLAN NOTE
BM at D30 demonstrated persistent mast cells, again seen on D100 BM. Avapritinib started around 2/14/25. D+100 bmbx without KIT mutation detected. Serum tryptase level improved-- now down to 14.1.

## 2025-03-18 NOTE — ASSESSMENT & PLAN NOTE
Counts remain low but plts slowly starting rise on eltrombopag, D100 bmbx still pending but no evidence of relapse thus far. Possibly related to mast cell disease which was present on D30 bmbx although D100 KIT mutation negative, no evidence of AML. PLT still low, eltrombopag initiated 2/18/2025, cont at current dose.  Current evidence of disease: No  Maintenance Therapy: No  MRD Monitoring Plan: AML MFC; Day 100 BMBx 3/3/25 no evidence of leukemia; there is residual focal mast cell atypia.

## 2025-03-18 NOTE — ASSESSMENT & PLAN NOTE
Day +117 following allogeneic stem cell transplant.  He has suspicion for GVHD with ongoing UGI symptopms of nausea and poor PO intake. Budesonide sent last week, not approved by insurance. Giving 70 mg solumedrol IV today and will consider additional oral prednisone if he has benefit. In the interim, budesonide re-sent to [facility].   -EGD scheduled for 3/28/2025  Continue current immunosuppressive therapy; tacro 4 mg BID; level today pending.

## 2025-03-19 LAB
ADENOVIRUS QPCR,PLASMA, VIRC: NOT DETECTED COPIES/ML
CMV DNA SERPL NAA+PROBE-LOG IU: NORMAL {LOG_IU}/ML
EBV DNA BLD NAA+PROBE-LOG IU: NORMAL {LOG_IU}/ML
LABORATORY COMMENT REPORT: NOT DETECTED
LABORATORY COMMENT REPORT: NOT DETECTED

## 2025-03-20 LAB
CD19 CELLS # BLD: 0.07 X10E9/L
CD19 CELLS NFR BLD: 16 %
CD3 CELLS # BLD: 0.31 X10E9/L
CD3 CELLS NFR SPEC: 67 %
CD3+CD4+ CELLS # BLD: 0.19 X10E9/L
CD3+CD4+ CELLS # BLD: 189 /MM3
CD3+CD4+ CELLS NFR BLD: 41 %
CD3+CD4+ CELLS/CD3+CD8+ CLL BLD: 1.78 %
CD3+CD4-CD8-CD45+ CELLS NFR BLD: 0 %
CD3+CD8+ CELLS # BLD: 0.11 X10E9/L
CD3+CD8+ CELLS NFR BLD: 23 %
CD3-CD16+CD56+ CELLS # BLD: 0.08 X10E9/L
CD3-CD16+CD56+ CELLS NFR BLD: 17 %
FLOW CYTOMETRY SPECIALIST REVIEW: ABNORMAL
LYMPHOCYTES # SPEC AUTO: 0.46 X10*3/UL
PATH REVIEW, IMMUNODEFICIENCY PROFILE: ABNORMAL

## 2025-03-21 ENCOUNTER — TELEPHONE (OUTPATIENT)
Dept: HEMATOLOGY/ONCOLOGY | Facility: HOSPITAL | Age: 70
End: 2025-03-21
Payer: MEDICARE

## 2025-03-21 NOTE — TELEPHONE ENCOUNTER
Feeling better today; received IV solumedrol on Tuesday. Eating andd rinking a little better, good appetite. Working out.

## 2025-03-24 NOTE — PROGRESS NOTES
"    ALLOGENEIC BMT PROGRESS NOTE    Patient ID: Jin Reynolds is a 70 y.o. male.  Primary Oncologist: Dr. 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   8/23/2023 Initial Diagnosis    DX:  SMOLDERING SYSTEMIC MASTOCYTOSIS  Presented with skin rash and eosinophilia    BRENDON DIAGNOSTIC CRITERIA  (For SM, Need major and 1 minor OR at least 3 minors)  - Multi-focal, dense infiltrates of MC (>= 15 mast cells in aggregates) in BM and/or other extra-cutaneous organs [major]  - In BM or extracutaneous organs, >25% MC spindle shaped OR have atypical morphology OR of all MC on BM aspirate smears, >25% are immature or atypical [minor]  - Detection of activating mutation KIT D816V in BM, PB, or other extracutaneous organ [minor]  - Serum tryptase persistently exceeds 20 ng/mL (unless associated clonal myeloid disorder, in which this paramenter is not valid) [minor]    \"B\" FINDINGS  - BM biopsy showing >30% infiltration (focal, dense aggregates) and/or serum tryptase > 200 ng/mL  - Signs of dysplasia or myeloproliferation, in non-MC lineages but insufficient criteria for definitive diagnosis of AHNMD with normal or slightly abnormal blood counts    \"C\" FINDINGS  None       8/23/2023 Biopsy    BONE MARROW (8/23/23)  Hypercellular (90-95%) with trilineage hematopoiesis, granulocytic hyperplasia, eosinophilia, and increased atypical mast cells  IP:  CD34+, +, CD7(bright)+, cCD3-, CD33-, CD15-, CD13+ blast population  IHC:  + increased spindle-shaped mast cells (15% cellularity), CD2-  Myeloid NGS:  CBL (31%), cKIT D816V (30%), RUNX1 x 2 (19%, 29%), SRSF2 (47%) [ASXL1 negative]  FISH:  PDGFRb negative    SERUM TRYPTASE  228 (8/23/23)  268 (9/5/23)     2/13/2024 -  Molecular Therapy    AVAPRITINIB   - Starting dose 25 mg daily (non-Adv SM)  - Not started due to improvement in symptoms.        7/16/2024 - 10/21/2024 Chemotherapy "    Venetoclax / AzaCITIDine  - C1D1 7/17/24:  complicated by TLS, venetoclax held after D1  - Post C1 BM (8/28/24):  hypercellular with >50% blasts, background systemic mastocytosis  - C2D1 9/5/24:  venetoclax restarted with C2, no TLS  - Post C2 BM (9/25/24):  hypocellular (<5%), atypical myeloblasts by IP/IHC (1-2%), persistent systemic mastocytosis  - C3D1 10/15/24:  delayed 1 week for count recovery  - Post C3 BM (10/23/24):  hypercellular with systemic mastocytosis, no increase blasts, 0.2% atypical blasts by IP, NGS w/ CBL (73%), cKIT (4%), RUNX1 x 2 (4%, 38%), SRSF2 (40%)       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    CONDITIONING Fludarabine, melphalan, and TBI   DONOR Matched sibling   MATCH GRADE 12/12   SEX MATCH Mismatched   ABO DONOR O pos   ABO RECIPIENT O pos   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, Mycophenolate, and Tacrolimus   GRAFT SOURCE Peripheral blood          2/14/2025 -  Molecular Therapy    AVAPRITINIB   - Persistent mast cells noted on T+30 and T+100 Post Tx Bms. Elevated levels of serum tryptase.   - Initiated Avapritinib 50 mg daily on 2/14/2025.     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)   6/27/2024 Initial Diagnosis    ICC 2022 DX: Acute myeloid leukemia, NOS (>=20% blasts)  [id number] AML Risk Stratification: INTERMEDIATE: Cytogenetic and/or molecular abnormalities not classified as favorable or adverse  Presented with pancytopenia and circulating blasts    BONE MARROW (06/27/2024)  Marrow replaced by blasts, fibrotic foci, some atypical + cells; 7-8% circulating blasts; aspicular  - Unable to perform additional studies due to aspicular specimen    PERIPHERAL BLOOD (6/27/2024)  FISH:  positive for gain RUNX1    PERIPHERAL BLOOD (7/1/24)  IP:  abnl myeloblast population (CD34+, CD7+, CD4+, CD13+, CD38+, CD11+ dim, HLA=DR+, TdT+, CD33-)  Myeloid NGS: CBL C404Y  (41%), KIT D816V (8%), RUNX1 x2 (8%, 30%), SRSF2 (37%)     7/16/2024 - 10/21/2024 Chemotherapy    Venetoclax / AzaCITIDine  - C1D1 7/17/24:  complicated by TLS, venetoclax held after D1  - Post C1 BM (8/28/24):  hypercellular with >50% blasts, background systemic mastocytosis  - C2D1 9/5/24:  venetoclax restarted with C2, no TLS  - Post C2 BM (9/25/24):  hypocellular (<5%), atypical myeloblasts by IP/IHC (1-2%), persistent systemic mastocytosis  - C3D1 10/15/24:  delayed 1 week for count recovery  - Post C3 BM (10/23/24):  hypercellular with systemic mastocytosis, no increase blasts, 0.2% atypical blasts by IP, NGS w/ CBL (73%), cKIT (4%), RUNX1 x 2 (4%, 38%), SRSF2 (40%)       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    CONDITIONING Fludarabine, melphalan, and TBI   DONOR Matched sibling   MATCH GRADE 12/12   SEX MATCH Mismatched   ABO DONOR O pos   ABO RECIPIENT O pos   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, Mycophenolate, and Tacrolimus   GRAFT SOURCE Peripheral blood          12/26/2024 -  Infection    Parainfluenza URI     12/26/2024 -  Disease Reevaluation      DATE DAY SOURCE MORPHOLOGY MRD WHOLE CHIMERISM   (% donor) CD3 CHIMERISM   (% donor) CD33 CHIMERISM   (% donor)   12/26/2024 D+30 PB        12/26/2024 D+30 BM No AML, persist BRENDON Neg %     1/20/2025  D+60 PB    96% 100%   3/25/2025 D+100 PB        3/3/2025 D+100 BM No AML, persist BRENDON Neg MFC 99%               03/25/25 T+124 s/p allo MRD SCT. GI symptoms slightly improved this past week, gaining weight with increase in PO intake. Will keep EGD on 3/28. PERRY improving, continue increasing PO fluid intake. Considered reducing FK dose but levels have been 4-5.     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 Possibly related to mast cell disease which was present on D30 bmbx although D100 KIT mutation  negative, no evidence of AML. PLT still low, eltrombopag initiated 2/18/2025, cont at current dose.  Current evidence of disease: No  Maintenance Therapy: No  MRD Monitoring Plan: AML MFC; Day 100 BMBx 3/3/25 no evidence of leukemia; there is residual focal mast cell atypia.   History of stem cell transplant (Multi)  Day +124 following allogeneic stem cell transplant.  He has suspicion for GVHD with ongoing UGI symptopms of nausea and poor PO intake. Budesonide not approved by insurance. Given single dose 70 mg solumedrol IV last week and symptoms have been better since then with some weight gain and decreased GI symptoms overall.   -EGD scheduled for 3/28/2025  Continue current immunosuppressive therapy; tacro 4 mg BID; level today pending.   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 Avapritinib started around 2/14/25. D+100 bmbx without KIT mutation detected. Serum tryptase level improved-- now down to 14.1.   Immunocompromised state  Remains at high risk for infections.  No current active signs or symptoms of infection.    Continues on prophylaxis with acyclovir and posaconazole.  TMP/SMX briefly held given hyperkalemia and renal insufficiency 1/10-1/17. Recent sCr has been elevated, but feel more related to GI issues at this time.   Switched posa to isavuconazonium d/t interaction with avapritinib.   Ongoing viral monitoring (as below).   Immune reconstitution: CD4 189; next due at T+180.   Post Tx Immunizations are planned for D+180. Will need scheduled.       SUBJECTIVE     Days since transplant: 124     HPI    Jin Reynolds presents today for post transplant follow up, unaccompanied. This past week, he has noticed an improvement in his upper GI symptoms. Last week he felt that some foods are feeling like they're not being swallowed properly but he has noted some improvement if he eats these  solids with soup or other liquids. No diarrhea or constipation. Appetite has been better. Energy levels are still holding, he's motivating himself to walk on the treadmill for at least 30 min daily if he can.       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  Social History     Tobacco Use    Smoking status: Never    Smokeless tobacco: Never   Substance Use Topics    Alcohol use: Never    Drug use: Yes     Types: Marijuana     Comment: daily         OBJECTIVE     BSA: 1.85 meters squared  /73   Pulse 69   Temp 36.2 °C (97.2 °F)   Resp 16   Wt 73.2 kg (161 lb 6 oz)   SpO2 100%   BMI 25.60 kg/m²   Weight         3/25/2025  0931             Weight: 73.2 kg (161 lb 6 oz)               Physical Exam  Constitutional:       Appearance: Normal appearance.   Eyes:      Conjunctiva/sclera: Conjunctivae normal.      Pupils: Pupils are equal, round, and reactive to light.   Cardiovascular:      Rate and Rhythm: Normal rate and regular rhythm.   Pulmonary:      Effort: Pulmonary effort is normal.      Breath sounds: Normal breath sounds.   Abdominal:      General: Bowel sounds are normal.   Musculoskeletal:         General: Normal range of motion.   Skin:     Findings: No rash.   Neurological:      Mental Status: He is alert. Mental status is at baseline.   Psychiatric:         Mood and Affect: Mood normal.         Performance Status:  Karnofsky Score: 80 - Normal activity with effort; some signs or symptoms of disease      POST TRANSPLANT MONITORING   GVHD:  Acute GVHD Overall Grade:    Chronic GVHD Total  "Score:      Viral Monitoring:     CMV   Lab Results   Component Value Date    CMVDNAPCR Not Detected 03/18/2025    CMVDNAPCR Not Detected 03/11/2025    CMVDNAPCR Not Detected 03/03/2025      EBV   Lab Results   Component Value Date    EBVPCRQNTWB 653 (H) 02/11/2025      Adenovirus   Lab Results   Component Value Date    ADEPB Not Detected 03/18/2025    ADEPB Not Detected 03/11/2025    ADEPB Not Detected 03/03/2025       Toxo   No results found for: \"TGONDPCRBL\"       TMA Monitoring:     MARKERS RECENT 3 VALUES   LDH Lab Results   Component Value Date     03/25/2025     03/18/2025     03/11/2025      Haptoglobin Lab Results   Component Value Date    HAPTOGLOBIN 39 03/25/2025    HAPTOGLOBIN 58 03/18/2025    HAPTOGLOBIN 84 03/11/2025      BP BP Readings from Last 3 Encounters:   03/25/25 121/73   03/18/25 119/76   03/11/25 118/72      Serum Creatinine Lab Results   Component Value Date    CREATININE 1.40 (H) 03/25/2025    CREATININE 1.69 (H) 03/18/2025    CREATININE 1.52 (H) 03/11/2025      Urine Protein/Creat Ratio Lab Results   Component Value Date    UTPCR 0.08 03/25/2025    UTPCR 0.09 03/18/2025    UTPCR 0.10 03/03/2025      Tacrolimus Level Lab Results   Component Value Date    TACROLIMUS 4.9 03/18/2025    TACROLIMUS 4.1 03/11/2025    TACROLIMUS 3.1 03/03/2025            Kalia June PA-C            "

## 2025-03-25 ENCOUNTER — OFFICE VISIT (OUTPATIENT)
Dept: HEMATOLOGY/ONCOLOGY | Facility: HOSPITAL | Age: 70
End: 2025-03-25
Payer: MEDICARE

## 2025-03-25 ENCOUNTER — LAB (OUTPATIENT)
Dept: HEMATOLOGY/ONCOLOGY | Facility: HOSPITAL | Age: 70
End: 2025-03-25
Payer: MEDICARE

## 2025-03-25 VITALS
WEIGHT: 161.38 LBS | TEMPERATURE: 97.2 F | SYSTOLIC BLOOD PRESSURE: 121 MMHG | HEART RATE: 69 BPM | DIASTOLIC BLOOD PRESSURE: 73 MMHG | OXYGEN SATURATION: 100 % | RESPIRATION RATE: 16 BRPM | BODY MASS INDEX: 25.6 KG/M2

## 2025-03-25 DIAGNOSIS — Z94.84 HISTORY OF ALLOGENEIC STEM CELL TRANSPLANT (MULTI): ICD-10-CM

## 2025-03-25 DIAGNOSIS — Z94.84 HISTORY OF STEM CELL TRANSPLANT (MULTI): ICD-10-CM

## 2025-03-25 DIAGNOSIS — D47.02 SYSTEMIC MASTOCYTOSIS WITH ASSOCIATED CLONAL HEMATOLOGICAL NON-MAST CELL LINEAGE DISEASE: ICD-10-CM

## 2025-03-25 DIAGNOSIS — Z94.81 S/P ALLOGENEIC BONE MARROW TRANSPLANT (MULTI): ICD-10-CM

## 2025-03-25 DIAGNOSIS — C92.01 ACUTE MYELOID LEUKEMIA IN REMISSION (MULTI): Primary | ICD-10-CM

## 2025-03-25 DIAGNOSIS — C92.01 AML (ACUTE MYELOID LEUKEMIA) IN REMISSION (MULTI): ICD-10-CM

## 2025-03-25 DIAGNOSIS — C92.01 ACUTE MYELOID LEUKEMIA IN REMISSION (MULTI): ICD-10-CM

## 2025-03-25 DIAGNOSIS — D84.9 IMMUNOCOMPROMISED STATE: ICD-10-CM

## 2025-03-25 LAB
ALBUMIN SERPL BCP-MCNC: 4.2 G/DL (ref 3.4–5)
ALP SERPL-CCNC: 50 U/L (ref 33–136)
ALT SERPL W P-5'-P-CCNC: 11 U/L (ref 10–52)
ANION GAP SERPL CALC-SCNC: 12 MMOL/L (ref 10–20)
APPEARANCE UR: CLEAR
AST SERPL W P-5'-P-CCNC: 15 U/L (ref 9–39)
BASOPHILS # BLD AUTO: 0.01 X10*3/UL (ref 0–0.1)
BASOPHILS NFR BLD AUTO: 0.3 %
BILIRUB SERPL-MCNC: 0.7 MG/DL (ref 0–1.2)
BILIRUB UR STRIP.AUTO-MCNC: NEGATIVE MG/DL
BUN SERPL-MCNC: 15 MG/DL (ref 6–23)
CALCIUM SERPL-MCNC: 8.6 MG/DL (ref 8.6–10.3)
CHLORIDE SERPL-SCNC: 107 MMOL/L (ref 98–107)
CO2 SERPL-SCNC: 23 MMOL/L (ref 21–32)
COLOR UR: NORMAL
CREAT SERPL-MCNC: 1.4 MG/DL (ref 0.5–1.3)
CREAT UR-MCNC: 111.2 MG/DL (ref 20–370)
EGFRCR SERPLBLD CKD-EPI 2021: 54 ML/MIN/1.73M*2
EOSINOPHIL # BLD AUTO: 0.04 X10*3/UL (ref 0–0.7)
EOSINOPHIL NFR BLD AUTO: 1.4 %
ERYTHROCYTE [DISTWIDTH] IN BLOOD BY AUTOMATED COUNT: 16 % (ref 11.5–14.5)
GLUCOSE SERPL-MCNC: 98 MG/DL (ref 74–99)
GLUCOSE UR STRIP.AUTO-MCNC: NORMAL MG/DL
HAPTOGLOB SERPL NEPH-MCNC: 39 MG/DL (ref 30–200)
HCT VFR BLD AUTO: 29 % (ref 41–52)
HGB BLD-MCNC: 10.4 G/DL (ref 13.5–17.5)
IGG SERPL-MCNC: 1160 MG/DL (ref 700–1600)
IMM GRANULOCYTES # BLD AUTO: 0.01 X10*3/UL (ref 0–0.7)
IMM GRANULOCYTES NFR BLD AUTO: 0.3 % (ref 0–0.9)
KETONES UR STRIP.AUTO-MCNC: NEGATIVE MG/DL
LDH SERPL L TO P-CCNC: 125 U/L (ref 84–246)
LEUKOCYTE ESTERASE UR QL STRIP.AUTO: NEGATIVE
LYMPHOCYTES # BLD AUTO: 0.35 X10*3/UL (ref 1.2–4.8)
LYMPHOCYTES NFR BLD AUTO: 11.9 %
MAGNESIUM SERPL-MCNC: 1.8 MG/DL (ref 1.6–2.4)
MCH RBC QN AUTO: 36.9 PG (ref 26–34)
MCHC RBC AUTO-ENTMCNC: 35.9 G/DL (ref 32–36)
MCV RBC AUTO: 103 FL (ref 80–100)
MONOCYTES # BLD AUTO: 0.23 X10*3/UL (ref 0.1–1)
MONOCYTES NFR BLD AUTO: 7.8 %
NEUTROPHILS # BLD AUTO: 2.29 X10*3/UL (ref 1.2–7.7)
NEUTROPHILS NFR BLD AUTO: 78.3 %
NITRITE UR QL STRIP.AUTO: NEGATIVE
NRBC BLD-RTO: 0 /100 WBCS (ref 0–0)
PH UR STRIP.AUTO: 7 [PH]
PLATELET # BLD AUTO: 53 X10*3/UL (ref 150–450)
POTASSIUM SERPL-SCNC: 4.1 MMOL/L (ref 3.5–5.3)
PROT SERPL-MCNC: 6.5 G/DL (ref 6.4–8.2)
PROT UR STRIP.AUTO-MCNC: NEGATIVE MG/DL
PROT UR-ACNC: 9 MG/DL (ref 5–25)
PROT/CREAT UR: 0.08 MG/MG CREAT (ref 0–0.17)
RBC # BLD AUTO: 2.82 X10*6/UL (ref 4.5–5.9)
RBC # UR STRIP.AUTO: NEGATIVE MG/DL
SODIUM SERPL-SCNC: 138 MMOL/L (ref 136–145)
SP GR UR STRIP.AUTO: 1.02
TACROLIMUS BLD-MCNC: 4.6 NG/ML
UROBILINOGEN UR STRIP.AUTO-MCNC: NORMAL MG/DL
WBC # BLD AUTO: 2.9 X10*3/UL (ref 4.4–11.3)

## 2025-03-25 PROCEDURE — 1159F MED LIST DOCD IN RCRD: CPT | Performed by: STUDENT IN AN ORGANIZED HEALTH CARE EDUCATION/TRAINING PROGRAM

## 2025-03-25 PROCEDURE — 83735 ASSAY OF MAGNESIUM: CPT

## 2025-03-25 PROCEDURE — 36591 DRAW BLOOD OFF VENOUS DEVICE: CPT

## 2025-03-25 PROCEDURE — 85025 COMPLETE CBC W/AUTO DIFF WBC: CPT

## 2025-03-25 PROCEDURE — 3074F SYST BP LT 130 MM HG: CPT | Performed by: STUDENT IN AN ORGANIZED HEALTH CARE EDUCATION/TRAINING PROGRAM

## 2025-03-25 PROCEDURE — 84156 ASSAY OF PROTEIN URINE: CPT

## 2025-03-25 PROCEDURE — 80053 COMPREHEN METABOLIC PANEL: CPT

## 2025-03-25 PROCEDURE — 81003 URINALYSIS AUTO W/O SCOPE: CPT

## 2025-03-25 PROCEDURE — 99215 OFFICE O/P EST HI 40 MIN: CPT | Performed by: STUDENT IN AN ORGANIZED HEALTH CARE EDUCATION/TRAINING PROGRAM

## 2025-03-25 PROCEDURE — 83615 LACTATE (LD) (LDH) ENZYME: CPT

## 2025-03-25 PROCEDURE — 1036F TOBACCO NON-USER: CPT | Performed by: STUDENT IN AN ORGANIZED HEALTH CARE EDUCATION/TRAINING PROGRAM

## 2025-03-25 PROCEDURE — 82784 ASSAY IGA/IGD/IGG/IGM EACH: CPT

## 2025-03-25 PROCEDURE — 87799 DETECT AGENT NOS DNA QUANT: CPT

## 2025-03-25 PROCEDURE — 2500000004 HC RX 250 GENERAL PHARMACY W/ HCPCS (ALT 636 FOR OP/ED): Performed by: INTERNAL MEDICINE

## 2025-03-25 PROCEDURE — 1126F AMNT PAIN NOTED NONE PRSNT: CPT | Performed by: STUDENT IN AN ORGANIZED HEALTH CARE EDUCATION/TRAINING PROGRAM

## 2025-03-25 PROCEDURE — 83010 ASSAY OF HAPTOGLOBIN QUANT: CPT

## 2025-03-25 PROCEDURE — 3078F DIAST BP <80 MM HG: CPT | Performed by: STUDENT IN AN ORGANIZED HEALTH CARE EDUCATION/TRAINING PROGRAM

## 2025-03-25 PROCEDURE — 80197 ASSAY OF TACROLIMUS: CPT

## 2025-03-25 RX ORDER — HEPARIN SODIUM,PORCINE/PF 10 UNIT/ML
50 SYRINGE (ML) INTRAVENOUS AS NEEDED
OUTPATIENT
Start: 2025-03-25

## 2025-03-25 RX ORDER — HEPARIN 100 UNIT/ML
500 SYRINGE INTRAVENOUS AS NEEDED
Status: DISCONTINUED | OUTPATIENT
Start: 2025-03-25 | End: 2025-03-25 | Stop reason: HOSPADM

## 2025-03-25 RX ORDER — HEPARIN 100 UNIT/ML
500 SYRINGE INTRAVENOUS AS NEEDED
OUTPATIENT
Start: 2025-03-25

## 2025-03-25 RX ADMIN — HEPARIN 500 UNITS: 100 SYRINGE at 09:06

## 2025-03-25 ASSESSMENT — PAIN SCALES - GENERAL: PAINLEVEL_OUTOF10: 0-NO PAIN

## 2025-03-25 NOTE — ASSESSMENT & PLAN NOTE
Remains at high risk for infections.  No current active signs or symptoms of infection.    Continues on prophylaxis with acyclovir and posaconazole.  TMP/SMX briefly held given hyperkalemia and renal insufficiency 1/10-1/17. Recent sCr has been elevated, but feel more related to GI issues at this time.   Switched posa to isavuconazonium d/t interaction with avapritinib.   Ongoing viral monitoring (as below).   Immune reconstitution: CD4 189; next due at T+180.   Post Tx Immunizations are planned for D+180. Will need scheduled.

## 2025-03-25 NOTE — ASSESSMENT & PLAN NOTE
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  He has suspicion for GVHD with ongoing UGI symptopms of nausea and poor PO intake. Budesonide not approved by insurance. Given single dose 70 mg solumedrol IV last week and symptoms have been better since then with some weight gain and decreased GI symptoms overall.   -EGD scheduled for 3/28/2025  Continue current immunosuppressive therapy; tacro 4 mg BID; level today pending.

## 2025-03-28 ENCOUNTER — HOSPITAL ENCOUNTER (OUTPATIENT)
Dept: GASTROENTEROLOGY | Facility: HOSPITAL | Age: 70
Discharge: HOME | End: 2025-03-28
Payer: MEDICARE

## 2025-03-28 VITALS
RESPIRATION RATE: 16 BRPM | WEIGHT: 150 LBS | DIASTOLIC BLOOD PRESSURE: 81 MMHG | OXYGEN SATURATION: 96 % | HEART RATE: 59 BPM | SYSTOLIC BLOOD PRESSURE: 125 MMHG | HEIGHT: 66 IN | TEMPERATURE: 96.6 F | BODY MASS INDEX: 24.11 KG/M2

## 2025-03-28 DIAGNOSIS — Z94.84 HISTORY OF ALLOGENEIC STEM CELL TRANSPLANT (MULTI): ICD-10-CM

## 2025-03-28 PROCEDURE — 7100000010 HC PHASE TWO TIME - EACH INCREMENTAL 1 MINUTE

## 2025-03-28 PROCEDURE — 2500000004 HC RX 250 GENERAL PHARMACY W/ HCPCS (ALT 636 FOR OP/ED): Performed by: INTERNAL MEDICINE

## 2025-03-28 PROCEDURE — 43239 EGD BIOPSY SINGLE/MULTIPLE: CPT | Performed by: INTERNAL MEDICINE

## 2025-03-28 PROCEDURE — 2720000007 HC OR 272 NO HCPCS

## 2025-03-28 PROCEDURE — 3700000012 HC SEDATION LEVEL 5+ TIME - INITIAL 15 MINUTES 5/> YEARS

## 2025-03-28 PROCEDURE — G0500 MOD SEDAT ENDO SERVICE >5YRS: HCPCS | Performed by: INTERNAL MEDICINE

## 2025-03-28 PROCEDURE — 7100000009 HC PHASE TWO TIME - INITIAL BASE CHARGE

## 2025-03-28 RX ORDER — FENTANYL CITRATE 50 UG/ML
INJECTION, SOLUTION INTRAMUSCULAR; INTRAVENOUS AS NEEDED
Status: COMPLETED | OUTPATIENT
Start: 2025-03-28 | End: 2025-03-28

## 2025-03-28 RX ORDER — MIDAZOLAM HYDROCHLORIDE 1 MG/ML
INJECTION, SOLUTION INTRAMUSCULAR; INTRAVENOUS AS NEEDED
Status: COMPLETED | OUTPATIENT
Start: 2025-03-28 | End: 2025-03-28

## 2025-03-28 RX ADMIN — FENTANYL CITRATE 25 MCG: 50 INJECTION, SOLUTION INTRAMUSCULAR; INTRAVENOUS at 15:20

## 2025-03-28 RX ADMIN — MIDAZOLAM HYDROCHLORIDE 1 MG: 1 INJECTION, SOLUTION INTRAMUSCULAR; INTRAVENOUS at 15:20

## 2025-03-28 RX ADMIN — FENTANYL CITRATE 50 MCG: 50 INJECTION, SOLUTION INTRAMUSCULAR; INTRAVENOUS at 15:14

## 2025-03-28 RX ADMIN — MIDAZOLAM HYDROCHLORIDE 2 MG: 1 INJECTION, SOLUTION INTRAMUSCULAR; INTRAVENOUS at 15:14

## 2025-03-28 RX ADMIN — MIDAZOLAM HYDROCHLORIDE 2 MG: 1 INJECTION, SOLUTION INTRAMUSCULAR; INTRAVENOUS at 15:16

## 2025-03-28 RX ADMIN — FENTANYL CITRATE 50 MCG: 50 INJECTION, SOLUTION INTRAMUSCULAR; INTRAVENOUS at 15:16

## 2025-03-28 ASSESSMENT — PAIN - FUNCTIONAL ASSESSMENT
PAIN_FUNCTIONAL_ASSESSMENT: 0-10

## 2025-03-28 ASSESSMENT — PAIN SCALES - GENERAL
PAINLEVEL_OUTOF10: 0 - NO PAIN
PAINLEVEL_OUTOF10: 0 - NO PAIN
PAINLEVEL_OUTOF10: 5 - MODERATE PAIN
PAINLEVEL_OUTOF10: 0 - NO PAIN
PAINLEVEL_OUTOF10: 5 - MODERATE PAIN
PAINLEVEL_OUTOF10: 0 - NO PAIN

## 2025-03-28 ASSESSMENT — COLUMBIA-SUICIDE SEVERITY RATING SCALE - C-SSRS: 1. IN THE PAST MONTH, HAVE YOU WISHED YOU WERE DEAD OR WISHED YOU COULD GO TO SLEEP AND NOT WAKE UP?: NO

## 2025-03-28 NOTE — H&P
History Of Present Illness  Jin Reynolds is a 70 y.o. male presenting for an EGD to rule out GVHD.     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 No pertinent surgical history.  Social History  He reports that he has never smoked. He has never used smokeless tobacco. He reports current drug use. Drug: Marijuana. He reports that he does not drink alcohol.    Family History  Family History   Problem Relation Name Age of Onset    Esophageal cancer Father          Allergies  Allergies   Allergen Reactions    Zyrtec [Cetirizine] Headache     Headaches do not occur with fexofenadine (Allegra)     Review of Systems   All other systems reviewed and are negative.    Pre-sedation Evaluation:  ASA Classification - ASA 3 - Patient with moderate systemic disease with functional limitations  Mallampati Score - II (hard and soft palate, upper portion of tonsils and uvula visible)    Physical Exam  Constitutional:       Appearance: Normal appearance.   Eyes:      Pupils: Pupils are equal, round, and reactive to light.   Cardiovascular:      Heart sounds: Normal heart sounds.   Pulmonary:      Breath sounds: Normal breath sounds.   Abdominal:      General: There is no distension.      Palpations: Abdomen is soft.      Tenderness: There is no abdominal tenderness. There is no guarding or rebound.   Neurological:      General: No focal deficit present.      Mental Status: He is alert and oriented to person, place, and time.   Psychiatric:         Mood and Affect: Mood normal.         Behavior: Behavior normal.       Last Recorded Vitals  Height  "1.676 m (5' 6\"), weight 68 kg (150 lb).     Assessment/Plan   Jin Reynolds is a 70 y.o. male presenting for an EGD to rule out GVHD.     PTA/Current Medications:  (Not in a hospital admission)    Current Outpatient Medications   Medication Sig Dispense Refill    acyclovir (Zovirax) 400 mg tablet Take 1 tablet (400 mg) by mouth every 12 hours. 180 tablet 2    amLODIPine (Norvasc) 10 mg tablet Take 1 tablet (10 mg) by mouth once daily. 90 tablet 3    cholecalciferol (Vitamin D-3) 25 MCG (1000 UT) tablet Take 2 tablets (2,000 Units) by mouth once daily. 180 Unspecified 2    eltrombopag olamine (Promacta) 50 mg tablet Take 1 tablet (50 mg) by mouth once daily in the morning. Take before meals. Take on an empty stomach, 1 hour before or 2 hours afer a meal. 30 tablet 11    famotidine (Pepcid) 20 mg tablet Take 1 tablet (20 mg) by mouth once daily. 90 tablet 3    fexofenadine (Allegra) 180 mg tablet Take 1 tablet (180 mg) by mouth once daily.      magnesium, amino acid chelate, 133 mg tablet Take 2 tablets (266 mg) by mouth 3 times a day. Or as instructed on your medication list. 180 tablet 1    prochlorperazine (Compazine) 10 mg tablet Take 1 tablet (10 mg) by mouth every 6 hours if needed for nausea or vomiting. 30 tablet 5    sulfamethoxazole-trimethoprim (Bactrim DS) 800-160 mg tablet Take 1 tablet by mouth 3 times a week. Take on Mondays, Wednesdays, and Fridays. 36 tablet 3    tacrolimus (Prograf) 1 mg capsule Take 4 capsules (4 mg) by mouth every 12 hours. Or as instructed on your medication list. 240 capsule 1    avapritinib (Ayvakit) 50 mg tablet Take 1 tablet (50 mg total) by mouth once daily.  Take at least 1 hour before a meal or 2 hours after a meal. Do not chew, crush, or split. 30 tablet 11    budesonide EC (Entocort EC) 3 mg 24 hr capsule Take 1 capsule (3 mg) by mouth 3 times a day. 90 capsule 1    isavuconazonium sulfate (Cresemba) 186 mg capsule capsule Take 2 capsules (372 mg) by mouth once daily. 56 " capsule 11    ondansetron (Zofran) 8 mg tablet Take 1 tablet (8 mg) by mouth every 8 hours if needed for nausea or vomiting. (Patient not taking: Reported on 3/28/2025) 90 tablet 2     No current facility-administered medications for this encounter.     Facility-Administered Medications Ordered in Other Encounters   Medication Dose Route Frequency Provider Last Rate Last Admin    alteplase (Cathflo Activase) injection 2 mg  2 mg intra-catheter PRN Nika Dawson MD        heparin flush 10 unit/mL syringe 50 Units  50 Units intra-catheter PRN Nika Dawson MD        heparin flush 100 unit/mL syringe 500 Units  500 Units intra-catheter PRN Nika Dawson MD        heparin flush 100 unit/mL syringe 500 Units  500 Units intra-catheter PRN Nika Dawson MD   500 Units at 02/25/25 1345    heparin flush 100 unit/mL syringe 500 Units  500 Units intra-catheter PRN Nika Dawson MD   500 Units at 03/18/25 0906     Lilli Cisneros MD

## 2025-03-28 NOTE — DISCHARGE INSTRUCTIONS

## 2025-04-01 ENCOUNTER — LAB (OUTPATIENT)
Dept: HEMATOLOGY/ONCOLOGY | Facility: HOSPITAL | Age: 70
End: 2025-04-01
Payer: MEDICARE

## 2025-04-01 ENCOUNTER — OFFICE VISIT (OUTPATIENT)
Dept: HEMATOLOGY/ONCOLOGY | Facility: HOSPITAL | Age: 70
End: 2025-04-01
Payer: MEDICARE

## 2025-04-01 VITALS
BODY MASS INDEX: 25.68 KG/M2 | TEMPERATURE: 97 F | SYSTOLIC BLOOD PRESSURE: 116 MMHG | DIASTOLIC BLOOD PRESSURE: 73 MMHG | WEIGHT: 159.1 LBS | HEART RATE: 65 BPM | OXYGEN SATURATION: 99 %

## 2025-04-01 DIAGNOSIS — R11.0 NAUSEA: ICD-10-CM

## 2025-04-01 DIAGNOSIS — Z94.84 HISTORY OF ALLOGENEIC STEM CELL TRANSPLANT (MULTI): ICD-10-CM

## 2025-04-01 DIAGNOSIS — C92.01 AML (ACUTE MYELOID LEUKEMIA) IN REMISSION (MULTI): ICD-10-CM

## 2025-04-01 DIAGNOSIS — D47.02 SYSTEMIC MASTOCYTOSIS WITH ASSOCIATED CLONAL HEMATOLOGICAL NON-MAST CELL LINEAGE DISEASE: ICD-10-CM

## 2025-04-01 DIAGNOSIS — Z94.84 HISTORY OF STEM CELL TRANSPLANT (MULTI): ICD-10-CM

## 2025-04-01 DIAGNOSIS — C92.01 ACUTE MYELOID LEUKEMIA IN REMISSION (MULTI): ICD-10-CM

## 2025-04-01 DIAGNOSIS — Z94.81 S/P ALLOGENEIC BONE MARROW TRANSPLANT (MULTI): ICD-10-CM

## 2025-04-01 DIAGNOSIS — D84.9 IMMUNOCOMPROMISED STATE: ICD-10-CM

## 2025-04-01 DIAGNOSIS — D89.813 GVHD (GRAFT VERSUS HOST DISEASE) (MULTI): Primary | ICD-10-CM

## 2025-04-01 LAB
ALBUMIN SERPL BCP-MCNC: 4.5 G/DL (ref 3.4–5)
ALP SERPL-CCNC: 51 U/L (ref 33–136)
ALT SERPL W P-5'-P-CCNC: 11 U/L (ref 10–52)
ANION GAP SERPL CALC-SCNC: 11 MMOL/L (ref 10–20)
APPEARANCE UR: CLEAR
AST SERPL W P-5'-P-CCNC: 15 U/L (ref 9–39)
BASOPHILS # BLD AUTO: 0.02 X10*3/UL (ref 0–0.1)
BASOPHILS NFR BLD AUTO: 0.6 %
BILIRUB SERPL-MCNC: 0.7 MG/DL (ref 0–1.2)
BILIRUB UR STRIP.AUTO-MCNC: NEGATIVE MG/DL
BUN SERPL-MCNC: 19 MG/DL (ref 6–23)
CALCIUM SERPL-MCNC: 8.6 MG/DL (ref 8.6–10.3)
CHLORIDE SERPL-SCNC: 107 MMOL/L (ref 98–107)
CO2 SERPL-SCNC: 23 MMOL/L (ref 21–32)
COLOR UR: YELLOW
CREAT SERPL-MCNC: 1.4 MG/DL (ref 0.5–1.3)
CREAT UR-MCNC: 178.3 MG/DL (ref 20–370)
EGFRCR SERPLBLD CKD-EPI 2021: 54 ML/MIN/1.73M*2
EOSINOPHIL # BLD AUTO: 0.03 X10*3/UL (ref 0–0.7)
EOSINOPHIL NFR BLD AUTO: 0.9 %
ERYTHROCYTE [DISTWIDTH] IN BLOOD BY AUTOMATED COUNT: 16.1 % (ref 11.5–14.5)
GLUCOSE SERPL-MCNC: 110 MG/DL (ref 74–99)
GLUCOSE UR STRIP.AUTO-MCNC: NORMAL MG/DL
HAPTOGLOB SERPL NEPH-MCNC: 39 MG/DL (ref 30–200)
HCT VFR BLD AUTO: 28.5 % (ref 41–52)
HGB BLD-MCNC: 10.3 G/DL (ref 13.5–17.5)
IGG SERPL-MCNC: 1000 MG/DL (ref 700–1600)
IMM GRANULOCYTES # BLD AUTO: 0.01 X10*3/UL (ref 0–0.7)
IMM GRANULOCYTES NFR BLD AUTO: 0.3 % (ref 0–0.9)
KETONES UR STRIP.AUTO-MCNC: NEGATIVE MG/DL
LDH SERPL L TO P-CCNC: 135 U/L (ref 84–246)
LEUKOCYTE ESTERASE UR QL STRIP.AUTO: NEGATIVE
LYMPHOCYTES # BLD AUTO: 0.43 X10*3/UL (ref 1.2–4.8)
LYMPHOCYTES NFR BLD AUTO: 13.4 %
MAGNESIUM SERPL-MCNC: 1.8 MG/DL (ref 1.6–2.4)
MCH RBC QN AUTO: 37.2 PG (ref 26–34)
MCHC RBC AUTO-ENTMCNC: 36.1 G/DL (ref 32–36)
MCV RBC AUTO: 103 FL (ref 80–100)
MONOCYTES # BLD AUTO: 0.18 X10*3/UL (ref 0.1–1)
MONOCYTES NFR BLD AUTO: 5.6 %
NEUTROPHILS # BLD AUTO: 2.53 X10*3/UL (ref 1.2–7.7)
NEUTROPHILS NFR BLD AUTO: 79.2 %
NITRITE UR QL STRIP.AUTO: NEGATIVE
NRBC BLD-RTO: 0 /100 WBCS (ref 0–0)
PH UR STRIP.AUTO: 6 [PH]
PLATELET # BLD AUTO: 65 X10*3/UL (ref 150–450)
POTASSIUM SERPL-SCNC: 4.1 MMOL/L (ref 3.5–5.3)
PROT SERPL-MCNC: 6.6 G/DL (ref 6.4–8.2)
PROT UR STRIP.AUTO-MCNC: NEGATIVE MG/DL
PROT UR-ACNC: 12 MG/DL (ref 5–25)
PROT/CREAT UR: 0.07 MG/MG CREAT (ref 0–0.17)
RBC # BLD AUTO: 2.77 X10*6/UL (ref 4.5–5.9)
RBC # UR STRIP.AUTO: NEGATIVE MG/DL
SODIUM SERPL-SCNC: 137 MMOL/L (ref 136–145)
SP GR UR STRIP.AUTO: 1.02
TACROLIMUS BLD-MCNC: 5.2 NG/ML
UROBILINOGEN UR STRIP.AUTO-MCNC: NORMAL MG/DL
WBC # BLD AUTO: 3.2 X10*3/UL (ref 4.4–11.3)

## 2025-04-01 PROCEDURE — 36591 DRAW BLOOD OFF VENOUS DEVICE: CPT

## 2025-04-01 PROCEDURE — 99215 OFFICE O/P EST HI 40 MIN: CPT | Performed by: INTERNAL MEDICINE

## 2025-04-01 PROCEDURE — 36415 COLL VENOUS BLD VENIPUNCTURE: CPT

## 2025-04-01 PROCEDURE — 3078F DIAST BP <80 MM HG: CPT | Performed by: INTERNAL MEDICINE

## 2025-04-01 PROCEDURE — 87799 DETECT AGENT NOS DNA QUANT: CPT

## 2025-04-01 PROCEDURE — 81003 URINALYSIS AUTO W/O SCOPE: CPT

## 2025-04-01 PROCEDURE — G2211 COMPLEX E/M VISIT ADD ON: HCPCS | Performed by: INTERNAL MEDICINE

## 2025-04-01 PROCEDURE — 1159F MED LIST DOCD IN RCRD: CPT | Performed by: INTERNAL MEDICINE

## 2025-04-01 PROCEDURE — 83615 LACTATE (LD) (LDH) ENZYME: CPT

## 2025-04-01 PROCEDURE — 80053 COMPREHEN METABOLIC PANEL: CPT

## 2025-04-01 PROCEDURE — 80197 ASSAY OF TACROLIMUS: CPT

## 2025-04-01 PROCEDURE — 85025 COMPLETE CBC W/AUTO DIFF WBC: CPT

## 2025-04-01 PROCEDURE — 2500000004 HC RX 250 GENERAL PHARMACY W/ HCPCS (ALT 636 FOR OP/ED): Performed by: INTERNAL MEDICINE

## 2025-04-01 PROCEDURE — 83735 ASSAY OF MAGNESIUM: CPT

## 2025-04-01 PROCEDURE — 82570 ASSAY OF URINE CREATININE: CPT

## 2025-04-01 PROCEDURE — 3074F SYST BP LT 130 MM HG: CPT | Performed by: INTERNAL MEDICINE

## 2025-04-01 PROCEDURE — 1126F AMNT PAIN NOTED NONE PRSNT: CPT | Performed by: INTERNAL MEDICINE

## 2025-04-01 PROCEDURE — 82784 ASSAY IGA/IGD/IGG/IGM EACH: CPT

## 2025-04-01 PROCEDURE — 83010 ASSAY OF HAPTOGLOBIN QUANT: CPT

## 2025-04-01 RX ORDER — HEPARIN 100 UNIT/ML
500 SYRINGE INTRAVENOUS AS NEEDED
Status: DISCONTINUED | OUTPATIENT
Start: 2025-04-01 | End: 2025-04-01 | Stop reason: HOSPADM

## 2025-04-01 RX ORDER — HEPARIN SODIUM,PORCINE/PF 10 UNIT/ML
50 SYRINGE (ML) INTRAVENOUS AS NEEDED
OUTPATIENT
Start: 2025-04-01

## 2025-04-01 RX ORDER — HEPARIN 100 UNIT/ML
500 SYRINGE INTRAVENOUS AS NEEDED
OUTPATIENT
Start: 2025-04-01

## 2025-04-01 RX ORDER — PREDNISONE 10 MG/1
40 TABLET ORAL DAILY
Qty: 120 TABLET | Refills: 0 | Status: SHIPPED | OUTPATIENT
Start: 2025-04-01 | End: 2025-05-01

## 2025-04-01 RX ADMIN — HEPARIN 500 UNITS: 100 SYRINGE at 09:11

## 2025-04-01 ASSESSMENT — PAIN SCALES - GENERAL: PAINLEVEL_OUTOF10: 0-NO PAIN

## 2025-04-01 NOTE — PROGRESS NOTES
"    Patient ID: Jin Reynolds is a 70 y.o. male.  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   8/23/2023 Initial Diagnosis    DX:  SMOLDERING SYSTEMIC MASTOCYTOSIS  Presented with skin rash and eosinophilia    BRENDON DIAGNOSTIC CRITERIA  (For SM, Need major and 1 minor OR at least 3 minors)  - Multi-focal, dense infiltrates of MC (>= 15 mast cells in aggregates) in BM and/or other extra-cutaneous organs [major]  - In BM or extracutaneous organs, >25% MC spindle shaped OR have atypical morphology OR of all MC on BM aspirate smears, >25% are immature or atypical [minor]  - Detection of activating mutation KIT D816V in BM, PB, or other extracutaneous organ [minor]  - Serum tryptase persistently exceeds 20 ng/mL (unless associated clonal myeloid disorder, in which this paramenter is not valid) [minor]    \"B\" FINDINGS  - BM biopsy showing >30% infiltration (focal, dense aggregates) and/or serum tryptase > 200 ng/mL  - Signs of dysplasia or myeloproliferation, in non-MC lineages but insufficient criteria for definitive diagnosis of AHNMD with normal or slightly abnormal blood counts    \"C\" FINDINGS  None       8/23/2023 Biopsy    BONE MARROW (8/23/23)  Hypercellular (90-95%) with trilineage hematopoiesis, granulocytic hyperplasia, eosinophilia, and increased atypical mast cells  IP:  CD34+, +, CD7(bright)+, cCD3-, CD33-, CD15-, CD13+ blast population  IHC:  + increased spindle-shaped mast cells (15% cellularity), CD2-  Myeloid NGS:  CBL (31%), cKIT D816V (30%), RUNX1 x 2 (19%, 29%), SRSF2 (47%) [ASXL1 negative]  FISH:  PDGFRb negative    SERUM TRYPTASE  228 (8/23/23)  268 (9/5/23)     2/13/2024 -  Molecular Therapy    AVAPRITINIB   - Starting dose 25 mg daily (non-Adv SM)  - Not started due to improvement in symptoms.        7/16/2024 - 10/21/2024 Chemotherapy    Venetoclax / AzaCITIDine  - C1D1 " 7/17/24:  complicated by TLS, venetoclax held after D1  - Post C1 BM (8/28/24):  hypercellular with >50% blasts, background systemic mastocytosis  - C2D1 9/5/24:  venetoclax restarted with C2, no TLS  - Post C2 BM (9/25/24):  hypocellular (<5%), atypical myeloblasts by IP/IHC (1-2%), persistent systemic mastocytosis  - C3D1 10/15/24:  delayed 1 week for count recovery  - Post C3 BM (10/23/24):  hypercellular with systemic mastocytosis, no increase blasts, 0.2% atypical blasts by IP, NGS w/ CBL (73%), cKIT (4%), RUNX1 x 2 (4%, 38%), SRSF2 (40%)       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    CONDITIONING Fludarabine, melphalan, and TBI   DONOR Matched sibling   MATCH GRADE 12/12   SEX MATCH Mismatched   ABO DONOR O pos   ABO RECIPIENT O pos   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, Mycophenolate, and Tacrolimus   GRAFT SOURCE Peripheral blood          2/14/2025 -  Molecular Therapy    AVAPRITINIB   - Persistent mast cells noted on T+30 and T+100 Post Tx Bms. Elevated levels of serum tryptase.   - Initiated Avapritinib 50 mg daily on 2/14/2025.     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)   6/27/2024 Initial Diagnosis    ICC 2022 DX: Acute myeloid leukemia, NOS (>=20% blasts)  [id number] AML Risk Stratification: INTERMEDIATE: Cytogenetic and/or molecular abnormalities not classified as favorable or adverse  Presented with pancytopenia and circulating blasts    BONE MARROW (06/27/2024)  Marrow replaced by blasts, fibrotic foci, some atypical + cells; 7-8% circulating blasts; aspicular  - Unable to perform additional studies due to aspicular specimen    PERIPHERAL BLOOD (6/27/2024)  FISH:  positive for gain RUNX1    PERIPHERAL BLOOD (7/1/24)  IP:  abnl myeloblast population (CD34+, CD7+, CD4+, CD13+, CD38+, CD11+ dim, HLA=DR+, TdT+, CD33-)  Myeloid NGS: CBL C404Y (41%), KIT D816V (8%), RUNX1 x2 (8%,  30%), SRSF2 (37%)     7/16/2024 - 10/21/2024 Chemotherapy    Venetoclax / AzaCITIDine  - C1D1 7/17/24:  complicated by TLS, venetoclax held after D1  - Post C1 BM (8/28/24):  hypercellular with >50% blasts, background systemic mastocytosis  - C2D1 9/5/24:  venetoclax restarted with C2, no TLS  - Post C2 BM (9/25/24):  hypocellular (<5%), atypical myeloblasts by IP/IHC (1-2%), persistent systemic mastocytosis  - C3D1 10/15/24:  delayed 1 week for count recovery  - Post C3 BM (10/23/24):  hypercellular with systemic mastocytosis, no increase blasts, 0.2% atypical blasts by IP, NGS w/ CBL (73%), cKIT (4%), RUNX1 x 2 (4%, 38%), SRSF2 (40%)       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    CONDITIONING Fludarabine, melphalan, and TBI   DONOR Matched sibling   MATCH GRADE 12/12   SEX MATCH Mismatched   ABO DONOR O pos   ABO RECIPIENT O pos   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, Mycophenolate, and Tacrolimus   GRAFT SOURCE Peripheral blood          12/26/2024 -  Infection    Parainfluenza URI     12/26/2024 -  Disease Reevaluation      DATE DAY SOURCE MORPHOLOGY MRD WHOLE CHIMERISM   (% donor) CD3 CHIMERISM   (% donor) CD33 CHIMERISM   (% donor)   12/26/2024 D+30 PB        12/26/2024 D+30 BM No AML, persist BRENDON Neg %     1/20/2025  D+60 PB    96% 100%   3/25/2025 D+100 PB        3/3/2025 D+100 BM No AML, persist BRENDON (focal) Neg MFC 99%                 Assessment & Plan  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  PLT still low, eltrombopag initiated 2/18/2025, cont at current dose.  Current evidence of disease: No  Maintenance Therapy: No  MRD Monitoring Plan: AML MFC; Day 100 BMBx 3/3/25 no evidence of leukemia; there is residual focal mast cell atypia.   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 D+100 bmbx without KIT  mutation detected. Serum tryptase level improved-- now down to 14.1 from 71 pre-KIT inhibitor.  Continue current therapy.  History of stem cell transplant (Multi)  Day +131 following allogeneic stem cell transplant.   - Continue current immunosuppressive therapy; tacro 4 mg BID; level today pending.   - Last BM chimerism 99% donor.  Immunocompromised state  Remains at high risk for infections.  No current active signs or symptoms of infection.    Continues on prophylaxis with acyclovir and posaconazole.  TMP/SMX briefly held given hyperkalemia and renal insufficiency 1/10-1/17. Recent sCr has been elevated, but feel more related to GI issues at this time.   Switched posa to isavuconazonium d/t interaction with avapritinib.   Ongoing viral monitoring (as below).   Immune reconstitution: CD4 189; next due at T+180.   Post Tx Immunizations are planned for D+180. Will need scheduled.   Nausea  Weight stable this week.  Suspicious for GVHD.  Unable to get budesonide approved.  Had EGD last week with biopsies pending.  Will give trial prednisone (0/5 mg/kg/day) starting at 40 mg daily.  Reassess next week.      SUBJECTIVE     Days since transplant: 131     Here today for planned follow up following allogeneic stem cell transplant.   Still having significant anorexia and nausea.  EGD last week.  Weight stable this week. No new health issues.  Denies fevers, chills, diarrhea, dyspnea, rash, and pain.      OBJECTIVE     BSA: 1.83 meters squared  /73 (BP Location: Left arm)   Pulse 65   Temp 36.1 °C (97 °F)   Wt 72.2 kg (159 lb 1.6 oz)   SpO2 99%   BMI 25.68 kg/m²   Weight         4/1/2025  0949             Weight: 72.2 kg (159 lb 1.6 oz)               Physical Exam  Vitals reviewed.   Constitutional:       Appearance: Normal appearance.   Eyes:      Conjunctiva/sclera: Conjunctivae normal.      Pupils: Pupils are equal, round, and reactive to light.   Skin:     General: Skin is warm and dry.      Findings: No  "rash.   Psychiatric:         Mood and Affect: Mood normal.         Performance Status:  Karnofsky Score: 80 - Normal activity with effort; some signs or symptoms of disease      POST TRANSPLANT MONITORING   GVHD  Acute GVHD Overall Grade:    Chronic GVHD Total Score: 2    Viral Monitoring:     CMV   No results found for: \"CMVPCRIU\"   EBV   Lab Results   Component Value Date    EBVPCRQNTWB 653 (H) 02/11/2025      Adenovirus   Lab Results   Component Value Date    ADEPB Not Detected 03/25/2025    ADEPB Not Detected 03/18/2025    ADEPB Not Detected 03/11/2025       Toxo   No results found for: \"TGONDPCRBL\"       TMA Monitoring:     MARKERS RECENT 3 VALUES   LDH Lab Results   Component Value Date     04/01/2025     03/25/2025     03/18/2025      Haptoglobin Lab Results   Component Value Date    HAPTOGLOBIN 39 04/01/2025    HAPTOGLOBIN 39 03/25/2025    HAPTOGLOBIN 58 03/18/2025      BP BP Readings from Last 3 Encounters:   04/01/25 116/73   03/28/25 125/81   03/25/25 121/73      Serum Creatinine Lab Results   Component Value Date    CREATININE 1.40 (H) 04/01/2025    CREATININE 1.40 (H) 03/25/2025    CREATININE 1.69 (H) 03/18/2025      Urine Protein/Creat Ratio Lab Results   Component Value Date    UTPCR 0.07 04/01/2025    UTPCR 0.08 03/25/2025    UTPCR 0.09 03/18/2025      Tacrolimus Level Lab Results   Component Value Date    TACROLIMUS 4.6 03/25/2025    TACROLIMUS 4.9 03/18/2025    TACROLIMUS 4.1 03/11/2025            Nika Dawson MD          "

## 2025-04-01 NOTE — ASSESSMENT & PLAN NOTE
Counts remain low but plts slowly starting rise on eltrombopag, D100 bmbx shows no AML but focal BRENDON.  PLT still low, eltrombopag initiated 2/18/2025, cont at current dose.  Current evidence of disease: No  Maintenance Therapy: No  MRD Monitoring Plan: AML MFC; Day 100 BMBx 3/3/25 no evidence of leukemia; there is residual focal mast cell atypia.

## 2025-04-01 NOTE — ASSESSMENT & PLAN NOTE
Avapritinib started around 2/14/25. D+100 bmbx without KIT mutation detected. Serum tryptase level improved-- now down to 14.1 from 71 pre-KIT inhibitor.  Continue current therapy.

## 2025-04-01 NOTE — ASSESSMENT & PLAN NOTE
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   - Continue current immunosuppressive therapy; tacro 4 mg BID; level today pending.   - Last BM chimerism 99% donor.

## 2025-04-01 NOTE — ASSESSMENT & PLAN NOTE
Weight stable this week.  Suspicious for GVHD.  Unable to get budesonide approved.  Had EGD last week with biopsies pending.  Will give trial prednisone (0/5 mg/kg/day) starting at 40 mg daily.  Reassess next week.

## 2025-04-02 LAB
ADENOVIRUS QPCR,PLASMA, VIRC: NOT DETECTED COPIES/ML
CHIMERISM INTERPRETATION: NORMAL
CHIMERISM INTERPRETATION: NORMAL
CMV DNA SERPL NAA+PROBE-LOG IU: NORMAL {LOG_IU}/ML
EBV DNA BLD NAA+PROBE-LOG IU: ABNORMAL {LOG_IU}/ML
ELECTRONICALLY SIGNED BY: NORMAL
ELECTRONICALLY SIGNED BY: NORMAL
LABORATORY COMMENT REPORT: ABNORMAL
LABORATORY COMMENT REPORT: NOT DETECTED

## 2025-04-07 ENCOUNTER — SPECIALTY PHARMACY (OUTPATIENT)
Dept: PHARMACY | Facility: CLINIC | Age: 70
End: 2025-04-07

## 2025-04-07 PROCEDURE — RXMED WILLOW AMBULATORY MEDICATION CHARGE

## 2025-04-08 ENCOUNTER — OFFICE VISIT (OUTPATIENT)
Dept: HEMATOLOGY/ONCOLOGY | Facility: HOSPITAL | Age: 70
End: 2025-04-08
Payer: MEDICARE

## 2025-04-08 VITALS
RESPIRATION RATE: 16 BRPM | OXYGEN SATURATION: 100 % | WEIGHT: 173.28 LBS | TEMPERATURE: 97.3 F | BODY MASS INDEX: 27.97 KG/M2 | HEART RATE: 72 BPM | DIASTOLIC BLOOD PRESSURE: 80 MMHG | SYSTOLIC BLOOD PRESSURE: 129 MMHG

## 2025-04-08 DIAGNOSIS — C92.01 ACUTE MYELOID LEUKEMIA IN REMISSION (MULTI): ICD-10-CM

## 2025-04-08 DIAGNOSIS — D47.02 SYSTEMIC MASTOCYTOSIS WITH ASSOCIATED CLONAL HEMATOLOGICAL NON-MAST CELL LINEAGE DISEASE: ICD-10-CM

## 2025-04-08 DIAGNOSIS — D84.9 IMMUNOCOMPROMISED STATE: Primary | ICD-10-CM

## 2025-04-08 DIAGNOSIS — E83.42 HYPOMAGNESEMIA: ICD-10-CM

## 2025-04-08 DIAGNOSIS — R11.0 NAUSEA: ICD-10-CM

## 2025-04-08 DIAGNOSIS — Z94.84 HISTORY OF ALLOGENEIC STEM CELL TRANSPLANT (MULTI): ICD-10-CM

## 2025-04-08 DIAGNOSIS — C92.01 AML (ACUTE MYELOID LEUKEMIA) IN REMISSION (MULTI): ICD-10-CM

## 2025-04-08 DIAGNOSIS — D89.813 GVHD (GRAFT VERSUS HOST DISEASE) (MULTI): ICD-10-CM

## 2025-04-08 DIAGNOSIS — Z94.84 HISTORY OF STEM CELL TRANSPLANT (MULTI): ICD-10-CM

## 2025-04-08 LAB
ALBUMIN SERPL BCP-MCNC: 4 G/DL (ref 3.4–5)
ALP SERPL-CCNC: 54 U/L (ref 33–136)
ALT SERPL W P-5'-P-CCNC: 30 U/L (ref 10–52)
ANION GAP SERPL CALC-SCNC: 10 MMOL/L (ref 10–20)
AST SERPL W P-5'-P-CCNC: 23 U/L (ref 9–39)
BASOPHILS # BLD AUTO: 0.01 X10*3/UL (ref 0–0.1)
BASOPHILS NFR BLD AUTO: 0.2 %
BILIRUB SERPL-MCNC: 0.4 MG/DL (ref 0–1.2)
BUN SERPL-MCNC: 28 MG/DL (ref 6–23)
CALCIUM SERPL-MCNC: 8.5 MG/DL (ref 8.6–10.3)
CHLORIDE SERPL-SCNC: 105 MMOL/L (ref 98–107)
CO2 SERPL-SCNC: 25 MMOL/L (ref 21–32)
CREAT SERPL-MCNC: 1.08 MG/DL (ref 0.5–1.3)
EGFRCR SERPLBLD CKD-EPI 2021: 74 ML/MIN/1.73M*2
EOSINOPHIL # BLD AUTO: 0.01 X10*3/UL (ref 0–0.7)
EOSINOPHIL NFR BLD AUTO: 0.2 %
ERYTHROCYTE [DISTWIDTH] IN BLOOD BY AUTOMATED COUNT: 17 % (ref 11.5–14.5)
GLUCOSE SERPL-MCNC: 99 MG/DL (ref 74–99)
HCT VFR BLD AUTO: 28.6 % (ref 41–52)
HGB BLD-MCNC: 10.1 G/DL (ref 13.5–17.5)
IMM GRANULOCYTES # BLD AUTO: 0.02 X10*3/UL (ref 0–0.7)
IMM GRANULOCYTES NFR BLD AUTO: 0.5 % (ref 0–0.9)
LYMPHOCYTES # BLD AUTO: 0.3 X10*3/UL (ref 1.2–4.8)
LYMPHOCYTES NFR BLD AUTO: 7 %
MAGNESIUM SERPL-MCNC: 1.87 MG/DL (ref 1.6–2.4)
MCH RBC QN AUTO: 37 PG (ref 26–34)
MCHC RBC AUTO-ENTMCNC: 35.3 G/DL (ref 32–36)
MCV RBC AUTO: 105 FL (ref 80–100)
MONOCYTES # BLD AUTO: 0.23 X10*3/UL (ref 0.1–1)
MONOCYTES NFR BLD AUTO: 5.4 %
NEUTROPHILS # BLD AUTO: 3.72 X10*3/UL (ref 1.2–7.7)
NEUTROPHILS NFR BLD AUTO: 86.7 %
NRBC BLD-RTO: 0 /100 WBCS (ref 0–0)
PLATELET # BLD AUTO: 57 X10*3/UL (ref 150–450)
POTASSIUM SERPL-SCNC: 4.1 MMOL/L (ref 3.5–5.3)
PROT SERPL-MCNC: 6 G/DL (ref 6.4–8.2)
RBC # BLD AUTO: 2.73 X10*6/UL (ref 4.5–5.9)
SODIUM SERPL-SCNC: 136 MMOL/L (ref 136–145)
WBC # BLD AUTO: 4.3 X10*3/UL (ref 4.4–11.3)

## 2025-04-08 PROCEDURE — 80053 COMPREHEN METABOLIC PANEL: CPT | Performed by: STUDENT IN AN ORGANIZED HEALTH CARE EDUCATION/TRAINING PROGRAM

## 2025-04-08 PROCEDURE — 3074F SYST BP LT 130 MM HG: CPT | Performed by: STUDENT IN AN ORGANIZED HEALTH CARE EDUCATION/TRAINING PROGRAM

## 2025-04-08 PROCEDURE — 1036F TOBACCO NON-USER: CPT | Performed by: STUDENT IN AN ORGANIZED HEALTH CARE EDUCATION/TRAINING PROGRAM

## 2025-04-08 PROCEDURE — 99215 OFFICE O/P EST HI 40 MIN: CPT | Performed by: STUDENT IN AN ORGANIZED HEALTH CARE EDUCATION/TRAINING PROGRAM

## 2025-04-08 PROCEDURE — 85025 COMPLETE CBC W/AUTO DIFF WBC: CPT | Performed by: STUDENT IN AN ORGANIZED HEALTH CARE EDUCATION/TRAINING PROGRAM

## 2025-04-08 PROCEDURE — 1126F AMNT PAIN NOTED NONE PRSNT: CPT | Performed by: STUDENT IN AN ORGANIZED HEALTH CARE EDUCATION/TRAINING PROGRAM

## 2025-04-08 PROCEDURE — 87799 DETECT AGENT NOS DNA QUANT: CPT | Performed by: STUDENT IN AN ORGANIZED HEALTH CARE EDUCATION/TRAINING PROGRAM

## 2025-04-08 PROCEDURE — 3079F DIAST BP 80-89 MM HG: CPT | Performed by: STUDENT IN AN ORGANIZED HEALTH CARE EDUCATION/TRAINING PROGRAM

## 2025-04-08 PROCEDURE — 2500000004 HC RX 250 GENERAL PHARMACY W/ HCPCS (ALT 636 FOR OP/ED): Performed by: INTERNAL MEDICINE

## 2025-04-08 PROCEDURE — 83735 ASSAY OF MAGNESIUM: CPT | Performed by: STUDENT IN AN ORGANIZED HEALTH CARE EDUCATION/TRAINING PROGRAM

## 2025-04-08 PROCEDURE — 1159F MED LIST DOCD IN RCRD: CPT | Performed by: STUDENT IN AN ORGANIZED HEALTH CARE EDUCATION/TRAINING PROGRAM

## 2025-04-08 RX ORDER — HEPARIN SODIUM,PORCINE/PF 10 UNIT/ML
50 SYRINGE (ML) INTRAVENOUS AS NEEDED
OUTPATIENT
Start: 2025-04-08

## 2025-04-08 RX ORDER — HEPARIN 100 UNIT/ML
500 SYRINGE INTRAVENOUS AS NEEDED
OUTPATIENT
Start: 2025-04-08

## 2025-04-08 RX ORDER — HEPARIN 100 UNIT/ML
500 SYRINGE INTRAVENOUS AS NEEDED
Status: DISCONTINUED | OUTPATIENT
Start: 2025-04-08 | End: 2025-04-08 | Stop reason: HOSPADM

## 2025-04-08 RX ORDER — PREDNISONE 10 MG/1
30 TABLET ORAL DAILY
Qty: 90 TABLET | Refills: 0 | Status: SHIPPED | OUTPATIENT
Start: 2025-04-08 | End: 2025-05-08

## 2025-04-08 RX ADMIN — HEPARIN 500 UNITS: 100 SYRINGE at 08:39

## 2025-04-08 ASSESSMENT — PAIN SCALES - GENERAL: PAINLEVEL_OUTOF10: 0-NO PAIN

## 2025-04-08 NOTE — ASSESSMENT & PLAN NOTE
Weight significantly improved since starting prednisone trial (0.5 mg/kg/day) last week. EGD biopsies still pending, suspicious clinically for upper GI GVHD.

## 2025-04-08 NOTE — ASSESSMENT & PLAN NOTE
Day +138 following allogeneic stem cell transplant.   - Continue current immunosuppressive therapy; tacro 4 mg BID; level today pending.   - Last BM chimerism 99% donor.

## 2025-04-08 NOTE — PROGRESS NOTES
"    Patient ID: Jin Reynolds is a 70 y.o. male.  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   8/23/2023 Initial Diagnosis    DX:  SMOLDERING SYSTEMIC MASTOCYTOSIS  Presented with skin rash and eosinophilia    BRENDON DIAGNOSTIC CRITERIA  (For SM, Need major and 1 minor OR at least 3 minors)  - Multi-focal, dense infiltrates of MC (>= 15 mast cells in aggregates) in BM and/or other extra-cutaneous organs [major]  - In BM or extracutaneous organs, >25% MC spindle shaped OR have atypical morphology OR of all MC on BM aspirate smears, >25% are immature or atypical [minor]  - Detection of activating mutation KIT D816V in BM, PB, or other extracutaneous organ [minor]  - Serum tryptase persistently exceeds 20 ng/mL (unless associated clonal myeloid disorder, in which this paramenter is not valid) [minor]    \"B\" FINDINGS  - BM biopsy showing >30% infiltration (focal, dense aggregates) and/or serum tryptase > 200 ng/mL  - Signs of dysplasia or myeloproliferation, in non-MC lineages but insufficient criteria for definitive diagnosis of AHNMD with normal or slightly abnormal blood counts    \"C\" FINDINGS  None       8/23/2023 Biopsy    BONE MARROW (8/23/23)  Hypercellular (90-95%) with trilineage hematopoiesis, granulocytic hyperplasia, eosinophilia, and increased atypical mast cells  IP:  CD34+, +, CD7(bright)+, cCD3-, CD33-, CD15-, CD13+ blast population  IHC:  + increased spindle-shaped mast cells (15% cellularity), CD2-  Myeloid NGS:  CBL (31%), cKIT D816V (30%), RUNX1 x 2 (19%, 29%), SRSF2 (47%) [ASXL1 negative]  FISH:  PDGFRb negative    SERUM TRYPTASE  228 (8/23/23)  268 (9/5/23)     2/13/2024 -  Molecular Therapy    AVAPRITINIB   - Starting dose 25 mg daily (non-Adv SM)  - Not started due to improvement in symptoms.        7/16/2024 - 10/21/2024 Chemotherapy    Venetoclax / AzaCITIDine  - C1D1 " 7/17/24:  complicated by TLS, venetoclax held after D1  - Post C1 BM (8/28/24):  hypercellular with >50% blasts, background systemic mastocytosis  - C2D1 9/5/24:  venetoclax restarted with C2, no TLS  - Post C2 BM (9/25/24):  hypocellular (<5%), atypical myeloblasts by IP/IHC (1-2%), persistent systemic mastocytosis  - C3D1 10/15/24:  delayed 1 week for count recovery  - Post C3 BM (10/23/24):  hypercellular with systemic mastocytosis, no increase blasts, 0.2% atypical blasts by IP, NGS w/ CBL (73%), cKIT (4%), RUNX1 x 2 (4%, 38%), SRSF2 (40%)       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    CONDITIONING Fludarabine, melphalan, and TBI   DONOR Matched sibling   MATCH GRADE 12/12   SEX MATCH Mismatched   ABO DONOR O pos   ABO RECIPIENT O pos   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, Mycophenolate, and Tacrolimus   GRAFT SOURCE Peripheral blood          2/14/2025 -  Molecular Therapy    AVAPRITINIB   - Persistent mast cells noted on T+30 and T+100 Post Tx Bms. Elevated levels of serum tryptase.   - Initiated Avapritinib 50 mg daily on 2/14/2025.     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)   6/27/2024 Initial Diagnosis    ICC 2022 DX: Acute myeloid leukemia, NOS (>=20% blasts)  [id number] AML Risk Stratification: INTERMEDIATE: Cytogenetic and/or molecular abnormalities not classified as favorable or adverse  Presented with pancytopenia and circulating blasts    BONE MARROW (06/27/2024)  Marrow replaced by blasts, fibrotic foci, some atypical + cells; 7-8% circulating blasts; aspicular  - Unable to perform additional studies due to aspicular specimen    PERIPHERAL BLOOD (6/27/2024)  FISH:  positive for gain RUNX1    PERIPHERAL BLOOD (7/1/24)  IP:  abnl myeloblast population (CD34+, CD7+, CD4+, CD13+, CD38+, CD11+ dim, HLA=DR+, TdT+, CD33-)  Myeloid NGS: CBL C404Y (41%), KIT D816V (8%), RUNX1 x2 (8%,  30%), SRSF2 (37%)     7/16/2024 - 10/21/2024 Chemotherapy    Venetoclax / AzaCITIDine  - C1D1 7/17/24:  complicated by TLS, venetoclax held after D1  - Post C1 BM (8/28/24):  hypercellular with >50% blasts, background systemic mastocytosis  - C2D1 9/5/24:  venetoclax restarted with C2, no TLS  - Post C2 BM (9/25/24):  hypocellular (<5%), atypical myeloblasts by IP/IHC (1-2%), persistent systemic mastocytosis  - C3D1 10/15/24:  delayed 1 week for count recovery  - Post C3 BM (10/23/24):  hypercellular with systemic mastocytosis, no increase blasts, 0.2% atypical blasts by IP, NGS w/ CBL (73%), cKIT (4%), RUNX1 x 2 (4%, 38%), SRSF2 (40%)       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    CONDITIONING Fludarabine, melphalan, and TBI   DONOR Matched sibling   MATCH GRADE 12/12   SEX MATCH Mismatched   ABO DONOR O pos   ABO RECIPIENT O pos   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, Mycophenolate, and Tacrolimus   GRAFT SOURCE Peripheral blood          12/26/2024 -  Infection    Parainfluenza URI     12/26/2024 -  Disease Reevaluation      DATE DAY SOURCE MORPHOLOGY MRD WHOLE CHIMERISM   (% donor) CD3 CHIMERISM   (% donor) CD33 CHIMERISM   (% donor)   12/26/2024 D+30 PB        12/26/2024 D+30 BM No AML, persist BRENDON Neg %     1/20/2025  D+60 PB    96% 100%   3/25/2025 D+100 PB        3/3/2025 D+100 BM No AML, persist BRENDON (focal) Neg MFC 99%             04/08/25 T+ 138 Upper GI symptoms essentially resolved after starting prednisone last week. Will taper to 30 mg daily starting tomorrow (4/9) and plan to continue for about 2 weeks before tapering again. Biopsy from EGD still pending. Counts stable.     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  PLT still low, eltrombopag initiated 2/18/2025, cont at current dose.  Current evidence of disease: No  Maintenance Therapy: No  MRD  Monitoring Plan: AML MFC; Day 100 BMBx 3/3/25 no evidence of leukemia; there is residual focal mast cell atypia.  Immunocompromised state  Remains at high risk for infections.  No current active signs or symptoms of infection.    Continues on prophylaxis with acyclovir and posaconazole.  TMP/SMX briefly held given hyperkalemia and renal insufficiency 1/10-1/17. Recent sCr has been elevated, but feel more related to GI issues at this time.   Switched posa to isavuconazonium d/t interaction with avapritinib.   Ongoing viral monitoring (as below).   Immune reconstitution: CD4 189; next due at T+180.   Post Tx Immunizations are planned for D+180. Will need scheduled.   Nausea  Weight significantly improved since starting prednisone trial (0.5 mg/kg/day) last week. EGD biopsies still pending, suspicious clinically for upper GI GVHD.   Hypomagnesemia  Continue oral supplements.  History of stem cell transplant (Multi)  Day +138 following allogeneic stem cell transplant.   - Continue current immunosuppressive therapy; tacro 4 mg BID; level today pending.   - Last BM chimerism 99% donor.  History of allogeneic stem cell transplant (Multi)  Day +138 following allogeneic stem cell transplant.   - Continue current immunosuppressive therapy; tacro 4 mg BID; level not obtained today. Will plan to start tapering once off prednisone.   - Last BM chimerism 99% donor.   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 Resolved with initiation of prednisone, budesonide not covered by insurance. Has been on pred 40 mg daily x 1 week, will decrease to 30 mg daily starting 4/9 through 4/22.   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  Significant improvement almost immediately after starting prednisone last week. Gained 13 pounds! Appetite great, nausea pretty much resolved. No diarrhea or rashes. Still  "walking on the treadmill about 30 min every other day, has not pushed past that limit but would like to try. No new health issues.  Denies fevers, chills, diarrhea, dyspnea, rash, and pain.      OBJECTIVE     BSA: 1.91 meters squared  /80   Pulse 72   Temp 36.3 °C (97.3 °F)   Resp 16   Wt 78.6 kg (173 lb 4.5 oz)   SpO2 100%   BMI 27.97 kg/m²   Weight         4/8/2025  0811             Weight: 78.6 kg (173 lb 4.5 oz)               Physical Exam  Vitals reviewed.   Constitutional:       Appearance: Normal appearance.   Eyes:      Conjunctiva/sclera: Conjunctivae normal.      Pupils: Pupils are equal, round, and reactive to light.   Skin:     General: Skin is warm and dry.      Findings: No rash.   Psychiatric:         Mood and Affect: Mood normal.         Performance Status:  Karnofsky Score: 80 - Normal activity with effort; some signs or symptoms of disease      POST TRANSPLANT MONITORING   GVHD  Acute GVHD Overall Grade:    Chronic GVHD Total Score:      Viral Monitoring:     CMV   No results found for: \"CMVPCRIU\"   EBV   Lab Results   Component Value Date    EBVPCRQNTWB 653 (H) 02/11/2025      Adenovirus   Lab Results   Component Value Date    ADEPB Not Detected 04/01/2025    ADEPB Not Detected 03/25/2025    ADEPB Not Detected 03/18/2025       Toxo   No results found for: \"TGONDPCRBL\"       TMA Monitoring:     MARKERS RECENT 3 VALUES   LDH Lab Results   Component Value Date     04/01/2025     03/25/2025     03/18/2025      Haptoglobin Lab Results   Component Value Date    HAPTOGLOBIN 39 04/01/2025    HAPTOGLOBIN 39 03/25/2025    HAPTOGLOBIN 58 03/18/2025      BP BP Readings from Last 3 Encounters:   04/08/25 129/80   04/01/25 116/73   03/28/25 125/81      Serum Creatinine Lab Results   Component Value Date    CREATININE 1.08 04/08/2025    CREATININE 1.40 (H) 04/01/2025    CREATININE 1.40 (H) 03/25/2025      Urine Protein/Creat Ratio Lab Results   Component Value Date    UTPCR 0.07 " 04/01/2025    UTPCR 0.08 03/25/2025    UTPCR 0.09 03/18/2025      Tacrolimus Level Lab Results   Component Value Date    TACROLIMUS 5.2 04/01/2025    TACROLIMUS 4.6 03/25/2025    TACROLIMUS 4.9 03/18/2025            Kalia June PA-C

## 2025-04-09 ENCOUNTER — TELEPHONE (OUTPATIENT)
Dept: HEMATOLOGY/ONCOLOGY | Facility: HOSPITAL | Age: 70
End: 2025-04-09
Payer: MEDICARE

## 2025-04-09 ENCOUNTER — PHARMACY VISIT (OUTPATIENT)
Dept: PHARMACY | Facility: CLINIC | Age: 70
End: 2025-04-09
Payer: COMMERCIAL

## 2025-04-09 LAB
CMV DNA SERPL NAA+PROBE-LOG IU: NORMAL {LOG_IU}/ML
EBV DNA BLD NAA+PROBE-LOG IU: ABNORMAL {LOG_IU}/ML
LABORATORY COMMENT REPORT: ABNORMAL
LABORATORY COMMENT REPORT: NOT DETECTED

## 2025-04-09 NOTE — TELEPHONE ENCOUNTER
Patient inquired during visit he would like to go to [facility] to ring end of treatment bell. Per charge nurse patient can come in any anytime, just notify the  that he wants to ring the bell.     Left message, encouraged patient to call office if any further needs.

## 2025-04-10 ENCOUNTER — SPECIALTY PHARMACY (OUTPATIENT)
Dept: PHARMACY | Facility: CLINIC | Age: 70
End: 2025-04-10

## 2025-04-10 LAB — ADENOVIRUS QPCR,PLASMA, VIRC: NOT DETECTED COPIES/ML

## 2025-04-15 ENCOUNTER — OFFICE VISIT (OUTPATIENT)
Dept: HEMATOLOGY/ONCOLOGY | Facility: HOSPITAL | Age: 70
End: 2025-04-15
Payer: MEDICARE

## 2025-04-15 ENCOUNTER — LAB (OUTPATIENT)
Dept: HEMATOLOGY/ONCOLOGY | Facility: HOSPITAL | Age: 70
End: 2025-04-15
Payer: MEDICARE

## 2025-04-15 ENCOUNTER — TELEPHONE (OUTPATIENT)
Dept: ADMISSION | Facility: HOSPITAL | Age: 70
End: 2025-04-15

## 2025-04-15 VITALS
DIASTOLIC BLOOD PRESSURE: 69 MMHG | HEART RATE: 58 BPM | BODY MASS INDEX: 28.61 KG/M2 | TEMPERATURE: 97 F | OXYGEN SATURATION: 100 % | RESPIRATION RATE: 16 BRPM | SYSTOLIC BLOOD PRESSURE: 118 MMHG | WEIGHT: 177.25 LBS

## 2025-04-15 DIAGNOSIS — C92.01 AML (ACUTE MYELOID LEUKEMIA) IN REMISSION (MULTI): ICD-10-CM

## 2025-04-15 DIAGNOSIS — Z94.84 HISTORY OF ALLOGENEIC STEM CELL TRANSPLANT (MULTI): ICD-10-CM

## 2025-04-15 DIAGNOSIS — D47.02 SYSTEMIC MASTOCYTOSIS WITH ASSOCIATED CLONAL HEMATOLOGICAL NON-MAST CELL LINEAGE DISEASE: ICD-10-CM

## 2025-04-15 DIAGNOSIS — D84.9 IMMUNOCOMPROMISED STATE: ICD-10-CM

## 2025-04-15 DIAGNOSIS — C92.01 ACUTE MYELOID LEUKEMIA IN REMISSION (MULTI): ICD-10-CM

## 2025-04-15 DIAGNOSIS — Z94.81 S/P ALLOGENEIC BONE MARROW TRANSPLANT (MULTI): ICD-10-CM

## 2025-04-15 DIAGNOSIS — C92.01 ACUTE MYELOID LEUKEMIA IN REMISSION (MULTI): Primary | ICD-10-CM

## 2025-04-15 DIAGNOSIS — Z94.84 HISTORY OF STEM CELL TRANSPLANT (MULTI): ICD-10-CM

## 2025-04-15 LAB
ALBUMIN SERPL BCP-MCNC: 4 G/DL (ref 3.4–5)
ALP SERPL-CCNC: 44 U/L (ref 33–136)
ALT SERPL W P-5'-P-CCNC: 41 U/L (ref 10–52)
ANION GAP SERPL CALC-SCNC: 11 MMOL/L (ref 10–20)
AST SERPL W P-5'-P-CCNC: 24 U/L (ref 9–39)
BASOPHILS # BLD AUTO: 0.01 X10*3/UL (ref 0–0.1)
BASOPHILS NFR BLD AUTO: 0.3 %
BILIRUB SERPL-MCNC: 0.6 MG/DL (ref 0–1.2)
BUN SERPL-MCNC: 23 MG/DL (ref 6–23)
CALCIUM SERPL-MCNC: 8.2 MG/DL (ref 8.6–10.3)
CHLORIDE SERPL-SCNC: 108 MMOL/L (ref 98–107)
CO2 SERPL-SCNC: 23 MMOL/L (ref 21–32)
CREAT SERPL-MCNC: 1.18 MG/DL (ref 0.5–1.3)
EGFRCR SERPLBLD CKD-EPI 2021: 67 ML/MIN/1.73M*2
EOSINOPHIL # BLD AUTO: 0.04 X10*3/UL (ref 0–0.7)
EOSINOPHIL NFR BLD AUTO: 1.1 %
ERYTHROCYTE [DISTWIDTH] IN BLOOD BY AUTOMATED COUNT: 17.5 % (ref 11.5–14.5)
GLUCOSE SERPL-MCNC: 90 MG/DL (ref 74–99)
HCT VFR BLD AUTO: 30.7 % (ref 41–52)
HGB BLD-MCNC: 10.8 G/DL (ref 13.5–17.5)
IMM GRANULOCYTES # BLD AUTO: 0.01 X10*3/UL (ref 0–0.7)
IMM GRANULOCYTES NFR BLD AUTO: 0.3 % (ref 0–0.9)
LABORATORY COMMENT REPORT: NORMAL
LYMPHOCYTES # BLD AUTO: 0.52 X10*3/UL (ref 1.2–4.8)
LYMPHOCYTES NFR BLD AUTO: 14.9 %
MAGNESIUM SERPL-MCNC: 2.12 MG/DL (ref 1.6–2.4)
MCH RBC QN AUTO: 38.2 PG (ref 26–34)
MCHC RBC AUTO-ENTMCNC: 35.2 G/DL (ref 32–36)
MCV RBC AUTO: 109 FL (ref 80–100)
MONOCYTES # BLD AUTO: 0.28 X10*3/UL (ref 0.1–1)
MONOCYTES NFR BLD AUTO: 8 %
NEUTROPHILS # BLD AUTO: 2.63 X10*3/UL (ref 1.2–7.7)
NEUTROPHILS NFR BLD AUTO: 75.4 %
NRBC BLD-RTO: 0 /100 WBCS (ref 0–0)
PATH REPORT.FINAL DX SPEC: NORMAL
PATH REPORT.GROSS SPEC: NORMAL
PATH REPORT.TOTAL CANCER: NORMAL
PLATELET # BLD AUTO: 69 X10*3/UL (ref 150–450)
POTASSIUM SERPL-SCNC: 4.2 MMOL/L (ref 3.5–5.3)
PROT SERPL-MCNC: 6 G/DL (ref 6.4–8.2)
RBC # BLD AUTO: 2.83 X10*6/UL (ref 4.5–5.9)
SODIUM SERPL-SCNC: 138 MMOL/L (ref 136–145)
TACROLIMUS BLD-MCNC: 4 NG/ML
WBC # BLD AUTO: 3.5 X10*3/UL (ref 4.4–11.3)

## 2025-04-15 PROCEDURE — 1126F AMNT PAIN NOTED NONE PRSNT: CPT | Performed by: STUDENT IN AN ORGANIZED HEALTH CARE EDUCATION/TRAINING PROGRAM

## 2025-04-15 PROCEDURE — 85025 COMPLETE CBC W/AUTO DIFF WBC: CPT

## 2025-04-15 PROCEDURE — 3078F DIAST BP <80 MM HG: CPT | Performed by: STUDENT IN AN ORGANIZED HEALTH CARE EDUCATION/TRAINING PROGRAM

## 2025-04-15 PROCEDURE — 99215 OFFICE O/P EST HI 40 MIN: CPT | Performed by: STUDENT IN AN ORGANIZED HEALTH CARE EDUCATION/TRAINING PROGRAM

## 2025-04-15 PROCEDURE — 36591 DRAW BLOOD OFF VENOUS DEVICE: CPT

## 2025-04-15 PROCEDURE — 3074F SYST BP LT 130 MM HG: CPT | Performed by: STUDENT IN AN ORGANIZED HEALTH CARE EDUCATION/TRAINING PROGRAM

## 2025-04-15 PROCEDURE — 87799 DETECT AGENT NOS DNA QUANT: CPT

## 2025-04-15 PROCEDURE — 80053 COMPREHEN METABOLIC PANEL: CPT

## 2025-04-15 PROCEDURE — 2500000004 HC RX 250 GENERAL PHARMACY W/ HCPCS (ALT 636 FOR OP/ED): Performed by: INTERNAL MEDICINE

## 2025-04-15 PROCEDURE — 83735 ASSAY OF MAGNESIUM: CPT

## 2025-04-15 PROCEDURE — 1159F MED LIST DOCD IN RCRD: CPT | Performed by: STUDENT IN AN ORGANIZED HEALTH CARE EDUCATION/TRAINING PROGRAM

## 2025-04-15 PROCEDURE — 80197 ASSAY OF TACROLIMUS: CPT

## 2025-04-15 RX ORDER — HEPARIN SODIUM,PORCINE/PF 10 UNIT/ML
50 SYRINGE (ML) INTRAVENOUS AS NEEDED
OUTPATIENT
Start: 2025-04-15

## 2025-04-15 RX ORDER — HEPARIN 100 UNIT/ML
500 SYRINGE INTRAVENOUS AS NEEDED
OUTPATIENT
Start: 2025-04-15

## 2025-04-15 RX ORDER — HEPARIN 100 UNIT/ML
500 SYRINGE INTRAVENOUS AS NEEDED
Status: DISCONTINUED | OUTPATIENT
Start: 2025-04-15 | End: 2025-04-15 | Stop reason: HOSPADM

## 2025-04-15 RX ADMIN — HEPARIN 500 UNITS: 100 SYRINGE at 08:36

## 2025-04-15 ASSESSMENT — PAIN SCALES - GENERAL: PAINLEVEL_OUTOF10: 0-NO PAIN

## 2025-04-15 NOTE — ASSESSMENT & PLAN NOTE
Avapritinib started around 2/14/25. D+100 bmbx without KIT mutation detected. Serum tryptase level improved-- now down to 14.1 from 71 pre-KIT inhibitor.  Continue current therapy. Still with some fatigue but not limiting ADLs.

## 2025-04-15 NOTE — PROGRESS NOTES
"    Patient ID: Jin Reynolds is a 70 y.o. male.  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   8/23/2023 Initial Diagnosis    DX:  SMOLDERING SYSTEMIC MASTOCYTOSIS  Presented with skin rash and eosinophilia    BRENDON DIAGNOSTIC CRITERIA  (For SM, Need major and 1 minor OR at least 3 minors)  - Multi-focal, dense infiltrates of MC (>= 15 mast cells in aggregates) in BM and/or other extra-cutaneous organs [major]  - In BM or extracutaneous organs, >25% MC spindle shaped OR have atypical morphology OR of all MC on BM aspirate smears, >25% are immature or atypical [minor]  - Detection of activating mutation KIT D816V in BM, PB, or other extracutaneous organ [minor]  - Serum tryptase persistently exceeds 20 ng/mL (unless associated clonal myeloid disorder, in which this paramenter is not valid) [minor]    \"B\" FINDINGS  - BM biopsy showing >30% infiltration (focal, dense aggregates) and/or serum tryptase > 200 ng/mL  - Signs of dysplasia or myeloproliferation, in non-MC lineages but insufficient criteria for definitive diagnosis of AHNMD with normal or slightly abnormal blood counts    \"C\" FINDINGS  None       8/23/2023 Biopsy    BONE MARROW (8/23/23)  Hypercellular (90-95%) with trilineage hematopoiesis, granulocytic hyperplasia, eosinophilia, and increased atypical mast cells  IP:  CD34+, +, CD7(bright)+, cCD3-, CD33-, CD15-, CD13+ blast population  IHC:  + increased spindle-shaped mast cells (15% cellularity), CD2-  Myeloid NGS:  CBL (31%), cKIT D816V (30%), RUNX1 x 2 (19%, 29%), SRSF2 (47%) [ASXL1 negative]  FISH:  PDGFRb negative    SERUM TRYPTASE  228 (8/23/23)  268 (9/5/23)     2/13/2024 -  Molecular Therapy    AVAPRITINIB   - Starting dose 25 mg daily (non-Adv SM)  - Not started due to improvement in symptoms.        7/16/2024 - 10/21/2024 Chemotherapy    Venetoclax / AzaCITIDine  - C1D1 " 7/17/24:  complicated by TLS, venetoclax held after D1  - Post C1 BM (8/28/24):  hypercellular with >50% blasts, background systemic mastocytosis  - C2D1 9/5/24:  venetoclax restarted with C2, no TLS  - Post C2 BM (9/25/24):  hypocellular (<5%), atypical myeloblasts by IP/IHC (1-2%), persistent systemic mastocytosis  - C3D1 10/15/24:  delayed 1 week for count recovery  - Post C3 BM (10/23/24):  hypercellular with systemic mastocytosis, no increase blasts, 0.2% atypical blasts by IP, NGS w/ CBL (73%), cKIT (4%), RUNX1 x 2 (4%, 38%), SRSF2 (40%)       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    CONDITIONING Fludarabine, melphalan, and TBI   DONOR Matched sibling   MATCH GRADE 12/12   SEX MATCH Mismatched   ABO DONOR O pos   ABO RECIPIENT O pos   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, Mycophenolate, and Tacrolimus   GRAFT SOURCE Peripheral blood          2/14/2025 -  Molecular Therapy    AVAPRITINIB   - Persistent mast cells noted on T+30 and T+100 Post Tx Bms. Elevated levels of serum tryptase.   - Initiated Avapritinib 50 mg daily on 2/14/2025.     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)   6/27/2024 Initial Diagnosis    ICC 2022 DX: Acute myeloid leukemia, NOS (>=20% blasts)  [id number] AML Risk Stratification: INTERMEDIATE: Cytogenetic and/or molecular abnormalities not classified as favorable or adverse  Presented with pancytopenia and circulating blasts    BONE MARROW (06/27/2024)  Marrow replaced by blasts, fibrotic foci, some atypical + cells; 7-8% circulating blasts; aspicular  - Unable to perform additional studies due to aspicular specimen    PERIPHERAL BLOOD (6/27/2024)  FISH:  positive for gain RUNX1    PERIPHERAL BLOOD (7/1/24)  IP:  abnl myeloblast population (CD34+, CD7+, CD4+, CD13+, CD38+, CD11+ dim, HLA=DR+, TdT+, CD33-)  Myeloid NGS: CBL C404Y (41%), KIT D816V (8%), RUNX1 x2 (8%,  30%), SRSF2 (37%)     7/16/2024 - 10/21/2024 Chemotherapy    Venetoclax / AzaCITIDine  - C1D1 7/17/24:  complicated by TLS, venetoclax held after D1  - Post C1 BM (8/28/24):  hypercellular with >50% blasts, background systemic mastocytosis  - C2D1 9/5/24:  venetoclax restarted with C2, no TLS  - Post C2 BM (9/25/24):  hypocellular (<5%), atypical myeloblasts by IP/IHC (1-2%), persistent systemic mastocytosis  - C3D1 10/15/24:  delayed 1 week for count recovery  - Post C3 BM (10/23/24):  hypercellular with systemic mastocytosis, no increase blasts, 0.2% atypical blasts by IP, NGS w/ CBL (73%), cKIT (4%), RUNX1 x 2 (4%, 38%), SRSF2 (40%)       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    CONDITIONING Fludarabine, melphalan, and TBI   DONOR Matched sibling   MATCH GRADE 12/12   SEX MATCH Mismatched   ABO DONOR O pos   ABO RECIPIENT O pos   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, Mycophenolate, and Tacrolimus   GRAFT SOURCE Peripheral blood          12/26/2024 -  Infection    Parainfluenza URI     12/26/2024 -  Disease Reevaluation      DATE DAY SOURCE MORPHOLOGY MRD WHOLE CHIMERISM   (% donor) CD3 CHIMERISM   (% donor) CD33 CHIMERISM   (% donor)   12/26/2024 D+30 PB        12/26/2024 D+30 BM No AML, persist BRENDON Neg %     1/20/2025  D+60 PB    96% 100%   3/25/2025 D+100 PB        3/3/2025 D+100 BM No AML, persist BRENDON (focal) Neg MFC 99%             04/15/25 T+ 145 Upper GI symptoms essentially resolved after starting prednisone on 4/8. Will taper to 20 mg daily starting tomorrow (4/16) and plan to continue for about 2 weeks before tapering again. Biopsy from EGD still pending, reaching out to pathology. Counts stable.     Assessment & Plan  History of stem cell transplant (Multi)  History of allogeneic stem cell transplant (Multi)  Day +145 following allogeneic stem cell transplant.   - Continue current immunosuppressive therapy; tacro 4 mg BID; level today pending. Plan to start tapering once completely  off prednisone and no evidence of GVHD.   - Last BM chimerism 99% donor.  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 D+100 bmbx without KIT mutation detected. Serum tryptase level improved-- now down to 14.1 from 71 pre-KIT inhibitor.  Continue current therapy. Still with some fatigue but not limiting ADLs.   Immunocompromised state  Remains at high risk for infections.  No current active signs or symptoms of infection.    Continues on prophylaxis with acyclovir and posaconazole.  TMP/SMX briefly held given hyperkalemia and renal insufficiency 1/10-1/17. Recent sCr has been elevated, but feel more related to GI issues at this time.   Switched posa to isavuconazonium d/t interaction with avapritinib.   Ongoing viral monitoring (as below).   Immune reconstitution: CD4 189; next due at T+180.   Post Tx Immunizations are planned for D+180. Will need scheduled.       SUBJECTIVE     Days since transplant: 145     Here today for planned follow up following allogeneic stem cell transplant.  Significant improvement almost immediately after starting prednisone last week. Gained 13 pounds! Appetite great, nausea pretty much resolved. No diarrhea or rashes. Still walking on the treadmill about 30 min every other day, has not pushed past that limit but would like to try. No new health issues.  Denies fevers, chills, diarrhea, dyspnea, rash, and pain.      OBJECTIVE     BSA: 1.93 meters squared  /69   Pulse 58   Temp 36.1 °C (97 °F)   Resp 16   Wt 80.4 kg (177 lb 4 oz)   SpO2 100%   BMI 28.61 kg/m²   Weight         4/15/2025  0845             Weight: 80.4 kg (177 lb 4 oz)               Physical Exam  Vitals reviewed.   Constitutional:       Appearance: Normal appearance.   Eyes:      Conjunctiva/sclera: Conjunctivae normal.      Pupils: Pupils are equal, round, and reactive to light.  "  Skin:     General: Skin is warm and dry.      Findings: No rash.   Psychiatric:         Mood and Affect: Mood normal.       Performance Status:  Karnofsky Score: 80 - Normal activity with effort; some signs or symptoms of disease      POST TRANSPLANT MONITORING   GVHD  Acute GVHD Overall Grade:    Chronic GVHD Total Score:      Viral Monitoring:     CMV   No results found for: \"CMVPCRIU\"   EBV   Lab Results   Component Value Date    EBVPCRQNTWB 653 (H) 02/11/2025      Adenovirus   Lab Results   Component Value Date    ADEPB Not Detected 04/08/2025    ADEPB Not Detected 04/01/2025    ADEPB Not Detected 03/25/2025       Toxo   No results found for: \"TGONDPCRBL\"       TMA Monitoring:     MARKERS RECENT 3 VALUES   LDH Lab Results   Component Value Date     04/01/2025     03/25/2025     03/18/2025      Haptoglobin Lab Results   Component Value Date    HAPTOGLOBIN 39 04/01/2025    HAPTOGLOBIN 39 03/25/2025    HAPTOGLOBIN 58 03/18/2025      BP BP Readings from Last 3 Encounters:   04/15/25 118/69   04/08/25 129/80   04/01/25 116/73      Serum Creatinine Lab Results   Component Value Date    CREATININE 1.08 04/08/2025    CREATININE 1.40 (H) 04/01/2025    CREATININE 1.40 (H) 03/25/2025      Urine Protein/Creat Ratio Lab Results   Component Value Date    UTPCR 0.07 04/01/2025    UTPCR 0.08 03/25/2025    UTPCR 0.09 03/18/2025      Tacrolimus Level Lab Results   Component Value Date    TACROLIMUS 5.2 04/01/2025    TACROLIMUS 4.6 03/25/2025    TACROLIMUS 4.9 03/18/2025            Kalia June PA-C          " 04/01/2025    UTPCR 0.08 03/25/2025    UTPCR 0.09 03/18/2025      Tacrolimus Level Lab Results   Component Value Date    TACROLIMUS 5.2 04/01/2025    TACROLIMUS 4.6 03/25/2025    TACROLIMUS 4.9 03/18/2025            Kalia June PA-C

## 2025-04-15 NOTE — ASSESSMENT & PLAN NOTE
Day +145 following allogeneic stem cell transplant.   - Continue current immunosuppressive therapy; tacro 4 mg BID; level today pending. Plan to start tapering once completely off prednisone and no evidence of GVHD.   - Last BM chimerism 99% donor.

## 2025-04-15 NOTE — TELEPHONE ENCOUNTER
[facility] called to advise that pt states he is receiving his [facility] from  [facility] and does not need to have it filled through [facility].   Please clarify and call [facility] if appropriate to remove from the fill list.   PH: [phone]

## 2025-04-16 ENCOUNTER — TELEPHONE (OUTPATIENT)
Dept: ADMISSION | Facility: HOSPITAL | Age: 70
End: 2025-04-16
Payer: MEDICARE

## 2025-04-16 NOTE — TELEPHONE ENCOUNTER
Reason for Conversation  [place]    Background   RN called patient, notified him Dr. Dawson and team is aware he will be arriving at 2pm. Jin appreciative of call back.

## 2025-04-22 ENCOUNTER — LAB (OUTPATIENT)
Dept: HEMATOLOGY/ONCOLOGY | Facility: HOSPITAL | Age: 70
End: 2025-04-22
Payer: MEDICARE

## 2025-04-22 ENCOUNTER — OFFICE VISIT (OUTPATIENT)
Dept: HEMATOLOGY/ONCOLOGY | Facility: HOSPITAL | Age: 70
End: 2025-04-22
Payer: MEDICARE

## 2025-04-22 VITALS
RESPIRATION RATE: 17 BRPM | HEART RATE: 62 BPM | DIASTOLIC BLOOD PRESSURE: 75 MMHG | TEMPERATURE: 97.7 F | OXYGEN SATURATION: 99 % | BODY MASS INDEX: 28.25 KG/M2 | WEIGHT: 175.04 LBS | SYSTOLIC BLOOD PRESSURE: 130 MMHG

## 2025-04-22 DIAGNOSIS — D84.9 IMMUNOCOMPROMISED STATE: ICD-10-CM

## 2025-04-22 DIAGNOSIS — C92.01 ACUTE MYELOID LEUKEMIA IN REMISSION (MULTI): ICD-10-CM

## 2025-04-22 DIAGNOSIS — C92.01 ACUTE MYELOID LEUKEMIA IN REMISSION (MULTI): Primary | ICD-10-CM

## 2025-04-22 DIAGNOSIS — Z94.81 S/P ALLOGENEIC BONE MARROW TRANSPLANT (MULTI): ICD-10-CM

## 2025-04-22 DIAGNOSIS — Z94.84 HISTORY OF STEM CELL TRANSPLANT (MULTI): ICD-10-CM

## 2025-04-22 DIAGNOSIS — Z94.84 HISTORY OF ALLOGENEIC STEM CELL TRANSPLANT (MULTI): ICD-10-CM

## 2025-04-22 DIAGNOSIS — D47.02 SYSTEMIC MASTOCYTOSIS WITH ASSOCIATED CLONAL HEMATOLOGICAL NON-MAST CELL LINEAGE DISEASE: ICD-10-CM

## 2025-04-22 PROBLEM — R11.0 NAUSEA: Status: RESOLVED | Noted: 2024-12-17 | Resolved: 2025-04-22

## 2025-04-22 LAB
ALBUMIN SERPL BCP-MCNC: 4.2 G/DL (ref 3.4–5)
ALP SERPL-CCNC: 49 U/L (ref 33–136)
ALT SERPL W P-5'-P-CCNC: 63 U/L (ref 10–52)
ANION GAP SERPL CALC-SCNC: 12 MMOL/L (ref 10–20)
AST SERPL W P-5'-P-CCNC: 35 U/L (ref 9–39)
BASOPHILS # BLD AUTO: 0.01 X10*3/UL (ref 0–0.1)
BASOPHILS NFR BLD AUTO: 0.3 %
BILIRUB SERPL-MCNC: 0.6 MG/DL (ref 0–1.2)
BUN SERPL-MCNC: 21 MG/DL (ref 6–23)
CALCIUM SERPL-MCNC: 8.4 MG/DL (ref 8.6–10.3)
CHLORIDE SERPL-SCNC: 108 MMOL/L (ref 98–107)
CO2 SERPL-SCNC: 24 MMOL/L (ref 21–32)
CREAT SERPL-MCNC: 1.14 MG/DL (ref 0.5–1.3)
EGFRCR SERPLBLD CKD-EPI 2021: 70 ML/MIN/1.73M*2
EOSINOPHIL # BLD AUTO: 0.04 X10*3/UL (ref 0–0.7)
EOSINOPHIL NFR BLD AUTO: 1.1 %
ERYTHROCYTE [DISTWIDTH] IN BLOOD BY AUTOMATED COUNT: 17 % (ref 11.5–14.5)
GLUCOSE SERPL-MCNC: 95 MG/DL (ref 74–99)
HCT VFR BLD AUTO: 31.6 % (ref 41–52)
HGB BLD-MCNC: 10.9 G/DL (ref 13.5–17.5)
IGG SERPL-MCNC: 803 MG/DL (ref 700–1600)
IMM GRANULOCYTES # BLD AUTO: 0.01 X10*3/UL (ref 0–0.7)
IMM GRANULOCYTES NFR BLD AUTO: 0.3 % (ref 0–0.9)
LYMPHOCYTES # BLD AUTO: 0.39 X10*3/UL (ref 1.2–4.8)
LYMPHOCYTES NFR BLD AUTO: 10.9 %
MAGNESIUM SERPL-MCNC: 1.82 MG/DL (ref 1.6–2.4)
MCH RBC QN AUTO: 38.2 PG (ref 26–34)
MCHC RBC AUTO-ENTMCNC: 34.5 G/DL (ref 32–36)
MCV RBC AUTO: 111 FL (ref 80–100)
MONOCYTES # BLD AUTO: 0.23 X10*3/UL (ref 0.1–1)
MONOCYTES NFR BLD AUTO: 6.4 %
NEUTROPHILS # BLD AUTO: 2.89 X10*3/UL (ref 1.2–7.7)
NEUTROPHILS NFR BLD AUTO: 81 %
NRBC BLD-RTO: 0 /100 WBCS (ref 0–0)
PLATELET # BLD AUTO: 60 X10*3/UL (ref 150–450)
POTASSIUM SERPL-SCNC: 4 MMOL/L (ref 3.5–5.3)
PROT SERPL-MCNC: 6.1 G/DL (ref 6.4–8.2)
RBC # BLD AUTO: 2.85 X10*6/UL (ref 4.5–5.9)
SODIUM SERPL-SCNC: 140 MMOL/L (ref 136–145)
TACROLIMUS BLD-MCNC: 2.7 NG/ML
WBC # BLD AUTO: 3.6 X10*3/UL (ref 4.4–11.3)

## 2025-04-22 PROCEDURE — 1036F TOBACCO NON-USER: CPT | Performed by: STUDENT IN AN ORGANIZED HEALTH CARE EDUCATION/TRAINING PROGRAM

## 2025-04-22 PROCEDURE — 80197 ASSAY OF TACROLIMUS: CPT

## 2025-04-22 PROCEDURE — 1126F AMNT PAIN NOTED NONE PRSNT: CPT | Performed by: STUDENT IN AN ORGANIZED HEALTH CARE EDUCATION/TRAINING PROGRAM

## 2025-04-22 PROCEDURE — 99215 OFFICE O/P EST HI 40 MIN: CPT | Performed by: STUDENT IN AN ORGANIZED HEALTH CARE EDUCATION/TRAINING PROGRAM

## 2025-04-22 PROCEDURE — 83735 ASSAY OF MAGNESIUM: CPT

## 2025-04-22 PROCEDURE — 87799 DETECT AGENT NOS DNA QUANT: CPT

## 2025-04-22 PROCEDURE — 85025 COMPLETE CBC W/AUTO DIFF WBC: CPT

## 2025-04-22 PROCEDURE — 80053 COMPREHEN METABOLIC PANEL: CPT

## 2025-04-22 PROCEDURE — 36591 DRAW BLOOD OFF VENOUS DEVICE: CPT

## 2025-04-22 PROCEDURE — 1159F MED LIST DOCD IN RCRD: CPT | Performed by: STUDENT IN AN ORGANIZED HEALTH CARE EDUCATION/TRAINING PROGRAM

## 2025-04-22 PROCEDURE — 3075F SYST BP GE 130 - 139MM HG: CPT | Performed by: STUDENT IN AN ORGANIZED HEALTH CARE EDUCATION/TRAINING PROGRAM

## 2025-04-22 PROCEDURE — 82784 ASSAY IGA/IGD/IGG/IGM EACH: CPT

## 2025-04-22 PROCEDURE — 2500000004 HC RX 250 GENERAL PHARMACY W/ HCPCS (ALT 636 FOR OP/ED): Mod: JZ | Performed by: INTERNAL MEDICINE

## 2025-04-22 PROCEDURE — 3078F DIAST BP <80 MM HG: CPT | Performed by: STUDENT IN AN ORGANIZED HEALTH CARE EDUCATION/TRAINING PROGRAM

## 2025-04-22 RX ORDER — HEPARIN 100 UNIT/ML
500 SYRINGE INTRAVENOUS AS NEEDED
Status: DISCONTINUED | OUTPATIENT
Start: 2025-04-22 | End: 2025-04-22 | Stop reason: HOSPADM

## 2025-04-22 RX ORDER — ALBUTEROL SULFATE 0.83 MG/ML
3 SOLUTION RESPIRATORY (INHALATION) AS NEEDED
OUTPATIENT
Start: 2025-05-20

## 2025-04-22 RX ORDER — FAMOTIDINE 10 MG/ML
20 INJECTION, SOLUTION INTRAVENOUS ONCE AS NEEDED
OUTPATIENT
Start: 2025-05-20

## 2025-04-22 RX ORDER — EPINEPHRINE 0.3 MG/.3ML
0.3 INJECTION SUBCUTANEOUS EVERY 5 MIN PRN
OUTPATIENT
Start: 2025-05-20

## 2025-04-22 RX ORDER — HEPARIN SODIUM,PORCINE/PF 10 UNIT/ML
50 SYRINGE (ML) INTRAVENOUS AS NEEDED
OUTPATIENT
Start: 2025-04-22

## 2025-04-22 RX ORDER — HEPARIN 100 UNIT/ML
500 SYRINGE INTRAVENOUS AS NEEDED
OUTPATIENT
Start: 2025-04-22

## 2025-04-22 RX ORDER — DIPHENHYDRAMINE HYDROCHLORIDE 50 MG/ML
50 INJECTION, SOLUTION INTRAMUSCULAR; INTRAVENOUS AS NEEDED
OUTPATIENT
Start: 2025-05-20

## 2025-04-22 RX ADMIN — HEPARIN 500 UNITS: 100 SYRINGE at 10:44

## 2025-04-22 ASSESSMENT — PAIN SCALES - GENERAL: PAINLEVEL_OUTOF10: 0-NO PAIN

## 2025-04-22 NOTE — ASSESSMENT & PLAN NOTE
Day +152 following allogeneic stem cell transplant.   - Continue current immunosuppressive therapy; tacro 4 mg BID; level today pending. Plan to start tapering once completely off prednisone and no evidence of GVHD.   - Last BM chimerism 99% donor.  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 Continue 20 mg daily x 2 weeks. EGD done 3/28 and biopsies showing no morphological evidence of GVHD, some foveolar metaplasia and reactive gastropathy.

## 2025-04-22 NOTE — PROGRESS NOTES
"    Patient ID: Jin Reynolds is a 70 y.o. male.  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   8/23/2023 Initial Diagnosis    DX:  SMOLDERING SYSTEMIC MASTOCYTOSIS  Presented with skin rash and eosinophilia    BRENDON DIAGNOSTIC CRITERIA  (For SM, Need major and 1 minor OR at least 3 minors)  - Multi-focal, dense infiltrates of MC (>= 15 mast cells in aggregates) in BM and/or other extra-cutaneous organs [major]  - In BM or extracutaneous organs, >25% MC spindle shaped OR have atypical morphology OR of all MC on BM aspirate smears, >25% are immature or atypical [minor]  - Detection of activating mutation KIT D816V in BM, PB, or other extracutaneous organ [minor]  - Serum tryptase persistently exceeds 20 ng/mL (unless associated clonal myeloid disorder, in which this paramenter is not valid) [minor]    \"B\" FINDINGS  - BM biopsy showing >30% infiltration (focal, dense aggregates) and/or serum tryptase > 200 ng/mL  - Signs of dysplasia or myeloproliferation, in non-MC lineages but insufficient criteria for definitive diagnosis of AHNMD with normal or slightly abnormal blood counts    \"C\" FINDINGS  None       8/23/2023 Biopsy    BONE MARROW (8/23/23)  Hypercellular (90-95%) with trilineage hematopoiesis, granulocytic hyperplasia, eosinophilia, and increased atypical mast cells  IP:  CD34+, +, CD7(bright)+, cCD3-, CD33-, CD15-, CD13+ blast population  IHC:  + increased spindle-shaped mast cells (15% cellularity), CD2-  Myeloid NGS:  CBL (31%), cKIT D816V (30%), RUNX1 x 2 (19%, 29%), SRSF2 (47%) [ASXL1 negative]  FISH:  PDGFRb negative    SERUM TRYPTASE  228 (8/23/23)  268 (9/5/23)     2/13/2024 -  Molecular Therapy    AVAPRITINIB   - Starting dose 25 mg daily (non-Adv SM)  - Not started due to improvement in symptoms.        7/16/2024 - 10/21/2024 Chemotherapy    Venetoclax / AzaCITIDine  - C1D1 " 7/17/24:  complicated by TLS, venetoclax held after D1  - Post C1 BM (8/28/24):  hypercellular with >50% blasts, background systemic mastocytosis  - C2D1 9/5/24:  venetoclax restarted with C2, no TLS  - Post C2 BM (9/25/24):  hypocellular (<5%), atypical myeloblasts by IP/IHC (1-2%), persistent systemic mastocytosis  - C3D1 10/15/24:  delayed 1 week for count recovery  - Post C3 BM (10/23/24):  hypercellular with systemic mastocytosis, no increase blasts, 0.2% atypical blasts by IP, NGS w/ CBL (73%), cKIT (4%), RUNX1 x 2 (4%, 38%), SRSF2 (40%)       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    CONDITIONING Fludarabine, melphalan, and TBI   DONOR Matched sibling   MATCH GRADE 12/12   SEX MATCH Mismatched   ABO DONOR O pos   ABO RECIPIENT O pos   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, Mycophenolate, and Tacrolimus   GRAFT SOURCE Peripheral blood          2/14/2025 -  Molecular Therapy    AVAPRITINIB   - Persistent mast cells noted on T+30 and T+100 Post Tx Bms. Elevated levels of serum tryptase.   - Initiated Avapritinib 50 mg daily on 2/14/2025.     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)   6/27/2024 Initial Diagnosis    ICC 2022 DX: Acute myeloid leukemia, NOS (>=20% blasts)  [id number] AML Risk Stratification: INTERMEDIATE: Cytogenetic and/or molecular abnormalities not classified as favorable or adverse  Presented with pancytopenia and circulating blasts    BONE MARROW (06/27/2024)  Marrow replaced by blasts, fibrotic foci, some atypical + cells; 7-8% circulating blasts; aspicular  - Unable to perform additional studies due to aspicular specimen    PERIPHERAL BLOOD (6/27/2024)  FISH:  positive for gain RUNX1    PERIPHERAL BLOOD (7/1/24)  IP:  abnl myeloblast population (CD34+, CD7+, CD4+, CD13+, CD38+, CD11+ dim, HLA=DR+, TdT+, CD33-)  Myeloid NGS: CBL C404Y (41%), KIT D816V (8%), RUNX1 x2 (8%,  30%), SRSF2 (37%)     7/16/2024 - 10/21/2024 Chemotherapy    Venetoclax / AzaCITIDine  - C1D1 7/17/24:  complicated by TLS, venetoclax held after D1  - Post C1 BM (8/28/24):  hypercellular with >50% blasts, background systemic mastocytosis  - C2D1 9/5/24:  venetoclax restarted with C2, no TLS  - Post C2 BM (9/25/24):  hypocellular (<5%), atypical myeloblasts by IP/IHC (1-2%), persistent systemic mastocytosis  - C3D1 10/15/24:  delayed 1 week for count recovery  - Post C3 BM (10/23/24):  hypercellular with systemic mastocytosis, no increase blasts, 0.2% atypical blasts by IP, NGS w/ CBL (73%), cKIT (4%), RUNX1 x 2 (4%, 38%), SRSF2 (40%)       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    CONDITIONING Fludarabine, melphalan, and TBI   DONOR Matched sibling   MATCH GRADE 12/12   SEX MATCH Mismatched   ABO DONOR O pos   ABO RECIPIENT O pos   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, Mycophenolate, and Tacrolimus   GRAFT SOURCE Peripheral blood          12/26/2024 -  Infection    Parainfluenza URI     12/26/2024 -  Disease Reevaluation      DATE DAY SOURCE MORPHOLOGY MRD WHOLE CHIMERISM   (% donor) CD3 CHIMERISM   (% donor) CD33 CHIMERISM   (% donor)   12/26/2024 D+30 PB        12/26/2024 D+30 BM No AML, persist BRENDON Neg %     1/20/2025  D+60 PB    96% 100%   3/25/2025 D+100 PB        3/3/2025 D+100 BM No AML, persist BRENDON (focal) Neg MFC 99%             04/22/25 T+ 152 Upper GI symptoms essentially resolved after starting prednisone on 4/8. Continue 20 mg daily for additional 2 weeks then taper to 10 mg daily.     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  PLT still low, eltrombopag initiated 2/18/2025, cont at current dose.  Current evidence of disease: No  Maintenance Therapy: No  MRD Monitoring Plan: AML MFC; Day 100 BMBx 3/3/25 no evidence of leukemia; there is residual focal mast cell  atypia. 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   - Continue current immunosuppressive therapy; tacro 4 mg BID; level today pending. Plan to start tapering once completely off prednisone and no evidence of GVHD.   - Last BM chimerism 99% donor.  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 Continue 20 mg daily x 2 weeks. EGD done 3/28 and biopsies showing no morphological evidence of GVHD, some foveolar metaplasia and reactive gastropathy.   Immunocompromised state  Remains at high risk for infections.  No current active signs or symptoms of infection.    Continues on prophylaxis with acyclovir and posaconazole.  TMP/SMX briefly held given hyperkalemia and renal insufficiency 1/10-1/17. Recent sCr has been elevated, but feel more related to GI issues at this time.   Switched posa to isavuconazonium d/t interaction with avapritinib.   Ongoing viral monitoring (as below).   Immune reconstitution: CD4 189; next due at T+180.   Post Tx Immunizations are planned for D+180. Requested.       SUBJECTIVE     Days since transplant: 152     Here today for planned follow up following allogeneic stem cell transplant.  Significant improvement almost immediately after starting prednisone last week. Gained 13 pounds! Appetite great, nausea pretty much resolved. No diarrhea or rashes. Still walking on the treadmill about 30 min every other day, has not pushed past that limit but would like to try. No new health issues.  Denies fevers, chills, diarrhea, dyspnea, rash, and pain.      OBJECTIVE     BSA: 1.92 meters squared  /75   Pulse 62   Temp 36.5 °C (97.7 °F)   Resp 17   Wt 79.4 kg (175 lb 0.7 oz)   SpO2 99%   BMI 28.25 kg/m²   Weight         4/22/2025  1102             Weight: 79.4 kg (175 lb 0.7 oz)               Physical Exam  Vitals reviewed.   Constitutional:       Appearance:  Normal appearance.   Eyes:      Conjunctiva/sclera: Conjunctivae normal.      Pupils: Pupils are equal, round, and reactive to light.   Skin:     General: Skin is warm and dry.      Findings: No rash.   Psychiatric:         Mood and Affect: Mood normal.         Performance Status:  Karnofsky Score: 80 - Normal activity with effort; some signs or symptoms of disease      POST TRANSPLANT MONITORING   GVHD  Acute GVHD Overall Grade:    Chronic GVHD Total Score:      Viral Monitoring:   VIRAL MONITORING    CMV    Lab Results   Component Value Date    CMVDNAPCR Not Detected 04/15/2025    CMVDNAPCR Not Detected 04/08/2025    CMVDNAPCR Not Detected 04/01/2025    CMVDNAPCR Not Detected 03/25/2025    CMVDNAPCR Not Detected 03/18/2025     Lab Results   Component Value Date    CMVPCRL  04/15/2025      Comment:      Not calculated    CMVPCRL  04/08/2025      Comment:      Not calculated    CMVPCRL  04/01/2025      Comment:      Not calculated    CMVPCRL  03/25/2025      Comment:      Not calculated    CMVPCRL  03/18/2025      Comment:      Not calculated      EBV    Lab Results   Component Value Date    EBVWBPCR Not Detected 04/15/2025    EBVWBPCR <500 Detected (A) 04/08/2025    EBVWBPCR <500 Detected (A) 04/01/2025    EBVWBPCR Not Detected 03/25/2025    EBVWBPCR Not Detected 03/18/2025    Lab Results   Component Value Date    EBVPCRQNTWB 653 (H) 02/11/2025      ADENO    Lab Results   Component Value Date    ADEPB Not Detected 04/15/2025    ADEPB Not Detected 04/08/2025    ADEPB Not Detected 04/01/2025    ADEPB Not Detected 03/25/2025    ADEPB Not Detected 03/18/2025           TMA Monitoring:     MARKERS RECENT 3 VALUES   LDH Lab Results   Component Value Date     04/01/2025     03/25/2025     03/18/2025      Haptoglobin Lab Results   Component Value Date    HAPTOGLOBIN 39 04/01/2025    HAPTOGLOBIN 39 03/25/2025    HAPTOGLOBIN 58 03/18/2025      BP BP Readings from Last 3 Encounters:   04/22/25 130/75    04/15/25 118/69   04/08/25 129/80      Serum Creatinine Lab Results   Component Value Date    CREATININE 1.14 04/22/2025    CREATININE 1.18 04/15/2025    CREATININE 1.08 04/08/2025      Urine Protein/Creat Ratio Lab Results   Component Value Date    UTPCR 0.07 04/01/2025    UTPCR 0.08 03/25/2025    UTPCR 0.09 03/18/2025      Tacrolimus Level Lab Results   Component Value Date    TACROLIMUS 4.0 04/15/2025    TACROLIMUS 5.2 04/01/2025    TACROLIMUS 4.6 03/25/2025            Kalia June PA-C

## 2025-04-22 NOTE — ASSESSMENT & PLAN NOTE
Remains at high risk for infections.  No current active signs or symptoms of infection.    Continues on prophylaxis with acyclovir and posaconazole.  TMP/SMX briefly held given hyperkalemia and renal insufficiency 1/10-1/17. Recent sCr has been elevated, but feel more related to GI issues at this time.   Switched posa to isavuconazonium d/t interaction with avapritinib.   Ongoing viral monitoring (as below).   Immune reconstitution: CD4 189; next due at T+180.   Post Tx Immunizations are planned for D+180. Requested.

## 2025-04-22 NOTE — ASSESSMENT & PLAN NOTE
Counts remain low but plts slowly starting rise on eltrombopag, D100 bmbx shows no AML but focal BRENDON.  PLT still low, eltrombopag initiated 2/18/2025, cont at current dose.  Current evidence of disease: No  Maintenance Therapy: No  MRD Monitoring Plan: AML MFC; Day 100 BMBx 3/3/25 no evidence of leukemia; there is residual focal mast cell atypia. T+100 PB Chimerisms 98% (CD3) and 100% (CD33)

## 2025-04-25 LAB — ADENOVIRUS QPCR,PLASMA, VIRC: NOT DETECTED COPIES/ML

## 2025-04-29 LAB
BAND RESOLUTION: 400 BANDS
CHROM ANALY OVERALL INTERP-IMP: NORMAL
CHROMOSOME ANALYSIS CELLS ANALYZED: 20 CELLS
CHROMOSOME ANALYSIS CELLS IMAGED: 5 CELLS
CHROMOSOME ANALYSIS HYPERMODAL CELL COUNT: 0 CELLS
CHROMOSOME ANALYSIS HYPOMODAL CELL COUNT: 0 CELLS
CHROMOSOME ANALYSIS MODAL CHROMOSOME NO: 46 CHROMOSOMES
CHROMOSOME ANALYSIS STAINING METHOD: NORMAL
ELECTRONICALLY SIGNED BY CYTOGENETICS: NORMAL
KARYOTYP MAR: 2 CELLS
TOTAL CELLS COUNTED MAR: 20 CELLS

## 2025-05-01 ENCOUNTER — SPECIALTY PHARMACY (OUTPATIENT)
Dept: PHARMACY | Facility: CLINIC | Age: 70
End: 2025-05-01

## 2025-05-01 PROCEDURE — RXMED WILLOW AMBULATORY MEDICATION CHARGE

## 2025-05-02 ENCOUNTER — LAB (OUTPATIENT)
Dept: HEMATOLOGY/ONCOLOGY | Facility: CLINIC | Age: 70
End: 2025-05-02
Payer: MEDICARE

## 2025-05-02 VITALS
SYSTOLIC BLOOD PRESSURE: 118 MMHG | OXYGEN SATURATION: 98 % | TEMPERATURE: 98.8 F | DIASTOLIC BLOOD PRESSURE: 80 MMHG | HEART RATE: 68 BPM | RESPIRATION RATE: 18 BRPM

## 2025-05-02 DIAGNOSIS — Z94.81 S/P ALLOGENEIC BONE MARROW TRANSPLANT (MULTI): ICD-10-CM

## 2025-05-02 DIAGNOSIS — C92.01 ACUTE MYELOID LEUKEMIA IN REMISSION (MULTI): ICD-10-CM

## 2025-05-02 DIAGNOSIS — Z94.84 HISTORY OF ALLOGENEIC STEM CELL TRANSPLANT (MULTI): ICD-10-CM

## 2025-05-02 DIAGNOSIS — D47.02 SYSTEMIC MASTOCYTOSIS WITH ASSOCIATED CLONAL HEMATOLOGICAL NON-MAST CELL LINEAGE DISEASE: ICD-10-CM

## 2025-05-02 LAB
ALBUMIN SERPL BCP-MCNC: 4.2 G/DL (ref 3.4–5)
ALP SERPL-CCNC: 48 U/L (ref 33–136)
ALT SERPL W P-5'-P-CCNC: 33 U/L (ref 10–52)
ANION GAP SERPL CALC-SCNC: 12 MMOL/L (ref 10–20)
AST SERPL W P-5'-P-CCNC: 25 U/L (ref 9–39)
BASOPHILS # BLD AUTO: 0.01 X10*3/UL (ref 0–0.1)
BASOPHILS NFR BLD AUTO: 0.3 %
BILIRUB SERPL-MCNC: 0.7 MG/DL (ref 0–1.2)
BUN SERPL-MCNC: 33 MG/DL (ref 6–23)
CALCIUM SERPL-MCNC: 8.2 MG/DL (ref 8.6–10.6)
CHLORIDE SERPL-SCNC: 107 MMOL/L (ref 98–107)
CO2 SERPL-SCNC: 22 MMOL/L (ref 21–32)
CREAT SERPL-MCNC: 1.58 MG/DL (ref 0.5–1.3)
EGFRCR SERPLBLD CKD-EPI 2021: 47 ML/MIN/1.73M*2
EOSINOPHIL # BLD AUTO: 0.03 X10*3/UL (ref 0–0.7)
EOSINOPHIL NFR BLD AUTO: 0.8 %
ERYTHROCYTE [DISTWIDTH] IN BLOOD BY AUTOMATED COUNT: 15.8 % (ref 11.5–14.5)
GLUCOSE SERPL-MCNC: 89 MG/DL (ref 74–99)
HCT VFR BLD AUTO: 30.9 % (ref 41–52)
HGB BLD-MCNC: 10.9 G/DL (ref 13.5–17.5)
IGG SERPL-MCNC: 765 MG/DL (ref 700–1600)
IMM GRANULOCYTES # BLD AUTO: 0 X10*3/UL (ref 0–0.7)
IMM GRANULOCYTES NFR BLD AUTO: 0 % (ref 0–0.9)
LYMPHOCYTES # BLD AUTO: 0.53 X10*3/UL (ref 1.2–4.8)
LYMPHOCYTES NFR BLD AUTO: 14.7 %
MAGNESIUM SERPL-MCNC: 2.07 MG/DL (ref 1.6–2.4)
MCH RBC QN AUTO: 38.9 PG (ref 26–34)
MCHC RBC AUTO-ENTMCNC: 35.3 G/DL (ref 32–36)
MCV RBC AUTO: 110 FL (ref 80–100)
MONOCYTES # BLD AUTO: 0.28 X10*3/UL (ref 0.1–1)
MONOCYTES NFR BLD AUTO: 7.8 %
NEUTROPHILS # BLD AUTO: 2.76 X10*3/UL (ref 1.2–7.7)
NEUTROPHILS NFR BLD AUTO: 76.4 %
NRBC BLD-RTO: ABNORMAL /100{WBCS}
PLATELET # BLD AUTO: 59 X10*3/UL (ref 150–450)
POTASSIUM SERPL-SCNC: 4.3 MMOL/L (ref 3.5–5.3)
PROT SERPL-MCNC: 6.1 G/DL (ref 6.4–8.2)
RBC # BLD AUTO: 2.8 X10*6/UL (ref 4.5–5.9)
SODIUM SERPL-SCNC: 137 MMOL/L (ref 136–145)
TACROLIMUS BLD-MCNC: 8.1 NG/ML
WBC # BLD AUTO: 3.6 X10*3/UL (ref 4.4–11.3)

## 2025-05-02 PROCEDURE — 80053 COMPREHEN METABOLIC PANEL: CPT

## 2025-05-02 PROCEDURE — 83735 ASSAY OF MAGNESIUM: CPT

## 2025-05-02 PROCEDURE — 82784 ASSAY IGA/IGD/IGG/IGM EACH: CPT

## 2025-05-02 PROCEDURE — 36591 DRAW BLOOD OFF VENOUS DEVICE: CPT

## 2025-05-02 PROCEDURE — 85025 COMPLETE CBC W/AUTO DIFF WBC: CPT

## 2025-05-02 PROCEDURE — 36415 COLL VENOUS BLD VENIPUNCTURE: CPT

## 2025-05-02 PROCEDURE — 80197 ASSAY OF TACROLIMUS: CPT

## 2025-05-02 PROCEDURE — 87799 DETECT AGENT NOS DNA QUANT: CPT

## 2025-05-02 PROCEDURE — 2500000004 HC RX 250 GENERAL PHARMACY W/ HCPCS (ALT 636 FOR OP/ED): Mod: JZ | Performed by: INTERNAL MEDICINE

## 2025-05-02 RX ORDER — HEPARIN 100 UNIT/ML
500 SYRINGE INTRAVENOUS AS NEEDED
OUTPATIENT
Start: 2025-05-02

## 2025-05-02 RX ORDER — HEPARIN 100 UNIT/ML
500 SYRINGE INTRAVENOUS AS NEEDED
Status: DISCONTINUED | OUTPATIENT
Start: 2025-05-02 | End: 2025-05-02 | Stop reason: HOSPADM

## 2025-05-02 RX ORDER — HEPARIN SODIUM,PORCINE/PF 10 UNIT/ML
50 SYRINGE (ML) INTRAVENOUS AS NEEDED
Status: DISCONTINUED | OUTPATIENT
Start: 2025-05-02 | End: 2025-05-02 | Stop reason: HOSPADM

## 2025-05-02 RX ORDER — HEPARIN SODIUM,PORCINE/PF 10 UNIT/ML
50 SYRINGE (ML) INTRAVENOUS AS NEEDED
OUTPATIENT
Start: 2025-05-02

## 2025-05-02 RX ADMIN — HEPARIN 500 UNITS: 100 SYRINGE at 07:30

## 2025-05-02 ASSESSMENT — PAIN SCALES - GENERAL: PAINLEVEL_OUTOF10: 0-NO PAIN

## 2025-05-04 LAB
CMV DNA SERPL NAA+PROBE-LOG IU: NORMAL {LOG_IU}/ML
LABORATORY COMMENT REPORT: NOT DETECTED

## 2025-05-05 LAB
EBV DNA BLD NAA+PROBE-LOG IU: ABNORMAL {LOG_IU}/ML
LABORATORY COMMENT REPORT: ABNORMAL

## 2025-05-06 ENCOUNTER — SPECIALTY PHARMACY (OUTPATIENT)
Dept: HEMATOLOGY/ONCOLOGY | Facility: HOSPITAL | Age: 70
End: 2025-05-06

## 2025-05-06 ENCOUNTER — OFFICE VISIT (OUTPATIENT)
Dept: HEMATOLOGY/ONCOLOGY | Facility: HOSPITAL | Age: 70
End: 2025-05-06
Payer: MEDICARE

## 2025-05-06 VITALS
SYSTOLIC BLOOD PRESSURE: 125 MMHG | DIASTOLIC BLOOD PRESSURE: 77 MMHG | OXYGEN SATURATION: 100 % | BODY MASS INDEX: 28.29 KG/M2 | TEMPERATURE: 97.5 F | HEART RATE: 61 BPM | RESPIRATION RATE: 16 BRPM | WEIGHT: 175.27 LBS

## 2025-05-06 DIAGNOSIS — D47.02 SYSTEMIC MASTOCYTOSIS WITH ASSOCIATED CLONAL HEMATOLOGICAL NON-MAST CELL LINEAGE DISEASE: ICD-10-CM

## 2025-05-06 DIAGNOSIS — D89.813 GVHD (GRAFT VERSUS HOST DISEASE) (MULTI): ICD-10-CM

## 2025-05-06 DIAGNOSIS — Z94.84 HISTORY OF STEM CELL TRANSPLANT (MULTI): ICD-10-CM

## 2025-05-06 DIAGNOSIS — Z94.84 HISTORY OF ALLOGENEIC STEM CELL TRANSPLANT (MULTI): ICD-10-CM

## 2025-05-06 DIAGNOSIS — D84.9 IMMUNOCOMPROMISED STATE: ICD-10-CM

## 2025-05-06 DIAGNOSIS — E55.9 VITAMIN D DEFICIENCY: Primary | ICD-10-CM

## 2025-05-06 DIAGNOSIS — C92.01 ACUTE MYELOID LEUKEMIA IN REMISSION (MULTI): ICD-10-CM

## 2025-05-06 LAB — ADENOVIRUS QPCR,PLASMA, VIRC: NOT DETECTED COPIES/ML

## 2025-05-06 PROCEDURE — 3078F DIAST BP <80 MM HG: CPT | Performed by: INTERNAL MEDICINE

## 2025-05-06 PROCEDURE — 99215 OFFICE O/P EST HI 40 MIN: CPT | Performed by: INTERNAL MEDICINE

## 2025-05-06 PROCEDURE — G2211 COMPLEX E/M VISIT ADD ON: HCPCS | Performed by: INTERNAL MEDICINE

## 2025-05-06 PROCEDURE — 1159F MED LIST DOCD IN RCRD: CPT | Performed by: INTERNAL MEDICINE

## 2025-05-06 PROCEDURE — 3074F SYST BP LT 130 MM HG: CPT | Performed by: INTERNAL MEDICINE

## 2025-05-06 PROCEDURE — 1126F AMNT PAIN NOTED NONE PRSNT: CPT | Performed by: INTERNAL MEDICINE

## 2025-05-06 RX ORDER — PREDNISONE 10 MG/1
20 TABLET ORAL DAILY
Qty: 60 TABLET | Refills: 0 | Status: SHIPPED | OUTPATIENT
Start: 2025-05-06 | End: 2025-06-05

## 2025-05-06 ASSESSMENT — PAIN SCALES - GENERAL: PAINLEVEL_OUTOF10: 0-NO PAIN

## 2025-05-06 NOTE — ASSESSMENT & PLAN NOTE
On avapritinib 50 mg daily for mastocytosis. Tryptase level has decreased from 70 to 14 after a month on avapritinib, indicating improvement in symptoms. Tryptase levels will be monitored biannually.

## 2025-05-06 NOTE — PROGRESS NOTES
[facility] Clinical Note  Patient Reassessment     Introduction  Jin Reynolds is a 70 y.o. male who is on the specialty pharmacy service for management of: Oncology Core.      [facility] supplied medication: avapritinib    Duration of therapy: Maintenance    The most recent encounter visit with the referring prescriber flavia on today was reviewed.  Pharmacy will continue to collaborate in the care of this patient with the referring prescriber.    Discussion  Jni was contacted on 5/6/2025 at 10:55 AM for a pharmacy visit with encounter number [id number] from:   [facility]  [id number]  formerly [facility] [id number]  Dept: [phone]  Loc: [phone]  Jin consented to a/an In person visit, which was performed.    Efficacy  Patient has developed new symptoms of condition: No  Patient/caregiver feels medication is affecting the disease state: y    Goals  Provided education on goals and possible outcomes of therapy:  Adherence with therapy  Timely completion of appropriate labs  Timely and appropriate follow up with provider  Identify and address medication interactions with presciption medications, OTC medications and supplements  Optimize or maintain quality of life  Patient has documented target(s) for goals of therapy: No    Tolerance  Patient has experienced side effects from this medication: No  Changes to current therapy regimen: No    The follow-up timeline was discussed. Every person responds to and reacts to therapy differently. Patient should be assessed for efficacy and tolerability in approximately: 8-12 weeks    Adherence  Patient Information  Informant: Self (Patient)  Demonstrates Understanding of Importance of Adherence: Yes  Does the patient have any barriers to self-administration (including physical and mental?): No  Support Network for Adherence: Healthcare Provider, Family Member  Adherence Tools Used:  Medication list, Medication dosing chart  Medication Information  Medication: avapritinib (Ayvakit)  Estimated Medication Adherence Level: Good  Adherence Estimation Source: Claims history  Barriers to Adherence: No Problems identified   The importance of adherence was discussed and patient/caregiver was advised to take the medication as prescribed by their provider. Encouraged patient/caregiver to call physician's office or specialty pharmacy if they have a question regarding a missed dose.    General Assessment  Changes to home medications, OTCs or supplements: No  Current Medications[1]  Reported new allergies: No  Reported new medical conditions: No  Additional monitoring reviewed: cbc cmp tryptase  Is laboratory follow up needed? No    Advised to contact the pharmacy if there are any changes to the patient's medication list, including prescriptions, OTC medications, herbal products, or supplements.    Impression/Plan  This patient has not been identified as high risk due to Lack of high risk qualifiers.  The following action was taken: N/A.    QOL/Patient Satisfaction  Rate your quality of life on scale of 1-10: 10 - Completely satisfied  Rate your satisfaction with  Specialty Pharmacy on scale of 1-10: 10 - Completely satisfied    Provided contact information ([phone]) for [facility] and reviewed dispensing process, refill timeline and patient management follow up. Confirmed understanding of education conducted during assessment. All questions and concerns were addressed and patient/caregiver was encouraged to reach out for additional questions or concerns.    Based on the patient's diagnosis, medication list, progress towards goals, adherence, tolerance, and medication list, medication remains appropriate:     Ernie Judge [facility]       [1]   Current Outpatient Medications   Medication Sig Dispense Refill    acyclovir (Zovirax) 400 mg tablet Take 1 tablet (400 mg) by mouth  every 12 hours. 180 tablet 2    amLODIPine (Norvasc) 10 mg tablet Take 1 tablet (10 mg) by mouth once daily. 90 tablet 3    avapritinib (Ayvakit) 50 mg tablet Take 1 tablet (50 mg total) by mouth once daily.  Take at least 1 hour before a meal or 2 hours after a meal. Do not chew, crush, or split. 30 tablet 11    cholecalciferol (Vitamin D-3) 25 MCG (1000 UT) tablet Take 2 tablets (2,000 Units) by mouth once daily. 180 Unspecified 2    eltrombopag olamine (Promacta) 50 mg tablet Take 1 tablet (50 mg) by mouth once daily in the morning. Take before meals. Take on an empty stomach, 1 hour before or 2 hours afer a meal. 30 tablet 11    famotidine (Pepcid) 20 mg tablet Take 1 tablet (20 mg) by mouth once daily. 90 tablet 3    fexofenadine (Allegra) 180 mg tablet Take 1 tablet (180 mg) by mouth once daily.      isavuconazonium sulfate (Cresemba) 186 mg capsule capsule Take 2 capsules (372 mg) by mouth once daily. 56 capsule 11    magnesium, amino acid chelate, 133 mg tablet Take 2 tablets (266 mg) by mouth 3 times a day. Or as instructed on your medication list. 180 tablet 1    predniSONE (Deltasone) 10 mg tablet Take 2 tablets (20 mg) by mouth once daily. 60 tablet 0    prochlorperazine (Compazine) 10 mg tablet Take 1 tablet (10 mg) by mouth every 6 hours if needed for nausea or vomiting. 30 tablet 5    sulfamethoxazole-trimethoprim (Bactrim DS) 800-160 mg tablet Take 1 tablet by mouth 3 times a week. Take on Mondays, Wednesdays, and Fridays. 36 tablet 3    tacrolimus (Prograf) 1 mg capsule Take 4 capsules (4 mg) by mouth every 12 hours. Or as instructed on your medication list. 240 capsule 1     No current facility-administered medications for this visit.     Facility-Administered Medications Ordered in Other Visits   Medication Dose Route Frequency Provider Last Rate Last Admin    alteplase (Cathflo Activase) injection 2 mg  2 mg intra-catheter PRN Nika Dawson MD        heparin flush 10 unit/mL syringe 50  Units  50 Units intra-catheter PRN Nika Dawson MD        heparin flush 100 unit/mL syringe 500 Units  500 Units intra-catheter PRN Nika Dawson MD        heparin flush 100 unit/mL syringe 500 Units  500 Units intra-catheter PRN Nika Dawson MD   500 Units at 02/25/25 1345    heparin flush 100 unit/mL syringe 500 Units  500 Units intra-catheter PRN Nika Dawson MD   500 Units at 03/18/25 0906

## 2025-05-06 NOTE — ASSESSMENT & PLAN NOTE
Day +166 following allogeneic stem cell transplant.   - Continue current immunosuppressive therapy; tacro 4 mg BID; level today pending. Plan to start tapering once completely off prednisone and no evidence of GVHD.   - Last BM chimerism 99% donor.

## 2025-05-06 NOTE — PROGRESS NOTES
"     Patient ID: Jin Reynolds is a 70 y.o. male.    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   8/23/2023 Initial Diagnosis    DX:  SMOLDERING SYSTEMIC MASTOCYTOSIS  Presented with skin rash and eosinophilia    BRENDON DIAGNOSTIC CRITERIA  (For SM, Need major and 1 minor OR at least 3 minors)  - Multi-focal, dense infiltrates of MC (>= 15 mast cells in aggregates) in BM and/or other extra-cutaneous organs [major]  - In BM or extracutaneous organs, >25% MC spindle shaped OR have atypical morphology OR of all MC on BM aspirate smears, >25% are immature or atypical [minor]  - Detection of activating mutation KIT D816V in BM, PB, or other extracutaneous organ [minor]  - Serum tryptase persistently exceeds 20 ng/mL (unless associated clonal myeloid disorder, in which this paramenter is not valid) [minor]    \"B\" FINDINGS  - BM biopsy showing >30% infiltration (focal, dense aggregates) and/or serum tryptase > 200 ng/mL  - Signs of dysplasia or myeloproliferation, in non-MC lineages but insufficient criteria for definitive diagnosis of AHNMD with normal or slightly abnormal blood counts    \"C\" FINDINGS  None       8/23/2023 Biopsy    BONE MARROW (8/23/23)  Hypercellular (90-95%) with trilineage hematopoiesis, granulocytic hyperplasia, eosinophilia, and increased atypical mast cells  IP:  CD34+, +, CD7(bright)+, cCD3-, CD33-, CD15-, CD13+ blast population  IHC:  + increased spindle-shaped mast cells (15% cellularity), CD2-  Myeloid NGS:  CBL (31%), cKIT D816V (30%), RUNX1 x 2 (19%, 29%), SRSF2 (47%) [ASXL1 negative]  FISH:  PDGFRb negative    SERUM TRYPTASE  228 (8/23/23)  268 (9/5/23)     2/13/2024 -  Molecular Therapy    AVAPRITINIB   - Starting dose 25 mg daily (non-Adv SM)  - Not started due to improvement in symptoms.        7/16/2024 - 10/21/2024 Chemotherapy    Venetoclax / AzaCITIDine  - C1D1 7/17/24:  complicated by " TLS, venetoclax held after D1  - Post C1 BM (8/28/24):  hypercellular with >50% blasts, background systemic mastocytosis  - C2D1 9/5/24:  venetoclax restarted with C2, no TLS  - Post C2 BM (9/25/24):  hypocellular (<5%), atypical myeloblasts by IP/IHC (1-2%), persistent systemic mastocytosis  - C3D1 10/15/24:  delayed 1 week for count recovery  - Post C3 BM (10/23/24):  hypercellular with systemic mastocytosis, no increase blasts, 0.2% atypical blasts by IP, NGS w/ CBL (73%), cKIT (4%), RUNX1 x 2 (4%, 38%), SRSF2 (40%)       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    CONDITIONING Fludarabine, melphalan, and TBI   DONOR Matched sibling   MATCH GRADE 12/12   SEX MATCH Mismatched   ABO DONOR O pos   ABO RECIPIENT O pos   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, Mycophenolate, and Tacrolimus   GRAFT SOURCE Peripheral blood          2/14/2025 -  Molecular Therapy    AVAPRITINIB   - Persistent mast cells noted on T+30 and T+100 Post Tx Bms. Elevated levels of serum tryptase.   - Initiated Avapritinib 50 mg daily on 2/14/2025.     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)   6/27/2024 Initial Diagnosis    ICC 2022 DX: Acute myeloid leukemia, NOS (>=20% blasts)  [id number] AML Risk Stratification: INTERMEDIATE: Cytogenetic and/or molecular abnormalities not classified as favorable or adverse  Presented with pancytopenia and circulating blasts    BONE MARROW (06/27/2024)  Marrow replaced by blasts, fibrotic foci, some atypical + cells; 7-8% circulating blasts; aspicular  - Unable to perform additional studies due to aspicular specimen    PERIPHERAL BLOOD (6/27/2024)  FISH:  positive for gain RUNX1    PERIPHERAL BLOOD (7/1/24)  IP:  abnl myeloblast population (CD34+, CD7+, CD4+, CD13+, CD38+, CD11+ dim, HLA=DR+, TdT+, CD33-)  Myeloid NGS: CBL C404Y (41%), KIT D816V (8%), RUNX1 x2 (8%, 30%), SRSF2 (37%)      7/16/2024 - 10/21/2024 Chemotherapy    Venetoclax / AzaCITIDine  - C1D1 7/17/24:  complicated by TLS, venetoclax held after D1  - Post C1 BM (8/28/24):  hypercellular with >50% blasts, background systemic mastocytosis  - C2D1 9/5/24:  venetoclax restarted with C2, no TLS  - Post C2 BM (9/25/24):  hypocellular (<5%), atypical myeloblasts by IP/IHC (1-2%), persistent systemic mastocytosis  - C3D1 10/15/24:  delayed 1 week for count recovery  - Post C3 BM (10/23/24):  hypercellular with systemic mastocytosis, no increase blasts, 0.2% atypical blasts by IP, NGS w/ CBL (73%), cKIT (4%), RUNX1 x 2 (4%, 38%), SRSF2 (40%)       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    CONDITIONING Fludarabine, melphalan, and TBI   DONOR Matched sibling   MATCH GRADE 12/12   SEX MATCH Mismatched   ABO DONOR O pos   ABO RECIPIENT O pos   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, Mycophenolate, and Tacrolimus   GRAFT SOURCE Peripheral blood          12/26/2024 -  Infection    Parainfluenza URI     12/26/2024 -  Disease Reevaluation      DATE DAY SOURCE MORPHOLOGY MRD WHOLE CHIMERISM   (% donor) CD3 CHIMERISM   (% donor) CD33 CHIMERISM   (% donor)   12/26/2024 D+30 PB        12/26/2024 D+30 BM No AML, persist BRENDON Neg %     1/20/2025  D+60 PB    96% 100%   3/25/2025 D+100 PB        3/3/2025 D+100 BM No AML, persist BRENDON (focal) Neg MFC 99%                Assessment & Plan  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  PLT still low, eltrombopag initiated 2/18/2025, cont at current dose.  Current evidence of disease: No  Maintenance Therapy: No  MRD Monitoring Plan: AML MFC; Day 100 BMBx 3/3/25 no evidence of leukemia; there is residual focal mast cell atypia. 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   EGD done 3/28 and biopsies showing no morphological evidence of GVHD, some foveolar metaplasia and reactive gastropathy. No active GVHD.  Last tapered 4/22.  Weight stable.  Tapering to 20 mg alternating with 10 mg every other day.  Next taper in 2 weeks if tolerated.  Vitamin D deficiency  Rechecking level.  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 Tryptase level has decreased from 70 to 14 after a month on avapritinib, indicating improvement in symptoms. Tryptase levels will be monitored biannually.  History of stem cell transplant (Multi)  Day +166 following allogeneic stem cell transplant.   - Continue current immunosuppressive therapy; tacro 4 mg BID; level today pending. Plan to start tapering once completely off prednisone and no evidence of GVHD.   - Last BM chimerism 99% donor.    Immunocompromised state  Remains at high risk for infections.  No current active signs or symptoms of infection.    Continues on prophylaxis with acyclovir and posaconazole.  TMP/SMX briefly held given hyperkalemia and renal insufficiency 1/10-1/17. Recent sCr has been elevated, but feel more related to GI issues at this time.   Switched posa to isavuconazonium d/t interaction with avapritinib.   Ongoing viral monitoring (as below).   Immune reconstitution: CD4 189; next due at T+180.   Post Tx Immunizations are planned for D+180. Requested.        ______________________________________________________________________________________________________________________________________________    SUBJECTIVE     History of Present Illness  The patient presents for post-transplant follow-up.    He reports a general sense of well-being, with no significant health issues. He has been adhering to his medication regimen and has not experienced any episodes of nausea or vomiting. However, occasional fatigue is noted, which he attributes to his  Cresemba medication. Despite this, he maintains an active lifestyle, including regular exercise. His weight has increased, and he reports a good appetite, although he notes a slight alteration in his sense of taste. He is not experiencing any pruritus.        OBJECTIVE        /77   Pulse 61   Temp 36.4 °C (97.5 °F)   Resp 16   Wt 79.5 kg (175 lb 4.3 oz)   SpO2 100%   BMI 28.29 kg/m²      Physical Exam  Vitals reviewed.   Constitutional:       Appearance: Normal appearance.   Eyes:      Conjunctiva/sclera: Conjunctivae normal.      Pupils: Pupils are equal, round, and reactive to light.   Skin:     General: Skin is warm and dry.      Findings: No rash.   Psychiatric:         Mood and Affect: Mood normal.         Results  Labs   - Kidney function test: Slightly elevated   - Liver function test: Normal   - White blood cell count: 3.6   - Neutrophil count: 2.76   - Hemoglobin: 10.9   - Platelet count: 59   - CD4 count: 189   - Immunoglobulin levels: Good   - Tacrolimus level: 8.1   - Tryptase level: Decreased from 70 to 14            This medical note was created with the assistance of artificial intelligence (AI) for documentation purposes. The content has been reviewed and confirmed by the healthcare provider for accuracy and completeness. Patient consented to the use of audio recording and use of AI during their visit.        Nika Dawson MD

## 2025-05-07 ENCOUNTER — PHARMACY VISIT (OUTPATIENT)
Dept: PHARMACY | Facility: CLINIC | Age: 70
End: 2025-05-07
Payer: COMMERCIAL

## 2025-05-13 ENCOUNTER — TELEPHONE (OUTPATIENT)
Dept: ADMISSION | Facility: HOSPITAL | Age: 70
End: 2025-05-13
Payer: MEDICARE

## 2025-05-13 DIAGNOSIS — Z94.84 HISTORY OF STEM CELL TRANSPLANT (MULTI): ICD-10-CM

## 2025-05-13 RX ORDER — TACROLIMUS 1 MG/1
4 CAPSULE ORAL EVERY 12 HOURS
Qty: 240 CAPSULE | Refills: 11 | Status: SHIPPED | OUTPATIENT
Start: 2025-05-13

## 2025-05-20 ENCOUNTER — OFFICE VISIT (OUTPATIENT)
Dept: HEMATOLOGY/ONCOLOGY | Facility: HOSPITAL | Age: 70
End: 2025-05-20
Payer: MEDICARE

## 2025-05-20 ENCOUNTER — INFUSION (OUTPATIENT)
Dept: HEMATOLOGY/ONCOLOGY | Facility: HOSPITAL | Age: 70
End: 2025-05-20
Payer: MEDICARE

## 2025-05-20 ENCOUNTER — LAB (OUTPATIENT)
Dept: HEMATOLOGY/ONCOLOGY | Facility: HOSPITAL | Age: 70
End: 2025-05-20
Payer: MEDICARE

## 2025-05-20 VITALS
SYSTOLIC BLOOD PRESSURE: 129 MMHG | OXYGEN SATURATION: 100 % | RESPIRATION RATE: 16 BRPM | TEMPERATURE: 96.6 F | HEART RATE: 63 BPM | DIASTOLIC BLOOD PRESSURE: 75 MMHG | WEIGHT: 178.35 LBS | BODY MASS INDEX: 28.79 KG/M2

## 2025-05-20 DIAGNOSIS — E55.9 VITAMIN D DEFICIENCY: ICD-10-CM

## 2025-05-20 DIAGNOSIS — D47.02 SYSTEMIC MASTOCYTOSIS WITH ASSOCIATED CLONAL HEMATOLOGICAL NON-MAST CELL LINEAGE DISEASE: ICD-10-CM

## 2025-05-20 DIAGNOSIS — M81.8 OTHER OSTEOPOROSIS WITHOUT CURRENT PATHOLOGICAL FRACTURE: ICD-10-CM

## 2025-05-20 DIAGNOSIS — D84.9 IMMUNOCOMPROMISED STATE: ICD-10-CM

## 2025-05-20 DIAGNOSIS — C92.01 ACUTE MYELOID LEUKEMIA IN REMISSION (MULTI): ICD-10-CM

## 2025-05-20 DIAGNOSIS — D89.813 GVHD (GRAFT VERSUS HOST DISEASE) (MULTI): ICD-10-CM

## 2025-05-20 DIAGNOSIS — Z94.84 HISTORY OF STEM CELL TRANSPLANT (MULTI): ICD-10-CM

## 2025-05-20 DIAGNOSIS — Z94.84 HISTORY OF ALLOGENEIC STEM CELL TRANSPLANT (MULTI): ICD-10-CM

## 2025-05-20 DIAGNOSIS — C92.01 ACUTE MYELOID LEUKEMIA IN REMISSION (MULTI): Primary | ICD-10-CM

## 2025-05-20 DIAGNOSIS — Z94.81 S/P ALLOGENEIC BONE MARROW TRANSPLANT (MULTI): ICD-10-CM

## 2025-05-20 LAB
25(OH)D3 SERPL-MCNC: 25 NG/ML (ref 30–100)
ALBUMIN SERPL BCP-MCNC: 4.5 G/DL (ref 3.4–5)
ALP SERPL-CCNC: 49 U/L (ref 33–136)
ALT SERPL W P-5'-P-CCNC: 33 U/L (ref 10–52)
ANION GAP SERPL CALC-SCNC: 12 MMOL/L (ref 10–20)
AST SERPL W P-5'-P-CCNC: 22 U/L (ref 9–39)
BASOPHILS # BLD AUTO: 0.01 X10*3/UL (ref 0–0.1)
BASOPHILS NFR BLD AUTO: 0.2 %
BILIRUB SERPL-MCNC: 0.7 MG/DL (ref 0–1.2)
BUN SERPL-MCNC: 25 MG/DL (ref 6–23)
CALCIUM SERPL-MCNC: 8.4 MG/DL (ref 8.6–10.3)
CHLORIDE SERPL-SCNC: 107 MMOL/L (ref 98–107)
CO2 SERPL-SCNC: 23 MMOL/L (ref 21–32)
CREAT SERPL-MCNC: 1.29 MG/DL (ref 0.5–1.3)
EGFRCR SERPLBLD CKD-EPI 2021: 60 ML/MIN/1.73M*2
EOSINOPHIL # BLD AUTO: 0.04 X10*3/UL (ref 0–0.7)
EOSINOPHIL NFR BLD AUTO: 1 %
ERYTHROCYTE [DISTWIDTH] IN BLOOD BY AUTOMATED COUNT: 15.3 % (ref 11.5–14.5)
GLUCOSE SERPL-MCNC: 99 MG/DL (ref 74–99)
HCT VFR BLD AUTO: 33.7 % (ref 41–52)
HGB BLD-MCNC: 11.6 G/DL (ref 13.5–17.5)
IGG SERPL-MCNC: 866 MG/DL (ref 700–1600)
IMM GRANULOCYTES # BLD AUTO: 0.01 X10*3/UL (ref 0–0.7)
IMM GRANULOCYTES NFR BLD AUTO: 0.2 % (ref 0–0.9)
LYMPHOCYTES # BLD AUTO: 0.55 X10*3/UL (ref 1.2–4.8)
LYMPHOCYTES NFR BLD AUTO: 13.2 %
MAGNESIUM SERPL-MCNC: 1.96 MG/DL (ref 1.6–2.4)
MCH RBC QN AUTO: 38.5 PG (ref 26–34)
MCHC RBC AUTO-ENTMCNC: 34.4 G/DL (ref 32–36)
MCV RBC AUTO: 112 FL (ref 80–100)
MONOCYTES # BLD AUTO: 0.36 X10*3/UL (ref 0.1–1)
MONOCYTES NFR BLD AUTO: 8.7 %
NEUTROPHILS # BLD AUTO: 3.19 X10*3/UL (ref 1.2–7.7)
NEUTROPHILS NFR BLD AUTO: 76.7 %
NRBC BLD-RTO: 0 /100 WBCS (ref 0–0)
PLATELET # BLD AUTO: 71 X10*3/UL (ref 150–450)
POTASSIUM SERPL-SCNC: 4.2 MMOL/L (ref 3.5–5.3)
PROT SERPL-MCNC: 6.5 G/DL (ref 6.4–8.2)
RBC # BLD AUTO: 3.01 X10*6/UL (ref 4.5–5.9)
SODIUM SERPL-SCNC: 138 MMOL/L (ref 136–145)
TACROLIMUS BLD-MCNC: 3.4 NG/ML
WBC # BLD AUTO: 4.2 X10*3/UL (ref 4.4–11.3)

## 2025-05-20 PROCEDURE — 1036F TOBACCO NON-USER: CPT | Performed by: STUDENT IN AN ORGANIZED HEALTH CARE EDUCATION/TRAINING PROGRAM

## 2025-05-20 PROCEDURE — 99215 OFFICE O/P EST HI 40 MIN: CPT | Mod: 25 | Performed by: STUDENT IN AN ORGANIZED HEALTH CARE EDUCATION/TRAINING PROGRAM

## 2025-05-20 PROCEDURE — 99215 OFFICE O/P EST HI 40 MIN: CPT | Performed by: STUDENT IN AN ORGANIZED HEALTH CARE EDUCATION/TRAINING PROGRAM

## 2025-05-20 PROCEDURE — 1126F AMNT PAIN NOTED NONE PRSNT: CPT | Performed by: STUDENT IN AN ORGANIZED HEALTH CARE EDUCATION/TRAINING PROGRAM

## 2025-05-20 PROCEDURE — G0009 ADMIN PNEUMOCOCCAL VACCINE: HCPCS | Performed by: STUDENT IN AN ORGANIZED HEALTH CARE EDUCATION/TRAINING PROGRAM

## 2025-05-20 PROCEDURE — 90471 IMMUNIZATION ADMIN: CPT | Performed by: STUDENT IN AN ORGANIZED HEALTH CARE EDUCATION/TRAINING PROGRAM

## 2025-05-20 PROCEDURE — 87799 DETECT AGENT NOS DNA QUANT: CPT

## 2025-05-20 PROCEDURE — 90739 HEPB VACC 2/4 DOSE ADULT IM: CPT | Mod: JZ | Performed by: STUDENT IN AN ORGANIZED HEALTH CARE EDUCATION/TRAINING PROGRAM

## 2025-05-20 PROCEDURE — 36591 DRAW BLOOD OFF VENOUS DEVICE: CPT

## 2025-05-20 PROCEDURE — 85025 COMPLETE CBC W/AUTO DIFF WBC: CPT

## 2025-05-20 PROCEDURE — 3074F SYST BP LT 130 MM HG: CPT | Performed by: STUDENT IN AN ORGANIZED HEALTH CARE EDUCATION/TRAINING PROGRAM

## 2025-05-20 PROCEDURE — 1159F MED LIST DOCD IN RCRD: CPT | Performed by: STUDENT IN AN ORGANIZED HEALTH CARE EDUCATION/TRAINING PROGRAM

## 2025-05-20 PROCEDURE — 82306 VITAMIN D 25 HYDROXY: CPT

## 2025-05-20 PROCEDURE — G0010 ADMIN HEPATITIS B VACCINE: HCPCS | Performed by: STUDENT IN AN ORGANIZED HEALTH CARE EDUCATION/TRAINING PROGRAM

## 2025-05-20 PROCEDURE — 80197 ASSAY OF TACROLIMUS: CPT

## 2025-05-20 PROCEDURE — 2500000004 HC RX 250 GENERAL PHARMACY W/ HCPCS (ALT 636 FOR OP/ED): Mod: JZ | Performed by: INTERNAL MEDICINE

## 2025-05-20 PROCEDURE — 80053 COMPREHEN METABOLIC PANEL: CPT

## 2025-05-20 PROCEDURE — 90648 HIB PRP-T VACCINE 4 DOSE IM: CPT | Mod: JZ | Performed by: STUDENT IN AN ORGANIZED HEALTH CARE EDUCATION/TRAINING PROGRAM

## 2025-05-20 PROCEDURE — 90472 IMMUNIZATION ADMIN EACH ADD: CPT | Performed by: STUDENT IN AN ORGANIZED HEALTH CARE EDUCATION/TRAINING PROGRAM

## 2025-05-20 PROCEDURE — 90696 DTAP-IPV VACCINE 4-6 YRS IM: CPT | Mod: JZ | Performed by: STUDENT IN AN ORGANIZED HEALTH CARE EDUCATION/TRAINING PROGRAM

## 2025-05-20 PROCEDURE — 83735 ASSAY OF MAGNESIUM: CPT

## 2025-05-20 PROCEDURE — 2500000004 HC RX 250 GENERAL PHARMACY W/ HCPCS (ALT 636 FOR OP/ED): Mod: JZ | Performed by: STUDENT IN AN ORGANIZED HEALTH CARE EDUCATION/TRAINING PROGRAM

## 2025-05-20 PROCEDURE — 82784 ASSAY IGA/IGD/IGG/IGM EACH: CPT

## 2025-05-20 PROCEDURE — 90677 PCV20 VACCINE IM: CPT | Mod: JZ | Performed by: STUDENT IN AN ORGANIZED HEALTH CARE EDUCATION/TRAINING PROGRAM

## 2025-05-20 PROCEDURE — 3078F DIAST BP <80 MM HG: CPT | Performed by: STUDENT IN AN ORGANIZED HEALTH CARE EDUCATION/TRAINING PROGRAM

## 2025-05-20 RX ORDER — PREDNISONE 10 MG/1
10 TABLET ORAL DAILY
Qty: 30 TABLET | Refills: 0 | Status: SHIPPED | OUTPATIENT
Start: 2025-05-20 | End: 2025-06-19

## 2025-05-20 RX ORDER — HEPARIN 100 UNIT/ML
500 SYRINGE INTRAVENOUS AS NEEDED
Status: DISCONTINUED | OUTPATIENT
Start: 2025-05-20 | End: 2025-05-20 | Stop reason: HOSPADM

## 2025-05-20 RX ORDER — FAMOTIDINE 10 MG/ML
20 INJECTION, SOLUTION INTRAVENOUS ONCE AS NEEDED
OUTPATIENT
Start: 2025-07-15

## 2025-05-20 RX ORDER — HEPARIN 100 UNIT/ML
500 SYRINGE INTRAVENOUS AS NEEDED
OUTPATIENT
Start: 2025-05-20

## 2025-05-20 RX ORDER — HEPARIN SODIUM,PORCINE/PF 10 UNIT/ML
50 SYRINGE (ML) INTRAVENOUS AS NEEDED
OUTPATIENT
Start: 2025-05-20

## 2025-05-20 RX ORDER — ALBUTEROL SULFATE 0.83 MG/ML
3 SOLUTION RESPIRATORY (INHALATION) AS NEEDED
OUTPATIENT
Start: 2025-07-15

## 2025-05-20 RX ORDER — DIPHENHYDRAMINE HYDROCHLORIDE 50 MG/ML
50 INJECTION, SOLUTION INTRAMUSCULAR; INTRAVENOUS AS NEEDED
OUTPATIENT
Start: 2025-07-15

## 2025-05-20 RX ORDER — EPINEPHRINE 0.3 MG/.3ML
0.3 INJECTION SUBCUTANEOUS EVERY 5 MIN PRN
OUTPATIENT
Start: 2025-07-15

## 2025-05-20 RX ADMIN — PNEUMOCOCCAL 20-VALENT CONJUGATE VACCINE 0.5 ML
2.2; 2.2; 2.2; 2.2; 2.2; 2.2; 2.2; 2.2; 2.2; 2.2; 2.2; 2.2; 2.2; 2.2; 2.2; 2.2; 4.4; 2.2; 2.2; 2.2 INJECTION, SUSPENSION INTRAMUSCULAR at 11:02

## 2025-05-20 RX ADMIN — HAEMOPHILUS B POLYSACCHARIDE CONJUGATE VACCINE FOR INJ 0.5 ML: RECON SOLN at 11:03

## 2025-05-20 RX ADMIN — DIPHTHERIA AND TETANUS TOXOIDS AND ACELLULAR PERTUSSIS ADSORBED AND INACTIVATED POLIOVIRUS VACCINE 0.5 ML: 25; 10; 25; 8; 25; 40; 8; 32 INJECTION, SUSPENSION INTRAMUSCULAR at 10:58

## 2025-05-20 RX ADMIN — HEPATITIS B VACCINE (RECOMBINANT) ADJUVANTED 20 MCG: 20 INJECTION, SOLUTION INTRAMUSCULAR at 10:58

## 2025-05-20 RX ADMIN — HEPARIN 500 UNITS: 100 SYRINGE at 08:46

## 2025-05-20 ASSESSMENT — PAIN SCALES - GENERAL: PAINLEVEL_OUTOF10: 0-NO PAIN

## 2025-05-20 NOTE — PATIENT INSTRUCTIONS
25    To whom it may concern,    I am writing on behalf of our patient, Jin Reynolds (: 1955), who received a hematopoietic stem cell transplant (HSCT) or CAR T-cell Therapy on 2024. Per the CDC recommendations, “Recipients of HSCT or CAR-T-cell therapy who received 1 or more doses of COVID-19 vaccine prior to or during treatment should be revaccinated. Revaccination should start at least 3 months (12 weeks) after transplant or CAR-T-cell therapy and should follow the currently recommended schedule for people who are unvaccinated.”                                                           Following the CDC recommendations, Jin Reynolds is due for 2-3 doses of the 9134-7209 Formula COVID-19 vaccines, and they would like to get them at your facility. I have included the dosing schedule below:   Moderna Product   - Administer first dose at 3 months post HSCT & CAR T-cell   - Administer second dose 4 weeks following the first dose   - Administer third dose >= 4 weeks following the second dose   Novavax Product   - Administer first dose at 3 months post HSCT & CAR T-cell   - Administer second dose 3 weeks following the first dose   Pfizer-BioNTech Product   - Administer first dose at 3 months post HSCT & CAR T-cell   - Administer second dose 3 weeks following the first dose   - Administer third dose >= 4 weeks following the second dose     If you would like to review the CDC recommendations, they can be found using the link below:  https://www.cdc.gov/vaccines/covid-19/clinical-considerations/xxodpca-jvtfvuluclmltl-xk.html#immunocompromised    Please reach out with any questions or concerns. Thank you for your help caring for our patient.     Sincerely,  Liz Cedeno, PharmD, BCOP         Ambulatory Stem Cell Transplant Pharmacist  [facility] Department of Pharmacy Services  [address] [place] [address]  Phone: [phone]  Fax: [phone]

## 2025-05-20 NOTE — PROGRESS NOTES
Patient arrived from doctor's visit , patient with no new complaints and received vaccine series as ordered ,tolerated well . Patient received the vaccine information sheets and aware of future scheduled appointments. Discharge  patient ambulatory in a stable condition .

## 2025-05-20 NOTE — ASSESSMENT & PLAN NOTE
On avapritinib 50 mg daily for mastocytosis. Tryptase level has decreased from 70 to 14 after a month on avapritinib, indicating improvement in symptoms. Tryptase levels will be monitored biannually, next due September.

## 2025-05-20 NOTE — ASSESSMENT & PLAN NOTE
Day +180 following allogeneic stem cell transplant.   - Continue current immunosuppressive therapy; tacro 4 mg BID; level today pending. Plan to start tapering once completely off prednisone and no evidence of GVHD.   - Last BM chimerism 99% donor.  - Checking PB chimerisms for T+180 today.

## 2025-05-20 NOTE — ASSESSMENT & PLAN NOTE
Remains at high risk for infections.  No current active signs or symptoms of infection.    Continues on prophylaxis with acyclovir and posaconazole.  TMP/SMX briefly held given hyperkalemia and renal insufficiency 1/10-1/17. Recent sCr has been elevated, but feel more related to GI issues at this time.   Switched posa to isavuconazonium d/t interaction with avapritinib.   Ongoing viral monitoring (as below).   Immune reconstitution: CD4 189; next due at T+180 (pending)   Post Tx Immunizations are planned for D+180. Started 5/20/2025.

## 2025-05-20 NOTE — ASSESSMENT & PLAN NOTE
Last DEXA 11/2023 showing osteoporosis in L spine, osteopenia in L femur. Last received zoledronic acid in 03/2024. Now on systemic oral steroids. Due for DEXA at T+1 year per SOP, but with his osteoporosis history we will plan to check DEXA once off steroid taper (ordered early August).

## 2025-05-20 NOTE — PROGRESS NOTES
"    Patient ID: Jin Reynolds is a 70 y.o. male.  Primary Oncologist: Dr. 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   8/23/2023 Initial Diagnosis    DX:  SMOLDERING SYSTEMIC MASTOCYTOSIS  Presented with skin rash and eosinophilia    BRENDON DIAGNOSTIC CRITERIA  (For SM, Need major and 1 minor OR at least 3 minors)  - Multi-focal, dense infiltrates of MC (>= 15 mast cells in aggregates) in BM and/or other extra-cutaneous organs [major]  - In BM or extracutaneous organs, >25% MC spindle shaped OR have atypical morphology OR of all MC on BM aspirate smears, >25% are immature or atypical [minor]  - Detection of activating mutation KIT D816V in BM, PB, or other extracutaneous organ [minor]  - Serum tryptase persistently exceeds 20 ng/mL (unless associated clonal myeloid disorder, in which this paramenter is not valid) [minor]    \"B\" FINDINGS  - BM biopsy showing >30% infiltration (focal, dense aggregates) and/or serum tryptase > 200 ng/mL  - Signs of dysplasia or myeloproliferation, in non-MC lineages but insufficient criteria for definitive diagnosis of AHNMD with normal or slightly abnormal blood counts    \"C\" FINDINGS  None       8/23/2023 Biopsy    BONE MARROW (8/23/23)  Hypercellular (90-95%) with trilineage hematopoiesis, granulocytic hyperplasia, eosinophilia, and increased atypical mast cells  IP:  CD34+, +, CD7(bright)+, cCD3-, CD33-, CD15-, CD13+ blast population  IHC:  + increased spindle-shaped mast cells (15% cellularity), CD2-  Myeloid NGS:  CBL (31%), cKIT D816V (30%), RUNX1 x 2 (19%, 29%), SRSF2 (47%) [ASXL1 negative]  FISH:  PDGFRb negative    SERUM TRYPTASE  228 (8/23/23)  268 (9/5/23)     2/13/2024 -  Molecular Therapy    AVAPRITINIB   - Starting dose 25 mg daily (non-Adv SM)  - Not started due to improvement in symptoms.        7/16/2024 - 10/21/2024 Chemotherapy    Venetoclax / AzaCITIDine  - " C1D1 7/17/24:  complicated by TLS, venetoclax held after D1  - Post C1 BM (8/28/24):  hypercellular with >50% blasts, background systemic mastocytosis  - C2D1 9/5/24:  venetoclax restarted with C2, no TLS  - Post C2 BM (9/25/24):  hypocellular (<5%), atypical myeloblasts by IP/IHC (1-2%), persistent systemic mastocytosis  - C3D1 10/15/24:  delayed 1 week for count recovery  - Post C3 BM (10/23/24):  hypercellular with systemic mastocytosis, no increase blasts, 0.2% atypical blasts by IP, NGS w/ CBL (73%), cKIT (4%), RUNX1 x 2 (4%, 38%), SRSF2 (40%)       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    CONDITIONING Fludarabine, melphalan, and TBI   DONOR Matched sibling   MATCH GRADE 12/12   SEX MATCH Mismatched   ABO DONOR O pos   ABO RECIPIENT O pos   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, Mycophenolate, and Tacrolimus   GRAFT SOURCE Peripheral blood          2/14/2025 -  Molecular Therapy    AVAPRITINIB   - Persistent mast cells noted on T+30 and T+100 Post Tx Bms. Elevated levels of serum tryptase.   - Initiated Avapritinib 50 mg daily on 2/14/2025.     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)   6/27/2024 Initial Diagnosis    ICC 2022 DX: Acute myeloid leukemia, NOS (>=20% blasts)  [id number] AML Risk Stratification: INTERMEDIATE: Cytogenetic and/or molecular abnormalities not classified as favorable or adverse  Presented with pancytopenia and circulating blasts    BONE MARROW (06/27/2024)  Marrow replaced by blasts, fibrotic foci, some atypical + cells; 7-8% circulating blasts; aspicular  - Unable to perform additional studies due to aspicular specimen    PERIPHERAL BLOOD (6/27/2024)  FISH:  positive for gain RUNX1    PERIPHERAL BLOOD (7/1/24)  IP:  abnl myeloblast population (CD34+, CD7+, CD4+, CD13+, CD38+, CD11+ dim, HLA=DR+, TdT+, CD33-)  Myeloid NGS: CBL C404Y (41%), KIT D816V (8%), RUNX1 x2 (8%,  30%), SRSF2 (37%)     7/16/2024 - 10/21/2024 Chemotherapy    Venetoclax / AzaCITIDine  - C1D1 7/17/24:  complicated by TLS, venetoclax held after D1  - Post C1 BM (8/28/24):  hypercellular with >50% blasts, background systemic mastocytosis  - C2D1 9/5/24:  venetoclax restarted with C2, no TLS  - Post C2 BM (9/25/24):  hypocellular (<5%), atypical myeloblasts by IP/IHC (1-2%), persistent systemic mastocytosis  - C3D1 10/15/24:  delayed 1 week for count recovery  - Post C3 BM (10/23/24):  hypercellular with systemic mastocytosis, no increase blasts, 0.2% atypical blasts by IP, NGS w/ CBL (73%), cKIT (4%), RUNX1 x 2 (4%, 38%), SRSF2 (40%)       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    CONDITIONING Fludarabine, melphalan, and TBI   DONOR Matched sibling   MATCH GRADE 12/12   SEX MATCH Mismatched   ABO DONOR O pos   ABO RECIPIENT O pos   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, Mycophenolate, and Tacrolimus   GRAFT SOURCE Peripheral blood          12/26/2024 -  Infection    Parainfluenza URI     12/26/2024 -  Disease Reevaluation      DATE DAY SOURCE MORPHOLOGY MRD WHOLE CHIMERISM   (% donor) CD3 CHIMERISM   (% donor) CD33 CHIMERISM   (% donor)   12/26/2024 D+30 PB        12/26/2024 D+30 BM No AML, persist BRENDON Neg %     1/20/2025  D+60 PB    96% 100%   3/25/2025 D+100 PB        3/3/2025 D+100 BM No AML, persist BRENDON (focal) Neg MFC 99%             05/20/25 T+ 180 Doing well, on pred taper for upper GI GVHD. Starting T+6 month vaccines today.     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  PLT still low, eltrombopag initiated 2/18/2025, cont at current dose.  Current evidence of disease: No  Maintenance Therapy: No  MRD Monitoring Plan: AML MFC; Day 100 BMBx 3/3/25 no evidence of leukemia; there is residual focal mast cell atypia. T+100 PB Chimerisms 98% (CD3) and 100% (CD33)  History  of stem cell transplant (Multi)  Day +180 following allogeneic stem cell transplant.   - Continue current immunosuppressive therapy; tacro 4 mg BID; level today pending. Plan to start tapering once completely off prednisone and no evidence of GVHD.   - Last BM chimerism 99% donor.  - Checking PB chimerisms for T+180 today.   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 Tryptase level has decreased from 70 to 14 after a month on avapritinib, indicating improvement in symptoms. Tryptase levels will be monitored biannually, next due September.   Immunocompromised state  Remains at high risk for infections.  No current active signs or symptoms of infection.    Continues on prophylaxis with acyclovir and posaconazole.  TMP/SMX briefly held given hyperkalemia and renal insufficiency 1/10-1/17. Recent sCr has been elevated, but feel more related to GI issues at this time.   Switched posa to isavuconazonium d/t interaction with avapritinib.   Ongoing viral monitoring (as below).   Immune reconstitution: CD4 189; next due at T+180 (pending)   Post Tx Immunizations are planned for D+180. Started 5/20/2025.   Vitamin D deficiency  Recheck level pending today. Has not been taking vitamin D maintenance.   Other osteoporosis without current pathological fracture  Last DEXA 11/2023 showing osteoporosis in L spine, osteopenia in L femur. Last received zoledronic acid in 03/2024. Now on systemic oral steroids. Due for DEXA at T+1 year per SOP, but with his osteoporosis history we will plan to check DEXA once off steroid taper (ordered early August).   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 Doing well without acute complaints. Remains physically active, walking about 30 min daily. Taking walks to daughter's house. Appetite has been great.  Was to decrease prednisone to 20 mg alternating with 10 mg after last visit, but remained on 20 mg daily until about 3-4 days ago when he self decreased to 10 mg daily. Doing well on current dose without return of nausea or anorexia.       OBJECTIVE     BSA: 1.94 meters squared  /75   Pulse 63   Temp 35.9 °C (96.6 °F)   Resp 16   Wt 80.9 kg (178 lb 5.6 oz)   SpO2 100%   BMI 28.79 kg/m²   Weight         5/20/2025  0923             Weight: 80.9 kg (178 lb 5.6 oz)               Physical Exam  Vitals reviewed.   Constitutional:       Appearance: Normal appearance.   HENT:      Mouth/Throat:      Mouth: Mucous membranes are moist.      Pharynx: Oropharynx is clear.   Eyes:      Conjunctiva/sclera: Conjunctivae normal.      Pupils: Pupils are equal, round, and reactive to light.   Pulmonary:      Effort: Pulmonary effort is normal.   Skin:     General: Skin is warm and dry.      Findings: No rash.   Psychiatric:         Mood and Affect: Mood normal.         Performance Status:  Karnofsky Score: 90 - Able to carry on normal activity; minor signs or symptoms of disease       POST TRANSPLANT MONITORING   GVHD  Acute GVHD Overall stGstrstastdstest:st st1st Chronic GVHD Total Score: 0    VIRAL MONITORING    CMV    Lab Results   Component Value Date    CMVDNAPCR Not Detected 05/02/2025    CMVDNAPCR Not Detected 04/22/2025    CMVDNAPCR Not Detected 04/15/2025    CMVDNAPCR Not Detected 04/08/2025    CMVDNAPCR Not Detected 04/01/2025     Lab Results   Component Value Date    CMVPCRL  05/02/2025      Comment:      Not calculated    CMVPCRL  04/22/2025      Comment:      Not calculated    CMVPCRL  04/15/2025      Comment:      Not calculated    CMVPCRL  04/08/2025      Comment:      Not calculated    CMVPCRL  04/01/2025      Comment:      Not calculated      EBV    Lab Results   Component Value Date    EBVWBPCR <500 Detected (A) 05/02/2025    EBVWBPCR Not Detected 04/22/2025    EBVWBPCR Not Detected 04/15/2025    EBVWBPCR <500 Detected  (A) 04/08/2025    EBVWBPCR <500 Detected (A) 04/01/2025    Lab Results   Component Value Date    EBVPCRQNTWB 653 (H) 02/11/2025      ADENO    Lab Results   Component Value Date    ADEPB Not Detected 05/02/2025    ADEPB Not Detected 04/22/2025    ADEPB Not Detected 04/15/2025    ADEPB Not Detected 04/08/2025    ADEPB Not Detected 04/01/2025           TMA Monitoring:     MARKERS RECENT 3 VALUES   LDH Lab Results   Component Value Date     04/01/2025     03/25/2025     03/18/2025      Haptoglobin Lab Results   Component Value Date    HAPTOGLOBIN 39 04/01/2025    HAPTOGLOBIN 39 03/25/2025    HAPTOGLOBIN 58 03/18/2025      BP BP Readings from Last 3 Encounters:   05/20/25 129/75   05/06/25 125/77   05/02/25 118/80      Serum Creatinine Lab Results   Component Value Date    CREATININE 1.29 05/20/2025    CREATININE 1.58 (H) 05/02/2025    CREATININE 1.14 04/22/2025      Urine Protein/Creat Ratio Lab Results   Component Value Date    UTPCR 0.07 04/01/2025    UTPCR 0.08 03/25/2025    UTPCR 0.09 03/18/2025      Tacrolimus Level Lab Results   Component Value Date    TACROLIMUS 8.1 05/02/2025    TACROLIMUS 2.7 04/22/2025    TACROLIMUS 4.0 04/15/2025            Kalia June PA-C

## 2025-05-29 LAB
CHIMERISM INTERPRETATION: NORMAL
CHIMERISM INTERPRETATION: NORMAL
ELECTRONICALLY SIGNED BY: NORMAL
ELECTRONICALLY SIGNED BY: NORMAL

## 2025-05-31 ENCOUNTER — SPECIALTY PHARMACY (OUTPATIENT)
Dept: PHARMACY | Facility: CLINIC | Age: 70
End: 2025-05-31

## 2025-05-31 PROCEDURE — RXMED WILLOW AMBULATORY MEDICATION CHARGE

## 2025-06-03 ENCOUNTER — LAB (OUTPATIENT)
Dept: HEMATOLOGY/ONCOLOGY | Facility: HOSPITAL | Age: 70
End: 2025-06-03
Payer: MEDICARE

## 2025-06-03 ENCOUNTER — OFFICE VISIT (OUTPATIENT)
Dept: HEMATOLOGY/ONCOLOGY | Facility: HOSPITAL | Age: 70
End: 2025-06-03
Payer: MEDICARE

## 2025-06-03 VITALS
WEIGHT: 171.74 LBS | HEART RATE: 66 BPM | RESPIRATION RATE: 17 BRPM | SYSTOLIC BLOOD PRESSURE: 139 MMHG | BODY MASS INDEX: 27.72 KG/M2 | OXYGEN SATURATION: 100 % | DIASTOLIC BLOOD PRESSURE: 85 MMHG | TEMPERATURE: 97.2 F

## 2025-06-03 DIAGNOSIS — M81.8 OTHER OSTEOPOROSIS WITHOUT CURRENT PATHOLOGICAL FRACTURE: ICD-10-CM

## 2025-06-03 DIAGNOSIS — Z94.81 S/P ALLOGENEIC BONE MARROW TRANSPLANT (MULTI): ICD-10-CM

## 2025-06-03 DIAGNOSIS — D47.02 SYSTEMIC MASTOCYTOSIS WITH ASSOCIATED CLONAL HEMATOLOGICAL NON-MAST CELL LINEAGE DISEASE: ICD-10-CM

## 2025-06-03 DIAGNOSIS — Z94.84 HISTORY OF STEM CELL TRANSPLANT (MULTI): ICD-10-CM

## 2025-06-03 DIAGNOSIS — C92.01 ACUTE MYELOID LEUKEMIA IN REMISSION (MULTI): Primary | ICD-10-CM

## 2025-06-03 DIAGNOSIS — E83.42 HYPOMAGNESEMIA: ICD-10-CM

## 2025-06-03 DIAGNOSIS — D84.9 IMMUNOCOMPROMISED STATE: ICD-10-CM

## 2025-06-03 DIAGNOSIS — C92.01 ACUTE MYELOID LEUKEMIA IN REMISSION (MULTI): ICD-10-CM

## 2025-06-03 LAB
ALBUMIN SERPL BCP-MCNC: 4.7 G/DL (ref 3.4–5)
ALP SERPL-CCNC: 48 U/L (ref 33–136)
ALT SERPL W P-5'-P-CCNC: 18 U/L (ref 10–52)
ANION GAP SERPL CALC-SCNC: 12 MMOL/L (ref 10–20)
AST SERPL W P-5'-P-CCNC: 16 U/L (ref 9–39)
BASOPHILS # BLD AUTO: 0.02 X10*3/UL (ref 0–0.1)
BASOPHILS # BLD AUTO: 0.02 X10*3/UL (ref 0–0.1)
BASOPHILS NFR BLD AUTO: 0.4 %
BASOPHILS NFR BLD AUTO: 0.4 %
BILIRUB SERPL-MCNC: 0.8 MG/DL (ref 0–1.2)
BUN SERPL-MCNC: 22 MG/DL (ref 6–23)
CALCIUM SERPL-MCNC: 9 MG/DL (ref 8.6–10.6)
CHLORIDE SERPL-SCNC: 106 MMOL/L (ref 98–107)
CO2 SERPL-SCNC: 24 MMOL/L (ref 21–32)
CREAT SERPL-MCNC: 1.42 MG/DL (ref 0.5–1.3)
EGFRCR SERPLBLD CKD-EPI 2021: 53 ML/MIN/1.73M*2
EOSINOPHIL # BLD AUTO: 0.03 X10*3/UL (ref 0–0.7)
EOSINOPHIL # BLD AUTO: 0.04 X10*3/UL (ref 0–0.7)
EOSINOPHIL NFR BLD AUTO: 0.7 %
EOSINOPHIL NFR BLD AUTO: 0.9 %
ERYTHROCYTE [DISTWIDTH] IN BLOOD BY AUTOMATED COUNT: 14.1 % (ref 11.5–14.5)
ERYTHROCYTE [DISTWIDTH] IN BLOOD BY AUTOMATED COUNT: 14.2 % (ref 11.5–14.5)
GLUCOSE SERPL-MCNC: 111 MG/DL (ref 74–99)
HCT VFR BLD AUTO: 33.9 % (ref 41–52)
HCT VFR BLD AUTO: 34.2 % (ref 41–52)
HGB BLD-MCNC: 12.5 G/DL (ref 13.5–17.5)
HGB BLD-MCNC: 12.6 G/DL (ref 13.5–17.5)
IGG SERPL-MCNC: 813 MG/DL (ref 700–1600)
IMM GRANULOCYTES # BLD AUTO: 0.01 X10*3/UL (ref 0–0.7)
IMM GRANULOCYTES # BLD AUTO: 0.02 X10*3/UL (ref 0–0.7)
IMM GRANULOCYTES NFR BLD AUTO: 0.2 % (ref 0–0.9)
IMM GRANULOCYTES NFR BLD AUTO: 0.4 % (ref 0–0.9)
LYMPHOCYTES # BLD AUTO: 0.62 X10*3/UL (ref 1.2–4.8)
LYMPHOCYTES # BLD AUTO: 0.67 X10*3/UL (ref 1.2–4.8)
LYMPHOCYTES NFR BLD AUTO: 13.9 %
LYMPHOCYTES NFR BLD AUTO: 15 %
MAGNESIUM SERPL-MCNC: 1.95 MG/DL (ref 1.6–2.4)
MCH RBC QN AUTO: 39.1 PG (ref 26–34)
MCH RBC QN AUTO: 39.7 PG (ref 26–34)
MCHC RBC AUTO-ENTMCNC: 36.5 G/DL (ref 32–36)
MCHC RBC AUTO-ENTMCNC: 37.2 G/DL (ref 32–36)
MCV RBC AUTO: 107 FL (ref 80–100)
MCV RBC AUTO: 107 FL (ref 80–100)
MONOCYTES # BLD AUTO: 0.34 X10*3/UL (ref 0.1–1)
MONOCYTES # BLD AUTO: 0.34 X10*3/UL (ref 0.1–1)
MONOCYTES NFR BLD AUTO: 7.6 %
MONOCYTES NFR BLD AUTO: 7.6 %
NEUTROPHILS # BLD AUTO: 3.38 X10*3/UL (ref 1.2–7.7)
NEUTROPHILS # BLD AUTO: 3.42 X10*3/UL (ref 1.2–7.7)
NEUTROPHILS NFR BLD AUTO: 75.9 %
NEUTROPHILS NFR BLD AUTO: 77 %
NRBC BLD-RTO: 0 /100 WBCS (ref 0–0)
NRBC BLD-RTO: 0 /100 WBCS (ref 0–0)
PLATELET # BLD AUTO: 82 X10*3/UL (ref 150–450)
PLATELET # BLD AUTO: 84 X10*3/UL (ref 150–450)
POTASSIUM SERPL-SCNC: 3.9 MMOL/L (ref 3.5–5.3)
PROT SERPL-MCNC: 6.6 G/DL (ref 6.4–8.2)
RBC # BLD AUTO: 3.17 X10*6/UL (ref 4.5–5.9)
RBC # BLD AUTO: 3.2 X10*6/UL (ref 4.5–5.9)
SODIUM SERPL-SCNC: 138 MMOL/L (ref 136–145)
TACROLIMUS BLD-MCNC: 4.3 NG/ML
WBC # BLD AUTO: 4.5 X10*3/UL (ref 4.4–11.3)
WBC # BLD AUTO: 4.5 X10*3/UL (ref 4.4–11.3)

## 2025-06-03 PROCEDURE — 2500000004 HC RX 250 GENERAL PHARMACY W/ HCPCS (ALT 636 FOR OP/ED): Performed by: INTERNAL MEDICINE

## 2025-06-03 PROCEDURE — 88185 FLOWCYTOMETRY/TC ADD-ON: CPT

## 2025-06-03 PROCEDURE — 1126F AMNT PAIN NOTED NONE PRSNT: CPT | Performed by: STUDENT IN AN ORGANIZED HEALTH CARE EDUCATION/TRAINING PROGRAM

## 2025-06-03 PROCEDURE — 80197 ASSAY OF TACROLIMUS: CPT

## 2025-06-03 PROCEDURE — 85025 COMPLETE CBC W/AUTO DIFF WBC: CPT

## 2025-06-03 PROCEDURE — 87799 DETECT AGENT NOS DNA QUANT: CPT

## 2025-06-03 PROCEDURE — 83735 ASSAY OF MAGNESIUM: CPT

## 2025-06-03 PROCEDURE — 99214 OFFICE O/P EST MOD 30 MIN: CPT | Performed by: STUDENT IN AN ORGANIZED HEALTH CARE EDUCATION/TRAINING PROGRAM

## 2025-06-03 PROCEDURE — 36591 DRAW BLOOD OFF VENOUS DEVICE: CPT

## 2025-06-03 PROCEDURE — 3075F SYST BP GE 130 - 139MM HG: CPT | Performed by: STUDENT IN AN ORGANIZED HEALTH CARE EDUCATION/TRAINING PROGRAM

## 2025-06-03 PROCEDURE — 82784 ASSAY IGA/IGD/IGG/IGM EACH: CPT

## 2025-06-03 PROCEDURE — 1159F MED LIST DOCD IN RCRD: CPT | Performed by: STUDENT IN AN ORGANIZED HEALTH CARE EDUCATION/TRAINING PROGRAM

## 2025-06-03 PROCEDURE — 80053 COMPREHEN METABOLIC PANEL: CPT

## 2025-06-03 PROCEDURE — 3079F DIAST BP 80-89 MM HG: CPT | Performed by: STUDENT IN AN ORGANIZED HEALTH CARE EDUCATION/TRAINING PROGRAM

## 2025-06-03 RX ORDER — HEPARIN 100 UNIT/ML
500 SYRINGE INTRAVENOUS AS NEEDED
Status: DISCONTINUED | OUTPATIENT
Start: 2025-06-03 | End: 2025-06-03 | Stop reason: HOSPADM

## 2025-06-03 RX ORDER — HEPARIN SODIUM,PORCINE/PF 10 UNIT/ML
50 SYRINGE (ML) INTRAVENOUS AS NEEDED
OUTPATIENT
Start: 2025-06-03

## 2025-06-03 RX ORDER — HEPARIN 100 UNIT/ML
500 SYRINGE INTRAVENOUS AS NEEDED
OUTPATIENT
Start: 2025-06-03

## 2025-06-03 RX ORDER — PREDNISONE 5 MG/1
TABLET ORAL
Qty: 30 TABLET | Refills: 0 | Status: SHIPPED | OUTPATIENT
Start: 2025-06-03

## 2025-06-03 RX ADMIN — HEPARIN 500 UNITS: 100 SYRINGE at 08:34

## 2025-06-03 ASSESSMENT — PAIN SCALES - GENERAL: PAINLEVEL_OUTOF10: 0-NO PAIN

## 2025-06-03 NOTE — ASSESSMENT & PLAN NOTE
Counts remain low but plts slowly starting rise on eltrombopag, D100 bmbx shows no AML but focal BRENDON.  Eltrombopag initiated 2/18/2025, cont at current dose but consider discontinuing at next visit if platelets continue to improve.   Current evidence of disease: No  Maintenance Therapy: No  MRD Monitoring Plan: AML MFC; Day 100 BMBx 3/3/25 no evidence of leukemia; there is residual focal mast cell atypia. T+180 PB Chimerisms 98% (CD3) and 100% (CD33)

## 2025-06-03 NOTE — ASSESSMENT & PLAN NOTE
Remains at high risk for infections.  No current active signs or symptoms of infection.    Continues on prophylaxis with acyclovir and isavuconazonium.  TMP/SMX briefly held given hyperkalemia and renal insufficiency 1/10-1/17. Recent sCr has been elevated, but feel more related to GI issues at this time.   Ongoing viral monitoring (as below).   Immune reconstitution: CD4 189; next due at T+180 (pending)   Post Tx Immunizations started 5/20/2025.

## 2025-06-03 NOTE — ASSESSMENT & PLAN NOTE
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   - Continue current immunosuppressive therapy; tacro 4 mg BID; level today pending. Plan to start tapering once completely off prednisone and no evidence of GVHD.   - Currently on pred 10 mg daily; decrease today to 5 mg alternating with 10 mg; will decrease to 5 mg every other day at next visit.   - Last BM chimerism 99% donor.  - D+180 PB chimerisms 98% (CD3) and 100% (CD33)

## 2025-06-03 NOTE — PROGRESS NOTES
"    Patient ID: Jin Reynolds is a 70 y.o. male.  Primary Oncologist: Dr. 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   8/23/2023 Initial Diagnosis    DX:  SMOLDERING SYSTEMIC MASTOCYTOSIS  Presented with skin rash and eosinophilia    BRENDON DIAGNOSTIC CRITERIA  (For SM, Need major and 1 minor OR at least 3 minors)  - Multi-focal, dense infiltrates of MC (>= 15 mast cells in aggregates) in BM and/or other extra-cutaneous organs [major]  - In BM or extracutaneous organs, >25% MC spindle shaped OR have atypical morphology OR of all MC on BM aspirate smears, >25% are immature or atypical [minor]  - Detection of activating mutation KIT D816V in BM, PB, or other extracutaneous organ [minor]  - Serum tryptase persistently exceeds 20 ng/mL (unless associated clonal myeloid disorder, in which this paramenter is not valid) [minor]    \"B\" FINDINGS  - BM biopsy showing >30% infiltration (focal, dense aggregates) and/or serum tryptase > 200 ng/mL  - Signs of dysplasia or myeloproliferation, in non-MC lineages but insufficient criteria for definitive diagnosis of AHNMD with normal or slightly abnormal blood counts    \"C\" FINDINGS  None       8/23/2023 Biopsy    BONE MARROW (8/23/23)  Hypercellular (90-95%) with trilineage hematopoiesis, granulocytic hyperplasia, eosinophilia, and increased atypical mast cells  IP:  CD34+, +, CD7(bright)+, cCD3-, CD33-, CD15-, CD13+ blast population  IHC:  + increased spindle-shaped mast cells (15% cellularity), CD2-  Myeloid NGS:  CBL (31%), cKIT D816V (30%), RUNX1 x 2 (19%, 29%), SRSF2 (47%) [ASXL1 negative]  FISH:  PDGFRb negative    SERUM TRYPTASE  228 (8/23/23)  268 (9/5/23)     2/13/2024 -  Molecular Therapy    AVAPRITINIB   - Starting dose 25 mg daily (non-Adv SM)  - Not started due to improvement in symptoms.        7/16/2024 - 10/21/2024 Chemotherapy    Venetoclax / AzaCITIDine  - " C1D1 7/17/24:  complicated by TLS, venetoclax held after D1  - Post C1 BM (8/28/24):  hypercellular with >50% blasts, background systemic mastocytosis  - C2D1 9/5/24:  venetoclax restarted with C2, no TLS  - Post C2 BM (9/25/24):  hypocellular (<5%), atypical myeloblasts by IP/IHC (1-2%), persistent systemic mastocytosis  - C3D1 10/15/24:  delayed 1 week for count recovery  - Post C3 BM (10/23/24):  hypercellular with systemic mastocytosis, no increase blasts, 0.2% atypical blasts by IP, NGS w/ CBL (73%), cKIT (4%), RUNX1 x 2 (4%, 38%), SRSF2 (40%)       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    CONDITIONING Fludarabine, melphalan, and TBI   DONOR Matched sibling   MATCH GRADE 12/12   SEX MATCH Mismatched   ABO DONOR O pos   ABO RECIPIENT O pos   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, Mycophenolate, and Tacrolimus   GRAFT SOURCE Peripheral blood          2/14/2025 -  Molecular Therapy    AVAPRITINIB   - Persistent mast cells noted on T+30 and T+100 Post Tx Bms. Elevated levels of serum tryptase.   - Initiated Avapritinib 50 mg daily on 2/14/2025.     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)   6/27/2024 Initial Diagnosis    ICC 2022 DX: Acute myeloid leukemia, NOS (>=20% blasts)  [id number] AML Risk Stratification: INTERMEDIATE: Cytogenetic and/or molecular abnormalities not classified as favorable or adverse  Presented with pancytopenia and circulating blasts    BONE MARROW (06/27/2024)  Marrow replaced by blasts, fibrotic foci, some atypical + cells; 7-8% circulating blasts; aspicular  - Unable to perform additional studies due to aspicular specimen    PERIPHERAL BLOOD (6/27/2024)  FISH:  positive for gain RUNX1    PERIPHERAL BLOOD (7/1/24)  IP:  abnl myeloblast population (CD34+, CD7+, CD4+, CD13+, CD38+, CD11+ dim, HLA=DR+, TdT+, CD33-)  Myeloid NGS: CBL C404Y (41%), KIT D816V (8%), RUNX1 x2 (8%,  30%), SRSF2 (37%)     7/16/2024 - 10/21/2024 Chemotherapy    Venetoclax / AzaCITIDine  - C1D1 7/17/24:  complicated by TLS, venetoclax held after D1  - Post C1 BM (8/28/24):  hypercellular with >50% blasts, background systemic mastocytosis  - C2D1 9/5/24:  venetoclax restarted with C2, no TLS  - Post C2 BM (9/25/24):  hypocellular (<5%), atypical myeloblasts by IP/IHC (1-2%), persistent systemic mastocytosis  - C3D1 10/15/24:  delayed 1 week for count recovery  - Post C3 BM (10/23/24):  hypercellular with systemic mastocytosis, no increase blasts, 0.2% atypical blasts by IP, NGS w/ CBL (73%), cKIT (4%), RUNX1 x 2 (4%, 38%), SRSF2 (40%)       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    CONDITIONING Fludarabine, melphalan, and TBI   DONOR Matched sibling   MATCH GRADE 12/12   SEX MATCH Mismatched   ABO DONOR O pos   ABO RECIPIENT O pos   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, Mycophenolate, and Tacrolimus   GRAFT SOURCE Peripheral blood          12/26/2024 -  Infection    Parainfluenza URI     12/26/2024 -  Disease Reevaluation      DATE DAY SOURCE MORPHOLOGY MRD WHOLE CHIMERISM   (% donor) CD3 CHIMERISM   (% donor) CD33 CHIMERISM   (% donor)   12/26/2024 D+30 PB        12/26/2024 D+30 BM No AML, persist BRENDON Neg %     1/20/2025  D+60 PB    96% 100%   3/25/2025 D+100 PB        3/3/2025 D+100 BM No AML, persist BRENDON (focal) Neg MFC 99%     5/20/2025 D+180 PB    98% 100%           06/03/25 T+ 194 Doing well, on pred taper for upper GI GVHD.     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  Eltrombopag initiated 2/18/2025, cont at current dose but consider discontinuing at next visit if platelets continue to improve.   Current evidence of disease: No  Maintenance Therapy: No  MRD Monitoring Plan: AML MFC; Day 100 BMBx 3/3/25 no evidence of leukemia; there is residual focal mast cell  atypia. 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   - Continue current immunosuppressive therapy; tacro 4 mg BID; level today pending. Plan to start tapering once completely off prednisone and no evidence of GVHD.   - Currently on pred 10 mg daily; decrease today to 5 mg alternating with 10 mg; will decrease to 5 mg every other day at next visit.   - Last BM chimerism 99% donor.  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 Tryptase level has decreased from 70 to 14 after a month on avapritinib, indicating improvement in symptoms. Tryptase levels will be monitored biannually, next due September.   Immunocompromised state  Remains at high risk for infections.  No current active signs or symptoms of infection.    Continues on prophylaxis with acyclovir and isavuconazonium.  TMP/SMX briefly held given hyperkalemia and renal insufficiency 1/10-1/17. Recent sCr has been elevated, but feel more related to GI issues at this time.   Ongoing viral monitoring (as below).   Immune reconstitution: CD4 189; next due at T+180 (pending)   Post Tx Immunizations started 5/20/2025.   Hypomagnesemia  Continue oral supplements.      SUBJECTIVE     Days since transplant: 194     Presents for routine post transplant follow up today. Doing well without acute complaints. Remains physically active, walking about 30 min daily. Taking walks to daughter's house. Appetite has been great. Was to decrease prednisone to 20 mg alternating with 10 mg after last visit, but remained on 20 mg daily until about 3-4 days ago when he self decreased to 10 mg daily. Doing well on current dose without return of nausea or anorexia.       OBJECTIVE     BSA: 1.9 meters squared  /85   Pulse 66   Temp 36.2 °C (97.2 °F)    Resp 17   Wt 77.9 kg (171 lb 11.8 oz)   SpO2 100%   BMI 27.72 kg/m²   Weight         6/3/2025  0843             Weight: 77.9 kg (171 lb 11.8 oz)               Physical Exam  Vitals reviewed.   Constitutional:       Appearance: Normal appearance.   HENT:      Mouth/Throat:      Mouth: Mucous membranes are moist.      Pharynx: Oropharynx is clear.   Eyes:      Conjunctiva/sclera: Conjunctivae normal.      Pupils: Pupils are equal, round, and reactive to light.   Pulmonary:      Effort: Pulmonary effort is normal.   Skin:     General: Skin is warm and dry.      Findings: No rash.   Psychiatric:         Mood and Affect: Mood normal.       Performance Status:  Karnofsky Score: 90 - Able to carry on normal activity; minor signs or symptoms of disease       POST TRANSPLANT MONITORING   GVHD  Acute GVHD Overall Grade:    Chronic GVHD Total Score:      VIRAL MONITORING    CMV    Lab Results   Component Value Date    CMVDNAPCR Not Detected 05/20/2025    CMVDNAPCR Not Detected 05/02/2025    CMVDNAPCR Not Detected 04/22/2025    CMVDNAPCR Not Detected 04/15/2025    CMVDNAPCR Not Detected 04/08/2025     Lab Results   Component Value Date    CMVPCRL  05/20/2025      Comment:      Not calculated    CMVPCRL  05/02/2025      Comment:      Not calculated    CMVPCRL  04/22/2025      Comment:      Not calculated    CMVPCRL  04/15/2025      Comment:      Not calculated    CMVPCRL  04/08/2025      Comment:      Not calculated      EBV    Lab Results   Component Value Date    EBVWBPCR Not Detected 05/20/2025    EBVWBPCR <500 Detected (A) 05/02/2025    EBVWBPCR Not Detected 04/22/2025    EBVWBPCR Not Detected 04/15/2025    EBVWBPCR <500 Detected (A) 04/08/2025    Lab Results   Component Value Date    EBVPCRQNTWB 653 (H) 02/11/2025      ADENO    Lab Results   Component Value Date    ADEPB Not Detected 05/20/2025    ADEPB Not Detected 05/02/2025    ADEPB Not Detected 04/22/2025    ADEPB Not Detected 04/15/2025    ADEPB Not Detected  04/08/2025           TMA Monitoring:     MARKERS RECENT 3 VALUES   LDH Lab Results   Component Value Date     04/01/2025     03/25/2025     03/18/2025      Haptoglobin Lab Results   Component Value Date    HAPTOGLOBIN 39 04/01/2025    HAPTOGLOBIN 39 03/25/2025    HAPTOGLOBIN 58 03/18/2025      BP BP Readings from Last 3 Encounters:   06/03/25 139/85   05/20/25 129/75   05/06/25 125/77      Serum Creatinine Lab Results   Component Value Date    CREATININE 1.29 05/20/2025    CREATININE 1.58 (H) 05/02/2025    CREATININE 1.14 04/22/2025      Urine Protein/Creat Ratio Lab Results   Component Value Date    UTPCR 0.07 04/01/2025    UTPCR 0.08 03/25/2025    UTPCR 0.09 03/18/2025      Tacrolimus Level Lab Results   Component Value Date    TACROLIMUS 3.4 05/20/2025    TACROLIMUS 8.1 05/02/2025    TACROLIMUS 2.7 04/22/2025            Kalia June PA-C

## 2025-06-04 ENCOUNTER — PHARMACY VISIT (OUTPATIENT)
Dept: PHARMACY | Facility: CLINIC | Age: 70
End: 2025-06-04
Payer: COMMERCIAL

## 2025-06-04 LAB
ADENOVIRUS QPCR,PLASMA, VIRC: NOT DETECTED COPIES/ML
CD19 CELLS # BLD: 0.15 X10E9/L (ref 0.07–0.91)
CD19 CELLS NFR BLD: 22 % (ref 6–19)
CD3 CELLS # BLD: 0.38 X10E9/L (ref 0.71–4.18)
CD3 CELLS NFR SPEC: 56 % (ref 59–87)
CD3+CD4+ CELLS # BLD: 0.25 X10E9/L (ref 0.35–2.74)
CD3+CD4+ CELLS # BLD: 248 /MM3 (ref 350–2740)
CD3+CD4+ CELLS NFR BLD: 37 % (ref 29–57)
CD3+CD4+ CELLS/CD3+CD8+ CLL BLD: 2.06 % (ref 1–3.5)
CD3+CD4-CD8-CD45+ CELLS NFR BLD: 1 % (ref 0–6)
CD3+CD8+ CELLS # BLD: 0.12 X10E9/L (ref 0.08–1.49)
CD3+CD8+ CELLS NFR BLD: 18 % (ref 7–31)
CD3-CD16+CD56+ CELLS # BLD: 0.14 X10E9/L (ref 0–0.86)
CD3-CD16+CD56+ CELLS NFR BLD: 21 % (ref 0–18)
CMV DNA SERPL NAA+PROBE-LOG IU: NORMAL {LOG_IU}/ML
EBV DNA BLD NAA+PROBE-LOG IU: NORMAL {LOG_IU}/ML
FLOW CYTOMETRY SPECIALIST REVIEW: ABNORMAL
LABORATORY COMMENT REPORT: NOT DETECTED
LABORATORY COMMENT REPORT: NOT DETECTED
LYMPHOCYTES # SPEC AUTO: 0.67 X10*3/UL
PATH REVIEW, IMMUNODEFICIENCY PROFILE: ABNORMAL

## 2025-06-13 ENCOUNTER — INFUSION (OUTPATIENT)
Dept: HEMATOLOGY/ONCOLOGY | Facility: CLINIC | Age: 70
End: 2025-06-13
Payer: MEDICARE

## 2025-06-13 VITALS
OXYGEN SATURATION: 98 % | HEART RATE: 66 BPM | DIASTOLIC BLOOD PRESSURE: 82 MMHG | WEIGHT: 178.2 LBS | SYSTOLIC BLOOD PRESSURE: 131 MMHG | TEMPERATURE: 97.2 F | RESPIRATION RATE: 18 BRPM | BODY MASS INDEX: 28.76 KG/M2

## 2025-06-13 DIAGNOSIS — Z94.81 S/P ALLOGENEIC BONE MARROW TRANSPLANT (MULTI): ICD-10-CM

## 2025-06-13 DIAGNOSIS — C92.01 ACUTE MYELOID LEUKEMIA IN REMISSION (MULTI): ICD-10-CM

## 2025-06-13 DIAGNOSIS — D47.02 SYSTEMIC MASTOCYTOSIS WITH ASSOCIATED CLONAL HEMATOLOGICAL NON-MAST CELL LINEAGE DISEASE: ICD-10-CM

## 2025-06-13 DIAGNOSIS — Z94.84 HISTORY OF STEM CELL TRANSPLANT (MULTI): ICD-10-CM

## 2025-06-13 LAB
BASOPHILS # BLD AUTO: 0.02 X10*3/UL (ref 0–0.1)
BASOPHILS NFR BLD AUTO: 0.5 %
EOSINOPHIL # BLD AUTO: 0.04 X10*3/UL (ref 0–0.7)
EOSINOPHIL NFR BLD AUTO: 1 %
ERYTHROCYTE [DISTWIDTH] IN BLOOD BY AUTOMATED COUNT: 14 % (ref 11.5–14.5)
HCT VFR BLD AUTO: 32.9 % (ref 41–52)
HGB BLD-MCNC: 11.8 G/DL (ref 13.5–17.5)
IMM GRANULOCYTES # BLD AUTO: 0.01 X10*3/UL (ref 0–0.7)
IMM GRANULOCYTES NFR BLD AUTO: 0.3 % (ref 0–0.9)
LYMPHOCYTES # BLD AUTO: 0.48 X10*3/UL (ref 1.2–4.8)
LYMPHOCYTES NFR BLD AUTO: 12.2 %
MCH RBC QN AUTO: 39.1 PG (ref 26–34)
MCHC RBC AUTO-ENTMCNC: 35.9 G/DL (ref 32–36)
MCV RBC AUTO: 109 FL (ref 80–100)
MONOCYTES # BLD AUTO: 0.31 X10*3/UL (ref 0.1–1)
MONOCYTES NFR BLD AUTO: 7.9 %
NEUTROPHILS # BLD AUTO: 3.07 X10*3/UL (ref 1.2–7.7)
NEUTROPHILS NFR BLD AUTO: 78.1 %
NRBC BLD-RTO: ABNORMAL /100{WBCS}
PLATELET # BLD AUTO: 86 X10*3/UL (ref 150–450)
RBC # BLD AUTO: 3.02 X10*6/UL (ref 4.5–5.9)
WBC # BLD AUTO: 3.9 X10*3/UL (ref 4.4–11.3)

## 2025-06-13 PROCEDURE — 84075 ASSAY ALKALINE PHOSPHATASE: CPT

## 2025-06-13 PROCEDURE — 87799 DETECT AGENT NOS DNA QUANT: CPT

## 2025-06-13 PROCEDURE — 83735 ASSAY OF MAGNESIUM: CPT

## 2025-06-13 PROCEDURE — 85025 COMPLETE CBC W/AUTO DIFF WBC: CPT

## 2025-06-13 PROCEDURE — 36591 DRAW BLOOD OFF VENOUS DEVICE: CPT

## 2025-06-13 PROCEDURE — 80197 ASSAY OF TACROLIMUS: CPT

## 2025-06-13 PROCEDURE — 2500000004 HC RX 250 GENERAL PHARMACY W/ HCPCS (ALT 636 FOR OP/ED): Performed by: INTERNAL MEDICINE

## 2025-06-13 RX ORDER — HEPARIN 100 UNIT/ML
500 SYRINGE INTRAVENOUS AS NEEDED
Status: DISCONTINUED | OUTPATIENT
Start: 2025-06-13 | End: 2025-06-13 | Stop reason: HOSPADM

## 2025-06-13 RX ORDER — HEPARIN SODIUM,PORCINE/PF 10 UNIT/ML
50 SYRINGE (ML) INTRAVENOUS AS NEEDED
Status: DISCONTINUED | OUTPATIENT
Start: 2025-06-13 | End: 2025-06-13 | Stop reason: HOSPADM

## 2025-06-13 RX ORDER — HEPARIN 100 UNIT/ML
500 SYRINGE INTRAVENOUS AS NEEDED
OUTPATIENT
Start: 2025-06-13

## 2025-06-13 RX ORDER — HEPARIN SODIUM,PORCINE/PF 10 UNIT/ML
50 SYRINGE (ML) INTRAVENOUS AS NEEDED
OUTPATIENT
Start: 2025-06-13

## 2025-06-13 RX ADMIN — HEPARIN 500 UNITS: 100 SYRINGE at 12:29

## 2025-06-13 ASSESSMENT — PAIN SCALES - GENERAL: PAINLEVEL_OUTOF10: 0-NO PAIN

## 2025-06-14 LAB
ALBUMIN SERPL BCP-MCNC: 4.3 G/DL (ref 3.4–5)
ALP SERPL-CCNC: 48 U/L (ref 33–136)
ALT SERPL W P-5'-P-CCNC: 16 U/L (ref 10–52)
ANION GAP SERPL CALC-SCNC: 13 MMOL/L (ref 10–20)
AST SERPL W P-5'-P-CCNC: 15 U/L (ref 9–39)
BILIRUB SERPL-MCNC: 0.6 MG/DL (ref 0–1.2)
BUN SERPL-MCNC: 28 MG/DL (ref 6–23)
CALCIUM SERPL-MCNC: 8.8 MG/DL (ref 8.6–10.6)
CHLORIDE SERPL-SCNC: 108 MMOL/L (ref 98–107)
CO2 SERPL-SCNC: 23 MMOL/L (ref 21–32)
CREAT SERPL-MCNC: 1.76 MG/DL (ref 0.5–1.3)
EGFRCR SERPLBLD CKD-EPI 2021: 41 ML/MIN/1.73M*2
GLUCOSE SERPL-MCNC: 89 MG/DL (ref 74–99)
MAGNESIUM SERPL-MCNC: 1.98 MG/DL (ref 1.6–2.4)
POTASSIUM SERPL-SCNC: 3.9 MMOL/L (ref 3.5–5.3)
PROT SERPL-MCNC: 6.1 G/DL (ref 6.4–8.2)
SODIUM SERPL-SCNC: 140 MMOL/L (ref 136–145)
TACROLIMUS BLD-MCNC: 2.4 NG/ML

## 2025-06-15 LAB
CMV DNA SERPL NAA+PROBE-LOG IU: NORMAL {LOG_IU}/ML
LABORATORY COMMENT REPORT: NOT DETECTED

## 2025-06-16 LAB
EBV DNA BLD NAA+PROBE-LOG IU: NORMAL {LOG_IU}/ML
LABORATORY COMMENT REPORT: NOT DETECTED

## 2025-06-17 ENCOUNTER — APPOINTMENT (OUTPATIENT)
Dept: HEMATOLOGY/ONCOLOGY | Facility: HOSPITAL | Age: 70
End: 2025-06-17
Payer: MEDICARE

## 2025-06-17 DIAGNOSIS — Z94.84 HISTORY OF STEM CELL TRANSPLANT (MULTI): Primary | ICD-10-CM

## 2025-06-17 DIAGNOSIS — C92.01 ACUTE MYELOID LEUKEMIA IN REMISSION (MULTI): ICD-10-CM

## 2025-06-18 LAB — ADENOVIRUS QPCR,PLASMA, VIRC: NOT DETECTED COPIES/ML

## 2025-06-19 ENCOUNTER — INFUSION (OUTPATIENT)
Dept: HEMATOLOGY/ONCOLOGY | Facility: CLINIC | Age: 70
End: 2025-06-19
Payer: MEDICARE

## 2025-06-19 VITALS
BODY MASS INDEX: 28.54 KG/M2 | WEIGHT: 176.81 LBS | DIASTOLIC BLOOD PRESSURE: 67 MMHG | TEMPERATURE: 96.1 F | SYSTOLIC BLOOD PRESSURE: 134 MMHG | RESPIRATION RATE: 16 BRPM | HEART RATE: 76 BPM

## 2025-06-19 DIAGNOSIS — Z94.81 S/P ALLOGENEIC BONE MARROW TRANSPLANT (MULTI): ICD-10-CM

## 2025-06-19 DIAGNOSIS — Z94.84 HISTORY OF STEM CELL TRANSPLANT (MULTI): ICD-10-CM

## 2025-06-19 DIAGNOSIS — C92.01 ACUTE MYELOID LEUKEMIA IN REMISSION (MULTI): ICD-10-CM

## 2025-06-19 DIAGNOSIS — D47.02 SYSTEMIC MASTOCYTOSIS WITH ASSOCIATED CLONAL HEMATOLOGICAL NON-MAST CELL LINEAGE DISEASE: ICD-10-CM

## 2025-06-19 LAB
ALBUMIN SERPL BCP-MCNC: 4.2 G/DL (ref 3.4–5)
ALP SERPL-CCNC: 47 U/L (ref 33–136)
ALT SERPL W P-5'-P-CCNC: 16 U/L (ref 10–52)
ANION GAP SERPL CALC-SCNC: 13 MMOL/L (ref 10–20)
AST SERPL W P-5'-P-CCNC: 14 U/L (ref 9–39)
BASOPHILS # BLD AUTO: 0.01 X10*3/UL (ref 0–0.1)
BASOPHILS NFR BLD AUTO: 0.3 %
BILIRUB SERPL-MCNC: 0.7 MG/DL (ref 0–1.2)
BUN SERPL-MCNC: 15 MG/DL (ref 6–23)
CALCIUM SERPL-MCNC: 8.4 MG/DL (ref 8.6–10.6)
CHLORIDE SERPL-SCNC: 108 MMOL/L (ref 98–107)
CO2 SERPL-SCNC: 22 MMOL/L (ref 21–32)
CREAT SERPL-MCNC: 1.27 MG/DL (ref 0.5–1.3)
EGFRCR SERPLBLD CKD-EPI 2021: 61 ML/MIN/1.73M*2
EOSINOPHIL # BLD AUTO: 0.03 X10*3/UL (ref 0–0.7)
EOSINOPHIL NFR BLD AUTO: 0.9 %
ERYTHROCYTE [DISTWIDTH] IN BLOOD BY AUTOMATED COUNT: 14.1 % (ref 11.5–14.5)
GLUCOSE SERPL-MCNC: 93 MG/DL (ref 74–99)
HCT VFR BLD AUTO: 32.1 % (ref 41–52)
HGB BLD-MCNC: 11.4 G/DL (ref 13.5–17.5)
IMM GRANULOCYTES # BLD AUTO: 0 X10*3/UL (ref 0–0.7)
IMM GRANULOCYTES NFR BLD AUTO: 0 % (ref 0–0.9)
LYMPHOCYTES # BLD AUTO: 0.49 X10*3/UL (ref 1.2–4.8)
LYMPHOCYTES NFR BLD AUTO: 14 %
MAGNESIUM SERPL-MCNC: 1.98 MG/DL (ref 1.6–2.4)
MCH RBC QN AUTO: 38.5 PG (ref 26–34)
MCHC RBC AUTO-ENTMCNC: 35.5 G/DL (ref 32–36)
MCV RBC AUTO: 108 FL (ref 80–100)
MONOCYTES # BLD AUTO: 0.3 X10*3/UL (ref 0.1–1)
MONOCYTES NFR BLD AUTO: 8.6 %
NEUTROPHILS # BLD AUTO: 2.66 X10*3/UL (ref 1.2–7.7)
NEUTROPHILS NFR BLD AUTO: 76.2 %
PLATELET # BLD AUTO: 90 X10*3/UL (ref 150–450)
POTASSIUM SERPL-SCNC: 3.9 MMOL/L (ref 3.5–5.3)
PROT SERPL-MCNC: 6 G/DL (ref 6.4–8.2)
RBC # BLD AUTO: 2.96 X10*6/UL (ref 4.5–5.9)
SODIUM SERPL-SCNC: 139 MMOL/L (ref 136–145)
TACROLIMUS BLD-MCNC: 3.2 NG/ML
WBC # BLD AUTO: 3.5 X10*3/UL (ref 4.4–11.3)

## 2025-06-19 PROCEDURE — 96360 HYDRATION IV INFUSION INIT: CPT | Mod: INF

## 2025-06-19 PROCEDURE — 2500000004 HC RX 250 GENERAL PHARMACY W/ HCPCS (ALT 636 FOR OP/ED): Performed by: STUDENT IN AN ORGANIZED HEALTH CARE EDUCATION/TRAINING PROGRAM

## 2025-06-19 PROCEDURE — 80197 ASSAY OF TACROLIMUS: CPT

## 2025-06-19 PROCEDURE — 83735 ASSAY OF MAGNESIUM: CPT

## 2025-06-19 PROCEDURE — 85025 COMPLETE CBC W/AUTO DIFF WBC: CPT

## 2025-06-19 PROCEDURE — 87799 DETECT AGENT NOS DNA QUANT: CPT

## 2025-06-19 PROCEDURE — 2500000004 HC RX 250 GENERAL PHARMACY W/ HCPCS (ALT 636 FOR OP/ED): Performed by: INTERNAL MEDICINE

## 2025-06-19 PROCEDURE — 84075 ASSAY ALKALINE PHOSPHATASE: CPT

## 2025-06-19 RX ORDER — HEPARIN 100 UNIT/ML
500 SYRINGE INTRAVENOUS AS NEEDED
OUTPATIENT
Start: 2025-06-19

## 2025-06-19 RX ORDER — HEPARIN SODIUM,PORCINE/PF 10 UNIT/ML
50 SYRINGE (ML) INTRAVENOUS AS NEEDED
Status: DISCONTINUED | OUTPATIENT
Start: 2025-06-19 | End: 2025-06-19 | Stop reason: HOSPADM

## 2025-06-19 RX ORDER — HEPARIN SODIUM,PORCINE/PF 10 UNIT/ML
50 SYRINGE (ML) INTRAVENOUS AS NEEDED
OUTPATIENT
Start: 2025-06-19

## 2025-06-19 RX ORDER — HEPARIN 100 UNIT/ML
500 SYRINGE INTRAVENOUS AS NEEDED
Status: DISCONTINUED | OUTPATIENT
Start: 2025-06-19 | End: 2025-06-19 | Stop reason: HOSPADM

## 2025-06-19 RX ADMIN — SODIUM CHLORIDE 1000 ML: 9 INJECTION, SOLUTION INTRAVENOUS at 09:36

## 2025-06-19 RX ADMIN — HEPARIN 500 UNITS: 100 SYRINGE at 10:43

## 2025-06-19 ASSESSMENT — PAIN SCALES - GENERAL: PAINLEVEL_OUTOF10: 0-NO PAIN

## 2025-06-21 LAB — ADENOVIRUS QPCR,PLASMA, VIRC: NOT DETECTED COPIES/ML

## 2025-06-28 ENCOUNTER — SPECIALTY PHARMACY (OUTPATIENT)
Dept: PHARMACY | Facility: CLINIC | Age: 70
End: 2025-06-28

## 2025-06-28 PROCEDURE — RXMED WILLOW AMBULATORY MEDICATION CHARGE

## 2025-06-30 ENCOUNTER — LAB (OUTPATIENT)
Dept: HEMATOLOGY/ONCOLOGY | Facility: CLINIC | Age: 70
End: 2025-06-30
Payer: MEDICARE

## 2025-06-30 VITALS
DIASTOLIC BLOOD PRESSURE: 77 MMHG | HEART RATE: 64 BPM | SYSTOLIC BLOOD PRESSURE: 129 MMHG | RESPIRATION RATE: 18 BRPM | TEMPERATURE: 97.2 F | OXYGEN SATURATION: 99 %

## 2025-06-30 DIAGNOSIS — Z94.81 S/P ALLOGENEIC BONE MARROW TRANSPLANT (MULTI): ICD-10-CM

## 2025-06-30 DIAGNOSIS — C92.01 ACUTE MYELOID LEUKEMIA IN REMISSION (MULTI): ICD-10-CM

## 2025-06-30 DIAGNOSIS — D47.02 SYSTEMIC MASTOCYTOSIS WITH ASSOCIATED CLONAL HEMATOLOGICAL NON-MAST CELL LINEAGE DISEASE: ICD-10-CM

## 2025-06-30 DIAGNOSIS — Z94.84 HISTORY OF STEM CELL TRANSPLANT (MULTI): ICD-10-CM

## 2025-06-30 LAB
ALBUMIN SERPL BCP-MCNC: 4.5 G/DL (ref 3.4–5)
ALP SERPL-CCNC: 50 U/L (ref 33–136)
ALT SERPL W P-5'-P-CCNC: 16 U/L (ref 10–52)
ANION GAP SERPL CALC-SCNC: 12 MMOL/L (ref 10–20)
AST SERPL W P-5'-P-CCNC: 16 U/L (ref 9–39)
BASOPHILS # BLD AUTO: 0.01 X10*3/UL (ref 0–0.1)
BASOPHILS NFR BLD AUTO: 0.3 %
BILIRUB SERPL-MCNC: 0.6 MG/DL (ref 0–1.2)
BUN SERPL-MCNC: 19 MG/DL (ref 6–23)
CALCIUM SERPL-MCNC: 8.3 MG/DL (ref 8.6–10.6)
CHLORIDE SERPL-SCNC: 107 MMOL/L (ref 98–107)
CO2 SERPL-SCNC: 22 MMOL/L (ref 21–32)
CREAT SERPL-MCNC: 1.32 MG/DL (ref 0.5–1.3)
EGFRCR SERPLBLD CKD-EPI 2021: 58 ML/MIN/1.73M*2
EOSINOPHIL # BLD AUTO: 0.06 X10*3/UL (ref 0–0.7)
EOSINOPHIL NFR BLD AUTO: 1.5 %
ERYTHROCYTE [DISTWIDTH] IN BLOOD BY AUTOMATED COUNT: 13.7 % (ref 11.5–14.5)
GLUCOSE SERPL-MCNC: 122 MG/DL (ref 74–99)
HCT VFR BLD AUTO: 32.9 % (ref 41–52)
HGB BLD-MCNC: 11.8 G/DL (ref 13.5–17.5)
IMM GRANULOCYTES # BLD AUTO: 0.01 X10*3/UL (ref 0–0.7)
IMM GRANULOCYTES NFR BLD AUTO: 0.3 % (ref 0–0.9)
LYMPHOCYTES # BLD AUTO: 0.47 X10*3/UL (ref 1.2–4.8)
LYMPHOCYTES NFR BLD AUTO: 12 %
MAGNESIUM SERPL-MCNC: 1.88 MG/DL (ref 1.6–2.4)
MCH RBC QN AUTO: 38.9 PG (ref 26–34)
MCHC RBC AUTO-ENTMCNC: 35.9 G/DL (ref 32–36)
MCV RBC AUTO: 109 FL (ref 80–100)
MONOCYTES # BLD AUTO: 0.27 X10*3/UL (ref 0.1–1)
MONOCYTES NFR BLD AUTO: 6.9 %
NEUTROPHILS # BLD AUTO: 3.1 X10*3/UL (ref 1.2–7.7)
NEUTROPHILS NFR BLD AUTO: 79 %
NRBC BLD-RTO: ABNORMAL /100{WBCS}
PLATELET # BLD AUTO: 86 X10*3/UL (ref 150–450)
POTASSIUM SERPL-SCNC: 3.7 MMOL/L (ref 3.5–5.3)
PROT SERPL-MCNC: 6.2 G/DL (ref 6.4–8.2)
RBC # BLD AUTO: 3.03 X10*6/UL (ref 4.5–5.9)
SODIUM SERPL-SCNC: 137 MMOL/L (ref 136–145)
WBC # BLD AUTO: 3.9 X10*3/UL (ref 4.4–11.3)

## 2025-06-30 PROCEDURE — 87799 DETECT AGENT NOS DNA QUANT: CPT

## 2025-06-30 PROCEDURE — 80197 ASSAY OF TACROLIMUS: CPT

## 2025-06-30 PROCEDURE — 85025 COMPLETE CBC W/AUTO DIFF WBC: CPT

## 2025-06-30 PROCEDURE — 36415 COLL VENOUS BLD VENIPUNCTURE: CPT

## 2025-06-30 PROCEDURE — 36591 DRAW BLOOD OFF VENOUS DEVICE: CPT

## 2025-06-30 PROCEDURE — 80053 COMPREHEN METABOLIC PANEL: CPT

## 2025-06-30 PROCEDURE — 83735 ASSAY OF MAGNESIUM: CPT

## 2025-06-30 RX ORDER — HEPARIN 100 UNIT/ML
500 SYRINGE INTRAVENOUS AS NEEDED
OUTPATIENT
Start: 2025-06-30

## 2025-06-30 RX ORDER — HEPARIN 100 UNIT/ML
500 SYRINGE INTRAVENOUS AS NEEDED
Status: DISCONTINUED | OUTPATIENT
Start: 2025-06-30 | End: 2025-06-30 | Stop reason: HOSPADM

## 2025-06-30 RX ORDER — HEPARIN SODIUM,PORCINE/PF 10 UNIT/ML
50 SYRINGE (ML) INTRAVENOUS AS NEEDED
Status: DISCONTINUED | OUTPATIENT
Start: 2025-06-30 | End: 2025-06-30 | Stop reason: HOSPADM

## 2025-06-30 RX ORDER — HEPARIN SODIUM,PORCINE/PF 10 UNIT/ML
50 SYRINGE (ML) INTRAVENOUS AS NEEDED
OUTPATIENT
Start: 2025-06-30

## 2025-06-30 ASSESSMENT — PAIN SCALES - GENERAL: PAINLEVEL_OUTOF10: 0-NO PAIN

## 2025-07-01 ENCOUNTER — OFFICE VISIT (OUTPATIENT)
Dept: HEMATOLOGY/ONCOLOGY | Facility: HOSPITAL | Age: 70
End: 2025-07-01
Payer: MEDICARE

## 2025-07-01 VITALS
HEART RATE: 61 BPM | DIASTOLIC BLOOD PRESSURE: 86 MMHG | TEMPERATURE: 97 F | BODY MASS INDEX: 28.13 KG/M2 | OXYGEN SATURATION: 100 % | HEIGHT: 67 IN | SYSTOLIC BLOOD PRESSURE: 141 MMHG | RESPIRATION RATE: 17 BRPM | WEIGHT: 179.23 LBS

## 2025-07-01 DIAGNOSIS — D47.02 SYSTEMIC MASTOCYTOSIS WITH ASSOCIATED CLONAL HEMATOLOGICAL NON-MAST CELL LINEAGE DISEASE: ICD-10-CM

## 2025-07-01 DIAGNOSIS — E83.42 HYPOMAGNESEMIA: ICD-10-CM

## 2025-07-01 DIAGNOSIS — E55.9 VITAMIN D DEFICIENCY: ICD-10-CM

## 2025-07-01 DIAGNOSIS — M81.8 OTHER OSTEOPOROSIS WITHOUT CURRENT PATHOLOGICAL FRACTURE: ICD-10-CM

## 2025-07-01 DIAGNOSIS — D84.9 IMMUNOCOMPROMISED STATE: ICD-10-CM

## 2025-07-01 DIAGNOSIS — C92.01 ACUTE MYELOID LEUKEMIA IN REMISSION (MULTI): Primary | ICD-10-CM

## 2025-07-01 DIAGNOSIS — Z94.84 HISTORY OF STEM CELL TRANSPLANT (MULTI): ICD-10-CM

## 2025-07-01 LAB
CMV DNA SERPL NAA+PROBE-LOG IU: NORMAL {LOG_IU}/ML
EBV DNA BLD NAA+PROBE-LOG IU: NORMAL {LOG_IU}/ML
LABORATORY COMMENT REPORT: NOT DETECTED
LABORATORY COMMENT REPORT: NOT DETECTED
TACROLIMUS BLD-MCNC: 3 NG/ML

## 2025-07-01 PROCEDURE — 3008F BODY MASS INDEX DOCD: CPT | Performed by: STUDENT IN AN ORGANIZED HEALTH CARE EDUCATION/TRAINING PROGRAM

## 2025-07-01 PROCEDURE — 3077F SYST BP >= 140 MM HG: CPT | Performed by: STUDENT IN AN ORGANIZED HEALTH CARE EDUCATION/TRAINING PROGRAM

## 2025-07-01 PROCEDURE — 99214 OFFICE O/P EST MOD 30 MIN: CPT | Performed by: STUDENT IN AN ORGANIZED HEALTH CARE EDUCATION/TRAINING PROGRAM

## 2025-07-01 PROCEDURE — 1126F AMNT PAIN NOTED NONE PRSNT: CPT | Performed by: STUDENT IN AN ORGANIZED HEALTH CARE EDUCATION/TRAINING PROGRAM

## 2025-07-01 PROCEDURE — 3079F DIAST BP 80-89 MM HG: CPT | Performed by: STUDENT IN AN ORGANIZED HEALTH CARE EDUCATION/TRAINING PROGRAM

## 2025-07-01 PROCEDURE — 1036F TOBACCO NON-USER: CPT | Performed by: STUDENT IN AN ORGANIZED HEALTH CARE EDUCATION/TRAINING PROGRAM

## 2025-07-01 PROCEDURE — 1159F MED LIST DOCD IN RCRD: CPT | Performed by: STUDENT IN AN ORGANIZED HEALTH CARE EDUCATION/TRAINING PROGRAM

## 2025-07-01 RX ORDER — PREDNISONE 5 MG/1
5 TABLET ORAL DAILY
Qty: 30 TABLET | Refills: 0 | Status: SHIPPED | OUTPATIENT
Start: 2025-07-01

## 2025-07-01 ASSESSMENT — PAIN SCALES - GENERAL: PAINLEVEL_OUTOF10: 0-NO PAIN

## 2025-07-01 NOTE — ASSESSMENT & PLAN NOTE
Last DEXA 11/2023 showing osteoporosis in L spine, osteopenia in L femur. Last received zoledronic acid in 03/2024. Now on systemic oral steroids. Due for DEXA at T+1 year per SOP, scheduled 11/2025.

## 2025-07-01 NOTE — PROGRESS NOTES
"    Patient ID: Jin Reynolds is a 70 y.o. male.  Primary Oncologist: Dr. 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   8/23/2023 Initial Diagnosis    DX:  SMOLDERING SYSTEMIC MASTOCYTOSIS  Presented with skin rash and eosinophilia    BRENDON DIAGNOSTIC CRITERIA  (For SM, Need major and 1 minor OR at least 3 minors)  - Multi-focal, dense infiltrates of MC (>= 15 mast cells in aggregates) in BM and/or other extra-cutaneous organs [major]  - In BM or extracutaneous organs, >25% MC spindle shaped OR have atypical morphology OR of all MC on BM aspirate smears, >25% are immature or atypical [minor]  - Detection of activating mutation KIT D816V in BM, PB, or other extracutaneous organ [minor]  - Serum tryptase persistently exceeds 20 ng/mL (unless associated clonal myeloid disorder, in which this paramenter is not valid) [minor]    \"B\" FINDINGS  - BM biopsy showing >30% infiltration (focal, dense aggregates) and/or serum tryptase > 200 ng/mL  - Signs of dysplasia or myeloproliferation, in non-MC lineages but insufficient criteria for definitive diagnosis of AHNMD with normal or slightly abnormal blood counts    \"C\" FINDINGS  None       8/23/2023 Biopsy    BONE MARROW (8/23/23)  Hypercellular (90-95%) with trilineage hematopoiesis, granulocytic hyperplasia, eosinophilia, and increased atypical mast cells  IP:  CD34+, +, CD7(bright)+, cCD3-, CD33-, CD15-, CD13+ blast population  IHC:  + increased spindle-shaped mast cells (15% cellularity), CD2-  Myeloid NGS:  CBL (31%), cKIT D816V (30%), RUNX1 x 2 (19%, 29%), SRSF2 (47%) [ASXL1 negative]  FISH:  PDGFRb negative    SERUM TRYPTASE  228 (8/23/23)  268 (9/5/23)     2/13/2024 -  Molecular Therapy    AVAPRITINIB   - Starting dose 25 mg daily (non-Adv SM)  - Not started due to improvement in symptoms.        7/16/2024 - 10/21/2024 Chemotherapy    Venetoclax / AzaCITIDine  - " C1D1 7/17/24:  complicated by TLS, venetoclax held after D1  - Post C1 BM (8/28/24):  hypercellular with >50% blasts, background systemic mastocytosis  - C2D1 9/5/24:  venetoclax restarted with C2, no TLS  - Post C2 BM (9/25/24):  hypocellular (<5%), atypical myeloblasts by IP/IHC (1-2%), persistent systemic mastocytosis  - C3D1 10/15/24:  delayed 1 week for count recovery  - Post C3 BM (10/23/24):  hypercellular with systemic mastocytosis, no increase blasts, 0.2% atypical blasts by IP, NGS w/ CBL (73%), cKIT (4%), RUNX1 x 2 (4%, 38%), SRSF2 (40%)       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    CONDITIONING Fludarabine, melphalan, and TBI   DONOR Matched sibling   MATCH GRADE 12/12   SEX MATCH Mismatched   ABO DONOR O pos   ABO RECIPIENT O pos   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, Mycophenolate, and Tacrolimus   GRAFT SOURCE Peripheral blood          2/14/2025 -  Molecular Therapy    AVAPRITINIB   - Persistent mast cells noted on T+30 and T+100 Post Tx Bms. Elevated levels of serum tryptase.   - Initiated Avapritinib 50 mg daily on 2/14/2025.     DATE Serum Tryptase Notes   1/7/2025 71.5 Prior to CLIF start   3/11/2025 14.1 1 MO CLFI                  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)   6/27/2024 Initial Diagnosis    ICC 2022 DX: Acute myeloid leukemia, NOS (>=20% blasts)  [id number] AML Risk Stratification: INTERMEDIATE: Cytogenetic and/or molecular abnormalities not classified as favorable or adverse  Presented with pancytopenia and circulating blasts    BONE MARROW (06/27/2024)  Marrow replaced by blasts, fibrotic foci, some atypical + cells; 7-8% circulating blasts; aspicular  - Unable to perform additional studies due to aspicular specimen    PERIPHERAL BLOOD (6/27/2024)  FISH:  positive for gain RUNX1    PERIPHERAL BLOOD (7/1/24)  IP:  abnl myeloblast population (CD34+, CD7+, CD4+, CD13+, CD38+, CD11+ dim, HLA=DR+, TdT+, CD33-)  Myeloid NGS: CBL C404Y (41%), KIT D816V (8%), RUNX1 x2 (8%,  30%), SRSF2 (37%)     7/16/2024 - 10/21/2024 Chemotherapy    Venetoclax / AzaCITIDine  - C1D1 7/17/24:  complicated by TLS, venetoclax held after D1  - Post C1 BM (8/28/24):  hypercellular with >50% blasts, background systemic mastocytosis  - C2D1 9/5/24:  venetoclax restarted with C2, no TLS  - Post C2 BM (9/25/24):  hypocellular (<5%), atypical myeloblasts by IP/IHC (1-2%), persistent systemic mastocytosis  - C3D1 10/15/24:  delayed 1 week for count recovery  - Post C3 BM (10/23/24):  hypercellular with systemic mastocytosis, no increase blasts, 0.2% atypical blasts by IP, NGS w/ CBL (73%), cKIT (4%), RUNX1 x 2 (4%, 38%), SRSF2 (40%)       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    CONDITIONING Fludarabine, melphalan, and TBI   DONOR Matched sibling   MATCH GRADE 12/12   SEX MATCH Mismatched   ABO DONOR O pos   ABO RECIPIENT O pos   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, Mycophenolate, and Tacrolimus   GRAFT SOURCE Peripheral blood          12/26/2024 -  Infection    Parainfluenza URI     12/26/2024 -  Disease Reevaluation      DATE DAY SOURCE MORPHOLOGY MRD WHOLE CHIMERISM   (% donor) CD3 CHIMERISM   (% donor) CD33 CHIMERISM   (% donor)   12/26/2024 D+30 PB        12/26/2024 D+30 BM No AML, persist BRENDON Neg %     1/20/2025  D+60 PB    96% 100%   3/25/2025 D+100 PB        3/3/2025 D+100 BM No AML, persist BRENDON (focal) Neg MFC 99%     5/20/2025 D+180 PB    98% 100%           07/01/25 T+ 222 Doing well, on pred taper for upper GI GVHD.     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  Eltrombopag initiated 2/18/2025 and platelets have remained >80k in the last week. Will trial holding today, recheck at next visit.   Current evidence of disease: No  Maintenance Therapy: No  MRD Monitoring Plan: AML MFC; Day 100 BMBx 3/3/25 no evidence of leukemia; there is residual focal mast cell  atypia. 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 Tryptase level has decreased from 70 to 14 after a month on avapritinib, indicating improvement in symptoms. Tryptase levels will be monitored biannually, next due September.   History of stem cell transplant (Multi)  Day +222 following allogeneic stem cell transplant.   - Continue current immunosuppressive therapy; tacro 4 mg BID; level today pending. Plan to start tapering once completely off prednisone and no evidence of GVHD.   - Currently on pred 5 mg daily x 1 week. Will continue for one more week, then decrease to 5 mg every other day, then stop.    - Last BM chimerism 99% donor.   - D+180 PB chimerisms 98% (CD3) and 100% (CD33).   Immunocompromised state  Remains at high risk for infections.  No current active signs or symptoms of infection.    Continues on prophylaxis with acyclovir and isavuconazonium.  TMP/SMX briefly held given hyperkalemia and renal insufficiency 1/10-1/17. Recent sCr has been elevated, but feel more related to GI issues at this time.   Ongoing viral monitoring (as below).   Immune reconstitution: CD4 248; next due at T+1 year.   Post Tx Immunizations started 5/20/2025. Due for 8 month vaccines 7/15.   Hypomagnesemia  Continue oral supplements.  Other osteoporosis without current pathological fracture  Last DEXA 11/2023 showing osteoporosis in L spine, osteopenia in L femur. Last received zoledronic acid in 03/2024. Now on systemic oral steroids. Due for DEXA at T+1 year per SOP, scheduled 11/2025.   Vitamin D deficiency  Recheck level 5/20 still deficient at 25. Recommended starting daily vitamin D 2,000.       SUBJECTIVE     Days since transplant: 222     Presents for routine post transplant follow up today. Doing well without acute complaints. Fatigue continues to be noted though not any worse  "than his baseline since starting avapritinib. Remains physically active, walking about 30 min daily and sometimes able to push further. Taking walks to daughter's house, but recently stopped due to hot weather. Appetite has been great. On prednisone taper, had been alternating 10 mg with 5 mg daily, but self tapered to 5 mg daily last week. A few days ago, had dry heaving early in the morning. Otherwise no issues or similar episodes since. Has been taking a nausea pill rarely.       OBJECTIVE     BSA: 1.96 meters squared  /86   Pulse 61   Temp 36.1 °C (97 °F)   Resp 17   Ht (S) 1.704 m (5' 7.09\") Comment: Simultaneous filing. User may not have seen previous data.  Wt 81.3 kg (179 lb 3.7 oz)   SpO2 100%   BMI 28.00 kg/m²   Weight         7/1/2025  0801             Weight: 81.3 kg (179 lb 3.7 oz)             Physical Exam  Vitals reviewed.   Constitutional:       Appearance: Normal appearance.   HENT:      Mouth/Throat:      Mouth: Mucous membranes are moist.      Pharynx: Oropharynx is clear.   Eyes:      Conjunctiva/sclera: Conjunctivae normal.      Pupils: Pupils are equal, round, and reactive to light.   Pulmonary:      Effort: Pulmonary effort is normal.   Skin:     General: Skin is warm and dry.      Findings: No rash.   Psychiatric:         Mood and Affect: Mood normal.         Performance Status:  Karnofsky Score: 90 - Able to carry on normal activity; minor signs or symptoms of disease       POST TRANSPLANT MONITORING   GVHD  Acute GVHD Overall stGstrstastdstest:st st1st Chronic GVHD Total Score: 0    VIRAL MONITORING    CMV    Lab Results   Component Value Date    CMVDNAPCR Not Detected 06/19/2025    CMVDNAPCR Not Detected 06/13/2025    CMVDNAPCR Not Detected 06/03/2025    CMVDNAPCR Not Detected 05/20/2025    CMVDNAPCR Not Detected 05/02/2025     Lab Results   Component Value Date    CMVPCRL  06/19/2025      Comment:      Not calculated    CMVPCRL  06/13/2025      Comment:      Not calculated    CMVPCRL  " 06/03/2025      Comment:      Not calculated    CMVPCRL  05/20/2025      Comment:      Not calculated    CMVPCRL  05/02/2025      Comment:      Not calculated      EBV    Lab Results   Component Value Date    EBVWBPCR Not Detected 06/19/2025    EBVWBPCR Not Detected 06/13/2025    EBVWBPCR Not Detected 06/03/2025    EBVWBPCR Not Detected 05/20/2025    EBVWBPCR <500 Detected (A) 05/02/2025    Lab Results   Component Value Date    EBVPCRQNTWB 653 (H) 02/11/2025      ADENO    Lab Results   Component Value Date    ADEPB Not Detected 06/19/2025    ADEPB Not Detected 06/13/2025    ADEPB Not Detected 06/03/2025    ADEPB Not Detected 05/20/2025    ADEPB Not Detected 05/02/2025           TMA Monitoring:     MARKERS RECENT 3 VALUES   LDH Lab Results   Component Value Date     04/01/2025     03/25/2025     03/18/2025      Haptoglobin Lab Results   Component Value Date    HAPTOGLOBIN 39 04/01/2025    HAPTOGLOBIN 39 03/25/2025    HAPTOGLOBIN 58 03/18/2025      BP BP Readings from Last 3 Encounters:   07/01/25 141/86   06/30/25 129/77   06/19/25 134/67      Serum Creatinine Lab Results   Component Value Date    CREATININE 1.32 (H) 06/30/2025    CREATININE 1.27 06/19/2025    CREATININE 1.76 (H) 06/13/2025      Urine Protein/Creat Ratio Lab Results   Component Value Date    UTPCR 0.07 04/01/2025    UTPCR 0.08 03/25/2025    UTPCR 0.09 03/18/2025      Tacrolimus Level Lab Results   Component Value Date    TACROLIMUS 3.2 06/19/2025    TACROLIMUS 2.4 06/13/2025    TACROLIMUS 4.3 06/03/2025            Kalia June PA-C

## 2025-07-01 NOTE — ASSESSMENT & PLAN NOTE
Remains at high risk for infections.  No current active signs or symptoms of infection.    Continues on prophylaxis with acyclovir and isavuconazonium.  TMP/SMX briefly held given hyperkalemia and renal insufficiency 1/10-1/17. Recent sCr has been elevated, but feel more related to GI issues at this time.   Ongoing viral monitoring (as below).   Immune reconstitution: CD4 248; next due at T+1 year.   Post Tx Immunizations started 5/20/2025. Due for 8 month vaccines 7/15.

## 2025-07-01 NOTE — ASSESSMENT & PLAN NOTE
Counts remain low but plts slowly starting rise on eltrombopag, D100 bmbx shows no AML but focal BRENDON.  Eltrombopag initiated 2/18/2025 and platelets have remained >80k in the last week. Will trial holding today, recheck at next visit.   Current evidence of disease: No  Maintenance Therapy: No  MRD Monitoring Plan: AML MFC; Day 100 BMBx 3/3/25 no evidence of leukemia; there is residual focal mast cell atypia. T+180 PB Chimerisms 98% (CD3) and 100% (CD33)

## 2025-07-01 NOTE — PATIENT INSTRUCTIONS
For the next week (through July 8th) take 5 mg prednisone daily  Then every other day starting July 9th.   Ex)   7/9- no prednisone  7/10- 5 mg prednisone  7/11- no prednisone  7/12- 5 mg prednisone  7/13- no prednisone  7/14- 5 mg prednisone  7/15- no prednisone

## 2025-07-01 NOTE — ASSESSMENT & PLAN NOTE
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   - Continue current immunosuppressive therapy; tacro 4 mg BID; level today pending. Plan to start tapering once completely off prednisone and no evidence of GVHD.   - Currently on pred 5 mg daily x 1 week. Will continue for one more week, then decrease to 5 mg every other day, then stop.    - Last BM chimerism 99% donor.   - D+180 PB chimerisms 98% (CD3) and 100% (CD33).

## 2025-07-02 LAB — ADENOVIRUS QPCR,PLASMA, VIRC: NOT DETECTED COPIES/ML

## 2025-07-03 ENCOUNTER — SPECIALTY PHARMACY (OUTPATIENT)
Dept: PHARMACY | Facility: CLINIC | Age: 70
End: 2025-07-03

## 2025-07-08 ENCOUNTER — PHARMACY VISIT (OUTPATIENT)
Dept: PHARMACY | Facility: CLINIC | Age: 70
End: 2025-07-08
Payer: COMMERCIAL

## 2025-07-14 ENCOUNTER — APPOINTMENT (OUTPATIENT)
Dept: HEMATOLOGY/ONCOLOGY | Facility: HOSPITAL | Age: 70
End: 2025-07-14
Payer: MEDICARE

## 2025-07-14 ENCOUNTER — INFUSION (OUTPATIENT)
Dept: HEMATOLOGY/ONCOLOGY | Facility: CLINIC | Age: 70
End: 2025-07-14
Payer: MEDICARE

## 2025-07-14 DIAGNOSIS — C92.01 ACUTE MYELOID LEUKEMIA IN REMISSION (MULTI): ICD-10-CM

## 2025-07-14 DIAGNOSIS — Z94.81 S/P ALLOGENEIC BONE MARROW TRANSPLANT (MULTI): ICD-10-CM

## 2025-07-14 DIAGNOSIS — D47.02 SYSTEMIC MASTOCYTOSIS WITH ASSOCIATED CLONAL HEMATOLOGICAL NON-MAST CELL LINEAGE DISEASE: ICD-10-CM

## 2025-07-14 DIAGNOSIS — Z94.84 HISTORY OF STEM CELL TRANSPLANT (MULTI): ICD-10-CM

## 2025-07-14 LAB
ALBUMIN SERPL BCP-MCNC: 4.7 G/DL (ref 3.4–5)
ALP SERPL-CCNC: 51 U/L (ref 33–136)
ALT SERPL W P-5'-P-CCNC: 15 U/L (ref 10–52)
ANION GAP SERPL CALC-SCNC: 13 MMOL/L (ref 10–20)
AST SERPL W P-5'-P-CCNC: 17 U/L (ref 9–39)
BASOPHILS # BLD AUTO: 0.01 X10*3/UL (ref 0–0.1)
BASOPHILS NFR BLD AUTO: 0.2 %
BILIRUB SERPL-MCNC: 0.7 MG/DL (ref 0–1.2)
BUN SERPL-MCNC: 14 MG/DL (ref 6–23)
CALCIUM SERPL-MCNC: 8.4 MG/DL (ref 8.6–10.6)
CHLORIDE SERPL-SCNC: 106 MMOL/L (ref 98–107)
CO2 SERPL-SCNC: 22 MMOL/L (ref 21–32)
CREAT SERPL-MCNC: 1.25 MG/DL (ref 0.5–1.3)
EGFRCR SERPLBLD CKD-EPI 2021: 62 ML/MIN/1.73M*2
EOSINOPHIL # BLD AUTO: 0.05 X10*3/UL (ref 0–0.7)
EOSINOPHIL NFR BLD AUTO: 1.2 %
ERYTHROCYTE [DISTWIDTH] IN BLOOD BY AUTOMATED COUNT: 13.4 % (ref 11.5–14.5)
GLUCOSE SERPL-MCNC: 100 MG/DL (ref 74–99)
HCT VFR BLD AUTO: 35.1 % (ref 41–52)
HGB BLD-MCNC: 12.5 G/DL (ref 13.5–17.5)
IMM GRANULOCYTES # BLD AUTO: 0 X10*3/UL (ref 0–0.7)
IMM GRANULOCYTES NFR BLD AUTO: 0 % (ref 0–0.9)
INR PPP: 1.1 (ref 0.9–1.1)
LYMPHOCYTES # BLD AUTO: 0.54 X10*3/UL (ref 1.2–4.8)
LYMPHOCYTES NFR BLD AUTO: 13 %
MAGNESIUM SERPL-MCNC: 1.93 MG/DL (ref 1.6–2.4)
MCH RBC QN AUTO: 38.1 PG (ref 26–34)
MCHC RBC AUTO-ENTMCNC: 35.6 G/DL (ref 32–36)
MCV RBC AUTO: 107 FL (ref 80–100)
MONOCYTES # BLD AUTO: 0.33 X10*3/UL (ref 0.1–1)
MONOCYTES NFR BLD AUTO: 7.9 %
NEUTROPHILS # BLD AUTO: 3.23 X10*3/UL (ref 1.2–7.7)
NEUTROPHILS NFR BLD AUTO: 77.7 %
NRBC BLD-RTO: ABNORMAL /100{WBCS}
PLATELET # BLD AUTO: 87 X10*3/UL (ref 150–450)
POTASSIUM SERPL-SCNC: 3.7 MMOL/L (ref 3.5–5.3)
PROT SERPL-MCNC: 6.3 G/DL (ref 6.4–8.2)
PROTHROMBIN TIME: 11.8 SECONDS (ref 9.8–12.4)
RBC # BLD AUTO: 3.28 X10*6/UL (ref 4.5–5.9)
SODIUM SERPL-SCNC: 137 MMOL/L (ref 136–145)
TACROLIMUS BLD-MCNC: 3.8 NG/ML
WBC # BLD AUTO: 4.2 X10*3/UL (ref 4.4–11.3)

## 2025-07-14 PROCEDURE — 80197 ASSAY OF TACROLIMUS: CPT

## 2025-07-14 PROCEDURE — 36591 DRAW BLOOD OFF VENOUS DEVICE: CPT

## 2025-07-14 PROCEDURE — 85610 PROTHROMBIN TIME: CPT

## 2025-07-14 PROCEDURE — 84075 ASSAY ALKALINE PHOSPHATASE: CPT

## 2025-07-14 PROCEDURE — 87799 DETECT AGENT NOS DNA QUANT: CPT

## 2025-07-14 PROCEDURE — 85025 COMPLETE CBC W/AUTO DIFF WBC: CPT

## 2025-07-14 PROCEDURE — 2500000004 HC RX 250 GENERAL PHARMACY W/ HCPCS (ALT 636 FOR OP/ED): Performed by: INTERNAL MEDICINE

## 2025-07-14 PROCEDURE — 83735 ASSAY OF MAGNESIUM: CPT

## 2025-07-14 RX ORDER — HEPARIN 100 UNIT/ML
500 SYRINGE INTRAVENOUS AS NEEDED
OUTPATIENT
Start: 2025-07-14

## 2025-07-14 RX ORDER — HEPARIN SODIUM,PORCINE/PF 10 UNIT/ML
50 SYRINGE (ML) INTRAVENOUS AS NEEDED
Status: DISCONTINUED | OUTPATIENT
Start: 2025-07-14 | End: 2025-07-14 | Stop reason: HOSPADM

## 2025-07-14 RX ORDER — HEPARIN SODIUM,PORCINE/PF 10 UNIT/ML
50 SYRINGE (ML) INTRAVENOUS AS NEEDED
OUTPATIENT
Start: 2025-07-14

## 2025-07-14 RX ORDER — HEPARIN 100 UNIT/ML
500 SYRINGE INTRAVENOUS AS NEEDED
Status: DISCONTINUED | OUTPATIENT
Start: 2025-07-14 | End: 2025-07-14 | Stop reason: HOSPADM

## 2025-07-14 RX ADMIN — HEPARIN 500 UNITS: 100 SYRINGE at 07:35

## 2025-07-15 ENCOUNTER — OFFICE VISIT (OUTPATIENT)
Dept: HEMATOLOGY/ONCOLOGY | Facility: HOSPITAL | Age: 70
End: 2025-07-15
Payer: MEDICARE

## 2025-07-15 ENCOUNTER — SPECIALTY PHARMACY (OUTPATIENT)
Dept: HEMATOLOGY/ONCOLOGY | Facility: HOSPITAL | Age: 70
End: 2025-07-15

## 2025-07-15 ENCOUNTER — INFUSION (OUTPATIENT)
Dept: HEMATOLOGY/ONCOLOGY | Facility: HOSPITAL | Age: 70
End: 2025-07-15
Payer: MEDICARE

## 2025-07-15 VITALS
TEMPERATURE: 97.9 F | HEART RATE: 54 BPM | DIASTOLIC BLOOD PRESSURE: 92 MMHG | WEIGHT: 175.27 LBS | OXYGEN SATURATION: 94 % | SYSTOLIC BLOOD PRESSURE: 136 MMHG | RESPIRATION RATE: 16 BRPM | BODY MASS INDEX: 27.38 KG/M2

## 2025-07-15 DIAGNOSIS — D47.02 SYSTEMIC MASTOCYTOSIS WITH ASSOCIATED CLONAL HEMATOLOGICAL NON-MAST CELL LINEAGE DISEASE: ICD-10-CM

## 2025-07-15 DIAGNOSIS — D89.810 ACUTE GVHD (MULTI): ICD-10-CM

## 2025-07-15 DIAGNOSIS — Z94.84 HISTORY OF STEM CELL TRANSPLANT (MULTI): ICD-10-CM

## 2025-07-15 DIAGNOSIS — E83.42 HYPOMAGNESEMIA: Primary | ICD-10-CM

## 2025-07-15 DIAGNOSIS — C92.01 ACUTE MYELOID LEUKEMIA IN REMISSION (MULTI): ICD-10-CM

## 2025-07-15 DIAGNOSIS — D84.9 IMMUNOCOMPROMISED STATE: ICD-10-CM

## 2025-07-15 PROCEDURE — 90739 HEPB VACC 2/4 DOSE ADULT IM: CPT | Performed by: STUDENT IN AN ORGANIZED HEALTH CARE EDUCATION/TRAINING PROGRAM

## 2025-07-15 PROCEDURE — G0010 ADMIN HEPATITIS B VACCINE: HCPCS | Performed by: STUDENT IN AN ORGANIZED HEALTH CARE EDUCATION/TRAINING PROGRAM

## 2025-07-15 PROCEDURE — 2500000004 HC RX 250 GENERAL PHARMACY W/ HCPCS (ALT 636 FOR OP/ED): Performed by: STUDENT IN AN ORGANIZED HEALTH CARE EDUCATION/TRAINING PROGRAM

## 2025-07-15 PROCEDURE — 96372 THER/PROPH/DIAG INJ SC/IM: CPT

## 2025-07-15 PROCEDURE — 90471 IMMUNIZATION ADMIN: CPT | Performed by: STUDENT IN AN ORGANIZED HEALTH CARE EDUCATION/TRAINING PROGRAM

## 2025-07-15 PROCEDURE — 90472 IMMUNIZATION ADMIN EACH ADD: CPT | Performed by: STUDENT IN AN ORGANIZED HEALTH CARE EDUCATION/TRAINING PROGRAM

## 2025-07-15 PROCEDURE — G0009 ADMIN PNEUMOCOCCAL VACCINE: HCPCS | Performed by: STUDENT IN AN ORGANIZED HEALTH CARE EDUCATION/TRAINING PROGRAM

## 2025-07-15 PROCEDURE — 90677 PCV20 VACCINE IM: CPT | Performed by: STUDENT IN AN ORGANIZED HEALTH CARE EDUCATION/TRAINING PROGRAM

## 2025-07-15 PROCEDURE — 90696 DTAP-IPV VACCINE 4-6 YRS IM: CPT | Performed by: STUDENT IN AN ORGANIZED HEALTH CARE EDUCATION/TRAINING PROGRAM

## 2025-07-15 PROCEDURE — 90648 HIB PRP-T VACCINE 4 DOSE IM: CPT | Performed by: STUDENT IN AN ORGANIZED HEALTH CARE EDUCATION/TRAINING PROGRAM

## 2025-07-15 RX ORDER — FAMOTIDINE 10 MG/ML
20 INJECTION, SOLUTION INTRAVENOUS ONCE AS NEEDED
OUTPATIENT
Start: 2025-09-07

## 2025-07-15 RX ORDER — ALBUTEROL SULFATE 0.83 MG/ML
3 SOLUTION RESPIRATORY (INHALATION) AS NEEDED
OUTPATIENT
Start: 2025-09-07

## 2025-07-15 RX ORDER — EPINEPHRINE 0.3 MG/.3ML
0.3 INJECTION SUBCUTANEOUS EVERY 5 MIN PRN
OUTPATIENT
Start: 2025-09-07

## 2025-07-15 RX ORDER — DIPHENHYDRAMINE HYDROCHLORIDE 50 MG/ML
50 INJECTION, SOLUTION INTRAMUSCULAR; INTRAVENOUS AS NEEDED
OUTPATIENT
Start: 2025-09-07

## 2025-07-15 RX ADMIN — HEPATITIS B VACCINE (RECOMBINANT) ADJUVANTED 20 MCG: 20 INJECTION, SOLUTION INTRAMUSCULAR at 10:24

## 2025-07-15 RX ADMIN — HAEMOPHILUS B POLYSACCHARIDE CONJUGATE VACCINE FOR INJ 0.5 ML: RECON SOLN at 10:25

## 2025-07-15 RX ADMIN — DIPHTHERIA AND TETANUS TOXOIDS AND ACELLULAR PERTUSSIS ADSORBED AND INACTIVATED POLIOVIRUS VACCINE 0.5 ML: 25; 10; 25; 8; 25; 40; 8; 32 INJECTION, SUSPENSION INTRAMUSCULAR at 10:24

## 2025-07-15 RX ADMIN — PNEUMOCOCCAL 20-VALENT CONJUGATE VACCINE 0.5 ML
2.2; 2.2; 2.2; 2.2; 2.2; 2.2; 2.2; 2.2; 2.2; 2.2; 2.2; 2.2; 2.2; 2.2; 2.2; 2.2; 4.4; 2.2; 2.2; 2.2 INJECTION, SUSPENSION INTRAMUSCULAR at 10:25

## 2025-07-15 ASSESSMENT — PAIN SCALES - GENERAL: PAINLEVEL_OUTOF10: 0-NO PAIN

## 2025-07-15 NOTE — ASSESSMENT & PLAN NOTE
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

## 2025-07-15 NOTE — PROGRESS NOTES
Pt arrived ambulatory for treatment. Saw provider prior to infusion appt.  Denies any new or worsening symptoms.  Tolerated injections without issue.  Discharged I stable condition.

## 2025-07-15 NOTE — PROGRESS NOTES
[facility] Clinical Note  Patient Reassessment     Introduction  Jin Reynolds is a 70 y.o. male who is on the specialty pharmacy service for management of: Oncology Core.      [facility] supplied medication: avapritinib    Duration of therapy: Maintenance    The most recent encounter visit with the referring prescriber spencer on today was reviewed.  Pharmacy will continue to collaborate in the care of this patient with the referring prescriber.    Discussion  Jin was contacted on 7/15/2025 at 10:56 AM for a pharmacy visit with encounter number [id number] from:   [facility]  [address]  [address]  [facility] [address]  Dept: [phone]  Loc: [phone]  Jin consented to a/an In person visit, which was performed.    Efficacy  Patient has developed new symptoms of condition: No  Patient/caregiver feels medication is affecting the disease state: y    Goals  Provided education on goals and possible outcomes of therapy:  Adherence with therapy  Timely completion of appropriate labs  Timely and appropriate follow up with provider  Identify and address medication interactions with presciption medications, OTC medications and supplements  Optimize or maintain quality of life  Patient has documented target(s) for goals of therapy: No    Tolerance  Patient has experienced side effects from this medication: No  Changes to current therapy regimen: No    The follow-up timeline was discussed. Every person responds to and reacts to therapy differently. Patient should be assessed for efficacy and tolerability in approximately: 8-12 weeks    Adherence  Patient Information  Informant: Self (Patient)  Demonstrates Understanding of Importance of Adherence: Yes  Does the patient have any barriers to self-administration (including physical and mental?): No  Support Network for Adherence: Healthcare Provider, Family Member  Adherence Tools Used: Medication  list, Medication dosing chart  Medication Information  Medication: avapritinib (Ayvakit)  Estimated Medication Adherence Level: Good  Adherence Estimation Source: Claims history  Barriers to Adherence: No Problems identified   The importance of adherence was discussed and patient/caregiver was advised to take the medication as prescribed by their provider. Encouraged patient/caregiver to call physician's office or specialty pharmacy if they have a question regarding a missed dose.    General Assessment  Changes to home medications, OTCs or supplements: No  Current Medications[1]  Reported new allergies: No  Reported new medical conditions: No  Additional monitoring reviewed: cbc cmp   Is laboratory follow up needed? No    Advised to contact the pharmacy if there are any changes to the patient's medication list, including prescriptions, OTC medications, herbal products, or supplements.    Impression/Plan  This patient has not been identified as high risk due to Lack of high risk qualifiers.  The following action was taken: N/A.         Provided contact information ([phone]) for [facility] and reviewed dispensing process, refill timeline and patient management follow up. Confirmed understanding of education conducted during assessment. All questions and concerns were addressed and patient/caregiver was encouraged to reach out for additional questions or concerns.    Based on the patient's diagnosis, medication list, progress towards goals, adherence, tolerance, and medication list, medication remains appropriate: Therapy remains appropriate (I attest)    [facility]       [1]   Current Outpatient Medications   Medication Sig Dispense Refill    acyclovir (Zovirax) 400 mg tablet Take 1 tablet (400 mg) by mouth every 12 hours. 180 tablet 2    amLODIPine (Norvasc) 10 mg tablet Take 1 tablet (10 mg) by mouth once daily. 90 tablet 3    avapritinib (Ayvakit) 50 mg tablet Take 1  tablet (50 mg total) by mouth once daily.  Take at least 1 hour before a meal or 2 hours after a meal. Do not chew, crush, or split. 30 tablet 11    cholecalciferol (Vitamin D-3) 25 MCG (1000 UT) tablet Take 2 tablets (2,000 Units) by mouth once daily. 180 Unspecified 2    eltrombopag olamine (Promacta) 50 mg tablet Take 1 tablet (50 mg) by mouth once daily in the morning. Take before meals. Take on an empty stomach, 1 hour before or 2 hours afer a meal. 30 tablet 11    famotidine (Pepcid) 20 mg tablet Take 1 tablet (20 mg) by mouth once daily. 90 tablet 3    fexofenadine (Allegra) 180 mg tablet Take 1 tablet (180 mg) by mouth once daily.      isavuconazonium sulfate (Cresemba) 186 mg capsule capsule Take 2 capsules (372 mg) by mouth once daily. 56 capsule 11    magnesium, amino acid chelate, 133 mg tablet Take 2 tablets (266 mg) by mouth 3 times a day. Or as instructed on your medication list. 180 tablet 1    prochlorperazine (Compazine) 10 mg tablet Take 1 tablet (10 mg) by mouth every 6 hours if needed for nausea or vomiting. 30 tablet 5    sulfamethoxazole-trimethoprim (Bactrim DS) 800-160 mg tablet Take 1 tablet by mouth 3 times a week. Take on Mondays, Wednesdays, and Fridays. 36 tablet 3    tacrolimus (Prograf) 1 mg capsule Take 4 capsules (4 mg) by mouth every 12 hours. Or as instructed on your medication list. 240 capsule 11     No current facility-administered medications for this visit.     Facility-Administered Medications Ordered in Other Visits   Medication Dose Route Frequency Provider Last Rate Last Admin    alteplase (Cathflo Activase) injection 2 mg  2 mg intra-catheter PRN Nika Dawson MD        heparin flush 10 unit/mL syringe 50 Units  50 Units intra-catheter PRN Nika Dawson MD        heparin flush 100 unit/mL syringe 500 Units  500 Units intra-catheter PRN Nika Dawson MD        heparin flush 100 unit/mL syringe 500 Units  500 Units intra-catheter PRN Nika Dawson  MD   500 Units at 02/25/25 1345    heparin flush 100 unit/mL syringe 500 Units  500 Units intra-catheter PRN Nika Dawson MD   500 Units at 03/18/25 0906

## 2025-07-15 NOTE — ASSESSMENT & PLAN NOTE
Remains at high risk for infections.  No current active signs or symptoms of infection.    Continues on prophylaxis with acyclovir, isavuconazonium, bactrim MWF.  TMP/SMX briefly held given hyperkalemia and renal insufficiency 1/10-1/17. Recent sCr fluctuating but at baseline  Ongoing viral monitoring: CMV ND, EBV ND, adeno ND others serology neg  Immune reconstitution: CD4 248; next due at T+1 year. IgG last 813  Post Tx Immunizations started 5/20/2025. Today due for HBV, DTaP, HiB, and pneumococcus

## 2025-07-15 NOTE — ASSESSMENT & PLAN NOTE
Day +236 following allogeneic stem cell transplant from [facility] (PB graft, 12/12 match, F->M, ABO matched)  - Continue current immunosuppressive therapy: tacro 4 mg BID; level today pending, continue current dose  - Engraftment: 12/19/24  - Complications: poor graft function (high chimerism but persistent pancytopenia)  - treated with eltrombopag for thrombocytopenia, but discontinued since June 2025 (target platelet count > 50)  - Last BM chimerism 99% donor.   - D+180 PB chimerisms 98% (CD3) and 100% (CD33).

## 2025-07-15 NOTE — ASSESSMENT & PLAN NOTE
-diagnosis: 6/2024, treated with aza/leonor x 3 with persistent disease, DAVID since allo-transplant  Current evidence of disease: No  Maintenance Therapy: No  MRD Monitoring Plan: next BM biopsy on T+270; Day 100 BMBx 3/3/25 no evidence of leukemia; there is residual focal mast cell atypia. T+180 PB Chimerisms 98% (CD3) and 100% (CD33)

## 2025-07-15 NOTE — PROGRESS NOTES
"    ALLOGENEIC BMT PROGRESS NOTE    Patient ID: iJn Reynolds is a 70 y.o. male.  Primary Oncologist: Dr. 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   8/23/2023 Initial Diagnosis    DX:  SMOLDERING SYSTEMIC MASTOCYTOSIS  Presented with skin rash and eosinophilia    BRENDON DIAGNOSTIC CRITERIA  (For SM, Need major and 1 minor OR at least 3 minors)  - Multi-focal, dense infiltrates of MC (>= 15 mast cells in aggregates) in BM and/or other extra-cutaneous organs [major]  - In BM or extracutaneous organs, >25% MC spindle shaped OR have atypical morphology OR of all MC on BM aspirate smears, >25% are immature or atypical [minor]  - Detection of activating mutation KIT D816V in BM, PB, or other extracutaneous organ [minor]  - Serum tryptase persistently exceeds 20 ng/mL (unless associated clonal myeloid disorder, in which this paramenter is not valid) [minor]    \"B\" FINDINGS  - BM biopsy showing >30% infiltration (focal, dense aggregates) and/or serum tryptase > 200 ng/mL  - Signs of dysplasia or myeloproliferation, in non-MC lineages but insufficient criteria for definitive diagnosis of AHNMD with normal or slightly abnormal blood counts    \"C\" FINDINGS  None       8/23/2023 Biopsy    BONE MARROW (8/23/23)  Hypercellular (90-95%) with trilineage hematopoiesis, granulocytic hyperplasia, eosinophilia, and increased atypical mast cells  IP:  CD34+, +, CD7(bright)+, cCD3-, CD33-, CD15-, CD13+ blast population  IHC:  + increased spindle-shaped mast cells (15% cellularity), CD2-  Myeloid NGS:  CBL (31%), cKIT D816V (30%), RUNX1 x 2 (19%, 29%), SRSF2 (47%) [ASXL1 negative]  FISH:  PDGFRb negative    SERUM TRYPTASE  228 (8/23/23)  268 (9/5/23)     2/13/2024 -  Molecular Therapy    AVAPRITINIB   - Starting dose 25 mg daily (non-Adv SM)  - Not started due to improvement in symptoms.        7/16/2024 - 10/21/2024 Chemotherapy "    Venetoclax / AzaCITIDine  - C1D1 7/17/24:  complicated by TLS, venetoclax held after D1  - Post C1 BM (8/28/24):  hypercellular with >50% blasts, background systemic mastocytosis  - C2D1 9/5/24:  venetoclax restarted with C2, no TLS  - Post C2 BM (9/25/24):  hypocellular (<5%), atypical myeloblasts by IP/IHC (1-2%), persistent systemic mastocytosis  - C3D1 10/15/24:  delayed 1 week for count recovery  - Post C3 BM (10/23/24):  hypercellular with systemic mastocytosis, no increase blasts, 0.2% atypical blasts by IP, NGS w/ CBL (73%), cKIT (4%), RUNX1 x 2 (4%, 38%), SRSF2 (40%)       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    CONDITIONING Fludarabine, melphalan, and TBI   DONOR Matched sibling   MATCH GRADE 12/12   SEX MATCH Mismatched   ABO DONOR O pos   ABO RECIPIENT O pos   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, Mycophenolate, and Tacrolimus   GRAFT SOURCE Peripheral blood          2/14/2025 -  Molecular Therapy    AVAPRITINIB   - Persistent mast cells noted on T+30 and T+100 Post Tx Bms. Elevated levels of serum tryptase.   - Initiated Avapritinib 50 mg daily on 2/14/2025.     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)   6/27/2024 Initial Diagnosis    ICC 2022 DX: Acute myeloid leukemia, NOS (>=20% blasts)  [id number] AML Risk Stratification: INTERMEDIATE: Cytogenetic and/or molecular abnormalities not classified as favorable or adverse  Presented with pancytopenia and circulating blasts    BONE MARROW (06/27/2024)  Marrow replaced by blasts, fibrotic foci, some atypical + cells; 7-8% circulating blasts; aspicular  - Unable to perform additional studies due to aspicular specimen    PERIPHERAL BLOOD (6/27/2024)  FISH:  positive for gain RUNX1    PERIPHERAL BLOOD (7/1/24)  IP:  abnl myeloblast population (CD34+, CD7+, CD4+, CD13+, CD38+, CD11+ dim, HLA=DR+, TdT+, CD33-)  Myeloid NGS: CBL C404Y  (41%), KIT D816V (8%), RUNX1 x2 (8%, 30%), SRSF2 (37%)     7/16/2024 - 10/21/2024 Chemotherapy    Venetoclax / AzaCITIDine  - C1D1 7/17/24:  complicated by TLS, venetoclax held after D1  - Post C1 BM (8/28/24):  hypercellular with >50% blasts, background systemic mastocytosis  - C2D1 9/5/24:  venetoclax restarted with C2, no TLS  - Post C2 BM (9/25/24):  hypocellular (<5%), atypical myeloblasts by IP/IHC (1-2%), persistent systemic mastocytosis  - C3D1 10/15/24:  delayed 1 week for count recovery  - Post C3 BM (10/23/24):  hypercellular with systemic mastocytosis, no increase blasts, 0.2% atypical blasts by IP, NGS w/ CBL (73%), cKIT (4%), RUNX1 x 2 (4%, 38%), SRSF2 (40%)       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    CONDITIONING Fludarabine, melphalan, and TBI   DONOR Matched sibling   MATCH GRADE 12/12   SEX MATCH Mismatched   ABO DONOR O pos   ABO RECIPIENT O pos   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, Mycophenolate, and Tacrolimus   GRAFT SOURCE Peripheral blood          12/26/2024 -  Infection    Parainfluenza URI     12/26/2024 -  Disease Reevaluation      DATE DAY SOURCE MORPHOLOGY MRD WHOLE CHIMERISM   (% donor) CD3 CHIMERISM   (% donor) CD33 CHIMERISM   (% donor)   12/26/2024 D+30 PB        12/26/2024 D+30 BM No AML, persist BRENDON Neg %     1/20/2025  D+60 PB    96% 100%   3/25/2025 D+100 PB        3/3/2025 D+100 BM No AML, persist BRENDON (focal) Neg MFC 99%     5/20/2025 D+180 PB    98% 100%         3/11/2025 - 5/20/2025 GVHD    Patient has GVHD: Acute   Acute GVHD Assessment          7/1/2025    08:00   Acute GVHD Grade and Severity   Overall Grade (Przepiorka) 0               07/15/25      Assessment & Plan  History of stem cell transplant (Multi)  Day +236 following allogeneic stem cell transplant from [facility] (PB graft, 12/12 match, F->M, ABO matched)  - Continue current immunosuppressive therapy: tacro 4 mg BID; level today pending, continue current dose  - Engraftment: 12/19/24  -  Complications: poor graft function (high chimerism but persistent pancytopenia)  - treated with eltrombopag for thrombocytopenia, but discontinued since June 2025 (target platelet count > 50)  - Last BM chimerism 99% donor.   - D+180 PB chimerisms 98% (CD3) and 100% (CD33).     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 T+180 PB Chimerisms 98% (CD3) and 100% (CD33)    Hypomagnesemia  Continue oral supplements.Continue oral supplements.    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 based on: BM with 30% mast cells, KIT D816V mutation present; per patient, he was having recurrent hives  -treated with avapritinib 2/24-6/24  -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  No current active signs or symptoms of infection.    Continues on prophylaxis with acyclovir, isavuconazonium, bactrim MWF.  TMP/SMX briefly held given hyperkalemia and renal insufficiency 1/10-1/17. Recent sCr fluctuating but at baseline  Ongoing viral monitoring: CMV ND, EBV ND, adeno ND others serology neg  Immune reconstitution: CD4 248; next due at T+1 year. IgG last 813  Post Tx Immunizations started 5/20/2025. Today due for HBV, DTaP, HiB, and pneumococcus      SUBJECTIVE     Days since transplant: 236     HPI    Jin Reynolds presents today for post transplant follow up, unaccompanied. He reports feeling well. He has been eating well, weight is steady. He is having no acute symptoms today of concern. He has been spending his summer with his grandchildren, driving them around town to get them to jobs, camps, and other activities. Denies fevers, chills, weight loss, headache, runny nose, sore throat, mouth sores, chest pain, SOB, N&V, diarrhea, constipation, hematochezia, dysuria, hematuria. Reports some ongoing dry skin ever since transplant in the arms, treated with lotion. Reports his previous upper GI symptoms of \"inability to keep food down and dry heaving\" have completely resolved.        Review of Systems - Oncology    Medical History[1]  Surgical History[2]  Social History[3]      OBJECTIVE     BSA: 1.94 meters squared  BP (!) 136/92   Pulse 54   Temp 36.6 °C (97.9 °F)   Resp 16   Wt 79.5 kg (175 lb 4.3 oz)   SpO2 94%   BMI 27.38 kg/m²   Weight         7/15/2025  0851             Weight: 79.5 kg (175 lb 4.3 oz)               Physical Exam  Gen: well-developed man in no acute distress  Eyes: anicteric, no injection, no discharge, pupils equal and reactive  HENT: NCAT, no discharge from orifices, oral mucosa moist, dentition adequate, no masses in neck  Heart: RRR, no m/r/g, no JVD, no pitting edema in BL " Les  Lungs: CTAB with vesicular breath sounds, no rhonchi/rales/wheezes/crackles, normal effort on RA  Abdo: soft, non-distended, BS normal, no TTP, no masses or organomegaly  MSK: no deformities of soft tissues or joints  Neuro: Aox4  Skin: warm, dry, and intact throughout  Psych: appropriate mood and affect, no phobias/delusions/hallucinations    Performance Status:  Karnofsky Score: 100 - Fully active, able to carry on all pre-disease performed without restriction      POST TRANSPLANT MONITORING   GVHD:  Acute GVHD Overall Grade:    Chronic GVHD Total Score:      VIRAL MONITORING    CMV    Lab Results   Component Value Date    CMVDNAPCR Not Detected 06/30/2025    CMVDNAPCR Not Detected 06/19/2025    CMVDNAPCR Not Detected 06/13/2025    CMVDNAPCR Not Detected 06/03/2025    CMVDNAPCR Not Detected 05/20/2025     Lab Results   Component Value Date    CMVPCRL  06/30/2025      Comment:      Not calculated    CMVPCRL  06/19/2025      Comment:      Not calculated    CMVPCRL  06/13/2025      Comment:      Not calculated    CMVPCRL  06/03/2025      Comment:      Not calculated    CMVPCRL  05/20/2025      Comment:      Not calculated      EBV    Lab Results   Component Value Date    EBVWBPCR Not Detected 06/30/2025    EBVWBPCR Not Detected 06/19/2025    EBVWBPCR Not Detected 06/13/2025    EBVWBPCR Not Detected 06/03/2025    EBVWBPCR Not Detected 05/20/2025    Lab Results   Component Value Date    EBVPCRQNTWB 653 (H) 02/11/2025      ADENO    Lab Results   Component Value Date    ADEPB Not Detected 06/30/2025    ADEPB Not Detected 06/19/2025    ADEPB Not Detected 06/13/2025    ADEPB Not Detected 06/03/2025    ADEPB Not Detected 05/20/2025             TMA Monitoring:     MARKERS RECENT 3 VALUES   LDH Lab Results   Component Value Date     04/01/2025     03/25/2025     03/18/2025      Haptoglobin Lab Results   Component Value Date    HAPTOGLOBIN 39 04/01/2025    HAPTOGLOBIN 39 03/25/2025    HAPTOGLOBIN 58  03/18/2025      BP BP Readings from Last 3 Encounters:   07/15/25 (!) 136/92   07/01/25 141/86   06/30/25 129/77      Serum Creatinine Lab Results   Component Value Date    CREATININE 1.25 07/14/2025    CREATININE 1.32 (H) 06/30/2025    CREATININE 1.27 06/19/2025      Urine Protein/Creat Ratio Lab Results   Component Value Date    UTPCR 0.07 04/01/2025    UTPCR 0.08 03/25/2025    UTPCR 0.09 03/18/2025      Tacrolimus Level Lab Results   Component Value Date    TACROLIMUS 3.8 07/14/2025    TACROLIMUS 3.0 06/30/2025    TACROLIMUS 3.2 06/19/2025            Baldomero Haynes MD PhD                 [1]   Past Medical History:  Diagnosis Date    PERRY (acute kidney injury) 07/18/2024    Cancer (Multi)     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  [3]   Social History  Tobacco Use    Smoking status: Never    Smokeless tobacco: Never   Substance Use Topics    Alcohol use: Never    Drug use: Yes     Types: Marijuana     Comment: daily

## 2025-07-16 ENCOUNTER — APPOINTMENT (OUTPATIENT)
Dept: CARDIOLOGY | Facility: HOSPITAL | Age: 70
End: 2025-07-16
Payer: MEDICARE

## 2025-07-16 LAB
ADENOVIRUS QPCR,PLASMA, VIRC: NOT DETECTED COPIES/ML
PATH REPORT.ADDENDUM SPEC: NORMAL
PATH REPORT.ADDENDUM SPEC: NORMAL
PATH REPORT.COMMENTS IMP SPEC: NORMAL
PATH REPORT.FINAL DX SPEC: NORMAL
PATH REPORT.GROSS SPEC: NORMAL
PATH REPORT.MICROSCOPIC SPEC OTHER STN: NORMAL
PATH REPORT.RELEVANT HX SPEC: NORMAL
PATH REPORT.TOTAL CANCER: NORMAL

## 2025-07-17 ENCOUNTER — HOSPITAL ENCOUNTER (OUTPATIENT)
Dept: CARDIOLOGY | Facility: HOSPITAL | Age: 70
Discharge: HOME | End: 2025-07-17
Payer: MEDICARE

## 2025-07-17 VITALS
RESPIRATION RATE: 16 BRPM | TEMPERATURE: 96.4 F | DIASTOLIC BLOOD PRESSURE: 74 MMHG | BODY MASS INDEX: 27.94 KG/M2 | WEIGHT: 178 LBS | SYSTOLIC BLOOD PRESSURE: 133 MMHG | HEART RATE: 60 BPM | HEIGHT: 67 IN | OXYGEN SATURATION: 99 %

## 2025-07-17 DIAGNOSIS — D47.02 SYSTEMIC MASTOCYTOSIS WITH ASSOCIATED CLONAL HEMATOLOGICAL NON-MAST CELL LINEAGE DISEASE: ICD-10-CM

## 2025-07-17 DIAGNOSIS — C92.01 ACUTE MYELOID LEUKEMIA IN REMISSION (MULTI): ICD-10-CM

## 2025-07-17 PROCEDURE — 99152 MOD SED SAME PHYS/QHP 5/>YRS: CPT

## 2025-07-17 PROCEDURE — 99223 1ST HOSP IP/OBS HIGH 75: CPT | Performed by: NURSE PRACTITIONER

## 2025-07-17 PROCEDURE — 36590 REMOVAL TUNNELED CV CATH: CPT | Performed by: RADIOLOGY

## 2025-07-17 PROCEDURE — 7100000001 HC RECOVERY ROOM TIME - INITIAL BASE CHARGE

## 2025-07-17 PROCEDURE — 99153 MOD SED SAME PHYS/QHP EA: CPT

## 2025-07-17 PROCEDURE — 7100000002 HC RECOVERY ROOM TIME - EACH INCREMENTAL 1 MINUTE

## 2025-07-17 PROCEDURE — 7100000009 HC PHASE TWO TIME - INITIAL BASE CHARGE

## 2025-07-17 PROCEDURE — 7100000010 HC PHASE TWO TIME - EACH INCREMENTAL 1 MINUTE

## 2025-07-17 PROCEDURE — 2500000004 HC RX 250 GENERAL PHARMACY W/ HCPCS (ALT 636 FOR OP/ED): Performed by: RADIOLOGY

## 2025-07-17 PROCEDURE — 2500000005 HC RX 250 GENERAL PHARMACY W/O HCPCS: Performed by: RADIOLOGY

## 2025-07-17 RX ORDER — FENTANYL CITRATE 50 UG/ML
INJECTION, SOLUTION INTRAMUSCULAR; INTRAVENOUS AS NEEDED
Status: DISCONTINUED | OUTPATIENT
Start: 2025-07-17 | End: 2025-07-17 | Stop reason: HOSPADM

## 2025-07-17 RX ORDER — LIDOCAINE HYDROCHLORIDE AND EPINEPHRINE 10; 10 UG/ML; MG/ML
INJECTION, SOLUTION INFILTRATION; PERINEURAL AS NEEDED
Status: DISCONTINUED | OUTPATIENT
Start: 2025-07-17 | End: 2025-07-17 | Stop reason: HOSPADM

## 2025-07-17 RX ORDER — MIDAZOLAM HYDROCHLORIDE 1 MG/ML
INJECTION, SOLUTION INTRAMUSCULAR; INTRAVENOUS AS NEEDED
Status: DISCONTINUED | OUTPATIENT
Start: 2025-07-17 | End: 2025-07-17 | Stop reason: HOSPADM

## 2025-07-17 RX ORDER — SODIUM CHLORIDE 9 MG/ML
100 INJECTION, SOLUTION INTRAVENOUS CONTINUOUS
Status: ACTIVE | OUTPATIENT
Start: 2025-07-17 | End: 2025-07-17

## 2025-07-17 RX ADMIN — MIDAZOLAM HYDROCHLORIDE 1 MG: 1 INJECTION, SOLUTION INTRAMUSCULAR; INTRAVENOUS at 10:10

## 2025-07-17 RX ADMIN — Medication 2 L/MIN: at 10:06

## 2025-07-17 RX ADMIN — LIDOCAINE HYDROCHLORIDE,EPINEPHRINE BITARTRATE 10 ML: 10; .01 INJECTION, SOLUTION INFILTRATION; PERINEURAL at 10:11

## 2025-07-17 RX ADMIN — FENTANYL CITRATE 100 MCG: 50 INJECTION, SOLUTION INTRAMUSCULAR; INTRAVENOUS at 10:10

## 2025-07-17 ASSESSMENT — ENCOUNTER SYMPTOMS
PSYCHIATRIC NEGATIVE: 1
EYES NEGATIVE: 1
NEUROLOGICAL NEGATIVE: 1
CARDIOVASCULAR NEGATIVE: 1
MUSCULOSKELETAL NEGATIVE: 1
HEMATOLOGIC/LYMPHATIC NEGATIVE: 1
ALLERGIC/IMMUNOLOGIC NEGATIVE: 1
CONSTITUTIONAL NEGATIVE: 1
RESPIRATORY NEGATIVE: 1
ENDOCRINE NEGATIVE: 1
GASTROINTESTINAL NEGATIVE: 1

## 2025-07-17 ASSESSMENT — PAIN SCALES - GENERAL
PAINLEVEL_OUTOF10: 0 - NO PAIN

## 2025-07-17 ASSESSMENT — PAIN - FUNCTIONAL ASSESSMENT
PAIN_FUNCTIONAL_ASSESSMENT: 0-10

## 2025-07-17 NOTE — H&P
History Of Present Illness  Jin Reynolds is a 70 y.o. male presenting with hx of myeloid leukemia, here for mediport removal. PMH includes myeloid leukemia s/p stem cell transplant, HTN, vitamin d deficiency.    Past Medical History:  Medical History[1]     Past Surgical History:  Surgical History[2]       Social History:  Social History[3]    Family History:  Family History[4]     Allergies:  RX Allergies[5]     Home Medications:  Current Outpatient Medications   Medication Instructions    acyclovir (ZOVIRAX) 400 mg, oral, Every 12 hours    amLODIPine (NORVASC) 10 mg, oral, Daily    avapritinib (AYVAKIT) 50 mg, oral, Daily, Take at least 1 hour before a meal or 2 hours after a meal. Do not chew, crush, or split.    cholecalciferol (VITAMIN D-3) 2,000 Units, oral, Daily    Cresemba 372 mg, oral, Daily    famotidine (PEPCID) 20 mg, oral, Daily    fexofenadine (ALLEGRA) 180 mg, Daily    magnesium, amino acid chelate, 133 mg tablet 266 mg, oral, 3 times daily, Or as instructed on your medication list.    prochlorperazine (COMPAZINE) 10 mg, oral, Every 6 hours PRN    Promacta 50 mg, oral, Daily before breakfast, Take on an empty stomach, 1 hour before or 2 hours afer a meal.    sulfamethoxazole-trimethoprim (Bactrim DS) 800-160 mg tablet 1 tablet, oral, 3 times weekly, Take on Mondays, Wednesdays, and Fridays.    tacrolimus (PROGRAF) 4 mg, oral, Every 12 hours, Or as instructed on your medication list.       Inpatient Medications:  Scheduled Medications[6]  PRN Medications[7]  Continuous Medications[8]      Review of Systems   Constitutional: Negative.    HENT: Negative.     Eyes: Negative.    Respiratory: Negative.     Cardiovascular: Negative.    Gastrointestinal: Negative.    Endocrine: Negative.    Genitourinary: Negative.    Musculoskeletal: Negative.    Skin: Negative.    Allergic/Immunologic: Negative.    Neurological: Negative.    Hematological: Negative.    Psychiatric/Behavioral: Negative.            Physical  "Exam  Constitutional:       General: He is awake. He is not in acute distress.     Appearance: He is not ill-appearing.     Cardiovascular:      Rate and Rhythm: Normal rate and regular rhythm.      Pulses:           Radial pulses are 1+ on the right side and 1+ on the left side.        Dorsalis pedis pulses are 1+ on the right side and 1+ on the left side.      Heart sounds: Normal heart sounds. No murmur heard.  Pulmonary:      Effort: Pulmonary effort is normal.      Breath sounds: Normal breath sounds and air entry.   Abdominal:      General: Bowel sounds are normal.      Palpations: Abdomen is soft.      Tenderness: There is no abdominal tenderness.     Musculoskeletal:      Right lower leg: No edema.      Left lower leg: No edema.     Skin:     General: Skin is warm and dry.     Neurological:      General: No focal deficit present.      Mental Status: He is alert and oriented to person, place, and time.      GCS: GCS eye subscore is 4. GCS verbal subscore is 5. GCS motor subscore is 6.     Psychiatric:         Mood and Affect: Mood normal.         Behavior: Behavior is cooperative.        Sedation Plan    ASA 3     Mallampati class: II.           NPO since 0000    Last Recorded Vitals  Blood pressure (!) 157/92, pulse 61, temperature 35.9 °C (96.7 °F), resp. rate 15, height 1.702 m (5' 7\"), weight 80.7 kg (178 lb), SpO2 100%.         Vitals from the Past 24 Hours  Heart Rate:  [61]   Temp:  [35.9 °C (96.7 °F)]   Resp:  [15]   BP: (157)/(92)   Height:  [170.2 cm (5' 7\")]   Weight:  [80.7 kg (178 lb)]   SpO2:  [100 %]          Relevant Results    Labs      CBC:   Recent Labs     07/14/25  0733 06/30/25  1234 06/19/25  0930 06/13/25  1228 06/03/25  0833 05/20/25  0845   WBC 4.2* 3.9* 3.5* 3.9* 4.5  4.5 4.2*   HGB 12.5* 11.8* 11.4* 11.8* 12.5*  12.6* 11.6*   HCT 35.1* 32.9* 32.1* 32.9* 34.2*  33.9* 33.7*   PLT 87* 86* 90* 86* 82*  84* 71*   * 109* 108* 109* 107*  107* 112*     BMP/CMP:   Recent Labs    " " 07/14/25  0733 06/30/25  1234 06/19/25  0930 06/13/25  1228 06/03/25  0833 05/20/25  0845    137 139 140 138 138   K 3.7 3.7 3.9 3.9 3.9 4.2    107 108* 108* 106 107   BUN 14 19 15 28* 22 25*   CREATININE 1.25 1.32* 1.27 1.76* 1.42* 1.29   CO2 22 22 22 23 24 23   CALCIUM 8.4* 8.3* 8.4* 8.8 9.0 8.4*   PROT 6.3* 6.2* 6.0* 6.1* 6.6 6.5   BILITOT 0.7 0.6 0.7 0.6 0.8 0.7   ALKPHOS 51 50 47 48 48 49   ALT 15 16 16 16 18 33   AST 17 16 14 15 16 22   GLUCOSE 100* 122* 93 89 111* 99      Magnesium:   Recent Labs     07/14/25  0733 06/30/25  1234 06/19/25  0930 06/13/25  1228 06/03/25  0833 05/20/25  0845   MG 1.93 1.88 1.98 1.98 1.95 1.96     Lipid Panel:   Recent Labs     01/28/25  0745 06/09/23  0125 05/31/23  1152 05/23/22  1108 03/02/21  1307   CHOL 109  --  92 108 157   HDL 39.6  --  35.8* 52.1 54.8   CHHDL 2.8  --  2.6 2.1 2.9   VLDL 16  --  12 9 12   TRIG 82 68 60 43 61   NHDL 69  --   --   --   --      Cardiac       No lab exists for component: \"CK\", \"CKMBP\"   Hemoglobin A1C: No results for input(s): \"HGBA1C\" in the last 98244 hours.  TSH/ Free T4:   Recent Labs     08/23/23  0909 06/09/23  0125 03/02/21  1307   TSH 2.51 5.44* 1.52   FREET4  --  0.92  --      Iron:   Recent Labs     11/20/24  0310 11/19/24  0409 11/18/24  0443 11/17/24  0400 11/16/24  0407 10/07/24  1416 08/23/23  0909 06/09/23  0125   FERRITIN 849* 834* 845* 860* 914*  --  280 520*   TIBC  --   --   --   --   --   --  245 219*   IRONSAT  --   --   --   --   --   --  12* 24*   BNP  --   --   --   --   --  52  --   --      Coag:   Results from last 7 days   Lab Units 07/14/25  0733   INR  1.1     ABO: No results found for: \"ABO\"    Past Cardiology Tests (Last 3 Years):    EKG:  Recent Labs     06/22/23  1435   ATRRATE 92   VENTRATE 92   PRINT 128   QRSDUR 106   QTCFRED 405   QTCCALCB 435   No results found. However, due to the size of the patient record, not all encounters were searched. Please check Results Review for a complete set of " results.  Echo:  Echocardiogram:   Transthoracic Echo (TTE) Complete 10/23/2024    PHYSICIAN INTERPRETATION:  Left Ventricle: Left ventricular ejection fraction is low normal, by visual estimate at 50-55%. There are no regional left ventricular wall motion abnormalities. The left ventricular cavity size is normal. The left ventricular septal wall thickness is mildly increased. There is mildly increased left ventricular posterior wall thickness. There is a false tendon visualized in the left ventricle. There is moderate concentric left ventricular hypertrophy. Spectral Doppler shows a pseudonormal pattern of left ventricular diastolic filling. There is an elevated mean left atrial pressure.  Left Atrium: The left atrium is severely dilated.  Right Ventricle: The right ventricle is normal in size. There is normal right ventricular global systolic function.  Right Atrium: The right atrium is moderately dilated.  Aortic Valve: The aortic valve is trileaflet. There is no evidence of aortic valve regurgitation. The peak instantaneous gradient of the aortic valve is 9.5 mmHg.  Mitral Valve: The mitral valve is normal in structure. There is trace mitral valve regurgitation.  Tricuspid Valve: The tricuspid valve is structurally normal. There is mild tricuspid regurgitation. The Doppler estimated RVSP is mildly elevated at 38.7 mmHg.  Pulmonic Valve: The pulmonic valve is structurally normal. There is physiologic pulmonic valve regurgitation.  Pericardium: Trivial pericardial effusion.  Aorta: The aortic root is normal.  In comparison to the previous echocardiogram(s): Compared with study dated 6/14/2023, no significant change . RVSP is again mildly elevated in context of grade II diastolic dysfunction.      CONCLUSIONS:  1. Left ventricular ejection fraction is low normal, by visual estimate at 50-55%.  2. Spectral Doppler shows a pseudonormal pattern of left ventricular diastolic filling.  3. There is an elevated mean left  atrial pressure.  4. There is moderate concentric left ventricular hypertrophy.  5. There is normal right ventricular global systolic function.  6. Mildly elevated right ventricular systolic pressure.  7. The left atrium is severely dilated.  8. The right atrium is moderately dilated.  9. Compared with study dated 6/14/2023, no significant change . RVSP is again mildly elevated in context of grade II diastolic dysfunction.      Ejection Fractions:  LV EF   Date/Time Value Ref Range Status   01/08/2025 11:09 AM 59 %    10/23/2024 01:54 PM 53 %      Cath:  Coronary Angiography: No results found for this or any previous visit from the past 1800 days.    Right Heart Cath: No results found for this or any previous visit from the past 1800 days.    Stress Test:  Nuclear:No results found for this or any previous visit from the past 1800 days.    Metabolic Stress: No results found for this or any previous visit from the past 1800 days.    Cardiac Imaging:  Cardiac Scoring: No results found for this or any previous visit from the past 1800 days.    Cardiac MRI: No results found for this or any previous visit from the past 1800 days.         Assessment/Plan  Assessment/Plan   Assessment & Plan  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)    hx of myeloid leukemia  -mediport removal with Dr. Blanco on 7/17/25       NP discussed with Dr. Blanco regarding plan of care/ discharge plan      I spent 30 minutes in the professional and overall care of this patient.      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  [3]    Social History  Tobacco Use    Smoking status: Never    Smokeless tobacco: Never   Substance Use Topics    Alcohol use: Never    Drug use: Yes     Types: Marijuana     Comment: daily   [4]   Family History  Problem Relation Name Age of Onset    Esophageal cancer Father     [5]   Allergies  Allergen Reactions    Zyrtec [Cetirizine] Headache     Headaches do not occur with fexofenadine (Allegra)   [6]   Scheduled medications   Medication Dose Route Frequency   [7]   PRN medications   Medication   [8]   Continuous Medications   Medication Dose Last Rate    sodium chloride 0.9%  100 mL/hr

## 2025-07-17 NOTE — DISCHARGE INSTRUCTIONS
Central Venous Catheter Removal- Patient and Family Education    A trained health care provider, as ordered by your doctor has removed your Central Venous  Catheter. The following instructions will provide a guideline to decrease the risk of  complications while caring for your removal site as it heals.    Central Venous Catheter Removed:   _XX_____ Port   ______ Pheresis Catheter   ______ Dialysis Catheter   ______ Tunneled Central Catheter   ______ PICC Line    Activity:  ? No exercising, lifting heavy objects, or strenuous activities for the next seven days.    Pain Management:  ? Expect some minor discomfort at the surgical site for the next few days.  ? You may use over-the-counter medications such as Acetaminophen (Tylenol) or  Ibuprofen (Advil or Motrin) for minor discomfort, unless restricted for some other  reasons.    Care of your incision after device removal:  ? Keep dressing dry for 2 days  ? If you have sterile paper strips over the incision allow them to fall off on their own.  ? You may shower 48 hours after the dressing is removed but do not soak the incision for 2 weeks  (no swimming, bathing or hot tubs until completely healed).    Medications:  ? Resume taking all your previous medications.  ? If you are taking blood-thinning medication, please follow special instructions by your  doctor.    Call your Doctor right away, if you have any of these signs:  ? Fever higher than a temperature of 101.5°F or chills.  ? Swelling or severe pain on the side the catheter was removed.  ? Increased bleeding, redness or drainage at or around the removal site.     How to Reach your Doctor:    Call [phone] with problems or questions.     This info is a general resource. It is not meant to replace your health care provider’s advice.  Ask your doctor or health care team any questions. Always follow their instructions.

## 2025-07-17 NOTE — NURSING NOTE
Home going instructions specific to procedure given. Easily arousable; responding appropriately. Vs +/- 20% of pre procedure status. Significant complications are absent. Ambulates without dizziness/age appropriate activity, pulse ox above or equal to 92% on room air/ordered oxygen treatment. Care Plan Complete. Discharge to home accompanied by (family). Discharged via wheelchair.  PIV removed and dressing applied.

## 2025-07-29 ENCOUNTER — SPECIALTY PHARMACY (OUTPATIENT)
Dept: PHARMACY | Facility: CLINIC | Age: 70
End: 2025-07-29

## 2025-07-29 PROCEDURE — RXMED WILLOW AMBULATORY MEDICATION CHARGE

## 2025-07-31 ENCOUNTER — PHARMACY VISIT (OUTPATIENT)
Dept: PHARMACY | Facility: CLINIC | Age: 70
End: 2025-07-31
Payer: COMMERCIAL

## 2025-08-12 ENCOUNTER — APPOINTMENT (OUTPATIENT)
Dept: HEMATOLOGY/ONCOLOGY | Facility: HOSPITAL | Age: 70
End: 2025-08-12
Payer: MEDICARE

## 2025-08-12 ENCOUNTER — LAB (OUTPATIENT)
Dept: LAB | Facility: HOSPITAL | Age: 70
End: 2025-08-12
Payer: MEDICARE

## 2025-08-12 ENCOUNTER — OFFICE VISIT (OUTPATIENT)
Dept: HEMATOLOGY/ONCOLOGY | Facility: HOSPITAL | Age: 70
End: 2025-08-12
Payer: MEDICARE

## 2025-08-12 VITALS
HEART RATE: 65 BPM | SYSTOLIC BLOOD PRESSURE: 129 MMHG | DIASTOLIC BLOOD PRESSURE: 76 MMHG | WEIGHT: 171.96 LBS | BODY MASS INDEX: 26.93 KG/M2 | OXYGEN SATURATION: 100 % | RESPIRATION RATE: 16 BRPM | TEMPERATURE: 97.3 F

## 2025-08-12 DIAGNOSIS — Z94.81 S/P ALLOGENEIC BONE MARROW TRANSPLANT (MULTI): ICD-10-CM

## 2025-08-12 DIAGNOSIS — D47.02 SYSTEMIC MASTOCYTOSIS WITH ASSOCIATED CLONAL HEMATOLOGICAL NON-MAST CELL LINEAGE DISEASE: ICD-10-CM

## 2025-08-12 DIAGNOSIS — Z94.84 HISTORY OF STEM CELL TRANSPLANT (MULTI): ICD-10-CM

## 2025-08-12 DIAGNOSIS — C92.01 ACUTE MYELOID LEUKEMIA IN REMISSION (MULTI): Primary | ICD-10-CM

## 2025-08-12 DIAGNOSIS — D84.9 IMMUNOCOMPROMISED STATE: ICD-10-CM

## 2025-08-12 DIAGNOSIS — C92.01 ACUTE MYELOID LEUKEMIA IN REMISSION (MULTI): ICD-10-CM

## 2025-08-12 LAB
ALBUMIN SERPL BCP-MCNC: 4.5 G/DL (ref 3.4–5)
ALP SERPL-CCNC: 53 U/L (ref 33–136)
ALT SERPL W P-5'-P-CCNC: 11 U/L (ref 10–52)
ANION GAP SERPL CALC-SCNC: 11 MMOL/L (ref 10–20)
AST SERPL W P-5'-P-CCNC: 14 U/L (ref 9–39)
BASOPHILS # BLD AUTO: 0.03 X10*3/UL (ref 0–0.1)
BASOPHILS NFR BLD AUTO: 0.6 %
BILIRUB SERPL-MCNC: 0.6 MG/DL (ref 0–1.2)
BUN SERPL-MCNC: 23 MG/DL (ref 6–23)
CALCIUM SERPL-MCNC: 8.8 MG/DL (ref 8.6–10.3)
CHLORIDE SERPL-SCNC: 107 MMOL/L (ref 98–107)
CO2 SERPL-SCNC: 24 MMOL/L (ref 21–32)
CREAT SERPL-MCNC: 1.61 MG/DL (ref 0.5–1.3)
EGFRCR SERPLBLD CKD-EPI 2021: 46 ML/MIN/1.73M*2
EOSINOPHIL # BLD AUTO: 0.11 X10*3/UL (ref 0–0.7)
EOSINOPHIL NFR BLD AUTO: 2.3 %
ERYTHROCYTE [DISTWIDTH] IN BLOOD BY AUTOMATED COUNT: 13.7 % (ref 11.5–14.5)
GLUCOSE SERPL-MCNC: 94 MG/DL (ref 74–99)
HCT VFR BLD AUTO: 34.5 % (ref 41–52)
HGB BLD-MCNC: 12.1 G/DL (ref 13.5–17.5)
IMM GRANULOCYTES # BLD AUTO: 0.01 X10*3/UL (ref 0–0.7)
IMM GRANULOCYTES NFR BLD AUTO: 0.2 % (ref 0–0.9)
LYMPHOCYTES # BLD AUTO: 0.8 X10*3/UL (ref 1.2–4.8)
LYMPHOCYTES NFR BLD AUTO: 16.9 %
MAGNESIUM SERPL-MCNC: 1.99 MG/DL (ref 1.6–2.4)
MCH RBC QN AUTO: 37.3 PG (ref 26–34)
MCHC RBC AUTO-ENTMCNC: 35.1 G/DL (ref 32–36)
MCV RBC AUTO: 107 FL (ref 80–100)
MONOCYTES # BLD AUTO: 0.32 X10*3/UL (ref 0.1–1)
MONOCYTES NFR BLD AUTO: 6.8 %
NEUTROPHILS # BLD AUTO: 3.47 X10*3/UL (ref 1.2–7.7)
NEUTROPHILS NFR BLD AUTO: 73.2 %
NRBC BLD-RTO: 0 /100 WBCS (ref 0–0)
PLATELET # BLD AUTO: 81 X10*3/UL (ref 150–450)
POTASSIUM SERPL-SCNC: 4.2 MMOL/L (ref 3.5–5.3)
PROT SERPL-MCNC: 6.6 G/DL (ref 6.4–8.2)
RBC # BLD AUTO: 3.24 X10*6/UL (ref 4.5–5.9)
SODIUM SERPL-SCNC: 138 MMOL/L (ref 136–145)
TACROLIMUS BLD-MCNC: 5.3 NG/ML
WBC # BLD AUTO: 4.7 X10*3/UL (ref 4.4–11.3)

## 2025-08-12 PROCEDURE — 87799 DETECT AGENT NOS DNA QUANT: CPT

## 2025-08-12 PROCEDURE — 3078F DIAST BP <80 MM HG: CPT | Performed by: STUDENT IN AN ORGANIZED HEALTH CARE EDUCATION/TRAINING PROGRAM

## 2025-08-12 PROCEDURE — 1159F MED LIST DOCD IN RCRD: CPT | Performed by: STUDENT IN AN ORGANIZED HEALTH CARE EDUCATION/TRAINING PROGRAM

## 2025-08-12 PROCEDURE — 36415 COLL VENOUS BLD VENIPUNCTURE: CPT

## 2025-08-12 PROCEDURE — 85025 COMPLETE CBC W/AUTO DIFF WBC: CPT

## 2025-08-12 PROCEDURE — 80053 COMPREHEN METABOLIC PANEL: CPT

## 2025-08-12 PROCEDURE — 83735 ASSAY OF MAGNESIUM: CPT

## 2025-08-12 PROCEDURE — 81267 CHIMERISM ANAL NO CELL SELEC: CPT

## 2025-08-12 PROCEDURE — 3074F SYST BP LT 130 MM HG: CPT | Performed by: STUDENT IN AN ORGANIZED HEALTH CARE EDUCATION/TRAINING PROGRAM

## 2025-08-12 PROCEDURE — 99215 OFFICE O/P EST HI 40 MIN: CPT | Performed by: STUDENT IN AN ORGANIZED HEALTH CARE EDUCATION/TRAINING PROGRAM

## 2025-08-12 PROCEDURE — 1126F AMNT PAIN NOTED NONE PRSNT: CPT | Performed by: STUDENT IN AN ORGANIZED HEALTH CARE EDUCATION/TRAINING PROGRAM

## 2025-08-12 PROCEDURE — 80197 ASSAY OF TACROLIMUS: CPT

## 2025-08-12 RX ORDER — SULFAMETHOXAZOLE AND TRIMETHOPRIM 800; 160 MG/1; MG/1
1 TABLET ORAL 3 TIMES WEEKLY
Qty: 36 TABLET | Refills: 3 | Status: SHIPPED | OUTPATIENT
Start: 2025-08-13 | End: 2026-08-08

## 2025-08-12 RX ORDER — AMOXICILLIN AND CLAVULANATE POTASSIUM 875; 125 MG/1; MG/1
875 TABLET, FILM COATED ORAL 2 TIMES DAILY
Qty: 10 TABLET | Refills: 0 | Status: SHIPPED | OUTPATIENT
Start: 2025-08-12 | End: 2025-08-17

## 2025-08-12 RX ORDER — TACROLIMUS 1 MG/1
3 CAPSULE ORAL EVERY 12 HOURS
Qty: 180 CAPSULE | Refills: 2 | Status: SHIPPED | OUTPATIENT
Start: 2025-08-12 | End: 2025-11-10

## 2025-08-12 ASSESSMENT — PAIN SCALES - GENERAL: PAINLEVEL_OUTOF10: 0-NO PAIN

## 2025-08-15 LAB
CHIMERISM INTERPRETATION: NORMAL
ELECTRONICALLY SIGNED BY: NORMAL

## 2025-08-22 ENCOUNTER — SPECIALTY PHARMACY (OUTPATIENT)
Dept: PHARMACY | Facility: CLINIC | Age: 70
End: 2025-08-22

## 2025-08-22 PROCEDURE — RXMED WILLOW AMBULATORY MEDICATION CHARGE

## 2025-08-26 ENCOUNTER — PHARMACY VISIT (OUTPATIENT)
Dept: PHARMACY | Facility: CLINIC | Age: 70
End: 2025-08-26
Payer: COMMERCIAL

## 2025-08-27 ENCOUNTER — APPOINTMENT (OUTPATIENT)
Dept: LAB | Facility: HOSPITAL | Age: 70
End: 2025-08-27
Payer: MEDICARE

## 2025-08-27 ENCOUNTER — PROCEDURE VISIT (OUTPATIENT)
Dept: HEMATOLOGY/ONCOLOGY | Facility: HOSPITAL | Age: 70
End: 2025-08-27
Payer: MEDICARE

## 2025-08-27 VITALS
RESPIRATION RATE: 16 BRPM | HEART RATE: 76 BPM | OXYGEN SATURATION: 100 % | BODY MASS INDEX: 27.14 KG/M2 | WEIGHT: 173.28 LBS | SYSTOLIC BLOOD PRESSURE: 136 MMHG | TEMPERATURE: 97.7 F | DIASTOLIC BLOOD PRESSURE: 83 MMHG

## 2025-08-27 DIAGNOSIS — Z94.84 HISTORY OF STEM CELL TRANSPLANT (MULTI): ICD-10-CM

## 2025-08-27 DIAGNOSIS — C92.01 AML (ACUTE MYELOID LEUKEMIA) IN REMISSION (MULTI): Primary | ICD-10-CM

## 2025-08-27 LAB
BASOPHILS # BLD AUTO: 0.03 X10*3/UL (ref 0–0.1)
BASOPHILS NFR BLD AUTO: 0.7 %
EOSINOPHIL # BLD AUTO: 0.05 X10*3/UL (ref 0–0.7)
EOSINOPHIL NFR BLD AUTO: 1.1 %
ERYTHROCYTE [DISTWIDTH] IN BLOOD BY AUTOMATED COUNT: 14.3 % (ref 11.5–14.5)
HCT VFR BLD AUTO: 35.6 % (ref 41–52)
HGB BLD-MCNC: 12.8 G/DL (ref 13.5–17.5)
IMM GRANULOCYTES # BLD AUTO: 0.01 X10*3/UL (ref 0–0.7)
IMM GRANULOCYTES NFR BLD AUTO: 0.2 % (ref 0–0.9)
LYMPHOCYTES # BLD AUTO: 0.78 X10*3/UL (ref 1.2–4.8)
LYMPHOCYTES NFR BLD AUTO: 17.1 %
MCH RBC QN AUTO: 38.1 PG (ref 26–34)
MCHC RBC AUTO-ENTMCNC: 36 G/DL (ref 32–36)
MCV RBC AUTO: 106 FL (ref 80–100)
MONOCYTES # BLD AUTO: 0.38 X10*3/UL (ref 0.1–1)
MONOCYTES NFR BLD AUTO: 8.4 %
NEUTROPHILS # BLD AUTO: 3.3 X10*3/UL (ref 1.2–7.7)
NEUTROPHILS NFR BLD AUTO: 72.5 %
NRBC BLD-RTO: 0 /100 WBCS (ref 0–0)
PLATELET # BLD AUTO: 88 X10*3/UL (ref 150–450)
RBC # BLD AUTO: 3.36 X10*6/UL (ref 4.5–5.9)
WBC # BLD AUTO: 4.6 X10*3/UL (ref 4.4–11.3)

## 2025-08-27 PROCEDURE — 36415 COLL VENOUS BLD VENIPUNCTURE: CPT

## 2025-08-27 PROCEDURE — 85025 COMPLETE CBC W/AUTO DIFF WBC: CPT

## 2025-08-27 PROCEDURE — 88185 FLOWCYTOMETRY/TC ADD-ON: CPT | Mod: TC | Performed by: PHYSICIAN ASSISTANT

## 2025-08-27 PROCEDURE — 88237 TISSUE CULTURE BONE MARROW: CPT | Performed by: PHYSICIAN ASSISTANT

## 2025-08-27 PROCEDURE — 38222 DX BONE MARROW BX & ASPIR: CPT | Mod: LT | Performed by: PHYSICIAN ASSISTANT

## 2025-08-27 RX ORDER — LIDOCAINE HYDROCHLORIDE 20 MG/ML
10 INJECTION, SOLUTION INFILTRATION; PERINEURAL ONCE
Status: DISCONTINUED | OUTPATIENT
Start: 2025-08-27 | End: 2025-08-27 | Stop reason: HOSPADM

## 2025-08-27 ASSESSMENT — PAIN SCALES - GENERAL: PAINLEVEL_OUTOF10: 0-NO PAIN

## 2025-08-28 LAB
CELL POPULATIONS: NORMAL
DIAGNOSIS: NORMAL
FLOW DIFFERENTIAL: NORMAL
FLOW TEST ORDERED: NORMAL
LAB TEST METHOD: NORMAL
NUMBER OF CELLS COLLECTED: NORMAL
PATH REPORT.TOTAL CANCER: NORMAL
SIGNATURE COMMENT: NORMAL
SPECIMEN VIABILITY: NORMAL

## 2025-08-29 LAB
CHROM ANALY OVERALL INTERP-IMP: NORMAL
ELECTRONICALLY SIGNED BY CYTOGENETICS: NORMAL
PATH REPORT.COMMENTS IMP SPEC: NORMAL
PATH REPORT.FINAL DX SPEC: NORMAL
PATH REPORT.GROSS SPEC: NORMAL
PATH REPORT.MICROSCOPIC SPEC OTHER STN: NORMAL
PATH REPORT.RELEVANT HX SPEC: NORMAL
PATH REPORT.RELEVANT HX SPEC: NORMAL
PATH REPORT.TOTAL CANCER: NORMAL

## 2025-09-04 LAB
CHIMERISM INTERPRETATION: NORMAL
ELECTRONICALLY SIGNED BY: NORMAL

## 2026-01-07 ENCOUNTER — APPOINTMENT (OUTPATIENT)
Dept: CARDIOLOGY | Facility: HOSPITAL | Age: 71
End: 2026-01-07
Payer: MEDICARE

## 2026-01-16 ENCOUNTER — APPOINTMENT (OUTPATIENT)
Dept: CARDIOLOGY | Facility: CLINIC | Age: 71
End: 2026-01-16
Payer: MEDICARE